# Patient Record
Sex: MALE | Race: WHITE | NOT HISPANIC OR LATINO | Employment: OTHER | RURAL
[De-identification: names, ages, dates, MRNs, and addresses within clinical notes are randomized per-mention and may not be internally consistent; named-entity substitution may affect disease eponyms.]

---

## 2012-09-04 LAB — CRC RECOMMENDATION EXT: NORMAL

## 2019-07-11 ENCOUNTER — HISTORICAL (OUTPATIENT)
Dept: ADMINISTRATIVE | Facility: HOSPITAL | Age: 69
End: 2019-07-11

## 2019-07-12 LAB
LAB AP CLINICAL INFORMATION: NORMAL
LAB AP DIAGNOSIS - HISTORICAL: NORMAL
LAB AP GROSS PATHOLOGY - HISTORICAL: NORMAL
LAB AP SPECIMEN SUBMITTED - HISTORICAL: NORMAL

## 2020-07-28 ENCOUNTER — HISTORICAL (OUTPATIENT)
Dept: ADMINISTRATIVE | Facility: HOSPITAL | Age: 70
End: 2020-07-28

## 2020-07-28 LAB
ANION GAP SERPL CALCULATED.3IONS-SCNC: 11 MMOL/L
APTT PPP: 40.3 SECONDS (ref 25.2–37.3)
BACTERIA #/AREA URNS HPF: ABNORMAL /HPF
BASOPHILS # BLD AUTO: 0.12 X10E3/UL (ref 0–0.2)
BASOPHILS NFR BLD AUTO: 0.5 % (ref 0–1)
BILIRUB UR QL STRIP: NEGATIVE MG/DL
BUN SERPL-MCNC: 20 MG/DL (ref 7–18)
CALCIUM SERPL-MCNC: 8.7 MG/DL (ref 8.5–10.1)
CHLORIDE SERPL-SCNC: 101 MMOL/L (ref 98–107)
CK MB SERPL-MCNC: <1 NG/ML (ref 1–3.6)
CK SERPL-CCNC: 79 U/L (ref 39–308)
CLARITY UR: ABNORMAL
CLARITY UR: ABNORMAL
CO2 SERPL-SCNC: 25 MMOL/L (ref 21–32)
COLOR UR: ABNORMAL
COLOR UR: ABNORMAL
CREAT SERPL-MCNC: 1.43 MG/DL (ref 0.7–1.3)
D DIMER PPP FEU-MCNC: 1.77 UG/ML (ref 0–0.47)
EOSINOPHIL # BLD AUTO: 0.01 X10E3/UL (ref 0–0.5)
EOSINOPHIL NFR BLD AUTO: 0 % (ref 1–4)
ERYTHROCYTE [DISTWIDTH] IN BLOOD BY AUTOMATED COUNT: 12.2 % (ref 11.5–14.5)
GLUCOSE SERPL-MCNC: 124 MG/DL (ref 74–106)
GLUCOSE UR STRIP-MCNC: NEGATIVE MG/DL
HCT VFR BLD AUTO: 34.1 % (ref 40–54)
HGB BLD-MCNC: 11 G/DL (ref 13.5–18)
IMM GRANULOCYTES # BLD AUTO: 0.17 X10E3/UL (ref 0–0.04)
IMM GRANULOCYTES NFR BLD: 0.8 % (ref 0–0.4)
INR BLD: 1.19 (ref 0–3.3)
KETONES UR STRIP-SCNC: NEGATIVE MG/DL
LACTATE SERPL-SCNC: 1.6 MMOL/L (ref 0.4–2)
LEUKOCYTE ESTERASE UR QL STRIP: ABNORMAL LEU/UL
LYMPHOCYTES # BLD AUTO: 0.92 X10E3/UL (ref 1–4.8)
LYMPHOCYTES NFR BLD AUTO: 4.2 % (ref 27–41)
MAGNESIUM SERPL-MCNC: 2 MG/DL (ref 1.7–2.3)
MCH RBC QN AUTO: 32.5 PG (ref 27–31)
MCHC RBC AUTO-ENTMCNC: 32.3 G/DL (ref 32–36)
MCV RBC AUTO: 100.9 FL (ref 80–96)
MONOCYTES # BLD AUTO: 1.11 X10E3/UL (ref 0–0.8)
MONOCYTES NFR BLD AUTO: 5.1 % (ref 2–6)
MPC BLD CALC-MCNC: 8.7 FL (ref 9.4–12.4)
MYOGLOBIN SERPL-MCNC: 79 NG/ML (ref 16–116)
NEUTROPHILS # BLD AUTO: 19.54 X10E3/UL (ref 1.8–7.7)
NEUTROPHILS NFR BLD AUTO: 89.4 % (ref 53–65)
NITRITE UR QL STRIP: POSITIVE
NRBC # BLD AUTO: 0 X10E3/UL (ref 0–0)
NRBC, AUTO (.00): 0 /100 (ref 0–0)
NT-PROBNP SERPL-MCNC: 583 PG/ML (ref 1–125)
PH UR STRIP: 6 PH UNITS (ref 5–8)
PLATELET # BLD AUTO: 351 X10E3/UL (ref 150–400)
POTASSIUM SERPL-SCNC: 4.3 MMOL/L (ref 3.5–5.1)
PROT UR QL STRIP: 100 MG/DL
PROTHROMBIN TIME: 14.5 SECONDS (ref 11.7–14.7)
RBC # BLD AUTO: 3.38 X10E6/UL (ref 4.6–6.2)
RBC # UR STRIP: ABNORMAL ERY/UL
RBC #/AREA URNS HPF: ABNORMAL /HPF (ref 0–3)
SARS-COV+SARS-COV-2 AG RESP QL IA.RAPID: NEGATIVE
SODIUM SERPL-SCNC: 133 MMOL/L (ref 136–145)
SP GR UR STRIP: 1.02 (ref 1–1.03)
SQUAMOUS #/AREA URNS LPF: ABNORMAL /LPF
TROPONIN I SERPL-MCNC: <0.017 NG/ML (ref 0–0.06)
UROBILINOGEN UR STRIP-ACNC: 0.2 EU/DL
WBC # BLD AUTO: 21.87 X10E3/UL (ref 4.5–11)
WBC #/AREA URNS HPF: ABNORMAL /HPF (ref 0–5)

## 2020-07-30 LAB
REPORT: 38
REPORT: NORMAL

## 2020-08-03 LAB
REPORT: NORMAL

## 2020-12-03 ENCOUNTER — HISTORICAL (OUTPATIENT)
Dept: ADMINISTRATIVE | Facility: HOSPITAL | Age: 70
End: 2020-12-03

## 2020-12-03 LAB — CREAT SERPL-MCNC: 1.4 MG/DL (ref 0.6–1.3)

## 2020-12-17 ENCOUNTER — HISTORICAL (OUTPATIENT)
Dept: ADMINISTRATIVE | Facility: HOSPITAL | Age: 70
End: 2020-12-17

## 2021-03-12 RX ORDER — METAXALONE 800 MG/1
800 TABLET ORAL DAILY PRN
COMMUNITY
End: 2021-08-31 | Stop reason: SDUPTHER

## 2021-03-12 RX ORDER — IPRATROPIUM BROMIDE AND ALBUTEROL 20; 100 UG/1; UG/1
1 SPRAY, METERED RESPIRATORY (INHALATION) 2 TIMES DAILY
COMMUNITY
End: 2022-04-04 | Stop reason: SDUPTHER

## 2021-03-12 RX ORDER — OMEPRAZOLE 20 MG/1
20 CAPSULE, DELAYED RELEASE ORAL DAILY
COMMUNITY
End: 2021-12-28 | Stop reason: SDUPTHER

## 2021-03-12 RX ORDER — MUPIROCIN 20 MG/G
OINTMENT TOPICAL 3 TIMES DAILY
COMMUNITY
End: 2021-07-19

## 2021-03-12 RX ORDER — CETIRIZINE HYDROCHLORIDE 10 MG/1
10 TABLET ORAL DAILY
COMMUNITY
End: 2022-04-04 | Stop reason: SDUPTHER

## 2021-03-12 RX ORDER — METHENAMINE HIPPURATE 1000 MG/1
1 TABLET ORAL 2 TIMES DAILY
COMMUNITY
End: 2021-09-21 | Stop reason: SDUPTHER

## 2021-03-12 RX ORDER — LISINOPRIL 40 MG/1
40 TABLET ORAL DAILY
COMMUNITY
End: 2021-10-18

## 2021-03-12 RX ORDER — ROSUVASTATIN CALCIUM 40 MG/1
10 TABLET, COATED ORAL NIGHTLY
COMMUNITY
End: 2021-07-19

## 2021-03-12 RX ORDER — GABAPENTIN 300 MG/1
300 CAPSULE ORAL 3 TIMES DAILY
COMMUNITY
End: 2021-08-18 | Stop reason: SDUPTHER

## 2021-03-12 RX ORDER — FLUOXETINE HYDROCHLORIDE 40 MG/1
40 CAPSULE ORAL DAILY
COMMUNITY
End: 2021-11-10 | Stop reason: SDUPTHER

## 2021-03-18 ENCOUNTER — HOSPITAL ENCOUNTER (OUTPATIENT)
Facility: HOSPITAL | Age: 71
Discharge: HOME | End: 2021-03-18
Attending: PAIN MEDICINE | Admitting: PAIN MEDICINE
Payer: COMMERCIAL

## 2021-03-18 ENCOUNTER — ANESTHESIA (OUTPATIENT)
Dept: PAIN MEDICINE | Facility: HOSPITAL | Age: 71
End: 2021-03-18
Payer: COMMERCIAL

## 2021-03-18 ENCOUNTER — ANESTHESIA EVENT (OUTPATIENT)
Dept: PAIN MEDICINE | Facility: HOSPITAL | Age: 71
End: 2021-03-18
Payer: COMMERCIAL

## 2021-03-18 VITALS
RESPIRATION RATE: 15 BRPM | DIASTOLIC BLOOD PRESSURE: 74 MMHG | SYSTOLIC BLOOD PRESSURE: 131 MMHG | OXYGEN SATURATION: 99 % | HEART RATE: 49 BPM | WEIGHT: 182 LBS | TEMPERATURE: 98 F | BODY MASS INDEX: 31.07 KG/M2 | HEIGHT: 64 IN

## 2021-03-18 DIAGNOSIS — M47.817 SPONDYLOSIS OF LUMBOSACRAL SPINE WITHOUT MYELOPATHY: ICD-10-CM

## 2021-03-18 PROCEDURE — 25000003 PHARM REV CODE 250: Performed by: PAIN MEDICINE

## 2021-03-18 PROCEDURE — 64635 DESTROY LUMB/SAC FACET JNT: CPT | Mod: LT,,, | Performed by: PAIN MEDICINE

## 2021-03-18 PROCEDURE — 64635 PR DESTROY LUMB/SAC FACET JNT: ICD-10-PCS | Mod: LT,,, | Performed by: PAIN MEDICINE

## 2021-03-18 PROCEDURE — 64635 DESTROY LUMB/SAC FACET JNT: CPT | Mod: LT | Performed by: PAIN MEDICINE

## 2021-03-18 PROCEDURE — 27201423 OPTIME MED/SURG SUP & DEVICES STERILE SUPPLY: Performed by: PAIN MEDICINE

## 2021-03-18 PROCEDURE — 64636 DESTROY L/S FACET JNT ADDL: CPT | Mod: LT,,, | Performed by: PAIN MEDICINE

## 2021-03-18 PROCEDURE — 37000008 HC ANESTHESIA 1ST 15 MINUTES: Performed by: PAIN MEDICINE

## 2021-03-18 PROCEDURE — 63600175 PHARM REV CODE 636 W HCPCS: Performed by: NURSE ANESTHETIST, CERTIFIED REGISTERED

## 2021-03-18 PROCEDURE — D9220A PRA ANESTHESIA: ICD-10-PCS | Mod: ,,, | Performed by: NURSE ANESTHETIST, CERTIFIED REGISTERED

## 2021-03-18 PROCEDURE — 64636 DESTROY L/S FACET JNT ADDL: CPT | Mod: 50 | Performed by: PAIN MEDICINE

## 2021-03-18 PROCEDURE — 27000284 HC CANNULA NASAL: Performed by: NURSE ANESTHETIST, CERTIFIED REGISTERED

## 2021-03-18 PROCEDURE — D9220A PRA ANESTHESIA: Mod: ,,, | Performed by: NURSE ANESTHETIST, CERTIFIED REGISTERED

## 2021-03-18 PROCEDURE — 37000009 HC ANESTHESIA EA ADD 15 MINS: Performed by: PAIN MEDICINE

## 2021-03-18 PROCEDURE — 64636 PR DESTROY L/S FACET JNT ADDL: ICD-10-PCS | Mod: LT,,, | Performed by: PAIN MEDICINE

## 2021-03-18 RX ORDER — LIDOCAINE HCL/PF 100 MG/5ML
SYRINGE (ML) INTRAVENOUS
Status: DISCONTINUED | OUTPATIENT
Start: 2021-03-18 | End: 2021-03-18

## 2021-03-18 RX ORDER — BUPIVACAINE HYDROCHLORIDE 2.5 MG/ML
INJECTION, SOLUTION INFILTRATION; PERINEURAL
Status: DISCONTINUED | OUTPATIENT
Start: 2021-03-18 | End: 2021-03-18 | Stop reason: HOSPADM

## 2021-03-18 RX ORDER — PROPOFOL 10 MG/ML
INJECTION, EMULSION INTRAVENOUS
Status: DISCONTINUED | OUTPATIENT
Start: 2021-03-18 | End: 2021-03-18

## 2021-03-18 RX ORDER — ORPHENADRINE CITRATE 30 MG/ML
INJECTION INTRAMUSCULAR; INTRAVENOUS
Status: DISCONTINUED | OUTPATIENT
Start: 2021-03-18 | End: 2021-03-18

## 2021-03-18 RX ADMIN — PROPOFOL 20 MG: 10 INJECTION, EMULSION INTRAVENOUS at 11:03

## 2021-03-18 RX ADMIN — Medication 100 MG: at 11:03

## 2021-03-18 RX ADMIN — PROPOFOL 20 MG: 10 INJECTION, EMULSION INTRAVENOUS at 12:03

## 2021-03-18 RX ADMIN — PROPOFOL 60 MG: 10 INJECTION, EMULSION INTRAVENOUS at 11:03

## 2021-03-18 RX ADMIN — PROPOFOL 30 MG: 10 INJECTION, EMULSION INTRAVENOUS at 11:03

## 2021-03-18 RX ADMIN — ORPHENADRINE CITRATE 60 MG: 30 INJECTION INTRAMUSCULAR; INTRAVENOUS at 11:03

## 2021-03-31 ENCOUNTER — TELEPHONE (OUTPATIENT)
Dept: PAIN MEDICINE | Facility: HOSPITAL | Age: 71
End: 2021-03-31

## 2021-04-01 ENCOUNTER — HOSPITAL ENCOUNTER (OUTPATIENT)
Facility: HOSPITAL | Age: 71
Discharge: HOME | End: 2021-04-01
Attending: PAIN MEDICINE | Admitting: PAIN MEDICINE
Payer: COMMERCIAL

## 2021-04-01 ENCOUNTER — ANESTHESIA EVENT (OUTPATIENT)
Dept: PAIN MEDICINE | Facility: HOSPITAL | Age: 71
End: 2021-04-01
Payer: COMMERCIAL

## 2021-04-01 ENCOUNTER — ANESTHESIA (OUTPATIENT)
Dept: PAIN MEDICINE | Facility: HOSPITAL | Age: 71
End: 2021-04-01
Payer: COMMERCIAL

## 2021-04-01 VITALS
BODY MASS INDEX: 25.25 KG/M2 | SYSTOLIC BLOOD PRESSURE: 142 MMHG | WEIGHT: 176.38 LBS | HEIGHT: 70 IN | OXYGEN SATURATION: 98 % | RESPIRATION RATE: 14 BRPM | DIASTOLIC BLOOD PRESSURE: 72 MMHG | HEART RATE: 62 BPM | TEMPERATURE: 97 F

## 2021-04-01 DIAGNOSIS — M47.817 SPONDYLOSIS OF LUMBOSACRAL SPINE WITHOUT MYELOPATHY: Primary | ICD-10-CM

## 2021-04-01 DIAGNOSIS — M47.817 SPONDYLOSIS OF LUMBOSACRAL REGION WITHOUT MYELOPATHY OR RADICULOPATHY: ICD-10-CM

## 2021-04-01 PROCEDURE — D9220A PRA ANESTHESIA: Mod: ,,, | Performed by: NURSE ANESTHETIST, CERTIFIED REGISTERED

## 2021-04-01 PROCEDURE — 25000003 PHARM REV CODE 250: Performed by: NURSE ANESTHETIST, CERTIFIED REGISTERED

## 2021-04-01 PROCEDURE — 64636 PR DESTROY L/S FACET JNT ADDL: ICD-10-PCS | Mod: RT,,, | Performed by: PAIN MEDICINE

## 2021-04-01 PROCEDURE — 25000003 PHARM REV CODE 250: Performed by: PAIN MEDICINE

## 2021-04-01 PROCEDURE — D9220A PRA ANESTHESIA: ICD-10-PCS | Mod: ,,, | Performed by: NURSE ANESTHETIST, CERTIFIED REGISTERED

## 2021-04-01 PROCEDURE — 64636 DESTROY L/S FACET JNT ADDL: CPT | Mod: RT,,, | Performed by: PAIN MEDICINE

## 2021-04-01 PROCEDURE — 63600175 PHARM REV CODE 636 W HCPCS: Performed by: NURSE ANESTHETIST, CERTIFIED REGISTERED

## 2021-04-01 PROCEDURE — 64636 DESTROY L/S FACET JNT ADDL: CPT | Mod: RT | Performed by: PAIN MEDICINE

## 2021-04-01 PROCEDURE — 64635 DESTROY LUMB/SAC FACET JNT: CPT | Mod: RT,,, | Performed by: PAIN MEDICINE

## 2021-04-01 PROCEDURE — 64635 PR DESTROY LUMB/SAC FACET JNT: ICD-10-PCS | Mod: RT,,, | Performed by: PAIN MEDICINE

## 2021-04-01 PROCEDURE — 64635 DESTROY LUMB/SAC FACET JNT: CPT | Mod: RT | Performed by: PAIN MEDICINE

## 2021-04-01 PROCEDURE — 37000009 HC ANESTHESIA EA ADD 15 MINS: Performed by: PAIN MEDICINE

## 2021-04-01 PROCEDURE — 27201423 OPTIME MED/SURG SUP & DEVICES STERILE SUPPLY: Performed by: PAIN MEDICINE

## 2021-04-01 PROCEDURE — 37000008 HC ANESTHESIA 1ST 15 MINUTES: Performed by: PAIN MEDICINE

## 2021-04-01 RX ORDER — ORPHENADRINE CITRATE 30 MG/ML
INJECTION INTRAMUSCULAR; INTRAVENOUS
Status: DISCONTINUED | OUTPATIENT
Start: 2021-04-01 | End: 2021-04-01

## 2021-04-01 RX ORDER — SODIUM CHLORIDE 9 MG/ML
INJECTION, SOLUTION INTRAVENOUS CONTINUOUS
Status: DISCONTINUED | OUTPATIENT
Start: 2021-04-01 | End: 2021-09-30

## 2021-04-01 RX ORDER — PROPOFOL 10 MG/ML
VIAL (ML) INTRAVENOUS
Status: DISCONTINUED | OUTPATIENT
Start: 2021-04-01 | End: 2021-04-01

## 2021-04-01 RX ORDER — LIDOCAINE HYDROCHLORIDE 10 MG/ML
INJECTION, SOLUTION INTRAVENOUS
Status: DISCONTINUED | OUTPATIENT
Start: 2021-04-01 | End: 2021-04-01

## 2021-04-01 RX ADMIN — PROPOFOL 50 MG: 10 INJECTION, EMULSION INTRAVENOUS at 10:04

## 2021-04-01 RX ADMIN — PROPOFOL 50 MG: 10 INJECTION, EMULSION INTRAVENOUS at 09:04

## 2021-04-01 RX ADMIN — SODIUM CHLORIDE: 9 INJECTION, SOLUTION INTRAVENOUS at 09:04

## 2021-04-01 RX ADMIN — LIDOCAINE HYDROCHLORIDE 100 MG: 10 INJECTION, SOLUTION INTRAVENOUS at 09:04

## 2021-04-01 RX ADMIN — ORPHENADRINE CITRATE 60 MG: 30 INJECTION INTRAMUSCULAR; INTRAVENOUS at 10:04

## 2021-04-20 ENCOUNTER — TELEPHONE (OUTPATIENT)
Dept: PAIN MEDICINE | Facility: CLINIC | Age: 71
End: 2021-04-20

## 2021-07-16 RX ORDER — MULTIVITAMIN
1 TABLET ORAL DAILY
COMMUNITY
End: 2022-08-22 | Stop reason: SDUPTHER

## 2021-07-19 ENCOUNTER — OFFICE VISIT (OUTPATIENT)
Dept: FAMILY MEDICINE | Facility: CLINIC | Age: 71
End: 2021-07-19
Payer: COMMERCIAL

## 2021-07-19 VITALS
WEIGHT: 181.19 LBS | HEART RATE: 72 BPM | DIASTOLIC BLOOD PRESSURE: 60 MMHG | SYSTOLIC BLOOD PRESSURE: 108 MMHG | OXYGEN SATURATION: 97 % | TEMPERATURE: 98 F | BODY MASS INDEX: 25.94 KG/M2 | HEIGHT: 70 IN | RESPIRATION RATE: 20 BRPM

## 2021-07-19 DIAGNOSIS — N18.9 CHRONIC RENAL IMPAIRMENT, UNSPECIFIED CKD STAGE: ICD-10-CM

## 2021-07-19 DIAGNOSIS — E78.00 HYPERCHOLESTEREMIA: ICD-10-CM

## 2021-07-19 DIAGNOSIS — G89.4 CHRONIC PAIN SYNDROME: ICD-10-CM

## 2021-07-19 DIAGNOSIS — Z11.59 NEED FOR HEPATITIS C SCREENING TEST: ICD-10-CM

## 2021-07-19 DIAGNOSIS — R73.03 PREDIABETES: Primary | ICD-10-CM

## 2021-07-19 DIAGNOSIS — I10 ESSENTIAL HYPERTENSION: ICD-10-CM

## 2021-07-19 DIAGNOSIS — Z79.899 ENCOUNTER FOR LONG-TERM (CURRENT) USE OF OTHER MEDICATIONS: ICD-10-CM

## 2021-07-19 PROCEDURE — 1159F MED LIST DOCD IN RCRD: CPT | Mod: CPTII,,, | Performed by: NURSE PRACTITIONER

## 2021-07-19 PROCEDURE — 1126F AMNT PAIN NOTED NONE PRSNT: CPT | Mod: CPTII,,, | Performed by: NURSE PRACTITIONER

## 2021-07-19 PROCEDURE — 3074F SYST BP LT 130 MM HG: CPT | Mod: CPTII,,, | Performed by: NURSE PRACTITIONER

## 2021-07-19 PROCEDURE — 3008F PR BODY MASS INDEX (BMI) DOCUMENTED: ICD-10-PCS | Mod: CPTII,,, | Performed by: NURSE PRACTITIONER

## 2021-07-19 PROCEDURE — 1157F PR ADVANCE CARE PLAN OR EQUIV PRESENT IN MEDICAL RECORD: ICD-10-PCS | Mod: CPTII,,, | Performed by: NURSE PRACTITIONER

## 2021-07-19 PROCEDURE — 3078F DIAST BP <80 MM HG: CPT | Mod: CPTII,,, | Performed by: NURSE PRACTITIONER

## 2021-07-19 PROCEDURE — 1126F PR PAIN SEVERITY QUANTIFIED, NO PAIN PRESENT: ICD-10-PCS | Mod: CPTII,,, | Performed by: NURSE PRACTITIONER

## 2021-07-19 PROCEDURE — 3008F BODY MASS INDEX DOCD: CPT | Mod: CPTII,,, | Performed by: NURSE PRACTITIONER

## 2021-07-19 PROCEDURE — 99204 OFFICE O/P NEW MOD 45 MIN: CPT | Mod: ,,, | Performed by: NURSE PRACTITIONER

## 2021-07-19 PROCEDURE — 3078F PR MOST RECENT DIASTOLIC BLOOD PRESSURE < 80 MM HG: ICD-10-PCS | Mod: CPTII,,, | Performed by: NURSE PRACTITIONER

## 2021-07-19 PROCEDURE — 3074F PR MOST RECENT SYSTOLIC BLOOD PRESSURE < 130 MM HG: ICD-10-PCS | Mod: CPTII,,, | Performed by: NURSE PRACTITIONER

## 2021-07-19 PROCEDURE — 99204 PR OFFICE/OUTPT VISIT, NEW, LEVL IV, 45-59 MIN: ICD-10-PCS | Mod: ,,, | Performed by: NURSE PRACTITIONER

## 2021-07-19 PROCEDURE — 1159F PR MEDICATION LIST DOCUMENTED IN MEDICAL RECORD: ICD-10-PCS | Mod: CPTII,,, | Performed by: NURSE PRACTITIONER

## 2021-07-19 PROCEDURE — 1157F ADVNC CARE PLAN IN RCRD: CPT | Mod: CPTII,,, | Performed by: NURSE PRACTITIONER

## 2021-07-19 RX ORDER — ROSUVASTATIN CALCIUM 20 MG/1
10 TABLET, FILM COATED ORAL DAILY
COMMUNITY
Start: 2021-07-12

## 2021-07-19 RX ORDER — ACETAMINOPHEN 500 MG
1 TABLET ORAL DAILY
COMMUNITY

## 2021-07-19 RX ORDER — AMOXICILLIN 500 MG
1 CAPSULE ORAL 2 TIMES DAILY
COMMUNITY

## 2021-07-19 RX ORDER — HYDROCORTISONE 25 MG/G
30 CREAM TOPICAL 2 TIMES DAILY PRN
COMMUNITY

## 2021-07-19 RX ORDER — KETOCONAZOLE 20 MG/G
CREAM TOPICAL DAILY PRN
COMMUNITY

## 2021-07-19 RX ORDER — ZINC GLUCONATE 50 MG
50 TABLET ORAL DAILY
COMMUNITY

## 2021-07-19 RX ORDER — ACETAMINOPHEN 500 MG
500 TABLET ORAL EVERY 6 HOURS PRN
COMMUNITY
End: 2022-05-12

## 2021-07-19 RX ORDER — TRIAMCINOLONE ACETONIDE 1 MG/G
15 CREAM TOPICAL 2 TIMES DAILY PRN
COMMUNITY

## 2021-07-19 RX ORDER — DIPHENHYDRAMINE HCL 25 MG
25 TABLET ORAL NIGHTLY PRN
COMMUNITY
End: 2022-03-15

## 2021-08-18 RX ORDER — GABAPENTIN 300 MG/1
300 CAPSULE ORAL 3 TIMES DAILY
Qty: 90 CAPSULE | Refills: 11 | Status: SHIPPED | OUTPATIENT
Start: 2021-08-18 | End: 2022-05-12 | Stop reason: SDUPTHER

## 2021-08-31 RX ORDER — METAXALONE 800 MG/1
800 TABLET ORAL DAILY PRN
Qty: 90 TABLET | Refills: 0 | Status: SHIPPED | OUTPATIENT
Start: 2021-08-31 | End: 2021-12-14 | Stop reason: SDUPTHER

## 2021-09-08 ENCOUNTER — TELEPHONE (OUTPATIENT)
Dept: FAMILY MEDICINE | Facility: CLINIC | Age: 71
End: 2021-09-08

## 2021-09-14 ENCOUNTER — TELEPHONE (OUTPATIENT)
Dept: FAMILY MEDICINE | Facility: CLINIC | Age: 71
End: 2021-09-14

## 2021-09-14 DIAGNOSIS — I10 ESSENTIAL HYPERTENSION: Primary | ICD-10-CM

## 2021-09-15 RX ORDER — AMLODIPINE BESYLATE 5 MG/1
5 TABLET ORAL DAILY
Qty: 90 TABLET | Refills: 1 | Status: SHIPPED | OUTPATIENT
Start: 2021-09-15 | End: 2021-11-10 | Stop reason: SDUPTHER

## 2021-09-21 RX ORDER — METHENAMINE HIPPURATE 1000 MG/1
1 TABLET ORAL 2 TIMES DAILY
Qty: 180 TABLET | Refills: 1 | Status: SHIPPED | OUTPATIENT
Start: 2021-09-21 | End: 2022-03-21 | Stop reason: SDUPTHER

## 2021-09-29 DIAGNOSIS — M25.562 LEFT KNEE PAIN, UNSPECIFIED CHRONICITY: Primary | ICD-10-CM

## 2021-09-30 ENCOUNTER — HOSPITAL ENCOUNTER (OUTPATIENT)
Dept: RADIOLOGY | Facility: HOSPITAL | Age: 71
Discharge: HOME | End: 2021-09-30
Attending: ORTHOPAEDIC SURGERY
Payer: COMMERCIAL

## 2021-09-30 ENCOUNTER — OFFICE VISIT (OUTPATIENT)
Dept: FAMILY MEDICINE | Facility: CLINIC | Age: 71
End: 2021-09-30
Payer: COMMERCIAL

## 2021-09-30 ENCOUNTER — OFFICE VISIT (OUTPATIENT)
Dept: ORTHOPEDICS | Facility: CLINIC | Age: 71
End: 2021-09-30
Payer: COMMERCIAL

## 2021-09-30 VITALS
WEIGHT: 184.81 LBS | HEART RATE: 72 BPM | RESPIRATION RATE: 20 BRPM | TEMPERATURE: 97 F | DIASTOLIC BLOOD PRESSURE: 68 MMHG | HEIGHT: 70 IN | OXYGEN SATURATION: 97 % | BODY MASS INDEX: 26.46 KG/M2 | SYSTOLIC BLOOD PRESSURE: 130 MMHG

## 2021-09-30 DIAGNOSIS — M17.12 PRIMARY LOCALIZED OSTEOARTHRITIS OF LEFT KNEE: Primary | ICD-10-CM

## 2021-09-30 DIAGNOSIS — Z79.899 ENCOUNTER FOR LONG-TERM (CURRENT) USE OF OTHER MEDICATIONS: ICD-10-CM

## 2021-09-30 DIAGNOSIS — I10 ESSENTIAL HYPERTENSION: Primary | ICD-10-CM

## 2021-09-30 DIAGNOSIS — G56.01 CARPAL TUNNEL SYNDROME OF RIGHT WRIST: ICD-10-CM

## 2021-09-30 DIAGNOSIS — M79.601 PAIN AND NUMBNESS OF RIGHT UPPER EXTREMITY: ICD-10-CM

## 2021-09-30 DIAGNOSIS — R20.0 PAIN AND NUMBNESS OF RIGHT UPPER EXTREMITY: ICD-10-CM

## 2021-09-30 DIAGNOSIS — M25.562 LEFT KNEE PAIN, UNSPECIFIED CHRONICITY: ICD-10-CM

## 2021-09-30 DIAGNOSIS — E78.00 HYPERCHOLESTEREMIA: ICD-10-CM

## 2021-09-30 LAB
ALBUMIN SERPL BCP-MCNC: 4 G/DL (ref 3.5–5)
ALBUMIN/GLOB SERPL: 1 {RATIO}
ALP SERPL-CCNC: 36 U/L (ref 45–115)
ALT SERPL W P-5'-P-CCNC: 19 U/L (ref 16–61)
ANION GAP SERPL CALCULATED.3IONS-SCNC: 6 MMOL/L (ref 7–16)
AST SERPL W P-5'-P-CCNC: 15 U/L (ref 15–37)
BASOPHILS # BLD AUTO: 0.09 K/UL (ref 0–0.2)
BASOPHILS NFR BLD AUTO: 1.2 % (ref 0–1)
BILIRUB SERPL-MCNC: 0.5 MG/DL (ref 0–1.2)
BUN SERPL-MCNC: 22 MG/DL (ref 7–18)
BUN/CREAT SERPL: 20 (ref 6–20)
CALCIUM SERPL-MCNC: 10 MG/DL (ref 8.5–10.1)
CHLORIDE SERPL-SCNC: 110 MMOL/L (ref 98–107)
CHOLEST SERPL-MCNC: 114 MG/DL (ref 0–200)
CHOLEST/HDLC SERPL: 2.2 {RATIO}
CO2 SERPL-SCNC: 29 MMOL/L (ref 21–32)
CREAT SERPL-MCNC: 1.09 MG/DL (ref 0.7–1.3)
DIFFERENTIAL METHOD BLD: ABNORMAL
EOSINOPHIL # BLD AUTO: 0.37 K/UL (ref 0–0.5)
EOSINOPHIL NFR BLD AUTO: 4.9 % (ref 1–4)
ERYTHROCYTE [DISTWIDTH] IN BLOOD BY AUTOMATED COUNT: 12.2 % (ref 11.5–14.5)
GLOBULIN SER-MCNC: 3.9 G/DL (ref 2–4)
GLUCOSE SERPL-MCNC: 104 MG/DL (ref 74–106)
HCT VFR BLD AUTO: 39.3 % (ref 40–54)
HDLC SERPL-MCNC: 51 MG/DL (ref 40–60)
HGB BLD-MCNC: 12.7 G/DL (ref 13.5–18)
IMM GRANULOCYTES # BLD AUTO: 0.02 K/UL (ref 0–0.04)
IMM GRANULOCYTES NFR BLD: 0.3 % (ref 0–0.4)
LDLC SERPL CALC-MCNC: 47 MG/DL
LDLC/HDLC SERPL: 0.9 {RATIO}
LYMPHOCYTES # BLD AUTO: 2.49 K/UL (ref 1–4.8)
LYMPHOCYTES NFR BLD AUTO: 33.2 % (ref 27–41)
MCH RBC QN AUTO: 31.4 PG (ref 27–31)
MCHC RBC AUTO-ENTMCNC: 32.3 G/DL (ref 32–36)
MCV RBC AUTO: 97 FL (ref 80–96)
MONOCYTES # BLD AUTO: 0.5 K/UL (ref 0–0.8)
MONOCYTES NFR BLD AUTO: 6.7 % (ref 2–6)
MPC BLD CALC-MCNC: 9.9 FL (ref 9.4–12.4)
NEUTROPHILS # BLD AUTO: 4.04 K/UL (ref 1.8–7.7)
NEUTROPHILS NFR BLD AUTO: 53.7 % (ref 53–65)
NONHDLC SERPL-MCNC: 63 MG/DL
NRBC # BLD AUTO: 0 X10E3/UL
NRBC, AUTO (.00): 0 %
PLATELET # BLD AUTO: 275 K/UL (ref 150–400)
POTASSIUM SERPL-SCNC: 4.4 MMOL/L (ref 3.5–5.1)
PROT SERPL-MCNC: 7.9 G/DL (ref 6.4–8.2)
RBC # BLD AUTO: 4.05 M/UL (ref 4.6–6.2)
SODIUM SERPL-SCNC: 141 MMOL/L (ref 136–145)
TRIGL SERPL-MCNC: 80 MG/DL (ref 35–150)
TSH SERPL DL<=0.005 MIU/L-ACNC: 1.54 UIU/ML (ref 0.36–3.74)
VLDLC SERPL-MCNC: 16 MG/DL
WBC # BLD AUTO: 7.51 K/UL (ref 4.5–11)

## 2021-09-30 PROCEDURE — 84443 ASSAY THYROID STIM HORMONE: CPT | Mod: ,,, | Performed by: CLINICAL MEDICAL LABORATORY

## 2021-09-30 PROCEDURE — 80061 LIPID PANEL: CPT | Mod: ,,, | Performed by: CLINICAL MEDICAL LABORATORY

## 2021-09-30 PROCEDURE — 73564 X-RAY EXAM KNEE 4 OR MORE: CPT | Mod: TC,LT

## 2021-09-30 PROCEDURE — 4010F ACE/ARB THERAPY RXD/TAKEN: CPT | Mod: CPTII,,, | Performed by: NURSE PRACTITIONER

## 2021-09-30 PROCEDURE — 99213 OFFICE O/P EST LOW 20 MIN: CPT | Mod: ,,, | Performed by: NURSE PRACTITIONER

## 2021-09-30 PROCEDURE — 1157F ADVNC CARE PLAN IN RCRD: CPT | Mod: CPTII,,, | Performed by: NURSE PRACTITIONER

## 2021-09-30 PROCEDURE — 80053 PR  METABOLIC PANEL,COMPREHENSIVE: ICD-10-PCS | Mod: ,,, | Performed by: CLINICAL MEDICAL LABORATORY

## 2021-09-30 PROCEDURE — 80061 LIPID PANEL: ICD-10-PCS | Mod: ,,, | Performed by: CLINICAL MEDICAL LABORATORY

## 2021-09-30 PROCEDURE — 84443 PR  ASSAY THYROID STIM HORMONE: ICD-10-PCS | Mod: ,,, | Performed by: CLINICAL MEDICAL LABORATORY

## 2021-09-30 PROCEDURE — 73564 X-RAY EXAM KNEE 4 OR MORE: CPT | Mod: 26,LT,, | Performed by: ORTHOPAEDIC SURGERY

## 2021-09-30 PROCEDURE — 1159F PR MEDICATION LIST DOCUMENTED IN MEDICAL RECORD: ICD-10-PCS | Mod: CPTII,,, | Performed by: NURSE PRACTITIONER

## 2021-09-30 PROCEDURE — 4010F PR ACE/ARB THEARPY RXD/TAKEN: ICD-10-PCS | Mod: CPTII,,, | Performed by: ORTHOPAEDIC SURGERY

## 2021-09-30 PROCEDURE — 1157F PR ADVANCE CARE PLAN OR EQUIV PRESENT IN MEDICAL RECORD: ICD-10-PCS | Mod: CPTII,,, | Performed by: ORTHOPAEDIC SURGERY

## 2021-09-30 PROCEDURE — 99213 PR OFFICE/OUTPT VISIT, EST, LEVL III, 20-29 MIN: ICD-10-PCS | Mod: ,,, | Performed by: ORTHOPAEDIC SURGERY

## 2021-09-30 PROCEDURE — 4010F ACE/ARB THERAPY RXD/TAKEN: CPT | Mod: CPTII,,, | Performed by: ORTHOPAEDIC SURGERY

## 2021-09-30 PROCEDURE — 3078F DIAST BP <80 MM HG: CPT | Mod: CPTII,,, | Performed by: NURSE PRACTITIONER

## 2021-09-30 PROCEDURE — 4010F PR ACE/ARB THEARPY RXD/TAKEN: ICD-10-PCS | Mod: CPTII,,, | Performed by: NURSE PRACTITIONER

## 2021-09-30 PROCEDURE — 73564 XR KNEE COMP 4 OR MORE VIEWS LEFT: ICD-10-PCS | Mod: 26,LT,, | Performed by: ORTHOPAEDIC SURGERY

## 2021-09-30 PROCEDURE — 1126F PR PAIN SEVERITY QUANTIFIED, NO PAIN PRESENT: ICD-10-PCS | Mod: CPTII,,, | Performed by: NURSE PRACTITIONER

## 2021-09-30 PROCEDURE — 99213 PR OFFICE/OUTPT VISIT, EST, LEVL III, 20-29 MIN: ICD-10-PCS | Mod: ,,, | Performed by: NURSE PRACTITIONER

## 2021-09-30 PROCEDURE — 3075F SYST BP GE 130 - 139MM HG: CPT | Mod: CPTII,,, | Performed by: NURSE PRACTITIONER

## 2021-09-30 PROCEDURE — 3044F HG A1C LEVEL LT 7.0%: CPT | Mod: CPTII,,, | Performed by: NURSE PRACTITIONER

## 2021-09-30 PROCEDURE — 99213 OFFICE O/P EST LOW 20 MIN: CPT | Mod: ,,, | Performed by: ORTHOPAEDIC SURGERY

## 2021-09-30 PROCEDURE — 3008F BODY MASS INDEX DOCD: CPT | Mod: CPTII,,, | Performed by: NURSE PRACTITIONER

## 2021-09-30 PROCEDURE — 3044F HG A1C LEVEL LT 7.0%: CPT | Mod: CPTII,,, | Performed by: ORTHOPAEDIC SURGERY

## 2021-09-30 PROCEDURE — 3078F PR MOST RECENT DIASTOLIC BLOOD PRESSURE < 80 MM HG: ICD-10-PCS | Mod: CPTII,,, | Performed by: NURSE PRACTITIONER

## 2021-09-30 PROCEDURE — 3008F PR BODY MASS INDEX (BMI) DOCUMENTED: ICD-10-PCS | Mod: CPTII,,, | Performed by: NURSE PRACTITIONER

## 2021-09-30 PROCEDURE — 1126F AMNT PAIN NOTED NONE PRSNT: CPT | Mod: CPTII,,, | Performed by: NURSE PRACTITIONER

## 2021-09-30 PROCEDURE — 85025 PR  COMPLETE CBC & AUTO DIFF WBC: ICD-10-PCS | Mod: ,,, | Performed by: CLINICAL MEDICAL LABORATORY

## 2021-09-30 PROCEDURE — 1157F PR ADVANCE CARE PLAN OR EQUIV PRESENT IN MEDICAL RECORD: ICD-10-PCS | Mod: CPTII,,, | Performed by: NURSE PRACTITIONER

## 2021-09-30 PROCEDURE — 3044F PR MOST RECENT HEMOGLOBIN A1C LEVEL <7.0%: ICD-10-PCS | Mod: CPTII,,, | Performed by: ORTHOPAEDIC SURGERY

## 2021-09-30 PROCEDURE — 3044F PR MOST RECENT HEMOGLOBIN A1C LEVEL <7.0%: ICD-10-PCS | Mod: CPTII,,, | Performed by: NURSE PRACTITIONER

## 2021-09-30 PROCEDURE — 85025 COMPLETE CBC W/AUTO DIFF WBC: CPT | Mod: ,,, | Performed by: CLINICAL MEDICAL LABORATORY

## 2021-09-30 PROCEDURE — 3075F PR MOST RECENT SYSTOLIC BLOOD PRESS GE 130-139MM HG: ICD-10-PCS | Mod: CPTII,,, | Performed by: NURSE PRACTITIONER

## 2021-09-30 PROCEDURE — 1157F ADVNC CARE PLAN IN RCRD: CPT | Mod: CPTII,,, | Performed by: ORTHOPAEDIC SURGERY

## 2021-09-30 PROCEDURE — 80053 COMPREHEN METABOLIC PANEL: CPT | Mod: ,,, | Performed by: CLINICAL MEDICAL LABORATORY

## 2021-09-30 PROCEDURE — 1159F MED LIST DOCD IN RCRD: CPT | Mod: CPTII,,, | Performed by: NURSE PRACTITIONER

## 2021-10-05 DIAGNOSIS — G56.01 CARPAL TUNNEL SYNDROME OF RIGHT WRIST: Primary | ICD-10-CM

## 2021-10-18 ENCOUNTER — HOSPITAL ENCOUNTER (OUTPATIENT)
Dept: RADIOLOGY | Facility: HOSPITAL | Age: 71
Discharge: HOME | End: 2021-10-18
Attending: NURSE PRACTITIONER
Payer: COMMERCIAL

## 2021-10-18 ENCOUNTER — OFFICE VISIT (OUTPATIENT)
Dept: ORTHOPEDICS | Facility: CLINIC | Age: 71
End: 2021-10-18
Payer: COMMERCIAL

## 2021-10-18 DIAGNOSIS — M25.551 RIGHT HIP PAIN: ICD-10-CM

## 2021-10-18 DIAGNOSIS — M17.0 PRIMARY LOCALIZED OSTEOARTHRITIS OF KNEES, BILATERAL: Primary | ICD-10-CM

## 2021-10-18 DIAGNOSIS — M25.551 RIGHT HIP PAIN: Primary | ICD-10-CM

## 2021-10-18 DIAGNOSIS — M17.0 BILATERAL PRIMARY OSTEOARTHRITIS OF KNEE: ICD-10-CM

## 2021-10-18 PROCEDURE — 20610 LARGE JOINT ASPIRATION/INJECTION: BILATERAL PATELLAR BURSA: ICD-10-PCS | Mod: 50,,, | Performed by: NURSE PRACTITIONER

## 2021-10-18 PROCEDURE — 1157F PR ADVANCE CARE PLAN OR EQUIV PRESENT IN MEDICAL RECORD: ICD-10-PCS | Mod: CPTII,,, | Performed by: NURSE PRACTITIONER

## 2021-10-18 PROCEDURE — 4010F ACE/ARB THERAPY RXD/TAKEN: CPT | Mod: CPTII,,, | Performed by: NURSE PRACTITIONER

## 2021-10-18 PROCEDURE — 1157F ADVNC CARE PLAN IN RCRD: CPT | Mod: CPTII,,, | Performed by: NURSE PRACTITIONER

## 2021-10-18 PROCEDURE — 73502 X-RAY EXAM HIP UNI 2-3 VIEWS: CPT | Mod: 26,RT,, | Performed by: STUDENT IN AN ORGANIZED HEALTH CARE EDUCATION/TRAINING PROGRAM

## 2021-10-18 PROCEDURE — 73502 X-RAY EXAM HIP UNI 2-3 VIEWS: CPT | Mod: TC,RT

## 2021-10-18 PROCEDURE — 3044F HG A1C LEVEL LT 7.0%: CPT | Mod: CPTII,,, | Performed by: NURSE PRACTITIONER

## 2021-10-18 PROCEDURE — 20610 DRAIN/INJ JOINT/BURSA W/O US: CPT | Mod: 50,,, | Performed by: NURSE PRACTITIONER

## 2021-10-18 PROCEDURE — 4010F PR ACE/ARB THEARPY RXD/TAKEN: ICD-10-PCS | Mod: CPTII,,, | Performed by: NURSE PRACTITIONER

## 2021-10-18 PROCEDURE — 3044F PR MOST RECENT HEMOGLOBIN A1C LEVEL <7.0%: ICD-10-PCS | Mod: CPTII,,, | Performed by: NURSE PRACTITIONER

## 2021-10-18 PROCEDURE — 73502 XR PELVIS 3 VIEW INC HIP 2 VIEW RIGHT: ICD-10-PCS | Mod: 26,RT,, | Performed by: STUDENT IN AN ORGANIZED HEALTH CARE EDUCATION/TRAINING PROGRAM

## 2021-10-18 RX ORDER — TRIAMCINOLONE ACETONIDE 40 MG/ML
40 INJECTION, SUSPENSION INTRA-ARTICULAR; INTRAMUSCULAR
Status: DISCONTINUED | OUTPATIENT
Start: 2021-10-18 | End: 2021-10-18 | Stop reason: HOSPADM

## 2021-10-18 RX ADMIN — TRIAMCINOLONE ACETONIDE 40 MG: 40 INJECTION, SUSPENSION INTRA-ARTICULAR; INTRAMUSCULAR at 02:10

## 2021-10-20 DIAGNOSIS — M79.603 PAIN AND NUMBNESS OF UPPER EXTREMITY: Primary | ICD-10-CM

## 2021-10-20 DIAGNOSIS — R20.0 PAIN AND NUMBNESS OF UPPER EXTREMITY: Primary | ICD-10-CM

## 2021-10-27 ENCOUNTER — TELEPHONE (OUTPATIENT)
Dept: FAMILY MEDICINE | Facility: CLINIC | Age: 71
End: 2021-10-27
Payer: MEDICARE

## 2021-11-10 ENCOUNTER — OFFICE VISIT (OUTPATIENT)
Dept: FAMILY MEDICINE | Facility: CLINIC | Age: 71
End: 2021-11-10
Payer: COMMERCIAL

## 2021-11-10 VITALS
SYSTOLIC BLOOD PRESSURE: 132 MMHG | WEIGHT: 185.19 LBS | HEIGHT: 70 IN | DIASTOLIC BLOOD PRESSURE: 88 MMHG | TEMPERATURE: 98 F | OXYGEN SATURATION: 98 % | RESPIRATION RATE: 16 BRPM | HEART RATE: 67 BPM | BODY MASS INDEX: 26.51 KG/M2

## 2021-11-10 DIAGNOSIS — I10 ESSENTIAL HYPERTENSION: Primary | ICD-10-CM

## 2021-11-10 DIAGNOSIS — Z82.49 FH: AORTIC ANEURYSM: ICD-10-CM

## 2021-11-10 DIAGNOSIS — Z13.6 ENCOUNTER FOR ABDOMINAL AORTIC ANEURYSM SCREENING: ICD-10-CM

## 2021-11-10 DIAGNOSIS — Z87.891 FORMER SMOKER: ICD-10-CM

## 2021-11-10 PROCEDURE — 1157F ADVNC CARE PLAN IN RCRD: CPT | Mod: CPTII,,, | Performed by: NURSE PRACTITIONER

## 2021-11-10 PROCEDURE — 4010F ACE/ARB THERAPY RXD/TAKEN: CPT | Mod: CPTII,,, | Performed by: NURSE PRACTITIONER

## 2021-11-10 PROCEDURE — 3008F PR BODY MASS INDEX (BMI) DOCUMENTED: ICD-10-PCS | Mod: CPTII,,, | Performed by: NURSE PRACTITIONER

## 2021-11-10 PROCEDURE — 1126F AMNT PAIN NOTED NONE PRSNT: CPT | Mod: CPTII,,, | Performed by: NURSE PRACTITIONER

## 2021-11-10 PROCEDURE — 1159F PR MEDICATION LIST DOCUMENTED IN MEDICAL RECORD: ICD-10-PCS | Mod: CPTII,,, | Performed by: NURSE PRACTITIONER

## 2021-11-10 PROCEDURE — 3075F SYST BP GE 130 - 139MM HG: CPT | Mod: CPTII,,, | Performed by: NURSE PRACTITIONER

## 2021-11-10 PROCEDURE — 99213 OFFICE O/P EST LOW 20 MIN: CPT | Mod: ,,, | Performed by: NURSE PRACTITIONER

## 2021-11-10 PROCEDURE — 1126F PR PAIN SEVERITY QUANTIFIED, NO PAIN PRESENT: ICD-10-PCS | Mod: CPTII,,, | Performed by: NURSE PRACTITIONER

## 2021-11-10 PROCEDURE — 3044F PR MOST RECENT HEMOGLOBIN A1C LEVEL <7.0%: ICD-10-PCS | Mod: CPTII,,, | Performed by: NURSE PRACTITIONER

## 2021-11-10 PROCEDURE — 3044F HG A1C LEVEL LT 7.0%: CPT | Mod: CPTII,,, | Performed by: NURSE PRACTITIONER

## 2021-11-10 PROCEDURE — 3075F PR MOST RECENT SYSTOLIC BLOOD PRESS GE 130-139MM HG: ICD-10-PCS | Mod: CPTII,,, | Performed by: NURSE PRACTITIONER

## 2021-11-10 PROCEDURE — 4010F PR ACE/ARB THEARPY RXD/TAKEN: ICD-10-PCS | Mod: CPTII,,, | Performed by: NURSE PRACTITIONER

## 2021-11-10 PROCEDURE — 1159F MED LIST DOCD IN RCRD: CPT | Mod: CPTII,,, | Performed by: NURSE PRACTITIONER

## 2021-11-10 PROCEDURE — 3008F BODY MASS INDEX DOCD: CPT | Mod: CPTII,,, | Performed by: NURSE PRACTITIONER

## 2021-11-10 PROCEDURE — 1157F PR ADVANCE CARE PLAN OR EQUIV PRESENT IN MEDICAL RECORD: ICD-10-PCS | Mod: CPTII,,, | Performed by: NURSE PRACTITIONER

## 2021-11-10 PROCEDURE — 99213 PR OFFICE/OUTPT VISIT, EST, LEVL III, 20-29 MIN: ICD-10-PCS | Mod: ,,, | Performed by: NURSE PRACTITIONER

## 2021-11-10 PROCEDURE — 3079F PR MOST RECENT DIASTOLIC BLOOD PRESSURE 80-89 MM HG: ICD-10-PCS | Mod: CPTII,,, | Performed by: NURSE PRACTITIONER

## 2021-11-10 PROCEDURE — 3079F DIAST BP 80-89 MM HG: CPT | Mod: CPTII,,, | Performed by: NURSE PRACTITIONER

## 2021-11-10 RX ORDER — FLUOXETINE HYDROCHLORIDE 40 MG/1
40 CAPSULE ORAL DAILY
Qty: 90 CAPSULE | Refills: 3 | Status: SHIPPED | OUTPATIENT
Start: 2021-11-10 | End: 2022-03-08 | Stop reason: DRUGHIGH

## 2021-11-10 RX ORDER — DICLOFENAC SODIUM 10 MG/G
GEL TOPICAL
COMMUNITY
Start: 2021-10-25 | End: 2022-05-16 | Stop reason: SDUPTHER

## 2021-11-10 RX ORDER — AMLODIPINE BESYLATE 5 MG/1
5 TABLET ORAL DAILY
Qty: 90 TABLET | Refills: 3 | Status: SHIPPED | OUTPATIENT
Start: 2021-11-10 | End: 2022-04-04 | Stop reason: SDUPTHER

## 2021-11-11 ENCOUNTER — OFFICE VISIT (OUTPATIENT)
Dept: ORTHOPEDICS | Facility: CLINIC | Age: 71
End: 2021-11-11
Payer: COMMERCIAL

## 2021-11-11 DIAGNOSIS — M54.50 LUMBAR PAIN: Primary | ICD-10-CM

## 2021-11-11 PROCEDURE — 4010F PR ACE/ARB THEARPY RXD/TAKEN: ICD-10-PCS | Mod: CPTII,,, | Performed by: ORTHOPAEDIC SURGERY

## 2021-11-11 PROCEDURE — 3044F PR MOST RECENT HEMOGLOBIN A1C LEVEL <7.0%: ICD-10-PCS | Mod: CPTII,,, | Performed by: ORTHOPAEDIC SURGERY

## 2021-11-11 PROCEDURE — 99213 OFFICE O/P EST LOW 20 MIN: CPT | Mod: ,,, | Performed by: ORTHOPAEDIC SURGERY

## 2021-11-11 PROCEDURE — 1157F PR ADVANCE CARE PLAN OR EQUIV PRESENT IN MEDICAL RECORD: ICD-10-PCS | Mod: CPTII,,, | Performed by: ORTHOPAEDIC SURGERY

## 2021-11-11 PROCEDURE — 4010F ACE/ARB THERAPY RXD/TAKEN: CPT | Mod: CPTII,,, | Performed by: ORTHOPAEDIC SURGERY

## 2021-11-11 PROCEDURE — 3044F HG A1C LEVEL LT 7.0%: CPT | Mod: CPTII,,, | Performed by: ORTHOPAEDIC SURGERY

## 2021-11-11 PROCEDURE — 1157F ADVNC CARE PLAN IN RCRD: CPT | Mod: CPTII,,, | Performed by: ORTHOPAEDIC SURGERY

## 2021-11-11 PROCEDURE — 99213 PR OFFICE/OUTPT VISIT, EST, LEVL III, 20-29 MIN: ICD-10-PCS | Mod: ,,, | Performed by: ORTHOPAEDIC SURGERY

## 2021-12-14 RX ORDER — METAXALONE 800 MG/1
800 TABLET ORAL DAILY PRN
Qty: 90 TABLET | Refills: 1 | Status: SHIPPED | OUTPATIENT
Start: 2021-12-14 | End: 2022-01-10 | Stop reason: SDUPTHER

## 2021-12-15 ENCOUNTER — HOSPITAL ENCOUNTER (OUTPATIENT)
Dept: RADIOLOGY | Facility: HOSPITAL | Age: 71
Discharge: HOME | End: 2021-12-15
Attending: NURSE PRACTITIONER
Payer: COMMERCIAL

## 2021-12-15 DIAGNOSIS — Z87.891 FORMER SMOKER: ICD-10-CM

## 2021-12-15 DIAGNOSIS — Z82.49 FH: AORTIC ANEURYSM: ICD-10-CM

## 2021-12-15 DIAGNOSIS — Z13.6 ENCOUNTER FOR ABDOMINAL AORTIC ANEURYSM SCREENING: ICD-10-CM

## 2021-12-15 PROCEDURE — 76706 US AAA SCREENING: ICD-10-PCS | Mod: 26,,,

## 2021-12-15 PROCEDURE — 76706 US ABDL AORTA SCREEN AAA: CPT | Mod: TC

## 2021-12-15 PROCEDURE — 76706 US ABDL AORTA SCREEN AAA: CPT | Mod: 26,,,

## 2021-12-28 RX ORDER — OMEPRAZOLE 20 MG/1
20 CAPSULE, DELAYED RELEASE ORAL DAILY
Qty: 90 CAPSULE | Refills: 3 | Status: SHIPPED | OUTPATIENT
Start: 2021-12-28 | End: 2022-04-04 | Stop reason: SDUPTHER

## 2022-01-03 ENCOUNTER — OFFICE VISIT (OUTPATIENT)
Dept: PAIN MEDICINE | Facility: CLINIC | Age: 72
End: 2022-01-03
Payer: COMMERCIAL

## 2022-01-03 VITALS
BODY MASS INDEX: 27.2 KG/M2 | HEART RATE: 66 BPM | WEIGHT: 190 LBS | SYSTOLIC BLOOD PRESSURE: 137 MMHG | HEIGHT: 70 IN | RESPIRATION RATE: 20 BRPM | DIASTOLIC BLOOD PRESSURE: 72 MMHG

## 2022-01-03 DIAGNOSIS — G89.29 CHRONIC BILATERAL LOW BACK PAIN WITH RIGHT-SIDED SCIATICA: Chronic | ICD-10-CM

## 2022-01-03 DIAGNOSIS — M54.17 LUMBOSACRAL RADICULOPATHY: Primary | Chronic | ICD-10-CM

## 2022-01-03 DIAGNOSIS — M54.41 CHRONIC BILATERAL LOW BACK PAIN WITH RIGHT-SIDED SCIATICA: Chronic | ICD-10-CM

## 2022-01-03 PROCEDURE — 1159F PR MEDICATION LIST DOCUMENTED IN MEDICAL RECORD: ICD-10-PCS | Mod: ,,, | Performed by: PAIN MEDICINE

## 2022-01-03 PROCEDURE — 99214 OFFICE O/P EST MOD 30 MIN: CPT | Mod: S$PBB,,, | Performed by: PAIN MEDICINE

## 2022-01-03 PROCEDURE — 3008F BODY MASS INDEX DOCD: CPT | Mod: ,,, | Performed by: PAIN MEDICINE

## 2022-01-03 PROCEDURE — 3075F PR MOST RECENT SYSTOLIC BLOOD PRESS GE 130-139MM HG: ICD-10-PCS | Mod: ,,, | Performed by: PAIN MEDICINE

## 2022-01-03 PROCEDURE — 3075F SYST BP GE 130 - 139MM HG: CPT | Mod: ,,, | Performed by: PAIN MEDICINE

## 2022-01-03 PROCEDURE — 1157F PR ADVANCE CARE PLAN OR EQUIV PRESENT IN MEDICAL RECORD: ICD-10-PCS | Mod: ,,, | Performed by: PAIN MEDICINE

## 2022-01-03 PROCEDURE — 3078F DIAST BP <80 MM HG: CPT | Mod: ,,, | Performed by: PAIN MEDICINE

## 2022-01-03 PROCEDURE — 3288F PR FALLS RISK ASSESSMENT DOCUMENTED: ICD-10-PCS | Mod: ,,, | Performed by: PAIN MEDICINE

## 2022-01-03 PROCEDURE — 1125F PR PAIN SEVERITY QUANTIFIED, PAIN PRESENT: ICD-10-PCS | Mod: ,,, | Performed by: PAIN MEDICINE

## 2022-01-03 PROCEDURE — 3288F FALL RISK ASSESSMENT DOCD: CPT | Mod: ,,, | Performed by: PAIN MEDICINE

## 2022-01-03 PROCEDURE — 1101F PT FALLS ASSESS-DOCD LE1/YR: CPT | Mod: ,,, | Performed by: PAIN MEDICINE

## 2022-01-03 PROCEDURE — 1157F ADVNC CARE PLAN IN RCRD: CPT | Mod: ,,, | Performed by: PAIN MEDICINE

## 2022-01-03 PROCEDURE — 99215 OFFICE O/P EST HI 40 MIN: CPT | Mod: PBBFAC | Performed by: PAIN MEDICINE

## 2022-01-03 PROCEDURE — 3008F PR BODY MASS INDEX (BMI) DOCUMENTED: ICD-10-PCS | Mod: ,,, | Performed by: PAIN MEDICINE

## 2022-01-03 PROCEDURE — 1159F MED LIST DOCD IN RCRD: CPT | Mod: ,,, | Performed by: PAIN MEDICINE

## 2022-01-03 PROCEDURE — 99214 PR OFFICE/OUTPT VISIT, EST, LEVL IV, 30-39 MIN: ICD-10-PCS | Mod: S$PBB,,, | Performed by: PAIN MEDICINE

## 2022-01-03 PROCEDURE — 1101F PR PT FALLS ASSESS DOC 0-1 FALLS W/OUT INJ PAST YR: ICD-10-PCS | Mod: ,,, | Performed by: PAIN MEDICINE

## 2022-01-03 PROCEDURE — 1125F AMNT PAIN NOTED PAIN PRSNT: CPT | Mod: ,,, | Performed by: PAIN MEDICINE

## 2022-01-03 PROCEDURE — 3078F PR MOST RECENT DIASTOLIC BLOOD PRESSURE < 80 MM HG: ICD-10-PCS | Mod: ,,, | Performed by: PAIN MEDICINE

## 2022-01-03 NOTE — H&P (VIEW-ONLY)
She Disclaimer: This note has been generated using voice-recognition software. There may be typographical errors that have been missed during proof-reading        Patient ID: Tray Jalloh is a 71 y.o. male.      Chief Complaint: Low-back Pain and Leg Pain      71 year old male returns for re-evaluation of lower back and right lumbar radicular pain.  The pain started several months ago,  without any precipitating events, and has remained intractable.  Pain starts at the right hip and radiates to the right knee, calf and foot.  The pain is exacerbated with sitting on hard surfaces.  He denies any paresthesia or weakness of the lower extremity.  He was seen by Dr. Dada Enriquez for right hip pain and Dr. Enriquez felt that the pain source originated from the lumbar spine.  He returns today to discuss a lumbar spine MRI and also possible nerve block injections.  He received lumbar medial branch blocks,  February 2021,  and experienced greater than 70%  Relief of the lower  back pain.  Three back radicular pain  remained unchanged and has  worsened over the past several months.          Pain Assessment  Pain Assessment: 0-10  Pain Score:   8  Pain Location: Back  Pain Orientation: Lower,Right (rt s i jt down to rt calf)  Pain Descriptors: Burning,Aching,Stabbing,Sharp  Pain Frequency: Intermittent  Pain Onset: Unable to tell  Aggravating Factors:  (sitting)  Pain Intervention(s): Medication (See eMAR),Rest      A's of Opioid Risk Assessment  Activity:Patient can  perform ADL.   Analgesia:Patients pain is  controlled by current medication.   Adverse Effects: Patient denies constipation or sedation.  Aberrant Behavior:  reviewed with no aberrant drug seeking/taking behavior.      Patient denies any suicidal or homicidal ideations    Physical Therapy/Home Exercise: yes          Review of Systems   Constitutional: Negative.    HENT: Negative.    Eyes: Negative.    Respiratory: Negative.    Cardiovascular: Negative.     Gastrointestinal: Negative.    Endocrine: Negative.    Genitourinary: Negative.    Musculoskeletal: Positive for arthralgias, back pain and leg pain.   Integumentary:  Negative.   Allergic/Immunologic: Negative.    Neurological: Negative.    Hematological: Negative.    Psychiatric/Behavioral: Negative.              Past Medical History:   Diagnosis Date    Cancer     Cervical radiculopathy     Chronic pain     Chronic renal impairment     COPD (chronic obstructive pulmonary disease)     Depression     Digestive disorder     GERD (gastroesophageal reflux disease)     Hypercholesteremia     Hypertension     Left ventricular hypertrophy     Lumbosacral spondylosis     Prediabetes     Pulmonary nodules     repeat CT due 12/03/21     Past Surgical History:   Procedure Laterality Date    ABDOMINAL SURGERY      inguinal hernia repair    BACK SURGERY      Bilateral L3-S1 MBB Bilateral 2-, 1-, 1-, 12-    Dr Jorge Parker    EYE SURGERY      HERNIA REPAIR      PROSTATECTOMY      RADIOFREQUENCY ABLATION OF LUMBAR MEDIAL BRANCH NERVE AT SINGLE LEVEL Right 4/1/2021    Procedure: Radiofrequency Ablation, Nerve, Spinal, Lumbar, Medial Branch, 1 Level;  Surgeon: Jordana Patel MD;  Location: [facility];  Service: Pain Management;  Laterality: Right;  Right L3-S1 RFTC    RADIOFREQUENCY THERMOCOAGULATION Bilateral 03/20/2012    Dr Parker    RADIOFREQUENCY THERMOCOAGULATION Left 3/18/2021    Procedure: RADIOFREQUENCY THERMAL COAGULATION;  Surgeon: Jordana Patel MD;  Location: [facility];  Service: Pain Management;  Laterality: Left;  BILATERAL L3-S1 RFTC LEFT SIDE FIRST     Social History     Socioeconomic History    Marital status:    Tobacco Use    Smoking status: Former Smoker     Types: Cigarettes    Smokeless tobacco: Never Used   Substance and Sexual Activity    Alcohol use: Not Currently    Drug use: Never    Sexual activity: Yes     Family  History   Problem Relation Age of Onset    Cancer Other     Diabetes Other     Heart disease Other     Hypertension Other     Stroke Other     Diabetes Mother     Hypertension Father     Heart disease Father     Stroke Father     Diabetes Paternal Grandmother      Review of patient's allergies indicates:   Allergen Reactions    Bee pollen     Grass pollen-june grass standard      has a current medication list which includes the following prescription(s): acetaminophen, amlodipine, cetirizine, cholecalciferol (vitamin d3), crestor, diclofenac sodium, fluoxetine, folic acid/multivit-min/lutein, gabapentin, hydrocortisone, combivent respimat, ketoconazole, lisinopril, metaxalone, methenamine, multivitamin, fish oil-omega-3 fatty acids, omeprazole, triamcinolone acetonide 0.1%, zinc gluconate, and diphenhydramine, and the following Facility-Administered Medications: hyaluronate sodium, stabilized and hyaluronate sodium, stabilized.      Objective:  Vitals:    01/03/22 0813   BP: 137/72   Pulse: 66   Resp: 20        Physical Exam  Vitals and nursing note reviewed.   Constitutional:       General: He is not in acute distress.     Appearance: Normal appearance. He is not ill-appearing, toxic-appearing or diaphoretic.   HENT:      Head: Normocephalic and atraumatic.      Nose: Nose normal.      Mouth/Throat:      Mouth: Mucous membranes are moist.   Eyes:      Extraocular Movements: Extraocular movements intact.      Pupils: Pupils are equal, round, and reactive to light.   Cardiovascular:      Rate and Rhythm: Normal rate and regular rhythm.      Heart sounds: Normal heart sounds.   Pulmonary:      Effort: Pulmonary effort is normal. No respiratory distress.      Breath sounds: Normal breath sounds. No stridor. No wheezing or rhonchi.   Abdominal:      General: Bowel sounds are normal.      Palpations: Abdomen is soft.   Musculoskeletal:         General: No swelling or deformity.      Cervical back: Normal and  normal range of motion. No spasms or tenderness. No pain with movement. Normal range of motion.      Thoracic back: Normal.      Lumbar back: Tenderness and bony tenderness present. No spasms. Decreased range of motion. Positive right straight leg raise test. Negative left straight leg raise test. Scoliosis present.      Right lower leg: No edema.      Left lower leg: No edema.   Skin:     General: Skin is warm.   Neurological:      General: No focal deficit present.      Mental Status: He is alert and oriented to person, place, and time. Mental status is at baseline.      Cranial Nerves: Cranial nerves are intact. No cranial nerve deficit.      Sensory: Sensation is intact. No sensory deficit.      Motor: No weakness.      Coordination: Coordination normal.      Gait: Gait abnormal.      Deep Tendon Reflexes: Reflexes are normal and symmetric.   Psychiatric:         Mood and Affect: Mood normal.         Behavior: Behavior normal.           Assessment:      1. Lumbosacral radiculopathy    2. Chronic bilateral low back pain with right-sided sciatica          Plan:  1. reviewed  2. Schedule right L4-5, 5 S1 transforaminal epidural steroid injection for lumbosacral radiculopathy  Orders Placed This Encounter   Procedures    Case Request Operating Room: Right L4-5,5-S1 TFESI     Order Specific Question:   Medical Necessity:     Answer:   Medically Non-Urgent [100]     Order Specific Question:   CPT Code:     Answer:   DE EPIDURAL INJ, ANES/STEROID, TRANSFORAMINAL, LUMB/SACR, SNGL LEVL [25347]     Order Specific Question:   Post-Procedure Disposition:     Answer:   PACU [1]     Order Specific Question:   Estimated Length of Stay:     Answer:   0 midnight     Order Specific Question:   Implant Required:     Answer:   No [1001]     Order Specific Question:   Is an on-site pathologist required for this procedure?     Answer:   N/A      3. Indications for this procedure for this specific patient include the following      - Pt has been in pain for at least 6 weeks and has failed conservative care (e.g. Exercise, physical methods, NSAID/ and or muscle relaxants)  - Pt has no major risk factors for spinal cancer or contraindicated condition   - Radicular pain is interfering with functional activity  - Pain is associated with symptoms of nerve root irritation   - Any numbness documented is accompanied with paresthesia   - No evidence of systemic or local infection, bleeding tendency or unstable medical condition   - Epidural provided as part of a comprehensive pain management program  - All repeat injections have at least 80% pain relief and increase functional gain and physical activity, and reduction in reliance on the use of medication and or physical therapy  - Pt has significant functional limitation resulting in diminished quality of life and impaired age appropriate ADL's.   - Diagnostic evaluation has ruled out other causes of pain  - Pt participating in an active rehabilitation program or home exercise program which has been discussed with the patient including heat ice and rest  - No more than 3 epidurals will be done in a 6 month period at the same level with at least 7 days between injections  - MAC is only offered in cases of needle phobia   - Injection done at right L4-5,5-S1  level which is consistent with patient's dermatomal pain complaint    6.Monitored Anesthesia Care medical necessity authorization request:    Monitor anesthesia request is medically indicated for the scheduled nerve block procedure due to:  - needle phobia and anxiety, placing  the patient at risk during the provided service.  -patient has severe problems with muscles and muscle spasticity that makes it hard to lie still  -patient suffers from chronic pain and is unable to function due to  diminished ADLs       report:  Reviewed and consistent with medication use as prescribed.      The total time spent for evaluation and management on  01/03/2022 including reviewing separately obtained history, performing a medically appropriate exam and evaluation, documenting clinical information in the health record, independently interpreting results and communicating them to the patient/family/caregiver, and ordering medications/tests/procedures was between 15-29 minutes.    The above plan and management options were discussed at length with patient. Patient is in agreement with the above and verbalized understanding. It will be communicated with the referring physician via electronic record, fax, or mail.

## 2022-01-03 NOTE — PATIENT INSTRUCTIONS
Right L4-S1 TFESI   1- AT 0800      COVID SCREENING TO BE DONE 48-72 HOURS PRIOR TO PROCEDURE IF PT HAS NOT RECEIVED BOTH COVID VACCINATIONS OR HAS BEEN VACCINATED WITHIN THE LAST 2 WEEKS. VACCINATION CARD MUST BE PROVIDED IF PT HAS BEEN VACCINATED OR PT MUST HAVE COVID SCREENING IN ORDER TO HAVE PROCEDURE. IF YOUR PRIMARY CARE DOCTOR ORDERS YOUR COVID TESTING YOU MUST BRING YOUR RESULTS WITH YOU TO YOUR PROCEDURE.    Procedure Instructions:    Nothing to eat or drink for 8 hours or after midnight including gum, candy, mints, or tobacco products.  If you are scheduled for 1:30 or later nothing to eat or drink after 5 a.m. the morning of the procedure, including gum, candy, mints, or tobacco products.  Must have a  at least 18 yrs of age to stay present at all times  No Diabetic medications the morning of procedure, check blood sugar the morning of procedure, if it is greater than 200 call the office at [phone]  If you are started on antibiotics or have been prescribed antibiotics, have a fever, or have any other type of infection call to reschedule procedure.  If you take blood pressure medications you can take it at your regular scheduled time.    If you are having a cervical/NECK procedure done: hold aspirin and aspirin products for 7 days and NSAIDS( ibuprofen, mobic, advil, aleve etc....)  for 3 days   prior to procedure.

## 2022-01-03 NOTE — PROGRESS NOTES
She Disclaimer: This note has been generated using voice-recognition software. There may be typographical errors that have been missed during proof-reading        Patient ID: Tray Jalloh is a 71 y.o. male.      Chief Complaint: Low-back Pain and Leg Pain      71 year old male returns for re-evaluation of lower back and right lumbar radicular pain.  The pain started several months ago,  without any precipitating events, and has remained intractable.  Pain starts at the right hip and radiates to the right knee, calf and foot.  The pain is exacerbated with sitting on hard surfaces.  He denies any paresthesia or weakness of the lower extremity.  He was seen by Dr. Dada Enriquez for right hip pain and Dr. Enriquez felt that the pain source originated from the lumbar spine.  He returns today to discuss a lumbar spine MRI and also possible nerve block injections.  He received lumbar medial branch blocks,  February 2021,  and experienced greater than 70%  Relief of the lower  back pain.  Three back radicular pain  remained unchanged and has  worsened over the past several months.          Pain Assessment  Pain Assessment: 0-10  Pain Score:   8  Pain Location: Back  Pain Orientation: Lower,Right (rt s i jt down to rt calf)  Pain Descriptors: Burning,Aching,Stabbing,Sharp  Pain Frequency: Intermittent  Pain Onset: Unable to tell  Aggravating Factors:  (sitting)  Pain Intervention(s): Medication (See eMAR),Rest      A's of Opioid Risk Assessment  Activity:Patient can  perform ADL.   Analgesia:Patients pain is  controlled by current medication.   Adverse Effects: Patient denies constipation or sedation.  Aberrant Behavior:  reviewed with no aberrant drug seeking/taking behavior.      Patient denies any suicidal or homicidal ideations    Physical Therapy/Home Exercise: yes          Review of Systems   Constitutional: Negative.    HENT: Negative.    Eyes: Negative.    Respiratory: Negative.    Cardiovascular: Negative.     Gastrointestinal: Negative.    Endocrine: Negative.    Genitourinary: Negative.    Musculoskeletal: Positive for arthralgias, back pain and leg pain.   Integumentary:  Negative.   Allergic/Immunologic: Negative.    Neurological: Negative.    Hematological: Negative.    Psychiatric/Behavioral: Negative.              Past Medical History:   Diagnosis Date    Cancer     Cervical radiculopathy     Chronic pain     Chronic renal impairment     COPD (chronic obstructive pulmonary disease)     Depression     Digestive disorder     GERD (gastroesophageal reflux disease)     Hypercholesteremia     Hypertension     Left ventricular hypertrophy     Lumbosacral spondylosis     Prediabetes     Pulmonary nodules     repeat CT due 12/03/21     Past Surgical History:   Procedure Laterality Date    ABDOMINAL SURGERY      inguinal hernia repair    BACK SURGERY      Bilateral L3-S1 MBB Bilateral 2-, 1-, 1-, 12-    Dr Jorge Parker    EYE SURGERY      HERNIA REPAIR      PROSTATECTOMY      RADIOFREQUENCY ABLATION OF LUMBAR MEDIAL BRANCH NERVE AT SINGLE LEVEL Right 4/1/2021    Procedure: Radiofrequency Ablation, Nerve, Spinal, Lumbar, Medial Branch, 1 Level;  Surgeon: Jordana Patel MD;  Location: [facility];  Service: Pain Management;  Laterality: Right;  Right L3-S1 RFTC    RADIOFREQUENCY THERMOCOAGULATION Bilateral 03/20/2012    Dr Parker    RADIOFREQUENCY THERMOCOAGULATION Left 3/18/2021    Procedure: RADIOFREQUENCY THERMAL COAGULATION;  Surgeon: Jordana Patel MD;  Location: [facility];  Service: Pain Management;  Laterality: Left;  BILATERAL L3-S1 RFTC LEFT SIDE FIRST     Social History     Socioeconomic History    Marital status:    Tobacco Use    Smoking status: Former Smoker     Types: Cigarettes    Smokeless tobacco: Never Used   Substance and Sexual Activity    Alcohol use: Not Currently    Drug use: Never    Sexual activity: Yes     Family  History   Problem Relation Age of Onset    Cancer Other     Diabetes Other     Heart disease Other     Hypertension Other     Stroke Other     Diabetes Mother     Hypertension Father     Heart disease Father     Stroke Father     Diabetes Paternal Grandmother      Review of patient's allergies indicates:   Allergen Reactions    Bee pollen     Grass pollen-june grass standard      has a current medication list which includes the following prescription(s): acetaminophen, amlodipine, cetirizine, cholecalciferol (vitamin d3), crestor, diclofenac sodium, fluoxetine, folic acid/multivit-min/lutein, gabapentin, hydrocortisone, combivent respimat, ketoconazole, lisinopril, metaxalone, methenamine, multivitamin, fish oil-omega-3 fatty acids, omeprazole, triamcinolone acetonide 0.1%, zinc gluconate, and diphenhydramine, and the following Facility-Administered Medications: hyaluronate sodium, stabilized and hyaluronate sodium, stabilized.      Objective:  Vitals:    01/03/22 0813   BP: 137/72   Pulse: 66   Resp: 20        Physical Exam  Vitals and nursing note reviewed.   Constitutional:       General: He is not in acute distress.     Appearance: Normal appearance. He is not ill-appearing, toxic-appearing or diaphoretic.   HENT:      Head: Normocephalic and atraumatic.      Nose: Nose normal.      Mouth/Throat:      Mouth: Mucous membranes are moist.   Eyes:      Extraocular Movements: Extraocular movements intact.      Pupils: Pupils are equal, round, and reactive to light.   Cardiovascular:      Rate and Rhythm: Normal rate and regular rhythm.      Heart sounds: Normal heart sounds.   Pulmonary:      Effort: Pulmonary effort is normal. No respiratory distress.      Breath sounds: Normal breath sounds. No stridor. No wheezing or rhonchi.   Abdominal:      General: Bowel sounds are normal.      Palpations: Abdomen is soft.   Musculoskeletal:         General: No swelling or deformity.      Cervical back: Normal and  normal range of motion. No spasms or tenderness. No pain with movement. Normal range of motion.      Thoracic back: Normal.      Lumbar back: Tenderness and bony tenderness present. No spasms. Decreased range of motion. Positive right straight leg raise test. Negative left straight leg raise test. Scoliosis present.      Right lower leg: No edema.      Left lower leg: No edema.   Skin:     General: Skin is warm.   Neurological:      General: No focal deficit present.      Mental Status: He is alert and oriented to person, place, and time. Mental status is at baseline.      Cranial Nerves: Cranial nerves are intact. No cranial nerve deficit.      Sensory: Sensation is intact. No sensory deficit.      Motor: No weakness.      Coordination: Coordination normal.      Gait: Gait abnormal.      Deep Tendon Reflexes: Reflexes are normal and symmetric.   Psychiatric:         Mood and Affect: Mood normal.         Behavior: Behavior normal.           Assessment:      1. Lumbosacral radiculopathy    2. Chronic bilateral low back pain with right-sided sciatica          Plan:  1. reviewed  2. Schedule right L4-5, 5 S1 transforaminal epidural steroid injection for lumbosacral radiculopathy  Orders Placed This Encounter   Procedures    Case Request Operating Room: Right L4-5,5-S1 TFESI     Order Specific Question:   Medical Necessity:     Answer:   Medically Non-Urgent [100]     Order Specific Question:   CPT Code:     Answer:   SC EPIDURAL INJ, ANES/STEROID, TRANSFORAMINAL, LUMB/SACR, SNGL LEVL [97276]     Order Specific Question:   Post-Procedure Disposition:     Answer:   PACU [1]     Order Specific Question:   Estimated Length of Stay:     Answer:   0 midnight     Order Specific Question:   Implant Required:     Answer:   No [1001]     Order Specific Question:   Is an on-site pathologist required for this procedure?     Answer:   N/A      3. Indications for this procedure for this specific patient include the following      - Pt has been in pain for at least 6 weeks and has failed conservative care (e.g. Exercise, physical methods, NSAID/ and or muscle relaxants)  - Pt has no major risk factors for spinal cancer or contraindicated condition   - Radicular pain is interfering with functional activity  - Pain is associated with symptoms of nerve root irritation   - Any numbness documented is accompanied with paresthesia   - No evidence of systemic or local infection, bleeding tendency or unstable medical condition   - Epidural provided as part of a comprehensive pain management program  - All repeat injections have at least 80% pain relief and increase functional gain and physical activity, and reduction in reliance on the use of medication and or physical therapy  - Pt has significant functional limitation resulting in diminished quality of life and impaired age appropriate ADL's.   - Diagnostic evaluation has ruled out other causes of pain  - Pt participating in an active rehabilitation program or home exercise program which has been discussed with the patient including heat ice and rest  - No more than 3 epidurals will be done in a 6 month period at the same level with at least 7 days between injections  - MAC is only offered in cases of needle phobia   - Injection done at right L4-5,5-S1  level which is consistent with patient's dermatomal pain complaint    6.Monitored Anesthesia Care medical necessity authorization request:    Monitor anesthesia request is medically indicated for the scheduled nerve block procedure due to:  - needle phobia and anxiety, placing  the patient at risk during the provided service.  -patient has severe problems with muscles and muscle spasticity that makes it hard to lie still  -patient suffers from chronic pain and is unable to function due to  diminished ADLs       report:  Reviewed and consistent with medication use as prescribed.      The total time spent for evaluation and management on  01/03/2022 including reviewing separately obtained history, performing a medically appropriate exam and evaluation, documenting clinical information in the health record, independently interpreting results and communicating them to the patient/family/caregiver, and ordering medications/tests/procedures was between 15-29 minutes.    The above plan and management options were discussed at length with patient. Patient is in agreement with the above and verbalized understanding. It will be communicated with the referring physician via electronic record, fax, or mail.

## 2022-01-10 DIAGNOSIS — Z11.59 SCREENING FOR VIRAL DISEASE: Primary | ICD-10-CM

## 2022-01-10 RX ORDER — METAXALONE 800 MG/1
800 TABLET ORAL DAILY PRN
Qty: 90 TABLET | Refills: 1 | Status: SHIPPED | OUTPATIENT
Start: 2022-01-10 | End: 2023-04-24 | Stop reason: SDUPTHER

## 2022-01-10 NOTE — TELEPHONE ENCOUNTER
----- Message from Sabiha Friedman sent at 1/10/2022 12:06 PM CST -----  Pt needs refill on metaxalone sent to [facility] on [phone]

## 2022-01-11 ENCOUNTER — PATIENT MESSAGE (OUTPATIENT)
Dept: PAIN MEDICINE | Facility: HOSPITAL | Age: 72
End: 2022-01-11
Payer: COMMERCIAL

## 2022-01-13 ENCOUNTER — ANESTHESIA EVENT (OUTPATIENT)
Dept: PAIN MEDICINE | Facility: HOSPITAL | Age: 72
End: 2022-01-13
Payer: COMMERCIAL

## 2022-01-13 ENCOUNTER — ANESTHESIA (OUTPATIENT)
Dept: PAIN MEDICINE | Facility: HOSPITAL | Age: 72
End: 2022-01-13
Payer: COMMERCIAL

## 2022-01-13 ENCOUNTER — HOSPITAL ENCOUNTER (OUTPATIENT)
Facility: HOSPITAL | Age: 72
Discharge: HOME | End: 2022-01-13
Attending: PAIN MEDICINE | Admitting: PAIN MEDICINE
Payer: COMMERCIAL

## 2022-01-13 VITALS
RESPIRATION RATE: 10 BRPM | HEIGHT: 70 IN | DIASTOLIC BLOOD PRESSURE: 73 MMHG | TEMPERATURE: 99 F | SYSTOLIC BLOOD PRESSURE: 135 MMHG | OXYGEN SATURATION: 98 % | WEIGHT: 186 LBS | BODY MASS INDEX: 26.63 KG/M2 | HEART RATE: 56 BPM

## 2022-01-13 DIAGNOSIS — M54.17 LUMBOSACRAL RADICULOPATHY: Primary | ICD-10-CM

## 2022-01-13 PROCEDURE — 64483 NJX AA&/STRD TFRM EPI L/S 1: CPT | Mod: RT,,, | Performed by: PAIN MEDICINE

## 2022-01-13 PROCEDURE — 64484 NJX AA&/STRD TFRM EPI L/S EA: CPT | Mod: RT | Performed by: PAIN MEDICINE

## 2022-01-13 PROCEDURE — 25000003 PHARM REV CODE 250: Performed by: NURSE ANESTHETIST, CERTIFIED REGISTERED

## 2022-01-13 PROCEDURE — 64484 PRA INJECT ANES/STEROID FORAMEN LUMBAR/SACRAL W IMG GUIDE ,EA ADD LEVEL: ICD-10-PCS | Mod: RT,,, | Performed by: PAIN MEDICINE

## 2022-01-13 PROCEDURE — D9220A PRA ANESTHESIA: ICD-10-PCS | Mod: ,,, | Performed by: NURSE ANESTHETIST, CERTIFIED REGISTERED

## 2022-01-13 PROCEDURE — 64483 PR EPIDURAL INJ, ANES/STEROID, TRANSFORAMINAL, LUMB/SACR, SNGL LEVL: ICD-10-PCS | Mod: RT,,, | Performed by: PAIN MEDICINE

## 2022-01-13 PROCEDURE — 25000003 PHARM REV CODE 250: Performed by: PAIN MEDICINE

## 2022-01-13 PROCEDURE — 27000284 HC CANNULA NASAL: Performed by: NURSE ANESTHETIST, CERTIFIED REGISTERED

## 2022-01-13 PROCEDURE — 27201423 OPTIME MED/SURG SUP & DEVICES STERILE SUPPLY: Performed by: PAIN MEDICINE

## 2022-01-13 PROCEDURE — 63600175 PHARM REV CODE 636 W HCPCS: Performed by: NURSE ANESTHETIST, CERTIFIED REGISTERED

## 2022-01-13 PROCEDURE — 64484 NJX AA&/STRD TFRM EPI L/S EA: CPT | Mod: RT,,, | Performed by: PAIN MEDICINE

## 2022-01-13 PROCEDURE — 37000008 HC ANESTHESIA 1ST 15 MINUTES: Performed by: PAIN MEDICINE

## 2022-01-13 PROCEDURE — 63600175 PHARM REV CODE 636 W HCPCS: Performed by: PAIN MEDICINE

## 2022-01-13 PROCEDURE — D9220A PRA ANESTHESIA: Mod: ,,, | Performed by: NURSE ANESTHETIST, CERTIFIED REGISTERED

## 2022-01-13 PROCEDURE — 01992 ANES DX/THER NRV BLK&INJ PRN: CPT | Performed by: PAIN MEDICINE

## 2022-01-13 PROCEDURE — 25500020 PHARM REV CODE 255: Performed by: PAIN MEDICINE

## 2022-01-13 PROCEDURE — 64483 NJX AA&/STRD TFRM EPI L/S 1: CPT | Mod: RT | Performed by: PAIN MEDICINE

## 2022-01-13 RX ORDER — IOPAMIDOL 612 MG/ML
INJECTION, SOLUTION INTRATHECAL
Status: DISCONTINUED | OUTPATIENT
Start: 2022-01-13 | End: 2022-01-13 | Stop reason: HOSPADM

## 2022-01-13 RX ORDER — PROPOFOL 10 MG/ML
VIAL (ML) INTRAVENOUS
Status: DISCONTINUED | OUTPATIENT
Start: 2022-01-13 | End: 2022-01-13

## 2022-01-13 RX ORDER — LIDOCAINE HYDROCHLORIDE 20 MG/ML
INJECTION, SOLUTION EPIDURAL; INFILTRATION; INTRACAUDAL; PERINEURAL
Status: DISCONTINUED | OUTPATIENT
Start: 2022-01-13 | End: 2022-01-13

## 2022-01-13 RX ORDER — SODIUM CHLORIDE 9 MG/ML
INJECTION, SOLUTION INTRAVENOUS CONTINUOUS
Status: DISCONTINUED | OUTPATIENT
Start: 2022-01-13 | End: 2022-01-13 | Stop reason: HOSPADM

## 2022-01-13 RX ORDER — TRIAMCINOLONE ACETONIDE 40 MG/ML
INJECTION, SUSPENSION INTRA-ARTICULAR; INTRAMUSCULAR
Status: DISCONTINUED | OUTPATIENT
Start: 2022-01-13 | End: 2022-01-13 | Stop reason: HOSPADM

## 2022-01-13 RX ADMIN — LIDOCAINE HYDROCHLORIDE 100 MG: 20 INJECTION, SOLUTION EPIDURAL; INFILTRATION; INTRACAUDAL; PERINEURAL at 08:01

## 2022-01-13 RX ADMIN — SODIUM CHLORIDE: 9 INJECTION, SOLUTION INTRAVENOUS at 08:01

## 2022-01-13 RX ADMIN — PROPOFOL 200 MG: 10 INJECTION, EMULSION INTRAVENOUS at 08:01

## 2022-01-13 NOTE — ANESTHESIA PREPROCEDURE EVALUATION
01/13/2022  Tray Jalolh is a 71 y.o., male.    Anesthesia Evaluation    I have reviewed the Patient Summary Reports.    I have reviewed the Nursing Notes. I have reviewed the NPO Status.   I have reviewed the Medications.     Review of Systems  Anesthesia Hx:  No problems with previous Anesthesia  Neg history of prior surgery.   Cardiovascular:   Hypertension    Pulmonary:   COPD, mild    Renal/:   Chronic Renal Disease    Hepatic/GI:   GERD    Musculoskeletal:   Arthritis     Neurological:   Neuromuscular Disease,    Psych:   Psychiatric History depression          Physical Exam  General:  Well nourished    Airway/Jaw/Neck:  Airway Findings: Mouth Opening: Normal Tongue: Normal  Mallampati: II  TM Distance: Normal, at least 6 cm         Dental:  DENTAL FINDINGS: Normal   Chest/Lungs:  Chest/Lungs Findings: Clear to auscultation     Heart/Vascular:  Heart Findings: Rate: Normal  Rhythm: Regular Rhythm     Abdomen:  Abdomen Findings:  Normal, Soft     Musculoskeletal:  Musculoskeletal Findings:     Mental Status:  Mental Status Findings:  Cooperative         Anesthesia Plan  Type of Anesthesia, risks & benefits discussed:  Anesthesia Type:  general    Patient's Preference:   Plan Factors:          Intra-op Monitoring Plan:   Intra-op Monitoring Plan Comments:   Post Op Pain Control Plan:   Post Op Pain Control Plan Comments:     Induction:   IV  Beta Blocker:         Informed Consent: Patient understands risks and agrees with Anesthesia plan.  Questions answered. Anesthesia consent signed with patient.  ASA Score: 3     Day of Surgery Review of History & Physical: I have interviewed and examined the patient. I have reviewed the patient's H&P dated:  There are no significant changes.          Ready For Surgery From Anesthesia Perspective.

## 2022-01-13 NOTE — OP NOTE
Procedure Note    Procedure Date: 1/13/2022    Procedure Performed: Right  Transforaminal Epidural @ L4-5,5-S1 with Fluoroscopic Guidance    Indications: Patient has failed conservative therapy.      Pre-op diagnosis: Lumbar Radiculopathy    Post-op diagnosis: same    Physician: Jordana Patel MD    Anesthesia: MAC    Medications injected:  kenalog 40mg, sterile preservative-free normal saline.    IVF: Per Anesthesia    Estimated Blood Loss: Less than 1cc    Complications: None    Technique:  The patient was interviewed in the holding area and Risks/Benefits were discussed, including, but not limited to the possibility of new or different pain, bleeding or infection.   All questions were answered.  The patient understood and accepted risks.  Consent was signed.  A time out was taken to identify the patient, procedure and side of the procedure. The patient was placed in a prone position, then prepped and draped in the usual sterile fashion using ChloraPrep and sterile towels.  The Right L4-5,5-S1 neural foramen were identified under fluoroscopic guidance in AP and oblique view.  Local anesthetic was given by raising a skin wheal with a 25-gauge 1.5 inch needle.  In the oblique view, a 3.5 inch 22-gauge  touhy needle was introduced into the foramen @ right L4-5 and L5-S1 and positioned in the posterior superior quarter of the foramen.  .5cc of Isovue M-300  contrast was injected live in an AP fluoroscopic view at each level demonstrating appropriate needle position with contrast spread outlining the respective  Right L4 and L5 nerve roots and also medially into the epidural space without intravascular or intrathecal spread.  After negative aspiration 1cc from a   1:1 mixture of  (40 mg Kenalog and  1 mL sterile  preservative-free normal saline)  was injected slowly and incrementally.  The needles were   removed.  The patient tolerated the procedure well.  The patient was monitored after the procedure.  Patient was  given post procedure and discharge instructions to follow at home. The patient was discharged in a stable condition and accompanied by an adult .

## 2022-01-13 NOTE — ANESTHESIA POSTPROCEDURE EVALUATION
Anesthesia Post Evaluation    Patient: Tray Jalloh    Procedure(s) Performed: Procedure(s) (LRB):  Right L4-5,5-S1 TFESI (Right)    Final Anesthesia Type: general      Patient location during evaluation: PACU  Patient participation: Yes- Able to Participate  Level of consciousness: awake and alert  Post-procedure vital signs: reviewed and stable  Pain management: adequate  Airway patency: patent    PONV status at discharge: No PONV  Anesthetic complications: no      Cardiovascular status: blood pressure returned to baseline  Respiratory status: unassisted and spontaneous ventilation  Hydration status: euvolemic  Follow-up not needed.          Vitals Value Taken Time   /65 01/13/22 0858   Temp 37 °C (98.6 °F) 01/13/22 0858   Pulse 65 01/13/22 0858   Resp 16 01/13/22 0858   SpO2 100 % 01/13/22 0858         No case tracking events are documented in the log.      Pain/Radha Score: No data recorded

## 2022-01-13 NOTE — TRANSFER OF CARE
"Anesthesia Transfer of Care Note    Patient: Tray Jalloh    Procedure(s) Performed: Procedure(s) (LRB):  Right L4-5,5-S1 TFESI (Right)    Patient location: PACU    Anesthesia Type: general    Transport from OR: Transported from OR on room air with adequate spontaneous ventilation    Post pain: adequate analgesia    Post assessment: no apparent anesthetic complications and tolerated procedure well    Post vital signs: stable    Level of consciousness: awake    Nausea/Vomiting: no nausea    Complications: none    Transfer of care protocol was followed      Last vitals:   Visit Vitals  /65   Pulse 65   Temp 37 °C (98.6 °F) (Oral)   Resp 16   Ht 5' 10" (1.778 m)   Wt 84.4 kg (186 lb)   SpO2 100%   BMI 26.69 kg/m²     "

## 2022-01-13 NOTE — BRIEF OP NOTE
Discharge Note  Short Stay    Admit Date: 1/13/2022    Discharge Date: 1/13/2022    Attending Physician: Jordana Patel     Discharge Provider: Jordana Patel    Diagnoses:Lumbosacral radiculopathy    Discharged Condition: Good    Final Diagnoses: Lumbosacral radiculopathy [M54.17]    Disposition: Home or Self Care    Hospital Course: No complications, uneventful    Outcome of Hospitalization, Treatment, Procedure, or Surgery:  Patient was admitted for outpatient interventional pain management procedure. The patient tolerated the procedure well with no complications.    Follow up/Patient Instructions:  Follow up as scheduled in Pain Management office in 3-4 weeks.  Patient has received instructions and follow up date and time.    Medications:  Continue previous medications

## 2022-01-13 NOTE — DISCHARGE SUMMARY
[facility] - Pain Management  Discharge Note  Short Stay    Procedure(s) (LRB):  Right L4-5,5-S1 TFESI (Right)    OUTCOME: Patient tolerated treatment/procedure well without complication and is now ready for discharge.    DISPOSITION: Home or Self Care    FINAL DIAGNOSIS:  Lumbosacral radiculopathy    FOLLOWUP: In clinic    DISCHARGE INSTRUCTIONS:  See nurse's notes     TIME SPENT ON DISCHARGE: 5 minutes

## 2022-01-13 NOTE — DISCHARGE INSTRUCTIONS
No driving, operating machinery, making legal decisions, or signing legal documents until tomorrow.   Continue diet as tolerated. Drink plenty of fluids and rest.   If unable to void in 8 hours proceed to nearest ER.   Notify MD of redness or drainage from incision site as well as any fever over the next 3-4 days.  No lifting over 5 lbs for the next 24 hrs.   Continue medications as prescribed. May take pain medication as prescribed.   May shower tomorrow. No tub baths for 48 hours following procedure.   May remove bandaids tomorrow, if they fall off before tomorrow they do not have to be replaced.

## 2022-01-13 NOTE — PLAN OF CARE
Plan:  D/c pt via wheelchair at 0945  Informed pt if does not void in 8 hours to go to ER. Notify if redness, drainage, from injection site or fever over next 3-4 days. Rest and drink plenty of fluids for the remainder of the day. No lifting over 5 lbs. For the remainder of the day. Continue regular medications as prescribed. May take pain medications as prescribed.     Pain improved 100%

## 2022-01-26 PROBLEM — M54.17 LUMBOSACRAL RADICULOPATHY: Status: ACTIVE | Noted: 2022-01-26

## 2022-01-26 PROBLEM — M25.50 POLYARTHRALGIA: Chronic | Status: ACTIVE | Noted: 2022-01-26

## 2022-01-26 NOTE — PROGRESS NOTES
Disclaimer:  This note has been generated using voice recognition software.  There may be type of graft focal areas that have been missed during a proof reading      Subjective:      Patient ID: Tray Jalloh is a 71 y.o. male.    Chief Complaint: Low-back Pain and Leg Pain      Pain  This is a chronic problem. The current episode started more than 1 year ago. The problem occurs daily. The problem has been waxing and waning. Associated symptoms include arthralgias. Pertinent negatives include no change in bowel habit, chest pain, chills, coughing, diaphoresis, fever, neck pain, rash, sore throat, vertigo or vomiting.     Review of Systems   Constitutional: Negative for activity change, appetite change, chills, diaphoresis, fever and unexpected weight change.   HENT: Negative for drooling, ear discharge, ear pain, facial swelling, nosebleeds, sore throat, trouble swallowing, voice change and goiter.    Eyes: Negative for photophobia, pain, discharge, redness and visual disturbance.   Respiratory: Negative for apnea, cough, choking, chest tightness, shortness of breath, wheezing and stridor.    Cardiovascular: Negative for chest pain, palpitations and leg swelling.   Gastrointestinal: Negative for abdominal distention, change in bowel habit, diarrhea, rectal pain, vomiting, fecal incontinence and change in bowel habit.   Endocrine: Negative for cold intolerance, heat intolerance, polydipsia, polyphagia and polyuria.   Genitourinary: Negative for bladder incontinence, dysuria, flank pain, frequency and hot flashes.   Musculoskeletal: Positive for arthralgias, back pain and leg pain. Negative for neck pain and neck stiffness.   Integumentary:  Negative for color change, pallor and rash.   Allergic/Immunologic: Negative for immunocompromised state.   Neurological: Negative for dizziness, vertigo, seizures, syncope, facial asymmetry, speech difficulty, light-headedness, disturbances in coordination, memory loss  "and coordination difficulties.   Hematological: Negative for adenopathy. Does not bruise/bleed easily.   Psychiatric/Behavioral: Negative for agitation, behavioral problems, confusion, decreased concentration, dysphoric mood, hallucinations, self-injury and suicidal ideas. The patient is not nervous/anxious and is not hyperactive.             Objective:  Vitals:    01/27/22 1423   BP: 114/74   Pulse: 66   Resp: 19   Weight: 85.3 kg (188 lb)   Height: 5' 10" (1.778 m)   PainSc:   3         Physical Exam  Vitals and nursing note reviewed. Exam conducted with a chaperone present.   Constitutional:       General: He is awake. He is not in acute distress.     Appearance: Normal appearance. He is not ill-appearing, toxic-appearing or diaphoretic.   HENT:      Head: Normocephalic and atraumatic.      Nose: Nose normal.      Mouth/Throat:      Mouth: Mucous membranes are moist.      Pharynx: Oropharynx is clear.   Eyes:      Conjunctiva/sclera: Conjunctivae normal.      Pupils: Pupils are equal, round, and reactive to light.   Cardiovascular:      Rate and Rhythm: Normal rate.   Pulmonary:      Effort: Pulmonary effort is normal. No respiratory distress.   Abdominal:      Palpations: Abdomen is soft.      Tenderness: There is no guarding.   Musculoskeletal:         General: No signs of injury. Normal range of motion.      Cervical back: Normal range of motion and neck supple. No rigidity.   Skin:     General: Skin is warm and dry.      Coloration: Skin is not jaundiced or pale.   Neurological:      General: No focal deficit present.      Mental Status: He is alert and oriented to person, place, and time. Mental status is at baseline.      Cranial Nerves: Cranial nerves are intact. No cranial nerve deficit (II-XII).   Psychiatric:         Mood and Affect: Mood normal.         Behavior: Behavior normal. Behavior is cooperative.         Thought Content: Thought content normal.           [facility] for [facility]  See OP " Notes for results.     IMPRESSION: See OP Notes for results.     This procedure was auto-finalized by: Virtual Radiologist       Lab Visit on 01/11/2022   Component Date Value Ref Range Status    SARS CoV-2 PCR 01/11/2022 Negative  Negative, Invalid Final   Office Visit on 09/30/2021   Component Date Value Ref Range Status    Sodium 09/30/2021 141  136 - 145 mmol/L Final    Potassium 09/30/2021 4.4  3.5 - 5.1 mmol/L Final    Chloride 09/30/2021 110* 98 - 107 mmol/L Final    CO2 09/30/2021 29  21 - 32 mmol/L Final    Anion Gap 09/30/2021 6* 7 - 16 mmol/L Final    Glucose 09/30/2021 104  74 - 106 mg/dL Final    BUN 09/30/2021 22* 7 - 18 mg/dL Final    Creatinine 09/30/2021 1.09  0.70 - 1.30 mg/dL Final    BUN/Creatinine Ratio 09/30/2021 20  6 - 20 Final    Calcium 09/30/2021 10.0  8.5 - 10.1 mg/dL Final    Total Protein 09/30/2021 7.9  6.4 - 8.2 g/dL Final    Albumin 09/30/2021 4.0  3.5 - 5.0 g/dL Final    Globulin 09/30/2021 3.9  2.0 - 4.0 g/dL Final    A/G Ratio 09/30/2021 1.0   Final    Bilirubin, Total 09/30/2021 0.5  >0.0 - 1.2 mg/dL Final    Alk Phos 09/30/2021 36* 45 - 115 U/L Final    ALT 09/30/2021 19  16 - 61 U/L Final    AST 09/30/2021 15  15 - 37 U/L Final    eGFR 09/30/2021 71  >=60 mL/min/1.73m² Final    Triglycerides 09/30/2021 80  35 - 150 mg/dL Final    Cholesterol 09/30/2021 114  0 - 200 mg/dL Final    HDL Cholesterol 09/30/2021 51  40 - 60 mg/dL Final    Cholesterol/HDL Ratio (Risk Factor) 09/30/2021 2.2   Final    Non-HDL 09/30/2021 63  mg/dL Final    LDL Calculated 09/30/2021 47  mg/dL Final    LDL/HDL 09/30/2021 0.9   Final    VLDL 09/30/2021 16  mg/dL Final    TSH 09/30/2021 1.540  0.358 - 3.740 uIU/mL Final    WBC 09/30/2021 7.51  4.50 - 11.00 K/uL Final    RBC 09/30/2021 4.05* 4.60 - 6.20 M/uL Final    Hemoglobin 09/30/2021 12.7* 13.5 - 18.0 g/dL Final    Hematocrit 09/30/2021 39.3* 40.0 - 54.0 % Final    MCV 09/30/2021 97.0* 80.0 - 96.0 fL Final    MCH  09/30/2021 31.4* 27.0 - 31.0 pg Final    MCHC 09/30/2021 32.3  32.0 - 36.0 g/dL Final    RDW 09/30/2021 12.2  11.5 - 14.5 % Final    Platelet Count 09/30/2021 275  150 - 400 K/uL Final    MPV 09/30/2021 9.9  9.4 - 12.4 fL Final    Neutrophils % 09/30/2021 53.7  53.0 - 65.0 % Final    Lymphocytes % 09/30/2021 33.2  27.0 - 41.0 % Final    Monocytes % 09/30/2021 6.7* 2.0 - 6.0 % Final    Eosinophils % 09/30/2021 4.9* 1.0 - 4.0 % Final    Basophils % 09/30/2021 1.2* 0.0 - 1.0 % Final    Immature Granulocytes % 09/30/2021 0.3  0.0 - 0.4 % Final    nRBC, Auto 09/30/2021 0.0  <=0.0 % Final    Neutrophils, Abs 09/30/2021 4.04  1.80 - 7.70 K/uL Final    Lymphocytes, Absolute 09/30/2021 2.49  1.00 - 4.80 K/uL Final    Monocytes, Absolute 09/30/2021 0.50  0.00 - 0.80 K/uL Final    Eosinophils, Absolute 09/30/2021 0.37  0.00 - 0.50 K/uL Final    Basophils, Absolute 09/30/2021 0.09  0.00 - 0.20 K/uL Final    Immature Granulocytes, Absolute 09/30/2021 0.02  0.00 - 0.04 K/uL Final    nRBC, Absolute 09/30/2021 0.00  <=0.00 x10e3/uL Final    Diff Type 09/30/2021 Auto   Final           Assessment:      1. Lumbosacral radiculopathy    2. Polyarthralgia          No orders of the defined types were placed in this encounter.          Requested Prescriptions      No prescriptions requested or ordered in this encounter         Plan:    Not using narcotics from our office    Follows orthopedics [facility]    Follow-up after L4/5, L5/S1 right-sided TFESI # 1 January 13, 2022  He states he had 97% relief after procedure note is ongoing    Procedure notes/fluoro images reviewed    X-ray pelvis [facility] October 2021, degenerative changes no fracture noted    X-ray right knee [facility] September 2021 degenerative changes multiple metallic fragments noted    MRI lumbar spine [facility] December 2020 multiple level degenerative changes malalignment noted    He states when his pain is flaring up with standing  walking of forces him to lean forward pain in his buttock and legs    At this point he would like to continue with conservative management    Follow-up as needed, patient request    Dr. Patel, January 2023

## 2022-01-27 ENCOUNTER — OFFICE VISIT (OUTPATIENT)
Dept: PAIN MEDICINE | Facility: CLINIC | Age: 72
End: 2022-01-27
Payer: COMMERCIAL

## 2022-01-27 VITALS
SYSTOLIC BLOOD PRESSURE: 114 MMHG | WEIGHT: 188 LBS | HEIGHT: 70 IN | HEART RATE: 66 BPM | DIASTOLIC BLOOD PRESSURE: 74 MMHG | BODY MASS INDEX: 26.92 KG/M2 | RESPIRATION RATE: 19 BRPM

## 2022-01-27 DIAGNOSIS — M54.17 LUMBOSACRAL RADICULOPATHY: Primary | Chronic | ICD-10-CM

## 2022-01-27 DIAGNOSIS — M25.50 POLYARTHRALGIA: Chronic | ICD-10-CM

## 2022-01-27 PROCEDURE — 1125F PR PAIN SEVERITY QUANTIFIED, PAIN PRESENT: ICD-10-PCS | Mod: ,,, | Performed by: PHYSICIAN ASSISTANT

## 2022-01-27 PROCEDURE — 3078F DIAST BP <80 MM HG: CPT | Mod: ,,, | Performed by: PHYSICIAN ASSISTANT

## 2022-01-27 PROCEDURE — 3074F PR MOST RECENT SYSTOLIC BLOOD PRESSURE < 130 MM HG: ICD-10-PCS | Mod: ,,, | Performed by: PHYSICIAN ASSISTANT

## 2022-01-27 PROCEDURE — 1159F MED LIST DOCD IN RCRD: CPT | Mod: ,,, | Performed by: PHYSICIAN ASSISTANT

## 2022-01-27 PROCEDURE — 1159F PR MEDICATION LIST DOCUMENTED IN MEDICAL RECORD: ICD-10-PCS | Mod: ,,, | Performed by: PHYSICIAN ASSISTANT

## 2022-01-27 PROCEDURE — 3078F PR MOST RECENT DIASTOLIC BLOOD PRESSURE < 80 MM HG: ICD-10-PCS | Mod: ,,, | Performed by: PHYSICIAN ASSISTANT

## 2022-01-27 PROCEDURE — 4010F ACE/ARB THERAPY RXD/TAKEN: CPT | Mod: ,,, | Performed by: PHYSICIAN ASSISTANT

## 2022-01-27 PROCEDURE — 3008F BODY MASS INDEX DOCD: CPT | Mod: ,,, | Performed by: PHYSICIAN ASSISTANT

## 2022-01-27 PROCEDURE — 3074F SYST BP LT 130 MM HG: CPT | Mod: ,,, | Performed by: PHYSICIAN ASSISTANT

## 2022-01-27 PROCEDURE — 1125F AMNT PAIN NOTED PAIN PRSNT: CPT | Mod: ,,, | Performed by: PHYSICIAN ASSISTANT

## 2022-01-27 PROCEDURE — 99214 OFFICE O/P EST MOD 30 MIN: CPT | Mod: S$PBB,,, | Performed by: PHYSICIAN ASSISTANT

## 2022-01-27 PROCEDURE — 1157F PR ADVANCE CARE PLAN OR EQUIV PRESENT IN MEDICAL RECORD: ICD-10-PCS | Mod: ,,, | Performed by: PHYSICIAN ASSISTANT

## 2022-01-27 PROCEDURE — 4010F PR ACE/ARB THEARPY RXD/TAKEN: ICD-10-PCS | Mod: ,,, | Performed by: PHYSICIAN ASSISTANT

## 2022-01-27 PROCEDURE — 3008F PR BODY MASS INDEX (BMI) DOCUMENTED: ICD-10-PCS | Mod: ,,, | Performed by: PHYSICIAN ASSISTANT

## 2022-01-27 PROCEDURE — 99214 PR OFFICE/OUTPT VISIT, EST, LEVL IV, 30-39 MIN: ICD-10-PCS | Mod: S$PBB,,, | Performed by: PHYSICIAN ASSISTANT

## 2022-01-27 PROCEDURE — 1157F ADVNC CARE PLAN IN RCRD: CPT | Mod: ,,, | Performed by: PHYSICIAN ASSISTANT

## 2022-01-27 PROCEDURE — 99215 OFFICE O/P EST HI 40 MIN: CPT | Mod: PBBFAC | Performed by: PHYSICIAN ASSISTANT

## 2022-03-07 NOTE — TELEPHONE ENCOUNTER
----- Message from Temitope Merritt sent at 3/7/2022  8:55 AM CST -----  Pt call back about meds      [phone]

## 2022-03-08 RX ORDER — FLUOXETINE HYDROCHLORIDE 20 MG/1
CAPSULE ORAL
Qty: 21 CAPSULE | Refills: 0 | Status: SHIPPED | OUTPATIENT
Start: 2022-03-08 | End: 2022-04-04

## 2022-03-08 NOTE — TELEPHONE ENCOUNTER
Pt returned call. He voiced understanding and states he still wants to stop the prozac. He voiced understanding on weaning off of it.     WM 39.

## 2022-03-08 NOTE — TELEPHONE ENCOUNTER
Sure, but it is more likely that the gabapentin is causing drowsiness than the prozac, and usually if it is the prozac, that can be resolved by taking it at night.   We will need to wean off the prozac since he is on 40 mg capsule daily which can't be split, I can send a 20 mg to take daily x 2 weeks then every other day x 2 weeks then stop. (I haven't sent it yet.)

## 2022-03-08 NOTE — TELEPHONE ENCOUNTER
Pt returned call. He states he takes prozac and gabapentin and states that he stays sleepy all the time. He is wondering if he could stop taking the prozac and see if that makes a difference.

## 2022-03-15 ENCOUNTER — OFFICE VISIT (OUTPATIENT)
Dept: FAMILY MEDICINE | Facility: CLINIC | Age: 72
End: 2022-03-15
Payer: COMMERCIAL

## 2022-03-15 VITALS
SYSTOLIC BLOOD PRESSURE: 118 MMHG | OXYGEN SATURATION: 95 % | BODY MASS INDEX: 26.01 KG/M2 | HEIGHT: 72 IN | DIASTOLIC BLOOD PRESSURE: 68 MMHG | RESPIRATION RATE: 20 BRPM | HEART RATE: 80 BPM | TEMPERATURE: 98 F | WEIGHT: 192 LBS

## 2022-03-15 DIAGNOSIS — R50.9 FEVER, UNSPECIFIED FEVER CAUSE: Primary | ICD-10-CM

## 2022-03-15 DIAGNOSIS — J06.9 VIRAL URI: ICD-10-CM

## 2022-03-15 DIAGNOSIS — R06.02 SOB (SHORTNESS OF BREATH): ICD-10-CM

## 2022-03-15 DIAGNOSIS — J02.9 SORE THROAT: ICD-10-CM

## 2022-03-15 LAB
CTP QC/QA: YES
CTP QC/QA: YES
FLUAV AG NPH QL: NEGATIVE
FLUBV AG NPH QL: NEGATIVE
S PYO RRNA THROAT QL PROBE: NEGATIVE
SARS-COV-2 AG RESP QL IA.RAPID: NEGATIVE

## 2022-03-15 PROCEDURE — 1157F PR ADVANCE CARE PLAN OR EQUIV PRESENT IN MEDICAL RECORD: ICD-10-PCS | Mod: ,,, | Performed by: NURSE PRACTITIONER

## 2022-03-15 PROCEDURE — 99213 OFFICE O/P EST LOW 20 MIN: CPT | Mod: ,,, | Performed by: NURSE PRACTITIONER

## 2022-03-15 PROCEDURE — 4010F ACE/ARB THERAPY RXD/TAKEN: CPT | Mod: ,,, | Performed by: NURSE PRACTITIONER

## 2022-03-15 PROCEDURE — 3008F BODY MASS INDEX DOCD: CPT | Mod: ,,, | Performed by: NURSE PRACTITIONER

## 2022-03-15 PROCEDURE — 1101F PR PT FALLS ASSESS DOC 0-1 FALLS W/OUT INJ PAST YR: ICD-10-PCS | Mod: ,,, | Performed by: NURSE PRACTITIONER

## 2022-03-15 PROCEDURE — 3078F DIAST BP <80 MM HG: CPT | Mod: ,,, | Performed by: NURSE PRACTITIONER

## 2022-03-15 PROCEDURE — 1159F PR MEDICATION LIST DOCUMENTED IN MEDICAL RECORD: ICD-10-PCS | Mod: ,,, | Performed by: NURSE PRACTITIONER

## 2022-03-15 PROCEDURE — 3288F FALL RISK ASSESSMENT DOCD: CPT | Mod: ,,, | Performed by: NURSE PRACTITIONER

## 2022-03-15 PROCEDURE — 3288F PR FALLS RISK ASSESSMENT DOCUMENTED: ICD-10-PCS | Mod: ,,, | Performed by: NURSE PRACTITIONER

## 2022-03-15 PROCEDURE — 3074F SYST BP LT 130 MM HG: CPT | Mod: ,,, | Performed by: NURSE PRACTITIONER

## 2022-03-15 PROCEDURE — 87880 POCT RAPID STREP A: ICD-10-PCS | Mod: QW,,, | Performed by: NURSE PRACTITIONER

## 2022-03-15 PROCEDURE — 99213 PR OFFICE/OUTPT VISIT, EST, LEVL III, 20-29 MIN: ICD-10-PCS | Mod: ,,, | Performed by: NURSE PRACTITIONER

## 2022-03-15 PROCEDURE — 1125F AMNT PAIN NOTED PAIN PRSNT: CPT | Mod: ,,, | Performed by: NURSE PRACTITIONER

## 2022-03-15 PROCEDURE — 87880 STREP A ASSAY W/OPTIC: CPT | Mod: QW,,, | Performed by: NURSE PRACTITIONER

## 2022-03-15 PROCEDURE — 3008F PR BODY MASS INDEX (BMI) DOCUMENTED: ICD-10-PCS | Mod: ,,, | Performed by: NURSE PRACTITIONER

## 2022-03-15 PROCEDURE — 1157F ADVNC CARE PLAN IN RCRD: CPT | Mod: ,,, | Performed by: NURSE PRACTITIONER

## 2022-03-15 PROCEDURE — 1125F PR PAIN SEVERITY QUANTIFIED, PAIN PRESENT: ICD-10-PCS | Mod: ,,, | Performed by: NURSE PRACTITIONER

## 2022-03-15 PROCEDURE — 87428 POCT SARS-COV2 (COVID) WITH FLU ANTIGEN: ICD-10-PCS | Mod: QW,,, | Performed by: NURSE PRACTITIONER

## 2022-03-15 PROCEDURE — 87428 SARSCOV & INF VIR A&B AG IA: CPT | Mod: QW,,, | Performed by: NURSE PRACTITIONER

## 2022-03-15 PROCEDURE — 3078F PR MOST RECENT DIASTOLIC BLOOD PRESSURE < 80 MM HG: ICD-10-PCS | Mod: ,,, | Performed by: NURSE PRACTITIONER

## 2022-03-15 PROCEDURE — 1159F MED LIST DOCD IN RCRD: CPT | Mod: ,,, | Performed by: NURSE PRACTITIONER

## 2022-03-15 PROCEDURE — 1101F PT FALLS ASSESS-DOCD LE1/YR: CPT | Mod: ,,, | Performed by: NURSE PRACTITIONER

## 2022-03-15 PROCEDURE — 3074F PR MOST RECENT SYSTOLIC BLOOD PRESSURE < 130 MM HG: ICD-10-PCS | Mod: ,,, | Performed by: NURSE PRACTITIONER

## 2022-03-15 PROCEDURE — 4010F PR ACE/ARB THEARPY RXD/TAKEN: ICD-10-PCS | Mod: ,,, | Performed by: NURSE PRACTITIONER

## 2022-03-15 NOTE — PATIENT INSTRUCTIONS
Patient Education       Preventing Falls   The Basics   Written by the doctors and editors at [facility]   Am I at risk of falling? -- Your risk of falling increases as you grow older. That's because getting older can make it harder to walk steadily and keep your balance. Also, the effects of falls are more serious in older people.  Overall, 3 to 4 out of every 10 people over the age of 65 fall each year. Up to 75 percent of people who fracture a hip never recover to the point they were before they had their fracture. If you have fallen in the past, you are at higher risk of falling again.  Several things can increase your risk of a fall, including:  Illness  A change in the medicines you take  An unsafe or unfamiliar setting (for example, a room with rugs or furniture that might trip you, or an area you don't know well)  How can my doctor help me to avoid falling? -- Your doctor can talk to you about the following things:  Past falls - It is important to tell your doctor about any times you have fallen or almost fallen. He or she can then suggest ways to prevent another fall.  Your health conditions - Some health problems can put you at risk of falling. These include conditions that affect eyesight, hearing, muscle strength, or balance.  The medicines you take - Certain medicines can increase the risk of falling. These include some medicines that are used for sleeping problems, anxiety, high blood pressure, or depression. Adding new medicines, or changing doses of some medicines, can also affect your risk of falling.  The more your doctor knows about your situation, the better he or she will be able to help you. For example, if you fell because you have a condition that causes pain, your doctor might suggest treatments to deal with the pain. Or if one of your medicines is making you dizzy and more likely to fall, your doctor might switch you to a different medicine.  Is there anything I can do on my own? -- Yes. To  help keep from falling, you can:  Make your home safer - To avoid falling at home, get rid of things that might make you trip or slip. This might include furniture, electrical cords, clutter, and loose rugs (figure 1). Keep your home well-lit so that you can easily see where you are going. Avoid storing things in high places so you don't have to reach or climb.  Wear sturdy shoes that fit well - Wearing shoes with high heels or slippery soles, or shoes that are too loose, can lead to falls. Walking around in bare feet, or only socks, can also increase your risk of falling.  Take vitamin D pills - Taking vitamin D might lower the risk of falls in older people. This is because vitamin D helps make bones and muscles stronger. Your doctor can talk to you about whether you should take extra vitamin D, and how much.  Stay active - Exercising on a regular basis can help lower your risk of falling. It might also help prevent you from getting hurt if you do fall. It is best to do a few different activities that help with both strength and balance. There are many kinds of exercise that can be safe for older people. These include walking, swimming, and Pérez Chi (a Chinese martial art that involves slow, gentle movements).  Use a cane, walker, and other safety devices - If your doctor recommends that you use a cane or walker, be sure that it's the right size and you know how to use it. There are other devices that might help you avoid falling, too. These include grab bars or a sturdy seat for the shower, non-slip bath mats, and hand rails or treads for the stairs (to prevent slipping).  If you worry that you could fall, there are also alarm buttons that let you call for help if you fall and can't get up.  What should I do if I fall? -- If you fall, see your doctor right away, even if you aren't hurt. Your doctor can try to figure out what caused you to fall, and how likely you are to fall again. He or she will do an exam and  talk to you about your health problems, medicines, and activities. Then he or she can suggest things you can do to avoid falling again.  Many older people have a hard time recovering after a fall. Doing things to prevent falling can help you to protect your health and independence.  All topics are updated as new evidence becomes available and our peer review process is complete.  This topic retrieved from [employer] on: Sep 21, 2021.  Topic 35304 Version 18.0  Release: 29.4.2 - C29.263  © 2021 UpToDate, Inc. and/or its affiliates. All rights reserved.  figure 1: How to avoid falling at home     This picture shows some of the things that can cause a fall in your home. Look around and remove any loose rugs, electrical cords, clutter, or furniture that could trip you.  Graphic 59773 Version 1.0    Consumer Information Use and Disclaimer   This information is not specific medical advice and does not replace information you receive from your health care provider. This is only a brief summary of general information. It does NOT include all information about conditions, illnesses, injuries, tests, procedures, treatments, therapies, discharge instructions or life-style choices that may apply to you. You must talk with your health care provider for complete information about your health and treatment options. This information should not be used to decide whether or not to accept your health care provider's advice, instructions or recommendations. Only your health care provider has the knowledge and training to provide advice that is right for you. The use of this information is governed by the [employer] End User License Agreement, available at https://www.[employer]/en/solutions/[employer]/about/pavan.The use of [employer] content is governed by the [employer] Terms of Use. ©2021 UpToDate, Inc. All rights reserved.  Copyright   © 2021 UpToDate, Inc. and/or its affiliates. All rights reserved.

## 2022-03-15 NOTE — PROGRESS NOTES
[facility] - FAMILY MEDICINE       PATIENT NAME: Tray Jalloh   : 1950    AGE: 71 y.o. DATE: 03/15/2022    MRN: [id number]        Reason for Visit / Chief Complaint: URI (Yesterday started feeling bad, temp was 100.3, highest 102. Hurting al over. Headache. Feel SOB) and Headache     Subjective:     Presents as a walk-in c/o fever up to 102, body aches, malaise, and SOB since yesterday. Denies cough. Some pleuritic pain left lower lung when he takes a deep breath.     PHQ9 11/10/2021   Total Score 1       Review of Systems:     Review of Systems   Constitutional: Positive for fatigue and fever. Negative for chills.   HENT: Positive for rhinorrhea and sore throat. Negative for congestion, ear pain, postnasal drip and sinus pain.    Respiratory: Positive for shortness of breath. Negative for cough and wheezing.    Cardiovascular: Negative for chest pain.   Gastrointestinal: Negative for abdominal pain, diarrhea, nausea and vomiting.   Musculoskeletal: Positive for myalgias.   Skin: Negative.    Neurological: Positive for headaches.       Allergies and Medications:     Review of patient's allergies indicates:   Allergen Reactions    Bee pollen     Grass pollen- grass standard         Current Outpatient Medications on File Prior to Visit   Medication Sig Dispense Refill    acetaminophen (TYLENOL) 500 MG tablet Take 500 mg by mouth every 6 (six) hours as needed for Pain.      amLODIPine (NORVASC) 5 MG tablet Take 1 tablet (5 mg total) by mouth once daily. 90 tablet 3    cetirizine (ZYRTEC) 10 MG tablet Take 10 mg by mouth once daily.      cholecalciferol, vitamin D3, (VITAMIN D3) 50 mcg (2,000 unit) Cap Take 1 capsule by mouth once daily.      CRESTOR 20 mg tablet Take 1 tablet by mouth once daily. Take 1/2 tablet by mouth every evening      diclofenac sodium (VOLTAREN) 1 % Gel       FLUoxetine 20 MG capsule Take 1 capsule daily for 2 weeks and  then 1 capsule every other day for 2 weeks. 21 capsule 0    folic acid/multivit-min/lutein (CENTRUM SILVER ORAL) Take 1 tablet by mouth once daily.      gabapentin (NEURONTIN) 300 MG capsule Take 1 capsule (300 mg total) by mouth 3 (three) times daily. 90 capsule 11    hydrocortisone 2.5 % cream Apply 30 g/kg topically 2 (two) times daily. Apply to face for dry skin      ipratropium-albuteroL (COMBIVENT RESPIMAT)  mcg/actuation inhaler Inhale 1 puff into the lungs 2 (two) times a day. Rescue      ketoconazole (NIZORAL) 2 % cream Apply topically once daily.      lisinopriL (PRINIVIL,ZESTRIL) 40 MG tablet Take 1 tablet (40 mg total) by mouth once daily. 90 tablet 3    metaxalone (SKELAXIN) 800 MG tablet Take 1 tablet (800 mg total) by mouth daily as needed for Pain. 90 tablet 1    methenamine (HIPREX) 1 gram Tab Take 1 tablet (1 g total) by mouth 2 (two) times daily. 180 tablet 1    multivitamin (THERAGRAN) per tablet Take 1 tablet by mouth once daily.      omega-3 fatty acids/fish oil (FISH OIL-OMEGA-3 FATTY ACIDS) 300-1,000 mg capsule Take 2 capsules by mouth once daily.      omeprazole (PRILOSEC) 20 MG capsule Take 1 capsule (20 mg total) by mouth once daily. 90 capsule 3    triamcinolone acetonide 0.1% (KENALOG) 0.1 % cream Apply 15 g topically 2 (two) times daily. Apply to back for dry skin and itching      zinc gluconate 50 mg tablet Take 50 mg by mouth once daily.      [DISCONTINUED] diphenhydrAMINE (SOMINEX) 25 mg tablet Take 25 mg by mouth nightly as needed for Insomnia.       Current Facility-Administered Medications on File Prior to Visit   Medication Dose Route Frequency Provider Last Rate Last Admin    hyaluronate sodium, stabilized (MONOVISC) Syrg   Intra-articular 1 time in Clinic/HOD Dada Enriquez MD        hyaluronate sodium, stabilized 60 mg/3 mL   Intra-articular 1 time in Clinic/HOD Dada Enriquez MD           Medical/Social/Family History:     Past Medical History:   Diagnosis  Date    Cancer     Cervical radiculopathy     Chronic pain     Chronic renal impairment     COPD (chronic obstructive pulmonary disease)     Depression     Digestive disorder     GERD (gastroesophageal reflux disease)     Hypercholesteremia     Hypertension     Left ventricular hypertrophy     Lumbosacral spondylosis     Prediabetes     Pulmonary nodules     repeat CT due 12/03/21      Social History     Tobacco Use   Smoking Status Former Smoker    Types: Cigarettes   Smokeless Tobacco Never Used      Social History     Substance and Sexual Activity   Alcohol Use Not Currently       Family History   Problem Relation Age of Onset    Cancer Other     Diabetes Other     Heart disease Other     Hypertension Other     Stroke Other     Diabetes Mother     Hypertension Father     Heart disease Father     Stroke Father     Diabetes Paternal Grandmother       Past Surgical History:   Procedure Laterality Date    ABDOMINAL SURGERY      inguinal hernia repair    BACK SURGERY      Bilateral L3-S1 MBB Bilateral 2-, 1-, 1-, 12-    Dr Jorge Parker    EYE SURGERY      HERNIA REPAIR      PROSTATECTOMY      RADIOFREQUENCY ABLATION OF LUMBAR MEDIAL BRANCH NERVE AT SINGLE LEVEL Right 4/1/2021    Procedure: Radiofrequency Ablation, Nerve, Spinal, Lumbar, Medial Branch, 1 Level;  Surgeon: Jordana Patel MD;  Location: [facility];  Service: Pain Management;  Laterality: Right;  Right L3-S1 RFTC    RADIOFREQUENCY THERMOCOAGULATION Bilateral 03/20/2012    Dr Parker    RADIOFREQUENCY THERMOCOAGULATION Left 3/18/2021    Procedure: RADIOFREQUENCY THERMAL COAGULATION;  Surgeon: Jordana Patel MD;  Location: [facility];  Service: Pain Management;  Laterality: Left;  BILATERAL L3-S1 RFTC LEFT SIDE FIRST    SELECTIVE INJECTION OF ANESTHETIC AGENT AROUND LUMBAR SPINAL NERVE ROOT BY TRANSFORAMINAL APPROACH Right 1/13/2022    Procedure: Right L4-5,5-S1 TFESI;   "Surgeon: Jordana Patel MD;  Location: [facility];  Service: Pain Management;  Laterality: Right;  PT AWARE TO BE TESTED VIA PC      Immunization History   Administered Date(s) Administered    COVID-19, MRNA, LN-S, PF (MODERNA FULL 0.5 ML DOSE) 03/02/2021, 03/30/2021    Influenza 10/14/2009, 11/01/2011, 10/01/2012, 10/01/2014    Influenza - High Dose - PF (65 years and older) 11/08/2005, 12/13/2006, 11/06/2007, 10/23/2008, 10/26/2021    Influenza - Quadrivalent - High Dose - PF (65 years and older) 10/27/2020    Pneumococcal 11/06/2007    Pneumococcal Conjugate - 13 Valent 08/17/2016    Pneumococcal Polysaccharide - 23 Valent 11/06/2007, 01/03/2018    Td (Adult), Unspecified Formulation 06/11/2004    Tdap 10/27/2020    Zoster Recombinant 10/27/2020, 01/27/2021          Objective:     Wt Readings from Last 3 Encounters:   03/15/22 0829 87.1 kg (192 lb)   01/27/22 1423 85.3 kg (188 lb)   01/13/22 0805 84.4 kg (186 lb)       Vitals:    03/15/22 0829   BP: 118/68   BP Location: Left arm   Patient Position: Sitting   BP Method: Large (Manual)   Pulse: 80   Resp: 20   Temp: 98.1 °F (36.7 °C)   TempSrc: Temporal   SpO2: 95%   Weight: 87.1 kg (192 lb)   Height: 5' 11.5" (1.816 m)     Body mass index is 26.41 kg/m².     Physical Exam:    Physical Exam  Vitals and nursing note reviewed.   Constitutional:       General: He is not in acute distress.     Appearance: Normal appearance. He is ill-appearing.   HENT:      Head: Normocephalic.      Right Ear: Tympanic membrane, ear canal and external ear normal.      Left Ear: Tympanic membrane, ear canal and external ear normal.      Nose: Nose normal. No congestion or rhinorrhea.      Right Sinus: No maxillary sinus tenderness or frontal sinus tenderness.      Left Sinus: No maxillary sinus tenderness or frontal sinus tenderness.      Mouth/Throat:      Mouth: Mucous membranes are moist.      Pharynx: Posterior oropharyngeal erythema present.   Eyes:      " Conjunctiva/sclera: Conjunctivae normal.   Cardiovascular:      Rate and Rhythm: Normal rate and regular rhythm.      Heart sounds: Normal heart sounds.   Pulmonary:      Effort: Pulmonary effort is normal. No respiratory distress.      Breath sounds: Normal breath sounds.   Musculoskeletal:      Cervical back: No rigidity.   Lymphadenopathy:      Cervical: No cervical adenopathy.   Skin:     General: Skin is warm and dry.      Coloration: Skin is not pale.   Neurological:      General: No focal deficit present.      Mental Status: He is alert and oriented to person, place, and time.         Assessment:          ICD-10-CM ICD-9-CM   1. Fever, unspecified fever cause  R50.9 780.60   2. SOB (shortness of breath)  R06.02 786.05   3. Sore throat  J02.9 462   4. Viral URI  J06.9 465.9        Plan:         Rapid COVID negative and flu negative      Rapid strep negative    CXR normal  Suspect viral etiology of s/s which should run their course with time and supportive care. Off work x 3 days to rest, increase fluids, and must be fever free x 24 hrs to return.    Fever, unspecified fever cause  -     POCT SARS-COV2 (COVID) with Flu Antigen  -     X-Ray Chest PA And Lateral; Future; Expected date: 03/15/2022  -     POCT rapid strep A    SOB (shortness of breath)  -     X-Ray Chest PA And Lateral; Future; Expected date: 03/15/2022    Sore throat  -     POCT rapid strep A    Viral URI        Current Outpatient Medications:     acetaminophen (TYLENOL) 500 MG tablet, Take 500 mg by mouth every 6 (six) hours as needed for Pain., Disp: , Rfl:     amLODIPine (NORVASC) 5 MG tablet, Take 1 tablet (5 mg total) by mouth once daily., Disp: 90 tablet, Rfl: 3    cetirizine (ZYRTEC) 10 MG tablet, Take 10 mg by mouth once daily., Disp: , Rfl:     cholecalciferol, vitamin D3, (VITAMIN D3) 50 mcg (2,000 unit) Cap, Take 1 capsule by mouth once daily., Disp: , Rfl:     CRESTOR 20 mg tablet, Take 1 tablet by mouth once daily. Take 1/2  tablet by mouth every evening, Disp: , Rfl:     diclofenac sodium (VOLTAREN) 1 % Gel, , Disp: , Rfl:     FLUoxetine 20 MG capsule, Take 1 capsule daily for 2 weeks and then 1 capsule every other day for 2 weeks., Disp: 21 capsule, Rfl: 0    folic acid/multivit-min/lutein (CENTRUM SILVER ORAL), Take 1 tablet by mouth once daily., Disp: , Rfl:     gabapentin (NEURONTIN) 300 MG capsule, Take 1 capsule (300 mg total) by mouth 3 (three) times daily., Disp: 90 capsule, Rfl: 11    hydrocortisone 2.5 % cream, Apply 30 g/kg topically 2 (two) times daily. Apply to face for dry skin, Disp: , Rfl:     ipratropium-albuteroL (COMBIVENT RESPIMAT)  mcg/actuation inhaler, Inhale 1 puff into the lungs 2 (two) times a day. Rescue, Disp: , Rfl:     ketoconazole (NIZORAL) 2 % cream, Apply topically once daily., Disp: , Rfl:     lisinopriL (PRINIVIL,ZESTRIL) 40 MG tablet, Take 1 tablet (40 mg total) by mouth once daily., Disp: 90 tablet, Rfl: 3    metaxalone (SKELAXIN) 800 MG tablet, Take 1 tablet (800 mg total) by mouth daily as needed for Pain., Disp: 90 tablet, Rfl: 1    methenamine (HIPREX) 1 gram Tab, Take 1 tablet (1 g total) by mouth 2 (two) times daily., Disp: 180 tablet, Rfl: 1    multivitamin (THERAGRAN) per tablet, Take 1 tablet by mouth once daily., Disp: , Rfl:     omega-3 fatty acids/fish oil (FISH OIL-OMEGA-3 FATTY ACIDS) 300-1,000 mg capsule, Take 2 capsules by mouth once daily., Disp: , Rfl:     omeprazole (PRILOSEC) 20 MG capsule, Take 1 capsule (20 mg total) by mouth once daily., Disp: 90 capsule, Rfl: 3    triamcinolone acetonide 0.1% (KENALOG) 0.1 % cream, Apply 15 g topically 2 (two) times daily. Apply to back for dry skin and itching, Disp: , Rfl:     zinc gluconate 50 mg tablet, Take 50 mg by mouth once daily., Disp: , Rfl:     Current Facility-Administered Medications:     hyaluronate sodium, stabilized (MONOVISC) Syrg, , Intra-articular, 1 time in Clinic/HOD, Dada Enriquez MD    hyaluronate  sodium, stabilized 60 mg/3 mL, , Intra-articular, 1 time in Clinic/HOD, Dada Enriquez MD    Requested Prescriptions      No prescriptions requested or ordered in this encounter       F/u as needed or if symptoms worsen or persist.  Future Appointments   Date Time Provider Department Center   5/12/2022  9:20 AM [facility] BRYANT Sweet       Signature: Elizabeth BARNES-BC

## 2022-03-15 NOTE — LETTER
March 15, 2022      [facility] [address]  [place] [address]  Phone: [phone]  Fax: [phone]       Patient: Tray Jalloh   YOB: 1950  Date of Visit: 03/15/2022    To Whom It May Concern:    Weston Jalloh  was at [facility] on 03/15/2022. The patient may return to work/school on 03/18/2022 with no restrictions. If you have any questions or concerns, or if I can be of further assistance, please do not hesitate to contact me.    Sincerely,    MOLLY Flannery

## 2022-03-21 RX ORDER — METHENAMINE HIPPURATE 1000 MG/1
1 TABLET ORAL 2 TIMES DAILY
Qty: 180 TABLET | Refills: 1 | Status: SHIPPED | OUTPATIENT
Start: 2022-03-21 | End: 2022-09-12 | Stop reason: SDUPTHER

## 2022-03-21 NOTE — TELEPHONE ENCOUNTER
----- Message from Sabiha Friedman sent at 3/21/2022 12:02 PM CDT -----  Patient needs a refill on methenamine  [facility]  at [phone].  Please call patient at [phone] if you have any questions. Thanks!

## 2022-04-04 DIAGNOSIS — I10 ESSENTIAL HYPERTENSION: ICD-10-CM

## 2022-04-04 RX ORDER — OMEPRAZOLE 20 MG/1
20 CAPSULE, DELAYED RELEASE ORAL DAILY
Qty: 90 CAPSULE | Refills: 3 | Status: SHIPPED | OUTPATIENT
Start: 2022-04-04 | End: 2023-02-21 | Stop reason: SDUPTHER

## 2022-04-04 RX ORDER — CETIRIZINE HYDROCHLORIDE 10 MG/1
10 TABLET ORAL DAILY
Qty: 90 TABLET | Refills: 3 | Status: SHIPPED | OUTPATIENT
Start: 2022-04-04 | End: 2023-04-24 | Stop reason: SDUPTHER

## 2022-04-04 RX ORDER — FLUOXETINE HYDROCHLORIDE 20 MG/1
CAPSULE ORAL
Qty: 21 CAPSULE | Refills: 0 | Status: CANCELLED | OUTPATIENT
Start: 2022-04-04

## 2022-04-04 RX ORDER — IPRATROPIUM BROMIDE AND ALBUTEROL 20; 100 UG/1; UG/1
1 SPRAY, METERED RESPIRATORY (INHALATION) EVERY 6 HOURS PRN
Qty: 12 G | Refills: 1 | Status: SHIPPED | OUTPATIENT
Start: 2022-04-04 | End: 2024-01-03

## 2022-04-04 RX ORDER — LISINOPRIL 40 MG/1
40 TABLET ORAL DAILY
Qty: 90 TABLET | Refills: 3 | Status: SHIPPED | OUTPATIENT
Start: 2022-04-04 | End: 2023-04-24 | Stop reason: SDUPTHER

## 2022-04-04 RX ORDER — AMLODIPINE BESYLATE 5 MG/1
5 TABLET ORAL DAILY
Qty: 90 TABLET | Refills: 3 | Status: SHIPPED | OUTPATIENT
Start: 2022-04-04 | End: 2022-07-18

## 2022-04-04 NOTE — TELEPHONE ENCOUNTER
Pt requesting printed Rx for [facility] pharmacy.    Last seen 11/10/21 and has appt 5/12/22.

## 2022-04-04 NOTE — TELEPHONE ENCOUNTER
----- Message from Temitope Merritt sent at 4/4/2022  7:26 AM CDT -----  Pt is wanting written prescriptions on everything but cholesterol med, [facility]. The written prescription is for the [employer] he said      [phone]

## 2022-05-12 ENCOUNTER — OFFICE VISIT (OUTPATIENT)
Dept: FAMILY MEDICINE | Facility: CLINIC | Age: 72
End: 2022-05-12
Payer: COMMERCIAL

## 2022-05-12 VITALS
BODY MASS INDEX: 27.77 KG/M2 | SYSTOLIC BLOOD PRESSURE: 132 MMHG | RESPIRATION RATE: 20 BRPM | DIASTOLIC BLOOD PRESSURE: 70 MMHG | HEIGHT: 70 IN | HEART RATE: 66 BPM | WEIGHT: 194 LBS | TEMPERATURE: 98 F | OXYGEN SATURATION: 96 %

## 2022-05-12 DIAGNOSIS — I10 ESSENTIAL HYPERTENSION: ICD-10-CM

## 2022-05-12 DIAGNOSIS — Z00.00 ROUTINE GENERAL MEDICAL EXAMINATION AT A HEALTH CARE FACILITY: Primary | ICD-10-CM

## 2022-05-12 DIAGNOSIS — Z13.1 DIABETES MELLITUS SCREENING: ICD-10-CM

## 2022-05-12 DIAGNOSIS — Z79.899 ENCOUNTER FOR LONG-TERM (CURRENT) USE OF OTHER MEDICATIONS: ICD-10-CM

## 2022-05-12 DIAGNOSIS — F32.89 OTHER DEPRESSION: ICD-10-CM

## 2022-05-12 DIAGNOSIS — R41.3 MEMORY IMPAIRMENT: ICD-10-CM

## 2022-05-12 DIAGNOSIS — R73.03 PREDIABETES: ICD-10-CM

## 2022-05-12 DIAGNOSIS — N18.9 CHRONIC RENAL IMPAIRMENT, UNSPECIFIED CKD STAGE: ICD-10-CM

## 2022-05-12 DIAGNOSIS — Z13.220 SCREENING FOR HYPERLIPIDEMIA: ICD-10-CM

## 2022-05-12 PROBLEM — J02.9 SORE THROAT: Status: RESOLVED | Noted: 2022-03-15 | Resolved: 2022-05-12

## 2022-05-12 PROBLEM — F32.A DEPRESSION: Status: ACTIVE | Noted: 2022-05-12

## 2022-05-12 PROBLEM — K92.9 DIGESTIVE DISORDER: Status: ACTIVE | Noted: 2022-05-12

## 2022-05-12 PROBLEM — R50.9 FEVER: Status: RESOLVED | Noted: 2022-03-15 | Resolved: 2022-05-12

## 2022-05-12 PROBLEM — J06.9 VIRAL URI: Status: RESOLVED | Noted: 2022-03-15 | Resolved: 2022-05-12

## 2022-05-12 LAB
ALBUMIN SERPL BCP-MCNC: 3.6 G/DL (ref 3.5–5)
ALBUMIN/GLOB SERPL: 0.9 {RATIO}
ALP SERPL-CCNC: 42 U/L (ref 45–115)
ALT SERPL W P-5'-P-CCNC: 21 U/L (ref 16–61)
ANION GAP SERPL CALCULATED.3IONS-SCNC: 12 MMOL/L (ref 7–16)
AST SERPL W P-5'-P-CCNC: 13 U/L (ref 15–37)
BASOPHILS # BLD AUTO: 0.1 K/UL (ref 0–0.2)
BASOPHILS NFR BLD AUTO: 1.7 % (ref 0–1)
BILIRUB SERPL-MCNC: 0.3 MG/DL (ref 0–1.2)
BUN SERPL-MCNC: 21 MG/DL (ref 7–18)
BUN/CREAT SERPL: 19 (ref 6–20)
CALCIUM SERPL-MCNC: 8.6 MG/DL (ref 8.5–10.1)
CHLORIDE SERPL-SCNC: 108 MMOL/L (ref 98–107)
CHOLEST SERPL-MCNC: 119 MG/DL (ref 0–200)
CHOLEST/HDLC SERPL: 3 {RATIO}
CO2 SERPL-SCNC: 24 MMOL/L (ref 21–32)
CREAT SERPL-MCNC: 1.1 MG/DL (ref 0.7–1.3)
CREAT UR-MCNC: 91 MG/DL (ref 39–259)
DIFFERENTIAL METHOD BLD: ABNORMAL
EOSINOPHIL # BLD AUTO: 0.4 K/UL (ref 0–0.5)
EOSINOPHIL NFR BLD AUTO: 6.6 % (ref 1–4)
ERYTHROCYTE [DISTWIDTH] IN BLOOD BY AUTOMATED COUNT: 12.2 % (ref 11.5–14.5)
EST. AVERAGE GLUCOSE BLD GHB EST-MCNC: 90 MG/DL
GLOBULIN SER-MCNC: 4.2 G/DL (ref 2–4)
GLUCOSE SERPL-MCNC: 102 MG/DL (ref 74–106)
HBA1C MFR BLD HPLC: 5.3 % (ref 4.5–6.6)
HCT VFR BLD AUTO: 39.2 % (ref 40–54)
HDLC SERPL-MCNC: 40 MG/DL (ref 40–60)
HGB BLD-MCNC: 12.3 G/DL (ref 13.5–18)
IMM GRANULOCYTES # BLD AUTO: 0.02 K/UL (ref 0–0.04)
IMM GRANULOCYTES NFR BLD: 0.3 % (ref 0–0.4)
LDLC SERPL CALC-MCNC: 63 MG/DL
LDLC/HDLC SERPL: 1.6 {RATIO}
LYMPHOCYTES # BLD AUTO: 2.23 K/UL (ref 1–4.8)
LYMPHOCYTES NFR BLD AUTO: 36.9 % (ref 27–41)
MCH RBC QN AUTO: 31 PG (ref 27–31)
MCHC RBC AUTO-ENTMCNC: 31.4 G/DL (ref 32–36)
MCV RBC AUTO: 98.7 FL (ref 80–96)
MICROALBUMIN UR-MCNC: 3.1 MG/DL (ref 0–2.8)
MICROALBUMIN/CREAT RATIO PNL UR: 34.1 MG/G (ref 0–30)
MONOCYTES # BLD AUTO: 0.44 K/UL (ref 0–0.8)
MONOCYTES NFR BLD AUTO: 7.3 % (ref 2–6)
MPC BLD CALC-MCNC: 9.6 FL (ref 9.4–12.4)
NEUTROPHILS # BLD AUTO: 2.85 K/UL (ref 1.8–7.7)
NEUTROPHILS NFR BLD AUTO: 47.2 % (ref 53–65)
NONHDLC SERPL-MCNC: 79 MG/DL
NRBC # BLD AUTO: 0 X10E3/UL
NRBC, AUTO (.00): 0 %
PLATELET # BLD AUTO: 305 K/UL (ref 150–400)
POTASSIUM SERPL-SCNC: 4.5 MMOL/L (ref 3.5–5.1)
PROT SERPL-MCNC: 7.8 G/DL (ref 6.4–8.2)
RBC # BLD AUTO: 3.97 M/UL (ref 4.6–6.2)
SODIUM SERPL-SCNC: 139 MMOL/L (ref 136–145)
TRIGL SERPL-MCNC: 78 MG/DL (ref 35–150)
TSH SERPL DL<=0.005 MIU/L-ACNC: 1.36 UIU/ML (ref 0.36–3.74)
VIT B12 SERPL-MCNC: 319 PG/ML (ref 193–986)
VLDLC SERPL-MCNC: 16 MG/DL
WBC # BLD AUTO: 6.04 K/UL (ref 4.5–11)

## 2022-05-12 PROCEDURE — 80050 GENERAL HEALTH PANEL: CPT | Mod: ,,, | Performed by: CLINICAL MEDICAL LABORATORY

## 2022-05-12 PROCEDURE — 3008F BODY MASS INDEX DOCD: CPT | Mod: ,,, | Performed by: NURSE PRACTITIONER

## 2022-05-12 PROCEDURE — 99397 PR PREVENTIVE VISIT,EST,65 & OVER: ICD-10-PCS | Mod: ,,, | Performed by: NURSE PRACTITIONER

## 2022-05-12 PROCEDURE — 1160F PR REVIEW ALL MEDS BY PRESCRIBER/CLIN PHARMACIST DOCUMENTED: ICD-10-PCS | Mod: ,,, | Performed by: NURSE PRACTITIONER

## 2022-05-12 PROCEDURE — 1160F RVW MEDS BY RX/DR IN RCRD: CPT | Mod: ,,, | Performed by: NURSE PRACTITIONER

## 2022-05-12 PROCEDURE — 3008F PR BODY MASS INDEX (BMI) DOCUMENTED: ICD-10-PCS | Mod: ,,, | Performed by: NURSE PRACTITIONER

## 2022-05-12 PROCEDURE — 80061 LIPID PANEL: ICD-10-PCS | Mod: ICN,,, | Performed by: CLINICAL MEDICAL LABORATORY

## 2022-05-12 PROCEDURE — 83036 HEMOGLOBIN GLYCOSYLATED A1C: CPT | Mod: ICN,,, | Performed by: CLINICAL MEDICAL LABORATORY

## 2022-05-12 PROCEDURE — 3078F PR MOST RECENT DIASTOLIC BLOOD PRESSURE < 80 MM HG: ICD-10-PCS | Mod: ,,, | Performed by: NURSE PRACTITIONER

## 2022-05-12 PROCEDURE — 4010F ACE/ARB THERAPY RXD/TAKEN: CPT | Mod: ,,, | Performed by: NURSE PRACTITIONER

## 2022-05-12 PROCEDURE — 82570 MICROALBUMIN / CREATININE RATIO URINE: ICD-10-PCS | Mod: ICN,,, | Performed by: CLINICAL MEDICAL LABORATORY

## 2022-05-12 PROCEDURE — 99397 PER PM REEVAL EST PAT 65+ YR: CPT | Mod: ,,, | Performed by: NURSE PRACTITIONER

## 2022-05-12 PROCEDURE — 1157F ADVNC CARE PLAN IN RCRD: CPT | Mod: ,,, | Performed by: NURSE PRACTITIONER

## 2022-05-12 PROCEDURE — 82607 VITAMIN B12: ICD-10-PCS | Mod: ICN,,, | Performed by: CLINICAL MEDICAL LABORATORY

## 2022-05-12 PROCEDURE — 3075F SYST BP GE 130 - 139MM HG: CPT | Mod: ,,, | Performed by: NURSE PRACTITIONER

## 2022-05-12 PROCEDURE — 82043 UR ALBUMIN QUANTITATIVE: CPT | Mod: ICN,,, | Performed by: CLINICAL MEDICAL LABORATORY

## 2022-05-12 PROCEDURE — 3075F PR MOST RECENT SYSTOLIC BLOOD PRESS GE 130-139MM HG: ICD-10-PCS | Mod: ,,, | Performed by: NURSE PRACTITIONER

## 2022-05-12 PROCEDURE — 1159F PR MEDICATION LIST DOCUMENTED IN MEDICAL RECORD: ICD-10-PCS | Mod: ,,, | Performed by: NURSE PRACTITIONER

## 2022-05-12 PROCEDURE — 80061 LIPID PANEL: CPT | Mod: ICN,,, | Performed by: CLINICAL MEDICAL LABORATORY

## 2022-05-12 PROCEDURE — 1159F MED LIST DOCD IN RCRD: CPT | Mod: ,,, | Performed by: NURSE PRACTITIONER

## 2022-05-12 PROCEDURE — 80050 PR  GENERAL HEALTH PANEL: ICD-10-PCS | Mod: ,,, | Performed by: CLINICAL MEDICAL LABORATORY

## 2022-05-12 PROCEDURE — 82570 ASSAY OF URINE CREATININE: CPT | Mod: ICN,,, | Performed by: CLINICAL MEDICAL LABORATORY

## 2022-05-12 PROCEDURE — 3078F DIAST BP <80 MM HG: CPT | Mod: ,,, | Performed by: NURSE PRACTITIONER

## 2022-05-12 PROCEDURE — 1157F PR ADVANCE CARE PLAN OR EQUIV PRESENT IN MEDICAL RECORD: ICD-10-PCS | Mod: ,,, | Performed by: NURSE PRACTITIONER

## 2022-05-12 PROCEDURE — 4010F PR ACE/ARB THEARPY RXD/TAKEN: ICD-10-PCS | Mod: ,,, | Performed by: NURSE PRACTITIONER

## 2022-05-12 PROCEDURE — 83036 HEMOGLOBIN A1C: ICD-10-PCS | Mod: ICN,,, | Performed by: CLINICAL MEDICAL LABORATORY

## 2022-05-12 PROCEDURE — 82043 MICROALBUMIN / CREATININE RATIO URINE: ICD-10-PCS | Mod: ICN,,, | Performed by: CLINICAL MEDICAL LABORATORY

## 2022-05-12 PROCEDURE — 82607 VITAMIN B-12: CPT | Mod: ICN,,, | Performed by: CLINICAL MEDICAL LABORATORY

## 2022-05-12 RX ORDER — GABAPENTIN 300 MG/1
300 CAPSULE ORAL 3 TIMES DAILY
Qty: 270 CAPSULE | Refills: 3 | Status: SHIPPED | OUTPATIENT
Start: 2022-05-12 | End: 2022-11-16

## 2022-05-12 RX ORDER — FLUOXETINE HYDROCHLORIDE 20 MG/1
20 CAPSULE ORAL DAILY
Qty: 90 CAPSULE | Refills: 1 | Status: SHIPPED | OUTPATIENT
Start: 2022-05-12 | End: 2022-07-18

## 2022-05-12 RX ORDER — FLUOXETINE HYDROCHLORIDE 20 MG/1
20 CAPSULE ORAL DAILY
Qty: 90 CAPSULE | Refills: 1 | Status: SHIPPED | OUTPATIENT
Start: 2022-05-12 | End: 2022-05-12

## 2022-05-12 RX ORDER — AMLODIPINE BESYLATE 5 MG/1
1 TABLET ORAL DAILY
COMMUNITY
Start: 2022-04-04 | End: 2023-04-24 | Stop reason: SDUPTHER

## 2022-05-12 NOTE — PROGRESS NOTES
[facility] - FAMILY MEDICINE       PATIENT NAME: Tray Jalloh   : 1950    AGE: 71 y.o. DATE: 2022    MRN: [id number]        Reason for Visit / Chief Complaint:  Follow-up (Room 4//  6 month follow up )     Subjective:     HPI:  Presents for 6 mth f/u HTN and is [employer] eligible.  Is worried about blood glucose and kidney filtration.  Worried about short term memory impairment, and c/o depression.  Wants to have comprehensive lab work-up.  Gut has gotten bigger and causes pressure on bladder when he bends over.    Wanted to stop fluoxetine bc he thought it was causing drowsiness, but now having bouts of depression and wants to resume it at a lower dose. No interest in doing things.    [facility]; hx prostatectomy.    PHQ9 2022   Total Score 2       Review of Systems:     Review of Systems   Constitutional: Negative for activity change and unexpected weight change.   HENT: Negative for hearing loss, rhinorrhea and trouble swallowing.    Eyes: Negative for discharge and visual disturbance.   Respiratory: Negative for chest tightness and wheezing.    Cardiovascular: Negative for chest pain and palpitations.   Gastrointestinal: Negative for abdominal pain, blood in stool, constipation, diarrhea and vomiting.   Endocrine: Negative for polydipsia and polyuria.   Genitourinary: Negative for difficulty urinating, hematuria and urgency.   Musculoskeletal: Positive for arthralgias, joint swelling and neck pain.        Seeing Dr. Akers - chronic left shoulder pain, gabapentin helps.  Bilat knee pain   Neurological: Positive for headaches. Negative for weakness.   Psychiatric/Behavioral: Positive for dysphoric mood. Negative for confusion, self-injury and suicidal ideas.        Memory impairment       Allergies and Medications:     Review of patient's allergies indicates:   Allergen Reactions    Bee pollen     Grass  pollen-june grass standard         Current Outpatient Medications on File Prior to Visit   Medication Sig Dispense Refill    amLODIPine (NORVASC) 5 MG tablet Take 1 tablet (5 mg total) by mouth once daily. 90 tablet 3    cetirizine (ZYRTEC) 10 MG tablet Take 1 tablet (10 mg total) by mouth once daily. 90 tablet 3    cholecalciferol, vitamin D3, (VITAMIN D3) 50 mcg (2,000 unit) Cap Take 1 capsule by mouth once daily.      CRESTOR 20 mg tablet Take 1 tablet by mouth once daily. Take 1/2 tablet by mouth every evening      diclofenac sodium (VOLTAREN) 1 % Gel       folic acid/multivit-min/lutein (CENTRUM SILVER ORAL) Take 1 tablet by mouth once daily.      hydrocortisone 2.5 % cream Apply 30 g/kg topically 2 (two) times daily. Apply to face for dry skin      ipratropium-albuteroL (COMBIVENT RESPIMAT)  mcg/actuation inhaler Inhale 1 puff into the lungs every 6 (six) hours as needed for Wheezing. Rescue 12 g 1    ketoconazole (NIZORAL) 2 % cream Apply topically once daily.      lisinopriL (PRINIVIL,ZESTRIL) 40 MG tablet Take 1 tablet (40 mg total) by mouth once daily. 90 tablet 3    metaxalone (SKELAXIN) 800 MG tablet Take 1 tablet (800 mg total) by mouth daily as needed for Pain. 90 tablet 1    methenamine (HIPREX) 1 gram Tab Take 1 tablet (1 g total) by mouth 2 (two) times daily. 180 tablet 1    multivitamin (THERAGRAN) per tablet Take 1 tablet by mouth once daily.      omega-3 fatty acids/fish oil (FISH OIL-OMEGA-3 FATTY ACIDS) 300-1,000 mg capsule Take 2 capsules by mouth once daily.      omeprazole (PRILOSEC) 20 MG capsule Take 1 capsule (20 mg total) by mouth once daily. 90 capsule 3    triamcinolone acetonide 0.1% (KENALOG) 0.1 % cream Apply 15 g topically 2 (two) times daily. Apply to back for dry skin and itching      zinc gluconate 50 mg tablet Take 50 mg by mouth once daily.      [DISCONTINUED] acetaminophen (TYLENOL) 500 MG tablet Take 500 mg by mouth every 6 (six) hours as needed for  Pain.      [DISCONTINUED] gabapentin (NEURONTIN) 300 MG capsule Take 1 capsule (300 mg total) by mouth 3 (three) times daily. 90 capsule 11    amLODIPine (NORVASC) 5 MG tablet Take 1 tablet by mouth once daily.       No current facility-administered medications on file prior to visit.       Labs:      Lab Results   Component Value Date    HGBA1C 5.6 07/19/2021      Lipids:   Lab Results   Component Value Date    CHOL 114 09/30/2021     Lab Results   Component Value Date    HDL 51 09/30/2021     Lab Results   Component Value Date    LDLCALC 47 09/30/2021     Lab Results   Component Value Date    TRIG 80 09/30/2021     Lab Results   Component Value Date    CHOLHDL 2.2 09/30/2021      CMP:  Sodium   Date Value Ref Range Status   09/30/2021 141 136 - 145 mmol/L Final     Potassium   Date Value Ref Range Status   09/30/2021 4.4 3.5 - 5.1 mmol/L Final     Chloride   Date Value Ref Range Status   09/30/2021 110 (H) 98 - 107 mmol/L Final     CO2   Date Value Ref Range Status   09/30/2021 29 21 - 32 mmol/L Final     Glucose   Date Value Ref Range Status   09/30/2021 104 74 - 106 mg/dL Final     BUN   Date Value Ref Range Status   09/30/2021 22 (H) 7 - 18 mg/dL Final     Creatinine   Date Value Ref Range Status   09/30/2021 1.09 0.70 - 1.30 mg/dL Final     Calcium   Date Value Ref Range Status   09/30/2021 10.0 8.5 - 10.1 mg/dL Final     Total Protein   Date Value Ref Range Status   09/30/2021 7.9 6.4 - 8.2 g/dL Final     Albumin   Date Value Ref Range Status   09/30/2021 4.0 3.5 - 5.0 g/dL Final     Bilirubin, Total   Date Value Ref Range Status   09/30/2021 0.5 >0.0 - 1.2 mg/dL Final     Alk Phos   Date Value Ref Range Status   09/30/2021 36 (L) 45 - 115 U/L Final     AST   Date Value Ref Range Status   09/30/2021 15 15 - 37 U/L Final     ALT   Date Value Ref Range Status   09/30/2021 19 16 - 61 U/L Final     Anion Gap   Date Value Ref Range Status   09/30/2021 6 (L) 7 - 16 mmol/L Final     eGFR African American   Date  Value Ref Range Status   12/03/2020 65       Comment:     The above 3 analytes were performed by [facility] [id number] [id number]     eGFR   Date Value Ref Range Status   09/30/2021 71 >=60 mL/min/1.73m² Final      CBC:  Lab Results   Component Value Date    WBC 7.51 09/30/2021    RBC 4.05 (L) 09/30/2021    HGB 12.7 (L) 09/30/2021    HCT 39.3 (L) 09/30/2021    MCV 97.0 (H) 09/30/2021    MCH 31.4 (H) 09/30/2021    MCHC 32.3 09/30/2021    RDW 12.2 09/30/2021     09/30/2021    MPV 9.9 09/30/2021    LYMPH 33.2 09/30/2021    LYMPH 2.49 09/30/2021    MONO 6.7 (H) 09/30/2021    EOS 0.37 09/30/2021    BASO 0.09 09/30/2021    EOSINOPHIL 4.9 (H) 09/30/2021    BASOPHIL 1.2 (H) 09/30/2021      Lab Results   Component Value Date    TSH 1.540 09/30/2021       Medical/Social/Family History:     Past Medical History:   Diagnosis Date    Cancer     Cervical radiculopathy     Chronic pain     Chronic renal impairment     COPD (chronic obstructive pulmonary disease)     Depression     Digestive disorder     GERD (gastroesophageal reflux disease)     Hypercholesteremia     Hypertension     Left ventricular hypertrophy     Lumbosacral spondylosis     Prediabetes     Pulmonary nodules     repeat CT due 12/03/21      Social History     Tobacco Use   Smoking Status Former Smoker    Packs/day: 2.00    Years: 30.00    Pack years: 60.00    Types: Cigarettes, Pipe, Cigars   Smokeless Tobacco Never Used      Social History     Substance and Sexual Activity   Alcohol Use Not Currently       Family History   Problem Relation Age of Onset    Diabetes Mother     Hypertension Father     Heart disease Father     Stroke Father     Alzheimer's disease Maternal Grandmother     Diabetes Paternal Grandmother     Cancer Other     Diabetes Other     Heart disease Other     Hypertension Other     Stroke Other       Past Surgical History:   Procedure Laterality Date    ABDOMINAL SURGERY      inguinal hernia  repair    BACK SURGERY      Bilateral L3-S1 MBB Bilateral 2-, 1-, 1-, 12-    Dr Jorge Parker    EYE SURGERY      HERNIA REPAIR      KNEE ARTHROSCOPY W/ MENISCAL REPAIR Right     PROSTATECTOMY      RADIOFREQUENCY ABLATION OF LUMBAR MEDIAL BRANCH NERVE AT SINGLE LEVEL Right 04/01/2021    Procedure: Radiofrequency Ablation, Nerve, Spinal, Lumbar, Medial Branch, 1 Level;  Surgeon: Jordana Patel MD;  Location: [facility];  Service: Pain Management;  Laterality: Right;  Right L3-S1 RFTC    RADIOFREQUENCY THERMOCOAGULATION Bilateral 03/20/2012    Dr Parker    RADIOFREQUENCY THERMOCOAGULATION Left 03/18/2021    Procedure: RADIOFREQUENCY THERMAL COAGULATION;  Surgeon: Jordana Patel MD;  Location: [facility];  Service: Pain Management;  Laterality: Left;  BILATERAL L3-S1 RFTC LEFT SIDE FIRST    SELECTIVE INJECTION OF ANESTHETIC AGENT AROUND LUMBAR SPINAL NERVE ROOT BY TRANSFORAMINAL APPROACH Right 01/13/2022    Procedure: Right L4-5,5-S1 TFESI;  Surgeon: Jordana Patel MD;  Location: [facility];  Service: Pain Management;  Laterality: Right;  PT AWARE TO BE TESTED VIA PC    SPINE SURGERY  1995    VASECTOMY  Mar 1996        Health Maintenance:     Immunization History   Administered Date(s) Administered    COVID-19, MRNA, LN-S, PF (MODERNA FULL 0.5 ML DOSE) 03/02/2021, 03/30/2021    Influenza 10/14/2009, 11/01/2011, 10/01/2012, 10/01/2014    Influenza - High Dose - PF (65 years and older) 11/08/2005, 12/13/2006, 11/06/2007, 10/23/2008, 10/26/2021    Influenza - Quadrivalent - High Dose - PF (65 years and older) 10/27/2020    Pneumococcal 11/06/2007    Pneumococcal Conjugate - 13 Valent 08/17/2016    Pneumococcal Polysaccharide - 23 Valent 11/06/2007, 01/03/2018    Td (Adult), Unspecified Formulation 06/11/2004    Tdap 10/27/2020    Zoster Recombinant 10/27/2020, 01/27/2021      Health Maintenance Due   Topic Date Due    COVID-19 Vaccine (3 -  "Booster for Moderna series) 08/30/2021     Health Maintenance Topics with due status: Not Due       Topic Last Completion Date    Colorectal Cancer Screening 09/04/2012    TETANUS VACCINE 10/27/2020    Lipid Panel 09/30/2021       Objective:      Wt Readings from Last 3 Encounters:   05/12/22 0909 88 kg (194 lb)   03/15/22 0829 87.1 kg (192 lb)   01/27/22 1423 85.3 kg (188 lb)     Vitals:    05/12/22 0909   BP: 132/70   Pulse: 66   Resp: 20   Temp: 97.6 °F (36.4 °C)   TempSrc: Tympanic   SpO2: 96%   Weight: 88 kg (194 lb)   Height: 5' 10" (1.778 m)     Body mass index is 27.84 kg/m².     Physical Exam:    Physical Exam  Vitals and nursing note reviewed.   Constitutional:       Appearance: Normal appearance.   HENT:      Head: Normocephalic.      Right Ear: Tympanic membrane, ear canal and external ear normal.      Left Ear: Tympanic membrane, ear canal and external ear normal.      Ears:      Comments: Wears hearing aids bilat.     Nose: Nose normal.      Mouth/Throat:      Mouth: Mucous membranes are moist.      Pharynx: Oropharynx is clear.   Eyes:      Conjunctiva/sclera: Conjunctivae normal.      Pupils: Pupils are equal, round, and reactive to light.   Neck:      Thyroid: No thyromegaly.      Vascular: Normal carotid pulses. No carotid bruit.   Cardiovascular:      Rate and Rhythm: Normal rate and regular rhythm.      Pulses: Normal pulses.      Heart sounds: Normal heart sounds.   Pulmonary:      Effort: Pulmonary effort is normal.      Breath sounds: Normal breath sounds.   Abdominal:      Palpations: Abdomen is soft.      Tenderness: There is no abdominal tenderness.   Musculoskeletal:      Cervical back: Neck supple.      Right lower leg: No edema.      Left lower leg: No edema.   Lymphadenopathy:      Cervical: No cervical adenopathy.   Skin:     General: Skin is warm and dry.   Neurological:      General: No focal deficit present.      Mental Status: He is alert and oriented to person, place, and time. "   Psychiatric:         Mood and Affect: Mood normal.         Behavior: Behavior normal.          Assessment:          ICD-10-CM ICD-9-CM   1. Routine general medical examination at a health care facility  Z00.00 V70.0   2. Screening for hyperlipidemia  Z13.220 V77.91   3. Diabetes mellitus screening  Z13.1 V77.1   4. Essential hypertension  I10 401.9   5. Other depression  F32.89 311   6. Prediabetes  R73.03 790.29   7. Chronic renal impairment, unspecified CKD stage  N18.9 585.9   8. Memory impairment  R41.3 780.93   9. Encounter for long-term (current) use of other medications  Z79.899 V58.69        Plan:       Routine general medical examination at a health care facility    Screening for hyperlipidemia  -     Lipid Panel; Future; Expected date: 05/12/2022    Diabetes mellitus screening  -     Cancel: Glucose, Fasting; Future; Expected date: 05/12/2022    Essential hypertension    Other depression  -     Discontinue: FLUoxetine 20 MG capsule; Take 1 capsule (20 mg total) by mouth once daily.  Dispense: 90 capsule; Refill: 1  -     FLUoxetine 20 MG capsule; Take 1 capsule (20 mg total) by mouth once daily.  Dispense: 90 capsule; Refill: 1    Prediabetes  -     Comprehensive Metabolic Panel; Future; Expected date: 05/12/2022  -     Hemoglobin A1C; Future; Expected date: 05/12/2022  -     Microalbumin/Creatinine Ratio, Urine; Future; Expected date: 05/12/2022    Chronic renal impairment, unspecified CKD stage  -     Comprehensive Metabolic Panel; Future; Expected date: 05/12/2022    Memory impairment  -     Vitamin B12; Future; Expected date: 05/12/2022  -     CBC Auto Differential; Future; Expected date: 05/12/2022  -     TSH; Future; Expected date: 05/12/2022    Encounter for long-term (current) use of other medications  -     Comprehensive Metabolic Panel; Future; Expected date: 05/12/2022  -     Vitamin B12; Future; Expected date: 05/12/2022  -     CBC Auto Differential; Future; Expected date: 05/12/2022  -      TSH; Future; Expected date: 05/12/2022    Other orders  -     gabapentin (NEURONTIN) 300 MG capsule; Take 1 capsule (300 mg total) by mouth 3 (three) times daily.  Dispense: 270 capsule; Refill: 3        Current Outpatient Medications:     amLODIPine (NORVASC) 5 MG tablet, Take 1 tablet (5 mg total) by mouth once daily., Disp: 90 tablet, Rfl: 3    cetirizine (ZYRTEC) 10 MG tablet, Take 1 tablet (10 mg total) by mouth once daily., Disp: 90 tablet, Rfl: 3    cholecalciferol, vitamin D3, (VITAMIN D3) 50 mcg (2,000 unit) Cap, Take 1 capsule by mouth once daily., Disp: , Rfl:     CRESTOR 20 mg tablet, Take 1 tablet by mouth once daily. Take 1/2 tablet by mouth every evening, Disp: , Rfl:     diclofenac sodium (VOLTAREN) 1 % Gel, , Disp: , Rfl:     folic acid/multivit-min/lutein (CENTRUM SILVER ORAL), Take 1 tablet by mouth once daily., Disp: , Rfl:     hydrocortisone 2.5 % cream, Apply 30 g/kg topically 2 (two) times daily. Apply to face for dry skin, Disp: , Rfl:     ipratropium-albuteroL (COMBIVENT RESPIMAT)  mcg/actuation inhaler, Inhale 1 puff into the lungs every 6 (six) hours as needed for Wheezing. Rescue, Disp: 12 g, Rfl: 1    ketoconazole (NIZORAL) 2 % cream, Apply topically once daily., Disp: , Rfl:     lisinopriL (PRINIVIL,ZESTRIL) 40 MG tablet, Take 1 tablet (40 mg total) by mouth once daily., Disp: 90 tablet, Rfl: 3    metaxalone (SKELAXIN) 800 MG tablet, Take 1 tablet (800 mg total) by mouth daily as needed for Pain., Disp: 90 tablet, Rfl: 1    methenamine (HIPREX) 1 gram Tab, Take 1 tablet (1 g total) by mouth 2 (two) times daily., Disp: 180 tablet, Rfl: 1    multivitamin (THERAGRAN) per tablet, Take 1 tablet by mouth once daily., Disp: , Rfl:     omega-3 fatty acids/fish oil (FISH OIL-OMEGA-3 FATTY ACIDS) 300-1,000 mg capsule, Take 2 capsules by mouth once daily., Disp: , Rfl:     omeprazole (PRILOSEC) 20 MG capsule, Take 1 capsule (20 mg total) by mouth once daily., Disp: 90 capsule,  Rfl: 3    triamcinolone acetonide 0.1% (KENALOG) 0.1 % cream, Apply 15 g topically 2 (two) times daily. Apply to back for dry skin and itching, Disp: , Rfl:     zinc gluconate 50 mg tablet, Take 50 mg by mouth once daily., Disp: , Rfl:     amLODIPine (NORVASC) 5 MG tablet, Take 1 tablet by mouth once daily., Disp: , Rfl:     FLUoxetine 20 MG capsule, Take 1 capsule (20 mg total) by mouth once daily., Disp: 90 capsule, Rfl: 1    gabapentin (NEURONTIN) 300 MG capsule, Take 1 capsule (300 mg total) by mouth 3 (three) times daily., Disp: 270 capsule, Rfl: 3      New & refilled meds:  Requested Prescriptions     Signed Prescriptions Disp Refills    FLUoxetine 20 MG capsule 90 capsule 1     Sig: Take 1 capsule (20 mg total) by mouth once daily.    gabapentin (NEURONTIN) 300 MG capsule 270 capsule 3     Sig: Take 1 capsule (300 mg total) by mouth 3 (three) times daily.     Health goals to improve overall health and well-being:  DASH diet, exercise at least 5 days per week  fasting glucose < 100  SBP < 130 & DBP < 80  LDL chol < 100  Stressed medication adherence.    Resume fluoxetine - rx to [facility] as requested and requests refills of gabapentin to [facility] (usually gets rx from Dr. Akers [place]    HY plus other needed labs as discussed; if no abn contributing to memory issues, will rx aricept    Return to clinic 6 mths for HTN, predm; HY 1 yr w/ fasting; and f/u as needed.    Future Appointments   Date Time Provider Department Center   5/16/2022 11:00 AM MOLLY Slater [facility]   11/16/2022  9:20 AM Elizabeth Lange [facility]   5/15/2023  8:40 AM Elizabeth Lange [facility]        Signature:  Elizabeth Lange [facility] - FAMILY MEDICINE    Date of encounter: 5/12/22

## 2022-05-13 DIAGNOSIS — M25.562 PAIN IN BOTH KNEES, UNSPECIFIED CHRONICITY: Primary | ICD-10-CM

## 2022-05-13 DIAGNOSIS — M25.561 PAIN IN BOTH KNEES, UNSPECIFIED CHRONICITY: Primary | ICD-10-CM

## 2022-05-16 ENCOUNTER — HOSPITAL ENCOUNTER (OUTPATIENT)
Dept: RADIOLOGY | Facility: HOSPITAL | Age: 72
Discharge: HOME | End: 2022-05-16
Attending: NURSE PRACTITIONER
Payer: COMMERCIAL

## 2022-05-16 ENCOUNTER — OFFICE VISIT (OUTPATIENT)
Dept: ORTHOPEDICS | Facility: CLINIC | Age: 72
End: 2022-05-16
Payer: COMMERCIAL

## 2022-05-16 DIAGNOSIS — M17.0 PRIMARY LOCALIZED OSTEOARTHRITIS OF KNEES, BILATERAL: Primary | ICD-10-CM

## 2022-05-16 DIAGNOSIS — M25.562 PAIN IN BOTH KNEES, UNSPECIFIED CHRONICITY: ICD-10-CM

## 2022-05-16 DIAGNOSIS — M25.561 PAIN IN BOTH KNEES, UNSPECIFIED CHRONICITY: ICD-10-CM

## 2022-05-16 PROCEDURE — 99212 OFFICE O/P EST SF 10 MIN: CPT | Mod: 25,,, | Performed by: NURSE PRACTITIONER

## 2022-05-16 PROCEDURE — 1157F ADVNC CARE PLAN IN RCRD: CPT | Mod: ,,, | Performed by: NURSE PRACTITIONER

## 2022-05-16 PROCEDURE — 73564 X-RAY EXAM KNEE 4 OR MORE: CPT | Mod: 26,RT,,

## 2022-05-16 PROCEDURE — 73564 X-RAY EXAM KNEE 4 OR MORE: CPT | Mod: 26,LT,,

## 2022-05-16 PROCEDURE — 3066F PR DOCUMENTATION OF TREATMENT FOR NEPHROPATHY: ICD-10-PCS | Mod: ,,, | Performed by: NURSE PRACTITIONER

## 2022-05-16 PROCEDURE — 4010F PR ACE/ARB THEARPY RXD/TAKEN: ICD-10-PCS | Mod: ,,, | Performed by: NURSE PRACTITIONER

## 2022-05-16 PROCEDURE — 20610 DRAIN/INJ JOINT/BURSA W/O US: CPT | Mod: 50,,, | Performed by: NURSE PRACTITIONER

## 2022-05-16 PROCEDURE — 20610 PR DRAIN/INJECT LARGE JOINT/BURSA: ICD-10-PCS | Mod: 50,,, | Performed by: NURSE PRACTITIONER

## 2022-05-16 PROCEDURE — 3044F PR MOST RECENT HEMOGLOBIN A1C LEVEL <7.0%: ICD-10-PCS | Mod: ,,, | Performed by: NURSE PRACTITIONER

## 2022-05-16 PROCEDURE — 3060F PR POS MICROALBUMINURIA RESULT DOCUMENTED/REVIEW: ICD-10-PCS | Mod: ,,, | Performed by: NURSE PRACTITIONER

## 2022-05-16 PROCEDURE — 73564 X-RAY EXAM KNEE 4 OR MORE: CPT | Mod: TC,50

## 2022-05-16 PROCEDURE — 1157F PR ADVANCE CARE PLAN OR EQUIV PRESENT IN MEDICAL RECORD: ICD-10-PCS | Mod: ,,, | Performed by: NURSE PRACTITIONER

## 2022-05-16 PROCEDURE — 3060F POS MICROALBUMINURIA REV: CPT | Mod: ,,, | Performed by: NURSE PRACTITIONER

## 2022-05-16 PROCEDURE — 4010F ACE/ARB THERAPY RXD/TAKEN: CPT | Mod: ,,, | Performed by: NURSE PRACTITIONER

## 2022-05-16 PROCEDURE — 3044F HG A1C LEVEL LT 7.0%: CPT | Mod: ,,, | Performed by: NURSE PRACTITIONER

## 2022-05-16 PROCEDURE — 20610 DRAIN/INJ JOINT/BURSA W/O US: CPT | Mod: ,,, | Performed by: NURSE PRACTITIONER

## 2022-05-16 PROCEDURE — 99212 PR OFFICE/OUTPT VISIT, EST, LEVL II, 10-19 MIN: ICD-10-PCS | Mod: 25,,, | Performed by: NURSE PRACTITIONER

## 2022-05-16 PROCEDURE — 3066F NEPHROPATHY DOC TX: CPT | Mod: ,,, | Performed by: NURSE PRACTITIONER

## 2022-05-16 PROCEDURE — 73564 PR  X-RAY KNEE 4+ VIEW: ICD-10-PCS | Mod: 26,RT,,

## 2022-05-16 RX ORDER — DICLOFENAC SODIUM 10 MG/G
GEL TOPICAL 4 TIMES DAILY
Qty: 900 G | Refills: 5 | Status: SHIPPED | OUTPATIENT
Start: 2022-05-16

## 2022-05-16 RX ORDER — TRIAMCINOLONE ACETONIDE 40 MG/ML
40 INJECTION, SUSPENSION INTRA-ARTICULAR; INTRAMUSCULAR
Status: DISCONTINUED | OUTPATIENT
Start: 2022-05-16 | End: 2022-05-16 | Stop reason: HOSPADM

## 2022-05-16 RX ADMIN — TRIAMCINOLONE ACETONIDE 40 MG: 40 INJECTION, SUSPENSION INTRA-ARTICULAR; INTRAMUSCULAR at 11:05

## 2022-05-16 NOTE — TELEPHONE ENCOUNTER
----- Message from Temitope Merritt sent at 5/16/2022  2:52 PM CDT -----  Patient needs a refill on diclofenac 1% gel for knees called into [facility] pharmacy.  Please call patient at [phone] if you have any questions. Thanks!

## 2022-05-16 NOTE — PROGRESS NOTES
CC:  Knee pain    71 y.o. Male returns to clinic for a follow up visit regarding knee pain.      He had bilateral knee injections in October. States they lasted 5 months until he had to do a lot of work on his knees. Reports his left knee has been the worst.        Past Medical History:   Diagnosis Date    Cancer     Cervical radiculopathy     Chronic pain     Chronic renal impairment     COPD (chronic obstructive pulmonary disease)     Depression     Digestive disorder     GERD (gastroesophageal reflux disease)     Hypercholesteremia     Hypertension     Left ventricular hypertrophy     Lumbosacral spondylosis     Prediabetes     Pulmonary nodules     repeat CT due 12/03/21     Past Surgical History:   Procedure Laterality Date    ABDOMINAL SURGERY      inguinal hernia repair    BACK SURGERY      Bilateral L3-S1 MBB Bilateral 2-, 1-, 1-, 12-    Dr Jorge Parker    EYE SURGERY      HERNIA REPAIR      KNEE ARTHROSCOPY W/ MENISCAL REPAIR Right     PROSTATECTOMY      RADIOFREQUENCY ABLATION OF LUMBAR MEDIAL BRANCH NERVE AT SINGLE LEVEL Right 04/01/2021    Procedure: Radiofrequency Ablation, Nerve, Spinal, Lumbar, Medial Branch, 1 Level;  Surgeon: Jordana Patel MD;  [facility]: [facility];  Service: Pain Management;  Laterality: Right;  Right L3-S1 RFTC    RADIOFREQUENCY THERMOCOAGULATION Bilateral 03/20/2012    Dr Parker    RADIOFREQUENCY THERMOCOAGULATION Left 03/18/2021    Procedure: RADIOFREQUENCY THERMAL COAGULATION;  Surgeon: Jordana Patel MD;  Location: [facility] access hospital [facility];  Service: Pain Management;  Laterality: Left;  BILATERAL L3-S1 RFTC LEFT SIDE FIRST    SELECTIVE INJECTION OF ANESTHETIC AGENT AROUND LUMBAR SPINAL NERVE ROOT BY TRANSFORAMINAL APPROACH Right 01/13/2022    Procedure: Right L4-5,5-S1 TFESI;  Surgeon: Jordana Patel MD;  Location: Critical access hospital [facility];  Service: Pain Management;  Laterality: Right;  PT AWARE TO BE TESTED VIA  PC    SPINE SURGERY  1995    VASECTOMY  Mar 1996         REVIEW OF SYSTEMS:   Constitution: Negative. Negative for chills, fever and night sweats.    Hematologic/Lymphatic: Negative for bleeding problem. Does not bruise/bleed easily.   Skin: Negative for dry skin, itching and rash.   Musculoskeletal: Negative for falls. Positive for knee pain and muscle weakness.   All other review of symptoms were reviewed and found to be noncontributory.     PHYSICAL EXAMINATION:  There were no vitals taken for this visit.  General    Constitutional: He is oriented to person, place, and time. He appears well-nourished.   HENT:   Head: Normocephalic and atraumatic.   Eyes: Pupils are equal, round, and reactive to light.   Neck: Neck supple.   Cardiovascular: Normal rate and regular rhythm.    Pulmonary/Chest: Effort normal. No respiratory distress.   Abdominal: There is no abdominal tenderness. There is no guarding.   Neurological: He is alert and oriented to person, place, and time. He has normal reflexes.   Psychiatric: He has a normal mood and affect. His behavior is normal. Judgment and thought content normal.           Right Knee Exam     Inspection   Swelling: present  Effusion: present    Tenderness   The patient is tender to palpation of the medial joint line and lateral joint line.    Crepitus   The patient has crepitus of the patella.    Range of Motion   Extension: normal   Flexion: abnormal     Tests   Meniscus   Ozzy:  Medial - positive   Ligament Examination Lachman: normal (-1 to 2mm) PCL-Posterior Drawer: normal (0 to 2mm)     Patella   Patellar Tracking: normal  Patellar Grind: positive    Other   Sensation: normal    Left Knee Exam     Inspection   Swelling: present  Effusion: present    Tenderness   The patient tender to palpation of the medial joint line and lateral joint line.    Crepitus   The patient has crepitus of the patella.    Range of Motion   Extension: normal   Flexion: abnormal     Tests    Meniscus   Ozzy:  Medial - positive   Stability Lachman: normal (-1 to 2mm) PCL-Posterior Drawer: normal (0 to 2mm)  Patella   Patellar Tracking: normal    Other   Sensation: normal    Muscle Strength   Right Lower Extremity   Quadriceps:  5/5   Hamstrin/5   Left Lower Extremity   Quadriceps:  5/5   Hamstrin/5     Reflexes     Left Side  Achilles:  2+  Quadriceps:  2+    Right Side   Achilles:  2+  Quadriceps:  2+    Vascular Exam     Right Pulses  Dorsalis Pedis:      2+  Posterior Tibial:      2+        Left Pulses  Dorsalis Pedis:      2+  Posterior Tibial:      2+              IMAGING:  No results found.   FINDINGS:  There is bilateral chondrocalcinosis.  There are minimal osteophytes bilaterally with mild to moderate medial compartment joint space loss bilaterally, more pronounced on the right     No acute fracture or aggressive periosteal reaction is seen     Bilateral left greater than right soft tissue pellets present     Impression:     1. Right greater than left osteoarthritis  2. Bilateral chondrocalcinosis  3. Left greater than right soft tissue foreign bodies        Electronically signed by: Kenya Hugo  Date:                                            2022  Time:                                           12:00                      ASSESSMENT:      ICD-10-CM ICD-9-CM   1. Primary localized osteoarthritis of knees, bilateral  M17.0 715.16       PLAN:     -Findings and treatment options were discussed with the patient  -All questions answered  Natural history and expected course discussed. Questions answered.  Educational materials distributed.  Reduction in offending activity.  Patellar compression sleeve.  OTC analgesics as needed.  Arthrocentesis. See procedure note.  RTC PRN  Rx given for incredi sleeve    There are no Patient Instructions on file for this visit.      No orders of the defined types were placed in this encounter.        Large Joint Aspiration/Injection: bilateral  knee    Date/Time: 5/16/2022 11:00 AM  Performed by: MOLLY Slater  Authorized by: MOLLY Slater     Consent Done?:  Yes (Verbal)  Indications:  Pain  Local anesthesia used?: No    Local anesthetic:  Bupivacaine 0.25% without epinephrine    Details:  Needle Size:  22 G  Approach:  Superior  Location:  Knee  Laterality:  Bilateral  Site:  Bilateral knee  Medications (Right):  40 mg triamcinolone acetonide 40 mg/mL  Medications (Left):  40 mg triamcinolone acetonide 40 mg/mL  Patient tolerance:  Patient tolerated the procedure well with no immediate complications

## 2022-07-18 ENCOUNTER — OFFICE VISIT (OUTPATIENT)
Dept: FAMILY MEDICINE | Facility: CLINIC | Age: 72
End: 2022-07-18
Payer: COMMERCIAL

## 2022-07-18 ENCOUNTER — HOSPITAL ENCOUNTER (OUTPATIENT)
Dept: RADIOLOGY | Facility: HOSPITAL | Age: 72
Discharge: HOME | End: 2022-07-18
Attending: NURSE PRACTITIONER
Payer: COMMERCIAL

## 2022-07-18 VITALS
OXYGEN SATURATION: 98 % | HEART RATE: 79 BPM | BODY MASS INDEX: 27.46 KG/M2 | DIASTOLIC BLOOD PRESSURE: 68 MMHG | SYSTOLIC BLOOD PRESSURE: 112 MMHG | RESPIRATION RATE: 20 BRPM | WEIGHT: 191.81 LBS | HEIGHT: 70 IN | TEMPERATURE: 98 F

## 2022-07-18 DIAGNOSIS — J44.9 COPD, MILD: ICD-10-CM

## 2022-07-18 DIAGNOSIS — K21.9 GASTROESOPHAGEAL REFLUX DISEASE WITHOUT ESOPHAGITIS: Chronic | ICD-10-CM

## 2022-07-18 DIAGNOSIS — R91.8 PULMONARY NODULES: ICD-10-CM

## 2022-07-18 DIAGNOSIS — R07.89 COSTOCHONDRAL CHEST PAIN: Primary | ICD-10-CM

## 2022-07-18 DIAGNOSIS — R07.9 LEFT-SIDED CHEST PAIN: ICD-10-CM

## 2022-07-18 DIAGNOSIS — I10 ESSENTIAL HYPERTENSION: Chronic | ICD-10-CM

## 2022-07-18 DIAGNOSIS — R06.02 SOB (SHORTNESS OF BREATH): ICD-10-CM

## 2022-07-18 DIAGNOSIS — J44.9 COPD, MILD: Chronic | ICD-10-CM

## 2022-07-18 PROCEDURE — 71046 X-RAY EXAM CHEST 2 VIEWS: CPT | Mod: TC

## 2022-07-18 PROCEDURE — 3044F PR MOST RECENT HEMOGLOBIN A1C LEVEL <7.0%: ICD-10-PCS | Mod: ,,, | Performed by: NURSE PRACTITIONER

## 2022-07-18 PROCEDURE — 93000 PR ELECTROCARDIOGRAM, COMPLETE: ICD-10-PCS | Mod: ,,, | Performed by: NURSE PRACTITIONER

## 2022-07-18 PROCEDURE — 71046 X-RAY EXAM CHEST 2 VIEWS: CPT | Mod: 26,,, | Performed by: STUDENT IN AN ORGANIZED HEALTH CARE EDUCATION/TRAINING PROGRAM

## 2022-07-18 PROCEDURE — 1159F MED LIST DOCD IN RCRD: CPT | Mod: ,,, | Performed by: NURSE PRACTITIONER

## 2022-07-18 PROCEDURE — 1126F AMNT PAIN NOTED NONE PRSNT: CPT | Mod: ,,, | Performed by: NURSE PRACTITIONER

## 2022-07-18 PROCEDURE — 1157F PR ADVANCE CARE PLAN OR EQUIV PRESENT IN MEDICAL RECORD: ICD-10-PCS | Mod: ,,, | Performed by: NURSE PRACTITIONER

## 2022-07-18 PROCEDURE — 3008F BODY MASS INDEX DOCD: CPT | Mod: ,,, | Performed by: NURSE PRACTITIONER

## 2022-07-18 PROCEDURE — 3008F PR BODY MASS INDEX (BMI) DOCUMENTED: ICD-10-PCS | Mod: ,,, | Performed by: NURSE PRACTITIONER

## 2022-07-18 PROCEDURE — 1157F ADVNC CARE PLAN IN RCRD: CPT | Mod: ,,, | Performed by: NURSE PRACTITIONER

## 2022-07-18 PROCEDURE — 99214 OFFICE O/P EST MOD 30 MIN: CPT | Mod: ,,, | Performed by: NURSE PRACTITIONER

## 2022-07-18 PROCEDURE — 1101F PT FALLS ASSESS-DOCD LE1/YR: CPT | Mod: ,,, | Performed by: NURSE PRACTITIONER

## 2022-07-18 PROCEDURE — 93000 ELECTROCARDIOGRAM COMPLETE: CPT | Mod: ,,, | Performed by: NURSE PRACTITIONER

## 2022-07-18 PROCEDURE — 3288F PR FALLS RISK ASSESSMENT DOCUMENTED: ICD-10-PCS | Mod: ,,, | Performed by: NURSE PRACTITIONER

## 2022-07-18 PROCEDURE — 3066F NEPHROPATHY DOC TX: CPT | Mod: ,,, | Performed by: NURSE PRACTITIONER

## 2022-07-18 PROCEDURE — 3074F SYST BP LT 130 MM HG: CPT | Mod: ,,, | Performed by: NURSE PRACTITIONER

## 2022-07-18 PROCEDURE — 3060F POS MICROALBUMINURIA REV: CPT | Mod: ,,, | Performed by: NURSE PRACTITIONER

## 2022-07-18 PROCEDURE — 3288F FALL RISK ASSESSMENT DOCD: CPT | Mod: ,,, | Performed by: NURSE PRACTITIONER

## 2022-07-18 PROCEDURE — 1159F PR MEDICATION LIST DOCUMENTED IN MEDICAL RECORD: ICD-10-PCS | Mod: ,,, | Performed by: NURSE PRACTITIONER

## 2022-07-18 PROCEDURE — 3078F DIAST BP <80 MM HG: CPT | Mod: ,,, | Performed by: NURSE PRACTITIONER

## 2022-07-18 PROCEDURE — 1126F PR PAIN SEVERITY QUANTIFIED, NO PAIN PRESENT: ICD-10-PCS | Mod: ,,, | Performed by: NURSE PRACTITIONER

## 2022-07-18 PROCEDURE — 1101F PR PT FALLS ASSESS DOC 0-1 FALLS W/OUT INJ PAST YR: ICD-10-PCS | Mod: ,,, | Performed by: NURSE PRACTITIONER

## 2022-07-18 PROCEDURE — 1160F RVW MEDS BY RX/DR IN RCRD: CPT | Mod: ,,, | Performed by: NURSE PRACTITIONER

## 2022-07-18 PROCEDURE — 3074F PR MOST RECENT SYSTOLIC BLOOD PRESSURE < 130 MM HG: ICD-10-PCS | Mod: ,,, | Performed by: NURSE PRACTITIONER

## 2022-07-18 PROCEDURE — 1160F PR REVIEW ALL MEDS BY PRESCRIBER/CLIN PHARMACIST DOCUMENTED: ICD-10-PCS | Mod: ,,, | Performed by: NURSE PRACTITIONER

## 2022-07-18 PROCEDURE — 3066F PR DOCUMENTATION OF TREATMENT FOR NEPHROPATHY: ICD-10-PCS | Mod: ,,, | Performed by: NURSE PRACTITIONER

## 2022-07-18 PROCEDURE — 3044F HG A1C LEVEL LT 7.0%: CPT | Mod: ,,, | Performed by: NURSE PRACTITIONER

## 2022-07-18 PROCEDURE — 3060F PR POS MICROALBUMINURIA RESULT DOCUMENTED/REVIEW: ICD-10-PCS | Mod: ,,, | Performed by: NURSE PRACTITIONER

## 2022-07-18 PROCEDURE — 3078F PR MOST RECENT DIASTOLIC BLOOD PRESSURE < 80 MM HG: ICD-10-PCS | Mod: ,,, | Performed by: NURSE PRACTITIONER

## 2022-07-18 PROCEDURE — 99214 PR OFFICE/OUTPT VISIT, EST, LEVL IV, 30-39 MIN: ICD-10-PCS | Mod: ,,, | Performed by: NURSE PRACTITIONER

## 2022-07-18 PROCEDURE — 4010F PR ACE/ARB THEARPY RXD/TAKEN: ICD-10-PCS | Mod: ,,, | Performed by: NURSE PRACTITIONER

## 2022-07-18 PROCEDURE — 71046 XR CHEST PA AND LATERAL: ICD-10-PCS | Mod: 26,,, | Performed by: STUDENT IN AN ORGANIZED HEALTH CARE EDUCATION/TRAINING PROGRAM

## 2022-07-18 PROCEDURE — 4010F ACE/ARB THERAPY RXD/TAKEN: CPT | Mod: ,,, | Performed by: NURSE PRACTITIONER

## 2022-07-18 RX ORDER — FLUOXETINE HYDROCHLORIDE 40 MG/1
40 CAPSULE ORAL DAILY
COMMUNITY
End: 2022-11-16

## 2022-07-18 NOTE — PROGRESS NOTES
[facility] - FAMILY MEDICINE       PATIENT NAME: Tray Jalloh   : 1950    AGE: 72 y.o. DATE: 2022    MRN: [id number]        Reason for Visit / Chief Complaint:  Chest Pain (Left sided chest pain x 2-3 weeks. He states it seems to happen when he is singing or exerting himself. He states he also has intermittent radiating pain in his left arm along with some SOB.) and Sore Throat (He also states he has been trouble swallowing for the past week.)     Subjective:     HPI:    Chest Pain (Left sided chest pain x 2-3 weeks. He states it seems to happen when he is singing or exerting himself. He states he also has intermittent radiating pain in his left arm along with some SOB.) and Sore Throat (He also states he has been trouble swallowing for the past week.)    Intermittent x 2-3 weeks, especially when singing in the choir and with exertion, hurts to take a deep breath. Pain occasionally radiates to LUE. SOB more than in past; uses CAROLE for mild COPD; hx of asthma which he reports resolved when he quit smoking.  Hx pulm nodules - f/u CT was due Dec 2021.    Saw Dr. Hernández/[facility] in past for cardiac work-up, all ok except LVH.     x1 week feels like he has a lump in his throat; occas gets choked  EGD w/ dilation in past    Review of Systems:     Review of Systems   Constitutional: Negative for activity change and unexpected weight change.   HENT: Positive for postnasal drip, rhinorrhea and trouble swallowing. Negative for hearing loss and sore throat.    Eyes: Positive for visual disturbance. Negative for discharge.   Respiratory: Positive for shortness of breath. Negative for cough, chest tightness and wheezing.    Cardiovascular: Positive for chest pain. Negative for palpitations.   Gastrointestinal: Negative for blood in stool, constipation, diarrhea and vomiting.   Endocrine: Negative for polydipsia and polyuria.   Genitourinary:  "Negative for difficulty urinating, hematuria and urgency.   Musculoskeletal: Positive for arthralgias and neck pain. Negative for joint swelling.   Neurological: Positive for headaches. Negative for weakness.   Psychiatric/Behavioral: Negative for confusion and dysphoric mood.       Allergies:     Review of patient's allergies indicates:   Allergen Reactions    Bee pollen     Grass pollen-june grass standard         Objective:      Wt Readings from Last 3 Encounters:   07/18/22 1510 87 kg (191 lb 12.8 oz)   05/12/22 0909 88 kg (194 lb)   03/15/22 0829 87.1 kg (192 lb)     Vitals:    07/18/22 1510   BP: 112/68   BP Location: Right arm   Patient Position: Sitting   BP Method: Large (Manual)   Pulse: 79   Resp: 20   Temp: 97.9 °F (36.6 °C)   TempSrc: Oral   SpO2: 98%   Weight: 87 kg (191 lb 12.8 oz)   Height: 5' 10" (1.778 m)     Body mass index is 27.52 kg/m².     Physical Exam:    Physical Exam  Vitals and nursing note reviewed.   Constitutional:       General: He is not in acute distress.     Appearance: Normal appearance.   HENT:      Head: Normocephalic.   Eyes:      Conjunctiva/sclera: Conjunctivae normal.   Neck:      Thyroid: No thyromegaly or thyroid tenderness.      Trachea: Trachea normal.   Cardiovascular:      Rate and Rhythm: Normal rate and regular rhythm.      Pulses: Normal pulses.      Heart sounds: Normal heart sounds.   Pulmonary:      Effort: Pulmonary effort is normal. No respiratory distress.      Breath sounds: Normal breath sounds.   Chest:      Chest wall: Tenderness present.   Musculoskeletal:      Cervical back: Neck supple.      Right lower leg: No edema.      Left lower leg: No edema.   Lymphadenopathy:      Cervical: No cervical adenopathy.   Skin:     General: Skin is warm and dry.   Neurological:      Mental Status: He is alert and oriented to person, place, and time.   Psychiatric:         Mood and Affect: Mood normal.         Behavior: Behavior normal.          Assessment:          " ICD-10-CM ICD-9-CM   1. Costochondral chest pain  R07.89 786.59   2. COPD, mild  J44.9 496   3. SOB (shortness of breath)  R06.02 786.05   4. Pulmonary nodules  R91.8 793.19   5. Gastroesophageal reflux disease without esophagitis  K21.9 530.81   6. Essential hypertension  I10 401.9        Plan:       Costochondral chest pain  -     POCT EKG 12-LEAD (NOT FOR [facility] USE)  -     X-Ray Chest PA And Lateral; Future; Expected date: 07/18/2022    COPD, mild  -     X-Ray Chest PA And Lateral; Future; Expected date: 07/18/2022  -     CT Chest High Resolution Without Contrast; Future; Expected date: 07/18/2022    SOB (shortness of breath)  -     X-Ray Chest PA And Lateral; Future; Expected date: 07/18/2022  -     CT Chest High Resolution Without Contrast; Future; Expected date: 07/18/2022    Pulmonary nodules  -     CT Chest High Resolution Without Contrast; Future; Expected date: 07/18/2022    Gastroesophageal reflux disease without esophagitis    Essential hypertension        Current Outpatient Medications:     amLODIPine (NORVASC) 5 MG tablet, Take 1 tablet by mouth once daily., Disp: , Rfl:     cetirizine (ZYRTEC) 10 MG tablet, Take 1 tablet (10 mg total) by mouth once daily., Disp: 90 tablet, Rfl: 3    cholecalciferol, vitamin D3, (VITAMIN D3) 50 mcg (2,000 unit) Cap, Take 1 capsule by mouth once daily., Disp: , Rfl:     CRESTOR 20 mg tablet, Take 1 tablet by mouth once daily. Take 1/2 tablet by mouth every evening, Disp: , Rfl:     diclofenac sodium (VOLTAREN) 1 % Gel, Apply topically 4 (four) times daily., Disp: 900 g, Rfl: 5    FLUoxetine 40 MG capsule, Take 40 mg by mouth once daily., Disp: , Rfl:     folic acid/multivit-min/lutein (CENTRUM SILVER ORAL), Take 1 tablet by mouth once daily., Disp: , Rfl:     gabapentin (NEURONTIN) 300 MG capsule, Take 1 capsule (300 mg total) by mouth 3 (three) times daily., Disp: 270 capsule, Rfl: 3    hydrocortisone 2.5 % cream, Apply 30 g/kg topically 2 (two) times daily.  Apply to face for dry skin, Disp: , Rfl:     ipratropium-albuteroL (COMBIVENT RESPIMAT)  mcg/actuation inhaler, Inhale 1 puff into the lungs every 6 (six) hours as needed for Wheezing. Rescue, Disp: 12 g, Rfl: 1    ketoconazole (NIZORAL) 2 % cream, Apply topically once daily., Disp: , Rfl:     lisinopriL (PRINIVIL,ZESTRIL) 40 MG tablet, Take 1 tablet (40 mg total) by mouth once daily., Disp: 90 tablet, Rfl: 3    metaxalone (SKELAXIN) 800 MG tablet, Take 1 tablet (800 mg total) by mouth daily as needed for Pain., Disp: 90 tablet, Rfl: 1    methenamine (HIPREX) 1 gram Tab, Take 1 tablet (1 g total) by mouth 2 (two) times daily., Disp: 180 tablet, Rfl: 1    multivitamin (THERAGRAN) per tablet, Take 1 tablet by mouth once daily., Disp: , Rfl:     omega-3 fatty acids/fish oil (FISH OIL-OMEGA-3 FATTY ACIDS) 300-1,000 mg capsule, Take 2 capsules by mouth once daily., Disp: , Rfl:     omeprazole (PRILOSEC) 20 MG capsule, Take 1 capsule (20 mg total) by mouth once daily., Disp: 90 capsule, Rfl: 3    triamcinolone acetonide 0.1% (KENALOG) 0.1 % cream, Apply 15 g topically 2 (two) times daily. Apply to back for dry skin and itching, Disp: , Rfl:     zinc gluconate 50 mg tablet, Take 50 mg by mouth once daily., Disp: , Rfl:   Active Problem List with Overview Notes    Diagnosis Date Noted    Left-sided chest pain 07/18/2022    Costochondral chest pain 07/18/2022    COPD, mild     Digestive disorder 05/12/2022    Depression 05/12/2022    Memory impairment 05/12/2022    SOB (shortness of breath) 03/15/2022    Lumbosacral radiculopathy 01/26/2022    Polyarthralgia 01/26/2022    Pain and numbness of right upper extremity 09/30/2021    Essential hypertension     Prediabetes     Chronic pain     GERD (gastroesophageal reflux disease)     Pulmonary nodules      repeat CT due 12/03/21      Hypercholesteremia     Chronic renal impairment     Spondylosis of lumbosacral region without myelopathy or  radiculopathy 04/01/2021    Spondylosis of lumbosacral spine without myelopathy 03/18/2021       New & refilled meds:  Requested Prescriptions      No prescriptions requested or ordered in this encounter     EKG normal  Suspect inflammation of chest wall. CXR at [facility] due to r/o infectious process.  May need EGD w/ dil if cont issues w/ swallowing.  F/u CT chest ordered but d/t nationwide contrast shortage ordered w/o.    Return to clinic as scheduled; and f/u as needed.    Future Appointments   Date Time Provider Department Center   11/16/2022  9:20 AM MOLLY Mur[facility] Sweet   5[facility]15[facility]2023  [facility]        Signature:  MOLLY Murry  [place] [facility] - FAMILY MEDICINE    Date of encounter: 7/18/22     no Educational problems/Occupational problems

## 2022-08-05 ENCOUNTER — HOSPITAL ENCOUNTER (OUTPATIENT)
Dept: RADIOLOGY | Facility: HOSPITAL | Age: 72
Discharge: HOME | End: 2022-08-05
Attending: NURSE PRACTITIONER
Payer: COMMERCIAL

## 2022-08-05 DIAGNOSIS — R91.8 PULMONARY NODULES: ICD-10-CM

## 2022-08-05 DIAGNOSIS — J44.9 COPD, MILD: ICD-10-CM

## 2022-08-05 DIAGNOSIS — R06.02 SOB (SHORTNESS OF BREATH): ICD-10-CM

## 2022-08-05 PROCEDURE — 71250 CT CHEST HIGH RESOLUTION WITHOUT CONTRAST: ICD-10-PCS | Mod: 26,,, | Performed by: RADIOLOGY

## 2022-08-05 PROCEDURE — 71250 CT THORAX DX C-: CPT | Mod: 26,,, | Performed by: RADIOLOGY

## 2022-08-05 PROCEDURE — 71250 CT THORAX DX C-: CPT | Mod: TC

## 2022-08-08 DIAGNOSIS — R91.8 PULMONARY NODULES: Primary | ICD-10-CM

## 2022-08-08 DIAGNOSIS — R91.8 OTHER NONSPECIFIC ABNORMAL FINDING OF LUNG FIELD: ICD-10-CM

## 2022-08-22 ENCOUNTER — OFFICE VISIT (OUTPATIENT)
Dept: FAMILY MEDICINE | Facility: CLINIC | Age: 72
End: 2022-08-22
Payer: COMMERCIAL

## 2022-08-22 VITALS
TEMPERATURE: 98 F | HEIGHT: 70 IN | OXYGEN SATURATION: 98 % | RESPIRATION RATE: 20 BRPM | BODY MASS INDEX: 27.15 KG/M2 | HEART RATE: 80 BPM | SYSTOLIC BLOOD PRESSURE: 132 MMHG | WEIGHT: 189.63 LBS | DIASTOLIC BLOOD PRESSURE: 84 MMHG

## 2022-08-22 DIAGNOSIS — I10 ESSENTIAL HYPERTENSION: Chronic | ICD-10-CM

## 2022-08-22 DIAGNOSIS — J06.9 VIRAL UPPER RESPIRATORY ILLNESS: Primary | ICD-10-CM

## 2022-08-22 PROBLEM — R07.89 COSTOCHONDRAL CHEST PAIN: Status: RESOLVED | Noted: 2022-07-18 | Resolved: 2022-08-22

## 2022-08-22 PROCEDURE — 1126F PR PAIN SEVERITY QUANTIFIED, NO PAIN PRESENT: ICD-10-PCS | Mod: ,,, | Performed by: NURSE PRACTITIONER

## 2022-08-22 PROCEDURE — 4010F ACE/ARB THERAPY RXD/TAKEN: CPT | Mod: ,,, | Performed by: NURSE PRACTITIONER

## 2022-08-22 PROCEDURE — 3008F PR BODY MASS INDEX (BMI) DOCUMENTED: ICD-10-PCS | Mod: ,,, | Performed by: NURSE PRACTITIONER

## 2022-08-22 PROCEDURE — 1160F RVW MEDS BY RX/DR IN RCRD: CPT | Mod: ,,, | Performed by: NURSE PRACTITIONER

## 2022-08-22 PROCEDURE — 3066F NEPHROPATHY DOC TX: CPT | Mod: ,,, | Performed by: NURSE PRACTITIONER

## 2022-08-22 PROCEDURE — 99213 PR OFFICE/OUTPT VISIT, EST, LEVL III, 20-29 MIN: ICD-10-PCS | Mod: ,,, | Performed by: NURSE PRACTITIONER

## 2022-08-22 PROCEDURE — 3075F SYST BP GE 130 - 139MM HG: CPT | Mod: ,,, | Performed by: NURSE PRACTITIONER

## 2022-08-22 PROCEDURE — 3060F PR POS MICROALBUMINURIA RESULT DOCUMENTED/REVIEW: ICD-10-PCS | Mod: ,,, | Performed by: NURSE PRACTITIONER

## 2022-08-22 PROCEDURE — 3075F PR MOST RECENT SYSTOLIC BLOOD PRESS GE 130-139MM HG: ICD-10-PCS | Mod: ,,, | Performed by: NURSE PRACTITIONER

## 2022-08-22 PROCEDURE — 99213 OFFICE O/P EST LOW 20 MIN: CPT | Mod: ,,, | Performed by: NURSE PRACTITIONER

## 2022-08-22 PROCEDURE — 3079F PR MOST RECENT DIASTOLIC BLOOD PRESSURE 80-89 MM HG: ICD-10-PCS | Mod: ,,, | Performed by: NURSE PRACTITIONER

## 2022-08-22 PROCEDURE — 3008F BODY MASS INDEX DOCD: CPT | Mod: ,,, | Performed by: NURSE PRACTITIONER

## 2022-08-22 PROCEDURE — 4010F PR ACE/ARB THEARPY RXD/TAKEN: ICD-10-PCS | Mod: ,,, | Performed by: NURSE PRACTITIONER

## 2022-08-22 PROCEDURE — 1126F AMNT PAIN NOTED NONE PRSNT: CPT | Mod: ,,, | Performed by: NURSE PRACTITIONER

## 2022-08-22 PROCEDURE — 1159F MED LIST DOCD IN RCRD: CPT | Mod: ,,, | Performed by: NURSE PRACTITIONER

## 2022-08-22 PROCEDURE — 3060F POS MICROALBUMINURIA REV: CPT | Mod: ,,, | Performed by: NURSE PRACTITIONER

## 2022-08-22 PROCEDURE — 3044F PR MOST RECENT HEMOGLOBIN A1C LEVEL <7.0%: ICD-10-PCS | Mod: ,,, | Performed by: NURSE PRACTITIONER

## 2022-08-22 PROCEDURE — 3066F PR DOCUMENTATION OF TREATMENT FOR NEPHROPATHY: ICD-10-PCS | Mod: ,,, | Performed by: NURSE PRACTITIONER

## 2022-08-22 PROCEDURE — 3079F DIAST BP 80-89 MM HG: CPT | Mod: ,,, | Performed by: NURSE PRACTITIONER

## 2022-08-22 PROCEDURE — 1159F PR MEDICATION LIST DOCUMENTED IN MEDICAL RECORD: ICD-10-PCS | Mod: ,,, | Performed by: NURSE PRACTITIONER

## 2022-08-22 PROCEDURE — 1157F ADVNC CARE PLAN IN RCRD: CPT | Mod: ,,, | Performed by: NURSE PRACTITIONER

## 2022-08-22 PROCEDURE — 1157F PR ADVANCE CARE PLAN OR EQUIV PRESENT IN MEDICAL RECORD: ICD-10-PCS | Mod: ,,, | Performed by: NURSE PRACTITIONER

## 2022-08-22 PROCEDURE — 3044F HG A1C LEVEL LT 7.0%: CPT | Mod: ,,, | Performed by: NURSE PRACTITIONER

## 2022-08-22 PROCEDURE — 1160F PR REVIEW ALL MEDS BY PRESCRIBER/CLIN PHARMACIST DOCUMENTED: ICD-10-PCS | Mod: ,,, | Performed by: NURSE PRACTITIONER

## 2022-08-22 RX ORDER — IBUPROFEN 100 MG/5ML
1000 SUSPENSION, ORAL (FINAL DOSE FORM) ORAL DAILY
COMMUNITY

## 2022-08-22 RX ORDER — PNV NO.95/FERROUS FUM/FOLIC AC 28MG-0.8MG
1000 TABLET ORAL DAILY
COMMUNITY

## 2022-08-22 RX ORDER — LANOLIN ALCOHOL/MO/W.PET/CERES
100 CREAM (GRAM) TOPICAL DAILY
COMMUNITY

## 2022-08-22 NOTE — LETTER
August 22, 2022      [facility] - [facility] [address] [place] [place] [address]  Phone: [phone]  Fax: [phone]       Patient: Tray Jalloh   YOB: 1950  Date of Visit: 08/22/2022    To Whom It May Concern:    Weston Jalloh  was at [facility] on 08/22/2022. The patient may return to work/school on 08/24/2022 with no restrictions. If you have any questions or concerns, or if I can be of further assistance, please do not hesitate to contact me.    Sincerely,    MOLLY Flannery

## 2022-08-22 NOTE — PROGRESS NOTES
"[facility]       PATIENT NAME: Tray Jalloh   : 1950    AGE: 72 y.o. DATE OF ENCOUNTER: 22   MRN: [id number]      Visit type: WALK-IN    Reason for Visit / Chief Complaint: URI (Pt presents with sore throat, drainage, nonproductive cough, diarrhea, and headaches x 3 days. No fever, nausea, vomiting, or congestion. )     Subjective:     Presents with sore throat, drainage, nonproductive cough, and headaches x 3 days. Sore throat has improved. Abd discomfort & diarrhea started last night & stopped around 0500 this am.     Went to a family reunion yesterday.    Review of Systems:     Review of Systems   Constitutional: Positive for fatigue. Negative for chills and fever.   HENT: Positive for postnasal drip and sore throat. Negative for congestion, ear pain, rhinorrhea and sinus pain.    Respiratory: Positive for cough. Negative for shortness of breath and wheezing.    Cardiovascular: Negative for chest pain.   Gastrointestinal: Negative for abdominal pain, diarrhea, nausea and vomiting.   Musculoskeletal: Negative for myalgias.   Skin: Negative.    Neurological: Positive for headaches.       Allergies:     Review of patient's allergies indicates:   Allergen Reactions    Bee pollen     Grass pollen- grass standard         Objective:     Vitals:    22 1131   BP: 132/84   BP Location: Left arm   Patient Position: Sitting   BP Method: Large (Manual)   Pulse: 80   Resp: 20   Temp: 97.7 °F (36.5 °C)   TempSrc: Oral   SpO2: 98%   Weight: 86 kg (189 lb 9.6 oz)   Height: 5' 10" (1.778 m)     Body mass index is 27.2 kg/m².     Physical Exam:    Physical Exam  Vitals and nursing note reviewed.   Constitutional:       General: He is not in acute distress.     Appearance: Normal appearance. He is not ill-appearing.   HENT:      Head: Normocephalic.      Right Ear: Tympanic membrane, ear canal and external ear normal.      Left Ear: Tympanic membrane, ear canal and " external ear normal.      Nose: Nose normal.      Mouth/Throat:      Mouth: Mucous membranes are moist.      Pharynx: Oropharynx is clear.   Eyes:      Conjunctiva/sclera: Conjunctivae normal.   Cardiovascular:      Rate and Rhythm: Normal rate and regular rhythm.      Heart sounds: Normal heart sounds.   Pulmonary:      Effort: Pulmonary effort is normal. No respiratory distress.      Breath sounds: Normal breath sounds.   Abdominal:      Palpations: Abdomen is soft.      Tenderness: There is no abdominal tenderness.   Musculoskeletal:      Cervical back: Neck supple.   Lymphadenopathy:      Cervical: No cervical adenopathy.   Skin:     General: Skin is warm and dry.   Neurological:      Mental Status: He is alert and oriented to person, place, and time.         Assessment:          ICD-10-CM ICD-9-CM   1. Viral upper respiratory illness  J06.9 465.9   2. Essential hypertension  I10 401.9        Plan:     Viral upper respiratory illness  -     POCT SARS-COV2 (COVID) with Flu Antigen    Essential hypertension  Comments:  fairly well controlled        Current Outpatient Medications:     amLODIPine (NORVASC) 5 MG tablet, Take 1 tablet by mouth once daily., Disp: , Rfl:     ascorbic acid, vitamin C, (VITAMIN C) 1000 MG tablet, Take 1,000 mg by mouth once daily., Disp: , Rfl:     cetirizine (ZYRTEC) 10 MG tablet, Take 1 tablet (10 mg total) by mouth once daily., Disp: 90 tablet, Rfl: 3    cholecalciferol, vitamin D3, (VITAMIN D3) 50 mcg (2,000 unit) Cap, Take 1 capsule by mouth once daily., Disp: , Rfl:     CRESTOR 20 mg tablet, Take 1 tablet by mouth once daily. Take 1/2 tablet by mouth every evening, Disp: , Rfl:     cyanocobalamin (VITAMIN B-12) 1000 MCG tablet, Take 100 mcg by mouth once daily., Disp: , Rfl:     diclofenac sodium (VOLTAREN) 1 % Gel, Apply topically 4 (four) times daily., Disp: 900 g, Rfl: 5    FLUoxetine 40 MG capsule, Take 40 mg by mouth once daily., Disp: , Rfl:     folic  acid/multivit-min/lutein (CENTRUM SILVER ORAL), Take 1 tablet by mouth once daily., Disp: , Rfl:     gabapentin (NEURONTIN) 300 MG capsule, Take 1 capsule (300 mg total) by mouth 3 (three) times daily., Disp: 270 capsule, Rfl: 3    hydrocortisone 2.5 % cream, Apply 30 g/kg topically 2 (two) times daily. Apply to face for dry skin, Disp: , Rfl:     ipratropium-albuteroL (COMBIVENT RESPIMAT)  mcg/actuation inhaler, Inhale 1 puff into the lungs every 6 (six) hours as needed for Wheezing. Rescue, Disp: 12 g, Rfl: 1    ketoconazole (NIZORAL) 2 % cream, Apply topically once daily., Disp: , Rfl:     lisinopriL (PRINIVIL,ZESTRIL) 40 MG tablet, Take 1 tablet (40 mg total) by mouth once daily., Disp: 90 tablet, Rfl: 3    metaxalone (SKELAXIN) 800 MG tablet, Take 1 tablet (800 mg total) by mouth daily as needed for Pain., Disp: 90 tablet, Rfl: 1    methenamine (HIPREX) 1 gram Tab, Take 1 tablet (1 g total) by mouth 2 (two) times daily., Disp: 180 tablet, Rfl: 1    omega-3 fatty acids/fish oil (FISH OIL-OMEGA-3 FATTY ACIDS) 300-1,000 mg capsule, Take 2 capsules by mouth once daily., Disp: , Rfl:     omeprazole (PRILOSEC) 20 MG capsule, Take 1 capsule (20 mg total) by mouth once daily., Disp: 90 capsule, Rfl: 3    triamcinolone acetonide 0.1% (KENALOG) 0.1 % cream, Apply 15 g topically 2 (two) times daily. Apply to back for dry skin and itching, Disp: , Rfl:     vitamin E 1000 UNIT capsule, Take 1,000 Units by mouth once daily., Disp: , Rfl:     zinc gluconate 50 mg tablet, Take 50 mg by mouth once daily., Disp: , Rfl:     Requested Prescriptions      No prescriptions requested or ordered in this encounter      Rapid COVID negative and flu negative    Suspect viral etiology of s/s that should run their course w/ time, rest, & supportive care.    No antibiotic indicated at this time.  [place] diet, increased fluids, advance as tolerated.  Continue current meds.    F/u as needed or if symptoms worsen or  persist.    Future Appointments   Date Time Provider Department Center   11/16/2022  9:20 AM MOLLY Murry [facility] Sweet   5/15/2023  8:40 AM MOLLY Murry [facility] Sweet       Signature: [facility]

## 2022-09-12 RX ORDER — METHENAMINE HIPPURATE 1000 MG/1
1 TABLET ORAL 2 TIMES DAILY
Qty: 180 TABLET | Refills: 1 | Status: SHIPPED | OUTPATIENT
Start: 2022-09-12 | End: 2023-04-24 | Stop reason: SDUPTHER

## 2022-09-12 NOTE — TELEPHONE ENCOUNTER
----- Message from Sabiha Friedman sent at 9/12/2022  7:33 AM CDT -----  Patient needs a refill on methenamine  [facility] .  Please call patient at [phone] if you have any questions. Thanks!

## 2022-09-27 ENCOUNTER — TELEPHONE (OUTPATIENT)
Dept: ORTHOPEDICS | Facility: CLINIC | Age: 72
End: 2022-09-27
Payer: COMMERCIAL

## 2022-09-27 NOTE — TELEPHONE ENCOUNTER
Patient called requesting an appointment for bilateral knee injections. His last were on 5-. He is requesting to come on OCT 3 because he is off that day and already has an appointment with Dr Akers at 2:15. We will see that isaura at 1:00

## 2022-09-30 DIAGNOSIS — M54.16 LUMBAR RADICULOPATHY: Primary | ICD-10-CM

## 2022-10-03 ENCOUNTER — OFFICE VISIT (OUTPATIENT)
Dept: SPINE | Facility: CLINIC | Age: 72
End: 2022-10-03
Payer: COMMERCIAL

## 2022-10-03 ENCOUNTER — HOSPITAL ENCOUNTER (OUTPATIENT)
Dept: RADIOLOGY | Facility: HOSPITAL | Age: 72
Discharge: HOME | End: 2022-10-03
Attending: ORTHOPAEDIC SURGERY
Payer: COMMERCIAL

## 2022-10-03 ENCOUNTER — OFFICE VISIT (OUTPATIENT)
Dept: ORTHOPEDICS | Facility: CLINIC | Age: 72
End: 2022-10-03
Payer: COMMERCIAL

## 2022-10-03 DIAGNOSIS — M54.16 LUMBAR RADICULOPATHY: Primary | ICD-10-CM

## 2022-10-03 DIAGNOSIS — M51.36 DDD (DEGENERATIVE DISC DISEASE), LUMBAR: ICD-10-CM

## 2022-10-03 DIAGNOSIS — M54.16 LUMBAR RADICULOPATHY: ICD-10-CM

## 2022-10-03 DIAGNOSIS — M17.0 PRIMARY LOCALIZED OSTEOARTHRITIS OF KNEES, BILATERAL: Primary | ICD-10-CM

## 2022-10-03 PROCEDURE — 3060F POS MICROALBUMINURIA REV: CPT | Mod: ,,, | Performed by: ORTHOPAEDIC SURGERY

## 2022-10-03 PROCEDURE — 3066F PR DOCUMENTATION OF TREATMENT FOR NEPHROPATHY: ICD-10-PCS | Mod: ,,, | Performed by: ORTHOPAEDIC SURGERY

## 2022-10-03 PROCEDURE — 3066F NEPHROPATHY DOC TX: CPT | Mod: ,,, | Performed by: ORTHOPAEDIC SURGERY

## 2022-10-03 PROCEDURE — 3060F PR POS MICROALBUMINURIA RESULT DOCUMENTED/REVIEW: ICD-10-PCS | Mod: ,,, | Performed by: ORTHOPAEDIC SURGERY

## 2022-10-03 PROCEDURE — 99214 PR OFFICE/OUTPT VISIT, EST, LEVL IV, 30-39 MIN: ICD-10-PCS | Mod: S$PBB,,, | Performed by: ORTHOPAEDIC SURGERY

## 2022-10-03 PROCEDURE — 20610 DRAIN/INJ JOINT/BURSA W/O US: CPT | Mod: 50,,, | Performed by: NURSE PRACTITIONER

## 2022-10-03 PROCEDURE — 72110 X-RAY EXAM L-2 SPINE 4/>VWS: CPT | Mod: 26,,, | Performed by: ORTHOPAEDIC SURGERY

## 2022-10-03 PROCEDURE — 1157F PR ADVANCE CARE PLAN OR EQUIV PRESENT IN MEDICAL RECORD: ICD-10-PCS | Mod: ,,, | Performed by: ORTHOPAEDIC SURGERY

## 2022-10-03 PROCEDURE — 99212 OFFICE O/P EST SF 10 MIN: CPT | Mod: PBBFAC | Performed by: ORTHOPAEDIC SURGERY

## 2022-10-03 PROCEDURE — 72110 X-RAY EXAM L-2 SPINE 4/>VWS: CPT | Mod: TC

## 2022-10-03 PROCEDURE — 72110 XR LUMBAR SPINE 4-5 VIEW WITH BENDING VIEWS: ICD-10-PCS | Mod: 26,,, | Performed by: ORTHOPAEDIC SURGERY

## 2022-10-03 PROCEDURE — 99214 OFFICE O/P EST MOD 30 MIN: CPT | Mod: S$PBB,,, | Performed by: ORTHOPAEDIC SURGERY

## 2022-10-03 PROCEDURE — 1157F ADVNC CARE PLAN IN RCRD: CPT | Mod: ,,, | Performed by: ORTHOPAEDIC SURGERY

## 2022-10-03 PROCEDURE — 99212 PR OFFICE/OUTPT VISIT, EST, LEVL II, 10-19 MIN: ICD-10-PCS | Mod: 25,,, | Performed by: NURSE PRACTITIONER

## 2022-10-03 PROCEDURE — 3044F HG A1C LEVEL LT 7.0%: CPT | Mod: ,,, | Performed by: ORTHOPAEDIC SURGERY

## 2022-10-03 PROCEDURE — 99212 OFFICE O/P EST SF 10 MIN: CPT | Mod: 25,,, | Performed by: NURSE PRACTITIONER

## 2022-10-03 PROCEDURE — 3044F PR MOST RECENT HEMOGLOBIN A1C LEVEL <7.0%: ICD-10-PCS | Mod: ,,, | Performed by: ORTHOPAEDIC SURGERY

## 2022-10-03 PROCEDURE — 4010F PR ACE/ARB THEARPY RXD/TAKEN: ICD-10-PCS | Mod: ,,, | Performed by: ORTHOPAEDIC SURGERY

## 2022-10-03 PROCEDURE — 20610 LARGE JOINT ASPIRATION/INJECTION: BILATERAL KNEE: ICD-10-PCS | Mod: 50,,, | Performed by: NURSE PRACTITIONER

## 2022-10-03 PROCEDURE — 4010F ACE/ARB THERAPY RXD/TAKEN: CPT | Mod: ,,, | Performed by: ORTHOPAEDIC SURGERY

## 2022-10-03 RX ORDER — TRIAMCINOLONE ACETONIDE 40 MG/ML
40 INJECTION, SUSPENSION INTRA-ARTICULAR; INTRAMUSCULAR
Status: DISCONTINUED | OUTPATIENT
Start: 2022-10-03 | End: 2022-10-03 | Stop reason: HOSPADM

## 2022-10-03 RX ORDER — PREGABALIN 75 MG/1
75 CAPSULE ORAL 2 TIMES DAILY
Qty: 60 CAPSULE | Refills: 6 | Status: SHIPPED | OUTPATIENT
Start: 2022-10-03 | End: 2022-10-04

## 2022-10-03 RX ADMIN — TRIAMCINOLONE ACETONIDE 40 MG: 40 INJECTION, SUSPENSION INTRA-ARTICULAR; INTRAMUSCULAR at 01:10

## 2022-10-03 NOTE — PROGRESS NOTES
MDM/time:  Greater than 30 minutes spent on this encounter including 10 minutes reviewing imaging and notes, 15 minutes with the patient, 5 minutes documentation    ASSESSMENT:  72 y.o. male with lumbar scoliosis and radiculopathy.  Cervical scoliosis    PLAN:  Will follow-up with Dr. Patel.  Consider medial branch blocks and rhizotomy.  Physical therapy.  Lyrica.  Follow-up in 3 months    HPI:  72 y.o. male here for evaluation of low back pain for the last few months.  Denies any leg pain.  Reports that he had a diskectomy in 1995 and did okay after that.  He has been taking Neurontin at night.  He has been using Voltaren gel.  He had an injection in January with good relief of his leg pain.  His neck is not bothering him that much.    IMAGING:  X-rays lumbar spine reviewed show:   On the AP there is thoracolumbar scoliosis with trunk shift to the right.  There are 5 non-rib-bearing lumbar vertebrae.  On the lateral there is decreased lumbar lordosis.  There is spondylotic disease with decreased disc height and osteophyte formation noted.  Grade 1 anterolisthesis at L3-4 and L4-5.    Past Medical History:   Diagnosis Date    Cancer     Cervical radiculopathy     Chronic pain     Chronic renal impairment     COPD (chronic obstructive pulmonary disease)     Depression     Digestive disorder     GERD (gastroesophageal reflux disease)     Hypercholesteremia     Hypertension     Left ventricular hypertrophy     Lumbosacral spondylosis     Prediabetes     Pulmonary nodules     repeat CT due 12/03/21     Past Surgical History:   Procedure Laterality Date    ABDOMINAL SURGERY      inguinal hernia repair    BACK SURGERY      Bilateral L3-S1 MBB Bilateral 2-, 1-, 1-, 12-    Dr Jorge Farmer Elena    EYE SURGERY      HERNIA REPAIR      KNEE ARTHROSCOPY W/ MENISCAL REPAIR Right     PROSTATECTOMY      RADIOFREQUENCY ABLATION OF LUMBAR MEDIAL BRANCH NERVE AT SINGLE LEVEL Right 04/01/2021     Procedure: Radiofrequency Ablation, Nerve, Spinal, Lumbar, Medial Branch, 1 Level;  Surgeon: Jordana Patel MD;  [facility];  Service: Pain Management;  Laterality: Right;  Right L3-S1 RFTC    RADIOFREQUENCY THERMOCOAGULATION Bilateral 03/20/2012    Dr Parker    RADIOFREQUENCY THERMOCOAGULATION Left 03/18/2021    Procedure: RADIOFREQUENCY THERMAL COAGULATION;  Surgeon: Jordana Patel MD;  [facility];  Service: Pain Management;  Laterality: Left;  BILATERAL L3-S1 RFTC LEFT SIDE FIRST    SELECTIVE INJECTION OF ANESTHETIC AGENT AROUND LUMBAR SPINAL NERVE ROOT BY TRANSFORAMINAL APPROACH Right 01/13/2022    Procedure: Right L4-5,5-S1 TFESI;  Surgeon: Jordana Patel MD;  [facility];  Service: Pain Management;  Laterality: Right;  PT AWARE TO BE TESTED VIA PC    SPINE SURGERY  1995    VASECTOMY  Mar 1996     Social History     Tobacco Use    Smoking status: Former     Packs/day: 2.00     Years: 30.00     Pack years: 60.00     Types: Cigarettes, Pipe, Cigars    Smokeless tobacco: Never   Substance Use Topics    Alcohol use: Not Currently    Drug use: Never      Current Outpatient Medications   Medication Instructions    amLODIPine (NORVASC) 5 MG tablet 1 tablet, Oral, Daily    ascorbic acid (vitamin C) (VITAMIN C) 1,000 mg, Oral, Daily    cetirizine (ZYRTEC) 10 mg, Oral, Daily    cholecalciferol, vitamin D3, (VITAMIN D3) 50 mcg (2,000 unit) Cap 1 capsule, Oral, Daily    CRESTOR 20 mg tablet 1 tablet, Oral, Daily, Take 1/2 tablet by mouth every evening    cyanocobalamin (VITAMIN B-12) 100 mcg, Oral, Daily    diclofenac sodium (VOLTAREN) 1 % Gel Topical (Top), 4 times daily    FLUoxetine 40 mg, Oral, Daily    folic acid/multivit-min/lutein (CENTRUM SILVER ORAL) 1 tablet, Oral, Daily    gabapentin (NEURONTIN) 300 mg, Oral, 3 times daily    hydrocortisone 2.5 % cream 30 g/kg, Topical (Top), 2 times daily, Apply to face for dry skin     ipratropium-albuteroL (COMBIVENT  RESPIMAT)  mcg/actuation inhaler 1 puff, Inhalation, Every 6 hours PRN, Rescue     ketoconazole (NIZORAL) 2 % cream Topical (Top), Daily    lisinopriL (PRINIVIL,ZESTRIL) 40 mg, Oral, Daily    metaxalone (SKELAXIN) 800 mg, Oral, Daily PRN    methenamine (HIPREX) 1 g, Oral, 2 times daily    omega-3 fatty acids/fish oil (FISH OIL-OMEGA-3 FATTY ACIDS) 300-1,000 mg capsule 2 capsules, Oral, Daily    omeprazole (PRILOSEC) 20 mg, Oral, Daily    triamcinolone acetonide 0.1% (KENALOG) 15 g, Topical (Top), 2 times daily, Apply to back for dry skin and itching     vitamin E 1,000 Units, Oral, Daily    zinc gluconate 50 mg, Oral, Daily        EXAM:  Constitutional  General Appearance:  There is no height or weight on file to calculate BMI., NAD  Psychiatric   Orientation: Oriented to time, oriented to place, oriented to person  Mood and Affect: Active and alert, normal mood, normal affect  Gait and Station   Appearance:  Normal gait, normal tandem gait, able to walk on toes, able to walk on heels  LUMBAR  Musculoskeletal System   Hips: Normal appearance, no leg length discrepancy, normal motion; left, normal motion; right    Lumbar Spine                   Inspection:  Normal alignment, normal sagittal balance                  Range of motion:  Painful decreased range of motion                  Bony Palpation of the Lumbar Spine:  No tenderness of the spinous process, no tenderness of the sacrum, no tenderness of the coccyx                  Bony Palpation of the Right Hip:  No tenderness of the iliac crest, no tenderness of the sciatic notch, no tenderness of the SI joint                  Bony Palpation of the Left Hip:  No tenderness of the iliac crest, no tenderness of the sciatic notch, no tenderness of the SI joint                  Soft Tissue Palpation on the Right:  No tenderness of the paraspinal region, no tenderness of the iliolumbar region                  Soft Tissue Palpation on the Left:  No tenderness of the  paraspinal region, no tenderness of the iliolumbar region    Motor Strength   L1 Right:  Hip flexion iliopsoas 5/5    L1 Left:  Hip flexion iliopsoas 5/5              L2-L4 Right:  Knee extension quadriceps 5/5, tibialis anterior 5/5              L2-L4 Left:  Knee extension quadriceps 5/5, tibialis anterior 5/5   L5 Right:  Extensor hallucis llongus 5/5,    L5 Left:  Extensor hallucis longus 5/5,    S1 Right:  Plantar flexion gastrocnemius 5/5   S1 Left:  Plantar flexion gastrocnemius 5/5    Neurological System   Ankle Reflex Right:  normal   Ankle Reflex Left: normal   Knee Reflex Right:  normal   Knee Reflex Left:  normal   Sensation on the Right:  L2 normal, L3 normal, L4 normal, L5 normal, S1 normal   Sensation on the Left:  L2 normal, L3 normal, L4 normal, L5 normal, S1 normal              Special Test on the Right:  Seated straight leg raising test negative, no clonus of the ankle              Special Test on the Left:  Seated straight leg raising test negative, no clonus of the ankle    Skin   Lumbosacral Spine:  Normal skin    Cardiovascular System   Arterial Pulses Right:  Posterior tibialis normal, dorsalis pedis normal   Arterial Pulses Left:  Posterior tibialis normal, dorsalis pedis normal   Edema Right: None   Edema Left:  None

## 2022-10-03 NOTE — PROGRESS NOTES
CC:  Knee pain    72 y.o. Male returns to clinic for a follow up visit regarding knee pain.       Patient has had injections in the past and that has provided him good relief for about 3 months.   He is here today to discuss injections again.        Past Medical History:   Diagnosis Date    Cancer     Cervical radiculopathy     Chronic pain     Chronic renal impairment     COPD (chronic obstructive pulmonary disease)     Depression     Digestive disorder     GERD (gastroesophageal reflux disease)     Hypercholesteremia     Hypertension     Left ventricular hypertrophy     Lumbosacral spondylosis     Prediabetes     Pulmonary nodules     repeat CT due 12/03/21     Past Surgical History:   Procedure Laterality Date    ABDOMINAL SURGERY      inguinal hernia repair    BACK SURGERY      Bilateral L3-S1 MBB Bilateral 2-, 1-, 1-, 12-    Dr Jorge Parker    EYE SURGERY      HERNIA REPAIR      KNEE ARTHROSCOPY W/ MENISCAL REPAIR Right     PROSTATECTOMY      RADIOFREQUENCY ABLATION OF LUMBAR MEDIAL BRANCH NERVE AT SINGLE LEVEL Right 04/01/2021    Procedure: Radiofrequency Ablation, Nerve, Spinal, Lumbar, Medial Branch, 1 Level;  Surgeon: Jordana Patel MD;  Location: [facility];  Service: Pain Management;  Laterality: Right;  Right L3-S1 RFTC    RADIOFREQUENCY THERMOCOAGULATION Bilateral 03/20/2012    Dr Parker    RADIOFREQUENCY THERMOCOAGULATION Left 03/18/2021    Procedure: RADIOFREQUENCY THERMAL COAGULATION;  Surgeon: Jordana Patel MD;  Location: [facility];  Service: Pain Management;  Laterality: Left;  BILATERAL L3-S1 RFTC LEFT SIDE FIRST    SELECTIVE INJECTION OF ANESTHETIC AGENT AROUND LUMBAR SPINAL NERVE ROOT BY TRANSFORAMINAL APPROACH Right 01/13/2022    Procedure: Right L4-5,5-S1 TFESI;  Surgeon: Jordana Patel MD;  Location: [facility];  Service: Pain Management;  Laterality: Right;  PT AWARE TO BE TESTED VIA PC    SPINE SURGERY  1995    VASECTOMY   Mar 1996         REVIEW OF SYSTEMS:   Constitution: Negative. Negative for chills, fever and night sweats.    Hematologic/Lymphatic: Negative for bleeding problem. Does not bruise/bleed easily.   Skin: Negative for dry skin, itching and rash.   Musculoskeletal: Negative for falls. Positive for knee pain and muscle weakness.   All other review of symptoms were reviewed and found to be noncontributory.     PHYSICAL EXAMINATION:  There were no vitals taken for this visit.  General    Constitutional: He is oriented to person, place, and time. He appears well-nourished.   HENT:   Head: Normocephalic and atraumatic.   Eyes: Pupils are equal, round, and reactive to light.   Neck: Neck supple.   Cardiovascular:  Normal rate and regular rhythm.            Pulmonary/Chest: Effort normal. No respiratory distress.   Abdominal: There is no abdominal tenderness. There is no guarding.   Neurological: He is alert and oriented to person, place, and time. He has normal reflexes.   Psychiatric: He has a normal mood and affect. His behavior is normal. Judgment and thought content normal.           Right Knee Exam     Inspection   Swelling: present  Effusion: present    Tenderness   The patient is tender to palpation of the medial joint line and lateral joint line.    Crepitus   The patient has crepitus of the patella.    Range of Motion   Extension:  normal   Flexion:  abnormal     Tests   Meniscus   Ozzy:  Medial - positive   Ligament Examination   Lachman: normal (-1 to 2mm)   PCL-Posterior Drawer: normal (0 to 2mm)     Patella   Patellar Tracking: normal  Patellar Grind: positive    Other   Sensation: normal    Left Knee Exam     Inspection   Swelling: present  Effusion: present    Tenderness   The patient tender to palpation of the medial joint line and lateral joint line.    Crepitus   The patient has crepitus of the patella.    Range of Motion   Extension:  normal   Flexion:  abnormal     Tests   Meniscus   Ozzy:  Medial  - positive   Stability   Lachman: normal (-1 to 2mm)   PCL-Posterior Drawer: normal (0 to 2mm)  Patella   Patellar Tracking: normal    Other   Sensation: normal    Muscle Strength   Right Lower Extremity   Quadriceps:  5/5   Hamstrin/5   Left Lower Extremity   Quadriceps:  5/5   Hamstrin/5     Reflexes     Left Side  Achilles:  2+  Quadriceps:  2+    Right Side   Achilles:  2+  Quadriceps:  2+    Vascular Exam     Right Pulses  Dorsalis Pedis:      2+  Posterior Tibial:      2+        Left Pulses  Dorsalis Pedis:      2+  Posterior Tibial:      2+      IMAGING:  No results found.     ASSESSMENT:      ICD-10-CM ICD-9-CM   1. Primary localized osteoarthritis of knees, bilateral  M17.0 715.16       PLAN:     -Findings and treatment options were discussed with the patient  -All questions answered  Natural history and expected course discussed. Questions answered.  Educational materials distributed.  Reduction in offending activity.  OTC analgesics as needed.  Arthrocentesis. See procedure note.    Bilateral knee injections today    There are no Patient Instructions on file for this visit.      No orders of the defined types were placed in this encounter.        Large Joint Aspiration/Injection: bilateral knee    Date/Time: 10/3/2022 1:00 PM  Performed by: MOLLY Slater  Authorized by: MOLLY Slater     Consent Done?:  Yes (Verbal)  Indications:  Pain  Local anesthesia used?: No    Local anesthetic:  Bupivacaine 0.25% without epinephrine    Details:  Needle Size:  22 G  Approach:  Superior  Location:  Knee  Laterality:  Bilateral  Site:  Bilateral knee  Medications (Right):  40 mg triamcinolone acetonide 40 mg/mL  Medications (Left):  40 mg triamcinolone acetonide 40 mg/mL  Patient tolerance:  Patient tolerated the procedure well with no immediate complications

## 2022-10-03 NOTE — PROGRESS NOTES
AP, lateral, flexion/extension views of the lumbar spine reviewed    On the AP there is thoracolumbar scoliosis with trunk shift to the right.  There are 5 non-rib-bearing lumbar vertebrae.  On the lateral there is decreased lumbar lordosis.  There is spondylotic disease with decreased disc height and osteophyte formation noted.  Grade 1 anterolisthesis at L3-4 and L4-5.    Impression:  Spondylotic changes of the lumbar spine as noted above

## 2022-10-04 RX ORDER — PREGABALIN 75 MG/1
75 CAPSULE ORAL 2 TIMES DAILY
Qty: 60 CAPSULE | Refills: 5 | Status: SHIPPED | OUTPATIENT
Start: 2022-10-04 | End: 2023-04-17 | Stop reason: SDUPTHER

## 2022-10-04 NOTE — TELEPHONE ENCOUNTER
Received call from patient that prescription could not be filled. I called pharmacy and they stated that there were too many refills on the scrip and they canceled it out. They asked that we send in new rx for lyrica.

## 2022-10-26 ENCOUNTER — CLINICAL SUPPORT (OUTPATIENT)
Dept: REHABILITATION | Facility: HOSPITAL | Age: 72
End: 2022-10-26
Payer: COMMERCIAL

## 2022-10-26 DIAGNOSIS — M54.16 LUMBAR RADICULOPATHY: ICD-10-CM

## 2022-10-26 PROCEDURE — 97140 MANUAL THERAPY 1/> REGIONS: CPT

## 2022-10-26 PROCEDURE — 97162 PT EVAL MOD COMPLEX 30 MIN: CPT

## 2022-10-26 NOTE — PLAN OF CARE
[facility]   Physical Therapy Initial Evaluation     Date: 10/26/2022   [id number]    Therapy Diagnosis:   Encounter Diagnosis   Name Primary?    Lumbar radiculopathy      Physician: Milan Akers MD    Physician Orders: PT Eval and Treat Lumbar  2-3 times per week for 6weeks  Medical Diagnosis from Referral: see above  Evaluation Date: 10/26/2022  Authorization Period Expiration: 10/03/2023  Plan of Care Expiration: 12/23/2022  Progress Note Due: 11/26/22  Visit # / Visits authorized: 1/ 20   FOTO: 50/59    Precautions: Standard     Time In: 0710  Time Out: 0740  Total Appointment Time (timed & untimed codes): 30 minutes      SUBJECTIVE     Date of onset: LBP for years, getting worse  History of current condition - Tray reports: LBP since he was a teenager.  Pt has a history of scoliosis, xray showing spondylolisthesis.  Grade [id number], L4-5.  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    Falls: no    Imaging, :   On the AP there is thoracolumbar scoliosis with trunk shift to the right.  There are 5 non-rib-bearing lumbar vertebrae.  On the lateral there is decreased lumbar lordosis.  There is spondylotic disease with decreased disc height and osteophyte formation noted.  Grade 1 anterolisthesis at L3-4 and L4-5.    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  Prior Level of Function: independent  Current Level of Function: independent with back pain    Pain:  Current 0/10, worst 9/10, best 0/10   Location: right back    Description: Aching and Burning  Aggravating Factors: Standing  Easing Factors:  heat    Patients goals: get relief of back pain     Medical History:   Past Medical History:   Diagnosis Date    Cancer     Cervical  radiculopathy     Chronic pain     Chronic renal impairment     COPD (chronic obstructive pulmonary disease)     Depression     Digestive disorder     GERD (gastroesophageal reflux disease)     Hypercholesteremia     Hypertension     Left ventricular hypertrophy     Lumbosacral spondylosis     Prediabetes     Pulmonary nodules     repeat CT due 21       Surgical History:   Tray Jalloh  has a past surgical history that includes Hernia repair; Back surgery; Radiofrequency thermocoagulation (Bilateral, 2012); Prostatectomy; Abdominal surgery; Radiofrequency thermocoagulation (Left, 2021); Eye surgery; Radiofrequency ablation of lumbar medial branch nerve at single level (Right, 2021); Bilateral L3-S1 MBB (Bilateral, 2-, 2021, 1-, 12-); Selective injection of anesthetic agent around lumbar spinal nerve root by transforaminal approach (Right, 2022); Spine surgery (); Vasectomy (Mar 1996); and Knee arthroscopy w/ meniscal repair (Right).    Medications:   Tray has a current medication list which includes the following prescription(s): amlodipine, ascorbic acid (vitamin c), cetirizine, cholecalciferol (vitamin d3), crestor, cyanocobalamin, diclofenac sodium, fluoxetine, folic acid/multivit-min/lutein, gabapentin, hydrocortisone, combivent respimat, ketoconazole, lisinopril, metaxalone, methenamine, fish oil-omega-3 fatty acids, omeprazole, pregabalin, triamcinolone acetonide 0.1%, vitamin e, and zinc gluconate.    Allergies:   Review of patient's allergies indicates:   Allergen Reactions    Bee pollen     Grass pollen- grass standard           OBJECTIVE     MMT:  BLE 5/5, /x left knee 4/5    Lumbar ACTIVE ROM:    Flexion:  full, with no pain  Extension:  50% decrease, with no pain  Side bendin % limitation on right and with P!, left full range of motion, no P!    Scoliosis-right convexity  Lateral shift-right  Leg length discrepancy-left  longer    Limitation/Restriction for FOTO lumbar Survey    Therapist reviewed FOTO scores for Tray Jalloh on 10/26/2022.   FOTO documents entered into [employer] - see Media section.  Functional Score: 50%         TREATMENT     Total Treatment time (time-based codes) separate from Evaluation: 10 minutes      Tray received the treatments listed below:      manual therapy techniques: sciatic nerve glide  BLE      ASSESSMENT     Tray is a 72 y.o. male referred to outpatient Physical Therapy with a medical diagnosis of lumbar radiculopathy. Patient presents with decreased range of motion, strength    Patient prognosis is Good.   Patient will benefit from skilled outpatient Physical Therapy to address the deficits stated above and in the chart below, provide patient /family education, and to maximize patientt's level of independence.     Plan of care discussed with patient: Yes  Patient's spiritual, cultural and educational needs considered and patient is agreeable to the plan of care and goals as stated below:     Anticipated Barriers for therapy: scoliosis, pain      Goals:  Short Term Goals: 4 weeks   Independent with HOME EXERCISE PROGRAM  Improve left knee strength to 4+/5  Tolerate 45 minutes of exercise with <7/10 pain.    Long Term Goals: 8 weeks   Improve left knee strength to 5/5  Tolerate 60 minutes of exercise with <5/10 pain.  Work 8 hour/day with <5/10 pain.    PLAN   Plan of care Certification: 10/26/2022 to 12/23/2022.    Outpatient Physical Therapy 2 times weekly for 8 weeks to include the following interventions: Cervical/Lumbar Traction, Electrical Stimulation 0-300 hz, Iontophoresis (with 1.3-2.0 dexamethasone), Manual Therapy, Neuromuscular Re-ed, Orthotic Management and Training, Patient Education, Self Care, Therapeutic Activities, Therapeutic Exercise, and Ultrasound.     MEHRDAD HARRISON, PT      I CERTIFY THE NEED FOR THESE SERVICES FURNISHED UNDER THIS PLAN OF TREATMENT AND WHILE  UNDER MY CARE   Physician's comments:     Physician's Signature: ___________________________________________________

## 2022-10-31 DIAGNOSIS — M17.0 PRIMARY LOCALIZED OSTEOARTHRITIS OF KNEES, BILATERAL: Primary | ICD-10-CM

## 2022-11-01 ENCOUNTER — CLINICAL SUPPORT (OUTPATIENT)
Dept: REHABILITATION | Facility: HOSPITAL | Age: 72
End: 2022-11-01
Payer: COMMERCIAL

## 2022-11-01 DIAGNOSIS — M47.817 SPONDYLOSIS OF LUMBOSACRAL REGION WITHOUT MYELOPATHY OR RADICULOPATHY: Primary | ICD-10-CM

## 2022-11-01 PROCEDURE — 97112 NEUROMUSCULAR REEDUCATION: CPT | Mod: CQ

## 2022-11-01 PROCEDURE — 97110 THERAPEUTIC EXERCISES: CPT | Mod: CQ

## 2022-11-01 NOTE — PROGRESS NOTES
[facility]   Physical Therapy Treatment Note     Name: Tray Jalloh  Clinic Number: [id number]    Therapy Diagnosis:   Encounter Diagnosis   Name Primary?    Spondylosis of lumbosacral region without myelopathy or radiculopathy Yes     Physician: Milan Akers MD    Visit Date: 11/1/2022    Physician Orders: PT Eval and Treat Lumbar  2-3 times per week for 6weeks  Medical Diagnosis from Referral: see above  Evaluation Date: 10/26/2022  Authorization Period Expiration: 10/03/2023  Plan of Care Expiration: 12/23/2022  Progress Note Due: 11/26/22  Visit # / Visits authorized: 2/ 20   FOTO: 50/59    PTA Visit #: 1/5     Time In: 7:00 am  Time Out: 7:42 am  Total Billable Time: 42 minutes    SUBJECTIVE     Pt reports: his left knee is what is hurting this morning.  He was not compliant with home exercise program. (He was not given home exercise program until today)  Response to previous treatment: no complaint   Functional change: no change    Pain: 5/10  Location: bilateral low back and left knee      OBJECTIVE     Objective Measures updated at progress report unless specified.   Case conference with Gustavo, PT, for initial PTA treatment.     Treatment     Tray received the treatments listed below:      therapeutic exercises to develop strength, ROM, and flexibility for 15 minutes including:  Bike x 5 minutes   Slant board stretch 3 x 30 second hold   Hamstring stretch at stairs 3 x 30 second hold bilateral   LTR in hooklying x 5 each way with 5 second hold     manual therapy techniques: Myofacial release and Soft tissue Mobilization were applied to the: low back and bilateral lower extremities  for 4 minutes, including:  Hamstring, piriformis, external rotators bilateral stretches    neuromuscular re-education activities to improve: Coordination and Posture for 23 minutes. The following activities were included:  Pelvic tilts (neutral) x 20 with 3 second hold   Tilts  with hip abduction with green theraband 2 x 10 with 3 second hold   Tilts with hip adduction with adduction squeeze x 3 second hold 2 x 10  Seated sciatic nerve glides x 5 each leg     therapeutic activities to improve functional performance for 0  minutes, including:  (not performed today)       Patient Education and Home Exercises     Home Exercises Provided and Patient Education Provided     Education provided:   - home exercise program     Written Home Exercises Provided: yes. Exercises were reviewed and Tray was able to demonstrate them prior to the end of the session.  Tray demonstrated good  understanding of the education provided. See EMR under Patient Instructions for exercises provided during therapy sessions    ASSESSMENT     Initially:  Tray is a 72 y.o. male referred to outpatient Physical Therapy with a medical diagnosis of lumbar radiculopathy. Patient presents with decreased range of motion, strength     Currently:  Tray was given an home exercise program and instructed on performance of these exercises. He arrived with forward flexed posture and very guarded gait. He was able to stand a little straighter after treatment. He received some brief manual stretching. His left knee was swollen and painful per subjective report. He did well with exercises and voiced understanding. Will progress as able.    Tray Is progressing towards his goals.   Pt prognosis is Good.     Pt will continue to benefit from skilled outpatient physical therapy to address the deficits listed in the problem list box on initial evaluation, provide pt/family education and to maximize pt's level of independence in the home and community environment.     Pt's spiritual, cultural and educational needs considered and pt agreeable to plan of care and goals.     Anticipated barriers to physical therapy: scoliosis and pain    Goals:   Short Term Goals: 4 weeks   Independent with HOME EXERCISE PROGRAM  Improve left knee  strength to 4+/5  Tolerate 45 minutes of exercise with <7/10 pain.     Long Term Goals: 8 weeks   Improve left knee strength to 5/5  Tolerate 60 minutes of exercise with <5/10 pain.  Work 8 hour/day with <5/10 pain.       PLAN     Plan of care Certification: 10/26/2022 to 12/23/2022.     Continue Plan of Care for Outpatient Physical Therapy 2 times weekly for 8 weeks to include the following interventions: Cervical/Lumbar Traction, Electrical Stimulation 0-300 hz, Iontophoresis (with 1.3-2.0 dexamethasone), Manual Therapy, Neuromuscular Re-ed, Orthotic Management and Training, Patient Education, Self Care, Therapeutic Activities, Therapeutic Exercise, and Ultrasound.     Merly Mcarthur, PTA   11/01/2022

## 2022-11-15 ENCOUNTER — TELEPHONE (OUTPATIENT)
Dept: ORTHOPEDICS | Facility: CLINIC | Age: 72
End: 2022-11-15
Payer: COMMERCIAL

## 2022-11-15 ENCOUNTER — CLINICAL SUPPORT (OUTPATIENT)
Dept: REHABILITATION | Facility: HOSPITAL | Age: 72
End: 2022-11-15
Payer: COMMERCIAL

## 2022-11-15 DIAGNOSIS — M41.35 THORACOGENIC SCOLIOSIS OF THORACOLUMBAR REGION: Primary | ICD-10-CM

## 2022-11-15 PROCEDURE — 97110 THERAPEUTIC EXERCISES: CPT | Mod: CQ

## 2022-11-15 NOTE — PROGRESS NOTES
[facility]   Physical Therapy Treatment Note     Name: Tray Jalloh  Clinic Number: [id number]    Therapy Diagnosis:   No diagnosis found.    Physician: Milan Akers MD    Visit Date: 11/15/2022    Physician Orders: PT Eval and Treat Lumbar  2-3 times per week for 6weeks  Medical Diagnosis from Referral: see above  Evaluation Date: 10/26/2022  Authorization Period Expiration: 10/03/2023  Plan of Care Expiration: 12/23/2022  Progress Note Due: 11/26/22  Visit # / Visits authorized: 3/ 20   FOTO: 50/59    PTA Visit #: 2/5     Time In: 7:00 am  Time Out: 0745  Total Billable Time: 45 minutes    SUBJECTIVE     Pt reports: hoping to get left knee injected tomorrow  He was not compliant with home exercise program. (He was not given home exercise program until today)  Response to previous treatment: no complaint   Functional change: no change    Pain: 5/10  Location: bilateral low back and left knee      OBJECTIVE     Objective Measures updated at progress report unless specified.   Case conference with Gustavo, PT, for initial PTA treatment.     Treatment     Tray received the treatments listed below:      therapeutic exercises to develop strength, ROM, and flexibility for 40 minutes including:  Bike x 5 minutes   Slant board stretch 3 x 30 second hold   Hamstring stretch at stairs 3 x 30 second hold bilateral   Hip flexor stretch on stairs 2 x 30 sec   Standing rows on cable #4 x 20  Forward pulldowns standing at cable #4 x 20  Cybex lumbar extension #4 x 20  Prone abdominal brace with GS x 10 with 3 sec hold  Prone hip extension x 10 Lionel  LTR in hooklying x 5 each way with 5 second hold     manual therapy techniques: Myofacial release and Soft tissue Mobilization were applied to the: low back and bilateral lower extremities  for 0 minutes, including:  Hamstring, piriformis, external rotators bilateral stretches    neuromuscular re-education activities to improve:  Coordination and Posture for 5 minutes. The following activities were included:  Pelvic tilts (neutral) x 20 with 3 second hold  NOT THIS VISIT   Tilts with hip abduction with green theraband 2 x 10 with 3 second hold NOT THIS VISIT   Tilts with hip adduction with adduction squeeze x 3 second hold 2 x 10 NOT THIS VISIT   Seated sciatic nerve glides x 5 each leg     therapeutic activities to improve functional performance for 0  minutes, including:  (not performed today)       Patient Education and Home Exercises     Home Exercises Provided and Patient Education Provided     Education provided:   - home exercise program     Written Home Exercises Provided: yes. Exercises were reviewed and Tray was able to demonstrate them prior to the end of the session.  Tray demonstrated good  understanding of the education provided. See EMR under Patient Instructions for exercises provided during therapy sessions    ASSESSMENT   Case conference with Jenni Suárez PT for initial PTA visit. Pt walks with flexed posture and with no heel strike at quick pace. States relief from back and leg pain at end of session.  Initially:  Tray is a 72 y.o. male referred to outpatient Physical Therapy with a medical diagnosis of lumbar radiculopathy. Patient presents with decreased range of motion, strength     Currently:  Tray was given an home exercise program and instructed on performance of these exercises. He arrived with forward flexed posture and very guarded gait. He was able to stand a little straighter after treatment. He received some brief manual stretching. His left knee was swollen and painful per subjective report. He did well with exercises and voiced understanding. Will progress as able.    Tray Is progressing towards his goals.   Pt prognosis is Good.     Pt will continue to benefit from skilled outpatient physical therapy to address the deficits listed in the problem list box on initial evaluation, provide  pt/family education and to maximize pt's level of independence in the home and community environment.     Pt's spiritual, cultural and educational needs considered and pt agreeable to plan of care and goals.     Anticipated barriers to physical therapy: scoliosis and pain    Goals:   Short Term Goals: 4 weeks   Independent with HOME EXERCISE PROGRAM  Improve left knee strength to 4+/5  Tolerate 45 minutes of exercise with <7/10 pain.     Long Term Goals: 8 weeks   Improve left knee strength to 5/5  Tolerate 60 minutes of exercise with <5/10 pain.  Work 8 hour/day with <5/10 pain.       PLAN     Plan of care Certification: 10/26/2022 to 12/23/2022.     Continue Plan of Care for Outpatient Physical Therapy 2 times weekly for 8 weeks to include the following interventions: Cervical/Lumbar Traction, Electrical Stimulation 0-300 hz, Iontophoresis (with 1.3-2.0 dexamethasone), Manual Therapy, Neuromuscular Re-ed, Orthotic Management and Training, Patient Education, Self Care, Therapeutic Activities, Therapeutic Exercise, and Ultrasound.     Liliana Ng, PTA   11/15/2022

## 2022-11-15 NOTE — TELEPHONE ENCOUNTER
----- Message from Kanchan Akers sent at 11/15/2022 10:04 AM CST -----  Pt calling to confirm that insurance has approved inj that pt is scheduled to get on 11/16 - pt call back # [phone]

## 2022-11-16 ENCOUNTER — OFFICE VISIT (OUTPATIENT)
Dept: FAMILY MEDICINE | Facility: CLINIC | Age: 72
End: 2022-11-16
Payer: COMMERCIAL

## 2022-11-16 VITALS
RESPIRATION RATE: 19 BRPM | SYSTOLIC BLOOD PRESSURE: 134 MMHG | HEART RATE: 72 BPM | DIASTOLIC BLOOD PRESSURE: 72 MMHG | BODY MASS INDEX: 28.89 KG/M2 | WEIGHT: 201.81 LBS | TEMPERATURE: 98 F | OXYGEN SATURATION: 99 % | HEIGHT: 70 IN

## 2022-11-16 DIAGNOSIS — Z12.11 SCREENING FOR MALIGNANT NEOPLASM OF COLON: ICD-10-CM

## 2022-11-16 DIAGNOSIS — M17.0 PRIMARY LOCALIZED OSTEOARTHRITIS OF KNEES, BILATERAL: Primary | ICD-10-CM

## 2022-11-16 DIAGNOSIS — Z79.899 ENCOUNTER FOR LONG-TERM (CURRENT) USE OF OTHER MEDICATIONS: ICD-10-CM

## 2022-11-16 DIAGNOSIS — N18.9 CHRONIC RENAL IMPAIRMENT, UNSPECIFIED CKD STAGE: ICD-10-CM

## 2022-11-16 DIAGNOSIS — Z87.898 HISTORY OF PREDIABETES: ICD-10-CM

## 2022-11-16 DIAGNOSIS — I10 ESSENTIAL HYPERTENSION: Primary | Chronic | ICD-10-CM

## 2022-11-16 DIAGNOSIS — J44.9 COPD, MILD: Chronic | ICD-10-CM

## 2022-11-16 DIAGNOSIS — R06.02 SOB (SHORTNESS OF BREATH): ICD-10-CM

## 2022-11-16 DIAGNOSIS — D63.8 ANEMIA OF CHRONIC DISEASE: ICD-10-CM

## 2022-11-16 PROBLEM — M19.90 CHRONIC OSTEOARTHRITIS: Status: ACTIVE | Noted: 2022-11-16

## 2022-11-16 PROBLEM — Z85.46 HISTORY OF PROSTATE CANCER: Status: ACTIVE | Noted: 2022-11-16

## 2022-11-16 PROBLEM — J06.9 VIRAL UPPER RESPIRATORY ILLNESS: Status: RESOLVED | Noted: 2022-03-15 | Resolved: 2022-11-16

## 2022-11-16 LAB
ANION GAP SERPL CALCULATED.3IONS-SCNC: 11 MMOL/L (ref 7–16)
BASOPHILS # BLD AUTO: 0.08 K/UL (ref 0–0.2)
BASOPHILS NFR BLD AUTO: 1.1 % (ref 0–1)
BUN SERPL-MCNC: 21 MG/DL (ref 7–18)
BUN/CREAT SERPL: 21 (ref 6–20)
CALCIUM SERPL-MCNC: 9 MG/DL (ref 8.5–10.1)
CHLORIDE SERPL-SCNC: 109 MMOL/L (ref 98–107)
CO2 SERPL-SCNC: 23 MMOL/L (ref 21–32)
CREAT SERPL-MCNC: 1 MG/DL (ref 0.7–1.3)
DIFFERENTIAL METHOD BLD: ABNORMAL
EGFR (NO RACE VARIABLE) (RUSH/TITUS): 80 ML/MIN/1.73M²
EOSINOPHIL # BLD AUTO: 0.51 K/UL (ref 0–0.5)
EOSINOPHIL NFR BLD AUTO: 7 % (ref 1–4)
ERYTHROCYTE [DISTWIDTH] IN BLOOD BY AUTOMATED COUNT: 11.8 % (ref 11.5–14.5)
EST. AVERAGE GLUCOSE BLD GHB EST-MCNC: 100 MG/DL
FERRITIN SERPL-MCNC: 21 NG/ML (ref 26–388)
FOLATE SERPL-MCNC: >20 NG/ML (ref 3.1–17.5)
GLUCOSE SERPL-MCNC: 102 MG/DL (ref 74–106)
HBA1C MFR BLD HPLC: 5.6 % (ref 4.5–6.6)
HCT VFR BLD AUTO: 38 % (ref 40–54)
HGB BLD-MCNC: 12.6 G/DL (ref 13.5–18)
IMM GRANULOCYTES # BLD AUTO: 0.01 K/UL (ref 0–0.04)
IMM GRANULOCYTES NFR BLD: 0.1 % (ref 0–0.4)
IRON SATN MFR SERPL: 37 % (ref 14–50)
IRON SERPL-MCNC: 100 ΜG/DL (ref 65–175)
LYMPHOCYTES # BLD AUTO: 2.39 K/UL (ref 1–4.8)
LYMPHOCYTES NFR BLD AUTO: 32.7 % (ref 27–41)
MCH RBC QN AUTO: 31.7 PG (ref 27–31)
MCHC RBC AUTO-ENTMCNC: 33.2 G/DL (ref 32–36)
MCV RBC AUTO: 95.7 FL (ref 80–96)
MONOCYTES # BLD AUTO: 0.69 K/UL (ref 0–0.8)
MONOCYTES NFR BLD AUTO: 9.4 % (ref 2–6)
MPC BLD CALC-MCNC: 10.1 FL (ref 9.4–12.4)
NEUTROPHILS # BLD AUTO: 3.63 K/UL (ref 1.8–7.7)
NEUTROPHILS NFR BLD AUTO: 49.7 % (ref 53–65)
NRBC # BLD AUTO: 0 X10E3/UL
NRBC, AUTO (.00): 0 %
PLATELET # BLD AUTO: 308 K/UL (ref 150–400)
POTASSIUM SERPL-SCNC: 4.4 MMOL/L (ref 3.5–5.1)
RBC # BLD AUTO: 3.97 M/UL (ref 4.6–6.2)
SODIUM SERPL-SCNC: 139 MMOL/L (ref 136–145)
TIBC SERPL-MCNC: 272 ΜG/DL (ref 250–450)
VIT B12 SERPL-MCNC: 668 PG/ML (ref 193–986)
WBC # BLD AUTO: 7.31 K/UL (ref 4.5–11)

## 2022-11-16 PROCEDURE — 1157F ADVNC CARE PLAN IN RCRD: CPT | Mod: ,,, | Performed by: NURSE PRACTITIONER

## 2022-11-16 PROCEDURE — 85025 COMPLETE CBC W/AUTO DIFF WBC: CPT | Mod: ,,, | Performed by: CLINICAL MEDICAL LABORATORY

## 2022-11-16 PROCEDURE — 3008F BODY MASS INDEX DOCD: CPT | Mod: ,,, | Performed by: NURSE PRACTITIONER

## 2022-11-16 PROCEDURE — 1159F PR MEDICATION LIST DOCUMENTED IN MEDICAL RECORD: ICD-10-PCS | Mod: ,,, | Performed by: NURSE PRACTITIONER

## 2022-11-16 PROCEDURE — 99214 PR OFFICE/OUTPT VISIT, EST, LEVL IV, 30-39 MIN: ICD-10-PCS | Mod: ,,, | Performed by: NURSE PRACTITIONER

## 2022-11-16 PROCEDURE — 82607 VITAMIN B-12: CPT | Mod: ,,, | Performed by: CLINICAL MEDICAL LABORATORY

## 2022-11-16 PROCEDURE — 3078F DIAST BP <80 MM HG: CPT | Mod: ,,, | Performed by: NURSE PRACTITIONER

## 2022-11-16 PROCEDURE — 82728 ASSAY OF FERRITIN: CPT | Mod: ,,, | Performed by: CLINICAL MEDICAL LABORATORY

## 2022-11-16 PROCEDURE — 83036 HEMOGLOBIN GLYCOSYLATED A1C: CPT | Mod: ,,, | Performed by: CLINICAL MEDICAL LABORATORY

## 2022-11-16 PROCEDURE — 1160F RVW MEDS BY RX/DR IN RCRD: CPT | Mod: ,,, | Performed by: NURSE PRACTITIONER

## 2022-11-16 PROCEDURE — 83036 HEMOGLOBIN A1C: ICD-10-PCS | Mod: ,,, | Performed by: CLINICAL MEDICAL LABORATORY

## 2022-11-16 PROCEDURE — 80048 BASIC METABOLIC PNL TOTAL CA: CPT | Mod: ,,, | Performed by: CLINICAL MEDICAL LABORATORY

## 2022-11-16 PROCEDURE — 83540 ASSAY OF IRON: CPT | Mod: ,,, | Performed by: CLINICAL MEDICAL LABORATORY

## 2022-11-16 PROCEDURE — 99214 OFFICE O/P EST MOD 30 MIN: CPT | Mod: ,,, | Performed by: NURSE PRACTITIONER

## 2022-11-16 PROCEDURE — 82746 VITAMIN B12/FOLATE, SERUM PANEL: ICD-10-PCS | Mod: ,,, | Performed by: CLINICAL MEDICAL LABORATORY

## 2022-11-16 PROCEDURE — 3044F HG A1C LEVEL LT 7.0%: CPT | Mod: ,,, | Performed by: NURSE PRACTITIONER

## 2022-11-16 PROCEDURE — 3060F PR POS MICROALBUMINURIA RESULT DOCUMENTED/REVIEW: ICD-10-PCS | Mod: ,,, | Performed by: NURSE PRACTITIONER

## 2022-11-16 PROCEDURE — 3044F PR MOST RECENT HEMOGLOBIN A1C LEVEL <7.0%: ICD-10-PCS | Mod: ,,, | Performed by: NURSE PRACTITIONER

## 2022-11-16 PROCEDURE — 82728 FERRITIN: ICD-10-PCS | Mod: ,,, | Performed by: CLINICAL MEDICAL LABORATORY

## 2022-11-16 PROCEDURE — 3066F NEPHROPATHY DOC TX: CPT | Mod: ,,, | Performed by: NURSE PRACTITIONER

## 2022-11-16 PROCEDURE — 4010F ACE/ARB THERAPY RXD/TAKEN: CPT | Mod: ,,, | Performed by: NURSE PRACTITIONER

## 2022-11-16 PROCEDURE — 1160F PR REVIEW ALL MEDS BY PRESCRIBER/CLIN PHARMACIST DOCUMENTED: ICD-10-PCS | Mod: ,,, | Performed by: NURSE PRACTITIONER

## 2022-11-16 PROCEDURE — 83550 IRON BINDING TEST: CPT | Mod: ,,, | Performed by: CLINICAL MEDICAL LABORATORY

## 2022-11-16 PROCEDURE — 82746 ASSAY OF FOLIC ACID SERUM: CPT | Mod: ,,, | Performed by: CLINICAL MEDICAL LABORATORY

## 2022-11-16 PROCEDURE — 1159F MED LIST DOCD IN RCRD: CPT | Mod: ,,, | Performed by: NURSE PRACTITIONER

## 2022-11-16 PROCEDURE — 80048 BASIC METABOLIC PANEL: ICD-10-PCS | Mod: ,,, | Performed by: CLINICAL MEDICAL LABORATORY

## 2022-11-16 PROCEDURE — 1157F PR ADVANCE CARE PLAN OR EQUIV PRESENT IN MEDICAL RECORD: ICD-10-PCS | Mod: ,,, | Performed by: NURSE PRACTITIONER

## 2022-11-16 PROCEDURE — 3075F SYST BP GE 130 - 139MM HG: CPT | Mod: ,,, | Performed by: NURSE PRACTITIONER

## 2022-11-16 PROCEDURE — 3078F PR MOST RECENT DIASTOLIC BLOOD PRESSURE < 80 MM HG: ICD-10-PCS | Mod: ,,, | Performed by: NURSE PRACTITIONER

## 2022-11-16 PROCEDURE — 85025 CBC WITH DIFFERENTIAL: ICD-10-PCS | Mod: ,,, | Performed by: CLINICAL MEDICAL LABORATORY

## 2022-11-16 PROCEDURE — 4010F PR ACE/ARB THEARPY RXD/TAKEN: ICD-10-PCS | Mod: ,,, | Performed by: NURSE PRACTITIONER

## 2022-11-16 PROCEDURE — 83540 IRON AND TIBC: ICD-10-PCS | Mod: ,,, | Performed by: CLINICAL MEDICAL LABORATORY

## 2022-11-16 PROCEDURE — 3066F PR DOCUMENTATION OF TREATMENT FOR NEPHROPATHY: ICD-10-PCS | Mod: ,,, | Performed by: NURSE PRACTITIONER

## 2022-11-16 PROCEDURE — 3060F POS MICROALBUMINURIA REV: CPT | Mod: ,,, | Performed by: NURSE PRACTITIONER

## 2022-11-16 PROCEDURE — 3075F PR MOST RECENT SYSTOLIC BLOOD PRESS GE 130-139MM HG: ICD-10-PCS | Mod: ,,, | Performed by: NURSE PRACTITIONER

## 2022-11-16 PROCEDURE — 83550 IRON AND TIBC: ICD-10-PCS | Mod: ,,, | Performed by: CLINICAL MEDICAL LABORATORY

## 2022-11-16 PROCEDURE — 82607 VITAMIN B12/FOLATE, SERUM PANEL: ICD-10-PCS | Mod: ,,, | Performed by: CLINICAL MEDICAL LABORATORY

## 2022-11-16 PROCEDURE — 3008F PR BODY MASS INDEX (BMI) DOCUMENTED: ICD-10-PCS | Mod: ,,, | Performed by: NURSE PRACTITIONER

## 2022-11-16 RX ORDER — BUDESONIDE, GLYCOPYRROLATE, AND FORMOTEROL FUMARATE 160; 9; 4.8 UG/1; UG/1; UG/1
2 AEROSOL, METERED RESPIRATORY (INHALATION) 2 TIMES DAILY
Qty: 32.1 G | Refills: 3 | Status: SHIPPED | OUTPATIENT
Start: 2022-11-16 | End: 2024-01-03 | Stop reason: ALTCHOICE

## 2022-11-16 RX ORDER — TOLTERODINE 2 MG/1
2 CAPSULE, EXTENDED RELEASE ORAL DAILY
COMMUNITY
Start: 2022-10-25

## 2022-11-16 NOTE — PROGRESS NOTES
[facility] - FAMILY [facility]IC[facility]       PATIENT NAME: Tray Jalloh   : 1950    AGE: 72 y.o. DATE OF ENCOUNTER: 22    MRN: [id number]      PCP: MOLLY Murry    Reason for Visit / Chief Complaint:  prediabetes and Follow-up (6mo fu for prediabetes. Patient is not fasting. )     Subjective:     HPI:    Presents for 6 mth f/u HTN & hx predm.  Last A1c normal.   [facility] but [facility]'t [facility].  Dr. Avalos started detrol LA for OA/stress incontinence which is helping.    Dr. Akers changed him from gabapentin to lyrica which is working well & not making him as groggy.    Off fluoxetine & doing well.    Former smoker, hx mild COPD, years ago asthma.  Was using CAROLE as needed but now having to use it 2x/day for SOB w/ exertion.  Scheduled  for f/u CT chest.  Job more sedentary than in past, snacking often - PB crackers, poptarts.  Has gained 12 lbs in past 3 mths.    [facility]; hx prostatectomy.    Review of Systems:     Review of Systems   Constitutional:  Negative for chills and fever. Unexpected weight change: wt gain.  Respiratory:  Positive for cough and shortness of breath.    Cardiovascular: Negative.    Gastrointestinal: Negative.    Genitourinary: Negative.    Skin: Negative.    Neurological: Negative.      Allergies:     Review of patient's allergies indicates:   Allergen Reactions    Bee pollen     Grass pollen-figueroa grass standard         Labs:      Lab Results   Component Value Date    HGBA1C 5.3 2022      Lipids:   Lab Results   Component Value Date    CHOL 119 2022     Lab Results   Component Value Date    HDL 40 2022     Lab Results   Component Value Date    LDLCALC 63 2022     Lab Results   Component Value Date    TRIG 78 2022     CMP:  Sodium   Date Value Ref Range Status   2022 139 136 - 145 mmol/L Final     Potassium   Date Value Ref Range Status   2022 4.5 3.5 - 5.1 mmol/L Final      Chloride   Date Value Ref Range Status   05/12/2022 108 (H) 98 - 107 mmol/L Final     Glucose   Date Value Ref Range Status   05/12/2022 102 74 - 106 mg/dL Final     BUN   Date Value Ref Range Status   05/12/2022 21 (H) 7 - 18 mg/dL Final     Creatinine   Date Value Ref Range Status   05/12/2022 1.10 0.70 - 1.30 mg/dL Final     AST   Date Value Ref Range Status   05/12/2022 13 (L) 15 - 37 U/L Final     ALT   Date Value Ref Range Status   05/12/2022 21 16 - 61 U/L Final     eGFR    Date Value Ref Range Status   12/03/2020 65       Comment:     The above 3 analytes were performed by [id number] [address] [place] [address]     eGFR   Date Value Ref Range Status   05/12/2022 70 >=60 mL/min/1.73m² Final      CBC:  Lab Results   Component Value Date    WBC 6.04 05/12/2022    RBC 3.97 (L) 05/12/2022    HGB 12.3 (L) 05/12/2022    HCT 39.2 (L) 05/12/2022     05/12/2022      TSH:  Lab Results   Component Value Date    TSH 1.360 05/12/2022       Medical History:     Past Medical History:   Diagnosis Date    Cervical radiculopathy     Chronic pain     Chronic renal impairment     COPD (chronic obstructive pulmonary disease)     Depression     Digestive disorder     GERD (gastroesophageal reflux disease)     Hypercholesteremia     Hypertension     Left ventricular hypertrophy     Lumbosacral spondylosis     Prediabetes     Pulmonary nodules     repeat CT due 12/03/21      Social History     Tobacco Use   Smoking Status Former    Packs/day: 2.00    Years: 30.00    Pack years: 60.00    Types: Cigarettes, Pipe, Cigars   Smokeless Tobacco Never      Objective:      Wt Readings from Last 3 Encounters:   11/16/22 0909 91.5 kg (201 lb 12.8 oz)   08/22/22 1131 86 kg (189 lb 9.6 oz)   07/18/22 1510 87 kg (191 lb 12.8 oz)     Vitals:    11/16/22 0909   BP: 134/72   BP Location: Left arm   Patient Position: Sitting   BP Method: Large (Manual)   Pulse: 72   Resp: 19   Temp: 97.6 °F (36.4 °C)   TempSrc: Oral  "  SpO2: 99%   Weight: 91.5 kg (201 lb 12.8 oz)   Height: 5' 10" (1.778 m)     Body mass index is 28.96 kg/m².     Physical Exam:    Physical Exam  Vitals and nursing note reviewed.   Constitutional:       General: He is not in acute distress.     Appearance: Normal appearance. He is not ill-appearing.   HENT:      Head: Normocephalic.      Mouth/Throat:      Mouth: Mucous membranes are moist.      Pharynx: Oropharynx is clear.   Eyes:      Conjunctiva/sclera: Conjunctivae normal.   Cardiovascular:      Rate and Rhythm: Normal rate and regular rhythm.      Pulses: Normal pulses.      Heart sounds: Normal heart sounds.   Pulmonary:      Effort: Pulmonary effort is normal. No respiratory distress.      Breath sounds: Normal breath sounds.   Musculoskeletal:      Cervical back: Neck supple.      Right lower leg: No edema.      Left lower leg: No edema.   Lymphadenopathy:      Cervical: No cervical adenopathy.   Skin:     General: Skin is warm and dry.   Neurological:      Mental Status: He is alert and oriented to person, place, and time.        Assessment:          ICD-10-CM ICD-9-CM   1. Essential hypertension  I10 401.9   2. Encounter for long-term (current) use of other medications  Z79.899 V58.69   3. Screening for malignant neoplasm of colon  Z12.11 V76.51   4. COPD, mild  J44.9 496   5. SOB (shortness of breath)  R06.02 786.05   6. History of prediabetes  Z87.898 V12.29   7. Anemia of chronic disease  D63.8 285.29   8. Chronic renal impairment, unspecified CKD stage  N18.9 585.9        Plan:       Essential hypertension    Encounter for long-term (current) use of other medications  -     Basic Metabolic Panel; Future; Expected date: 11/16/2022  -     Hemoglobin A1C; Future; Expected date: 11/16/2022  -     Vitamin B12 & Folate; Future; Expected date: 11/16/2022    Screening for malignant neoplasm of colon  -     Cologuard Screening (Multitarget Stool DNA); Future; Expected date: 11/16/2022    COPD, mild  -     " budesonide-glycopyr-formoterol (BREZTRI AEROSPHERE) 160-9-4.8 mcg/actuation HFAA; Inhale 2 puffs into the lungs 2 (two) times a day. Rinse mouth after use.  Dispense: 32.1 g; Refill: 3    SOB (shortness of breath)    History of prediabetes  -     Hemoglobin A1C; Future; Expected date: 11/16/2022    Anemia of chronic disease  -     Iron and TIBC; Future; Expected date: 11/16/2022  -     Ferritin; Future; Expected date: 11/16/2022  -     Vitamin B12 & Folate; Future; Expected date: 11/16/2022  -     CBC Auto Differential; Future; Expected date: 11/16/2022    Chronic renal impairment, unspecified CKD stage  -     Basic Metabolic Panel; Future; Expected date: 11/16/2022      Current Outpatient Medications:     amLODIPine (NORVASC) 5 MG tablet, Take 1 tablet by mouth once daily., Disp: , Rfl:     ascorbic acid, vitamin C, (VITAMIN C) 1000 MG tablet, Take 1,000 mg by mouth once daily., Disp: , Rfl:     cetirizine (ZYRTEC) 10 MG tablet, Take 1 tablet (10 mg total) by mouth once daily., Disp: 90 tablet, Rfl: 3    cholecalciferol, vitamin D3, (VITAMIN D3) 50 mcg (2,000 unit) Cap, Take 1 capsule by mouth once daily., Disp: , Rfl:     CRESTOR 20 mg tablet, Take 1 tablet by mouth once daily. Take 1/2 tablet by mouth every evening, Disp: , Rfl:     cyanocobalamin (VITAMIN B-12) 1000 MCG tablet, Take 100 mcg by mouth once daily., Disp: , Rfl:     diclofenac sodium (VOLTAREN) 1 % Gel, Apply topically 4 (four) times daily., Disp: 900 g, Rfl: 5    folic acid/multivit-min/lutein (CENTRUM SILVER ORAL), Take 1 tablet by mouth once daily., Disp: , Rfl:     hydrocortisone 2.5 % cream, Apply 30 g/kg topically 2 (two) times daily. Apply to face for dry skin, Disp: , Rfl:     ipratropium-albuteroL (COMBIVENT RESPIMAT)  mcg/actuation inhaler, Inhale 1 puff into the lungs every 6 (six) hours as needed for Wheezing. Rescue, Disp: 12 g, Rfl: 1    ketoconazole (NIZORAL) 2 % cream, Apply topically once daily., Disp: , Rfl:     lisinopriL  (PRINIVIL,ZESTRIL) 40 MG tablet, Take 1 tablet (40 mg total) by mouth once daily., Disp: 90 tablet, Rfl: 3    metaxalone (SKELAXIN) 800 MG tablet, Take 1 tablet (800 mg total) by mouth daily as needed for Pain., Disp: 90 tablet, Rfl: 1    methenamine (HIPREX) 1 gram Tab, Take 1 tablet (1 g total) by mouth 2 (two) times daily., Disp: 180 tablet, Rfl: 1    omega-3 fatty acids/fish oil (FISH OIL-OMEGA-3 FATTY ACIDS) 300-1,000 mg capsule, Take 2 capsules by mouth once daily., Disp: , Rfl:     omeprazole (PRILOSEC) 20 MG capsule, Take 1 capsule (20 mg total) by mouth once daily., Disp: 90 capsule, Rfl: 3    pregabalin (LYRICA) 75 MG capsule, Take 1 capsule (75 mg total) by mouth 2 (two) times daily., Disp: 60 capsule, Rfl: 5    tolterodine (DETROL LA) 2 MG Cp24, Take 2 mg by mouth once daily., Disp: , Rfl:     triamcinolone acetonide 0.1% (KENALOG) 0.1 % cream, Apply 15 g topically 2 (two) times daily. Apply to back for dry skin and itching, Disp: , Rfl:     vitamin E 1000 UNIT capsule, Take 1,000 Units by mouth once daily., Disp: , Rfl:     zinc gluconate 50 mg tablet, Take 50 mg by mouth once daily., Disp: , Rfl:     budesonide-glycopyr-formoterol (BREZTRI AEROSPHERE) 160-9-4.8 mcg/actuation HFAA, Inhale 2 puffs into the lungs 2 (two) times a day. Rinse mouth after use., Disp: 32.1 g, Rfl: 3    New & refilled meds:  Requested Prescriptions     Signed Prescriptions Disp Refills    budesonide-glycopyr-formoterol (BREZTRI AEROSPHERE) 160-9-4.8 mcg/actuation HFAA 32.1 g 3     Sig: Inhale 2 puffs into the lungs 2 (two) times a day. Rinse mouth after use.     Cologuard to update colon screening  Had flu shot about 1 mth ago at [facility] [address]; get COVID booster date from [facility], declines further booster doses.     Current treatment plan is effective, no change in therapy.  Lab results and schedule of future lab studies reviewed with patient.  Reviewed diet, exercise and weight  control.    Start Breztri for uncontrolled COPD, SOB w/ exertion, w/ increased CAROLE use.  Coupon given.  Advised to rinse mouth after using inhaler.    Return to clinic 3 mths for COPD/SOB w/ exertion, f/u wt gain; May for HY w/ fasting labs as scheduled/routine f/u; and f/u as needed.    Signature:  MOLLY Murry

## 2022-11-17 ENCOUNTER — CLINICAL SUPPORT (OUTPATIENT)
Dept: REHABILITATION | Facility: HOSPITAL | Age: 72
End: 2022-11-17
Payer: COMMERCIAL

## 2022-11-17 DIAGNOSIS — M54.17 LUMBOSACRAL RADICULOPATHY: Primary | ICD-10-CM

## 2022-11-17 PROCEDURE — 97110 THERAPEUTIC EXERCISES: CPT | Mod: CQ

## 2022-11-17 NOTE — PROGRESS NOTES
[facility]   Physical Therapy Treatment Note     Name: Tray Jalloh  Clinic Number: [id number]    Therapy Diagnosis:   No diagnosis found.    Physician: Milan Akers MD    Visit Date: 11/17/2022    Physician Orders: PT Eval and Treat Lumbar  2-3 times per week for 6weeks  Medical Diagnosis from Referral: see above  Evaluation Date: 10/26/2022  Authorization Period Expiration: 10/03/2023  Plan of Care Expiration: 12/23/2022  Progress Note Due: 11/26/22  Visit # / Visits authorized: 3/ 20   FOTO: 50/59    PTA Visit #: 3/5     Time In: 7:00 am  Time Out: 0745  Total Billable Time: 45 minutes    SUBJECTIVE     Pt reports: knee injection was put off til 11/30/22  He was not compliant with home exercise program. (He was not given home exercise program until today)  Response to previous treatment: no problems  Functional change: no change    Pain: 5/10  Location: bilateral low back and left knee      OBJECTIVE     Objective Measures updated at progress report unless specified.       Treatment     Tray received the treatments listed below:      therapeutic exercises to develop strength, ROM, and flexibility for 40 minutes including:  Bike x 5 minutes   Slant board stretch 3 x 30 second hold   Hamstring stretch at stairs 3 x 30 second hold bilateral   Hip flexor stretch on stairs 2 x 30 sec   Standing rows on cable #4 x 20  Forward pulldowns standing at cable #4 x 20  Cybex lumbar extension #4 x 30  Prone abdominal brace with GS x 10 with 3 sec hold  Prone hip extension x 10 Lionel  LTR in hooklying x 0 each way with 5 second hold   Quad rocking x 5 with 10 sec hold    manual therapy techniques: Myofacial release and Soft tissue Mobilization were applied to the: low back and bilateral lower extremities  for 0 minutes, including:  Hamstring, piriformis, external rotators bilateral stretches    neuromuscular re-education activities to improve: Coordination and Posture for 5  minutes. The following activities were included:  Pelvic tilts (neutral) x 20 with 3 second hold  NOT THIS VISIT   Tilts with hip abduction with green theraband 2 x 10 with 3 second hold NOT THIS VISIT   Tilts with hip adduction with adduction squeeze x 3 second hold 2 x 10 NOT THIS VISIT   Seated sciatic nerve glides x 5 each leg     therapeutic activities to improve functional performance for 0  minutes, including:  (not performed today)       Patient Education and Home Exercises     Home Exercises Provided and Patient Education Provided     Education provided:   - home exercise program     Written Home Exercises Provided: yes. Exercises were reviewed and Tray was able to demonstrate them prior to the end of the session.  Tray demonstrated good  understanding of the education provided. See EMR under Patient Instructions for exercises provided during therapy sessions    ASSESSMENT    Pt states prone hip extension exercises are easier this visit versus last. Needs correction for posture with exercises for less flexed spine. Good ROM with quad rocking without c/o knee pain.  Initially:  Tray is a 72 y.o. male referred to outpatient Physical Therapy with a medical diagnosis of lumbar radiculopathy. Patient presents with decreased range of motion, strength     Currently:  Tray was given an home exercise program and instructed on performance of these exercises. He arrived with forward flexed posture and very guarded gait. He was able to stand a little straighter after treatment. He received some brief manual stretching. His left knee was swollen and painful per subjective report. He did well with exercises and voiced understanding. Will progress as able.    Tray Is progressing towards his goals.   Pt prognosis is Good.     Pt will continue to benefit from skilled outpatient physical therapy to address the deficits listed in the problem list box on initial evaluation, provide pt/family education and to  maximize pt's level of independence in the home and community environment.     Pt's spiritual, cultural and educational needs considered and pt agreeable to plan of care and goals.     Anticipated barriers to physical therapy: scoliosis and pain    Goals:   Short Term Goals: 4 weeks   Independent with HOME EXERCISE PROGRAM  Improve left knee strength to 4+/5  Tolerate 45 minutes of exercise with <7/10 pain.     Long Term Goals: 8 weeks   Improve left knee strength to 5/5  Tolerate 60 minutes of exercise with <5/10 pain.  Work 8 hour/day with <5/10 pain.       PLAN     Plan of care Certification: 10/26/2022 to 12/23/2022.     Continue Plan of Care for Outpatient Physical Therapy 2 times weekly for 8 weeks to include the following interventions: Cervical/Lumbar Traction, Electrical Stimulation 0-300 hz, Iontophoresis (with 1.3-2.0 dexamethasone), Manual Therapy, Neuromuscular Re-ed, Orthotic Management and Training, Patient Education, Self Care, Therapeutic Activities, Therapeutic Exercise, and Ultrasound.     Liliana Ng, PTA   11/17/2022

## 2022-11-22 ENCOUNTER — CLINICAL SUPPORT (OUTPATIENT)
Dept: REHABILITATION | Facility: HOSPITAL | Age: 72
End: 2022-11-22
Payer: COMMERCIAL

## 2022-11-22 DIAGNOSIS — M47.817 SPONDYLOSIS OF LUMBOSACRAL REGION WITHOUT MYELOPATHY OR RADICULOPATHY: Primary | ICD-10-CM

## 2022-11-22 PROCEDURE — 97140 MANUAL THERAPY 1/> REGIONS: CPT | Mod: CQ

## 2022-11-22 PROCEDURE — 97110 THERAPEUTIC EXERCISES: CPT | Mod: CQ

## 2022-11-22 NOTE — PROGRESS NOTES
[facility]   Physical Therapy Treatment Note     Name: Tray Jalloh  Clinic Number: [id number]    Therapy Diagnosis:   No diagnosis found.    Physician: Milan Akers MD    Visit Date: 11/22/2022    Physician Orders: PT Eval and Treat Lumbar  2-3 times per week for 6weeks  Medical Diagnosis from Referral: see above  Evaluation Date: 10/26/2022  Authorization Period Expiration: 10/03/2023  Plan of Care Expiration: 12/23/2022  Progress Note Due: 11/26/22  Visit # / Visits authorized: 5/ 20   FOTO: 50/59    PTA Visit #: 4/5     Time In: 7:00 am  Time Out: 0745  Total Billable Time: 45 minutes    SUBJECTIVE     Pt reports:  feeling pretty good  He was compliant with home exercise program.  Response to previous treatment: no problems  Functional change: no change    Pain: 2/10  Location: bilateral low back and left knee      OBJECTIVE     Objective Measures updated at progress report unless specified.       Treatment     Tray received the treatments listed below:      therapeutic exercises to develop strength, ROM, and flexibility for 40 minutes including:  Bike x 5 minutes   Slant board stretch 3 x 30 second hold   Hamstring stretch at stairs 3 x 30 second hold bilateral   Hip flexor stretch on stairs 2 x 30 sec   Standing rows on cable #4 x 20  Forward pulldowns standing at cable #4 x 20  Cybex lumbar extension #4 x 30  Prone abdominal brace with GS x 10 with 3 sec hold  Prone hip extension with opp UE lift alternating x 10 Lionel  LTR in hooklying x 0 each way with 5 second hold   Quad rocking x 5 with 10 sec hold    manual therapy techniques: Myofacial release and Soft tissue Mobilization were applied to the: low back and bilateral lower extremities  for 10 minutes, including:  Hamstring, piriformis, external rotators bilateral stretches, MFR to paraspinals    neuromuscular re-education activities to improve: Coordination and Posture for 0 minutes. The following activities  were included:  Pelvic tilts (neutral) x 20 with 3 second hold  NOT THIS VISIT   Tilts with hip abduction with green theraband 2 x 10 with 3 second hold NOT THIS VISIT   Tilts with hip adduction with adduction squeeze x 3 second hold 2 x 10 NOT THIS VISIT   Seated sciatic nerve glides x 5 each leg     therapeutic activities to improve functional performance for 0  minutes, including:  (not performed today)       Patient Education and Home Exercises     Home Exercises Provided and Patient Education Provided     Education provided:   - home exercise program     Written Home Exercises Provided: yes. Exercises were reviewed and Tray was able to demonstrate them prior to the end of the session.  Tray demonstrated good  understanding of the education provided. See EMR under Patient Instructions for exercises provided during therapy sessions    ASSESSMENT    Pt states he is able to stand more erect after treatment.  Initially:  Tray is a 72 y.o. male referred to outpatient Physical Therapy with a medical diagnosis of lumbar radiculopathy. Patient presents with decreased range of motion, strength     Currently:  Tray was given an home exercise program and instructed on performance of these exercises. He arrived with forward flexed posture and very guarded gait. He was able to stand a little straighter after treatment. He received some brief manual stretching. His left knee was swollen and painful per subjective report. He did well with exercises and voiced understanding. Will progress as able.    Tray Is progressing towards his goals.   Pt prognosis is Good.     Pt will continue to benefit from skilled outpatient physical therapy to address the deficits listed in the problem list box on initial evaluation, provide pt/family education and to maximize pt's level of independence in the home and community environment.     Pt's spiritual, cultural and educational needs considered and pt agreeable to plan of care  and goals.     Anticipated barriers to physical therapy: scoliosis and pain    Goals:   Short Term Goals: 4 weeks   Independent with HOME EXERCISE PROGRAM  Improve left knee strength to 4+/5  Tolerate 45 minutes of exercise with <7/10 pain.     Long Term Goals: 8 weeks   Improve left knee strength to 5/5  Tolerate 60 minutes of exercise with <5/10 pain.  Work 8 hour/day with <5/10 pain.       PLAN     Plan of care Certification: 10/26/2022 to 12/23/2022.     Continue Plan of Care for Outpatient Physical Therapy 2 times weekly for 8 weeks to include the following interventions: Cervical/Lumbar Traction, Electrical Stimulation 0-300 hz, Iontophoresis (with 1.3-2.0 dexamethasone), Manual Therapy, Neuromuscular Re-ed, Orthotic Management and Training, Patient Education, Self Care, Therapeutic Activities, Therapeutic Exercise, and Ultrasound.     Liliana Ng, PTA   11/22/2022

## 2022-11-29 ENCOUNTER — CLINICAL SUPPORT (OUTPATIENT)
Dept: REHABILITATION | Facility: HOSPITAL | Age: 72
End: 2022-11-29
Payer: COMMERCIAL

## 2022-11-29 DIAGNOSIS — M41.9 SCOLIOSIS OF LUMBAR SPINE, UNSPECIFIED SCOLIOSIS TYPE: Primary | ICD-10-CM

## 2022-11-29 PROCEDURE — 97110 THERAPEUTIC EXERCISES: CPT | Mod: CQ

## 2022-11-29 PROCEDURE — 97140 MANUAL THERAPY 1/> REGIONS: CPT | Mod: CQ

## 2022-11-29 NOTE — PROGRESS NOTES
[facility]   Physical Therapy Treatment Note     Name: Tray Jalloh  Clinic Number: [id number]    Therapy Diagnosis:   No diagnosis found.    Physician: Milan Akers MD    Visit Date: 11/29/2022    Physician Orders: PT Eval and Treat Lumbar  2-3 times per week for 6weeks  Medical Diagnosis from Referral: see above  Evaluation Date: 10/26/2022  Authorization Period Expiration: 10/03/2023  Plan of Care Expiration: 12/23/2022  Progress Note Due: 11/26/22  Visit # / Visits authorized: 5/ 20   FOTO: 50/59    PTA Visit #: 5/5     Time In: 7:00 am  Time Out: 0745  Total Billable Time: 45 minutes    SUBJECTIVE     Pt reports:  feeling pretty good, getting knee injected tomorrow. Legs seems better, back a little better too. Able to sleep good last night  He was compliant with home exercise program.  Response to previous treatment: no problems  Functional change: no change    Pain: 1/10  Location: bilateral low back     OBJECTIVE     Objective Measures updated at progress report unless specified.       Treatment     Tray received the treatments listed below:      therapeutic exercises to develop strength, ROM, and flexibility for 40 minutes including:  Bike x 5 minutes   Slant board stretch 3 x 30 second hold   Hamstring stretch at stairs 3 x 30 second hold bilateral   Hip flexor stretch on stairs 2 x 30 sec   Standing rows on cable #5 x 20  Forward pulldowns standing at cable #5 x 20  Leg press #6 x 20  Hip extension #3 x 15 Lionel  Cybex lumbar extension #4 x 30  Prone abdominal brace with GS x 10 with 3 sec hold  Prone hip extension with opp UE lift alternating x 10 Lionel  Lionel UE lift x 10 with 2 sec hold  LTR in hooklying x 0 each way with 5 second hold   Quad rocking x 5 with 10 sec hold    manual therapy techniques: Myofacial release and Soft tissue Mobilization were applied to the: low back and bilateral lower extremities  for 10 minutes, including:  Hamstring, piriformis,  external rotators bilateral stretches, MFR to paraspinals    neuromuscular re-education activities to improve: Coordination and Posture for 0 minutes. The following activities were included:  Pelvic tilts (neutral) x 20 with 3 second hold  NOT THIS VISIT   Tilts with hip abduction with green theraband 2 x 10 with 3 second hold NOT THIS VISIT   Tilts with hip adduction with adduction squeeze x 3 second hold 2 x 10 NOT THIS VISIT   Seated sciatic nerve glides x 5 each leg     therapeutic activities to improve functional performance for 0  minutes, including:  (not performed today)       Patient Education and Home Exercises     Home Exercises Provided and Patient Education Provided     Education provided:   - home exercise program     Written Home Exercises Provided: yes. Exercises were reviewed and Tray was able to demonstrate them prior to the end of the session.  Tray demonstrated good  understanding of the education provided. See EMR under Patient Instructions for exercises provided during therapy sessions    ASSESSMENT   Challenged by prone multifidus strengthening. Added in general strengthening of Les as well today.  Initially:  Tray is a 72 y.o. male referred to outpatient Physical Therapy with a medical diagnosis of lumbar radiculopathy. Patient presents with decreased range of motion, strength     Currently:  Tray was given an home exercise program and instructed on performance of these exercises. He arrived with forward flexed posture and very guarded gait. He was able to stand a little straighter after treatment. He received some brief manual stretching. His left knee was swollen and painful per subjective report. He did well with exercises and voiced understanding. Will progress as able.    Tray Is progressing towards his goals.   Pt prognosis is Good.     Pt will continue to benefit from skilled outpatient physical therapy to address the deficits listed in the problem list box on initial  evaluation, provide pt/family education and to maximize pt's level of independence in the home and community environment.     Pt's spiritual, cultural and educational needs considered and pt agreeable to plan of care and goals.     Anticipated barriers to physical therapy: scoliosis and pain    Goals:   Short Term Goals: 4 weeks   Independent with HOME EXERCISE PROGRAM  Improve left knee strength to 4+/5  Tolerate 45 minutes of exercise with <7/10 pain.     Long Term Goals: 8 weeks   Improve left knee strength to 5/5  Tolerate 60 minutes of exercise with <5/10 pain.  Work 8 hour/day with <5/10 pain.       PLAN     Plan of care Certification: 10/26/2022 to 12/23/2022.     Continue Plan of Care for Outpatient Physical Therapy 2 times weekly for 8 weeks to include the following interventions: Cervical/Lumbar Traction, Electrical Stimulation 0-300 hz, Iontophoresis (with 1.3-2.0 dexamethasone), Manual Therapy, Neuromuscular Re-ed, Orthotic Management and Training, Patient Education, Self Care, Therapeutic Activities, Therapeutic Exercise, and Ultrasound.     Liliana Ng, PTA   11/29/2022

## 2022-12-01 ENCOUNTER — CLINICAL SUPPORT (OUTPATIENT)
Dept: REHABILITATION | Facility: HOSPITAL | Age: 72
End: 2022-12-01
Payer: COMMERCIAL

## 2022-12-01 DIAGNOSIS — M41.9 SCOLIOSIS OF LUMBAR SPINE, UNSPECIFIED SCOLIOSIS TYPE: Primary | ICD-10-CM

## 2022-12-01 PROCEDURE — 97110 THERAPEUTIC EXERCISES: CPT

## 2022-12-01 NOTE — PROGRESS NOTES
[facility]   Physical Therapy Updated Plan of Care     Name: Tray Jalloh  Clinic Number: [id number]    Therapy Diagnosis:   No diagnosis found.    Physician: Milan Akers MD    Visit Date: 12/1/2022    Physician Orders: PT Eval and Treat Lumbar  2-3 times per week for 6weeks  Medical Diagnosis from Referral: see above  Evaluation Date: 10/26/2022  Authorization Period Expiration: 10/03/2023  Plan of Care Expiration: 12/23/2022  Progress Note Due: 1/3/22  Visit # / Visits authorized: 7/ 20   FOTO: 50/59    PTA Visit #: 5/5     Time In: 7:08 am  Time Out: 745am  Total Billable Time: 38  minutes    SUBJECTIVE     Pt reports:  Was not able to get the injection because insurance did not cover it.     He was compliant with home exercise program.  Response to previous treatment: no problems  Functional change: no change    Pain: 1/10  Location: bilateral low back     OBJECTIVE     Objective Measures updated at progress report unless specified.       Treatment     Tray received the treatments listed below:      therapeutic exercises to develop strength, ROM, and flexibility for 38 minutes including:  Bike x 5 minutes   Slant board stretch 3 x 30 second hold   Hamstring stretch at stairs 3 x 30 second hold bilateral   Standing rows on cable #5 x 20  Forward pulldowns standing at cable #5 x 20  Leg press #6 x 20  Cybex lumbar extension #4 x 30  Cybex hip abduction 2pl x 20      (Not today)  Hip flexor stretch on stairs 2 x 30 sec   Hip extension #3 x 15 Lionel  Prone abdominal brace with GS x 10 with 3 sec hold  Prone hip extension with opp UE lift alternating x 10 Lionel  Lionel UE lift x 10 with 2 sec hold  LTR in hooklying x 0 each way with 5 second hold   Quad rocking x 5 with 10 sec hold  manual therapy techniques: Myofacial release and Soft tissue Mobilization were applied to the: low back and bilateral lower extremities  for 10 minutes, including:  Hamstring, piriformis,  external rotators bilateral stretches, MFR to paraspinals  neuromuscular re-education activities to improve: Coordination and Posture for 0 minutes. The following activities were included:  Pelvic tilts (neutral) x 20 with 3 second hold  NOT THIS VISIT   Tilts with hip abduction with green theraband 2 x 10 with 3 second hold NOT THIS VISIT   Tilts with hip adduction with adduction squeeze x 3 second hold 2 x 10 NOT THIS VISIT   Seated sciatic nerve glides x 5 each leg   therapeutic activities to improve functional performance for 0  minutes, including:  (not performed today)       Patient Education and Home Exercises     Home Exercises Provided and Patient Education Provided     Education provided:   - home exercise program     Written Home Exercises Provided: yes. Exercises were reviewed and Tray was able to demonstrate them prior to the end of the session.  Tray demonstrated good  understanding of the education provided. See EMR under Patient Instructions for exercises provided during therapy sessions    ASSESSMENT     Tray was able to complete some stretching and strengthening exercises today but unable to finish the whole program secondary to needing to get to work. Posture is still forward bend at the trunk and slow and guarded movements but did well with body mechanics throughout exercises. Progress as able.      Currently:  Tray was given an home exercise program and instructed on performance of these exercises. He arrived with forward flexed posture and very guarded gait. He was able to stand a little straighter after treatment. He received some brief manual stretching. His left knee was swollen and painful per subjective report. He did well with exercises and voiced understanding. Will progress as able.    Tray Is progressing towards his goals.   Pt prognosis is Good.     Pt will continue to benefit from skilled outpatient physical therapy to address the deficits listed in the problem list  box on initial evaluation, provide pt/family education and to maximize pt's level of independence in the home and community environment.     Pt's spiritual, cultural and educational needs considered and pt agreeable to plan of care and goals.     Anticipated barriers to physical therapy: scoliosis and pain    Goals:   Short Term Goals: 4 weeks   Independent with HOME EXERCISE PROGRAM (Goal Met)  Improve left knee strength to 4+/5 (Goal Partially Met)  Tolerate 45 minutes of exercise with <7/10 pain. (Goal Met)     Long Term Goals: 8 weeks   Improve left knee strength to 5/5 (Goal Partially Met)  Tolerate 60 minutes of exercise with <5/10 pain. (Goal Met)  Work 8 hour/day with <5/10 pain. (Goal Partially Met)     Reasons for Recertification of Therapy: to continue to progress toward goals    Plan     Updated Certification Period: 12/1/2022 to 1/3/22  Recommended Treatment Plan: 2 times per week for 8 weeks: Aquatic Therapy, Cervical/Lumbar Traction, Electrical Stimulation IFC/Premod, Gait Training, Iontophoresis (with Dex), Manual Therapy, Moist Heat/ Ice, Neuromuscular Re-ed, Patient Education, Therapeutic Activities, Therapeutic Exercise, Ultrasound, and Dry Needling  Other Recommendations: none    JOSEF CONLEY, PT  12/1/2022      I CERTIFY THE NEED FOR THESE SERVICES FURNISHED UNDER THIS PLAN OF TREATMENT AND WHILE UNDER MY CARE.    Physician's comments:      Physician's Signature: ___________________________________________________

## 2022-12-06 ENCOUNTER — CLINICAL SUPPORT (OUTPATIENT)
Dept: REHABILITATION | Facility: HOSPITAL | Age: 72
End: 2022-12-06
Payer: COMMERCIAL

## 2022-12-06 DIAGNOSIS — M41.9 SCOLIOSIS OF LUMBOSACRAL SPINE, UNSPECIFIED SCOLIOSIS TYPE: Primary | ICD-10-CM

## 2022-12-06 PROCEDURE — 97110 THERAPEUTIC EXERCISES: CPT | Mod: CQ

## 2022-12-06 PROCEDURE — 97140 MANUAL THERAPY 1/> REGIONS: CPT | Mod: CQ

## 2022-12-06 NOTE — PROGRESS NOTES
[facility]   Physical Therapy Updated Plan of Care     Name: Tray Jalloh  Clinic Number: [id number]    Therapy Diagnosis:   No diagnosis found.    Physician: Milan Akers MD    Visit Date: 12/6/2022    Physician Orders: PT Eval and Treat Lumbar  2-3 times per week for 6weeks  Medical Diagnosis from Referral: see above  Evaluation Date: 10/26/2022  Authorization Period Expiration: 10/03/2023  Plan of Care Expiration: 12/23/2022  Progress Note Due: 1/3/22  Visit # / Visits authorized: 8/ 20   FOTO: 50/59    PTA Visit #: 1     Time In: 0707  Time Out: 745am  Total Billable Time: 38 minutes    SUBJECTIVE     Pt reports:  Vivekna see Dada Enriquez on 12/20 about the knee    He was compliant with home exercise program.  Response to previous treatment: no problems  Functional change: no change    Pain: 1/10  Location: bilateral low back     OBJECTIVE     Objective Measures updated at progress report unless specified.       Treatment     Tray received the treatments listed below:      therapeutic exercises to develop strength, ROM, and flexibility for 38 minutes including:  Bike x 3 minutes   Slant board stretch with abdominal bracing x 10 with 3 sec hold  Hamstring stretch on bench 3 x 30 second hold bilateral   Standing rows on cable #5 x 20  Forward pulldowns standing at cable #5 x 20  Leg press #6 x 20  Cybex lumbar extension #4 x 30  Seated ball roll outs 3 x 10 sec hold 3 ways  Cybex hip abduction 2pl x 20  Hip flexor stretch on stairs 2 x 30 sec   Hip extension #3 x 15 Lionel NOT THIS VISIT   Prone abdominal brace with GS x 10 with 3 sec hold  Prone hip extension with opp UE lift alternating x 10 Lionel  Lionel UE lift x 10 with 2 sec hold NOT THIS VISIT   LTR in hooklying x 0 each way with 5 second hold   Quad rocking x 5 with 10 sec hold NOT THIS VISIT     manual therapy techniques: Myofacial release and Soft tissue Mobilization were applied to the: low back and bilateral lower  extremities  for 10 minutes, including: MFR to paraspinals    neuromuscular re-education activities to improve: Coordination and Posture for 0 minutes. The following activities were included:  Pelvic tilts (neutral) x 20 with 3 second hold  NOT THIS VISIT   Tilts with hip abduction with green theraband 2 x 10 with 3 second hold NOT THIS VISIT   Tilts with hip adduction with adduction squeeze x 3 second hold 2 x 10 NOT THIS VISIT   Seated sciatic nerve glides x 5 each leg   therapeutic activities to improve functional performance for 0  minutes, including:  (not performed today)       Patient Education and Home Exercises     Home Exercises Provided and Patient Education Provided     Education provided:   - home exercise program     Written Home Exercises Provided: yes. Exercises were reviewed and Tray was able to demonstrate them prior to the end of the session.  Tray demonstrated good  understanding of the education provided. See EMR under Patient Instructions for exercises provided during therapy sessions    ASSESSMENT     Pt with more centered alignment today, still very flexed especially in walking gait.;  Currently:  Tray was given an home exercise program and instructed on performance of these exercises. He arrived with forward flexed posture and very guarded gait. He was able to stand a little straighter after treatment. He received some brief manual stretching. His left knee was swollen and painful per subjective report. He did well with exercises and voiced understanding. Will progress as able.    Tray Is progressing towards his goals.   Pt prognosis is Good.     Pt will continue to benefit from skilled outpatient physical therapy to address the deficits listed in the problem list box on initial evaluation, provide pt/family education and to maximize pt's level of independence in the home and community environment.     Pt's spiritual, cultural and educational needs considered and pt agreeable to  plan of care and goals.     Anticipated barriers to physical therapy: scoliosis and pain    Goals:   Short Term Goals: 4 weeks   Independent with HOME EXERCISE PROGRAM (Goal Met)  Improve left knee strength to 4+/5 (Goal Partially Met)  Tolerate 45 minutes of exercise with <7/10 pain. (Goal Met)     Long Term Goals: 8 weeks   Improve left knee strength to 5/5 (Goal Partially Met)  Tolerate 60 minutes of exercise with <5/10 pain. (Goal Met)  Work 8 hour/day with <5/10 pain. (Goal Partially Met)     Reasons for Recertification of Therapy: to continue to progress toward goals    Plan     Updated Certification Period: 12/6/2022 to 1/3/22  Recommended Treatment Plan: 2 times per week for 8 weeks: Aquatic Therapy, Cervical/Lumbar Traction, Electrical Stimulation IFC/Premod, Gait Training, Iontophoresis (with Dex), Manual Therapy, Moist Heat/ Ice, Neuromuscular Re-ed, Patient Education, Therapeutic Activities, Therapeutic Exercise, Ultrasound, and Dry Needling  Other Recommendations: none    Liliana Ng, PTA  12/6/2022

## 2022-12-08 ENCOUNTER — PATIENT OUTREACH (OUTPATIENT)
Dept: ADMINISTRATIVE | Facility: HOSPITAL | Age: 72
End: 2022-12-08
Payer: MEDICARE

## 2022-12-13 ENCOUNTER — HOSPITAL ENCOUNTER (OUTPATIENT)
Dept: RADIOLOGY | Facility: HOSPITAL | Age: 72
Discharge: HOME | End: 2022-12-13
Attending: NURSE PRACTITIONER
Payer: COMMERCIAL

## 2022-12-13 ENCOUNTER — CLINICAL SUPPORT (OUTPATIENT)
Dept: REHABILITATION | Facility: HOSPITAL | Age: 72
End: 2022-12-13
Payer: COMMERCIAL

## 2022-12-13 DIAGNOSIS — M54.17 LUMBOSACRAL RADICULOPATHY: ICD-10-CM

## 2022-12-13 DIAGNOSIS — R91.8 PULMONARY NODULES: ICD-10-CM

## 2022-12-13 DIAGNOSIS — M41.9 SCOLIOSIS OF LUMBOSACRAL SPINE, UNSPECIFIED SCOLIOSIS TYPE: Primary | ICD-10-CM

## 2022-12-13 DIAGNOSIS — R91.8 OTHER NONSPECIFIC ABNORMAL FINDING OF LUNG FIELD: ICD-10-CM

## 2022-12-13 PROCEDURE — 71250 CT CHEST WITHOUT CONTRAST: ICD-10-PCS | Mod: 26,,, | Performed by: STUDENT IN AN ORGANIZED HEALTH CARE EDUCATION/TRAINING PROGRAM

## 2022-12-13 PROCEDURE — 71250 CT THORAX DX C-: CPT | Mod: 26,,, | Performed by: STUDENT IN AN ORGANIZED HEALTH CARE EDUCATION/TRAINING PROGRAM

## 2022-12-13 PROCEDURE — 97110 THERAPEUTIC EXERCISES: CPT

## 2022-12-13 PROCEDURE — 97140 MANUAL THERAPY 1/> REGIONS: CPT

## 2022-12-13 PROCEDURE — 71250 CT THORAX DX C-: CPT | Mod: TC

## 2022-12-13 PROCEDURE — 97112 NEUROMUSCULAR REEDUCATION: CPT

## 2022-12-13 NOTE — PLAN OF CARE
[facility]  Physical Therapy Plan of Care Note    Name: Tary Jalloh  Clinic Number: [id number]    Therapy Diagnosis:   Encounter Diagnoses   Name Primary?    Scoliosis of lumbosacral spine, unspecified scoliosis type [M41.9 (ICD-10-CM)] Yes    Lumbosacral radiculopathy [M54.17 (ICD-10-CM)]      Physician: Milan Akers MD    Visit Date: 12/13/2022    Physician Orders: PT Eval and Treat Lumbar  2-3 times per week for 6weeks  Medical Diagnosis from Referral: see above  Evaluation Date: 10/26/2022  Authorization Period Expiration: 10/03/2023  Plan of Care Expiration: 1/23/2023  Progress Note Due: 1/26/23  Visit # / Visits authorized: 9/20   FOTO: 50/59    Precautions: standard, grade I spondylolistheses  Functional Level Prior to Evaluation: independent with all bending, lifting, standing, walking /s pain.    SUBJECTIVE     Update: patient reports pain present 2/10 in thoracic and lumbar paraspinalss     OBJECTIVE     Update: knee flexion left 4+, right 4+) improved from 4-/5.  Knee / left 4+, right 4+    Therex:  15 minutes  LOWER TRUNK ROTATION x 10  Seated ball flexion forward<>left<>right  Hooklying hip adduction x 30  Hooklying hip abduction green x 30    Neuromuscular re-ed:  5 minutes  Scap retract / x 30 green      Manual:  35 minutes  Passive stretching of lumbosacral spine, hamstrings, piriformis, single KTC, flexion and rotation to right, Nolan stretch (hip flexor stretch)  Graston #5, #4 to thoracic and lumbar paraspinals.    ASSESSMENT     Update: patient has improved flexibility, standing and gait tolerance    Short Term Goals: 4 weeks   Independent with HOME EXERCISE PROGRAM.  Met  Improve left knee strength to 4+/5.  Met  Tolerate 45 minutes of exercise with <7/10 pain. Met     Long Term Goals: 8 weeks   Improve left knee strength to 5/5. Not Met  Tolerate 60 minutes of exercise with <5/10 pain. Not Met  Work 8 hour/day with <5/10 pain.  Not Met     Reasons for  Recertification of Therapy:  patient is making progress towards established goals.    PLAN     Updated Certification Period: 12/13/2022 to 1/23/2023   Recommended Treatment Plan: 2 times per week for 6 weeks:  Cervical/Lumbar Traction, Electrical Stimulation 0-300 hz, Gait Training, Iontophoresis (with 2.0 ml dexamethasone), Manual Therapy, Moist Heat/ Ice, Neuromuscular Re-ed, Orthotic Management and Training, Patient Education, and Self Care  Other Recommendations: none    MEHRDAD HARRISON, PT    I CERTIFY THE NEED FOR THESE SERVICES FURNISHED UNDER THIS PLAN OF TREATMENT AND WHILE UNDER MY CARE  Physician's comments:      Physician's Signature: ___________________________________________________

## 2022-12-14 LAB — NONINV COLON CA DNA+OCC BLD SCRN STL QL: POSITIVE

## 2022-12-15 ENCOUNTER — CLINICAL SUPPORT (OUTPATIENT)
Dept: REHABILITATION | Facility: HOSPITAL | Age: 72
End: 2022-12-15
Payer: MEDICARE

## 2022-12-15 DIAGNOSIS — M41.9 SCOLIOSIS OF LUMBOSACRAL SPINE, UNSPECIFIED SCOLIOSIS TYPE: Primary | ICD-10-CM

## 2022-12-15 DIAGNOSIS — M54.16 LUMBAR RADICULOPATHY: ICD-10-CM

## 2022-12-15 DIAGNOSIS — R19.5 POSITIVE COLORECTAL CANCER SCREENING USING COLOGUARD TEST: Primary | ICD-10-CM

## 2022-12-15 PROCEDURE — 97140 MANUAL THERAPY 1/> REGIONS: CPT

## 2022-12-15 PROCEDURE — 97110 THERAPEUTIC EXERCISES: CPT

## 2022-12-15 NOTE — PROGRESS NOTES
[facility]   Physical Therapy Updated Plan of Care     Name: Tray Jalloh  Clinic Number: [id number]    Therapy Diagnosis:   Encounter Diagnoses   Name Primary?    Scoliosis of lumbosacral spine, unspecified scoliosis type [M41.9 (ICD-10-CM)] Yes    Lumbar radiculopathy        Physician: Milan Akers MD    Visit Date: 12/15/2022    Physician Orders: PT Eval and Treat Lumbar  2-3 times per week for 6weeks  Medical Diagnosis from Referral: see above  Evaluation Date: 10/26/2022  Authorization Period Expiration: 10/03/2023  Plan of Care Expiration: 12/23/2022  Progress Note Due: 1/3/22  Visit # / Visits authorized: 10/20   FOTO: 50/59    PTA Visit #: 0    Time In: 0705  Time Out: 0750  Total Billable Time: 30 minutes    SUBJECTIVE     Pt reports:  Vivekna see Dada Enriquez on 12/20 about the knee    He was compliant with home exercise program.  Response to previous treatment: no problems  Functional change: no change    Pain: 1/10  Location: bilateral low back     OBJECTIVE     Objective Measures updated at progress report unless specified.       Treatment     Tray received the treatments listed below:      therapeutic exercises to develop strength, ROM, and flexibility for 10 minutes including:  Cubii x 5 minutes   Slant board stretch with abdominal bracing x 10 with 3 sec hold    manual therapy techniques 20 minutes;   Myofacial release and Soft tissue Mobilization were applied to the low back and bilateral lower extremities:  Piriformis stretch 4 x 30 sh  Hamstring stretch supine 90/90 4 x 30 sh  Nolan stretch 4 x 30 sh    neuromuscular re-education activities to improve: Coordination and Posture for 0 minutes. The following activities were included:  Pelvic tilts (neutral) x 20 with 3 second hold  NOT THIS VISIT   Tilts with hip abduction with green theraband 2 x 10 with 3 second hold NOT THIS VISIT   Tilts with hip adduction with adduction squeeze x 3 second hold 2 x  10 NOT THIS VISIT   Seated sciatic nerve glides x 5 each leg   therapeutic activities to improve functional performance for 0  minutes, including:  (not performed today)       Patient Education and Home Exercises     Home Exercises Provided and Patient Education Provided     Education provided:   - home exercise program     Written Home Exercises Provided: yes. Exercises were reviewed and Tray was able to demonstrate them prior to the end of the session.  Tray demonstrated good  understanding of the education provided. See EMR under Patient Instructions for exercises provided during therapy sessions    ASSESSMENT     Patient tolerated treatment /s increased complaints of pain in mid to low back.  Patient is   Tray Is progressing towards his goals.   Pt prognosis is Good.     Pt will continue to benefit from skilled outpatient physical therapy to address the deficits listed in the problem list box on initial evaluation, provide pt/family education and to maximize pt's level of independence in the home and community environment.     Pt's spiritual, cultural and educational needs considered and pt agreeable to plan of care and goals.     Anticipated barriers to physical therapy: scoliosis and pain    Goals:   Short Term Goals: 4 weeks   Independent with HOME EXERCISE PROGRAM (Goal Met)  Improve left knee strength to 4+/5 (Goal Partially Met)  Tolerate 45 minutes of exercise with <7/10 pain. (Goal Met)     Long Term Goals: 8 weeks   Improve left knee strength to 5/5 (Goal Partially Met)  Tolerate 60 minutes of exercise with <5/10 pain. (Goal Met)  Work 8 hour/day with <5/10 pain. (Goal Partially Met)     Reasons for Recertification of Therapy: to continue to progress toward goals    Plan     Updated Certification Period: 12/15/2022 to 1/3/22  Recommended Treatment Plan: 2 times per week for 8 weeks: Aquatic Therapy, Cervical/Lumbar Traction, Electrical Stimulation IFC/Premod, Gait Training, Iontophoresis (with  Dex), Manual Therapy, Moist Heat/ Ice, Neuromuscular Re-ed, Patient Education, Therapeutic Activities, Therapeutic Exercise, Ultrasound, and Dry Needling  Other Recommendations: none    MEHRDAD HARRISON, PT  12/15/2022

## 2022-12-19 DIAGNOSIS — Z12.11 COLON CANCER SCREENING: Primary | ICD-10-CM

## 2022-12-20 ENCOUNTER — OFFICE VISIT (OUTPATIENT)
Dept: ORTHOPEDICS | Facility: CLINIC | Age: 72
End: 2022-12-20
Payer: COMMERCIAL

## 2022-12-20 ENCOUNTER — CLINICAL SUPPORT (OUTPATIENT)
Dept: REHABILITATION | Facility: HOSPITAL | Age: 72
End: 2022-12-20
Payer: COMMERCIAL

## 2022-12-20 DIAGNOSIS — M17.12 PRIMARY OSTEOARTHRITIS OF LEFT KNEE: Primary | ICD-10-CM

## 2022-12-20 DIAGNOSIS — M47.817 SPONDYLOSIS OF LUMBOSACRAL SPINE WITHOUT MYELOPATHY: Primary | ICD-10-CM

## 2022-12-20 PROCEDURE — 99213 PR OFFICE/OUTPT VISIT, EST, LEVL III, 20-29 MIN: ICD-10-PCS | Mod: ,,, | Performed by: ORTHOPAEDIC SURGERY

## 2022-12-20 PROCEDURE — 1157F PR ADVANCE CARE PLAN OR EQUIV PRESENT IN MEDICAL RECORD: ICD-10-PCS | Mod: ,,, | Performed by: ORTHOPAEDIC SURGERY

## 2022-12-20 PROCEDURE — 1157F ADVNC CARE PLAN IN RCRD: CPT | Mod: ,,, | Performed by: ORTHOPAEDIC SURGERY

## 2022-12-20 PROCEDURE — 3060F POS MICROALBUMINURIA REV: CPT | Mod: ,,, | Performed by: ORTHOPAEDIC SURGERY

## 2022-12-20 PROCEDURE — 3066F NEPHROPATHY DOC TX: CPT | Mod: ,,, | Performed by: ORTHOPAEDIC SURGERY

## 2022-12-20 PROCEDURE — 3060F PR POS MICROALBUMINURIA RESULT DOCUMENTED/REVIEW: ICD-10-PCS | Mod: ,,, | Performed by: ORTHOPAEDIC SURGERY

## 2022-12-20 PROCEDURE — 4010F ACE/ARB THERAPY RXD/TAKEN: CPT | Mod: ,,, | Performed by: ORTHOPAEDIC SURGERY

## 2022-12-20 PROCEDURE — 99213 OFFICE O/P EST LOW 20 MIN: CPT | Mod: ,,, | Performed by: ORTHOPAEDIC SURGERY

## 2022-12-20 PROCEDURE — 3066F PR DOCUMENTATION OF TREATMENT FOR NEPHROPATHY: ICD-10-PCS | Mod: ,,, | Performed by: ORTHOPAEDIC SURGERY

## 2022-12-20 PROCEDURE — 3044F PR MOST RECENT HEMOGLOBIN A1C LEVEL <7.0%: ICD-10-PCS | Mod: ,,, | Performed by: ORTHOPAEDIC SURGERY

## 2022-12-20 PROCEDURE — 97110 THERAPEUTIC EXERCISES: CPT | Mod: CQ

## 2022-12-20 PROCEDURE — 97140 MANUAL THERAPY 1/> REGIONS: CPT | Mod: CQ

## 2022-12-20 PROCEDURE — 3044F HG A1C LEVEL LT 7.0%: CPT | Mod: ,,, | Performed by: ORTHOPAEDIC SURGERY

## 2022-12-20 PROCEDURE — 4010F PR ACE/ARB THEARPY RXD/TAKEN: ICD-10-PCS | Mod: ,,, | Performed by: ORTHOPAEDIC SURGERY
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
## 2022-12-20 NOTE — PROGRESS NOTES
[facility]   Physical Therapy Updated Plan of Care     Name: Tray Jalloh  Clinic Number: [id number]    Therapy Diagnosis:   No diagnosis found.      Physician: Milan Akers MD    Visit Date: 12/20/2022    Physician Orders: PT Eval and Treat Lumbar  2-3 times per week for 6weeks  Medical Diagnosis from Referral: see above  Evaluation Date: 10/26/2022  Authorization Period Expiration: 10/03/2023  Plan of Care Expiration: 12/23/2022  Progress Note Due: 1/3/22  Visit # / Visits authorized: 11/20   FOTO: 50/59    PTA Visit #: 1    Time In: 0700  Time Out: 0748  Total Billable Time: 48 minutes    SUBJECTIVE     Pt reports:  Gonna see Dada Enriquez on 12/20 about the knee. Probably will need knee replacement    He was compliant with home exercise program.  Response to previous treatment: no problems  Functional change: no change    Pain: 1/10  Location: bilateral low back     OBJECTIVE     Objective Measures updated at progress report unless specified.       Treatment     Tray received the treatments listed below:      therapeutic exercises to develop strength, ROM, and flexibility for 10 minutes including:  Nustep x 5 minutes   Slant board stretch with abdominal bracing x 10 with 3 sec hold  HAMSTRING and hip flexor stretch on step 3 x 30 sec hold each Lionel  Standing rows on cable tower #4 x 20  Standing forward pull down #4 x 20  Standing overhead shoulder flexion #2 x 10  Leg press #6 x 20  Cybex HAMSTRING curls #4 2 x10 with DF stretch x 10 after each set  Trunk extension #4 x 20    manual therapy techniques 8 minutes;   Myofacial release and Soft tissue Mobilization were applied to the low back and bilateral lower extremities:  Piriformis stretch 4 x 30 second hold NOT THIS VISIT   Hamstring stretch supine 90/90 4 x 30 second hold NOT THIS VISIT   Nolan stretch 4 x 30 second hold NOT THIS VISIT   MFR to thoracic and lumbar area while flexion stretch over  ball    neuromuscular re-education activities to improve: Coordination and Posture for 0 minutes. The following activities were included:  Pelvic tilts (neutral) x 20 with 3 second hold  NOT THIS VISIT   Tilts with hip abduction with green theraband 2 x 10 with 3 second hold NOT THIS VISIT   Tilts with hip adduction with adduction squeeze x 3 second hold 2 x 10 NOT THIS VISIT   Seated sciatic nerve glides x 5 each leg   therapeutic activities to improve functional performance for 0  minutes, including:  (not performed today)       Patient Education and Home Exercises     Home Exercises Provided and Patient Education Provided     Education provided:   - home exercise program     Written Home Exercises Provided: yes. Exercises were reviewed and Tray was able to demonstrate them prior to the end of the session.  Tray demonstrated good  understanding of the education provided. See EMR under Patient Instructions for exercises provided during therapy sessions    ASSESSMENT     Patient needing cueing for aligning trunk with exercise. Still tends to walk with flexed posture.  Patient is   Tray Is progressing towards his goals.   Pt prognosis is Good.     Pt will continue to benefit from skilled outpatient physical therapy to address the deficits listed in the problem list box on initial evaluation, provide pt/family education and to maximize pt's level of independence in the home and community environment.     Pt's spiritual, cultural and educational needs considered and pt agreeable to plan of care and goals.     Anticipated barriers to physical therapy: scoliosis and pain    Goals:   Short Term Goals: 4 weeks   Independent with HOME EXERCISE PROGRAM (Goal Met)  Improve left knee strength to 4+/5 (Goal Partially Met)  Tolerate 45 minutes of exercise with <7/10 pain. (Goal Met)     Long Term Goals: 8 weeks   Improve left knee strength to 5/5 (Goal Partially Met)  Tolerate 60 minutes of exercise with <5/10 pain.  (Goal Met)  Work 8 hour/day with <5/10 pain. (Goal Partially Met)     Reasons for Recertification of Therapy: to continue to progress toward goals    Plan     Updated Certification Period: 12/20/2022 to 1/3/22  Recommended Treatment Plan: 2 times per week for 8 weeks: Aquatic Therapy, Cervical/Lumbar Traction, Electrical Stimulation IFC/Premod, Gait Training, Iontophoresis (with Dex), Manual Therapy, Moist Heat/ Ice, Neuromuscular Re-ed, Patient Education, Therapeutic Activities, Therapeutic Exercise, Ultrasound, and Dry Needling  Other Recommendations: none    Liliana Ng, PTA  12/20/2022

## 2022-12-22 ENCOUNTER — CLINICAL SUPPORT (OUTPATIENT)
Dept: REHABILITATION | Facility: HOSPITAL | Age: 72
End: 2022-12-22
Payer: COMMERCIAL

## 2022-12-22 DIAGNOSIS — M47.817 SPONDYLOSIS OF LUMBOSACRAL REGION WITHOUT MYELOPATHY OR RADICULOPATHY: Primary | ICD-10-CM

## 2022-12-22 DIAGNOSIS — M25.551 RIGHT HIP PAIN: Primary | ICD-10-CM

## 2022-12-22 DIAGNOSIS — Z01.818 PREPROCEDURAL EXAMINATION: ICD-10-CM

## 2022-12-22 DIAGNOSIS — M17.12 PRIMARY OSTEOARTHRITIS OF LEFT KNEE: Primary | ICD-10-CM

## 2022-12-22 PROCEDURE — 97110 THERAPEUTIC EXERCISES: CPT | Mod: CQ

## 2022-12-22 PROCEDURE — 97140 MANUAL THERAPY 1/> REGIONS: CPT | Mod: CQ

## 2022-12-22 RX ORDER — SODIUM CHLORIDE 9 MG/ML
INJECTION, SOLUTION INTRAVENOUS CONTINUOUS
Status: CANCELLED | OUTPATIENT
Start: 2022-12-22

## 2022-12-22 RX ORDER — MUPIROCIN 20 MG/G
OINTMENT TOPICAL
Status: CANCELLED | OUTPATIENT
Start: 2022-12-22

## 2022-12-22 NOTE — PROGRESS NOTES
[facility]   Physical Therapy Updated Plan of Care     Name: Tray Jalloh  Clinic Number: [id number]    Therapy Diagnosis:   No diagnosis found.      Physician: Milan Aekrs MD    Visit Date: 12/22/2022    Physician Orders: PT Eval and Treat Lumbar  2-3 times per week for 6weeks  Medical Diagnosis from Referral: see above  Evaluation Date: 10/26/2022  Authorization Period Expiration: 10/03/2023  Plan of Care Expiration: 12/23/2022  Progress Note Due: 1/3/22  Visit # / Visits authorized: 12/20   FOTO: 50/59    PTA Visit #: 2    Time In: 0700  Time Out: 0745  Total Billable Time: 45 minutes    SUBJECTIVE     Pt reports: will have knee scope surgery on Jan 11, 2023 on left knee;     He was compliant with home exercise program.  Response to previous treatment: no problems  Functional change: no change    Pain: 1/10  Location: bilateral low back     OBJECTIVE     Objective Measures updated at progress report unless specified.       Treatment     Tray received the treatments listed below:      therapeutic exercises to develop strength, ROM, and flexibility for 10 minutes including:  Nustep x 5 minutes   Slant board stretch with abdominal bracing and glut sets x 10 with 3 sec hold  HAMSTRING and hip flexor stretch on step 3 x 30 sec hold each Lionel  Standing rows on cable tower #5 x 20  Standing forward pull down #5 x 20  Standing overhead shoulder flexion #2 x 10  Leg press #6 x 20  Cybex HAMSTRING curls #4 x10, #5 x 10 with DF stretch x 10 after each set  Trunk extension #5 x 20    manual therapy techniques 8 minutes;   Myofacial release and Soft tissue Mobilization were applied to the low back and bilateral lower extremities:  Piriformis stretch 4 x 30 second hold NOT THIS VISIT   Hamstring stretch supine 90/90 4 x 30 second hold NOT THIS VISIT   Nolan stretch 4 x 30 second hold NOT THIS VISIT   MFR to thoracic and lumbar area while flexion stretch over  ball    neuromuscular re-education activities to improve: Coordination and Posture for 0 minutes. The following activities were included:  Pelvic tilts (neutral) x 20 with 3 second hold  NOT THIS VISIT   Tilts with hip abduction with green theraband 2 x 10 with 3 second hold NOT THIS VISIT   Tilts with hip adduction with adduction squeeze x 3 second hold 2 x 10 NOT THIS VISIT   Seated sciatic nerve glides x 5 each leg   therapeutic activities to improve functional performance for 0  minutes, including:  (not performed today)       Patient Education and Home Exercises     Home Exercises Provided and Patient Education Provided     Education provided:   - home exercise program     Written Home Exercises Provided: yes. Exercises were reviewed and Tray was able to demonstrate them prior to the end of the session.  Tray demonstrated good  understanding of the education provided. See EMR under Patient Instructions for exercises provided during therapy sessions    ASSESSMENT     Patient needing cueing for aligning trunk with exercise. Still tends to walk with flexed posture.  Patient is challenged by UE exercises but was able to increase resistance on others and perform well.  Tray Is progressing towards his goals.   Pt prognosis is Good.     Pt will continue to benefit from skilled outpatient physical therapy to address the deficits listed in the problem list box on initial evaluation, provide pt/family education and to maximize pt's level of independence in the home and community environment.     Pt's spiritual, cultural and educational needs considered and pt agreeable to plan of care and goals.     Anticipated barriers to physical therapy: scoliosis and pain    Goals:   Short Term Goals: 4 weeks   Independent with HOME EXERCISE PROGRAM (Goal Met)  Improve left knee strength to 4+/5 (Goal Partially Met)  Tolerate 45 minutes of exercise with <7/10 pain. (Goal Met)     Long Term Goals: 8 weeks   Improve left knee  strength to 5/5 (Goal Partially Met)  Tolerate 60 minutes of exercise with <5/10 pain. (Goal Met)  Work 8 hour/day with <5/10 pain. (Goal Partially Met)     Reasons for Recertification of Therapy: to continue to progress toward goals    Plan     Updated Certification Period: 12/22/2022 to 1/3/22  Recommended Treatment Plan: 2 times per week for 8 weeks: Aquatic Therapy, Cervical/Lumbar Traction, Electrical Stimulation IFC/Premod, Gait Training, Iontophoresis (with Dex), Manual Therapy, Moist Heat/ Ice, Neuromuscular Re-ed, Patient Education, Therapeutic Activities, Therapeutic Exercise, Ultrasound, and Dry Needling  Other Recommendations: none    Liliana Ng, PTA  12/22/2022

## 2022-12-27 ENCOUNTER — CLINICAL SUPPORT (OUTPATIENT)
Dept: REHABILITATION | Facility: HOSPITAL | Age: 72
End: 2022-12-27
Payer: COMMERCIAL

## 2022-12-27 ENCOUNTER — OFFICE VISIT (OUTPATIENT)
Dept: FAMILY MEDICINE | Facility: CLINIC | Age: 72
End: 2022-12-27
Payer: COMMERCIAL

## 2022-12-27 VITALS
TEMPERATURE: 98 F | SYSTOLIC BLOOD PRESSURE: 118 MMHG | HEART RATE: 71 BPM | WEIGHT: 197.38 LBS | RESPIRATION RATE: 20 BRPM | DIASTOLIC BLOOD PRESSURE: 68 MMHG | HEIGHT: 70 IN | BODY MASS INDEX: 28.26 KG/M2 | OXYGEN SATURATION: 96 %

## 2022-12-27 DIAGNOSIS — I10 ESSENTIAL HYPERTENSION: Chronic | ICD-10-CM

## 2022-12-27 DIAGNOSIS — U07.1 COVID-19: Primary | ICD-10-CM

## 2022-12-27 DIAGNOSIS — M41.27 OTHER IDIOPATHIC SCOLIOSIS, LUMBOSACRAL REGION: Primary | ICD-10-CM

## 2022-12-27 LAB
CTP QC/QA: YES
FLUAV AG NPH QL: NEGATIVE
FLUBV AG NPH QL: NEGATIVE
SARS-COV-2 AG RESP QL IA.RAPID: POSITIVE

## 2022-12-27 PROCEDURE — 87428 SARSCOV & INF VIR A&B AG IA: CPT | Mod: QW,,, | Performed by: NURSE PRACTITIONER

## 2022-12-27 PROCEDURE — 3288F PR FALLS RISK ASSESSMENT DOCUMENTED: ICD-10-PCS | Mod: ,,, | Performed by: NURSE PRACTITIONER

## 2022-12-27 PROCEDURE — 1160F PR REVIEW ALL MEDS BY PRESCRIBER/CLIN PHARMACIST DOCUMENTED: ICD-10-PCS | Mod: ,,, | Performed by: NURSE PRACTITIONER

## 2022-12-27 PROCEDURE — 3066F PR DOCUMENTATION OF TREATMENT FOR NEPHROPATHY: ICD-10-PCS | Mod: ,,, | Performed by: NURSE PRACTITIONER

## 2022-12-27 PROCEDURE — 1157F ADVNC CARE PLAN IN RCRD: CPT | Mod: ,,, | Performed by: NURSE PRACTITIONER

## 2022-12-27 PROCEDURE — 3060F POS MICROALBUMINURIA REV: CPT | Mod: ,,, | Performed by: NURSE PRACTITIONER

## 2022-12-27 PROCEDURE — 97110 THERAPEUTIC EXERCISES: CPT | Mod: CQ

## 2022-12-27 PROCEDURE — 3078F DIAST BP <80 MM HG: CPT | Mod: ,,, | Performed by: NURSE PRACTITIONER

## 2022-12-27 PROCEDURE — 1101F PR PT FALLS ASSESS DOC 0-1 FALLS W/OUT INJ PAST YR: ICD-10-PCS | Mod: ,,, | Performed by: NURSE PRACTITIONER

## 2022-12-27 PROCEDURE — 3288F FALL RISK ASSESSMENT DOCD: CPT | Mod: ,,, | Performed by: NURSE PRACTITIONER

## 2022-12-27 PROCEDURE — 4010F PR ACE/ARB THEARPY RXD/TAKEN: ICD-10-PCS | Mod: ,,, | Performed by: NURSE PRACTITIONER

## 2022-12-27 PROCEDURE — 1101F PT FALLS ASSESS-DOCD LE1/YR: CPT | Mod: ,,, | Performed by: NURSE PRACTITIONER

## 2022-12-27 PROCEDURE — 3008F PR BODY MASS INDEX (BMI) DOCUMENTED: ICD-10-PCS | Mod: ,,, | Performed by: NURSE PRACTITIONER

## 2022-12-27 PROCEDURE — 3074F PR MOST RECENT SYSTOLIC BLOOD PRESSURE < 130 MM HG: ICD-10-PCS | Mod: ,,, | Performed by: NURSE PRACTITIONER

## 2022-12-27 PROCEDURE — 1157F PR ADVANCE CARE PLAN OR EQUIV PRESENT IN MEDICAL RECORD: ICD-10-PCS | Mod: ,,, | Performed by: NURSE PRACTITIONER

## 2022-12-27 PROCEDURE — 3066F NEPHROPATHY DOC TX: CPT | Mod: ,,, | Performed by: NURSE PRACTITIONER

## 2022-12-27 PROCEDURE — 1159F PR MEDICATION LIST DOCUMENTED IN MEDICAL RECORD: ICD-10-PCS | Mod: ,,, | Performed by: NURSE PRACTITIONER

## 2022-12-27 PROCEDURE — 99213 OFFICE O/P EST LOW 20 MIN: CPT | Mod: ,,, | Performed by: NURSE PRACTITIONER

## 2022-12-27 PROCEDURE — 1159F MED LIST DOCD IN RCRD: CPT | Mod: ,,, | Performed by: NURSE PRACTITIONER

## 2022-12-27 PROCEDURE — 87428 POCT SARS-COV2 (COVID) WITH FLU ANTIGEN: ICD-10-PCS | Mod: QW,,, | Performed by: NURSE PRACTITIONER

## 2022-12-27 PROCEDURE — 3044F PR MOST RECENT HEMOGLOBIN A1C LEVEL <7.0%: ICD-10-PCS | Mod: ,,, | Performed by: NURSE PRACTITIONER

## 2022-12-27 PROCEDURE — 3044F HG A1C LEVEL LT 7.0%: CPT | Mod: ,,, | Performed by: NURSE PRACTITIONER

## 2022-12-27 PROCEDURE — 97140 MANUAL THERAPY 1/> REGIONS: CPT | Mod: CQ

## 2022-12-27 PROCEDURE — 4010F ACE/ARB THERAPY RXD/TAKEN: CPT | Mod: ,,, | Performed by: NURSE PRACTITIONER

## 2022-12-27 PROCEDURE — 3078F PR MOST RECENT DIASTOLIC BLOOD PRESSURE < 80 MM HG: ICD-10-PCS | Mod: ,,, | Performed by: NURSE PRACTITIONER

## 2022-12-27 PROCEDURE — 3008F BODY MASS INDEX DOCD: CPT | Mod: ,,, | Performed by: NURSE PRACTITIONER

## 2022-12-27 PROCEDURE — 1160F RVW MEDS BY RX/DR IN RCRD: CPT | Mod: ,,, | Performed by: NURSE PRACTITIONER

## 2022-12-27 PROCEDURE — 99213 PR OFFICE/OUTPT VISIT, EST, LEVL III, 20-29 MIN: ICD-10-PCS | Mod: ,,, | Performed by: NURSE PRACTITIONER

## 2022-12-27 PROCEDURE — 3060F PR POS MICROALBUMINURIA RESULT DOCUMENTED/REVIEW: ICD-10-PCS | Mod: ,,, | Performed by: NURSE PRACTITIONER

## 2022-12-27 PROCEDURE — 3074F SYST BP LT 130 MM HG: CPT | Mod: ,,, | Performed by: NURSE PRACTITIONER

## 2022-12-27 NOTE — LETTER
December 27, 2022      [facility] [address] [place] [address]  Phone: [phone]  Fax: [phone]       Patient: Tray Jalloh   YOB: 1950  Date of Visit: 12/27/2022    To Whom It May Concern:    Weston Jalloh  was at [facility] on 12/27/2022. The patient may return to work/school on 01/02/2023 with no restrictions. If you have any questions or concerns, or if I can be of further assistance, please do not hesitate to contact me.    Sincerely,    Bridgett Yates RN

## 2022-12-27 NOTE — PROGRESS NOTES
"[facility] MEDICINE       PATIENT NAME: Tray Jalloh   : 1950    AGE: 72 y.o. DATE OF ENCOUNTER: 22   MRN: [id number]      Visit type: WALK-IN  PCP:  MOLLY Murry    Reason for Visit / Chief Complaint: Sore Throat, Cough, Fever (Patient reports taking tylenol for fever last night. Symptoms present since .), Sinusitis, and Headache     Subjective:     Presents for upper resp s/s w/ fever x 2 days.  Works at [employer].    Reports is having knee surgery in January and needs lab results from Nov sent to Dr. Enriquez.  Advised if needing surgery clearance will need appt w/ labs & CXR after COVID resolved/wk prior to surgery.    Review of Systems:     Review of Systems   Constitutional:  Positive for fatigue and fever. Negative for chills.   HENT:  Positive for congestion and sore throat. Negative for ear pain, postnasal drip, rhinorrhea and sinus pain.    Respiratory:  Positive for cough. Negative for shortness of breath and wheezing.    Cardiovascular:  Negative for chest pain.   Musculoskeletal:  Positive for myalgias.   Skin: Negative.    Neurological:  Positive for headaches.     Allergies:     Review of patient's allergies indicates:   Allergen Reactions    Bee pollen     Grass pollen- grass standard       Objective:     Wt Readings from Last 3 Encounters:   22 1436 89.5 kg (197 lb 6.4 oz)   22 0909 91.5 kg (201 lb 12.8 oz)   22 1131 86 kg (189 lb 9.6 oz)       Vitals:    22 1436   BP: 118/68   BP Location: Right arm   Patient Position: Sitting   BP Method: Large (Manual)   Pulse: 71   Resp: 20   Temp: 97.9 °F (36.6 °C)   SpO2: 96%   Weight: 89.5 kg (197 lb 6.4 oz)   Height: 5' 10" (1.778 m)     Body mass index is 28.32 kg/m².     Physical Exam:    Physical Exam  Vitals and nursing note reviewed.   Constitutional:       General: He is not in acute distress.     Appearance: Normal appearance. He is ill-appearing.   HENT:      Head: " Normocephalic.      Right Ear: Tympanic membrane, ear canal and external ear normal.      Left Ear: Tympanic membrane, ear canal and external ear normal.      Nose: Mucosal edema and rhinorrhea present. Rhinorrhea is clear.      Mouth/Throat:      Mouth: Mucous membranes are moist.      Pharynx: Oropharynx is clear.   Eyes:      Conjunctiva/sclera: Conjunctivae normal.   Cardiovascular:      Rate and Rhythm: Normal rate and regular rhythm.      Heart sounds: Normal heart sounds.   Pulmonary:      Effort: Pulmonary effort is normal. No respiratory distress.      Breath sounds: Normal breath sounds.   Musculoskeletal:      Cervical back: Neck supple.   Skin:     General: Skin is warm and dry.   Neurological:      Mental Status: He is alert and oriented to person, place, and time.       Assessment:          ICD-10-CM ICD-9-CM   1. COVID-19  U07.1 079.89   2. Essential hypertension  I10 401.9        Plan:     COVID-19  -     POCT SARS-COV2 (COVID) with Flu Antigen    Essential hypertension  Comments:  well controlled with current tx        Current Outpatient Medications:     amLODIPine (NORVASC) 5 MG tablet, Take 1 tablet by mouth once daily., Disp: , Rfl:     ascorbic acid, vitamin C, (VITAMIN C) 1000 MG tablet, Take 1,000 mg by mouth once daily., Disp: , Rfl:     budesonide-glycopyr-formoterol (BREZTRI AEROSPHERE) 160-9-4.8 mcg/actuation HFAA, Inhale 2 puffs into the lungs 2 (two) times a day. Rinse mouth after use., Disp: 32.1 g, Rfl: 3    cetirizine (ZYRTEC) 10 MG tablet, Take 1 tablet (10 mg total) by mouth once daily., Disp: 90 tablet, Rfl: 3    cholecalciferol, vitamin D3, (VITAMIN D3) 50 mcg (2,000 unit) Cap, Take 1 capsule by mouth once daily., Disp: , Rfl:     CRESTOR 20 mg tablet, Take 1 tablet by mouth once daily. Take 1/2 tablet by mouth every evening, Disp: , Rfl:     cyanocobalamin (VITAMIN B-12) 1000 MCG tablet, Take 100 mcg by mouth once daily., Disp: , Rfl:     diclofenac sodium (VOLTAREN) 1 % Gel,  Apply topically 4 (four) times daily., Disp: 900 g, Rfl: 5    folic acid/multivit-min/lutein (CENTRUM SILVER ORAL), Take 1 tablet by mouth once daily., Disp: , Rfl:     hydrocortisone 2.5 % cream, Apply 30 g/kg topically 2 (two) times daily. Apply to face for dry skin, Disp: , Rfl:     ipratropium-albuteroL (COMBIVENT RESPIMAT)  mcg/actuation inhaler, Inhale 1 puff into the lungs every 6 (six) hours as needed for Wheezing. Rescue, Disp: 12 g, Rfl: 1    ketoconazole (NIZORAL) 2 % cream, Apply topically once daily., Disp: , Rfl:     lisinopriL (PRINIVIL,ZESTRIL) 40 MG tablet, Take 1 tablet (40 mg total) by mouth once daily., Disp: 90 tablet, Rfl: 3    metaxalone (SKELAXIN) 800 MG tablet, Take 1 tablet (800 mg total) by mouth daily as needed for Pain., Disp: 90 tablet, Rfl: 1    methenamine (HIPREX) 1 gram Tab, Take 1 tablet (1 g total) by mouth 2 (two) times daily., Disp: 180 tablet, Rfl: 1    omega-3 fatty acids/fish oil (FISH OIL-OMEGA-3 FATTY ACIDS) 300-1,000 mg capsule, Take 2 capsules by mouth once daily., Disp: , Rfl:     omeprazole (PRILOSEC) 20 MG capsule, Take 1 capsule (20 mg total) by mouth once daily., Disp: 90 capsule, Rfl: 3    pregabalin (LYRICA) 75 MG capsule, Take 1 capsule (75 mg total) by mouth 2 (two) times daily., Disp: 60 capsule, Rfl: 5    tolterodine (DETROL LA) 2 MG Cp24, Take 2 mg by mouth once daily., Disp: , Rfl:     triamcinolone acetonide 0.1% (KENALOG) 0.1 % cream, Apply 15 g topically 2 (two) times daily. Apply to back for dry skin and itching, Disp: , Rfl:     vitamin E 1000 UNIT capsule, Take 1,000 Units by mouth once daily., Disp: , Rfl:     zinc gluconate 50 mg tablet, Take 50 mg by mouth once daily., Disp: , Rfl:     Requested Prescriptions      No prescriptions requested or ordered in this encounter        Rapid COVID POS and flu negative      Advised to quarantine per updated CDC guidelines.  Advised COVID-19 is a virus and viruses do not respond to antibiotics nor do  antibiotics prevent a bacterial infection from developing.  Advised of multiple med interactions with anti-viral Paxlovid so will not prescribe.   Treatment for COVID-19 is primarily supportive including rest, increase fluids, and the use of OTC meds to help alleviate symptoms.    For immune support take vitamin C, vitamin D, and zinc 50 mg daily.  Acetaminophen or ibuprofen as needed for body aches, headache, or fever.  Home O2 sat monitoring can be used if experiencing shortness of breath.   Advised to report to ED for evaluation of any new or worsening chest pain, shortness of breath, or other severe symptoms.    Call the office if you have other questions or concerns that arise.    Signature: Elizabeth Lange FNP-BC

## 2022-12-27 NOTE — PROGRESS NOTES
[facility]   Physical Therapy Updated Plan of Care     Name: Tray Jalloh  Clinic Number: [id number]    Therapy Diagnosis:   No diagnosis found.      Physician: Milan Akers MD    Visit Date: 12/27/2022    Physician Orders: PT Eval and Treat Lumbar  2-3 times per week for 6weeks  Medical Diagnosis from Referral: see above  Evaluation Date: 10/26/2022  Authorization Period Expiration: 10/03/2023  Plan of Care Expiration: 12/23/2022  Progress Note Due: 1/3/22  Visit # / Visits authorized: 13/20   FOTO: 50/59    PTA Visit #: 3    Time In: 0700  Time Out: 0743  Total Billable Time: 43 minutes    SUBJECTIVE     Pt reports: will have knee scope surgery on Jan 11, 2023 on left knee; may have to quit working because it causes so much pain    He was compliant with home exercise program.  Response to previous treatment: no problems  Functional change: no change    Pain: 0/10  Location: bilateral low back     OBJECTIVE     Objective Measures updated at progress report unless specified.       Treatment     Tray received the treatments listed below:      therapeutic exercises to develop strength, ROM, and flexibility for 10 minutes including:  Nustep x 5 minutes   Slant board stretch with abdominal bracing and glut sets x 10 with 3 sec hold  HAMSTRING and hip flexor stretch on step 3 x 30 sec hold each Lionel  Standing rows on cable tower #5 x 20  Standing forward pull down #5 x 20  Standing overhead shoulder flexion #2 x 10  Leg press #6 x 20  Cybex HAMSTRING curls  #5 2 x 10 with DF stretch x 10 after each set  Trunk extension #5 x 20    manual therapy techniques 8 minutes;   Myofacial release and Soft tissue Mobilization were applied to the low back and bilateral lower extremities:  Piriformis stretch 4 x 30 second hold   Hamstring stretch supine 90/90 4 x 30 second hold  Nolan stretch 4 x 30 second hold   MFR to thoracic and lumbar area while flexion stretch over ball NOT THIS  VISIT     neuromuscular re-education activities to improve: Coordination and Posture for 0 minutes. The following activities were included:  Pelvic tilts (neutral) x 20 with 3 second hold  NOT THIS VISIT   Tilts with hip abduction with green theraband 2 x 10 with 3 second hold NOT THIS VISIT   Tilts with hip adduction with adduction squeeze x 3 second hold 2 x 10 NOT THIS VISIT   Seated sciatic nerve glides x 5 each leg   therapeutic activities to improve functional performance for 0  minutes, including:  (not performed today)       Patient Education and Home Exercises     Home Exercises Provided and Patient Education Provided     Education provided:   - home exercise program     Written Home Exercises Provided: yes. Exercises were reviewed and Tray was able to demonstrate them prior to the end of the session.  Tray demonstrated good  understanding of the education provided. See EMR under Patient Instructions for exercises provided during therapy sessions    ASSESSMENT     Patient needing cueing for aligning trunk with exercise. Still tends to walk with flexed posture and on toes/ no heelstrike.  Patient is challenged by UE exercises but was able to increase resistance on others and perform well.  Tray Is progressing towards his goals.   Pt prognosis is Good.     Pt will continue to benefit from skilled outpatient physical therapy to address the deficits listed in the problem list box on initial evaluation, provide pt/family education and to maximize pt's level of independence in the home and community environment.     Pt's spiritual, cultural and educational needs considered and pt agreeable to plan of care and goals.     Anticipated barriers to physical therapy: scoliosis and pain    Goals:   Short Term Goals: 4 weeks   Independent with HOME EXERCISE PROGRAM (Goal Met)  Improve left knee strength to 4+/5 (Goal Partially Met)  Tolerate 45 minutes of exercise with <7/10 pain. (Goal Met)     Long Term Goals:  8 weeks   Improve left knee strength to 5/5 (Goal Partially Met)  Tolerate 60 minutes of exercise with <5/10 pain. (Goal Met)  Work 8 hour/day with <5/10 pain. (Goal Partially Met)     Reasons for Recertification of Therapy: to continue to progress toward goals    Plan   Consider D/C next visit  Updated Certification Period: 12/27/2022 to 1/3/22  Recommended Treatment Plan: 2 times per week for 8 weeks: Aquatic Therapy, Cervical/Lumbar Traction, Electrical Stimulation IFC/Premod, Gait Training, Iontophoresis (with Dex), Manual Therapy, Moist Heat/ Ice, Neuromuscular Re-ed, Patient Education, Therapeutic Activities, Therapeutic Exercise, Ultrasound, and Dry Needling  Other Recommendations: none    Liliana Ng, PTA  12/27/2022

## 2023-01-04 ENCOUNTER — HOSPITAL ENCOUNTER (OUTPATIENT)
Dept: RADIOLOGY | Facility: HOSPITAL | Age: 73
Discharge: HOME | End: 2023-01-04
Attending: ORTHOPAEDIC SURGERY
Payer: COMMERCIAL

## 2023-01-04 ENCOUNTER — OFFICE VISIT (OUTPATIENT)
Dept: FAMILY MEDICINE | Facility: CLINIC | Age: 73
End: 2023-01-04
Payer: COMMERCIAL

## 2023-01-04 ENCOUNTER — CLINICAL SUPPORT (OUTPATIENT)
Dept: CARDIOLOGY | Facility: CLINIC | Age: 73
End: 2023-01-04
Payer: COMMERCIAL

## 2023-01-04 VITALS
HEIGHT: 70 IN | HEART RATE: 67 BPM | WEIGHT: 194.38 LBS | BODY MASS INDEX: 27.83 KG/M2 | SYSTOLIC BLOOD PRESSURE: 120 MMHG | DIASTOLIC BLOOD PRESSURE: 68 MMHG | RESPIRATION RATE: 18 BRPM | OXYGEN SATURATION: 97 % | TEMPERATURE: 98 F

## 2023-01-04 DIAGNOSIS — G89.29 CHRONIC PAIN OF LEFT KNEE: ICD-10-CM

## 2023-01-04 DIAGNOSIS — Z01.818 PREPROCEDURAL EXAMINATION: ICD-10-CM

## 2023-01-04 DIAGNOSIS — M25.562 CHRONIC PAIN OF LEFT KNEE: ICD-10-CM

## 2023-01-04 DIAGNOSIS — Z01.818 PREPROCEDURAL EXAMINATION: Primary | ICD-10-CM

## 2023-01-04 DIAGNOSIS — M17.12 PRIMARY OSTEOARTHRITIS OF LEFT KNEE: Chronic | ICD-10-CM

## 2023-01-04 DIAGNOSIS — I10 ESSENTIAL HYPERTENSION: Chronic | ICD-10-CM

## 2023-01-04 LAB
ANION GAP SERPL CALCULATED.3IONS-SCNC: 16 MMOL/L (ref 7–16)
BASOPHILS # BLD AUTO: 0.04 K/UL (ref 0–0.2)
BASOPHILS NFR BLD AUTO: 0.7 % (ref 0–1)
BUN SERPL-MCNC: 30 MG/DL (ref 7–18)
BUN/CREAT SERPL: 26 (ref 6–20)
CALCIUM SERPL-MCNC: 9.2 MG/DL (ref 8.5–10.1)
CHLORIDE SERPL-SCNC: 110 MMOL/L (ref 98–107)
CO2 SERPL-SCNC: 22 MMOL/L (ref 21–32)
CREAT SERPL-MCNC: 1.14 MG/DL (ref 0.7–1.3)
DIFFERENTIAL METHOD BLD: ABNORMAL
EGFR (NO RACE VARIABLE) (RUSH/TITUS): 69 ML/MIN/1.73M²
EOSINOPHIL # BLD AUTO: 0.38 K/UL (ref 0–0.5)
EOSINOPHIL NFR BLD AUTO: 6.6 % (ref 1–4)
ERYTHROCYTE [DISTWIDTH] IN BLOOD BY AUTOMATED COUNT: 11.9 % (ref 11.5–14.5)
GLUCOSE SERPL-MCNC: 110 MG/DL (ref 74–106)
HCT VFR BLD AUTO: 36.6 % (ref 40–54)
HGB BLD-MCNC: 11.9 G/DL (ref 13.5–18)
IMM GRANULOCYTES # BLD AUTO: 0.02 K/UL (ref 0–0.04)
IMM GRANULOCYTES NFR BLD: 0.3 % (ref 0–0.4)
LYMPHOCYTES # BLD AUTO: 2.15 K/UL (ref 1–4.8)
LYMPHOCYTES NFR BLD AUTO: 37.3 % (ref 27–41)
MCH RBC QN AUTO: 30.7 PG (ref 27–31)
MCHC RBC AUTO-ENTMCNC: 32.5 G/DL (ref 32–36)
MCV RBC AUTO: 94.3 FL (ref 80–96)
MONOCYTES # BLD AUTO: 0.4 K/UL (ref 0–0.8)
MONOCYTES NFR BLD AUTO: 6.9 % (ref 2–6)
MPC BLD CALC-MCNC: 10.2 FL (ref 9.4–12.4)
NEUTROPHILS # BLD AUTO: 2.77 K/UL (ref 1.8–7.7)
NEUTROPHILS NFR BLD AUTO: 48.2 % (ref 53–65)
NRBC # BLD AUTO: 0 X10E3/UL
NRBC, AUTO (.00): 0 %
PLATELET # BLD AUTO: 280 K/UL (ref 150–400)
POTASSIUM SERPL-SCNC: 4.6 MMOL/L (ref 3.5–5.1)
RBC # BLD AUTO: 3.88 M/UL (ref 4.6–6.2)
SODIUM SERPL-SCNC: 143 MMOL/L (ref 136–145)
WBC # BLD AUTO: 5.76 K/UL (ref 4.5–11)

## 2023-01-04 PROCEDURE — 1160F RVW MEDS BY RX/DR IN RCRD: CPT | Mod: ,,, | Performed by: NURSE PRACTITIONER

## 2023-01-04 PROCEDURE — 80048 BASIC METABOLIC PNL TOTAL CA: CPT | Mod: ,,, | Performed by: CLINICAL MEDICAL LABORATORY

## 2023-01-04 PROCEDURE — 1159F PR MEDICATION LIST DOCUMENTED IN MEDICAL RECORD: ICD-10-PCS | Mod: ,,, | Performed by: NURSE PRACTITIONER

## 2023-01-04 PROCEDURE — 93005 ELECTROCARDIOGRAM TRACING: CPT | Mod: PBBFAC | Performed by: STUDENT IN AN ORGANIZED HEALTH CARE EDUCATION/TRAINING PROGRAM

## 2023-01-04 PROCEDURE — 80048 BASIC METABOLIC PANEL: ICD-10-PCS | Mod: ,,, | Performed by: CLINICAL MEDICAL LABORATORY

## 2023-01-04 PROCEDURE — 71046 X-RAY EXAM CHEST 2 VIEWS: CPT | Mod: TC

## 2023-01-04 PROCEDURE — 3078F DIAST BP <80 MM HG: CPT | Mod: ,,, | Performed by: NURSE PRACTITIONER

## 2023-01-04 PROCEDURE — 1160F PR REVIEW ALL MEDS BY PRESCRIBER/CLIN PHARMACIST DOCUMENTED: ICD-10-PCS | Mod: ,,, | Performed by: NURSE PRACTITIONER

## 2023-01-04 PROCEDURE — 3008F PR BODY MASS INDEX (BMI) DOCUMENTED: ICD-10-PCS | Mod: ,,, | Performed by: NURSE PRACTITIONER

## 2023-01-04 PROCEDURE — 3008F BODY MASS INDEX DOCD: CPT | Mod: ,,, | Performed by: NURSE PRACTITIONER

## 2023-01-04 PROCEDURE — 3078F PR MOST RECENT DIASTOLIC BLOOD PRESSURE < 80 MM HG: ICD-10-PCS | Mod: ,,, | Performed by: NURSE PRACTITIONER

## 2023-01-04 PROCEDURE — 1157F ADVNC CARE PLAN IN RCRD: CPT | Mod: ,,, | Performed by: NURSE PRACTITIONER

## 2023-01-04 PROCEDURE — 99213 OFFICE O/P EST LOW 20 MIN: CPT | Mod: ,,, | Performed by: NURSE PRACTITIONER

## 2023-01-04 PROCEDURE — 1157F PR ADVANCE CARE PLAN OR EQUIV PRESENT IN MEDICAL RECORD: ICD-10-PCS | Mod: ,,, | Performed by: NURSE PRACTITIONER

## 2023-01-04 PROCEDURE — 85025 COMPLETE CBC W/AUTO DIFF WBC: CPT | Mod: ,,, | Performed by: CLINICAL MEDICAL LABORATORY

## 2023-01-04 PROCEDURE — 93010 EKG 12-LEAD: ICD-10-PCS | Mod: S$PBB,,, | Performed by: STUDENT IN AN ORGANIZED HEALTH CARE EDUCATION/TRAINING PROGRAM

## 2023-01-04 PROCEDURE — 85025 CBC WITH DIFFERENTIAL: ICD-10-PCS | Mod: ,,, | Performed by: CLINICAL MEDICAL LABORATORY

## 2023-01-04 PROCEDURE — 99213 PR OFFICE/OUTPT VISIT, EST, LEVL III, 20-29 MIN: ICD-10-PCS | Mod: ,,, | Performed by: NURSE PRACTITIONER

## 2023-01-04 PROCEDURE — 71046 X-RAY EXAM CHEST 2 VIEWS: CPT | Mod: 26,,, | Performed by: RADIOLOGY

## 2023-01-04 PROCEDURE — 3074F PR MOST RECENT SYSTOLIC BLOOD PRESSURE < 130 MM HG: ICD-10-PCS | Mod: ,,, | Performed by: NURSE PRACTITIONER

## 2023-01-04 PROCEDURE — 99212 OFFICE O/P EST SF 10 MIN: CPT | Mod: PBBFAC,25

## 2023-01-04 PROCEDURE — 3074F SYST BP LT 130 MM HG: CPT | Mod: ,,, | Performed by: NURSE PRACTITIONER

## 2023-01-04 PROCEDURE — 71046 XR CHEST PA AND LATERAL: ICD-10-PCS | Mod: 26,,, | Performed by: RADIOLOGY

## 2023-01-04 PROCEDURE — 93010 ELECTROCARDIOGRAM REPORT: CPT | Mod: S$PBB,,, | Performed by: STUDENT IN AN ORGANIZED HEALTH CARE EDUCATION/TRAINING PROGRAM

## 2023-01-04 PROCEDURE — 1159F MED LIST DOCD IN RCRD: CPT | Mod: ,,, | Performed by: NURSE PRACTITIONER

## 2023-01-04 RX ORDER — FLUOXETINE HYDROCHLORIDE 20 MG/1
1 CAPSULE ORAL DAILY
COMMUNITY
Start: 2022-05-12 | End: 2023-01-04

## 2023-01-04 NOTE — PROGRESS NOTES
[facility] - FAMILY MEDICINE       PATIENT NAME: Tray Jalloh   : 1950    AGE: 72 y.o. DATE OF ENCOUNTER: 23    MRN: [id number]      PCP: MOLLY Murry    Reason for Visit / Chief Complaint:  surgery clearance (Patient presents to clinic for a surgery clearance)     Subjective:     HPI:    Presents for pre-op clearance for diagnostic left knee arthroscopy per Dr. Dada Enriquez scheduled for 23.  Was pos for COVID 8 days ago; feels much better.  Had flu shot in Oct at [facility] (or [facility]).  Labs, EKG, & CXR were ordered per Dr. Enriquez - advised to go for procedures today & will draw labs while here.    Review of Systems:     Review of Systems   Constitutional: Negative.    HENT:  Positive for congestion (chronic rhinitis).    Respiratory:  Negative for cough and wheezing. Shortness of breath: chronic, intermittent d/t hx COPD.   Cardiovascular: Negative.    Gastrointestinal: Negative.    Genitourinary: Negative.    Musculoskeletal:  Positive for arthralgias.   Skin: Negative.    Neurological: Negative.      Allergies:     Review of patient's allergies indicates:   Allergen Reactions    Bee pollen     Grass pollen-figueroa grass standard         Labs:      Lab Results   Component Value Date    HGBA1C 5.6 2022      Lipids:   Lab Results   Component Value Date    CHOL 119 2022     Lab Results   Component Value Date    HDL 40 2022     Lab Results   Component Value Date    LDLCALC 63 2022     Lab Results   Component Value Date    TRIG 78 2022     CMP:  Sodium   Date Value Ref Range Status   2022 139 136 - 145 mmol/L Final     Potassium   Date Value Ref Range Status   2022 4.4 3.5 - 5.1 mmol/L Final     Chloride   Date Value Ref Range Status   2022 109 (H) 98 - 107 mmol/L Final     Glucose   Date Value Ref Range Status   2022 102 74 - 106 mg/dL Final     BUN   Date Value Ref Range Status   2022 21 (H) 7 - 18 mg/dL Final  "    Creatinine   Date Value Ref Range Status   11/16/2022 1.00 0.70 - 1.30 mg/dL Final     AST   Date Value Ref Range Status   05/12/2022 13 (L) 15 - 37 U/L Final     ALT   Date Value Ref Range Status   05/12/2022 21 16 - 61 U/L Final     eGFR    Date Value Ref Range Status   12/03/2020 65       Comment:     The above 3 analytes were performed by [facility] [id number] [place] [address],[place] [id number]     eGFR   Date Value Ref Range Status   05/12/2022 70 >=60 mL/min/1.73m² Final      CBC:  Lab Results   Component Value Date    WBC 7.31 11/16/2022    RBC 3.97 (L) 11/16/2022    HGB 12.6 (L) 11/16/2022    HCT 38.0 (L) 11/16/2022     11/16/2022      TSH:  Lab Results   Component Value Date    TSH 1.360 05/12/2022     Objective:      Wt Readings from Last 3 Encounters:   01/04/23 1121 88.2 kg (194 lb 6.4 oz)   12/27/22 1436 89.5 kg (197 lb 6.4 oz)   11/16/22 0909 91.5 kg (201 lb 12.8 oz)     Vitals:    01/04/23 1121   BP: 120/68   BP Location: Left arm   Patient Position: Sitting   BP Method: Large (Manual)   Pulse: 67   Resp: 18   Temp: 98 °F (36.7 °C)   TempSrc: Oral   SpO2: 97%   Weight: 88.2 kg (194 lb 6.4 oz)   Height: 5' 10" (1.778 m)     Body mass index is 27.89 kg/m².     Physical Exam:    Physical Exam  Vitals and nursing note reviewed.   Constitutional:       General: He is not in acute distress.     Appearance: Normal appearance.   HENT:      Head: Normocephalic.      Right Ear: Tympanic membrane, ear canal and external ear normal.      Left Ear: Tympanic membrane, ear canal and external ear normal.      Nose: Nose normal.      Mouth/Throat:      Mouth: Mucous membranes are moist.      Pharynx: Oropharynx is clear.   Eyes:      Conjunctiva/sclera: Conjunctivae normal.      Pupils: Pupils are equal, round, and reactive to light.   Neck:      Thyroid: No thyromegaly.      Vascular: Normal carotid pulses. No carotid bruit.   Cardiovascular:      Rate and Rhythm: Normal rate and regular rhythm.      " Pulses: Normal pulses.      Heart sounds: Normal heart sounds.   Pulmonary:      Effort: Pulmonary effort is normal. No respiratory distress.      Breath sounds: Normal breath sounds.   Abdominal:      Palpations: Abdomen is soft.      Tenderness: There is no abdominal tenderness.   Musculoskeletal:      Cervical back: Neck supple.      Right lower leg: No edema.      Left lower leg: No edema.   Lymphadenopathy:      Cervical: No cervical adenopathy.   Skin:     General: Skin is warm and dry.   Neurological:      General: No focal deficit present.      Mental Status: He is alert and oriented to person, place, and time.   Psychiatric:         Mood and Affect: Mood normal.         Behavior: Behavior normal.        Assessment:          ICD-10-CM ICD-9-CM   1. Preprocedural examination  Z01.818 V72.84   2. Chronic pain of left knee  M25.562 719.46    G89.29 338.29   3. Primary osteoarthritis of left knee  M17.12 715.16   4. Essential hypertension  I10 401.9        Plan:       Preprocedural examination  -     CBC Auto Differential  -     Basic Metabolic Panel    Chronic pain of left knee    Primary osteoarthritis of left knee  Comments:  diagnostic knee scope left 1/11/23 per Dr. Dada Enriquez    Essential hypertension  Comments:  controlled        Current Outpatient Medications:     amLODIPine (NORVASC) 5 MG tablet, Take 1 tablet by mouth once daily., Disp: , Rfl:     ascorbic acid, vitamin C, (VITAMIN C) 1000 MG tablet, Take 1,000 mg by mouth once daily., Disp: , Rfl:     budesonide-glycopyr-formoterol (BREZTRI AEROSPHERE) 160-9-4.8 mcg/actuation HFAA, Inhale 2 puffs into the lungs 2 (two) times a day. Rinse mouth after use., Disp: 32.1 g, Rfl: 3    cetirizine (ZYRTEC) 10 MG tablet, Take 1 tablet (10 mg total) by mouth once daily., Disp: 90 tablet, Rfl: 3    cholecalciferol, vitamin D3, (VITAMIN D3) 50 mcg (2,000 unit) Cap, Take 1 capsule by mouth once daily., Disp: , Rfl:     CRESTOR 20 mg tablet, Take 1 tablet by  mouth once daily. Take 1/2 tablet by mouth every evening, Disp: , Rfl:     cyanocobalamin (VITAMIN B-12) 1000 MCG tablet, Take 100 mcg by mouth once daily., Disp: , Rfl:     diclofenac sodium (VOLTAREN) 1 % Gel, Apply topically 4 (four) times daily., Disp: 900 g, Rfl: 5    folic acid/multivit-min/lutein (CENTRUM SILVER ORAL), Take 1 tablet by mouth once daily., Disp: , Rfl:     hydrocortisone 2.5 % cream, Apply 30 g/kg topically 2 (two) times daily. Apply to face for dry skin, Disp: , Rfl:     ipratropium-albuteroL (COMBIVENT RESPIMAT)  mcg/actuation inhaler, Inhale 1 puff into the lungs every 6 (six) hours as needed for Wheezing. Rescue, Disp: 12 g, Rfl: 1    ketoconazole (NIZORAL) 2 % cream, Apply topically once daily., Disp: , Rfl:     lisinopriL (PRINIVIL,ZESTRIL) 40 MG tablet, Take 1 tablet (40 mg total) by mouth once daily., Disp: 90 tablet, Rfl: 3    metaxalone (SKELAXIN) 800 MG tablet, Take 1 tablet (800 mg total) by mouth daily as needed for Pain., Disp: 90 tablet, Rfl: 1    methenamine (HIPREX) 1 gram Tab, Take 1 tablet (1 g total) by mouth 2 (two) times daily., Disp: 180 tablet, Rfl: 1    omega-3 fatty acids/fish oil (FISH OIL-OMEGA-3 FATTY ACIDS) 300-1,000 mg capsule, Take 2 capsules by mouth once daily., Disp: , Rfl:     omeprazole (PRILOSEC) 20 MG capsule, Take 1 capsule (20 mg total) by mouth once daily., Disp: 90 capsule, Rfl: 3    pregabalin (LYRICA) 75 MG capsule, Take 1 capsule (75 mg total) by mouth 2 (two) times daily., Disp: 60 capsule, Rfl: 5    tolterodine (DETROL LA) 2 MG Cp24, Take 2 mg by mouth once daily., Disp: , Rfl:     triamcinolone acetonide 0.1% (KENALOG) 0.1 % cream, Apply 15 g topically 2 (two) times daily. Apply to back for dry skin and itching, Disp: , Rfl:     vitamin E 1000 UNIT capsule, Take 1,000 Units by mouth once daily., Disp: , Rfl:     zinc gluconate 50 mg tablet, Take 50 mg by mouth once daily., Disp: , Rfl:     New & refilled meds:  Requested  Prescriptions      No prescriptions requested or ordered in this encounter     Continue current meds.  Flu shot - NIGHAT for date  Pre-op CXR is normal.    EKG report pending  Lab results pending  Is clear for surgery from a primary care standpoint pending review of labs & EKG.    Return to clinic as scheduled; and f/u as needed.    Future Appointments   Date Time Provider Department Center   1/6/2023  9:45 AM Milan Akers MD [facility]   2/15/2023  7:00 AM [facility] GI ROOM 02 [facility]   2/21/2023 10:40 AM MOLLY Murry [facility]   5/15/2023  8:40 AM MOLLY Murry [facility]        Signature:  MOLLY Murry

## 2023-01-06 ENCOUNTER — OFFICE VISIT (OUTPATIENT)
Dept: SPINE | Facility: CLINIC | Age: 73
End: 2023-01-06
Payer: COMMERCIAL

## 2023-01-06 DIAGNOSIS — M54.16 LUMBAR RADICULOPATHY: ICD-10-CM

## 2023-01-06 DIAGNOSIS — M51.36 DDD (DEGENERATIVE DISC DISEASE), LUMBAR: Primary | ICD-10-CM

## 2023-01-06 PROCEDURE — 1159F PR MEDICATION LIST DOCUMENTED IN MEDICAL RECORD: ICD-10-PCS | Mod: ,,, | Performed by: ORTHOPAEDIC SURGERY

## 2023-01-06 PROCEDURE — 1157F ADVNC CARE PLAN IN RCRD: CPT | Mod: ,,, | Performed by: ORTHOPAEDIC SURGERY

## 2023-01-06 PROCEDURE — 99213 OFFICE O/P EST LOW 20 MIN: CPT | Mod: PBBFAC | Performed by: ORTHOPAEDIC SURGERY

## 2023-01-06 PROCEDURE — 99214 PR OFFICE/OUTPT VISIT, EST, LEVL IV, 30-39 MIN: ICD-10-PCS | Mod: S$PBB,,, | Performed by: ORTHOPAEDIC SURGERY

## 2023-01-06 PROCEDURE — 99214 OFFICE O/P EST MOD 30 MIN: CPT | Mod: S$PBB,,, | Performed by: ORTHOPAEDIC SURGERY

## 2023-01-06 PROCEDURE — 1157F PR ADVANCE CARE PLAN OR EQUIV PRESENT IN MEDICAL RECORD: ICD-10-PCS | Mod: ,,, | Performed by: ORTHOPAEDIC SURGERY

## 2023-01-06 PROCEDURE — 1159F MED LIST DOCD IN RCRD: CPT | Mod: ,,, | Performed by: ORTHOPAEDIC SURGERY

## 2023-01-11 ENCOUNTER — HOSPITAL ENCOUNTER (OUTPATIENT)
Facility: HOSPITAL | Age: 73
Discharge: HOME | End: 2023-01-11
Attending: ORTHOPAEDIC SURGERY | Admitting: ORTHOPAEDIC SURGERY
Payer: COMMERCIAL

## 2023-01-11 ENCOUNTER — ANESTHESIA (OUTPATIENT)
Dept: SURGERY | Facility: HOSPITAL | Age: 73
End: 2023-01-11
Payer: COMMERCIAL

## 2023-01-11 ENCOUNTER — ANESTHESIA EVENT (OUTPATIENT)
Dept: SURGERY | Facility: HOSPITAL | Age: 73
End: 2023-01-11
Payer: COMMERCIAL

## 2023-01-11 VITALS
TEMPERATURE: 99 F | HEART RATE: 95 BPM | SYSTOLIC BLOOD PRESSURE: 143 MMHG | BODY MASS INDEX: 27.63 KG/M2 | RESPIRATION RATE: 16 BRPM | WEIGHT: 193 LBS | HEIGHT: 70 IN | OXYGEN SATURATION: 94 % | DIASTOLIC BLOOD PRESSURE: 73 MMHG

## 2023-01-11 DIAGNOSIS — M17.12 PRIMARY OSTEOARTHRITIS OF LEFT KNEE: Primary | ICD-10-CM

## 2023-01-11 PROCEDURE — 63600175 PHARM REV CODE 636 W HCPCS: Performed by: ANESTHESIOLOGY

## 2023-01-11 PROCEDURE — 25000003 PHARM REV CODE 250: Performed by: ORTHOPAEDIC SURGERY

## 2023-01-11 PROCEDURE — D9220A PRA ANESTHESIA: ICD-10-PCS | Mod: CRNA,,,

## 2023-01-11 PROCEDURE — 71000016 HC POSTOP RECOV ADDL HR: Performed by: ORTHOPAEDIC SURGERY

## 2023-01-11 PROCEDURE — 29880 ARTHRS KNE SRG MNISECTMY M&L: CPT | Mod: LT,,, | Performed by: ORTHOPAEDIC SURGERY

## 2023-01-11 PROCEDURE — 27000177 HC AIRWAY, LARYNGEAL MASK: Performed by: ANESTHESIOLOGY

## 2023-01-11 PROCEDURE — D9220A PRA ANESTHESIA: Mod: ANES,,, | Performed by: ANESTHESIOLOGY

## 2023-01-11 PROCEDURE — 71000015 HC POSTOP RECOV 1ST HR: Performed by: ORTHOPAEDIC SURGERY

## 2023-01-11 PROCEDURE — 27000655: Performed by: ANESTHESIOLOGY

## 2023-01-11 PROCEDURE — 29873 ARTHRS KNEE SURG W/LAT RLS: CPT | Mod: 51,LT,, | Performed by: ORTHOPAEDIC SURGERY

## 2023-01-11 PROCEDURE — 36000711: Performed by: ORTHOPAEDIC SURGERY

## 2023-01-11 PROCEDURE — 27000716 HC OXISENSOR PROBE, ANY SIZE: Performed by: ANESTHESIOLOGY

## 2023-01-11 PROCEDURE — 29873 PR KNEE SCOPE, W/LATERAL RELEASE: ICD-10-PCS | Mod: 51,LT,, | Performed by: ORTHOPAEDIC SURGERY

## 2023-01-11 PROCEDURE — 63600175 PHARM REV CODE 636 W HCPCS

## 2023-01-11 PROCEDURE — 27201423 OPTIME MED/SURG SUP & DEVICES STERILE SUPPLY: Performed by: ORTHOPAEDIC SURGERY

## 2023-01-11 PROCEDURE — 36000710: Performed by: ORTHOPAEDIC SURGERY

## 2023-01-11 PROCEDURE — 37000008 HC ANESTHESIA 1ST 15 MINUTES: Performed by: ORTHOPAEDIC SURGERY

## 2023-01-11 PROCEDURE — D9220A PRA ANESTHESIA: Mod: CRNA,,,

## 2023-01-11 PROCEDURE — 29880 PR KNEE SCOPE MED/LAT MENISCECTOMY: ICD-10-PCS | Mod: LT,,, | Performed by: ORTHOPAEDIC SURGERY

## 2023-01-11 PROCEDURE — 71000033 HC RECOVERY, INTIAL HOUR: Performed by: ORTHOPAEDIC SURGERY

## 2023-01-11 PROCEDURE — 63600175 PHARM REV CODE 636 W HCPCS: Performed by: ORTHOPAEDIC SURGERY

## 2023-01-11 PROCEDURE — 25000003 PHARM REV CODE 250

## 2023-01-11 PROCEDURE — D9220A PRA ANESTHESIA: ICD-10-PCS | Mod: ANES,,, | Performed by: ANESTHESIOLOGY

## 2023-01-11 PROCEDURE — 37000009 HC ANESTHESIA EA ADD 15 MINS: Performed by: ORTHOPAEDIC SURGERY

## 2023-01-11 PROCEDURE — 97161 PT EVAL LOW COMPLEX 20 MIN: CPT

## 2023-01-11 RX ORDER — MEPERIDINE HYDROCHLORIDE 25 MG/ML
25 INJECTION INTRAMUSCULAR; INTRAVENOUS; SUBCUTANEOUS EVERY 10 MIN PRN
Status: DISCONTINUED | OUTPATIENT
Start: 2023-01-11 | End: 2023-01-11 | Stop reason: HOSPADM

## 2023-01-11 RX ORDER — ACETAMINOPHEN 325 MG/1
325 TABLET ORAL EVERY 6 HOURS PRN
COMMUNITY

## 2023-01-11 RX ORDER — CEFAZOLIN SODIUM 1 G/3ML
INJECTION, POWDER, FOR SOLUTION INTRAMUSCULAR; INTRAVENOUS
Status: DISCONTINUED | OUTPATIENT
Start: 2023-01-11 | End: 2023-01-11

## 2023-01-11 RX ORDER — EPHEDRINE SULFATE 50 MG/ML
INJECTION, SOLUTION INTRAVENOUS
Status: DISCONTINUED | OUTPATIENT
Start: 2023-01-11 | End: 2023-01-11

## 2023-01-11 RX ORDER — OXYCODONE HYDROCHLORIDE 5 MG/1
5 TABLET ORAL
Status: DISCONTINUED | OUTPATIENT
Start: 2023-01-11 | End: 2023-01-11 | Stop reason: HOSPADM

## 2023-01-11 RX ORDER — MUPIROCIN 20 MG/G
OINTMENT TOPICAL
Status: DISCONTINUED | OUTPATIENT
Start: 2023-01-11 | End: 2023-01-11 | Stop reason: HOSPADM

## 2023-01-11 RX ORDER — CELECOXIB 200 MG/1
200 CAPSULE ORAL 2 TIMES DAILY
Qty: 60 CAPSULE | Refills: 2 | Status: SHIPPED | OUTPATIENT
Start: 2023-01-11 | End: 2023-05-22

## 2023-01-11 RX ORDER — DEXAMETHASONE SODIUM PHOSPHATE 4 MG/ML
INJECTION, SOLUTION INTRA-ARTICULAR; INTRALESIONAL; INTRAMUSCULAR; INTRAVENOUS; SOFT TISSUE
Status: DISCONTINUED | OUTPATIENT
Start: 2023-01-11 | End: 2023-01-11

## 2023-01-11 RX ORDER — DIPHENHYDRAMINE HCL 25 MG
25 CAPSULE ORAL EVERY 6 HOURS PRN
COMMUNITY

## 2023-01-11 RX ORDER — DIPHENHYDRAMINE HYDROCHLORIDE 50 MG/ML
25 INJECTION INTRAMUSCULAR; INTRAVENOUS EVERY 6 HOURS PRN
Status: DISCONTINUED | OUTPATIENT
Start: 2023-01-11 | End: 2023-01-11 | Stop reason: HOSPADM

## 2023-01-11 RX ORDER — ONDANSETRON 2 MG/ML
4 INJECTION INTRAMUSCULAR; INTRAVENOUS DAILY PRN
Status: DISCONTINUED | OUTPATIENT
Start: 2023-01-11 | End: 2023-01-11 | Stop reason: HOSPADM

## 2023-01-11 RX ORDER — CEFAZOLIN SODIUM 2 G/50ML
2 SOLUTION INTRAVENOUS
Status: DISCONTINUED | OUTPATIENT
Start: 2023-01-11 | End: 2023-01-11 | Stop reason: HOSPADM

## 2023-01-11 RX ORDER — PROPOFOL 10 MG/ML
INJECTION, EMULSION INTRAVENOUS
Status: DISCONTINUED | OUTPATIENT
Start: 2023-01-11 | End: 2023-01-11

## 2023-01-11 RX ORDER — OXYCODONE AND ACETAMINOPHEN 5; 325 MG/1; MG/1
1 TABLET ORAL EVERY 4 HOURS PRN
Status: DISCONTINUED | OUTPATIENT
Start: 2023-01-11 | End: 2023-01-11 | Stop reason: HOSPADM

## 2023-01-11 RX ORDER — OXYCODONE AND ACETAMINOPHEN 10; 325 MG/1; MG/1
1 TABLET ORAL EVERY 6 HOURS PRN
Qty: 30 TABLET | Refills: 0 | Status: SHIPPED | OUTPATIENT
Start: 2023-01-11 | End: 2023-02-21

## 2023-01-11 RX ORDER — LIDOCAINE HYDROCHLORIDE 20 MG/ML
INJECTION, SOLUTION EPIDURAL; INFILTRATION; INTRACAUDAL; PERINEURAL
Status: DISCONTINUED | OUTPATIENT
Start: 2023-01-11 | End: 2023-01-11

## 2023-01-11 RX ORDER — ONDANSETRON 2 MG/ML
INJECTION INTRAMUSCULAR; INTRAVENOUS
Status: DISCONTINUED | OUTPATIENT
Start: 2023-01-11 | End: 2023-01-11

## 2023-01-11 RX ORDER — FENTANYL CITRATE 50 UG/ML
INJECTION, SOLUTION INTRAMUSCULAR; INTRAVENOUS
Status: DISCONTINUED | OUTPATIENT
Start: 2023-01-11 | End: 2023-01-11

## 2023-01-11 RX ORDER — MORPHINE SULFATE 10 MG/ML
4 INJECTION INTRAMUSCULAR; INTRAVENOUS; SUBCUTANEOUS EVERY 5 MIN PRN
Status: DISCONTINUED | OUTPATIENT
Start: 2023-01-11 | End: 2023-01-11 | Stop reason: HOSPADM

## 2023-01-11 RX ORDER — OXYCODONE HYDROCHLORIDE 5 MG/1
10 TABLET ORAL EVERY 4 HOURS PRN
Status: DISCONTINUED | OUTPATIENT
Start: 2023-01-11 | End: 2023-01-11 | Stop reason: HOSPADM

## 2023-01-11 RX ORDER — SODIUM CHLORIDE 9 MG/ML
INJECTION, SOLUTION INTRAVENOUS CONTINUOUS
Status: DISCONTINUED | OUTPATIENT
Start: 2023-01-11 | End: 2023-01-11 | Stop reason: HOSPADM

## 2023-01-11 RX ORDER — EPINEPHRINE CONVENIENCE KIT 1 MG/ML(1)
KIT INTRAMUSCULAR; SUBCUTANEOUS
Status: DISCONTINUED | OUTPATIENT
Start: 2023-01-11 | End: 2023-01-11 | Stop reason: HOSPADM

## 2023-01-11 RX ORDER — HYDROMORPHONE HYDROCHLORIDE 2 MG/ML
0.5 INJECTION, SOLUTION INTRAMUSCULAR; INTRAVENOUS; SUBCUTANEOUS EVERY 5 MIN PRN
Status: DISCONTINUED | OUTPATIENT
Start: 2023-01-11 | End: 2023-01-11 | Stop reason: HOSPADM

## 2023-01-11 RX ORDER — ONDANSETRON 4 MG/1
8 TABLET, ORALLY DISINTEGRATING ORAL EVERY 8 HOURS PRN
Status: DISCONTINUED | OUTPATIENT
Start: 2023-01-11 | End: 2023-01-11 | Stop reason: HOSPADM

## 2023-01-11 RX ORDER — ONDANSETRON 4 MG/1
4 TABLET, ORALLY DISINTEGRATING ORAL EVERY 6 HOURS PRN
Qty: 30 TABLET | Refills: 0 | Status: SHIPPED | OUTPATIENT
Start: 2023-01-11 | End: 2023-02-21

## 2023-01-11 RX ORDER — HYDROMORPHONE HYDROCHLORIDE 2 MG/ML
INJECTION, SOLUTION INTRAMUSCULAR; INTRAVENOUS; SUBCUTANEOUS
Status: DISCONTINUED | OUTPATIENT
Start: 2023-01-11 | End: 2023-01-11

## 2023-01-11 RX ADMIN — EPHEDRINE SULFATE 10 MG: 50 INJECTION INTRAVENOUS at 12:01

## 2023-01-11 RX ADMIN — EPHEDRINE SULFATE 15 MG: 50 INJECTION INTRAVENOUS at 11:01

## 2023-01-11 RX ADMIN — ONDANSETRON 4 MG: 2 INJECTION INTRAMUSCULAR; INTRAVENOUS at 11:01

## 2023-01-11 RX ADMIN — OXYCODONE HYDROCHLORIDE 10 MG: 5 TABLET ORAL at 01:01

## 2023-01-11 RX ADMIN — SODIUM CHLORIDE, POTASSIUM CHLORIDE, SODIUM LACTATE AND CALCIUM CHLORIDE: 600; 310; 30; 20 INJECTION, SOLUTION INTRAVENOUS at 12:01

## 2023-01-11 RX ADMIN — PROPOFOL 120 MG: 10 INJECTION, EMULSION INTRAVENOUS at 11:01

## 2023-01-11 RX ADMIN — LIDOCAINE HYDROCHLORIDE 80 MG: 20 INJECTION, SOLUTION EPIDURAL; INFILTRATION; INTRACAUDAL; PERINEURAL at 11:01

## 2023-01-11 RX ADMIN — SODIUM CHLORIDE: 9 INJECTION, SOLUTION INTRAVENOUS at 10:01

## 2023-01-11 RX ADMIN — DEXAMETHASONE SODIUM PHOSPHATE 8 MG: 4 INJECTION, SOLUTION INTRA-ARTICULAR; INTRALESIONAL; INTRAMUSCULAR; INTRAVENOUS; SOFT TISSUE at 11:01

## 2023-01-11 RX ADMIN — EPHEDRINE SULFATE 25 MG: 50 INJECTION INTRAVENOUS at 11:01

## 2023-01-11 RX ADMIN — FENTANYL CITRATE 100 MCG: 50 INJECTION INTRAMUSCULAR; INTRAVENOUS at 11:01

## 2023-01-11 RX ADMIN — CEFAZOLIN 2 G: 1 INJECTION, POWDER, FOR SOLUTION INTRAMUSCULAR; INTRAVENOUS; PARENTERAL at 11:01

## 2023-01-11 RX ADMIN — MORPHINE SULFATE 4 MG: 10 INJECTION INTRAVENOUS at 01:01

## 2023-01-11 RX ADMIN — FENTANYL CITRATE 100 MCG: 50 INJECTION INTRAMUSCULAR; INTRAVENOUS at 12:01

## 2023-01-11 RX ADMIN — HYDROMORPHONE HYDROCHLORIDE 0.5 MG: 2 INJECTION, SOLUTION INTRAMUSCULAR; INTRAVENOUS; SUBCUTANEOUS at 12:01

## 2023-01-11 NOTE — PT/OT/SLP EVAL
Physical Therapy Evaluation    Patient Name:  Tray Jalloh   MRN:  [id number]    Recommendations:     Discharge Recommendations: home, outpatient PT   Discharge Equipment Recommendations: crutches   Barriers to discharge: None    Assessment:     Tray Jalloh is a 72 y.o. male admitted with a medical diagnosis of <principal problem not specified>.  He presents with the following impairments/functional limitations: decreased ROM, gait instability Patient with good understanding of post op education.    Rehab Prognosis: Good; patient would benefit from acute skilled PT services to address these deficits and reach maximum level of function.    Recent Surgery: Procedure(s) (LRB):  ARTHROSCOPY, KNEE, WITH MENISCECTOMY (Left)  ARTHROSCOPIC RELEASE, KNEE, LATERAL RETINACULA (Left) Day of Surgery    Plan:     During this hospitalization, patient to be seen 1 x/week to address the identified rehab impairments via gait training, therapeutic activities and progress toward the following goals:    Plan of Care Expires:  01/11/23    Subjective     Chief Complaint: post op pain  Patient/Family Comments/goals: Plans on dc home today  Pain/Comfort:  Pain Rating 1: 4/10  Location - Side 1: Left  Location 1: knee  Pain Addressed 1: Cessation of Activity    Patients cultural, spiritual, Sabianist conflicts given the current situation:      Living Environment:  Lives with spouse  Prior to admission, patients level of function was independent.  Equipment used at home: none.  DME owned (not currently used): none.  Upon discharge, patient will have assistance from spouse.    Objective:     Communicated with nurse prior to session.  Patient found supine with    upon PT entry to room.    General Precautions: Standard, fall  Orthopedic Precautions:LLE weight bearing as tolerated   Braces:    Respiratory Status: Room air    Exams:  na    Functional Mobility:  Gait: ambulated 20 feet with crutches wbat with crutches      AM-PAC  6 CLICK MOBILITY  Total Score:18       Treatment & Education:  HEP: ascope protocol, wbat with crutches    Patient left supine with call button in reach.    GOALS:   Multidisciplinary Problems       Physical Therapy Goals       Not on file              Multidisciplinary Problems (Resolved)          Problem: Physical Therapy    Goal Priority Disciplines Outcome Goal Variances Interventions   Physical Therapy Goal   (Resolved)     PT, PT/OT Met     Description: Short Term Goals  Independent with HEP  Independent with crutchesx 10 feet FWB/WBAT: left lower extremity    Long term goals  Needed equipment for home.                            History:     Past Medical History:   Diagnosis Date    Cervical radiculopathy     Chronic pain     Chronic renal impairment     COPD (chronic obstructive pulmonary disease)     Depression     Digestive disorder     GERD (gastroesophageal reflux disease)     Hypercholesteremia     Hypertension     Left ventricular hypertrophy     Lumbosacral spondylosis     Prediabetes     Pulmonary nodules     repeat CT due 12/03/21       Past Surgical History:   Procedure Laterality Date    ABDOMINAL SURGERY      inguinal hernia repair    BACK SURGERY      Bilateral L3-S1 MBB Bilateral 2-, 1-, 1-, 12-    Dr Jorge Parker    EYE SURGERY      HERNIA REPAIR      KNEE ARTHROSCOPY W/ MENISCAL REPAIR Right     PROSTATECTOMY      RADIOFREQUENCY ABLATION OF LUMBAR MEDIAL BRANCH NERVE AT SINGLE LEVEL Right 04/01/2021    Procedure: Radiofrequency Ablation, Nerve, Spinal, Lumbar, Medial Branch, 1 Level;  Surgeon: Jordana Patel MD;  Location: [facility];  Service: Pain Management;  Laterality: Right;  Right L3-S1 RFTC    RADIOFREQUENCY THERMOCOAGULATION Bilateral 03/20/2012    Dr Parker    RADIOFREQUENCY THERMOCOAGULATION Left 03/18/2021    Procedure: RADIOFREQUENCY THERMAL COAGULATION;  Surgeon: Jordana Patel MD;  Location: [facility];  Service: Pain  Management;  Laterality: Left;  BILATERAL L3-S1 RFTC LEFT SIDE FIRST    SELECTIVE INJECTION OF ANESTHETIC AGENT AROUND LUMBAR SPINAL NERVE ROOT BY TRANSFORAMINAL APPROACH Right 01/13/2022    Procedure: Right L4-5,5-S1 TFESI;  Surgeon: Jordana Patel MD;  Location: [facility];  Service: Pain Management;  Laterality: Right;  PT AWARE TO BE TESTED VIA PC    SHOULDER ARTHROSCOPY Right     SPINE SURGERY  1995    VASECTOMY  Mar 1996       Time Tracking:     PT Received On: 01/11/23  PT Start Time: 1440     PT Stop Time: 1500  PT Total Time (min): 20 min     Billable Minutes: Evaluation 20 01/11/2023

## 2023-01-11 NOTE — PLAN OF CARE
Problem: Physical Therapy  Goal: Physical Therapy Goal  Description: Short Term Goals  Independent with HEP  Independent with crutchesx 10 feet FWB/WBAT: left lower extremity    Long term goals  Needed equipment for home.       Outcome: Met

## 2023-01-11 NOTE — ANESTHESIA PREPROCEDURE EVALUATION
01/11/2023  Tray Jalloh is a 72 y.o., male.      Pre-op Assessment    I have reviewed the Patient Summary Reports.    I have reviewed the NPO Status.   I have reviewed the Medications.     Review of Systems  Anesthesia Hx:  No problems with previous Anesthesia    Social:  Non-Smoker, No Alcohol Use    Hematology/Oncology:  Hematology Normal   Oncology Normal     EENT/Dental:EENT/Dental Normal   Cardiovascular:   Exercise tolerance: good Hypertension Denies MI.      Pulmonary:   COPD, mild    Renal/:   Chronic Renal Disease, CKD    Hepatic/GI:   GERD    Musculoskeletal:  Musculoskeletal Normal    Neurological:   Denies CVA.  Pain Etiology/Diagnosis, Cervical Radiculopathy, Lumbar Radiculopathy    Endocrine:  Endocrine Normal    Dermatological:  Skin Normal    Psych:   depression          Physical Exam  General: Well nourished, Cooperative, Alert and Oriented    Airway:  Mallampati: II / II  Mouth Opening: Normal  TM Distance: Normal  Neck ROM: Normal ROM    Dental:  Dentures    Chest/Lungs:  Clear to auscultation    Heart:  Rate: Normal  Rhythm: Regular Rhythm  Sounds: Normal        Chemistry        Component Value Date/Time     01/04/2023 1146    K 4.6 01/04/2023 1146     (H) 01/04/2023 1146    CO2 22 01/04/2023 1146    BUN 30 (H) 01/04/2023 1146    CREATININE 1.14 01/04/2023 1146     (H) 01/04/2023 1146        Component Value Date/Time    CALCIUM 9.2 01/04/2023 1146    ALKPHOS 42 (L) 05/12/2022 1004    AST 13 (L) 05/12/2022 1004    ALT 21 05/12/2022 1004    BILITOT 0.3 05/12/2022 1004    ESTGFRAFRICA 65 12/03/2020 1107    EGFRNONAA 70 05/12/2022 1004        Lab Results   Component Value Date    WBC 5.76 01/04/2023    HGB 11.9 (L) 01/04/2023    HCT 36.6 (L) 01/04/2023     01/04/2023     Results for orders placed or performed in visit on 01/04/23   EKG 12-lead     Collection Time: 01/04/23 12:34 PM    Narrative    Test Reason : PREOP    Vent. Rate : 060 BPM     Atrial Rate : 060 BPM     P-R Int : 250 ms          QRS Dur : 088 ms      QT Int : 378 ms       P-R-T Axes : 062 058 052 degrees     QTc Int : 378 ms    Sinus rhythm with 1st degree A-V block  Otherwise normal ECG  No previous ECGs available  Confirmed by Hesham MCCLELLAND, Yung RODARTE ([id number]) on 1/9/2023 5:18:01 PM    Referred By: RUFUS YOON           Confirmed By:Yung Dahl MD         Anesthesia Plan  Type of Anesthesia, risks & benefits discussed:    Anesthesia Type: Gen Supraglottic Airway  Intra-op Monitoring Plan: Standard ASA Monitors  Post Op Pain Control Plan: multimodal analgesia  Induction:  IV  Airway Plan: Direct  Informed Consent: Informed consent signed with the Patient and all parties understand the risks and agree with anesthesia plan.  All questions answered.   ASA Score: 3  Day of Surgery Review of History & Physical: H&P Update referred to the surgeon/provider.    Ready For Surgery From Anesthesia Perspective.     .

## 2023-01-11 NOTE — OP NOTE
[facility] - Orthopedic Periop Services  Operative Note    Surgery Date: 1/11/2023      Surgeon(s) and Role:     * Dada Enriquez MD - Primary    Assistant: Reggie Burgos    Pre-op Diagnosis:   Primary osteoarthritis of left knee [M17.12]     Post-op Diagnosis:  Post-Op Diagnosis Codes:     * Primary osteoarthritis of left knee [M17.12]     Procedure:  Procedure(s) (LRB):  ARTHROSCOPY, KNEE, WITH MENISCECTOMY (Left)  ARTHROSCOPIC RELEASE, KNEE, LATERAL RETINACULA (Left)   (Medial and lateral menisectomy)   Chondroplasty of the medial tibial plateau, medial femoral condyle    Anesthesia:  General    EBL:  1 mL     Implants: * No implants in log *    Tourniquet time: 0 mins    Complication:   none        INDICATIONS:  The patient is a 72 y.o. year-old who had a history and physical examination consistent with the aforementioned diagnoses.  The risks and benefits were discussed with the patient.  The patient acknowledged an understanding and wished to proceed with operative intervention.  Informed consent was obtained prior to the procedure.  Details of the surgical procedure were explained including incisions, probable rehabilitation course and description of the hardware and graft to be used was given.  The patient understands the likely length of convalescence after surgery and we reasonably explained the risks, benefits and alternatives to surgery.  The reasonable expectations and potential complications were discussed and acknowledged including, but not limited to, infection, bleeding, blood clots, nerve injury, retear, instability and continued pain and stiffness.  It was also explained that there was a chance of failure, which may require further management.  The patient agreed, understood and wished to proceed.      Procedure: The patient was taken to the operating room and placed supine.  Anesthesia was administered and pre-operative antibiotics were given.  A timeout was performed.  Patient was positioned  appropriately and prepped and draped in the usual sterile fashion.  Standard anteromedial and anterolateral portals were established and a diagnostic arthroscopy was performed; systemic examination of the joint revealed the following:     PF  negative compartmentalization or adhesions in the suprapatellar pouch.   Trochlear chondromalacia:grade I  Patella chondromalacia: grade I    Medial  Medial femoral chondyle chondromalacia : grade II  Medial tibial plateau chondromalacia grade III  Positive for meniscus tear.    Lateral  Lateral femoral condyle chondromalacia: none  Lateral tibial plateau chondromalacia: none  Positive for meniscus tear.      Examination of the intercondylar notch revealed: Normal ACL and PCL      Within the medial compartment we noted significant tightness and a percutaneous pie crusting release of the MCL was performed in order to better expose the medial meniscus.  There was a horizontal cleavage tear extending from the root attachment all the way to the junction of the anterior and posterior horns.  At the junction between the anterior and posterior horns there was a full radial tear.  Along the anterior aspect of this the tear was quite degenerative and the body the meniscus appeared to be quite diminutive.  It is seen that meniscus repair would be achievable in this case.  A meniscectomy was performed at this point and the edges of the radial tear were debrided with a motorized shaver and the unstable edges of the horizontal cleavage tear posteriorly were trimmed with a biter and completed with a shaver.  Grade 3 chondral wear was focused on the posterior medial aspect of the tibia and this was lightly debrided and a debridement of the grade 2 chondromalacia of the medial femoral condyle was likewise performed.  Attention was then turned towards the lateral compartment where a lateral meniscectomy was performed on the periphery as well as 2 small tear between the junction of the anterior  and posterior horns lateral meniscus.  The cartilage in this compartment appeared to be otherwise well preserved.  Excessive tightness of the patella was noted within the patellofemoral compartment grade 1 chondromalacia was seen on both the trochlea and patella.  This was debrided with a motorized shaver and a attention was then turned towards a lateral release.A release of the lateral retinaculum was performed.  An arthroscopic wand was used to release the lateral retinacular structures from the superior lateral edge to the inferior lateral edge of the patella.  This corrected the lateral patella tilt was present and served to off load the patellofemoral compartment.     This completed the procedure all instruments were removed. Portals were closed with 3-0 nylon.  0.25% bupivacaine was injected along the portal sites and sterile dressings were applied.           Disposition:awakened from anesthesia, extubated and taken to the recovery room in a stable condition, having suffered no apparent untoward event.

## 2023-01-11 NOTE — ANESTHESIA PROCEDURE NOTES
LMA    Date/Time: 1/11/2023 11:38 AM  Performed by: Travis Saez II, CRNA  Authorized by: Hernesto Torres MD     Intubation:     Induction:  Intravenous    Intubated:  Postinduction    Mask Ventilation:  Easy with oral airway    Attempts:  1    Attempted By:  CRNA    Difficult Airway Encountered?: No      Complications:  None    Airway Device:  Supraglottic airway/LMA    Airway Device Size:  4.0    Style/Cuff Inflation:  Cuffed (inflated to minimal occlusive pressure)    Secured at:  The lips    Placement Verified By:  Capnometry    Complicating Factors:  None    Findings Post-Intubation:  BS equal bilateral and atraumatic/condition of teeth unchanged

## 2023-01-11 NOTE — TRANSFER OF CARE
"Anesthesia Transfer of Care Note    Patient: Tray Jalloh    Procedure(s) Performed: Procedure(s) (LRB):  ARTHROSCOPY, KNEE, WITH MENISCECTOMY (Left)  ARTHROSCOPIC RELEASE, KNEE, LATERAL RETINACULA (Left)    Patient location: PACU    Anesthesia Type: general    Transport from OR: Transported from OR on 6-10 L/min O2 by face mask with adequate spontaneous ventilation    Post pain: adequate analgesia    Post assessment: no apparent anesthetic complications    Post vital signs: stable    Level of consciousness: responds to stimulation and sedated    Nausea/Vomiting: no nausea/vomiting    Complications: none    Transfer of care protocol was followed      Last vitals:   Visit Vitals  BP (!) 111/59 (BP Location: Right arm, Patient Position: Lying)   Pulse 85   Temp 37 °C (98.6 °F) (Oral)   Resp 10   Ht 5' 10" (1.778 m)   Wt 87.5 kg (193 lb)   SpO2 97%   BMI 27.69 kg/m²     "

## 2023-01-11 NOTE — OR NURSING
1300 REC'D TO PACU IN STABLE COND. PT SLEEP, WAKES TO VOICE. CONNECTED TO BP CUFF, EKG LEADS & SPO2 MONITORS. VS STABLE. PT HAD A LT KNEE SCOPE TODAY. NO DISTRESS NOTED @ THIS TIME, WILL  CONT TO MONITOR.    1340 TRANSFERRED TO OUT PT ROOM 20 IN STABLE COND. VS STABLE BEDSIDE REPORT GIVEN TO MARIAN METZ RN. NO DISTRESS NOTED@ THIS TIME.  VS  95%  96 115/64

## 2023-01-13 DIAGNOSIS — M17.12 PRIMARY OSTEOARTHRITIS OF LEFT KNEE: Primary | ICD-10-CM

## 2023-01-17 NOTE — DISCHARGE SUMMARY
[facility] - Orthopedic Periop Services  Discharge Note  Short Stay    Procedure(s) (LRB):  ARTHROSCOPY, KNEE, WITH MENISCECTOMY (Left)  ARTHROSCOPIC RELEASE, KNEE, LATERAL RETINACULA (Left)      OUTCOME: Patient tolerated treatment/procedure well without complication and is now ready for discharge.    DISPOSITION: Home or Self Care    FINAL DIAGNOSIS:  <principal problem not specified> left knee osteoarthitis    FOLLOWUP: In clinic    DISCHARGE INSTRUCTIONS:    Discharge Procedure Orders   Diet general     Remove dressing in 72 hours   Order Comments: Remove dressing in 3 days.  Place band-aids over portal sites.     Call MD for:  temperature >100.4     Call MD for:  persistent nausea and vomiting     Call MD for:  severe uncontrolled pain     Call MD for:  difficulty breathing, headache or visual disturbances     Call MD for:  redness, tenderness, or signs of infection (pain, swelling, redness, odor or green/yellow discharge around incision site)     Call MD for:  hives     Call MD for:  persistent dizziness or light-headedness     Call MD for:  extreme fatigue     Weight bearing restrictions (specify)   Order Comments: Please refer to op note for therapy plan        TIME SPENT ON DISCHARGE: 9 minutes

## 2023-01-19 ENCOUNTER — OFFICE VISIT (OUTPATIENT)
Dept: ORTHOPEDICS | Facility: CLINIC | Age: 73
End: 2023-01-19
Payer: COMMERCIAL

## 2023-01-19 DIAGNOSIS — Z98.890 S/P LEFT KNEE ARTHROSCOPY: Primary | ICD-10-CM

## 2023-01-19 PROCEDURE — 99214 OFFICE O/P EST MOD 30 MIN: CPT | Mod: PBBFAC | Performed by: NURSE PRACTITIONER

## 2023-01-19 PROCEDURE — 1159F PR MEDICATION LIST DOCUMENTED IN MEDICAL RECORD: ICD-10-PCS | Mod: ,,, | Performed by: NURSE PRACTITIONER

## 2023-01-19 PROCEDURE — 1159F MED LIST DOCD IN RCRD: CPT | Mod: ,,, | Performed by: NURSE PRACTITIONER

## 2023-01-19 PROCEDURE — 1157F PR ADVANCE CARE PLAN OR EQUIV PRESENT IN MEDICAL RECORD: ICD-10-PCS | Mod: ,,, | Performed by: NURSE PRACTITIONER

## 2023-01-19 PROCEDURE — 1157F ADVNC CARE PLAN IN RCRD: CPT | Mod: ,,, | Performed by: NURSE PRACTITIONER

## 2023-01-19 PROCEDURE — 99024 POSTOP FOLLOW-UP VISIT: CPT | Mod: ,,, | Performed by: NURSE PRACTITIONER

## 2023-01-19 PROCEDURE — 99024 PR POST-OP FOLLOW-UP VISIT: ICD-10-PCS | Mod: ,,, | Performed by: NURSE PRACTITIONER

## 2023-01-19 NOTE — PROGRESS NOTES
HISTORY OF PRESENT ILLNESS:       No surgery found No surgery found      Pt is here today for First post-operative followup of his No surgery found.  he is doing well.  We have reviewed his findings and discussed plan of care and future treatment options, including the physical therapy plan.      Patient is here 1 week post op left knee arthroscopy. He is doing well. Still using crutches when he leaves home. Not having much pain. He starts therapy tomorrow. His incisions look good. Sutures removed and steri strips applied.                                                                                PHYSICAL EXAMINATION:     Incision sites healed well  No evidence of any erythema, infection or induration  Minimal effusion  2+ DP pulse  Knee ROM: 0-120                                                                               ASSESSMENT:                                                                                                                                               1. Status post above, doing well.                                                                                                                               PLAN:       Wounds were treated today  DVT prophylaxis discussed  Therapy plan discussed in great detail today; all questions answered.                     Discussed plan of care  Ok to work off crutches  RTC 4 weeks with Dr Garland                                                                                                                            There are no Patient Instructions on file for this visit.

## 2023-01-20 ENCOUNTER — CLINICAL SUPPORT (OUTPATIENT)
Dept: REHABILITATION | Facility: HOSPITAL | Age: 73
End: 2023-01-20
Payer: COMMERCIAL

## 2023-01-20 DIAGNOSIS — Z98.890 STATUS POST LATERAL MENISCECTOMY OF LEFT KNEE: Primary | ICD-10-CM

## 2023-01-20 DIAGNOSIS — R29.898 WEAKNESS OF LEFT LEG: ICD-10-CM

## 2023-01-20 DIAGNOSIS — M17.12 PRIMARY OSTEOARTHRITIS OF LEFT KNEE: ICD-10-CM

## 2023-01-20 DIAGNOSIS — M25.662 DECREASED RANGE OF MOTION (ROM) OF LEFT KNEE: ICD-10-CM

## 2023-01-20 PROCEDURE — 97110 THERAPEUTIC EXERCISES: CPT

## 2023-01-20 PROCEDURE — 97161 PT EVAL LOW COMPLEX 20 MIN: CPT

## 2023-01-20 NOTE — PLAN OF CARE
[facility]   Physical Therapy Initial Evaluation    Date: 1/20/2023   Name: Tray Jalloh  Clinic Number: [id number]    Therapy Diagnosis: Left Knee Meniscectomy   Physician: Dada Enriquez MD    Physician Orders: PT Eval and Treat   Medical Diagnosis from Referral: Left Knee Meniscectomy   Evaluation Date: 1/20/2023  Updated Plan of Care Due :   Authorization Period Expiration: 1/13/2023  Plan of Care Expiration: 3/3/2023  Visit # / Visits authorized: 1/ 20    Time In: 7:55 am   Time Out: 8:55 am   Total Appointment Time (timed & untimed codes): 60 minutes    Precautions: Standard; He is WBAT     Subjective   Date of onset: 1/11/2023  History of current condition - Tray reports: His Left had been hurting him pretty bad for the past 6 months. Dr Dada Enriquez performed a Medial and Lateral Meniscectomy of the Left Knee on 1/11/2023. He presents today to Outpatient Physical Therapy to begin his Rehab. He does have a complicated past medical history that includes Right Knee Surgery, Scoliosis causing significant curve to low back and uneven pelvis, and Left Hip pain.  X Ray noted there is thoracolumbar scoliosis with trunk shift to the right.  On the lateral there is decreased lumbar lordosis.  There is spondylotic disease with decreased disc height and osteophyte formation noted.  Grade 1 anterolisthesis at L3-4 and L4-5.       Patient underwent surgery on 1/11/2023. Op Note States in Part :  Procedure:  Procedure(s) (LRB):  ARTHROSCOPY, KNEE, WITH MENISCECTOMY (Left)  ARTHROSCOPIC RELEASE, KNEE, LATERAL RETINACULA (Left)   (Medial and lateral menisectomy)   Chondroplasty of the medial tibial plateau, medial femoral condylePF  negative compartmentalization or adhesions in the suprapatellar pouch.   Trochlear chondromalacia:grade I  Patella chondromalacia: grade I     Medial  Medial femoral chondyle chondromalacia : grade II  Medial tibial plateau chondromalacia grade III  Positive for  meniscus tear.     Lateral  Lateral femoral condyle chondromalacia: none  Lateral tibial plateau chondromalacia: none  Positive for meniscus tear.       For the medial meniscus, there was a horizontal cleavage tear extending from the root attachment all the way to the junction of the anterior and posterior horns.  At the junction between the anterior and posterior horns there was a full radial tear.  Along the anterior aspect of this the tear was quite degenerative and the body the meniscus appeared to be quite diminutive.  It is seen that meniscus repair would be achievable in this case.  A meniscectomy was performed at this point and the edges of the radial tear were debrided with a motorized shaver and the unstable edges of the horizontal cleavage tear posteriorly were trimmed with a biter and completed with a shaver.  Grade 3 chondral wear was focused on the posterior medial aspect of the tibia and this was lightly debrided and a debridement of the grade 2 chondromalacia of the medial femoral condyle was likewise performed.  Attention was then turned towards the lateral compartment where a lateral meniscectomy was performed on the periphery as well as 2 small tear between the junction of the anterior and posterior horns lateral meniscus.  The cartilage in this compartment appeared to be otherwise well preserved.  Excessive tightness of the patella was noted within the patellofemoral compartment grade 1 chondromalacia was seen on both the trochlea and patella.  This was debrided with a motorized shaver and a attention was then turned towards a lateral release.A release of the lateral retinaculum was performed.  An arthroscopic wand was used to release the lateral retinacular structures from the superior lateral edge to the inferior lateral edge of the patella.  This corrected the lateral patella tilt was present and served to off load the patellofemoral compartment.         Medical History:   Past Medical  History:   Diagnosis Date    Cervical radiculopathy     Chronic pain     Chronic renal impairment     COPD (chronic obstructive pulmonary disease)     Depression     Digestive disorder     GERD (gastroesophageal reflux disease)     Hypercholesteremia     Hypertension     Left ventricular hypertrophy     Lumbosacral spondylosis     Prediabetes     Pulmonary nodules     repeat CT due 12/03/21       Surgical History:   Tray Jalloh  has a past surgical history that includes Hernia repair; Back surgery; Radiofrequency thermocoagulation (Bilateral, 03/20/2012); Prostatectomy; Abdominal surgery; Radiofrequency thermocoagulation (Left, 03/18/2021); Eye surgery; Radiofrequency ablation of lumbar medial branch nerve at single level (Right, 04/01/2021); Bilateral L3-S1 MBB (Bilateral, 2-, 1-, 1-, 12-); Selective injection of anesthetic agent around lumbar spinal nerve root by transforaminal approach (Right, 01/13/2022); Spine surgery (1995); Vasectomy (Mar 1996); Knee arthroscopy w/ meniscal repair (Right); Shoulder arthroscopy (Right); Knee arthroscopy w/ meniscectomy (Left, 1/11/2023); and Lateral retinacula release of knee (Left, 1/11/2023).    Medications:   Tray has a current medication list which includes the following prescription(s): acetaminophen, amlodipine, ascorbic acid (vitamin c), breztri aerosphere, celecoxib, cetirizine, cholecalciferol (vitamin d3), crestor, cyanocobalamin, diclofenac sodium, diphenhydramine, folic acid/multivit-min/lutein, hydrocortisone, combivent respimat, ketoconazole, lisinopril, metaxalone, methenamine, fish oil-omega-3 fatty acids, omeprazole, ondansetron, oxycodone-acetaminophen, pregabalin, tolterodine, triamcinolone acetonide 0.1%, vitamin e, and zinc gluconate.    Allergies:   Review of patient's allergies indicates:   Allergen Reactions    Bee pollen     Grass pollen-june grass standard         Imaging,   Severe OA in the Left Knee  And On  "the AP there is thoracolumbar scoliosis with trunk shift to the right.  There are 5 non-rib-bearing lumbar vertebrae.  On the lateral there is decreased lumbar lordosis.  There is spondylotic disease with decreased disc height and osteophyte formation noted.  Grade 1 anterolisthesis at L3-4 and L4-5.     Prior Therapy: None for this   Social History:  lives with their family  Occupation: Works at [employer] - has to be on his feet a lot and gets down on his knees sometimes  Prior Level of Function: Able to work full time   Current Level of Function: Unable to work at this time; Has crutches for long distance ambulation    Pain:  Current 0/10, worst 4/10, best 0/10   Location: left knee    Description: Aching, Dull, and Tight  Aggravating Factors: Standing, Walking, Extension, Flexing, and Getting out of bed/chair  Easing Factors: relaxation, pain medication, ice, and rest    Patients goals: "I need to get this knee better so I can go back to work."    Objective       Observation : Scoliosis causes Right Convexity and Lateral shift right. This affects him in sitting and standing. The scoliosis does make standing and ambulation more difficult.    Incision :    Healing well      Girth Measurements :      Right Lower Extremity :  Mid Patella 40  cm         Left Lower Extremity :  Mid Patella 45  cm          Range of Motion/Strength :                  Left Extremity                                                                        Right Extremity   AROM PROM Strength  Location  AROM    PROM   Strength        Hip      Flexion (140)                       Extension (10)                       Internal Rotation (40)                       External Rotation (50)                       Abduction (45)                       Adduction (30)       115  120  4/5    Knee    Flexion (140)  135   5/5     -2   0  4/5                 Extension (0)  0   5/5         Ankle   Dorsiflexion (20)                        Plantar Flexion (50)    "                     Inversion (35)                        Eversion (25)                   Functional Impairments :  Patient is noted to have decreased stance time and antalgic gait on the Left. Patient has decreased heel strike and hits foot flat due to lack of Full Knee Extension with initial contact on the Left. Patient has decreased step/stride length resulting in slow milena.  He does have crutches that he uses for long distance ambulation at this time. He is able to walk short distances without an assistive device.       Limitation/Restriction for FOTO Intake Knee Survey    Therapist reviewed FOTO scores for Tray Jalloh on 1/20/2023.   FOTO documents entered into EPIC - see Media section.    Limitation Score: 41%         TREATMENT   Treatment Time In: 8:30 am   Treatment Time Out: 8:45 am   Total Treatment time (time-based codes) separate from Evaluation: 15 minutes      Tray received the treatments listed below:  THERAPEUTIC EXERCISES to develop strength, endurance, ROM, and flexibility for 15 minutes including :  Initiated the Home Exercise Program       Home Exercises and Patient Education Provided    Education provided:   - Discussed the findings from the Evaluation, Reviewed the Plan of Care, and Instructed patient on their Home Exercise Program      Written Home Exercises Provided: yes.  Exercises were reviewed and Tray was able to demonstrate them prior to the end of the session.  Tray demonstrated good  understanding of the education provided.     See EMR under Patient Instructions for exercises provided 1/20/2023.    Assessment   Tray is a 72 y.o. male referred to outpatient Physical Therapy with a medical diagnosis of Left Knee Meniscectomy. Tray reports: His Left had been hurting him pretty bad for the past 6 months. Dr Dada Enriquez performed a Medial and Lateral Meniscectomy of the Left Knee on 1/11/2023. He presents today to Outpatient Physical Therapy to begin his Rehab. He  does have a complicated past medical history that includes Right Knee Surgery, Scoliosis causing significant curve to low back and uneven pelvis, and Left Hip pain.  X Ray noted there is thoracolumbar scoliosis with trunk shift to the right.  On the lateral there is decreased lumbar lordosis.  There is spondylotic disease with decreased disc height and osteophyte formation noted.  Grade 1 anterolisthesis at L3-4 and L4-5. Scoliosis causes Right Convexity and Lateral shift right. This affects him in sitting and standing. The scoliosis does make standing and ambulation more difficult. Patient presents with decreased Left Knee Range of Motion and Strength as espected following surgery. He also continues to have edema in the Left knee. Discussed icing this at home. Patient is noted to have decreased stance time and antalgic gait on the Left. Patient has decreased heel strike and hits foot flat due to lack of Full Knee Extension with initial contact on the Left. Patient has decreased step/stride length resulting in slow milena.  He does have crutches that he uses for long distance ambulation at this time. He is able to walk short distances without an assistive device. Therapist initiated the basic Home Exercise Program for Knee Rehab. Therapist reviewed all the exercises and had patient return demonstration. Patient will benefit from Physical Therapy intervention to address all deficits and return patient to his prior level of function.         Patient prognosis is Good.   Patientt will benefit from skilled outpatient Physical Therapy to address the deficits stated above and in the chart below, provide patient /family education, and to maximize patientt's level of independence.     Plan of care discussed with patient: Yes  Patient's spiritual, cultural and educational needs considered and patient is agreeable to the plan of care and goals as stated below:     Anticipated Barriers for therapy: None    Goals:  Short Term  Goals: 3 weeks   1. Independent with Home Exercise Program   2. Increase Left Knee Range of Motion to 0 Degrees to 130 Degrees  3. Increase Left Knee Strength to grossly 4+/5  4. Patient will ambulate 500 feet with Normal Gait pattern with complaints of pain Less than or Equal to 3/10.      Long Term Goals: 6 weeks   1. Patient will increase Left Knee Strength to grossly 5/5  2. Patient will ambulate 1000+ feet with normal step/stride length with complaints of pain Less than or Equal to 1/10.        Plan   Plan of care Certification: 1/20/2023 to 3/3/2023.    Outpatient Physical Therapy 2 times weekly for 6 weeks to include the following interventions: Electrical Stimulation to increase strength and decrease pain, inflammation, and edema as needed, Gait Training, Manual Therapy, Moist Heat/ Ice, Neuromuscular Re-ed, Patient Education, and Therapeutic Exercise.     MAGALI OSBORNE, PT, DPT       I CERTIFY THE NEED FOR THESE SERVICES FURNISHED UNDER THIS PLAN OF TREATMENT AND WHILE UNDER MY CARE.    Physician's comments:      Physician's Signature: ___________________________________________________

## 2023-01-20 NOTE — PROGRESS NOTES
See Plan of Care     Sup Visit performed today with DANIEL Portillo and DANIEL Marlow.  All goals and treatment plan reviewed. Will work toward completion of all goals set.     Patient does have previous back and Left Hip problems that will have to be considered during his knee rehabilitation.   We will start conservative and advance to Cybex machines as able.    First Treatment May Include :     Bike   SB  Hamstring Stretch on Stairs   Knee Flexion Stretch on Stairs     Supine :   Quad Sets  Heel Slides   Short Arc Quads   Straight Leg Raises   Hip Abduction   Bridging     Sitting :  Marching   TKEs  Hamstring Curls   Hip Abd/Add  Ankle Pumps     Standing :  Standing Marches  Squat to chair/Shallow standing knee bends  Hip Abduction   Heel Raises     Single Leg Stance - Firm   Single Leg Stance - Foam

## 2023-01-23 ENCOUNTER — CLINICAL SUPPORT (OUTPATIENT)
Dept: REHABILITATION | Facility: HOSPITAL | Age: 73
End: 2023-01-23
Payer: COMMERCIAL

## 2023-01-23 DIAGNOSIS — Z98.890 STATUS POST LATERAL MENISCECTOMY OF LEFT KNEE: Primary | ICD-10-CM

## 2023-01-23 DIAGNOSIS — M25.662 DECREASED RANGE OF MOTION (ROM) OF LEFT KNEE: ICD-10-CM

## 2023-01-23 DIAGNOSIS — R29.898 WEAKNESS OF LEFT LEG: ICD-10-CM

## 2023-01-23 PROCEDURE — 97530 THERAPEUTIC ACTIVITIES: CPT | Mod: CQ

## 2023-01-23 PROCEDURE — 97110 THERAPEUTIC EXERCISES: CPT | Mod: CQ

## 2023-01-23 PROCEDURE — 97016 VASOPNEUMATIC DEVICE THERAPY: CPT | Mod: CQ

## 2023-01-23 NOTE — PROGRESS NOTES
[facility]   Physical Therapy Treatment Note     Name: Tray Jalloh  Clinic Number: [id number]  Physician: Dada Enriquez MD  Visit Date: 1/23/2023  Therapy Diagnosis: Left Knee Meniscectomy   Physician Orders: PT Eval and Treat   Medical Diagnosis from Referral: Left Knee Meniscectomy: DOS:1/11/2023    Evaluation Date: 1/20/2023  Updated Plan of Care Due :   Authorization Period Expiration: 1/13/2023  Plan of Care Expiration: 3/3/2023  Visit # / Visits authorized: 2/20  PTA Visit #: 1/5     Time In: 11:28 am  Time Out: 12:15 pm  Total Billable Time: 47 minutes    SUBJECTIVE     Pt reports: he was sore from the sit to stands and leg raises that were given on his home exercise program. His knee is swollen today.  He was compliant with home exercise program.  Response to previous treatment: first visit since evaluation   Functional change: n/a    Pain: 1/10  Location: left knee    Precautions: STANDARD, Patient is WBAT    Patient does have previous back and Left Hip problems that will have to be considered during his knee rehabilitation.   We will start conservative and advance to Cybex machines as able.    OBJECTIVE     Objective Measures updated at progress report unless specified.     EVALUATION: 115-120    -2-0    Treatment     Tray received the treatments listed below:      therapeutic exercises to develop strength, endurance, ROM, and flexibility for 23 minutes including:    Nustep: 5 minutes   SB stretch 3 x 20 second hold   Hamstring Stretch on Stairs 3 x 15 second hold LLE  Knee Flexion Stretch on Stairs 3 x 15 second hold LLE    Supine :   Quad Sets   Heel Slides   Short Arc Quads   Straight Leg Raises   Hip Abduction   Bridging     Sitting :  Marching   TKEs - 3 plates 3 x 10 LLE  Hamstring Curls   Hip Abd/Add  Ankle Pumps     Standing :  Heel Raises 2 x 10  Cybex bilateral hamstring curls 5 plates 3 x 10    neuromuscular re-education activities to improve:  Balance, Coordination, Sense, and Proprioception for 0 minutes. The following activities were included:  Single Leg Stance - Firm   Single Leg Stance - Foam     therapeutic activities to improve functional performance for 14 minutes, including:  Standing Marches x 20 bilateral at rail   Squat to chair/Shallow standing knee bends - at sink with chair x 15x   Hip Abduction bilateral 2 x 10 at rail     supervised modalities after being cleared for contradictions: Vasopneumatic compression with GameReady to left knee at medium pressure, coldest setting x 10 minutes to decrease edema and soreness.         Patient Education and Home Exercises     Home Exercises Provided and Patient Education Provided     Education provided:   - home exercise program review    Written Home Exercises Provided: Patient instructed to cont prior HEP. Exercises were reviewed and Tray was able to demonstrate them prior to the end of the session.  Tray demonstrated good  understanding of the education provided. See EMR under Patient Instructions for exercises provided during therapy sessions    ASSESSMENT     Supervisory visit with MAGALI OSBORNE PT, DPT  prior to initial PTA visit.     Patient arrived for initial visit after evaluation today with reports of soreness in anterior thigh and hip musculature. He presents with decreased tolerance to exercises due to extensive history of scoliosis and lumbar issues. Reports the knee is swollen today upon arrival, but he has been icing it at home. Patient tolerated today's treatment with functional mobility activities introduced. Ended with GameReady for vasopneumatic compression to left knee at conclusion of tx today. Will slowly progress as tolerated when Patient returns.       PMH from evaluation:   Tray is a 72 y.o. male referred to outpatient Physical Therapy with a medical diagnosis of Left Knee Meniscectomy. Tray reports: His Left had been hurting him pretty bad for the past 6  months. Dr Dada Enriquez performed a Medial and Lateral Meniscectomy of the Left Knee on 1/11/2023. He presents today to Outpatient Physical Therapy to begin his Rehab. He does have a complicated past medical history that includes Right Knee Surgery, Scoliosis causing significant curve to low back and uneven pelvis, and Left Hip pain.  X Ray noted there is thoracolumbar scoliosis with trunk shift to the right.  On the lateral there is decreased lumbar lordosis.  There is spondylotic disease with decreased disc height and osteophyte formation noted.  Grade 1 anterolisthesis at L3-4 and L4-5. Scoliosis causes Right Convexity and Lateral shift right. This affects him in sitting and standing. The scoliosis does make standing and ambulation more difficult. Patient presents with decreased Left Knee Range of Motion and Strength as espected following surgery. He also continues to have edema in the Left knee. Discussed icing this at home. Patient is noted to have decreased stance time and antalgic gait on the Left. Patient has decreased heel strike and hits foot flat due to lack of Full Knee Extension with initial contact on the Left. Patient has decreased step/stride length resulting in slow milena.  He does have crutches that he uses for long distance ambulation at this time. He is able to walk short distances without an assistive device. Therapist initiated the basic Home Exercise Program for Knee Rehab. Therapist reviewed all the exercises and had patient return demonstration. Patient will benefit from Physical Therapy intervention to address all deficits and return patient to his prior level of function.      Tray Is progressing towards his goals.   Pt prognosis is Good.     Pt will continue to benefit from skilled outpatient physical therapy to address the deficits listed in the problem list box on initial evaluation, provide pt/family education and to maximize pt's level of independence in the home and community  environment.     Pt's spiritual, cultural and educational needs considered and pt agreeable to plan of care and goals.     Anticipated Barriers for therapy: None     Goals:  Short Term Goals: 3 weeks   1. Independent with Home Exercise Program   2. Increase Left Knee Range of Motion to 0 Degrees to 130 Degrees  3. Increase Left Knee Strength to grossly 4+/5  4. Patient will ambulate 500 feet with Normal Gait pattern with complaints of pain Less than or Equal to 3/10.       Long Term Goals: 6 weeks   1. Patient will increase Left Knee Strength to grossly 5/5  2. Patient will ambulate 1000+ feet with normal step/stride length with complaints of pain Less than or Equal to 1/10.     PLAN     Plan of care Certification: 1/20/2023 to 3/3/2023.     Outpatient Physical Therapy 2 times weekly for 6 weeks to include the following interventions: Electrical Stimulation to increase strength and decrease pain, inflammation, and edema as needed, Gait Training, Manual Therapy, Moist Heat/ Ice, Neuromuscular Re-ed, Patient Education, and Therapeutic Exercise.     Omari Swift, PTA   01/23/2023

## 2023-01-24 ENCOUNTER — OFFICE VISIT (OUTPATIENT)
Dept: PAIN MEDICINE | Facility: CLINIC | Age: 73
End: 2023-01-24
Payer: COMMERCIAL

## 2023-01-24 VITALS
DIASTOLIC BLOOD PRESSURE: 77 MMHG | HEIGHT: 70 IN | HEART RATE: 77 BPM | RESPIRATION RATE: 12 BRPM | WEIGHT: 201 LBS | SYSTOLIC BLOOD PRESSURE: 132 MMHG | BODY MASS INDEX: 28.77 KG/M2

## 2023-01-24 DIAGNOSIS — G89.29 CHRONIC PAIN OF LEFT KNEE: Chronic | ICD-10-CM

## 2023-01-24 DIAGNOSIS — M25.50 POLYARTHRALGIA: Chronic | ICD-10-CM

## 2023-01-24 DIAGNOSIS — M47.817 SPONDYLOSIS OF LUMBOSACRAL REGION WITHOUT MYELOPATHY OR RADICULOPATHY: Primary | Chronic | ICD-10-CM

## 2023-01-24 DIAGNOSIS — M25.562 CHRONIC PAIN OF LEFT KNEE: Chronic | ICD-10-CM

## 2023-01-24 PROCEDURE — 1159F MED LIST DOCD IN RCRD: CPT | Mod: ,,, | Performed by: PHYSICIAN ASSISTANT

## 2023-01-24 PROCEDURE — 1125F PR PAIN SEVERITY QUANTIFIED, PAIN PRESENT: ICD-10-PCS | Mod: ,,, | Performed by: PHYSICIAN ASSISTANT

## 2023-01-24 PROCEDURE — 1157F ADVNC CARE PLAN IN RCRD: CPT | Mod: ,,, | Performed by: PHYSICIAN ASSISTANT

## 2023-01-24 PROCEDURE — 1157F PR ADVANCE CARE PLAN OR EQUIV PRESENT IN MEDICAL RECORD: ICD-10-PCS | Mod: ,,, | Performed by: PHYSICIAN ASSISTANT

## 2023-01-24 PROCEDURE — 3075F SYST BP GE 130 - 139MM HG: CPT | Mod: ,,, | Performed by: PHYSICIAN ASSISTANT

## 2023-01-24 PROCEDURE — 99214 PR OFFICE/OUTPT VISIT, EST, LEVL IV, 30-39 MIN: ICD-10-PCS | Mod: S$PBB,,, | Performed by: PHYSICIAN ASSISTANT

## 2023-01-24 PROCEDURE — 3008F PR BODY MASS INDEX (BMI) DOCUMENTED: ICD-10-PCS | Mod: ,,, | Performed by: PHYSICIAN ASSISTANT

## 2023-01-24 PROCEDURE — 99215 OFFICE O/P EST HI 40 MIN: CPT | Mod: PBBFAC | Performed by: PHYSICIAN ASSISTANT

## 2023-01-24 PROCEDURE — 3075F PR MOST RECENT SYSTOLIC BLOOD PRESS GE 130-139MM HG: ICD-10-PCS | Mod: ,,, | Performed by: PHYSICIAN ASSISTANT

## 2023-01-24 PROCEDURE — 3078F PR MOST RECENT DIASTOLIC BLOOD PRESSURE < 80 MM HG: ICD-10-PCS | Mod: ,,, | Performed by: PHYSICIAN ASSISTANT

## 2023-01-24 PROCEDURE — 99214 OFFICE O/P EST MOD 30 MIN: CPT | Mod: S$PBB,,, | Performed by: PHYSICIAN ASSISTANT

## 2023-01-24 PROCEDURE — 1125F AMNT PAIN NOTED PAIN PRSNT: CPT | Mod: ,,, | Performed by: PHYSICIAN ASSISTANT

## 2023-01-24 PROCEDURE — 3288F FALL RISK ASSESSMENT DOCD: CPT | Mod: ,,, | Performed by: PHYSICIAN ASSISTANT

## 2023-01-24 PROCEDURE — 1101F PT FALLS ASSESS-DOCD LE1/YR: CPT | Mod: ,,, | Performed by: PHYSICIAN ASSISTANT

## 2023-01-24 PROCEDURE — 3008F BODY MASS INDEX DOCD: CPT | Mod: ,,, | Performed by: PHYSICIAN ASSISTANT

## 2023-01-24 PROCEDURE — 1101F PR PT FALLS ASSESS DOC 0-1 FALLS W/OUT INJ PAST YR: ICD-10-PCS | Mod: ,,, | Performed by: PHYSICIAN ASSISTANT

## 2023-01-24 PROCEDURE — 3078F DIAST BP <80 MM HG: CPT | Mod: ,,, | Performed by: PHYSICIAN ASSISTANT

## 2023-01-24 PROCEDURE — 3288F PR FALLS RISK ASSESSMENT DOCUMENTED: ICD-10-PCS | Mod: ,,, | Performed by: PHYSICIAN ASSISTANT

## 2023-01-24 PROCEDURE — 1159F PR MEDICATION LIST DOCUMENTED IN MEDICAL RECORD: ICD-10-PCS | Mod: ,,, | Performed by: PHYSICIAN ASSISTANT

## 2023-01-24 NOTE — PROGRESS NOTES
Disclaimer:  This note has been generated using voice recognition software.  There may be type of graft focal areas that have been missed during a proof reading      Subjective:      Patient ID: Tray Jalloh is a 72 y.o. male.    Chief Complaint: Low-back Pain and Knee Pain (Knee surgery on 1/11/23)      Pain  This is a chronic problem. The current episode started more than 1 year ago. The problem occurs daily. The problem has been unchanged. Associated symptoms include arthralgias. Pertinent negatives include no anorexia, chest pain, chills, coughing, diaphoresis, fatigue, fever, neck pain, sore throat, vertigo or vomiting.   Review of Systems   Constitutional:  Negative for activity change, appetite change, chills, diaphoresis, fatigue, fever and unexpected weight change.   HENT:  Negative for drooling, ear discharge, ear pain, facial swelling, nosebleeds, sore throat, trouble swallowing, voice change and goiter.    Eyes:  Negative for photophobia, pain, discharge, redness and visual disturbance.   Respiratory:  Negative for apnea, cough, choking, chest tightness, shortness of breath, wheezing and stridor.    Cardiovascular:  Negative for chest pain, palpitations and leg swelling.   Gastrointestinal:  Negative for abdominal distention, anorexia, diarrhea, rectal pain, vomiting and fecal incontinence.   Endocrine: Negative for cold intolerance, heat intolerance, polydipsia, polyphagia and polyuria.   Genitourinary:  Negative for flank pain and frequency.   Musculoskeletal:  Positive for arthralgias and back pain. Negative for neck pain and neck stiffness.   Integumentary:  Negative for color change and pallor.   Neurological:  Negative for dizziness, vertigo, seizures, syncope, facial asymmetry, speech difficulty, light-headedness, coordination difficulties, memory loss and coordination difficulties.   Hematological:  Negative for adenopathy. Does not bruise/bleed easily.   Psychiatric/Behavioral:   "Negative for agitation, behavioral problems, confusion, decreased concentration, dysphoric mood, hallucinations, self-injury and suicidal ideas. The patient is not nervous/anxious and is not hyperactive.           Objective:  Vitals:    01/24/23 1054   BP: 132/77   Pulse: 77   Resp: 12   Weight: 91.2 kg (201 lb)   Height: 5' 10" (1.778 m)   PainSc:   1   PainLoc: Back         Physical Exam  Vitals and nursing note reviewed. Exam conducted with a chaperone present.   Constitutional:       General: He is awake. He is not in acute distress.     Appearance: Normal appearance. He is not ill-appearing, toxic-appearing or diaphoretic.   HENT:      Head: Normocephalic and atraumatic.      Nose: Nose normal.      Mouth/Throat:      Mouth: Mucous membranes are moist.      Pharynx: Oropharynx is clear.   Eyes:      Conjunctiva/sclera: Conjunctivae normal.      Pupils: Pupils are equal, round, and reactive to light.   Cardiovascular:      Rate and Rhythm: Normal rate.   Pulmonary:      Effort: Pulmonary effort is normal. No respiratory distress.   Abdominal:      Palpations: Abdomen is soft.      Tenderness: There is no guarding.   Musculoskeletal:         General: No signs of injury. Normal range of motion.      Cervical back: Normal range of motion and neck supple. No rigidity.   Skin:     General: Skin is warm and dry.      Coloration: Skin is not jaundiced or pale.   Neurological:      General: No focal deficit present.      Mental Status: He is alert and oriented to person, place, and time. Mental status is at baseline.      Cranial Nerves: No cranial nerve deficit (II-XII).   Psychiatric:         Mood and Affect: Mood normal.         Behavior: Behavior normal. Behavior is cooperative.         Thought Content: Thought content normal.         X-Ray Chest PA And Lateral  Narrative: EXAMINATION:  XR CHEST PA AND LATERAL    CLINICAL HISTORY:  Encounter for other preprocedural examination    COMPARISON:  18 July " 2022    FINDINGS:  The heart and mediastinum are normal in size and configuration.  The pulmonary vascularity is normal in caliber.  No lung infiltrates, effusions, pneumothorax or other abnormality is demonstrated.  Impression: No evidence of cardiopulmonary disease.    Electronically signed by: Fantasma Lutz  Date:    01/04/2023  Time:    12:14       Office Visit on 01/04/2023   Component Date Value Ref Range Status    Sodium 01/04/2023 143  136 - 145 mmol/L Final    Potassium 01/04/2023 4.6  3.5 - 5.1 mmol/L Final    Chloride 01/04/2023 110 (H)  98 - 107 mmol/L Final    CO2 01/04/2023 22  21 - 32 mmol/L Final    Anion Gap 01/04/2023 16  7 - 16 mmol/L Final    Glucose 01/04/2023 110 (H)  74 - 106 mg/dL Final    BUN 01/04/2023 30 (H)  7 - 18 mg/dL Final    Creatinine 01/04/2023 1.14  0.70 - 1.30 mg/dL Final    BUN/Creatinine Ratio 01/04/2023 26 (H)  6 - 20 Final    Calcium 01/04/2023 9.2  8.5 - 10.1 mg/dL Final    eGFR 01/04/2023 69  >=60 mL/min/1.73m² Final    WBC 01/04/2023 5.76  4.50 - 11.00 K/uL Final    RBC 01/04/2023 3.88 (L)  4.60 - 6.20 M/uL Final    Hemoglobin 01/04/2023 11.9 (L)  13.5 - 18.0 g/dL Final    Hematocrit 01/04/2023 36.6 (L)  40.0 - 54.0 % Final    MCV 01/04/2023 94.3  80.0 - 96.0 fL Final    MCH 01/04/2023 30.7  27.0 - 31.0 pg Final    MCHC 01/04/2023 32.5  32.0 - 36.0 g/dL Final    RDW 01/04/2023 11.9  11.5 - 14.5 % Final    Platelet Count 01/04/2023 280  150 - 400 K/uL Final    MPV 01/04/2023 10.2  9.4 - 12.4 fL Final    Neutrophils % 01/04/2023 48.2 (L)  53.0 - 65.0 % Final    Lymphocytes % 01/04/2023 37.3  27.0 - 41.0 % Final    Monocytes % 01/04/2023 6.9 (H)  2.0 - 6.0 % Final    Eosinophils % 01/04/2023 6.6 (H)  1.0 - 4.0 % Final    Basophils % 01/04/2023 0.7  0.0 - 1.0 % Final    Immature Granulocytes % 01/04/2023 0.3  0.0 - 0.4 % Final    nRBC, Auto 01/04/2023 0.0  <=0.0 % Final    Neutrophils, Abs 01/04/2023 2.77  1.80 - 7.70 K/uL Final    Lymphocytes, Absolute 01/04/2023 2.15  1.00  - 4.80 K/uL Final    Monocytes, Absolute 01/04/2023 0.40  0.00 - 0.80 K/uL Final    Eosinophils, Absolute 01/04/2023 0.38  0.00 - 0.50 K/uL Final    Basophils, Absolute 01/04/2023 0.04  0.00 - 0.20 K/uL Final    Immature Granulocytes, Absolute 01/04/2023 0.02  0.00 - 0.04 K/uL Final    nRBC, Absolute 01/04/2023 0.00  <=0.00 x10e3/uL Final    Diff Type 01/04/2023 Auto   Final   Office Visit on 12/27/2022   Component Date Value Ref Range Status    SARS Coronavirus 2 Antigen 12/27/2022 Positive (A)  Negative Final    Rapid Influenza A Ag 12/27/2022 Negative  Negative Final    Rapid Influenza B Ag 12/27/2022 Negative  Negative Final     Acceptable 12/27/2022 Yes   Final   Patient Outreach on 12/08/2022   Component Date Value Ref Range Status    CRC Recommendation External 09/04/2012 Repeat colonoscopy in 10 years   Final-Edited   Office Visit on 11/16/2022   Component Date Value Ref Range Status    Cologuard Result 12/08/2022 Positive (A)  Negative Final    Sodium 11/16/2022 139  136 - 145 mmol/L Final    Potassium 11/16/2022 4.4  3.5 - 5.1 mmol/L Final    Chloride 11/16/2022 109 (H)  98 - 107 mmol/L Final    CO2 11/16/2022 23  21 - 32 mmol/L Final    Anion Gap 11/16/2022 11  7 - 16 mmol/L Final    Glucose 11/16/2022 102  74 - 106 mg/dL Final    BUN 11/16/2022 21 (H)  7 - 18 mg/dL Final    Creatinine 11/16/2022 1.00  0.70 - 1.30 mg/dL Final    BUN/Creatinine Ratio 11/16/2022 21 (H)  6 - 20 Final    Calcium 11/16/2022 9.0  8.5 - 10.1 mg/dL Final    eGFR 11/16/2022 80  >=60 mL/min/1.73m² Final    Hemoglobin A1C 11/16/2022 5.6  4.5 - 6.6 % Final    Estimated Average Glucose 11/16/2022 100  mg/dL Final    Iron 11/16/2022 100  65 - 175 µg/dL Final    Iron Saturation 11/16/2022 37  14 - 50 % Final    TIBC 11/16/2022 272  250 - 450 µg/dL Final    Ferritin 11/16/2022 21 (L)  26 - 388 ng/mL Final    Vitamin B12 11/16/2022 668  193 - 986 pg/mL Final    Folate 11/16/2022 >20.0 (H)  3.1 - 17.5 ng/mL Final    WBC  11/16/2022 7.31  4.50 - 11.00 K/uL Final    RBC 11/16/2022 3.97 (L)  4.60 - 6.20 M/uL Final    Hemoglobin 11/16/2022 12.6 (L)  13.5 - 18.0 g/dL Final    Hematocrit 11/16/2022 38.0 (L)  40.0 - 54.0 % Final    MCV 11/16/2022 95.7  80.0 - 96.0 fL Final    MCH 11/16/2022 31.7 (H)  27.0 - 31.0 pg Final    MCHC 11/16/2022 33.2  32.0 - 36.0 g/dL Final    RDW 11/16/2022 11.8  11.5 - 14.5 % Final    Platelet Count 11/16/2022 308  150 - 400 K/uL Final    MPV 11/16/2022 10.1  9.4 - 12.4 fL Final    Neutrophils % 11/16/2022 49.7 (L)  53.0 - 65.0 % Final    Lymphocytes % 11/16/2022 32.7  27.0 - 41.0 % Final    Monocytes % 11/16/2022 9.4 (H)  2.0 - 6.0 % Final    Eosinophils % 11/16/2022 7.0 (H)  1.0 - 4.0 % Final    Basophils % 11/16/2022 1.1 (H)  0.0 - 1.0 % Final    Immature Granulocytes % 11/16/2022 0.1  0.0 - 0.4 % Final    nRBC, Auto 11/16/2022 0.0  <=0.0 % Final    Neutrophils, Abs 11/16/2022 3.63  1.80 - 7.70 K/uL Final    Lymphocytes, Absolute 11/16/2022 2.39  1.00 - 4.80 K/uL Final    Monocytes, Absolute 11/16/2022 0.69  0.00 - 0.80 K/uL Final    Eosinophils, Absolute 11/16/2022 0.51 (H)  0.00 - 0.50 K/uL Final    Basophils, Absolute 11/16/2022 0.08  0.00 - 0.20 K/uL Final    Immature Granulocytes, Absolute 11/16/2022 0.01  0.00 - 0.04 K/uL Final    nRBC, Absolute 11/16/2022 0.00  <=0.00 x10e3/uL Final    Diff Type 11/16/2022 Auto   Final           Assessment:      No diagnosis found.        No orders of the defined types were placed in this encounter.          Requested Prescriptions      No prescriptions requested or ordered in this encounter         Plan:    Not using narcotics from our office    Follows orthopedics [facility]    He had L4/5, L5/S1 right-sided TFESI # 1 January 13, 2022  He states he had 97% relief after procedure note is ongoing    Today's complain mostly back pain lower back area worse with flexion extension rotation lumbar spine ongoing for 3 months, his pain is facet joint in nature    He is  requesting procedure for back pain    X-ray pelvis [facility] October 2021, degenerative changes no fracture noted    X-ray right knee [facility] September 2021 degenerative changes multiple metallic fragments noted    MRI lumbar spine [facility] December 2020 multiple level degenerative changes malalignment noted    Continue home exercise program as directed    Indications for this procedure for this specific patient include the following   - Pt has had symptoms for three months with moderate to severe pain with functional impairment rated of 7/10 pain.   - Pain non-responsive to conservative care.    - Pain predominately axial and not associated with radiculopathy or claudication.    - No non-facet pathology as source of pain.    - Clinical assessment implicates facet joint as putative pain source.    - facet loading maneuver positive  - Pain is exacerbated by extension or prolonged sitting/standing and relieved by rest.    - No unexplained neurologic deficit.    - No history of coagulopathy, infection or unstable medical conditions.    - Pain is causing significant functional limitation resulting in diminished quality of life and impaired age appropriate ADL's.   - Clinical assessment implicates facet joint as putative source of pain  - Repeat injections not done prior to 7 days   - no more than 2 levels will be done    The planned medically necessary  surgical procedure is performed in a hospital outpatient department and not in an ambulatory surgical center due to:     -there is no geographically assessable ambulatory surgery center that has the  necessary equipment and fluoroscopy needed for the procedure     -there is no geographically assessable ambulatory surgical center available at which the physician has privileges     -an ASC's  specific  guideline regarding the individuals weight or health conditions that prevent the use of an ASC    Monitor anesthesia request is medically indicated for the  scheduled nerve block procedure due to:  1- needle phobia and anxiety, placing  the patient at risk during the provided service.  2-patient has an ASA class greater than 3 and requires constant presence of an anesthesiologist during the procedure,   3-patient has severe problems hard to lie still  4-patient suffers from chronic pain and is unable to function due to  diminished ADLs    Schedule right lumbar RF TC L4 through S1, lumbosacral spondylosis    Proceed with left lumbar RF TC L4 through S1 after completing right side    Dr. Patel,

## 2023-01-24 NOTE — PATIENT INSTRUCTIONS
Procedure Instructions:    Nothing to eat or drink for 8 hours or after midnight including gum, candy, mints, or tobacco products.  If you are scheduled for 1:30 or later nothing to eat or drink after 5 a.m. the morning of the procedure, including gum, candy, mints, or tobacco products.  Must have a  at least 18 yrs of age to stay present at all times  No Diabetic medications the morning of procedure, check blood sugar the morning of procedure, if it is greater than 200 call the office at [phone]  If you are started on antibiotics or have been prescribed antibiotics, have a fever, or have any other type of infection call to reschedule procedure.  If you take blood pressure medications you can take it at your regular scheduled time with a small sip of WATER!    HOLD ASPIRIN AND ASPIRIN PRODUCTS  (ASPIRIN, BC POWDER ETC. ) FOR 7 DAYS  PRIOR TO PROCEDURE  HOLD NSAIDS( ibuprofen, mobic, meloxicam, advil, diclofenac, naproxen, relafen, celebrex,  methotrexate, aleve etc....)  FOR 3 DAYS   PRIOR TO PROCEDURE    Hold celebrex starting on 2/6

## 2023-01-24 NOTE — H&P (VIEW-ONLY)
Disclaimer:  This note has been generated using voice recognition software.  There may be type of graft focal areas that have been missed during a proof reading      Subjective:      Patient ID: Tray Jalloh is a 72 y.o. male.    Chief Complaint: Low-back Pain and Knee Pain (Knee surgery on 1/11/23)      Pain  This is a chronic problem. The current episode started more than 1 year ago. The problem occurs daily. The problem has been unchanged. Associated symptoms include arthralgias. Pertinent negatives include no anorexia, chest pain, chills, coughing, diaphoresis, fatigue, fever, neck pain, sore throat, vertigo or vomiting.   Review of Systems   Constitutional:  Negative for activity change, appetite change, chills, diaphoresis, fatigue, fever and unexpected weight change.   HENT:  Negative for drooling, ear discharge, ear pain, facial swelling, nosebleeds, sore throat, trouble swallowing, voice change and goiter.    Eyes:  Negative for photophobia, pain, discharge, redness and visual disturbance.   Respiratory:  Negative for apnea, cough, choking, chest tightness, shortness of breath, wheezing and stridor.    Cardiovascular:  Negative for chest pain, palpitations and leg swelling.   Gastrointestinal:  Negative for abdominal distention, anorexia, diarrhea, rectal pain, vomiting and fecal incontinence.   Endocrine: Negative for cold intolerance, heat intolerance, polydipsia, polyphagia and polyuria.   Genitourinary:  Negative for flank pain and frequency.   Musculoskeletal:  Positive for arthralgias and back pain. Negative for neck pain and neck stiffness.   Integumentary:  Negative for color change and pallor.   Neurological:  Negative for dizziness, vertigo, seizures, syncope, facial asymmetry, speech difficulty, light-headedness, coordination difficulties, memory loss and coordination difficulties.   Hematological:  Negative for adenopathy. Does not bruise/bleed easily.   Psychiatric/Behavioral:   "Negative for agitation, behavioral problems, confusion, decreased concentration, dysphoric mood, hallucinations, self-injury and suicidal ideas. The patient is not nervous/anxious and is not hyperactive.           Objective:  Vitals:    01/24/23 1054   BP: 132/77   Pulse: 77   Resp: 12   Weight: 91.2 kg (201 lb)   Height: 5' 10" (1.778 m)   PainSc:   1   PainLoc: Back         Physical Exam  Vitals and nursing note reviewed. Exam conducted with a chaperone present.   Constitutional:       General: He is awake. He is not in acute distress.     Appearance: Normal appearance. He is not ill-appearing, toxic-appearing or diaphoretic.   HENT:      Head: Normocephalic and atraumatic.      Nose: Nose normal.      Mouth/Throat:      Mouth: Mucous membranes are moist.      Pharynx: Oropharynx is clear.   Eyes:      Conjunctiva/sclera: Conjunctivae normal.      Pupils: Pupils are equal, round, and reactive to light.   Cardiovascular:      Rate and Rhythm: Normal rate.   Pulmonary:      Effort: Pulmonary effort is normal. No respiratory distress.   Abdominal:      Palpations: Abdomen is soft.      Tenderness: There is no guarding.   Musculoskeletal:         General: No signs of injury. Normal range of motion.      Cervical back: Normal range of motion and neck supple. No rigidity.   Skin:     General: Skin is warm and dry.      Coloration: Skin is not jaundiced or pale.   Neurological:      General: No focal deficit present.      Mental Status: He is alert and oriented to person, place, and time. Mental status is at baseline.      Cranial Nerves: No cranial nerve deficit (II-XII).   Psychiatric:         Mood and Affect: Mood normal.         Behavior: Behavior normal. Behavior is cooperative.         Thought Content: Thought content normal.         X-Ray Chest PA And Lateral  Narrative: EXAMINATION:  XR CHEST PA AND LATERAL    CLINICAL HISTORY:  Encounter for other preprocedural examination    COMPARISON:  18 July " 2022    FINDINGS:  The heart and mediastinum are normal in size and configuration.  The pulmonary vascularity is normal in caliber.  No lung infiltrates, effusions, pneumothorax or other abnormality is demonstrated.  Impression: No evidence of cardiopulmonary disease.    Electronically signed by: Fantasma Lutz  Date:    01/04/2023  Time:    12:14       Office Visit on 01/04/2023   Component Date Value Ref Range Status    Sodium 01/04/2023 143  136 - 145 mmol/L Final    Potassium 01/04/2023 4.6  3.5 - 5.1 mmol/L Final    Chloride 01/04/2023 110 (H)  98 - 107 mmol/L Final    CO2 01/04/2023 22  21 - 32 mmol/L Final    Anion Gap 01/04/2023 16  7 - 16 mmol/L Final    Glucose 01/04/2023 110 (H)  74 - 106 mg/dL Final    BUN 01/04/2023 30 (H)  7 - 18 mg/dL Final    Creatinine 01/04/2023 1.14  0.70 - 1.30 mg/dL Final    BUN/Creatinine Ratio 01/04/2023 26 (H)  6 - 20 Final    Calcium 01/04/2023 9.2  8.5 - 10.1 mg/dL Final    eGFR 01/04/2023 69  >=60 mL/min/1.73m² Final    WBC 01/04/2023 5.76  4.50 - 11.00 K/uL Final    RBC 01/04/2023 3.88 (L)  4.60 - 6.20 M/uL Final    Hemoglobin 01/04/2023 11.9 (L)  13.5 - 18.0 g/dL Final    Hematocrit 01/04/2023 36.6 (L)  40.0 - 54.0 % Final    MCV 01/04/2023 94.3  80.0 - 96.0 fL Final    MCH 01/04/2023 30.7  27.0 - 31.0 pg Final    MCHC 01/04/2023 32.5  32.0 - 36.0 g/dL Final    RDW 01/04/2023 11.9  11.5 - 14.5 % Final    Platelet Count 01/04/2023 280  150 - 400 K/uL Final    MPV 01/04/2023 10.2  9.4 - 12.4 fL Final    Neutrophils % 01/04/2023 48.2 (L)  53.0 - 65.0 % Final    Lymphocytes % 01/04/2023 37.3  27.0 - 41.0 % Final    Monocytes % 01/04/2023 6.9 (H)  2.0 - 6.0 % Final    Eosinophils % 01/04/2023 6.6 (H)  1.0 - 4.0 % Final    Basophils % 01/04/2023 0.7  0.0 - 1.0 % Final    Immature Granulocytes % 01/04/2023 0.3  0.0 - 0.4 % Final    nRBC, Auto 01/04/2023 0.0  <=0.0 % Final    Neutrophils, Abs 01/04/2023 2.77  1.80 - 7.70 K/uL Final    Lymphocytes, Absolute 01/04/2023 2.15  1.00  - 4.80 K/uL Final    Monocytes, Absolute 01/04/2023 0.40  0.00 - 0.80 K/uL Final    Eosinophils, Absolute 01/04/2023 0.38  0.00 - 0.50 K/uL Final    Basophils, Absolute 01/04/2023 0.04  0.00 - 0.20 K/uL Final    Immature Granulocytes, Absolute 01/04/2023 0.02  0.00 - 0.04 K/uL Final    nRBC, Absolute 01/04/2023 0.00  <=0.00 x10e3/uL Final    Diff Type 01/04/2023 Auto   Final   Office Visit on 12/27/2022   Component Date Value Ref Range Status    SARS Coronavirus 2 Antigen 12/27/2022 Positive (A)  Negative Final    Rapid Influenza A Ag 12/27/2022 Negative  Negative Final    Rapid Influenza B Ag 12/27/2022 Negative  Negative Final     Acceptable 12/27/2022 Yes   Final   Patient Outreach on 12/08/2022   Component Date Value Ref Range Status    CRC Recommendation External 09/04/2012 Repeat colonoscopy in 10 years   Final-Edited   Office Visit on 11/16/2022   Component Date Value Ref Range Status    Cologuard Result 12/08/2022 Positive (A)  Negative Final    Sodium 11/16/2022 139  136 - 145 mmol/L Final    Potassium 11/16/2022 4.4  3.5 - 5.1 mmol/L Final    Chloride 11/16/2022 109 (H)  98 - 107 mmol/L Final    CO2 11/16/2022 23  21 - 32 mmol/L Final    Anion Gap 11/16/2022 11  7 - 16 mmol/L Final    Glucose 11/16/2022 102  74 - 106 mg/dL Final    BUN 11/16/2022 21 (H)  7 - 18 mg/dL Final    Creatinine 11/16/2022 1.00  0.70 - 1.30 mg/dL Final    BUN/Creatinine Ratio 11/16/2022 21 (H)  6 - 20 Final    Calcium 11/16/2022 9.0  8.5 - 10.1 mg/dL Final    eGFR 11/16/2022 80  >=60 mL/min/1.73m² Final    Hemoglobin A1C 11/16/2022 5.6  4.5 - 6.6 % Final    Estimated Average Glucose 11/16/2022 100  mg/dL Final    Iron 11/16/2022 100  65 - 175 µg/dL Final    Iron Saturation 11/16/2022 37  14 - 50 % Final    TIBC 11/16/2022 272  250 - 450 µg/dL Final    Ferritin 11/16/2022 21 (L)  26 - 388 ng/mL Final    Vitamin B12 11/16/2022 668  193 - 986 pg/mL Final    Folate 11/16/2022 >20.0 (H)  3.1 - 17.5 ng/mL Final    WBC  11/16/2022 7.31  4.50 - 11.00 K/uL Final    RBC 11/16/2022 3.97 (L)  4.60 - 6.20 M/uL Final    Hemoglobin 11/16/2022 12.6 (L)  13.5 - 18.0 g/dL Final    Hematocrit 11/16/2022 38.0 (L)  40.0 - 54.0 % Final    MCV 11/16/2022 95.7  80.0 - 96.0 fL Final    MCH 11/16/2022 31.7 (H)  27.0 - 31.0 pg Final    MCHC 11/16/2022 33.2  32.0 - 36.0 g/dL Final    RDW 11/16/2022 11.8  11.5 - 14.5 % Final    Platelet Count 11/16/2022 308  150 - 400 K/uL Final    MPV 11/16/2022 10.1  9.4 - 12.4 fL Final    Neutrophils % 11/16/2022 49.7 (L)  53.0 - 65.0 % Final    Lymphocytes % 11/16/2022 32.7  27.0 - 41.0 % Final    Monocytes % 11/16/2022 9.4 (H)  2.0 - 6.0 % Final    Eosinophils % 11/16/2022 7.0 (H)  1.0 - 4.0 % Final    Basophils % 11/16/2022 1.1 (H)  0.0 - 1.0 % Final    Immature Granulocytes % 11/16/2022 0.1  0.0 - 0.4 % Final    nRBC, Auto 11/16/2022 0.0  <=0.0 % Final    Neutrophils, Abs 11/16/2022 3.63  1.80 - 7.70 K/uL Final    Lymphocytes, Absolute 11/16/2022 2.39  1.00 - 4.80 K/uL Final    Monocytes, Absolute 11/16/2022 0.69  0.00 - 0.80 K/uL Final    Eosinophils, Absolute 11/16/2022 0.51 (H)  0.00 - 0.50 K/uL Final    Basophils, Absolute 11/16/2022 0.08  0.00 - 0.20 K/uL Final    Immature Granulocytes, Absolute 11/16/2022 0.01  0.00 - 0.04 K/uL Final    nRBC, Absolute 11/16/2022 0.00  <=0.00 x10e3/uL Final    Diff Type 11/16/2022 Auto   Final           Assessment:      No diagnosis found.        No orders of the defined types were placed in this encounter.          Requested Prescriptions      No prescriptions requested or ordered in this encounter         Plan:    Not using narcotics from our office    Follows orthopedics [facility]    He had L4/5, L5/S1 right-sided TFESI # 1 January 13, 2022  He states he had 97% relief after procedure note is ongoing    Today's complain mostly back pain lower back area worse with flexion extension rotation lumbar spine ongoing for 3 months, his pain is facet joint in nature    He is  requesting procedure for back pain    X-ray pelvis [facility] October 2021, degenerative changes no fracture noted    X-ray right knee [facility] September 2021 degenerative changes multiple metallic fragments noted    MRI lumbar spine [facility] December 2020 multiple level degenerative changes malalignment noted    Continue home exercise program as directed    Indications for this procedure for this specific patient include the following   - Pt has had symptoms for three months with moderate to severe pain with functional impairment rated of 7/10 pain.   - Pain non-responsive to conservative care.    - Pain predominately axial and not associated with radiculopathy or claudication.    - No non-facet pathology as source of pain.    - Clinical assessment implicates facet joint as putative pain source.    - facet loading maneuver positive  - Pain is exacerbated by extension or prolonged sitting/standing and relieved by rest.    - No unexplained neurologic deficit.    - No history of coagulopathy, infection or unstable medical conditions.    - Pain is causing significant functional limitation resulting in diminished quality of life and impaired age appropriate ADL's.   - Clinical assessment implicates facet joint as putative source of pain  - Repeat injections not done prior to 7 days   - no more than 2 levels will be done    The planned medically necessary  surgical procedure is performed in a hospital outpatient department and not in an ambulatory surgical center due to:     -there is no geographically assessable ambulatory surgery center that has the  necessary equipment and fluoroscopy needed for the procedure     -there is no geographically assessable ambulatory surgical center available at which the physician has privileges     -an ASC's  specific  guideline regarding the individuals weight or health conditions that prevent the use of an ASC    Monitor anesthesia request is medically indicated for the  scheduled nerve block procedure due to:  1- needle phobia and anxiety, placing  the patient at risk during the provided service.  2-patient has an ASA class greater than 3 and requires constant presence of an anesthesiologist during the procedure,   3-patient has severe problems hard to lie still  4-patient suffers from chronic pain and is unable to function due to  diminished ADLs    Schedule right lumbar RF TC L4 through S1, lumbosacral spondylosis    Proceed with left lumbar RF TC L4 through S1 after completing right side    Dr. Patel,

## 2023-01-25 ENCOUNTER — CLINICAL SUPPORT (OUTPATIENT)
Dept: REHABILITATION | Facility: HOSPITAL | Age: 73
End: 2023-01-25
Payer: COMMERCIAL

## 2023-01-25 DIAGNOSIS — M25.662 DECREASED RANGE OF MOTION (ROM) OF LEFT KNEE: ICD-10-CM

## 2023-01-25 DIAGNOSIS — Z98.890 STATUS POST LATERAL MENISCECTOMY OF LEFT KNEE: Primary | ICD-10-CM

## 2023-01-25 DIAGNOSIS — R29.898 WEAKNESS OF LEFT LEG: ICD-10-CM

## 2023-01-25 PROCEDURE — 97110 THERAPEUTIC EXERCISES: CPT | Mod: CQ

## 2023-01-25 PROCEDURE — 97530 THERAPEUTIC ACTIVITIES: CPT | Mod: CQ

## 2023-01-25 PROCEDURE — 97112 NEUROMUSCULAR REEDUCATION: CPT | Mod: CQ

## 2023-01-25 NOTE — PROGRESS NOTES
[facility]   Physical Therapy Treatment Note     Name: Tray Jalloh  Clinic Number: [id number]  Physician: Dada Enriquez MD  Visit Date: 1/25/2023  Therapy Diagnosis: Left Knee Meniscectomy   Physician Orders: PT Eval and Treat   Medical Diagnosis from Referral: Left Knee Meniscectomy: DOS:1/11/2023    Evaluation Date: 1/20/2023  Updated Plan of Care Due :   Authorization Period Expiration: 1/13/2023  Plan of Care Expiration: 3/3/2023  Visit # / Visits authorized: 3/20  PTA Visit #: 2/5     Time In: 11:18 am  Time Out: 12:01 pm  Total Billable Time: 43 minutes    SUBJECTIVE     Pt reports: he reports he was sore from the last visit. The right knee is a little sore today from the exercises.   He was compliant with home exercise program.   Response to previous treatment: soreness noted  Functional change: n/a    Pain: 1/10  Location: left knee     Precautions: STANDARD, Patient is WBAT    Patient does have previous back and Left Hip problems that will have to be considered during his knee rehabilitation.   We will start conservative and advance to Cybex machines as able.    OBJECTIVE     Objective Measures updated at progress report unless specified.     EVALUATION: 115-120    -2-0    Treatment     Tray received the treatments listed below:      therapeutic exercises to develop strength, endurance, ROM, and flexibility for 23 minutes including:    Bike: 5 minutes   SB stretch 3 x 20 second hold   Hamstring Stretch on Stairs 3 x 15 second hold LLE  Knee Flexion Stretch on Stairs 3 x 15 second hold LLE  Heel Raises at step 2 x 10  Cybex bilateral leg press 6 plates 3 x 10  Cybex bilateral hamstring curls 5 plates 3 x 10  Cybex bilateral hip abduction 1 plate 2 x 10 ea    neuromuscular re-education activities to improve: Balance, Coordination, Sense, and Proprioception for 10 minutes. The following activities were included:  Single Leg Stance - Firm 3 x 15 second hold   LLE  Single Leg Stance - Foam 3 x 15 second hold LLE  TKEs - 4 plates 3 x 10 LLE    therapeutic activities to improve functional performance for 10 minutes, including:  Fwd step ups with Reebok step 3 x 10 LLE  Lateral step downs with Reebok step 2 x 10 LLE  Shallow squat sit <> stand at rail 3 x 10    supervised modalities after being cleared for contradictions: Vasopneumatic compression with GameReady to left knee at medium pressure, coldest setting x 0 minutes to decrease edema and soreness.     Patient Education and Home Exercises     Home Exercises Provided and Patient Education Provided     Education provided:   - home exercise program    Written Home Exercises Provided: Patient instructed to cont prior HEP. Exercises were reviewed and Tray was able to demonstrate them prior to the end of the session.  Tray demonstrated good  understanding of the education provided. See EMR under Patient Instructions for exercises provided during therapy sessions    ASSESSMENT     Supervisory visit with MAGALI OSBORNE, PT, DPT  prior to initial PTA visit.     Patient arrived with reports of soreness in left knee today. He reports compliance with home exercise program. He presents forward flexed, kyphotic posture and foot flat landing with ambulation due to extensive history of scoliosis and lumbar issues. Reports the knee is swollen today upon arrival, but he has been icing it at home. Patient tolerated today's treatment with progression of functional mobility activities to fwd and lateral step ups. Also added cybex bilateral hip abduction and cybex bilateral hamstring curls. Unilateral SLS Neuro Re-Ed balance activities introduced with both firm and unsupported foam surface. Patient demonstrated fatigue at end of session today. Educated him to be diligent with home exercise program.  Will slowly progress as tolerated when Patient returns.      PMH from evaluation:   Tray is a 72 y.o. male referred to outpatient  Physical Therapy with a medical diagnosis of Left Knee Meniscectomy. Tray reports: His Left had been hurting him pretty bad for the past 6 months. Dr Dada Enriquez performed a Medial and Lateral Meniscectomy of the Left Knee on 1/11/2023. He presents today to Outpatient Physical Therapy to begin his Rehab. He does have a complicated past medical history that includes Right Knee Surgery, Scoliosis causing significant curve to low back and uneven pelvis, and Left Hip pain.  X Ray noted there is thoracolumbar scoliosis with trunk shift to the right.  On the lateral there is decreased lumbar lordosis.  There is spondylotic disease with decreased disc height and osteophyte formation noted.  Grade 1 anterolisthesis at L3-4 and L4-5. Scoliosis causes Right Convexity and Lateral shift right. This affects him in sitting and standing. The scoliosis does make standing and ambulation more difficult. Patient presents with decreased Left Knee Range of Motion and Strength as espected following surgery. He also continues to have edema in the Left knee. Discussed icing this at home. Patient is noted to have decreased stance time and antalgic gait on the Left. Patient has decreased heel strike and hits foot flat due to lack of Full Knee Extension with initial contact on the Left. Patient has decreased step/stride length resulting in slow milena.  He does have crutches that he uses for long distance ambulation at this time. He is able to walk short distances without an assistive device. Therapist initiated the basic Home Exercise Program for Knee Rehab. Therapist reviewed all the exercises and had patient return demonstration. Patient will benefit from Physical Therapy intervention to address all deficits and return patient to his prior level of function.      Tray Is progressing towards his goals.   Pt prognosis is Good.     Pt will continue to benefit from skilled outpatient physical therapy to address the deficits listed in the  problem list box on initial evaluation, provide pt/family education and to maximize pt's level of independence in the home and community environment.     Pt's spiritual, cultural and educational needs considered and pt agreeable to plan of care and goals.     Anticipated Barriers for therapy: None     Goals:  Short Term Goals: 3 weeks   1. Independent with Home Exercise Program   2. Increase Left Knee Range of Motion to 0 Degrees to 130 Degrees  3. Increase Left Knee Strength to grossly 4+/5  4. Patient will ambulate 500 feet with Normal Gait pattern with complaints of pain Less than or Equal to 3/10.       Long Term Goals: 6 weeks   1. Patient will increase Left Knee Strength to grossly 5/5  2. Patient will ambulate 1000+ feet with normal step/stride length with complaints of pain Less than or Equal to 1/10.     PLAN     Plan of care Certification: 1/20/2023 to 3/3/2023.     Outpatient Physical Therapy 2 times weekly for 6 weeks to include the following interventions: Electrical Stimulation to increase strength and decrease pain, inflammation, and edema as needed, Gait Training, Manual Therapy, Moist Heat/ Ice, Neuromuscular Re-ed, Patient Education, and Therapeutic Exercise.     Omari Swift, PTA   01/25/2023

## 2023-01-30 ENCOUNTER — CLINICAL SUPPORT (OUTPATIENT)
Dept: REHABILITATION | Facility: HOSPITAL | Age: 73
End: 2023-01-30
Payer: COMMERCIAL

## 2023-01-30 DIAGNOSIS — Z98.890 STATUS POST LATERAL MENISCECTOMY OF LEFT KNEE: Primary | ICD-10-CM

## 2023-01-30 DIAGNOSIS — M25.662 DECREASED RANGE OF MOTION (ROM) OF LEFT KNEE: ICD-10-CM

## 2023-01-30 DIAGNOSIS — R29.898 WEAKNESS OF LEFT LEG: ICD-10-CM

## 2023-01-30 PROCEDURE — 97110 THERAPEUTIC EXERCISES: CPT | Mod: CQ

## 2023-01-30 PROCEDURE — 97112 NEUROMUSCULAR REEDUCATION: CPT | Mod: CQ

## 2023-01-30 PROCEDURE — 97530 THERAPEUTIC ACTIVITIES: CPT | Mod: CQ

## 2023-01-30 NOTE — PROGRESS NOTES
[facility]   Physical Therapy Treatment Note     Name: Tray Jalloh  Clinic Number: [id number]  Physician: Dada Enriquez MD  Visit Date: 1/30/2023  Therapy Diagnosis: Left Knee Meniscectomy   Physician Orders: PT Eval and Treat   Medical Diagnosis from Referral: Left Knee Meniscectomy: DOS:1/11/2023    Evaluation Date: 1/20/2023  Updated Plan of Care Due :   Authorization Period Expiration: 1/13/2023  Plan of Care Expiration: 3/3/2023  Visit # / Visits authorized: 3/20  PTA Visit #: 2/5     Time In: 10:50 am  Time Out: 11:24 am  Total Billable Time: 34 minutes    SUBJECTIVE     Pt reports: he is out of breath but doing well  He was compliant with home exercise program.   Response to previous treatment: soreness noted  Functional change: n/a    Pain: 1/10  Location: left knee     Precautions: STANDARD, Patient is WBAT    Patient does have previous back and Left Hip problems that will have to be considered during his knee rehabilitation.   We will start conservative and advance to Cybex machines as able.    OBJECTIVE     Objective Measures updated at progress report unless specified.     EVALUATION: 115-120    -2-0    Treatment     Tray received the treatments listed below:      therapeutic exercises to develop strength, endurance, ROM, and flexibility for 14 minutes including:    Bike: 5 minutes   SB stretch 3 x 20 second hold   Hamstring Stretch on Stairs 3 x 15 second hold LLE  Knee Flexion Stretch on Stairs 3 x 15 second hold LLE  Heel Raises at step 3 x 10  Cybex bilateral leg press 8 plates 3 x 10  Single Leg Press 4 plates 30x  Cybex bilateral hamstring curls 6 plates 25x  Cybex bilateral hip abduction 1 plate 2 x 10 ea    neuromuscular re-education activities to improve: Balance, Coordination, Sense, and Proprioception for 10 minutes. The following activities were included:  Single Leg Stance - Firm 3 x 15 second hold  LLE  Single Leg Stance - Foam 3 x 15 second hold  LLE  TKEs - 4 plates 3 x 10 LLE    therapeutic activities to improve functional performance for 10 minutes, including:  Fwd step ups with Reebok step 3 x 10 LLE  Lateral step downs with Reebok step 2 x 10 LLE  Shallow squat sit <> stand at rail 3 x 10    supervised modalities after being cleared for contradictions: Vasopneumatic compression with GameReady to left knee at medium pressure, coldest setting x 0 minutes to decrease edema and soreness.     Patient Education and Home Exercises     Home Exercises Provided and Patient Education Provided     Education provided:   - home exercise program    Written Home Exercises Provided: Patient instructed to cont prior HEP. Exercises were reviewed and Tray was able to demonstrate them prior to the end of the session.  Tray demonstrated good  understanding of the education provided. See EMR under Patient Instructions for exercises provided during therapy sessions    ASSESSMENT       Pt able to progress through strengthening exercises with only complaints of fatigue and minimal joint line knee pain. Progressed exercises as pt tolerates. Will continue to progress towards LTGs.       PMH from evaluation:   Tray is a 72 y.o. male referred to outpatient Physical Therapy with a medical diagnosis of Left Knee Meniscectomy. Tray reports: His Left had been hurting him pretty bad for the past 6 months. Dr Dada Enriquez performed a Medial and Lateral Meniscectomy of the Left Knee on 1/11/2023. He presents today to Outpatient Physical Therapy to begin his Rehab. He does have a complicated past medical history that includes Right Knee Surgery, Scoliosis causing significant curve to low back and uneven pelvis, and Left Hip pain.  X Ray noted there is thoracolumbar scoliosis with trunk shift to the right.  On the lateral there is decreased lumbar lordosis.  There is spondylotic disease with decreased disc height and osteophyte formation noted.  Grade 1 anterolisthesis at L3-4 and  L4-5. Scoliosis causes Right Convexity and Lateral shift right. This affects him in sitting and standing. The scoliosis does make standing and ambulation more difficult. Patient presents with decreased Left Knee Range of Motion and Strength as espected following surgery. He also continues to have edema in the Left knee. Discussed icing this at home. Patient is noted to have decreased stance time and antalgic gait on the Left. Patient has decreased heel strike and hits foot flat due to lack of Full Knee Extension with initial contact on the Left. Patient has decreased step/stride length resulting in slow milena.  He does have crutches that he uses for long distance ambulation at this time. He is able to walk short distances without an assistive device. Therapist initiated the basic Home Exercise Program for Knee Rehab. Therapist reviewed all the exercises and had patient return demonstration. Patient will benefit from Physical Therapy intervention to address all deficits and return patient to his prior level of function.      Tray Is progressing towards his goals.   Pt prognosis is Good.     Pt will continue to benefit from skilled outpatient physical therapy to address the deficits listed in the problem list box on initial evaluation, provide pt/family education and to maximize pt's level of independence in the home and community environment.     Pt's spiritual, cultural and educational needs considered and pt agreeable to plan of care and goals.     Anticipated Barriers for therapy: None     Goals:  Short Term Goals: 3 weeks   1. Independent with Home Exercise Program   2. Increase Left Knee Range of Motion to 0 Degrees to 130 Degrees  3. Increase Left Knee Strength to grossly 4+/5  4. Patient will ambulate 500 feet with Normal Gait pattern with complaints of pain Less than or Equal to 3/10.       Long Term Goals: 6 weeks   1. Patient will increase Left Knee Strength to grossly 5/5  2. Patient will ambulate  1000+ feet with normal step/stride length with complaints of pain Less than or Equal to 1/10.     PLAN     Plan of care Certification: 1/20/2023 to 3/3/2023.     Outpatient Physical Therapy 2 times weekly for 6 weeks to include the following interventions: Electrical Stimulation to increase strength and decrease pain, inflammation, and edema as needed, Gait Training, Manual Therapy, Moist Heat/ Ice, Neuromuscular Re-ed, Patient Education, and Therapeutic Exercise.     Virgil Boothe, PTA   01/30/2023

## 2023-02-02 ENCOUNTER — CLINICAL SUPPORT (OUTPATIENT)
Dept: REHABILITATION | Facility: HOSPITAL | Age: 73
End: 2023-02-02
Payer: COMMERCIAL

## 2023-02-02 DIAGNOSIS — Z98.890 STATUS POST LATERAL MENISCECTOMY OF LEFT KNEE: Primary | ICD-10-CM

## 2023-02-02 DIAGNOSIS — R29.898 WEAKNESS OF LEFT LEG: ICD-10-CM

## 2023-02-02 DIAGNOSIS — M25.662 DECREASED RANGE OF MOTION (ROM) OF LEFT KNEE: ICD-10-CM

## 2023-02-02 PROCEDURE — 97110 THERAPEUTIC EXERCISES: CPT | Mod: CQ

## 2023-02-02 PROCEDURE — 97112 NEUROMUSCULAR REEDUCATION: CPT | Mod: CQ

## 2023-02-02 PROCEDURE — 97530 THERAPEUTIC ACTIVITIES: CPT | Mod: CQ

## 2023-02-02 NOTE — PROGRESS NOTES
[facility]   Physical Therapy Treatment Note     Name: Tray Jalloh  Clinic Number: [id number]  Physician: Milan Akers MD  Visit Date: 2/2/2023  Therapy Diagnosis: Left Knee Meniscectomy   Physician Orders: PT Eval and Treat   Medical Diagnosis from Referral: Left Knee Meniscectomy: DOS:1/11/2023    Evaluation Date: 1/20/2023  Updated Plan of Care Due :   Authorization Period Expiration: 1/13/2023  Plan of Care Expiration: 3/3/2023  Visit # / Visits authorized: 5/20  PTA Visit #: 4/5     Time In: 08:31 am  Time Out: 09:12 am  Total Billable Time: 41 minutes    SUBJECTIVE     Pt reports: he is a little stiff this morning, he reports the weather has contributed to this. Patient reports he sees Dr. Patel 2/9 for a procedure where he will have the nerve in his back burned.   He was compliant with home exercise program.   Response to previous treatment: soreness noted  Functional change: n/a    Pain: 1/10  Location: left knee     Precautions: STANDARD, Patient is weightbearing with no assistive device     Patient does have previous back and Left Hip problems that will have to be considered during his knee rehabilitation.   We will start conservative and advance to Cybex machines as able.    OBJECTIVE     Objective Measures updated at progress report unless specified.     EVALUATION: 115-120    -2-0    Treatment     Tray received the treatments listed below:      therapeutic exercises to develop strength, endurance, ROM, and flexibility for 20 minutes including:    Bike: 5 minutes   SB stretch 3 x 20 second hold   Hamstring Stretch on Stairs 3 x 15 second hold LLE  Knee Flexion Stretch on Stairs 3 x 15 second hold LLE  Heel Raises at step 3 x 10  Cybex bilateral leg press 8 plates 3 x 10  Single Leg Press 4 plates 30x  Cybex bilateral hamstring curls 7 plates 30x  Cybex bilateral hip abduction 2 plate 2 x 10 ea    neuromuscular re-education activities to improve: Balance,  Coordination, Sense, and Proprioception for 10 minutes. The following activities were included:  Single Leg Stance - Firm 3 x 15 second hold  LLE  Single Leg Stance - Foam 3 x 15 second hold LLE  TKEs - 4 plates 3 x 10 LLE    therapeutic activities to improve functional performance for 10 minutes, including:  Fwd step ups with Reebok step 3 x 10 LLE  Lateral step downs with Reebok step 3 x 10 LLE  Shallow squat sit <> stand at rail 3 x 10    supervised modalities after being cleared for contradictions: Vasopneumatic compression with GameReady to left knee at medium pressure, coldest setting x 0 minutes to decrease edema and soreness.     Patient Education and Home Exercises     Home Exercises Provided and Patient Education Provided     Education provided:   - home exercise program    Written Home Exercises Provided: Patient instructed to cont prior HEP. Exercises were reviewed and Tray was able to demonstrate them prior to the end of the session.  Tray demonstrated good  understanding of the education provided. See EMR under Patient Instructions for exercises provided during therapy sessions    ASSESSMENT     Pt able to progress through strengthening exercises with only complaints of fatigue and stiffness. Increased weight today on cybex bilateral hip abduction and cybex hamstring curls per Patient tolerance. Patient sees pain management MD 2/9 for lumbar nerve block. Will continue to progress lower extremity therex and neuro-re-ed towards LTGs.       PMH from evaluation:   Tray is a 72 y.o. male referred to outpatient Physical Therapy with a medical diagnosis of Left Knee Meniscectomy. Tray reports: His Left had been hurting him pretty bad for the past 6 months. Dr Dada Enriquez performed a Medial and Lateral Meniscectomy of the Left Knee on 1/11/2023. He presents today to Outpatient Physical Therapy to begin his Rehab. He does have a complicated past medical history that includes Right Knee Surgery,  Scoliosis causing significant curve to low back and uneven pelvis, and Left Hip pain.  X Ray noted there is thoracolumbar scoliosis with trunk shift to the right.  On the lateral there is decreased lumbar lordosis.  There is spondylotic disease with decreased disc height and osteophyte formation noted.  Grade 1 anterolisthesis at L3-4 and L4-5. Scoliosis causes Right Convexity and Lateral shift right. This affects him in sitting and standing. The scoliosis does make standing and ambulation more difficult. Patient presents with decreased Left Knee Range of Motion and Strength as espected following surgery. He also continues to have edema in the Left knee. Discussed icing this at home. Patient is noted to have decreased stance time and antalgic gait on the Left. Patient has decreased heel strike and hits foot flat due to lack of Full Knee Extension with initial contact on the Left. Patient has decreased step/stride length resulting in slow milena.  He does have crutches that he uses for long distance ambulation at this time. He is able to walk short distances without an assistive device. Therapist initiated the basic Home Exercise Program for Knee Rehab. Therapist reviewed all the exercises and had patient return demonstration. Patient will benefit from Physical Therapy intervention to address all deficits and return patient to his prior level of function.      Tray Is progressing towards his goals.   Pt prognosis is Good.     Pt will continue to benefit from skilled outpatient physical therapy to address the deficits listed in the problem list box on initial evaluation, provide pt/family education and to maximize pt's level of independence in the home and community environment.     Pt's spiritual, cultural and educational needs considered and pt agreeable to plan of care and goals.     Anticipated Barriers for therapy: None     Goals:  Short Term Goals: 3 weeks   1. Independent with Home Exercise Program   2.  Increase Left Knee Range of Motion to 0 Degrees to 130 Degrees  3. Increase Left Knee Strength to grossly 4+/5  4. Patient will ambulate 500 feet with Normal Gait pattern with complaints of pain Less than or Equal to 3/10.       Long Term Goals: 6 weeks   1. Patient will increase Left Knee Strength to grossly 5/5  2. Patient will ambulate 1000+ feet with normal step/stride length with complaints of pain Less than or Equal to 1/10.     PLAN     Plan of care Certification: 1/20/2023 to 3/3/2023.     Outpatient Physical Therapy 2 times weekly for 6 weeks to include the following interventions: Electrical Stimulation to increase strength and decrease pain, inflammation, and edema as needed, Gait Training, Manual Therapy, Moist Heat/ Ice, Neuromuscular Re-ed, Patient Education, and Therapeutic Exercise.     Omari Swift, PTA   02/02/2023

## 2023-02-06 ENCOUNTER — CLINICAL SUPPORT (OUTPATIENT)
Dept: REHABILITATION | Facility: HOSPITAL | Age: 73
End: 2023-02-06
Payer: COMMERCIAL

## 2023-02-06 DIAGNOSIS — M25.662 DECREASED RANGE OF MOTION (ROM) OF LEFT KNEE: ICD-10-CM

## 2023-02-06 DIAGNOSIS — R29.898 WEAKNESS OF LEFT LEG: ICD-10-CM

## 2023-02-06 DIAGNOSIS — Z98.890 STATUS POST LATERAL MENISCECTOMY OF LEFT KNEE: Primary | ICD-10-CM

## 2023-02-06 PROCEDURE — 97112 NEUROMUSCULAR REEDUCATION: CPT

## 2023-02-06 PROCEDURE — 97110 THERAPEUTIC EXERCISES: CPT

## 2023-02-06 PROCEDURE — 97530 THERAPEUTIC ACTIVITIES: CPT

## 2023-02-06 NOTE — PROGRESS NOTES
[facility]   Physical Therapy Treatment Note     Name: Tray Jalloh  Clinic Number: [id number]  Physician: Milan Akers MD  Visit Date: 2/6/2023  Therapy Diagnosis: Left Knee Meniscectomy   Physician Orders: PT Eval and Treat   Medical Diagnosis from Referral: Left Knee Meniscectomy: DOS:1/11/2023    Evaluation Date: 1/20/2023  Authorization Period Expiration: 1/13/2023  Plan of Care Expiration: 3/3/2023  Visit # / Visits authorized: 6/20  PTA Visit #: 4/5     Time In: 10:00 am  Time Out:10:54 am  Total Billable Time: 53 minutes    SUBJECTIVE     Pt reports: Feeling well with only 1/10 pain  He was compliant with home exercise program.   Response to previous treatment: soreness noted  Functional change: Improved mobility    Pain: 1/10  Location: left knee     Precautions: STANDARD, Patient is weightbearing with no assistive device     Patient does have previous back and Left Hip problems that will have to be considered during his knee rehabilitation.   We will start conservative and advance to Cybex machines as able.    OBJECTIVE     Objective Measures updated at progress report unless specified.     2/6/2023          120 degrees      0    Treatment     Tray received the treatments listed below:      therapeutic exercises to develop strength, endurance, ROM, and flexibility for 35 minutes including:    Bike: 5 minutes   SB stretch 4 x 15 second hold   Hamstring Stretch on Stairs 4 x 15 second hold LLE  Knee Flexion Stretch on Stairs 4 x 15 second hold LLE  Heel Raises at step 3 x 10  Cybex bilateral leg press 8 plates 3 x 10  Single Leg Press 4 plates 3 x 10  Cybex bilateral hamstring curls 7 plates 3 x 10  Cybex bilateral hip abduction 2 plate 2 x 10 ea  Cybex knee extension 3 x 10 3 plates    neuromuscular re-education activities to improve: Balance, Coordination, Sense, and Proprioception for 8 minutes. The following activities were included:  Single Leg Stance -  "Firm    Single Leg Stance - Foam  4 minutes with multiple trails  TKEs - 4 plates 3 x 10 LEFT LOWER EXTREMITY  Balance board    therapeutic activities to improve functional performance for 10 minutes, including:  Fwd step ups with 6" step 3 x 10 LLE  Lateral step downs with Reebok step 4" 3 x 10 LLE  Shallow squat sit <> stand at rail 3 x 10  Half kneeling position on foam with practice getting up and down x 5 with 30 second holds in position    supervised modalities after being cleared for contradictions: Vasopneumatic compression with GameReady to left knee at medium pressure, coldest setting x 0 minutes to decrease edema and soreness.     Patient Education and Home Exercises     Home Exercises Provided and Patient Education Provided     Education provided:   - home exercise program    Written Home Exercises Provided: Patient instructed to cont prior HEP. Exercises were reviewed and Tray was able to demonstrate them prior to the end of the session.  Tray demonstrated good  understanding of the education provided. See EMR under Patient Instructions for exercises provided during therapy sessions    ASSESSMENT     Patient demonstrates improved RANGE OF MOTION. Patient has met his short term goals and his long term pain goal. Patient exhibits potential to meet his long term strength goal. Patient complains of not being able to get down on one knee in half kneel position due to anterior knee pain. Worked on half kneeling today with patient tolerating and performing well. Will continue to advance as tolerated.    PMH from evaluation:   Tray is a 72 y.o. male referred to outpatient Physical Therapy with a medical diagnosis of Left Knee Meniscectomy. Tray reports: His Left had been hurting him pretty bad for the past 6 months. Dr Dada Enriquez performed a Medial and Lateral Meniscectomy of the Left Knee on 1/11/2023. He presents today to Outpatient Physical Therapy to begin his Rehab. He does have a complicated " past medical history that includes Right Knee Surgery, Scoliosis causing significant curve to low back and uneven pelvis, and Left Hip pain.  X Ray noted there is thoracolumbar scoliosis with trunk shift to the right.  On the lateral there is decreased lumbar lordosis.  There is spondylotic disease with decreased disc height and osteophyte formation noted.  Grade 1 anterolisthesis at L3-4 and L4-5. Scoliosis causes Right Convexity and Lateral shift right. This affects him in sitting and standing. The scoliosis does make standing and ambulation more difficult. Patient presents with decreased Left Knee Range of Motion and Strength as espected following surgery. He also continues to have edema in the Left knee. Discussed icing this at home. Patient is noted to have decreased stance time and antalgic gait on the Left. Patient has decreased heel strike and hits foot flat due to lack of Full Knee Extension with initial contact on the Left. Patient has decreased step/stride length resulting in slow milena.  He does have crutches that he uses for long distance ambulation at this time. He is able to walk short distances without an assistive device. Therapist initiated the basic Home Exercise Program for Knee Rehab. Therapist reviewed all the exercises and had patient return demonstration. Patient will benefit from Physical Therapy intervention to address all deficits and return patient to his prior level of function.      Tray Is progressing towards his goals.   Pt prognosis is Good.     Pt will continue to benefit from skilled outpatient physical therapy to address the deficits listed in the problem list box on initial evaluation, provide pt/family education and to maximize pt's level of independence in the home and community environment.     Pt's spiritual, cultural and educational needs considered and pt agreeable to plan of care and goals.     Anticipated Barriers for therapy: None     Goals:  Short Term Goals: 3  weeks   1. Independent with Home Exercise Program : Met  2. Increase Left Knee Range of Motion to 0 Degrees to 130 Degrees : Not met, current 0-120 degrees  3. Increase Left Knee Strength to grossly 4+/5 : Met  4. Patient will ambulate 500 feet with Normal Gait pattern with complaints of pain Less than or Equal to 3/10. : Met     Long Term Goals: 6 weeks   1. Patient will increase Left Knee Strength to grossly 5/5 : Ongoing  2. Patient will ambulate 1000+ feet with normal step/stride length with complaints of pain Less than or Equal to 1/10. : Met    PLAN     Plan of care Certification: 1/20/2023 to 3/3/2023.     Outpatient Physical Therapy 2 times weekly for 6 weeks to include the following interventions: Electrical Stimulation to increase strength and decrease pain, inflammation, and edema as needed, Gait Training, Manual Therapy, Moist Heat/ Ice, Neuromuscular Re-ed, Patient Education, and Therapeutic Exercise.     JANIE OSBORNE, PT, MLT     02/06/2023

## 2023-02-08 ENCOUNTER — CLINICAL SUPPORT (OUTPATIENT)
Dept: REHABILITATION | Facility: HOSPITAL | Age: 73
End: 2023-02-08
Payer: COMMERCIAL

## 2023-02-08 DIAGNOSIS — Z98.890 STATUS POST LATERAL MENISCECTOMY OF LEFT KNEE: Primary | ICD-10-CM

## 2023-02-08 DIAGNOSIS — M25.662 DECREASED RANGE OF MOTION (ROM) OF LEFT KNEE: ICD-10-CM

## 2023-02-08 DIAGNOSIS — R29.898 WEAKNESS OF LEFT LEG: ICD-10-CM

## 2023-02-08 PROCEDURE — 97110 THERAPEUTIC EXERCISES: CPT | Mod: CQ

## 2023-02-08 PROCEDURE — 97530 THERAPEUTIC ACTIVITIES: CPT | Mod: CQ

## 2023-02-08 NOTE — PROGRESS NOTES
[facility]   Physical Therapy Treatment Note     Name: Tray Jalloh  Clinic Number: [id number]  Physician: Milan Akers MD  Visit Date: 2/8/2023  Therapy Diagnosis: Left Knee Meniscectomy   Physician Orders: PT Eval and Treat   Medical Diagnosis from Referral: Left Knee Meniscectomy: DOS:1/11/2023    Evaluation Date: 1/20/2023  Authorization Period Expiration: 1/13/2023  Plan of Care Expiration: 3/3/2023  Visit # / Visits authorized: 7/20  PTA Visit #: 1/5     Time In: 10:00 am  Time Out:10:40 am  Total Billable Time: 40 minutes    SUBJECTIVE     Pt reports: Feeling well with only minor pain noted  He was compliant with home exercise program.   Response to previous treatment: soreness noted  Functional change: Improved mobility    Pain: 1/10  Location: left knee     Precautions: STANDARD, Patient is weightbearing with no assistive device     Patient does have previous back and Left Hip problems that will have to be considered during his knee rehabilitation.   We will start conservative and advance to Cybex machines as able.    OBJECTIVE     Objective Measures updated at progress report unless specified.     2/6/2023          120 degrees      0    Treatment     Tray received the treatments listed below:      therapeutic exercises to develop strength, endurance, ROM, and flexibility for 30 minutes including:    Bike: 5 minutes   SB stretch 4 x 15 second hold   Hamstring Stretch on Stairs 4 x 15 second hold LLE  Knee Flexion Stretch on Stairs 4 x 15 second hold LLE  Heel Raises at step 3 x 10  Cybex bilateral leg press 8 plates 3 x 10  Single Leg Press 4 plates 3 x 10  Cybex bilateral hamstring curls 7 plates 3 x 10  Cybex bilateral hip abduction 2 plate 2 x 10 ea  Cybex Hip extension bilateral 2 plates 20x  Cybex knee extension 3 x 10 3 plates    neuromuscular re-education activities to improve: Balance, Coordination, Sense, and Proprioception for 0 minutes. The  "following activities were included:  Single Leg Stance - Firm    Single Leg Stance - Foam  4 minutes with multiple trails  TKEs - 4 plates 3 x 10 LEFT LOWER EXTREMITY  Balance board    therapeutic activities to improve functional performance for 10 minutes, including:  Sit to Stands 30x  Fwd step ups with 6" step 3 x 10 LLE  Lateral step downs with Reebok step 4" 3 x 10 LLE  Half kneeling position on foam with practice getting up and down x 5 with 30 second holds in position    supervised modalities after being cleared for contradictions: Vasopneumatic compression with GameReady to left knee at medium pressure, coldest setting x 0 minutes to decrease edema and soreness.     Patient Education and Home Exercises     Home Exercises Provided and Patient Education Provided     Education provided:   - home exercise program    Written Home Exercises Provided: Patient instructed to cont prior HEP. Exercises were reviewed and Tray was able to demonstrate them prior to the end of the session.  Tray demonstrated good  understanding of the education provided. See EMR under Patient Instructions for exercises provided during therapy sessions    ASSESSMENT     Pt tolerated all therapeutic exercises/activities today with fatigue noted. RANGE OF MOTION is doing well but pt ambulates and avoids terminal knee extension. Progressed LOWER EXTREMITY strengthening with fatigue noted. Will continue to progress as noted. Pt is doing well and reaching his LTGs.     PMH from evaluation:   Tray is a 72 y.o. male referred to outpatient Physical Therapy with a medical diagnosis of Left Knee Meniscectomy. Tray reports: His Left had been hurting him pretty bad for the past 6 months. Dr Dada Enriquez performed a Medial and Lateral Meniscectomy of the Left Knee on 1/11/2023. He presents today to Outpatient Physical Therapy to begin his Rehab. He does have a complicated past medical history that includes Right Knee Surgery, Scoliosis causing " significant curve to low back and uneven pelvis, and Left Hip pain.  X Ray noted there is thoracolumbar scoliosis with trunk shift to the right.  On the lateral there is decreased lumbar lordosis.  There is spondylotic disease with decreased disc height and osteophyte formation noted.  Grade 1 anterolisthesis at L3-4 and L4-5. Scoliosis causes Right Convexity and Lateral shift right. This affects him in sitting and standing. The scoliosis does make standing and ambulation more difficult. Patient presents with decreased Left Knee Range of Motion and Strength as espected following surgery. He also continues to have edema in the Left knee. Discussed icing this at home. Patient is noted to have decreased stance time and antalgic gait on the Left. Patient has decreased heel strike and hits foot flat due to lack of Full Knee Extension with initial contact on the Left. Patient has decreased step/stride length resulting in slow milena.  He does have crutches that he uses for long distance ambulation at this time. He is able to walk short distances without an assistive device. Therapist initiated the basic Home Exercise Program for Knee Rehab. Therapist reviewed all the exercises and had patient return demonstration. Patient will benefit from Physical Therapy intervention to address all deficits and return patient to his prior level of function.      Tray Is progressing towards his goals.   Pt prognosis is Good.     Pt will continue to benefit from skilled outpatient physical therapy to address the deficits listed in the problem list box on initial evaluation, provide pt/family education and to maximize pt's level of independence in the home and community environment.     Pt's spiritual, cultural and educational needs considered and pt agreeable to plan of care and goals.     Anticipated Barriers for therapy: None     Goals:  Short Term Goals: 3 weeks   1. Independent with Home Exercise Program : Met  2. Increase Left  Knee Range of Motion to 0 Degrees to 130 Degrees : Not met, current 0-120 degrees  3. Increase Left Knee Strength to grossly 4+/5 : Met  4. Patient will ambulate 500 feet with Normal Gait pattern with complaints of pain Less than or Equal to 3/10. : Met     Long Term Goals: 6 weeks   1. Patient will increase Left Knee Strength to grossly 5/5 : Ongoing  2. Patient will ambulate 1000+ feet with normal step/stride length with complaints of pain Less than or Equal to 1/10. : Met    PLAN     Plan of care Certification: 1/20/2023 to 3/3/2023.     Outpatient Physical Therapy 2 times weekly for 6 weeks to include the following interventions: Electrical Stimulation to increase strength and decrease pain, inflammation, and edema as needed, Gait Training, Manual Therapy, Moist Heat/ Ice, Neuromuscular Re-ed, Patient Education, and Therapeutic Exercise.     Virgil Boothe PTA,      02/08/2023

## 2023-02-09 ENCOUNTER — HOSPITAL ENCOUNTER (OUTPATIENT)
Facility: HOSPITAL | Age: 73
Discharge: HOME | End: 2023-02-09
Attending: PAIN MEDICINE | Admitting: PAIN MEDICINE
Payer: COMMERCIAL

## 2023-02-09 ENCOUNTER — ANESTHESIA EVENT (OUTPATIENT)
Dept: PAIN MEDICINE | Facility: HOSPITAL | Age: 73
End: 2023-02-09
Payer: COMMERCIAL

## 2023-02-09 ENCOUNTER — ANESTHESIA (OUTPATIENT)
Dept: PAIN MEDICINE | Facility: HOSPITAL | Age: 73
End: 2023-02-09
Payer: COMMERCIAL

## 2023-02-09 VITALS
RESPIRATION RATE: 11 BRPM | SYSTOLIC BLOOD PRESSURE: 122 MMHG | OXYGEN SATURATION: 97 % | BODY MASS INDEX: 27.63 KG/M2 | HEART RATE: 65 BPM | WEIGHT: 193 LBS | HEIGHT: 70 IN | DIASTOLIC BLOOD PRESSURE: 73 MMHG | TEMPERATURE: 97 F

## 2023-02-09 DIAGNOSIS — M47.817 LUMBOSACRAL SPONDYLOSIS WITHOUT MYELOPATHY: ICD-10-CM

## 2023-02-09 PROCEDURE — 37000008 HC ANESTHESIA 1ST 15 MINUTES: Performed by: PAIN MEDICINE

## 2023-02-09 PROCEDURE — 01940 ANES NULYT AGT LMBR/SAC: CPT | Performed by: PAIN MEDICINE

## 2023-02-09 PROCEDURE — 37000009 HC ANESTHESIA EA ADD 15 MINS: Performed by: PAIN MEDICINE

## 2023-02-09 PROCEDURE — 63600175 PHARM REV CODE 636 W HCPCS: Performed by: PAIN MEDICINE

## 2023-02-09 PROCEDURE — 64635 DESTROY LUMB/SAC FACET JNT: CPT | Mod: RT,,, | Performed by: PAIN MEDICINE

## 2023-02-09 PROCEDURE — D9220A PRA ANESTHESIA: ICD-10-PCS | Mod: ,,,

## 2023-02-09 PROCEDURE — 25000003 PHARM REV CODE 250: Performed by: PAIN MEDICINE

## 2023-02-09 PROCEDURE — 27000716 HC OXISENSOR PROBE, ANY SIZE

## 2023-02-09 PROCEDURE — 64635 DESTROY LUMB/SAC FACET JNT: CPT | Mod: RT | Performed by: PAIN MEDICINE

## 2023-02-09 PROCEDURE — 27000284 HC CANNULA NASAL

## 2023-02-09 PROCEDURE — 64636 PR DESTROY L/S FACET JNT ADDL: ICD-10-PCS | Mod: RT,,, | Performed by: PAIN MEDICINE

## 2023-02-09 PROCEDURE — 63600175 PHARM REV CODE 636 W HCPCS

## 2023-02-09 PROCEDURE — D9220A PRA ANESTHESIA: Mod: ,,,

## 2023-02-09 PROCEDURE — 64636 DESTROY L/S FACET JNT ADDL: CPT | Mod: RT,,, | Performed by: PAIN MEDICINE

## 2023-02-09 PROCEDURE — 27201423 OPTIME MED/SURG SUP & DEVICES STERILE SUPPLY: Performed by: PAIN MEDICINE

## 2023-02-09 PROCEDURE — 25000003 PHARM REV CODE 250

## 2023-02-09 PROCEDURE — 64635 PR DESTROY LUMB/SAC FACET JNT: ICD-10-PCS | Mod: RT,,, | Performed by: PAIN MEDICINE

## 2023-02-09 PROCEDURE — 64636 DESTROY L/S FACET JNT ADDL: CPT | Mod: RT | Performed by: PAIN MEDICINE

## 2023-02-09 RX ORDER — ORPHENADRINE CITRATE 30 MG/ML
INJECTION INTRAMUSCULAR; INTRAVENOUS
Status: DISCONTINUED | OUTPATIENT
Start: 2023-02-09 | End: 2023-02-09

## 2023-02-09 RX ORDER — LIDOCAINE HYDROCHLORIDE 20 MG/ML
INJECTION INTRAVENOUS
Status: DISCONTINUED | OUTPATIENT
Start: 2023-02-09 | End: 2023-02-09

## 2023-02-09 RX ORDER — BUPIVACAINE HYDROCHLORIDE 2.5 MG/ML
INJECTION, SOLUTION INFILTRATION; PERINEURAL CODE/TRAUMA/SEDATION MEDICATION
Status: DISCONTINUED | OUTPATIENT
Start: 2023-02-09 | End: 2023-02-09 | Stop reason: HOSPADM

## 2023-02-09 RX ORDER — TRIAMCINOLONE ACETONIDE 40 MG/ML
INJECTION, SUSPENSION INTRA-ARTICULAR; INTRAMUSCULAR CODE/TRAUMA/SEDATION MEDICATION
Status: DISCONTINUED | OUTPATIENT
Start: 2023-02-09 | End: 2023-02-09 | Stop reason: HOSPADM

## 2023-02-09 RX ORDER — PROPOFOL 10 MG/ML
VIAL (ML) INTRAVENOUS
Status: DISCONTINUED | OUTPATIENT
Start: 2023-02-09 | End: 2023-02-09

## 2023-02-09 RX ORDER — SODIUM CHLORIDE 9 MG/ML
INJECTION, SOLUTION INTRAVENOUS CONTINUOUS
Status: DISCONTINUED | OUTPATIENT
Start: 2023-02-09 | End: 2023-02-09 | Stop reason: HOSPADM

## 2023-02-09 RX ADMIN — SODIUM CHLORIDE: 9 INJECTION, SOLUTION INTRAVENOUS at 12:02

## 2023-02-09 RX ADMIN — PROPOFOL 40 MG: 10 INJECTION, EMULSION INTRAVENOUS at 12:02

## 2023-02-09 RX ADMIN — LIDOCAINE HYDROCHLORIDE 100 MG: 20 INJECTION, SOLUTION INTRAVENOUS at 12:02

## 2023-02-09 RX ADMIN — PROPOFOL 50 MG: 10 INJECTION, EMULSION INTRAVENOUS at 12:02

## 2023-02-09 RX ADMIN — PROPOFOL 30 MG: 10 INJECTION, EMULSION INTRAVENOUS at 12:02

## 2023-02-09 RX ADMIN — ORPHENADRINE CITRATE 60 MG: 30 INJECTION INTRAMUSCULAR; INTRAVENOUS at 12:02

## 2023-02-09 NOTE — ANESTHESIA PREPROCEDURE EVALUATION
02/09/2023  Tray Jalloh is a 72 y.o., male.  No current facility-administered medications on file prior to encounter.     Current Outpatient Medications on File Prior to Encounter   Medication Sig Dispense Refill    amLODIPine (NORVASC) 5 MG tablet Take 1 tablet by mouth once daily.      ascorbic acid, vitamin C, (VITAMIN C) 1000 MG tablet Take 1,000 mg by mouth once daily.      cetirizine (ZYRTEC) 10 MG tablet Take 1 tablet (10 mg total) by mouth once daily. 90 tablet 3    CRESTOR 20 mg tablet Take 1 tablet by mouth once daily. Take 1/2 tablet by mouth every evening      lisinopriL (PRINIVIL,ZESTRIL) 40 MG tablet Take 1 tablet (40 mg total) by mouth once daily. 90 tablet 3    metaxalone (SKELAXIN) 800 MG tablet Take 1 tablet (800 mg total) by mouth daily as needed for Pain. 90 tablet 1    omeprazole (PRILOSEC) 20 MG capsule Take 1 capsule (20 mg total) by mouth once daily. 90 capsule 3    oxyCODONE-acetaminophen (PERCOCET)  mg per tablet Take 1 tablet by mouth every 6 (six) hours as needed for Pain. 30 tablet 0    pregabalin (LYRICA) 75 MG capsule Take 1 capsule (75 mg total) by mouth 2 (two) times daily. 60 capsule 5    acetaminophen (TYLENOL) 325 MG tablet Take 325 mg by mouth every 6 (six) hours as needed for Pain.      budesonide-glycopyr-formoterol (BREZTRI AEROSPHERE) 160-9-4.8 mcg/actuation HFAA Inhale 2 puffs into the lungs 2 (two) times a day. Rinse mouth after use. 32.1 g 3    celecoxib (CELEBREX) 200 MG capsule Take 1 capsule (200 mg total) by mouth 2 (two) times daily. 60 capsule 2    cholecalciferol, vitamin D3, (VITAMIN D3) 50 mcg (2,000 unit) Cap Take 1 capsule by mouth once daily.      cyanocobalamin (VITAMIN B-12) 1000 MCG tablet Take 100 mcg by mouth once daily.      diclofenac sodium (VOLTAREN) 1 % Gel Apply topically 4 (four) times daily. 900 g 5     diphenhydrAMINE (BENADRYL) 25 mg capsule Take 25 mg by mouth every 6 (six) hours as needed for Itching.      folic acid/multivit-min/lutein (CENTRUM SILVER ORAL) Take 1 tablet by mouth once daily.      hydrocortisone 2.5 % cream Apply 30 g/kg topically 2 (two) times daily. Apply to face for dry skin      ipratropium-albuteroL (COMBIVENT RESPIMAT)  mcg/actuation inhaler Inhale 1 puff into the lungs every 6 (six) hours as needed for Wheezing. Rescue 12 g 1    ketoconazole (NIZORAL) 2 % cream Apply topically once daily.      methenamine (HIPREX) 1 gram Tab Take 1 tablet (1 g total) by mouth 2 (two) times daily. 180 tablet 1    omega-3 fatty acids/fish oil (FISH OIL-OMEGA-3 FATTY ACIDS) 300-1,000 mg capsule Take 2 capsules by mouth once daily.      ondansetron (ZOFRAN-ODT) 4 MG TbDL Take 1 tablet (4 mg total) by mouth every 6 (six) hours as needed (nausea). 30 tablet 0    tolterodine (DETROL LA) 2 MG Cp24 Take 2 mg by mouth once daily.      triamcinolone acetonide 0.1% (KENALOG) 0.1 % cream Apply 15 g topically 2 (two) times daily. Apply to back for dry skin and itching      vitamin E 1000 UNIT capsule Take 1,000 Units by mouth once daily.      zinc gluconate 50 mg tablet Take 50 mg by mouth once daily.       Active Ambulatory Problems     Diagnosis Date Noted    Spondylosis of lumbosacral spine without myelopathy 03/18/2021    Spondylosis of lumbosacral region without myelopathy or radiculopathy 04/01/2021    Essential hypertension     Chronic pain     Gastroesophageal reflux disease without esophagitis     Pulmonary nodules     Hypercholesteremia     Chronic renal impairment     Pain and numbness of right upper extremity 09/30/2021    Lumbosacral radiculopathy 01/26/2022    Polyarthralgia 01/26/2022    SOB (shortness of breath) 03/15/2022    Digestive disorder 05/12/2022    Depressive disorder 05/12/2022    Memory impairment 05/12/2022    COPD, mild     Left-sided chest pain  07/18/2022    History of prostate cancer 11/16/2022    Chronic osteoarthritis 11/16/2022    Anemia of chronic disease 11/16/2022    History of prediabetes 11/16/2022    Positive colorectal cancer screening using Cologuard test 12/15/2022    COVID-19 12/27/2022    Primary osteoarthritis of left knee 01/04/2023    Status post lateral meniscectomy of left knee 01/20/2023    Decreased range of motion (ROM) of left knee 01/20/2023    Weakness of left leg 01/20/2023    Chronic pain of left knee 01/24/2023     Resolved Ambulatory Problems     Diagnosis Date Noted    Prediabetes     Fever 03/15/2022    Sore throat 03/15/2022    Viral upper respiratory illness 03/15/2022    Costochondral chest pain 07/18/2022     Past Medical History:   Diagnosis Date    Cervical radiculopathy     COPD (chronic obstructive pulmonary disease)     Depression     GERD (gastroesophageal reflux disease)     Hypertension     Left ventricular hypertrophy     Lumbosacral spondylosis      Past Surgical History:   Procedure Laterality Date    ABDOMINAL SURGERY      inguinal hernia repair    BACK SURGERY      Bilateral L3-S1 MBB Bilateral 2-, 1-, 1-, 12-    Dr Jorge Parker    EYE SURGERY      HERNIA REPAIR      KNEE ARTHROSCOPY W/ MENISCAL REPAIR Right     KNEE ARTHROSCOPY W/ MENISCECTOMY Left 1/11/2023    Procedure: ARTHROSCOPY, KNEE, WITH MENISCECTOMY;  Surgeon: Dada Enriquez MD;  Location: [facility];  Service: Orthopedics;  Laterality: Left;    LATERAL RETINACULA RELEASE OF KNEE Left 1/11/2023    Procedure: ARTHROSCOPIC RELEASE, KNEE, LATERAL RETINACULA;  Surgeon: Dada Enriquez MD;  Location: [facility];  Service: Orthopedics;  Laterality: Left;    PROSTATECTOMY      RADIOFREQUENCY ABLATION OF LUMBAR MEDIAL BRANCH NERVE AT SINGLE LEVEL Right 04/01/2021    Procedure: Radiofrequency Ablation, Nerve, Spinal, Lumbar, Medial Branch, 1 Level;  Surgeon: Jordana Patel MD;   Location: [facility];  Service: Pain Management;  Laterality: Right;  Right L3-S1 RFTC    RADIOFREQUENCY THERMOCOAGULATION Bilateral 03/20/2012    Dr Parker    RADIOFREQUENCY THERMOCOAGULATION Left 03/18/2021    Procedure: RADIOFREQUENCY THERMAL COAGULATION;  Surgeon: Jordana Patel MD;  Location: [facility];  Service: Pain Management;  Laterality: Left;  BILATERAL L3-S1 RFTC LEFT SIDE FIRST    SELECTIVE INJECTION OF ANESTHETIC AGENT AROUND LUMBAR SPINAL NERVE ROOT BY TRANSFORAMINAL APPROACH Right 01/13/2022    Procedure: Right L4-5,5-S1 TFESI;  Surgeon: Jordana Patel MD;  Location: [facility];  Service: Pain Management;  Laterality: Right;  PT AWARE TO BE TESTED VIA PC    SHOULDER ARTHROSCOPY Right     SPINE SURGERY  1995    VASECTOMY  Mar 1996         Pre-op Assessment    I have reviewed the Patient Summary Reports.     I have reviewed the Nursing Notes. I have reviewed the NPO Status.   I have reviewed the Medications.     Review of Systems  Anesthesia Hx:  No problems with previous Anesthesia  Denies Family Hx of Anesthesia complications.   Denies Personal Hx of Anesthesia complications.   Social:  Non-Smoker, No Alcohol Use    Hematology/Oncology:  Hematology Normal   Oncology Normal     EENT/Dental:EENT/Dental Normal   Cardiovascular:   Exercise tolerance: good Hypertension Denies MI.   hyperlipidemia ECG has been reviewed.    Pulmonary:   COPD, mild Shortness of breath    Renal/:   Chronic Renal Disease, CKD    Hepatic/GI:   GERD    Musculoskeletal:   Arthritis     Neurological:   Denies CVA. Neuromuscular Disease,   Pain Etiology/Diagnosis, Cervical Radiculopathy, Lumbar Radiculopathy    Endocrine:  Endocrine Normal    Dermatological:  Skin Normal    Psych:   Psychiatric History depression          Chemistry        Component Value Date/Time     01/04/2023 1146    K 4.6 01/04/2023 1146     (H) 01/04/2023 1146    CO2 22 01/04/2023 1146    BUN 30 (H) 01/04/2023  1146    CREATININE 1.14 01/04/2023 1146     (H) 01/04/2023 1146        Component Value Date/Time    CALCIUM 9.2 01/04/2023 1146    ALKPHOS 42 (L) 05/12/2022 1004    AST 13 (L) 05/12/2022 1004    ALT 21 05/12/2022 1004    BILITOT 0.3 05/12/2022 1004    ESTGFRAFRICA 65 12/03/2020 1107    EGFRNONAA 70 05/12/2022 1004        Lab Results   Component Value Date    WBC 5.76 01/04/2023    HGB 11.9 (L) 01/04/2023    HCT 36.6 (L) 01/04/2023    MCV 94.3 01/04/2023     01/04/2023           Physical Exam  General: Well nourished, Cooperative, Alert and Oriented    Airway:  Mallampati: II   Mouth Opening: Normal  TM Distance: Normal  Tongue: Normal  Neck ROM: Normal ROM    Dental:  Partial Dentures    Chest/Lungs:  Normal Respiratory Rate      Results for orders placed or performed in visit on 01/04/23   EKG 12-lead    Collection Time: 01/04/23 12:34 PM    Narrative    Test Reason : PREOP    Vent. Rate : 060 BPM     Atrial Rate : 060 BPM     P-R Int : 250 ms          QRS Dur : 088 ms      QT Int : 378 ms       P-R-T Axes : 062 058 052 degrees     QTc Int : 378 ms    Sinus rhythm with 1st degree A-V block  Otherwise normal ECG  No previous ECGs available  Confirmed by Hesham MCCLELLAND, Yung RODARTE ([id number]) on 1/9/2023 5:18:01 PM    Referred By: RUFUS YOON           Confirmed By:Yung Dahl MD       Anesthesia Plan  Type of Anesthesia, risks & benefits discussed:    Anesthesia Type: Gen Natural Airway  Intra-op Monitoring Plan: Standard ASA Monitors  Post Op Pain Control Plan: multimodal analgesia  Induction:  IV  Airway Plan: Direct  Informed Consent: Informed consent signed with the Patient and all parties understand the risks and agree with anesthesia plan.  All questions answered. Patient consented to blood products? Yes  ASA Score: 3  Day of Surgery Review of History & Physical: H&P Update referred to the surgeon/provider.I have interviewed and examined the patient. I have reviewed the patient's H&P dated: There  are no significant changes.     Ready For Surgery From Anesthesia Perspective.     .

## 2023-02-09 NOTE — ANESTHESIA POSTPROCEDURE EVALUATION
Anesthesia Post Evaluation    Patient: Tray Jalloh    Procedure(s) Performed: Procedure(s) (LRB):  Radiofrequency Ablation, Nerve, Spinal, Lumbar, Medial Branch, Level L4-S1 (Right)    Final Anesthesia Type: general      [facility]  Patient participation: Yes- Able to Participate  Level of consciousness: awake and alert  Post-procedure vital signs: reviewed and stable  Pain management: adequate  Airway patency: patent    PONV status at discharge: No PONV  Anesthetic complications: no      Cardiovascular status: blood pressure returned to baseline, hemodynamically stable and stable  Respiratory status: unassisted  Hydration status: euvolemic  Follow-up not needed.  Comments: Pt voices appreciation for care          Vitals Value Taken Time   /73 02/09/23 1305   Temp 36.1 °C (97 °F) 02/09/23 1236   Pulse 65 02/09/23 1305   Resp 11 02/09/23 1305   SpO2 97 % 02/09/23 1305         Event Time   Out of Recovery 13:05:59         Pain/Radha Score: Radha Score: 10 (2/9/2023  1:05 PM)

## 2023-02-09 NOTE — BRIEF OP NOTE
Discharge Note  Short Stay    Admit Date: 2/9/2023    Discharge Date: 2/9/2023    Attending Physician: Jordana Patel     Discharge Provider: Jordana Patel    Diagnoses:Lumbosacral spondylosis    Discharged Condition: Good    Final Diagnoses: Spondylosis of lumbosacral region without myelopathy or radiculopathy [M47.817]    Disposition: Home or Self Care    Hospital Course: No complications, uneventful    Outcome of Hospitalization, Treatment, Procedure, or Surgery:  Patient was admitted for outpatient interventional pain management procedure. The patient tolerated the procedure well with no complications.    Follow up/Patient Instructions:  Follow up as scheduled in Pain Management office in 3-4 weeks.  Patient has received instructions and follow up date and time.    Medications:  Continue previous medications    No discharge procedures on file.

## 2023-02-09 NOTE — PLAN OF CARE
Plan:  D/c pt via wheelchair at 1310  Informed pt if does not void in 8 hours to go to ER. Notify if redness, drainage, from injection site or fever over next 3-4 days. Rest and drink plenty of fluids for the remainder of the day. No lifting over 5 lbs. For the remainder of the day. Continue regular medications as prescribed. May take pain medications as prescribed.     Pain improved 100%

## 2023-02-09 NOTE — TRANSFER OF CARE
"Anesthesia Transfer of Care Note    Patient: Tray Jalloh    Procedure(s) Performed: Procedure(s) (LRB):  Radiofrequency Ablation, Nerve, Spinal, Lumbar, Medial Branch, Level L4-S1 (Right)    Patient location: Other: [facility]    Anesthesia Type: general    Transport from OR: Transported from OR on room air with adequate spontaneous ventilation    Post pain: adequate analgesia    Post assessment: no apparent anesthetic complications    Post vital signs: stable    Level of consciousness: sedated and responds to stimulation    Nausea/Vomiting: no nausea/vomiting    Complications: none    Transfer of care protocol was followedComments: Good SV continue, NAD noted, VSS, RTRN      Last vitals:   Visit Vitals  BP (!) 91/52 (BP Location: Left arm, Patient Position: Lying)   Pulse 68   Temp 36.1 °C (97 °F) (Skin)   Resp 13   Ht 5' 10" (1.778 m)   Wt 87.5 kg (193 lb)   SpO2 95%   BMI 27.69 kg/m²     "

## 2023-02-09 NOTE — DISCHARGE INSTRUCTIONS
No driving, operating machinery, making legal decisions, or signing legal documents until tomorrow.   Continue diet as tolerated. Drink plenty of fluids and rest.   If unable to void in 8 hours proceed to nearest ER.   Notify MD of redness or drainage from incision site as well as any fever over the next 3-4 days.  No lifting over 5 lbs for the next 24 hrs.   Continue medications as prescribed. May take pain medication as prescribed.   May shower tomorrow. No tub baths for 48 hours following procedure.   May remove bandaids tomorrow, if they fall off before tomorrow they do not have to be replaced.          Procedure Instructions:    Nothing to eat or drink for 8 hours or after midnight including gum, candy, mints, or tobacco products.  If you are scheduled for 1:30 or later nothing to eat or drink after 5 a.m. the morning of the procedure, including gum, candy, mints, or tobacco products.  Must have a  at least 18 yrs of age to stay present at all times  No Diabetic medications the morning of procedure, check blood sugar the morning of procedure, if it is greater than 200 call the office at [phone]  If you are started on antibiotics or have been prescribed antibiotics, have a fever, or have any other type of infection call to reschedule procedure.  If you take blood pressure medications you can take it at your regular scheduled time with a small sip of WATER!    HOLD ASPIRIN AND ASPIRIN PRODUCTS  (ASPIRIN, BC POWDER ETC. ) FOR 7 DAYS  PRIOR TO PROCEDURE  HOLD NSAIDS( ibuprofen, mobic, meloxicam, advil, diclofenac, naproxen, relafen, celebrex,  methotrexate, aleve etc....)  FOR 3 DAYS   PRIOR TO PROCEDURE    Left L4-S1 RFTC scheduled for 2/28/2023 @ 1040am  Begin holding Celebrex on 2/25/2023

## 2023-02-09 NOTE — OP NOTE
Procedure Note    Procedure Date: 2/9/2023    Procedure Performed:  Radiofrequency ablation of the right  L3-4,4-5,5-S1 medial branch nerves utilizing fluoroscopy    Indications: Patient has failed conservative therapy.      Pre-op diagnosis: Lumbosacral Spondylosis    Post-op diagnosis: same    Physician: GUILHERME Patel MD    Anesthesia:MAC    Medications injected:  Pre-lesion, 2ml of 1% lidocaine at each level.  From a 3cc mixture of  (0.25% bupivacaine and 40mg of kenalog)  1ml of this solution was injected at each level post-lesion.    Local anesthetic used: 1% Lidocaine, 10 ml     Estimated Blood Loss:Less than 1cc    IVF:Per Anesthesia    Complications: None    Technique:  The patient was interviewed in the holding area and Risks/Benefits were discussed, including, but not limited to  the possibility of new or different pain, bleeding or infection.   All questions were answered.  The patient understood and accepted risks.  The area of treatment was marked. Consent was reviewed and signed.  A time out was taken to identify the patient, procedure and side of the procedure. The patient was placed in a prone position, then prepped and draped in the usual sterile fashion using ChloraPrep and sterile towels.  The levels were determined under fluoroscopic guidance.   AP and oblique fluoroscopy were used to identify and wai the junctions between the superior articular processes and transverse processes at  Right  L3-4,4-5,5-S1.  The right  sacral ala was also identified and marked.  The skin and subcutaneous tissues in these identified areas were anesthetized with 1% lidocaine. A 20-gauge 100 mm Cumulux RF needle was advanced towards each of these points under fluoroscopic guidance such that the tip of the needle was positioned posterior to the Neuro-foramen on lateral fluoroscopic view. Each needle was positioned such that, on stimulation, the patient had an appropriate pain response between 0.3-0.5 V, with no motor  response, other than Lumbar Paraspinal Muscles up to 2.0V.  One mL of 2% lidocaine was then injected at each level prior to lesioning, which was performed for 90 seconds at 80°C.  Once the lesion was complete, 1 mL of a solution consisting of  a 3cc mixture  (0.25% bupivacaine 2cc and and 40mg kenalog)  was injected through each probe. The probes were removed and a sterile Band-Aid dressing was applied to the puncture site.  The patient tolerated the procedure well and was monitored after the procedure.  Patient was given post procedure and discharge instructions to follow at home.  The patient was discharged in a stable condition and accompanied by an adult.

## 2023-02-13 ENCOUNTER — CLINICAL SUPPORT (OUTPATIENT)
Dept: REHABILITATION | Facility: HOSPITAL | Age: 73
End: 2023-02-13
Payer: COMMERCIAL

## 2023-02-13 DIAGNOSIS — R29.898 WEAKNESS OF LEFT LEG: ICD-10-CM

## 2023-02-13 DIAGNOSIS — M25.662 DECREASED RANGE OF MOTION (ROM) OF LEFT KNEE: ICD-10-CM

## 2023-02-13 DIAGNOSIS — Z98.890 STATUS POST LATERAL MENISCECTOMY OF LEFT KNEE: Primary | ICD-10-CM

## 2023-02-13 PROCEDURE — 97530 THERAPEUTIC ACTIVITIES: CPT

## 2023-02-13 PROCEDURE — 97112 NEUROMUSCULAR REEDUCATION: CPT

## 2023-02-13 PROCEDURE — 97110 THERAPEUTIC EXERCISES: CPT

## 2023-02-13 NOTE — PROGRESS NOTES
[facility]   Physical Therapy Treatment Note     Name: Tray Jalloh  Clinic Number: [id number]  Physician: Milan Akers MD  Visit Date: 2/13/2023  Therapy Diagnosis: Left Knee Meniscectomy   Physician Orders: PT Eval and Treat   Medical Diagnosis from Referral: Left Knee Meniscectomy: DOS:1/11/2023    Evaluation Date: 1/20/2023  Authorization Period Expiration: 1/13/2023  Plan of Care Expiration: 3/3/2023  Visit # / Visits authorized: 8/20  PTA Visit #: 1/5     Time In: 9:00 am (front office was late checking patient in due to computers being down)  Time Out: 9:55 am  Total Billable Time: 55 minutes    SUBJECTIVE     Pt reports: stiffness in left knee due to cold weather  He was compliant with home exercise program.   Response to previous treatment: soreness noted  Functional change: Improved mobility    Pain: 2/10  Location: left knee     Precautions: STANDARD, Patient is weightbearing with no assistive device     Patient does have previous back and Left Hip problems that will have to be considered during his knee rehabilitation.   We will start conservative and advance to Cybex machines as able.    OBJECTIVE     Objective Measures updated at progress report unless specified.     2/13/2023          130 degrees      0    Treatment     Tray received the treatments listed below:      therapeutic exercises to develop strength, endurance, ROM, and flexibility for 35 minutes including:    Bike: 5 minutes   SB stretch 4 x 15 second hold   Hamstring Stretch on Stairs 4 x 15 second hold LLE  Knee Flexion Stretch on Stairs 4 x 15 second hold LLE  Heel Raises at step 3 x 10  Cybex bilateral leg press 8 plates 3 x 10  Single Leg Press 4 plates 3 x 10  Cybex bilateral hamstring curls 7 plates 3 x 10  Cybex bilateral hip abduction 2 plate 2 x 10 ea  Cybex Hip extension bilateral 2 plates 20x  Cybex knee extension 3 x 10 3 plates    neuromuscular re-education activities to improve:  "Balance, Coordination, Sense, and Proprioception for 10 minutes. The following activities were included:  Single Leg Stance - Firm    Single Leg Stance - Foam  4 minutes with multiple trails  TKEs - 6 plates 3 x 10 LEFT LOWER EXTREMITY  Balance board    therapeutic activities to improve functional performance for 10 minutes, including:  Sit to Stands 3 x 10  Fwd step ups with 6" step 3 x 10 LLE  Lateral step downs with Reebok step 4" 3 x 10 LLE  Half kneeling position on foam with practice getting up and down x 5 with 30 second holds in position    supervised modalities after being cleared for contradictions: Vasopneumatic compression with GameReady to left knee at medium pressure, coldest setting x 0 minutes to decrease edema and soreness.     Patient Education and Home Exercises     Home Exercises Provided and Patient Education Provided     Education provided:   - home exercise program    Written Home Exercises Provided: Patient instructed to cont prior HEP. Exercises were reviewed and Tray was able to demonstrate them prior to the end of the session.  Tray demonstrated good  understanding of the education provided. See EMR under Patient Instructions for exercises provided during therapy sessions    ASSESSMENT     Patient is making steady functional progress. Patient met his RANGE OF MOTION goal today so all STG's have been met. Will continue to work toward achievement of LTG's.     PMH from evaluation:   Tray is a 72 y.o. male referred to outpatient Physical Therapy with a medical diagnosis of Left Knee Meniscectomy. Tray reports: His Left had been hurting him pretty bad for the past 6 months. Dr Dada Enriquez performed a Medial and Lateral Meniscectomy of the Left Knee on 1/11/2023. He presents today to Outpatient Physical Therapy to begin his Rehab. He does have a complicated past medical history that includes Right Knee Surgery, Scoliosis causing significant curve to low back and uneven pelvis, and " Left Hip pain.  X Ray noted there is thoracolumbar scoliosis with trunk shift to the right.  On the lateral there is decreased lumbar lordosis.  There is spondylotic disease with decreased disc height and osteophyte formation noted.  Grade 1 anterolisthesis at L3-4 and L4-5. Scoliosis causes Right Convexity and Lateral shift right. This affects him in sitting and standing. The scoliosis does make standing and ambulation more difficult. Patient presents with decreased Left Knee Range of Motion and Strength as espected following surgery. He also continues to have edema in the Left knee. Discussed icing this at home. Patient is noted to have decreased stance time and antalgic gait on the Left. Patient has decreased heel strike and hits foot flat due to lack of Full Knee Extension with initial contact on the Left. Patient has decreased step/stride length resulting in slow milena.  He does have crutches that he uses for long distance ambulation at this time. He is able to walk short distances without an assistive device. Therapist initiated the basic Home Exercise Program for Knee Rehab. Therapist reviewed all the exercises and had patient return demonstration. Patient will benefit from Physical Therapy intervention to address all deficits and return patient to his prior level of function.      Tray Is progressing towards his goals.   Pt prognosis is Good.     Pt will continue to benefit from skilled outpatient physical therapy to address the deficits listed in the problem list box on initial evaluation, provide pt/family education and to maximize pt's level of independence in the home and community environment.     Pt's spiritual, cultural and educational needs considered and pt agreeable to plan of care and goals.     Anticipated Barriers for therapy: None     Goals:  Short Term Goals: 3 weeks   1. Independent with Home Exercise Program : Met  2. Increase Left Knee Range of Motion to 0 Degrees to 130 Degrees : met,  current 0-130 degrees  3. Increase Left Knee Strength to grossly 4+/5 : Met  4. Patient will ambulate 500 feet with Normal Gait pattern with complaints of pain Less than or Equal to 3/10. : Met     Long Term Goals: 6 weeks   1. Patient will increase Left Knee Strength to grossly 5/5 : Ongoing  2. Patient will ambulate 1000+ feet with normal step/stride length with complaints of pain Less than or Equal to 1/10. : Met    PLAN     Plan of care Certification: 1/20/2023 to 3/3/2023.     Outpatient Physical Therapy 2 times weekly for 6 weeks to include the following interventions: Electrical Stimulation to increase strength and decrease pain, inflammation, and edema as needed, Gait Training, Manual Therapy, Moist Heat/ Ice, Neuromuscular Re-ed, Patient Education, and Therapeutic Exercise.     JANIE OSBORNE, PT,      02/13/2023

## 2023-02-15 ENCOUNTER — ANESTHESIA EVENT (OUTPATIENT)
Dept: GASTROENTEROLOGY | Facility: HOSPITAL | Age: 73
End: 2023-02-15
Payer: COMMERCIAL

## 2023-02-15 ENCOUNTER — ANESTHESIA (OUTPATIENT)
Dept: GASTROENTEROLOGY | Facility: HOSPITAL | Age: 73
End: 2023-02-15
Payer: COMMERCIAL

## 2023-02-15 ENCOUNTER — HOSPITAL ENCOUNTER (OUTPATIENT)
Dept: GASTROENTEROLOGY | Facility: HOSPITAL | Age: 73
Discharge: HOME | End: 2023-02-15
Attending: INTERNAL MEDICINE
Payer: COMMERCIAL

## 2023-02-15 VITALS — DIASTOLIC BLOOD PRESSURE: 51 MMHG | OXYGEN SATURATION: 98 % | SYSTOLIC BLOOD PRESSURE: 111 MMHG | HEART RATE: 74 BPM

## 2023-02-15 VITALS
OXYGEN SATURATION: 97 % | RESPIRATION RATE: 14 BRPM | DIASTOLIC BLOOD PRESSURE: 57 MMHG | HEART RATE: 68 BPM | BODY MASS INDEX: 27.63 KG/M2 | SYSTOLIC BLOOD PRESSURE: 110 MMHG | TEMPERATURE: 97 F | WEIGHT: 193 LBS | HEIGHT: 70 IN

## 2023-02-15 DIAGNOSIS — K62.1 POLYP OF RECTUM: ICD-10-CM

## 2023-02-15 DIAGNOSIS — Z12.11 COLON CANCER SCREENING: ICD-10-CM

## 2023-02-15 DIAGNOSIS — K57.30 DIVERTICULOSIS OF COLON: ICD-10-CM

## 2023-02-15 DIAGNOSIS — R19.5 POSITIVE COLORECTAL CANCER SCREENING USING COLOGUARD TEST: Primary | ICD-10-CM

## 2023-02-15 PROCEDURE — D9220A PRA ANESTHESIA: ICD-10-PCS | Mod: ,,, | Performed by: NURSE ANESTHETIST, CERTIFIED REGISTERED

## 2023-02-15 PROCEDURE — 45385 COLONOSCOPY W/LESION REMOVAL: CPT | Mod: 33,,, | Performed by: INTERNAL MEDICINE

## 2023-02-15 PROCEDURE — 37000009 HC ANESTHESIA EA ADD 15 MINS

## 2023-02-15 PROCEDURE — 27000716 HC OXISENSOR PROBE, ANY SIZE: Performed by: NURSE ANESTHETIST, CERTIFIED REGISTERED

## 2023-02-15 PROCEDURE — 63600175 PHARM REV CODE 636 W HCPCS: Performed by: NURSE ANESTHETIST, CERTIFIED REGISTERED

## 2023-02-15 PROCEDURE — 45385 PR COLONOSCOPY,REMV LESN,SNARE: ICD-10-PCS | Mod: 33,,, | Performed by: INTERNAL MEDICINE

## 2023-02-15 PROCEDURE — 37000008 HC ANESTHESIA 1ST 15 MINUTES

## 2023-02-15 PROCEDURE — 27000284 HC CANNULA NASAL: Performed by: NURSE ANESTHETIST, CERTIFIED REGISTERED

## 2023-02-15 PROCEDURE — 88305 SURGICAL PATHOLOGY: ICD-10-PCS | Mod: 26,,, | Performed by: PATHOLOGY

## 2023-02-15 PROCEDURE — 88305 TISSUE EXAM BY PATHOLOGIST: CPT | Mod: TC,SUR | Performed by: INTERNAL MEDICINE

## 2023-02-15 PROCEDURE — 88305 TISSUE EXAM BY PATHOLOGIST: CPT | Mod: 26,,, | Performed by: PATHOLOGY

## 2023-02-15 PROCEDURE — 45385 COLONOSCOPY W/LESION REMOVAL: CPT | Mod: PT | Performed by: INTERNAL MEDICINE

## 2023-02-15 PROCEDURE — 25000003 PHARM REV CODE 250: Performed by: NURSE ANESTHETIST, CERTIFIED REGISTERED

## 2023-02-15 PROCEDURE — D9220A PRA ANESTHESIA: Mod: ,,, | Performed by: NURSE ANESTHETIST, CERTIFIED REGISTERED

## 2023-02-15 RX ORDER — PROPOFOL 10 MG/ML
VIAL (ML) INTRAVENOUS CONTINUOUS PRN
Status: DISCONTINUED | OUTPATIENT
Start: 2023-02-15 | End: 2023-02-15

## 2023-02-15 RX ORDER — PHENYLEPHRINE HYDROCHLORIDE 10 MG/ML
INJECTION INTRAVENOUS
Status: DISCONTINUED | OUTPATIENT
Start: 2023-02-15 | End: 2023-02-15

## 2023-02-15 RX ORDER — LIDOCAINE HYDROCHLORIDE 20 MG/ML
INJECTION, SOLUTION EPIDURAL; INFILTRATION; INTRACAUDAL; PERINEURAL
Status: DISCONTINUED | OUTPATIENT
Start: 2023-02-15 | End: 2023-02-15

## 2023-02-15 RX ORDER — SODIUM CHLORIDE 0.9 % (FLUSH) 0.9 %
10 SYRINGE (ML) INJECTION
Status: DISCONTINUED | OUTPATIENT
Start: 2023-02-15 | End: 2023-02-16 | Stop reason: HOSPADM

## 2023-02-15 RX ADMIN — PHENYLEPHRINE HYDROCHLORIDE 100 MCG: 10 INJECTION INTRAVENOUS at 08:02

## 2023-02-15 RX ADMIN — SODIUM CHLORIDE: 9 INJECTION, SOLUTION INTRAVENOUS at 08:02

## 2023-02-15 RX ADMIN — LIDOCAINE HYDROCHLORIDE 50 MG: 20 INJECTION, SOLUTION INTRAVENOUS at 08:02

## 2023-02-15 RX ADMIN — PROPOFOL 150 MCG/KG/MIN: 10 INJECTION, EMULSION INTRAVENOUS at 08:02

## 2023-02-15 NOTE — DISCHARGE INSTRUCTIONS
Procedure Date  2/15/23     Impression  Overall Impression: Diverticula were noted in the transverse, descending and sigmoid colon. One small polyp was removed from the rectum with hot snare.     Recommendation    Await pathology results     Repeat colonoscopy in 5 years         High fiber diet  Disp: DC to home in stable condition. Resume diet, no driving x 24 hours. F/U with PCP as scheduled. Dx: + cologuard test, colon diverticulosis, one small polyp was removed on this exam    NO DRIVING, OPERATING EQUIPMENT, OR SIGNING LEGAL DOCUMENTS FOR 24 HOURS.  THE NURSE WILL CALL YOU WITH YOUR BIOPSY RESULTS IN A FEW DAYS.

## 2023-02-15 NOTE — H&P
[facility]  Gastroenterology  H&P    Patient Name: Tray Jalloh  MRN: [id number]  Admission Date: 2/15/2023  Code Status: Full Code    Attending Provider: Mingo Hare MD   Primary Care Physician: MOLLY Murry  Principal Problem:<principal problem not specified>    Subjective:     History of Present Illness: Pt had a positive cologuard test. His last colonoscopy was 2012.    Past Medical History:   Diagnosis Date    Cervical radiculopathy     Chronic pain     Chronic renal impairment     COPD (chronic obstructive pulmonary disease)     Depression     Digestive disorder     GERD (gastroesophageal reflux disease)     Hypercholesteremia     Hypertension     Left ventricular hypertrophy     Lumbosacral spondylosis     Prediabetes     Pulmonary nodules     repeat CT due 12/03/21       Past Surgical History:   Procedure Laterality Date    ABDOMINAL SURGERY      inguinal hernia repair    BACK SURGERY      Bilateral L3-S1 MBB Bilateral 2-, 1-, 1-, 12-    Dr oJrge Parker    EYE SURGERY      HERNIA REPAIR      KNEE ARTHROSCOPY W/ MENISCAL REPAIR Right     KNEE ARTHROSCOPY W/ MENISCECTOMY Left 1/11/2023    Procedure: ARTHROSCOPY, KNEE, WITH MENISCECTOMY;  Surgeon: Dada Enriquez MD;  Location: [facility] OR;  Service: Orthopedics;  Laterality: Left;    LATERAL RETINACULA RELEASE OF KNEE Left 1/11/2023    Procedure: ARTHROSCOPIC RELEASE, KNEE, LATERAL RETINACULA;  Surgeon: Dada Enriquez MD;  Location: [facility] OR;  Service: Orthopedics;  Laterality: Left;    PROSTATECTOMY      RADIOFREQUENCY ABLATION OF LUMBAR MEDIAL BRANCH NERVE AT SINGLE LEVEL Right 04/01/2021    Procedure: Radiofrequency Ablation, Nerve, Spinal, Lumbar, Medial Branch, 1 Level;  Surgeon: Jordana Patel MD;  Location: [facility];  Service: Pain Management;  Laterality: Right;  Right L3-S1 RFTC    RADIOFREQUENCY ABLATION OF LUMBAR MEDIAL BRANCH NERVE AT SINGLE LEVEL Right  2/9/2023    Procedure: Radiofrequency Ablation, Nerve, Spinal, Lumbar, Medial Branch, Level L4-S1;  Surgeon: Jordana Patel MD;  Location: [facility];  Service: Pain Management;  Laterality: Right;  bonita left after right is done    RADIOFREQUENCY THERMOCOAGULATION Bilateral 03/20/2012    Dr Parker    RADIOFREQUENCY THERMOCOAGULATION Left 03/18/2021    Procedure: RADIOFREQUENCY THERMAL COAGULATION;  Surgeon: Jordana Patel MD;  Location: [facility];  Service: Pain Management;  Laterality: Left;  BILATERAL L3-S1 RFTC LEFT SIDE FIRST    SELECTIVE INJECTION OF ANESTHETIC AGENT AROUND LUMBAR SPINAL NERVE ROOT BY TRANSFORAMINAL APPROACH Right 01/13/2022    Procedure: Right L4-5,5-S1 TFESI;  Surgeon: Jordana Patel MD;  Location: [facility];  Service: Pain Management;  Laterality: Right;  PT AWARE TO BE TESTED VIA PC    SHOULDER ARTHROSCOPY Right     SPINE SURGERY  1995    VASECTOMY  Mar 1996       Review of patient's allergies indicates:   Allergen Reactions    Bee pollen     Grass pollen-figueroa grass standard      Family History       Problem Relation (Age of Onset)    Alzheimer's disease Maternal Grandmother    Cancer Other    Diabetes Mother, Paternal Grandmother, Other    Heart disease Father, Other    Hypertension Father, Other    Stroke Father, Other          Tobacco Use    Smoking status: Former     Packs/day: 2.00     Years: 30.00     Pack years: 60.00     Types: Cigarettes, Pipe, Cigars    Smokeless tobacco: Never   Substance and Sexual Activity    Alcohol use: Not Currently    Drug use: Never    Sexual activity: Not Currently     Partners: Female     Birth control/protection: Abstinence     Comment: Sterile     Review of Systems   Respiratory: Negative.     Cardiovascular: Negative.    Gastrointestinal: Negative.    Objective:     Vital Signs (Most Recent):  Pulse: 71 (02/15/23 0738)  Resp: 15 (02/15/23 0738)  BP: 120/75 (02/15/23 0738)  SpO2: 97 % (02/15/23 0738) Vital Signs (24h  Range):  Pulse:  [71] 71  Resp:  [15] 15  SpO2:  [97 %] 97 %  BP: (120)/(75) 120/75     Weight: 87.5 kg (193 lb) (02/15/23 0738)  Body mass index is 27.69 kg/m².    No intake or output data in the 24 hours ending 02/15/23 0822    Lines/Drains/Airways       Peripheral Intravenous Line  Duration                  Peripheral IV - Single Lumen 02/15/23 0755 22 G Left;Posterior Hand <1 day                    Physical Exam  Vitals reviewed.   Constitutional:       General: He is not in acute distress.     Appearance: Normal appearance. He is well-developed. He is not ill-appearing.   HENT:      Head: Normocephalic and atraumatic.      Nose: Nose normal.   Eyes:      Pupils: Pupils are equal, round, and reactive to light.      Comments: + glasses   Cardiovascular:      Rate and Rhythm: Normal rate and regular rhythm.   Pulmonary:      Effort: Pulmonary effort is normal.      Breath sounds: Normal breath sounds. No wheezing.   Abdominal:      General: Abdomen is flat. Bowel sounds are normal. There is no distension.      Palpations: Abdomen is soft.      Tenderness: There is no abdominal tenderness. There is no guarding.   Skin:     General: Skin is warm and dry.      Coloration: Skin is not jaundiced.   Neurological:      Mental Status: He is alert.   Psychiatric:         Attention and Perception: Attention normal.         Mood and Affect: Affect normal.         Speech: Speech normal.         Behavior: Behavior is cooperative.      Comments: Pt was calm while speaking.       Significant Labs:  CBC: No results for input(s): WBC, HGB, HCT, PLT in the last 48 hours.  CMP: No results for input(s): GLU, CALCIUM, ALBUMIN, PROT, NA, K, CO2, CL, BUN, CREATININE, ALKPHOS, ALT, AST, BILITOT in the last 48 hours.    Significant Imaging:  Imaging results within the past 24 hours have been reviewed.    Assessment/Plan:     There are no hospital problems to display for this patient.        Imp: Positive cologuard test  Plan:  colonoscopy    Mingo Hare MD  Gastroenterology  [facility] - Endoscopy

## 2023-02-15 NOTE — TRANSFER OF CARE
"Anesthesia Transfer of Care Note    Patient: Tray Jalloh    Procedure(s) Performed: * No procedures listed *    Patient location: GI    Anesthesia Type: general    Transport from OR: Transported from OR on room air with adequate spontaneous ventilation. Continuous ECG monitoring in transport. Continuous SpO2 monitoring in transport    Post pain: adequate analgesia    Post assessment: no apparent anesthetic complications    Post vital signs: stable    Level of consciousness: sedated and responds to stimulation    Nausea/Vomiting: no nausea/vomiting    Complications: none    Transfer of care protocol was followedComments: Good SV continue, NAD, VSS, RTRN      Last vitals:   Visit Vitals  BP (!) 111/51 (BP Location: Right arm, Patient Position: Lying)   Pulse 70   Temp 36.1 °C (97 °F) (Skin)   Resp 13   Ht 5' 10" (1.778 m)   Wt 87.5 kg (193 lb)   SpO2 98%   BMI 27.69 kg/m²     "

## 2023-02-15 NOTE — ANESTHESIA POSTPROCEDURE EVALUATION
Anesthesia Post Evaluation    Patient: Tray Jalloh    Procedure(s) Performed: * No procedures listed *    Final Anesthesia Type: general      Patient location during evaluation: GI PACU  Patient participation: Yes- Able to Participate  Level of consciousness: awake and alert  Post-procedure vital signs: reviewed and stable  Pain management: adequate  Airway patency: patent    PONV status at discharge: No PONV  Anesthetic complications: no      Cardiovascular status: blood pressure returned to baseline and hemodynamically stable  Respiratory status: spontaneous ventilation  Hydration status: euvolemic  Follow-up not needed.  Comments: Pt voices appreciation for care          Vitals Value Taken Time   /79 02/15/23 1143   Temp 36.1 °C (97 °F) 02/15/23 0849   Pulse 88 02/15/23 1304   Resp 11 02/15/23 1302   SpO2 96 % 02/15/23 1304   Vitals shown include unvalidated device data.      Event Time   Out of Recovery 09:27:21         Pain/Radha Score: Radha Score: 10 (2/15/2023  9:01 AM)

## 2023-02-15 NOTE — ANESTHESIA PREPROCEDURE EVALUATION
02/15/2023  Tray Jalloh is a 72 y.o., male.    Past Medical History:   Diagnosis Date    Cervical radiculopathy     Chronic pain     Chronic renal impairment     COPD (chronic obstructive pulmonary disease)     Depression     Digestive disorder     GERD (gastroesophageal reflux disease)     Hypercholesteremia     Hypertension     Left ventricular hypertrophy     Lumbosacral spondylosis     Prediabetes     Pulmonary nodules     repeat CT due 12/03/21       Past Surgical History:   Procedure Laterality Date    ABDOMINAL SURGERY      inguinal hernia repair    BACK SURGERY      Bilateral L3-S1 MBB Bilateral 2-, 1-, 1-, 12-    Dr Jorge Parker    EYE SURGERY      HERNIA REPAIR      KNEE ARTHROSCOPY W/ MENISCAL REPAIR Right     KNEE ARTHROSCOPY W/ MENISCECTOMY Left 1/11/2023    Procedure: ARTHROSCOPY, KNEE, WITH MENISCECTOMY;  Surgeon: Dada Enriquez MD;  Location: [facility];  Service: Orthopedics;  Laterality: Left;    LATERAL RETINACULA RELEASE OF KNEE Left 1/11/2023    Procedure: ARTHROSCOPIC RELEASE, KNEE, LATERAL RETINACULA;  Surgeon: Dada Enriquez MD;  Location: [facility];  Service: Orthopedics;  Laterality: Left;    PROSTATECTOMY      RADIOFREQUENCY ABLATION OF LUMBAR MEDIAL BRANCH NERVE AT SINGLE LEVEL Right 04/01/2021    Procedure: Radiofrequency Ablation, Nerve, Spinal, Lumbar, Medial Branch, 1 Level;  Surgeon: Jordana Patel MD;  Location: [facility];  Service: Pain Management;  Laterality: Right;  Right L3-S1 RFTC    RADIOFREQUENCY ABLATION OF LUMBAR MEDIAL BRANCH NERVE AT SINGLE LEVEL Right 2/9/2023    Procedure: Radiofrequency Ablation, Nerve, Spinal, Lumbar, Medial Branch, Level L4-S1;  Surgeon: Jordana Patel MD;  Location: [facility];  Service: Pain Management;  Laterality: Right;  bonita left after  right is done    RADIOFREQUENCY THERMOCOAGULATION Bilateral 03/20/2012    Dr Parker    RADIOFREQUENCY THERMOCOAGULATION Left 03/18/2021    Procedure: RADIOFREQUENCY THERMAL COAGULATION;  Surgeon: Jordana Patel MD;  Location: [facility];  Service: Pain Management;  Laterality: Left;  BILATERAL L3-S1 RFTC LEFT SIDE FIRST    SELECTIVE INJECTION OF ANESTHETIC AGENT AROUND LUMBAR SPINAL NERVE ROOT BY TRANSFORAMINAL APPROACH Right 01/13/2022    Procedure: Right L4-5,5-S1 TFESI;  Surgeon: Jordana Patel MD;  Location: [facility];  Service: Pain Management;  Laterality: Right;  PT AWARE TO BE TESTED VIA PC    SHOULDER ARTHROSCOPY Right     SPINE SURGERY  1995    VASECTOMY  Mar 1996       Family History   Problem Relation Age of Onset    Diabetes Mother     Hypertension Father     Heart disease Father     Stroke Father     Alzheimer's disease Maternal Grandmother     Diabetes Paternal Grandmother     Cancer Other     Diabetes Other     Heart disease Other     Hypertension Other     Stroke Other        Social History     Socioeconomic History    Marital status:    Tobacco Use    Smoking status: Former     Packs/day: 2.00     Years: 30.00     Pack years: 60.00     Types: Cigarettes, Pipe, Cigars    Smokeless tobacco: Never   Substance and Sexual Activity    Alcohol use: Not Currently    Drug use: Never    Sexual activity: Not Currently     Partners: Female     Birth control/protection: Abstinence     Comment: Sterile       Current Outpatient Medications   Medication Sig Dispense Refill    acetaminophen (TYLENOL) 325 MG tablet Take 325 mg by mouth every 6 (six) hours as needed for Pain.      amLODIPine (NORVASC) 5 MG tablet Take 1 tablet by mouth once daily.      ascorbic acid, vitamin C, (VITAMIN C) 1000 MG tablet Take 1,000 mg by mouth once daily.      budesonide-glycopyr-formoterol (BREZTRI AEROSPHERE) 160-9-4.8 mcg/actuation HFAA Inhale 2 puffs into the lungs 2 (two) times  a day. Rinse mouth after use. 32.1 g 3    celecoxib (CELEBREX) 200 MG capsule Take 1 capsule (200 mg total) by mouth 2 (two) times daily. 60 capsule 2    cetirizine (ZYRTEC) 10 MG tablet Take 1 tablet (10 mg total) by mouth once daily. 90 tablet 3    cholecalciferol, vitamin D3, (VITAMIN D3) 50 mcg (2,000 unit) Cap Take 1 capsule by mouth once daily.      CRESTOR 20 mg tablet Take 1 tablet by mouth once daily. Take 1/2 tablet by mouth every evening      cyanocobalamin (VITAMIN B-12) 1000 MCG tablet Take 100 mcg by mouth once daily.      diclofenac sodium (VOLTAREN) 1 % Gel Apply topically 4 (four) times daily. 900 g 5    diphenhydrAMINE (BENADRYL) 25 mg capsule Take 25 mg by mouth every 6 (six) hours as needed for Itching.      folic acid/multivit-min/lutein (CENTRUM SILVER ORAL) Take 1 tablet by mouth once daily.      hydrocortisone 2.5 % cream Apply 30 g/kg topically 2 (two) times daily. Apply to face for dry skin      ipratropium-albuteroL (COMBIVENT RESPIMAT)  mcg/actuation inhaler Inhale 1 puff into the lungs every 6 (six) hours as needed for Wheezing. Rescue 12 g 1    ketoconazole (NIZORAL) 2 % cream Apply topically once daily.      lisinopriL (PRINIVIL,ZESTRIL) 40 MG tablet Take 1 tablet (40 mg total) by mouth once daily. 90 tablet 3    metaxalone (SKELAXIN) 800 MG tablet Take 1 tablet (800 mg total) by mouth daily as needed for Pain. 90 tablet 1    methenamine (HIPREX) 1 gram Tab Take 1 tablet (1 g total) by mouth 2 (two) times daily. 180 tablet 1    omega-3 fatty acids/fish oil (FISH OIL-OMEGA-3 FATTY ACIDS) 300-1,000 mg capsule Take 2 capsules by mouth once daily.      omeprazole (PRILOSEC) 20 MG capsule Take 1 capsule (20 mg total) by mouth once daily. 90 capsule 3    ondansetron (ZOFRAN-ODT) 4 MG TbDL Take 1 tablet (4 mg total) by mouth every 6 (six) hours as needed (nausea). 30 tablet 0    oxyCODONE-acetaminophen (PERCOCET)  mg per tablet Take 1 tablet by mouth every 6  (six) hours as needed for Pain. 30 tablet 0    pregabalin (LYRICA) 75 MG capsule Take 1 capsule (75 mg total) by mouth 2 (two) times daily. 60 capsule 5    tolterodine (DETROL LA) 2 MG Cp24 Take 2 mg by mouth once daily.      triamcinolone acetonide 0.1% (KENALOG) 0.1 % cream Apply 15 g topically 2 (two) times daily. Apply to back for dry skin and itching      vitamin E 1000 UNIT capsule Take 1,000 Units by mouth once daily.      zinc gluconate 50 mg tablet Take 50 mg by mouth once daily.       No current facility-administered medications for this encounter.       Review of patient's allergies indicates:   Allergen Reactions    Bee pollen     Grass pollen-figueroa grass standard        Pre-op Assessment    I have reviewed the Patient Summary Reports.     I have reviewed the Nursing Notes. I have reviewed the NPO Status.   I have reviewed the Medications.     Review of Systems  Anesthesia Hx:  No problems with previous Anesthesia  Denies Family Hx of Anesthesia complications.   Denies Personal Hx of Anesthesia complications.   Hematology/Oncology:     Oncology Normal     EENT/Dental:EENT/Dental Normal   Cardiovascular:   Hypertension hyperlipidemia    Pulmonary:   COPD Shortness of breath    Renal/:   Chronic Renal Disease    Hepatic/GI:   GERD    Musculoskeletal:   Arthritis     Neurological:   Neuromuscular Disease,    Endocrine:   Diabetes, type 2    Dermatological:  Skin Normal    Psych:   Psychiatric History          Physical Exam  General: Well nourished, Alert, Oriented and Cooperative    Airway:  Mouth Opening: Normal  Neck ROM: Normal ROM    Chest/Lungs:  Normal Respiratory Rate    Heart:  Rate: Normal        Anesthesia Plan  Type of Anesthesia, risks & benefits discussed:    Anesthesia Type: Gen Natural Airway, MAC  Intra-op Monitoring Plan: Standard ASA Monitors  Post Op Pain Control Plan: multimodal analgesia and IV/PO Opioids PRN  Induction:  IV  Informed Consent: Informed consent signed with the  Patient and all parties understand the risks and agree with anesthesia plan.  All questions answered. Patient consented to blood products? Yes  ASA Score: 3  Day of Surgery Review of History & Physical: I have interviewed and examined the patient. I have reviewed the patient's H&P dated: There are no significant changes.     Ready For Surgery From Anesthesia Perspective.     .

## 2023-02-16 ENCOUNTER — OFFICE VISIT (OUTPATIENT)
Dept: ORTHOPEDICS | Facility: CLINIC | Age: 73
End: 2023-02-16
Payer: COMMERCIAL

## 2023-02-16 DIAGNOSIS — M17.11 OSTEOARTHRITIS OF RIGHT KNEE, UNSPECIFIED OSTEOARTHRITIS TYPE: Primary | ICD-10-CM

## 2023-02-16 LAB
ESTROGEN SERPL-MCNC: NORMAL PG/ML
INSULIN SERPL-ACNC: NORMAL U[IU]/ML
LAB AP GROSS DESCRIPTION: NORMAL
LAB AP LABORATORY NOTES: NORMAL
T3RU NFR SERPL: NORMAL %

## 2023-02-16 PROCEDURE — 4010F PR ACE/ARB THEARPY RXD/TAKEN: ICD-10-PCS | Mod: ,,, | Performed by: NURSE PRACTITIONER

## 2023-02-16 PROCEDURE — 99024 PR POST-OP FOLLOW-UP VISIT: ICD-10-PCS | Mod: S$PBB,,, | Performed by: NURSE PRACTITIONER

## 2023-02-16 PROCEDURE — 20610 DRAIN/INJ JOINT/BURSA W/O US: CPT | Mod: PBBFAC,79,RT | Performed by: NURSE PRACTITIONER

## 2023-02-16 PROCEDURE — 1157F PR ADVANCE CARE PLAN OR EQUIV PRESENT IN MEDICAL RECORD: ICD-10-PCS | Mod: ,,, | Performed by: NURSE PRACTITIONER

## 2023-02-16 PROCEDURE — 4010F ACE/ARB THERAPY RXD/TAKEN: CPT | Mod: ,,, | Performed by: NURSE PRACTITIONER

## 2023-02-16 PROCEDURE — 99024 POSTOP FOLLOW-UP VISIT: CPT | Mod: S$PBB,,, | Performed by: NURSE PRACTITIONER

## 2023-02-16 PROCEDURE — 99211 OFF/OP EST MAY X REQ PHY/QHP: CPT | Mod: PBBFAC,25 | Performed by: NURSE PRACTITIONER

## 2023-02-16 PROCEDURE — 1157F ADVNC CARE PLAN IN RCRD: CPT | Mod: ,,, | Performed by: NURSE PRACTITIONER

## 2023-02-16 PROCEDURE — 20610 LARGE JOINT ASPIRATION/INJECTION: R SUPRA PATELLAR BURSA: ICD-10-PCS | Mod: S$PBB,79,RT, | Performed by: NURSE PRACTITIONER

## 2023-02-16 RX ORDER — BUPIVACAINE HYDROCHLORIDE 2.5 MG/ML
1 INJECTION, SOLUTION INFILTRATION; PERINEURAL
Status: COMPLETED | OUTPATIENT
Start: 2023-02-16 | End: 2023-02-16

## 2023-02-16 RX ORDER — TRIAMCINOLONE ACETONIDE 40 MG/ML
40 INJECTION, SUSPENSION INTRA-ARTICULAR; INTRAMUSCULAR
Status: DISCONTINUED | OUTPATIENT
Start: 2023-02-16 | End: 2023-02-16 | Stop reason: HOSPADM

## 2023-02-16 RX ADMIN — TRIAMCINOLONE ACETONIDE 40 MG: 40 INJECTION, SUSPENSION INTRA-ARTICULAR; INTRAMUSCULAR at 10:02

## 2023-02-16 RX ADMIN — BUPIVACAINE HYDROCHLORIDE 2.5 MG: 2.5 INJECTION, SOLUTION INFILTRATION; PERINEURAL at 10:02

## 2023-02-16 NOTE — PROCEDURES
Large Joint Aspiration/Injection: R supra patellar bursa    Date/Time: 2/16/2023 10:00 AM  Performed by: MOLLY Slater  Authorized by: MOLLY Slater     Consent Done?:  Yes (Verbal)  Indications:  Pain  Site marked: the procedure site was marked    Local anesthetic:  Bupivacaine 0.25% without epinephrine  Anesthetic total (ml):  3      Details:  Needle Size:  22 G  Location:  Knee  Site:  R supra patellar bursa  Medications:  40 mg triamcinolone acetonide 40 mg/mL  Patient tolerance:  Patient tolerated the procedure well with no immediate complications

## 2023-02-16 NOTE — LETTER
February 16, 2023      [facility]  [address] [place] [id number]  Phone: [phone]  Fax: [phone]       Patient: Tray Jalloh   YOB: 1950  Date of Visit: 02/16/2023    To Whom It May Concern:    Weston Jalloh  was at [facility] on 02/16/2023. The patient may return to work/school on 3- with no restrictions. If you have any questions or concerns, or if I can be of further assistance, please do not hesitate to contact me.    Sincerely,    MOLLY Moreno

## 2023-02-16 NOTE — PROGRESS NOTES
HISTORY OF PRESENT ILLNESS:       No surgery found No surgery found      Pt is here today for Second post-operative followup of his No surgery found.  he is doing well.  We have reviewed his findings and discussed plan of care and future treatment options, including the physical therapy plan.      Patient is 5 weeks post op left knee arthroscopy. He is still in therapy and doing well. Pain from time to time but overall improving. Reports he is having pain in the right knee today. HE has had injections in the past and would like an injection in the right knee today.                                                                                PHYSICAL EXAMINATION:     Incision sites healed well  No evidence of any erythema, infection or induration  Minimal effusion  2+ DP pulse    Well healed incisions to left knee. Knee ROM 0-120                                                                                 ASSESSMENT:                                                                                                                                               1. Status post above, doing well.                                                                                                                               PLAN:       Wounds were treated today  DVT prophylaxis discussed  Therapy plan discussed in great detail today; all questions answered.                                                                         RTC in 4 weeks  Continue PT  Right knee injection today                                                                      There are no Patient Instructions on file for this visit.

## 2023-02-17 ENCOUNTER — CLINICAL SUPPORT (OUTPATIENT)
Dept: REHABILITATION | Facility: HOSPITAL | Age: 73
End: 2023-02-17
Payer: COMMERCIAL

## 2023-02-17 DIAGNOSIS — Z98.890 STATUS POST LATERAL MENISCECTOMY OF LEFT KNEE: Primary | ICD-10-CM

## 2023-02-17 DIAGNOSIS — M25.662 DECREASED RANGE OF MOTION (ROM) OF LEFT KNEE: ICD-10-CM

## 2023-02-17 DIAGNOSIS — R29.898 WEAKNESS OF LEFT LEG: ICD-10-CM

## 2023-02-17 PROCEDURE — 97110 THERAPEUTIC EXERCISES: CPT

## 2023-02-17 PROCEDURE — 97530 THERAPEUTIC ACTIVITIES: CPT

## 2023-02-17 PROCEDURE — 97112 NEUROMUSCULAR REEDUCATION: CPT

## 2023-02-17 NOTE — PROGRESS NOTES
[facility]   Physical Therapy Treatment Note     Name: Tray Jalloh  Clinic Number: [id number]  Physician: Milan Akers MD  Visit Date: 2/17/2023  Therapy Diagnosis: Left Knee Meniscectomy   Physician Orders: PT Eval and Treat   Medical Diagnosis from Referral: Left Knee Meniscectomy: DOS:1/11/2023    Evaluation Date: 1/20/2023  Authorization Period Expiration: 1/13/2023  Plan of Care Expiration: 3/3/2023  Visit # / Visits authorized: 9/20  PTA Visit #: 1/5     Time In: 8:00 am   Time Out: 8:58 am  Total Billable Time: 55 minutes    SUBJECTIVE     Pt reports: he fell this morning trying to catch the dog. He fell on his knees. He says he is ok just a little sore.  He was compliant with home exercise program.   Response to previous treatment: soreness noted  Functional change: Improved mobility    Pain: 3/10  Location: left knee     Precautions: STANDARD, Patient is weightbearing with no assistive device     Patient does have previous back and Left Hip problems that will have to be considered during his knee rehabilitation.   We will start conservative and advance to Cybex machines as able.    OBJECTIVE     Objective Measures updated at progress report unless specified.     2/13/2023          130 degrees      0    Treatment     Tray received the treatments listed below:      therapeutic exercises to develop strength, endurance, ROM, and flexibility for 35 minutes including:    Bike: 5 minutes   SB stretch 4 x 15 second hold   Hamstring Stretch on Stairs 4 x 15 second hold LLE  Knee Flexion Stretch on Stairs 4 x 15 second hold LLE  Heel Raises at step 3 x 10  Cybex bilateral leg press 8 plates 3 x 10  Single Leg Press 4 plates 3 x 10  Cybex bilateral hamstring curls 7 plates 3 x 10  Cybex bilateral hip abduction 2 plate 2 x 10 ea  Cybex Hip extension bilateral 2 plates 20x  Cybex knee extension 3 x 10 3 plates    neuromuscular re-education activities to improve: Balance,  "Coordination, Sense, and Proprioception for 10 minutes. The following activities were included:  Single Leg Stance - Firm    Single Leg Stance - Foam  4 minutes with multiple trails  TKEs - 6 plates 3 x 10 LEFT LOWER EXTREMITY  Balance board    therapeutic activities to improve functional performance for 10 minutes, including:  Sit to Stands 3 x 10  Fwd step ups with 6" step 3 x 10 LLE  Lateral step downs with Reebok step 4" 3 x 10 LLE  Half kneeling position on foam with practice getting up and down x 5   Bilateral kneeling position on foam with practice getting up and down x 5     supervised modalities after being cleared for contradictions: Vasopneumatic compression with GameReady to left knee at medium pressure, coldest setting x 0 minutes to decrease edema and soreness.     Patient Education and Home Exercises     Home Exercises Provided and Patient Education Provided     Education provided:   - home exercise program    Written Home Exercises Provided: Patient instructed to cont prior HEP. Exercises were reviewed and Tray was able to demonstrate them prior to the end of the session.  Tray demonstrated good  understanding of the education provided. See EMR under Patient Instructions for exercises provided during therapy sessions    ASSESSMENT     Patient reported 2 falls this morning. He was chasing his dog and stumbled and fell. His knees are skinned and he busted his lip. He is sore but otherwise states he is okay to proceed with Therapy today. Left Knee pain is 3/10. He was able to perform all exercises today with no increased complaints of pain. Therapist is working to increase strength in Quads and Hamstrings. We are also working to improve overall balance with static and dynamic single leg stance. 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 Had him practice kneeling on a foam pad and getting up and down from a bilateral kneeling and half kneeling position. He performed well. Will continue " with the current Plan of Care.           PM from evaluation:   Tray is a 72 y.o. male referred to outpatient Physical Therapy with a medical diagnosis of Left Knee Meniscectomy. Tray reports: His Left had been hurting him pretty bad for the past 6 months. Dr Dada Enriquez performed a Medial and Lateral Meniscectomy of the Left Knee on 1/11/2023. He presents today to Outpatient Physical Therapy to begin his Rehab. He does have a complicated past medical history that includes Right Knee Surgery, Scoliosis causing significant curve to low back and uneven pelvis, and Left Hip pain.  X Ray noted there is thoracolumbar scoliosis with trunk shift to the right.  On the lateral there is decreased lumbar lordosis.  There is spondylotic disease with decreased disc height and osteophyte formation noted.  Grade 1 anterolisthesis at L3-4 and L4-5. Scoliosis causes Right Convexity and Lateral shift right. This affects him in sitting and standing. The scoliosis does make standing and ambulation more difficult. Patient presents with decreased Left Knee Range of Motion and Strength as espected following surgery. He also continues to have edema in the Left knee. Discussed icing this at home. Patient is noted to have decreased stance time and antalgic gait on the Left. Patient has decreased heel strike and hits foot flat due to lack of Full Knee Extension with initial contact on the Left. Patient has decreased step/stride length resulting in slow milena.  He does have crutches that he uses for long distance ambulation at this time. He is able to walk short distances without an assistive device. Therapist initiated the basic Home Exercise Program for Knee Rehab. Therapist reviewed all the exercises and had patient return demonstration. Patient will benefit from Physical Therapy intervention to address all deficits and return patient to his prior level of function.      Tray Is progressing towards his goals.   Pt prognosis is  Good.     Pt will continue to benefit from skilled outpatient physical therapy to address the deficits listed in the problem list box on initial evaluation, provide pt/family education and to maximize pt's level of independence in the home and community environment.     Pt's spiritual, cultural and educational needs considered and pt agreeable to plan of care and goals.     Anticipated Barriers for therapy: None     Goals:  Short Term Goals: 3 weeks   1. Independent with Home Exercise Program : Met  2. Increase Left Knee Range of Motion to 0 Degrees to 130 Degrees : met, current 0-130 degrees  3. Increase Left Knee Strength to grossly 4+/5 : Met  4. Patient will ambulate 500 feet with Normal Gait pattern with complaints of pain Less than or Equal to 3/10. : Met     Long Term Goals: 6 weeks   1. Patient will increase Left Knee Strength to grossly 5/5 : Ongoing  2. Patient will ambulate 1000+ feet with normal step/stride length with complaints of pain Less than or Equal to 1/10. : Met    PLAN     Plan of care Certification: 1/20/2023 to 3/3/2023.     Outpatient Physical Therapy 2 times weekly for 6 weeks to include the following interventions: Electrical Stimulation to increase strength and decrease pain, inflammation, and edema as needed, Gait Training, Manual Therapy, Moist Heat/ Ice, Neuromuscular Re-ed, Patient Education, and Therapeutic Exercise.     MAGALI OSBORNE, PT, DPT     02/17/2023

## 2023-02-20 ENCOUNTER — CLINICAL SUPPORT (OUTPATIENT)
Dept: REHABILITATION | Facility: HOSPITAL | Age: 73
End: 2023-02-20
Payer: COMMERCIAL

## 2023-02-20 DIAGNOSIS — M25.662 DECREASED RANGE OF MOTION (ROM) OF LEFT KNEE: ICD-10-CM

## 2023-02-20 DIAGNOSIS — R29.898 WEAKNESS OF LEFT LEG: ICD-10-CM

## 2023-02-20 DIAGNOSIS — Z98.890 STATUS POST LATERAL MENISCECTOMY OF LEFT KNEE: Primary | ICD-10-CM

## 2023-02-20 PROCEDURE — 97112 NEUROMUSCULAR REEDUCATION: CPT

## 2023-02-20 PROCEDURE — 97110 THERAPEUTIC EXERCISES: CPT

## 2023-02-20 PROCEDURE — 97530 THERAPEUTIC ACTIVITIES: CPT

## 2023-02-20 NOTE — PROGRESS NOTES
[facility]   Physical Therapy Treatment Note     Name: Tray Jalloh  Clinic Number: [id number]  Physician: Milan Akers MD  Visit Date: 2/20/2023  Therapy Diagnosis: Left Knee Meniscectomy   Physician Orders: PT Eval and Treat   Medical Diagnosis from Referral: Left Knee Meniscectomy: DOS:1/11/2023    Evaluation Date: 1/20/2023  Authorization Period Expiration: 1/13/2023  Plan of Care Expiration: 3/3/2023  Visit # / Visits authorized: 10/20  PTA Visit #: 1/5     Time In: 8:56 am   Time Out: 9:53 am  Total Billable Time: 55 minutes    SUBJECTIVE     Pt reports: his left knee is feeling good today  He was compliant with home exercise program.   Response to previous treatment: soreness noted  Functional change: Improved mobility    Pain: 0/10  Location: left knee     Precautions: STANDARD, Patient is weightbearing with no assistive device     Patient does have previous back and Left Hip problems that will have to be considered during his knee rehabilitation.   We will start conservative and advance to Cybex machines as able.    OBJECTIVE     Objective Measures updated at progress report unless specified.     2/13/2023          130 degrees      0    Treatment     Tray received the treatments listed below:      therapeutic exercises to develop strength, endurance, ROM, and flexibility for 35 minutes including:    Bike: 5 minutes   SB stretch 4 x 15 second hold   Hamstring Stretch on Stairs 4 x 15 second hold LLE  Knee Flexion Stretch on Stairs 4 x 15 second hold LLE  Heel Raises at step 3 x 10  Cybex bilateral leg press 8 plates 3 x 10  Single Leg Press 4 plates 3 x 10  Cybex bilateral hamstring curls 7 plates 3 x 10  Cybex bilateral hip abduction 2 plate 2 x 10 ea  Cybex Hip extension bilateral 2 plates 20x  Cybex knee extension 3 x 10 3 plates    neuromuscular re-education activities to improve: Balance, Coordination, Sense, and Proprioception for 10 minutes. The  "following activities were included:  Single Leg Stance - Firm    Single Leg Stance - Foam  4 minutes with multiple trails  TKEs - 6 plates 3 x 10 LEFT LOWER EXTREMITY  Balance board    therapeutic activities to improve functional performance for 10 minutes, including:  Sit to Stands 3 x 10  Fwd step ups with 6" step 3 x 10 LLE  Lateral step downs with Reebok step 4" 3 x 10 LLE  Half kneeling position on foam with practice getting up and down x 5   Bilateral kneeling position (Tall Kneeling) on foam with practice getting up and down x 5     supervised modalities after being cleared for contradictions: Vasopneumatic compression with GameReady to left knee at medium pressure, coldest setting x 0 minutes to decrease edema and soreness.     Patient Education and Home Exercises     Home Exercises Provided and Patient Education Provided     Education provided:   - home exercise program    Written Home Exercises Provided: Patient instructed to cont prior HEP. Exercises were reviewed and Tray was able to demonstrate them prior to the end of the session.  Tray demonstrated good  understanding of the education provided. See EMR under Patient Instructions for exercises provided during therapy sessions    ASSESSMENT     Patient has had no falls since last week. His Left knee is feeling good and he reports no pain at this time. He is able to perform all exercises today with no increase in knee joint pain. We will continue to work on strengthening, Range of Motion, and overall functional mobility. Still working on half kneeling and tall kneeling and getting on/off the floor to prepare him for return to work. He performed well. Will continue with the current Plan of Care.           PMH from evaluation:   Tray is a 72 y.o. male referred to outpatient Physical Therapy with a medical diagnosis of Left Knee Meniscectomy. Tray reports: His Left had been hurting him pretty bad for the past 6 months. Dr Dada Enriquez performed a " Medial and Lateral Meniscectomy of the Left Knee on 1/11/2023. He presents today to Outpatient Physical Therapy to begin his Rehab. He does have a complicated past medical history that includes Right Knee Surgery, Scoliosis causing significant curve to low back and uneven pelvis, and Left Hip pain.  X Ray noted there is thoracolumbar scoliosis with trunk shift to the right.  On the lateral there is decreased lumbar lordosis.  There is spondylotic disease with decreased disc height and osteophyte formation noted.  Grade 1 anterolisthesis at L3-4 and L4-5. Scoliosis causes Right Convexity and Lateral shift right. This affects him in sitting and standing. The scoliosis does make standing and ambulation more difficult. Patient presents with decreased Left Knee Range of Motion and Strength as espected following surgery. He also continues to have edema in the Left knee. Discussed icing this at home. Patient is noted to have decreased stance time and antalgic gait on the Left. Patient has decreased heel strike and hits foot flat due to lack of Full Knee Extension with initial contact on the Left. Patient has decreased step/stride length resulting in slow milena.  He does have crutches that he uses for long distance ambulation at this time. He is able to walk short distances without an assistive device. Therapist initiated the basic Home Exercise Program for Knee Rehab. Therapist reviewed all the exercises and had patient return demonstration. Patient will benefit from Physical Therapy intervention to address all deficits and return patient to his prior level of function.      Tray Is progressing towards his goals.   Pt prognosis is Good.     Pt will continue to benefit from skilled outpatient physical therapy to address the deficits listed in the problem list box on initial evaluation, provide pt/family education and to maximize pt's level of independence in the home and community environment.     Pt's spiritual,  cultural and educational needs considered and pt agreeable to plan of care and goals.     Anticipated Barriers for therapy: None     Goals:  Short Term Goals: 3 weeks   1. Independent with Home Exercise Program : Met  2. Increase Left Knee Range of Motion to 0 Degrees to 130 Degrees : met, current 0-130 degrees  3. Increase Left Knee Strength to grossly 4+/5 : Met  4. Patient will ambulate 500 feet with Normal Gait pattern with complaints of pain Less than or Equal to 3/10. : Met     Long Term Goals: 6 weeks   1. Patient will increase Left Knee Strength to grossly 5/5 : Ongoing  2. Patient will ambulate 1000+ feet with normal step/stride length with complaints of pain Less than or Equal to 1/10. : Met    PLAN     Plan of care Certification: 1/20/2023 to 3/3/2023.     Outpatient Physical Therapy 2 times weekly for 6 weeks to include the following interventions: Electrical Stimulation to increase strength and decrease pain, inflammation, and edema as needed, Gait Training, Manual Therapy, Moist Heat/ Ice, Neuromuscular Re-ed, Patient Education, and Therapeutic Exercise.     MAGALI OSBORNE, PT, DPT     02/20/2023

## 2023-02-21 ENCOUNTER — OFFICE VISIT (OUTPATIENT)
Dept: FAMILY MEDICINE | Facility: CLINIC | Age: 73
End: 2023-02-21
Payer: COMMERCIAL

## 2023-02-21 VITALS
HEART RATE: 78 BPM | OXYGEN SATURATION: 98 % | HEIGHT: 70 IN | WEIGHT: 190.38 LBS | RESPIRATION RATE: 18 BRPM | SYSTOLIC BLOOD PRESSURE: 108 MMHG | DIASTOLIC BLOOD PRESSURE: 62 MMHG | TEMPERATURE: 98 F | BODY MASS INDEX: 27.25 KG/M2

## 2023-02-21 DIAGNOSIS — J44.9 COPD, MILD: Primary | Chronic | ICD-10-CM

## 2023-02-21 DIAGNOSIS — S00.81XA ABRASION, FACE W/O INFECTION: ICD-10-CM

## 2023-02-21 DIAGNOSIS — R06.02 SOB (SHORTNESS OF BREATH): Chronic | ICD-10-CM

## 2023-02-21 DIAGNOSIS — K21.9 GASTROESOPHAGEAL REFLUX DISEASE WITHOUT ESOPHAGITIS: Chronic | ICD-10-CM

## 2023-02-21 PROBLEM — R07.9 LEFT-SIDED CHEST PAIN: Status: RESOLVED | Noted: 2022-07-18 | Resolved: 2023-02-21

## 2023-02-21 PROBLEM — R19.5 POSITIVE COLORECTAL CANCER SCREENING USING COLOGUARD TEST: Status: RESOLVED | Noted: 2022-12-15 | Resolved: 2023-02-21

## 2023-02-21 PROBLEM — D63.8 ANEMIA OF CHRONIC DISEASE: Chronic | Status: ACTIVE | Noted: 2022-11-16

## 2023-02-21 PROBLEM — U07.1 COVID-19: Status: RESOLVED | Noted: 2022-12-27 | Resolved: 2023-02-21

## 2023-02-21 PROBLEM — M47.817 SPONDYLOSIS OF LUMBOSACRAL SPINE WITHOUT MYELOPATHY: Status: RESOLVED | Noted: 2021-03-18 | Resolved: 2023-02-21

## 2023-02-21 PROCEDURE — 3078F DIAST BP <80 MM HG: CPT | Mod: ,,, | Performed by: NURSE PRACTITIONER

## 2023-02-21 PROCEDURE — 1159F PR MEDICATION LIST DOCUMENTED IN MEDICAL RECORD: ICD-10-PCS | Mod: ,,, | Performed by: NURSE PRACTITIONER

## 2023-02-21 PROCEDURE — 3074F PR MOST RECENT SYSTOLIC BLOOD PRESSURE < 130 MM HG: ICD-10-PCS | Mod: ,,, | Performed by: NURSE PRACTITIONER

## 2023-02-21 PROCEDURE — 1126F AMNT PAIN NOTED NONE PRSNT: CPT | Mod: ,,, | Performed by: NURSE PRACTITIONER

## 2023-02-21 PROCEDURE — 99214 PR OFFICE/OUTPT VISIT, EST, LEVL IV, 30-39 MIN: ICD-10-PCS | Mod: ,,, | Performed by: NURSE PRACTITIONER

## 2023-02-21 PROCEDURE — 3078F PR MOST RECENT DIASTOLIC BLOOD PRESSURE < 80 MM HG: ICD-10-PCS | Mod: ,,, | Performed by: NURSE PRACTITIONER

## 2023-02-21 PROCEDURE — 1126F PR PAIN SEVERITY QUANTIFIED, NO PAIN PRESENT: ICD-10-PCS | Mod: ,,, | Performed by: NURSE PRACTITIONER

## 2023-02-21 PROCEDURE — 4010F ACE/ARB THERAPY RXD/TAKEN: CPT | Mod: ,,, | Performed by: NURSE PRACTITIONER

## 2023-02-21 PROCEDURE — 1157F ADVNC CARE PLAN IN RCRD: CPT | Mod: ,,, | Performed by: NURSE PRACTITIONER

## 2023-02-21 PROCEDURE — 3008F PR BODY MASS INDEX (BMI) DOCUMENTED: ICD-10-PCS | Mod: ,,, | Performed by: NURSE PRACTITIONER

## 2023-02-21 PROCEDURE — 1159F MED LIST DOCD IN RCRD: CPT | Mod: ,,, | Performed by: NURSE PRACTITIONER

## 2023-02-21 PROCEDURE — 1157F PR ADVANCE CARE PLAN OR EQUIV PRESENT IN MEDICAL RECORD: ICD-10-PCS | Mod: ,,, | Performed by: NURSE PRACTITIONER

## 2023-02-21 PROCEDURE — 1160F RVW MEDS BY RX/DR IN RCRD: CPT | Mod: ,,, | Performed by: NURSE PRACTITIONER

## 2023-02-21 PROCEDURE — 99214 OFFICE O/P EST MOD 30 MIN: CPT | Mod: ,,, | Performed by: NURSE PRACTITIONER

## 2023-02-21 PROCEDURE — 1100F PR PT FALLS ASSESS DOC 2+ FALLS/FALL W/INJURY/YR: ICD-10-PCS | Mod: ,,, | Performed by: NURSE PRACTITIONER

## 2023-02-21 PROCEDURE — 1100F PTFALLS ASSESS-DOCD GE2>/YR: CPT | Mod: ,,, | Performed by: NURSE PRACTITIONER

## 2023-02-21 PROCEDURE — 3074F SYST BP LT 130 MM HG: CPT | Mod: ,,, | Performed by: NURSE PRACTITIONER

## 2023-02-21 PROCEDURE — 1160F PR REVIEW ALL MEDS BY PRESCRIBER/CLIN PHARMACIST DOCUMENTED: ICD-10-PCS | Mod: ,,, | Performed by: NURSE PRACTITIONER

## 2023-02-21 PROCEDURE — 3288F FALL RISK ASSESSMENT DOCD: CPT | Mod: ,,, | Performed by: NURSE PRACTITIONER

## 2023-02-21 PROCEDURE — 3288F PR FALLS RISK ASSESSMENT DOCUMENTED: ICD-10-PCS | Mod: ,,, | Performed by: NURSE PRACTITIONER

## 2023-02-21 PROCEDURE — 4010F PR ACE/ARB THEARPY RXD/TAKEN: ICD-10-PCS | Mod: ,,, | Performed by: NURSE PRACTITIONER

## 2023-02-21 PROCEDURE — 3008F BODY MASS INDEX DOCD: CPT | Mod: ,,, | Performed by: NURSE PRACTITIONER

## 2023-02-21 RX ORDER — OMEPRAZOLE 20 MG/1
20 CAPSULE, DELAYED RELEASE ORAL DAILY
Qty: 90 CAPSULE | Refills: 3 | Status: SHIPPED | OUTPATIENT
Start: 2023-02-21 | End: 2023-05-22 | Stop reason: SDUPTHER

## 2023-02-21 NOTE — PROGRESS NOTES
"   [facility]       PATIENT NAME: Tray Jalloh   : 1950    AGE: 72 y.o. DATE OF ENCOUNTER: 23    MRN: [id number]      PCP: MOLLY Murry    Reason for Visit / Chief Complaint:  COPD (Patient presents to clinic for 3 month follow up of COPD)         274}    Subjective:     HPI:    Presents for 3 mth f/u uncontrolled COPD & SOB with exertion.  Was using CAROLE 2x/day so was started on Breztri in November.    Breztri has helped but reports still was using CAROLE 2x/day "out of habit"; still has chronic SOB w/ exertion.    1 wk ago got off balance when going too fast to get to his dog and fell forward hitting his face.  Still has discomfort in his nose and cheeks.    Review of Systems:   Review of Systems   Constitutional: Negative.    HENT:  Positive for congestion (chronic rhinitis).    Respiratory:  Positive for shortness of breath (chronic, intermittent d/t hx COPD). Negative for cough and wheezing.    Cardiovascular: Negative.    Gastrointestinal: Negative.    Genitourinary: Negative.    Musculoskeletal:  Positive for arthralgias and back pain.   Skin: Negative.    Neurological: Negative.      Allergies and Meds: 274}     Review of patient's allergies indicates:   Allergen Reactions    Bee pollen     Grass pollen-figueroa grass standard         Current Outpatient Medications:     acetaminophen (TYLENOL) 325 MG tablet, Take 325 mg by mouth every 6 (six) hours as needed for Pain., Disp: , Rfl:     amLODIPine (NORVASC) 5 MG tablet, Take 1 tablet by mouth once daily., Disp: , Rfl:     ascorbic acid, vitamin C, (VITAMIN C) 1000 MG tablet, Take 1,000 mg by mouth once daily., Disp: , Rfl:     budesonide-glycopyr-formoterol (BREZTRI AEROSPHERE) 160-9-4.8 mcg/actuation HFAA, Inhale 2 puffs into the lungs 2 (two) times a day. Rinse mouth after use., Disp: 32.1 g, Rfl: 3    celecoxib (CELEBREX) 200 MG capsule, Take 1 capsule (200 mg total) by mouth 2 (two) times daily., " Disp: 60 capsule, Rfl: 2    cetirizine (ZYRTEC) 10 MG tablet, Take 1 tablet (10 mg total) by mouth once daily., Disp: 90 tablet, Rfl: 3    cholecalciferol, vitamin D3, (VITAMIN D3) 50 mcg (2,000 unit) Cap, Take 1 capsule by mouth once daily., Disp: , Rfl:     CRESTOR 20 mg tablet, Take 1 tablet by mouth once daily. Take 1/2 tablet by mouth every evening, Disp: , Rfl:     cyanocobalamin (VITAMIN B-12) 1000 MCG tablet, Take 100 mcg by mouth once daily., Disp: , Rfl:     diclofenac sodium (VOLTAREN) 1 % Gel, Apply topically 4 (four) times daily., Disp: 900 g, Rfl: 5    diphenhydrAMINE (BENADRYL) 25 mg capsule, Take 25 mg by mouth every 6 (six) hours as needed for Itching., Disp: , Rfl:     folic acid/multivit-min/lutein (CENTRUM SILVER ORAL), Take 1 tablet by mouth once daily., Disp: , Rfl:     hydrocortisone 2.5 % cream, Apply 30 g/kg topically 2 (two) times daily. Apply to face for dry skin, Disp: , Rfl:     ipratropium-albuteroL (COMBIVENT RESPIMAT)  mcg/actuation inhaler, Inhale 1 puff into the lungs every 6 (six) hours as needed for Wheezing. Rescue, Disp: 12 g, Rfl: 1    ketoconazole (NIZORAL) 2 % cream, Apply topically once daily., Disp: , Rfl:     lisinopriL (PRINIVIL,ZESTRIL) 40 MG tablet, Take 1 tablet (40 mg total) by mouth once daily., Disp: 90 tablet, Rfl: 3    metaxalone (SKELAXIN) 800 MG tablet, Take 1 tablet (800 mg total) by mouth daily as needed for Pain., Disp: 90 tablet, Rfl: 1    methenamine (HIPREX) 1 gram Tab, Take 1 tablet (1 g total) by mouth 2 (two) times daily., Disp: 180 tablet, Rfl: 1    omega-3 fatty acids/fish oil (FISH OIL-OMEGA-3 FATTY ACIDS) 300-1,000 mg capsule, Take 2 capsules by mouth once daily., Disp: , Rfl:     pregabalin (LYRICA) 75 MG capsule, Take 1 capsule (75 mg total) by mouth 2 (two) times daily., Disp: 60 capsule, Rfl: 5    tolterodine (DETROL LA) 2 MG Cp24, Take 2 mg by mouth once daily., Disp: , Rfl:     triamcinolone acetonide 0.1% (KENALOG) 0.1 % cream, Apply 15  "g topically 2 (two) times daily. Apply to back for dry skin and itching, Disp: , Rfl:     vitamin E 1000 UNIT capsule, Take 1,000 Units by mouth once daily., Disp: , Rfl:     zinc gluconate 50 mg tablet, Take 50 mg by mouth once daily., Disp: , Rfl:     omeprazole (PRILOSEC) 20 MG capsule, Take 1 capsule (20 mg total) by mouth once daily., Disp: 90 capsule, Rfl: 3    Medical History: 274}     Past Medical History:   Diagnosis Date    Cervical radiculopathy     Chronic pain     Chronic renal impairment     COPD (chronic obstructive pulmonary disease)     COVID-19 12/27/2022    Depression     Digestive disorder     GERD (gastroesophageal reflux disease)     Hypercholesteremia     Hypertension     Left ventricular hypertrophy     Lumbosacral spondylosis     Positive colorectal cancer screening using Cologuard test 12/15/2022    Prediabetes     Pulmonary nodules     repeat CT due 12/03/21      Social History     Tobacco Use   Smoking Status Former    Packs/day: 2.00    Years: 30.00    Pack years: 60.00    Types: Cigarettes, Pipe, Cigars   Smokeless Tobacco Never      Objective:  274}   /62 (BP Location: Left arm, Patient Position: Sitting, BP Method: Medium (Manual))   Pulse 78   Temp 97.7 °F (36.5 °C) (Oral)   Resp 18   Ht 5' 10" (1.778 m)   Wt 86.4 kg (190 lb 6.4 oz)   SpO2 98%   BMI 27.32 kg/m²     Wt Readings from Last 3 Encounters:   02/21/23 86.4 kg (190 lb 6.4 oz)   02/15/23 87.5 kg (193 lb)   02/09/23 87.5 kg (193 lb)     BP Readings from Last 3 Encounters:   02/21/23 108/62   02/15/23 (!) 110/57   02/15/23 (!) 111/51     Body mass index is 27.32 kg/m².     Physical Exam  Vitals and nursing note reviewed.   Constitutional:       General: He is not in acute distress.     Appearance: Normal appearance. He is not ill-appearing.   HENT:      Head: Normocephalic.      Nose:      Right Sinus: Maxillary sinus tenderness present.      Left Sinus: Maxillary sinus tenderness present.   Eyes:      " Conjunctiva/sclera: Conjunctivae normal.   Cardiovascular:      Rate and Rhythm: Normal rate and regular rhythm.      Heart sounds: Normal heart sounds.   Pulmonary:      Effort: Pulmonary effort is normal. No respiratory distress.      Breath sounds: Normal breath sounds.   Musculoskeletal:      Cervical back: Neck supple.      Right lower leg: No edema.      Left lower leg: No edema.   Skin:     General: Skin is warm and dry.      Findings: Abrasion (small superficial scabbed abrasion to upper lip, no redness or drainage) present.   Neurological:      Mental Status: He is alert and oriented to person, place, and time.        Assessment and Plan: 274}     1. COPD, mild  Comments:  controlled, continue Breztri    2. SOB (shortness of breath)  Comments:  with exertion not controlled, progressing over time    3. Gastroesophageal reflux disease without esophagitis  -     omeprazole (PRILOSEC) 20 MG capsule; Take 1 capsule (20 mg total) by mouth once daily.  Dispense: 90 capsule; Refill: 3    4. Abrasion, face w/o infection  Comments:  above lip s/p fall 1 wk ago       Tylenol & ice pack 15 min 2-3 times per day for facial discomfort.     Return to clinic in May for [facility] as scheduled; and sooner as needed.    Chart reviewed by Milan Lilly MD on 04/18/2023  Future Appointments   Date Time Provider Department Center   2[facility]22/2023  9[facility]   2/27/2023  9:00 AM [facility]   3/1/2023 10:00 AM [facility]   3/9/2023  1:15 PM Dada Enriquez MD [facility]THO [facility]   5/15/2023  8:40 AM MOLLY Murry [facility]   7/3/2023  9:15 AM Milan Akers MD [facility] SPINE [facility]        Signature:  MOLLY Murry

## 2023-02-22 ENCOUNTER — CLINICAL SUPPORT (OUTPATIENT)
Dept: REHABILITATION | Facility: HOSPITAL | Age: 73
End: 2023-02-22
Payer: COMMERCIAL

## 2023-02-22 ENCOUNTER — TELEPHONE (OUTPATIENT)
Dept: GASTROENTEROLOGY | Facility: CLINIC | Age: 73
End: 2023-02-22
Payer: MEDICARE

## 2023-02-22 DIAGNOSIS — R29.898 WEAKNESS OF LEFT LEG: ICD-10-CM

## 2023-02-22 DIAGNOSIS — M25.662 DECREASED RANGE OF MOTION (ROM) OF LEFT KNEE: ICD-10-CM

## 2023-02-22 DIAGNOSIS — Z98.890 STATUS POST LATERAL MENISCECTOMY OF LEFT KNEE: Primary | ICD-10-CM

## 2023-02-22 PROCEDURE — 97530 THERAPEUTIC ACTIVITIES: CPT

## 2023-02-22 PROCEDURE — 97110 THERAPEUTIC EXERCISES: CPT

## 2023-02-22 PROCEDURE — 97112 NEUROMUSCULAR REEDUCATION: CPT

## 2023-02-22 NOTE — PROGRESS NOTES
[facility]   Physical Therapy Treatment Note     Name: Tray Jalloh  Clinic Number: [id number]  Physician: Milan Akers MD  Visit Date: 2/22/2023  Therapy Diagnosis: Left Knee Meniscectomy   Physician Orders: PT Eval and Treat   Medical Diagnosis from Referral: Left Knee Meniscectomy: DOS:1/11/2023    Evaluation Date: 1/20/2023  Authorization Period Expiration: 1/13/2023  Plan of Care Expiration: 3/3/2023  Visit # / Visits authorized: 10/20  PTA Visit #: 1/5     Time In: 8:54 am   Time Out: 9:53 am  Total Billable Time: 55 minutes    SUBJECTIVE     Pt reports: no pain in the Left knee   He was compliant with home exercise program.   Response to previous treatment: soreness noted  Functional change: Improved mobility    Pain: 0/10  Location: left knee     Precautions: STANDARD, Patient is weightbearing with no assistive device     Patient does have previous back and Left Hip problems that will have to be considered during his knee rehabilitation.   We will start conservative and advance to Cybex machines as able.    OBJECTIVE     Objective Measures updated at progress report unless specified.     2/13/2023          130 degrees      0    Treatment     Tray received the treatments listed below:      therapeutic exercises to develop strength, endurance, ROM, and flexibility for 35 minutes including:    Bike: 5 minutes   SB stretch 4 x 15 second hold   Hamstring Stretch on Stairs 4 x 15 second hold LLE  Knee Flexion Stretch on Stairs 4 x 15 second hold LLE  Heel Raises at step 3 x 10  Cybex bilateral leg press 8 plates 3 x 10  Single Leg Press 4 plates 3 x 10  Cybex bilateral hamstring curls 7 plates 3 x 10  Cybex bilateral hip abduction 2 plate 2 x 10 ea  Cybex Hip extension bilateral 2 plates 20x  Cybex knee extension 3 x 10 3 plates    neuromuscular re-education activities to improve: Balance, Coordination, Sense, and Proprioception for 10 minutes. The following  "activities were included:  Single Leg Stance - Firm    Single Leg Stance - Foam  4 minutes with multiple trails  TKEs - 6 plates 3 x 10 LEFT LOWER EXTREMITY  Balance board    therapeutic activities to improve functional performance for 10 minutes, including:  Sit to Stands 3 x 10  Fwd step ups with 6" step 3 x 10 LLE  Lateral step downs with Reebok step 4" 3 x 10 LLE  Half kneeling position on foam with practice getting up and down x 5   Bilateral kneeling position (Tall Kneeling) on foam with practice getting up and down x 5     supervised modalities after being cleared for contradictions: Vasopneumatic compression with GameReady to left knee at medium pressure, coldest setting x 0 minutes to decrease edema and soreness.     Patient Education and Home Exercises     Home Exercises Provided and Patient Education Provided     Education provided:   - home exercise program    Written Home Exercises Provided: Patient instructed to cont prior HEP. Exercises were reviewed and Trya was able to demonstrate them prior to the end of the session.  Tray demonstrated good  understanding of the education provided. See EMR under Patient Instructions for exercises provided during therapy sessions    ASSESSMENT     Left Knee appears to be healing very well. His Range of Motion in Within Normal Limits and his strength is improving. He should be able to discharge from Therapy by the end of next week if he continues to progress at this rate.             PMH from evaluation:   Tray is a 72 y.o. male referred to outpatient Physical Therapy with a medical diagnosis of Left Knee Meniscectomy. Tray reports: His Left had been hurting him pretty bad for the past 6 months. Dr Dada Enriquez performed a Medial and Lateral Meniscectomy of the Left Knee on 1/11/2023. He presents today to Outpatient Physical Therapy to begin his Rehab. He does have a complicated past medical history that includes Right Knee Surgery, Scoliosis causing " significant curve to low back and uneven pelvis, and Left Hip pain.  X Ray noted there is thoracolumbar scoliosis with trunk shift to the right.  On the lateral there is decreased lumbar lordosis.  There is spondylotic disease with decreased disc height and osteophyte formation noted.  Grade 1 anterolisthesis at L3-4 and L4-5. Scoliosis causes Right Convexity and Lateral shift right. This affects him in sitting and standing. The scoliosis does make standing and ambulation more difficult. Patient presents with decreased Left Knee Range of Motion and Strength as espected following surgery. He also continues to have edema in the Left knee. Discussed icing this at home. Patient is noted to have decreased stance time and antalgic gait on the Left. Patient has decreased heel strike and hits foot flat due to lack of Full Knee Extension with initial contact on the Left. Patient has decreased step/stride length resulting in slow milena.  He does have crutches that he uses for long distance ambulation at this time. He is able to walk short distances without an assistive device. Therapist initiated the basic Home Exercise Program for Knee Rehab. Therapist reviewed all the exercises and had patient return demonstration. Patient will benefit from Physical Therapy intervention to address all deficits and return patient to his prior level of function.      Tray Is progressing towards his goals.   Pt prognosis is Good.     Pt will continue to benefit from skilled outpatient physical therapy to address the deficits listed in the problem list box on initial evaluation, provide pt/family education and to maximize pt's level of independence in the home and community environment.     Pt's spiritual, cultural and educational needs considered and pt agreeable to plan of care and goals.     Anticipated Barriers for therapy: None     Goals:  Short Term Goals: 3 weeks   1. Independent with Home Exercise Program : Met  2. Increase Left  Knee Range of Motion to 0 Degrees to 130 Degrees : met, current 0-130 degrees  3. Increase Left Knee Strength to grossly 4+/5 : Met  4. Patient will ambulate 500 feet with Normal Gait pattern with complaints of pain Less than or Equal to 3/10. : Met     Long Term Goals: 6 weeks   1. Patient will increase Left Knee Strength to grossly 5/5 : Ongoing  2. Patient will ambulate 1000+ feet with normal step/stride length with complaints of pain Less than or Equal to 1/10. : Met    PLAN     Plan of care Certification: 1/20/2023 to 3/3/2023.     Outpatient Physical Therapy 2 times weekly for 6 weeks to include the following interventions: Electrical Stimulation to increase strength and decrease pain, inflammation, and edema as needed, Gait Training, Manual Therapy, Moist Heat/ Ice, Neuromuscular Re-ed, Patient Education, and Therapeutic Exercise.     MAGALI OSBORNE, PT, DPT     02/22/2023

## 2023-02-27 ENCOUNTER — CLINICAL SUPPORT (OUTPATIENT)
Dept: REHABILITATION | Facility: HOSPITAL | Age: 73
End: 2023-02-27
Payer: COMMERCIAL

## 2023-02-27 DIAGNOSIS — M25.662 DECREASED RANGE OF MOTION (ROM) OF LEFT KNEE: ICD-10-CM

## 2023-02-27 DIAGNOSIS — Z98.890 STATUS POST LATERAL MENISCECTOMY OF LEFT KNEE: Primary | ICD-10-CM

## 2023-02-27 DIAGNOSIS — R29.898 WEAKNESS OF LEFT LEG: ICD-10-CM

## 2023-02-27 PROCEDURE — 97530 THERAPEUTIC ACTIVITIES: CPT

## 2023-02-27 PROCEDURE — 97110 THERAPEUTIC EXERCISES: CPT

## 2023-02-27 PROCEDURE — 97112 NEUROMUSCULAR REEDUCATION: CPT

## 2023-02-27 NOTE — PROGRESS NOTES
[facility]   Physical Therapy Treatment Note     Name: Tray Jalloh  Clinic Number: [id number]  Physician: Milan Akers MD  Visit Date: 2/27/2023  Therapy Diagnosis: Left Knee Meniscectomy   Physician Orders: PT Eval and Treat   Medical Diagnosis from Referral: Left Knee Meniscectomy: DOS:1/11/2023    Evaluation Date: 1/20/2023  Authorization Period Expiration: 1/13/2023  Plan of Care Expiration: 3/3/2023  Visit # / Visits authorized: 12/20  PTA Visit #: 1/5     Time In: 8:52 am   Time Out: 9:48 am  Total Billable Time: 55 minutes    SUBJECTIVE     Pt reports: The Left knee is feeling great but his back is hurting  He was compliant with home exercise program.   Response to previous treatment: soreness noted  Functional change: Improved mobility    Pain: 0/10  Location: left knee     Precautions: STANDARD, Patient is weightbearing with no assistive device     Patient does have previous back and Left Hip problems that will have to be considered during his knee rehabilitation.   We will start conservative and advance to Cybex machines as able.    OBJECTIVE     Objective Measures updated at progress report unless specified.     2/13/2023          130 degrees      0    Treatment     Tray received the treatments listed below:      therapeutic exercises to develop strength, endurance, ROM, and flexibility for 35 minutes including:    Bike: 5 minutes   SB stretch 4 x 15 second hold   Hamstring Stretch on Stairs 4 x 15 second hold LLE  Knee Flexion Stretch on Stairs 4 x 15 second hold LLE  Heel Raises at step 3 x 10  Cybex bilateral leg press 8 plates 3 x 10  Single Leg Press 4 plates 3 x 10  Cybex bilateral hamstring curls 7 plates 3 x 10  Cybex bilateral hip abduction 2 plate 2 x 10 ea  Cybex Hip extension bilateral 2 plates 20x  Cybex knee extension 3 x 10 3 plates    neuromuscular re-education activities to improve: Balance, Coordination, Sense, and Proprioception for 10  "minutes. The following activities were included:  Single Leg Stance - Firm    Single Leg Stance - Foam  4 minutes with multiple trails  TKEs - 6 plates 3 x 10 LEFT LOWER EXTREMITY  Balance board    therapeutic activities to improve functional performance for 10 minutes, including:  Sit to Stands 3 x 10  Fwd step ups with 6" step 3 x 10 LLE  Lateral step downs with Reebok step 4" 3 x 10 LLE  Half kneeling position on foam with practice getting up and down x 5   Bilateral kneeling position (Tall Kneeling) on foam with practice getting up and down x 5     supervised modalities after being cleared for contradictions: Vasopneumatic compression with GameReady to left knee at medium pressure, coldest setting x 0 minutes to decrease edema and soreness.     Patient Education and Home Exercises     Home Exercises Provided and Patient Education Provided     Education provided:   - home exercise program    Written Home Exercises Provided: Patient instructed to cont prior HEP. Exercises were reviewed and Tray was able to demonstrate them prior to the end of the session.  Tray demonstrated good  understanding of the education provided. See EMR under Patient Instructions for exercises provided during therapy sessions    ASSESSMENT       Patient reports his back was acting up this weekend making his balance off. He had a fall in the shower on Saturday because of this. He states his knee is fine and not hurting at all. He is going to the pain doctor tomorrow and he is going to follow up with her on the back problems. This is a long standing problem that is being addressed by Dr Patel.   Therapist is working with patient on increasing Left Knee strength and Range of Motion. He should be ready for discharge from Therapy by end of next visit if he continues to progress at this rate. He has a follow up with Dr Garland on 3/9 and plans to return to work on 3/13.               PMH from evaluation:   Tray is a 72 y.o. male referred " to outpatient Physical Therapy with a medical diagnosis of Left Knee Meniscectomy. Tray reports: His Left had been hurting him pretty bad for the past 6 months. Dr Dada Enriquez performed a Medial and Lateral Meniscectomy of the Left Knee on 1/11/2023. He presents today to Outpatient Physical Therapy to begin his Rehab. He does have a complicated past medical history that includes Right Knee Surgery, Scoliosis causing significant curve to low back and uneven pelvis, and Left Hip pain.  X Ray noted there is thoracolumbar scoliosis with trunk shift to the right.  On the lateral there is decreased lumbar lordosis.  There is spondylotic disease with decreased disc height and osteophyte formation noted.  Grade 1 anterolisthesis at L3-4 and L4-5. Scoliosis causes Right Convexity and Lateral shift right. This affects him in sitting and standing. The scoliosis does make standing and ambulation more difficult. Patient presents with decreased Left Knee Range of Motion and Strength as espected following surgery. He also continues to have edema in the Left knee. Discussed icing this at home. Patient is noted to have decreased stance time and antalgic gait on the Left. Patient has decreased heel strike and hits foot flat due to lack of Full Knee Extension with initial contact on the Left. Patient has decreased step/stride length resulting in slow milena.  He does have crutches that he uses for long distance ambulation at this time. He is able to walk short distances without an assistive device. Therapist initiated the basic Home Exercise Program for Knee Rehab. Therapist reviewed all the exercises and had patient return demonstration. Patient will benefit from Physical Therapy intervention to address all deficits and return patient to his prior level of function.      Tray Is progressing towards his goals.   Pt prognosis is Good.     Pt will continue to benefit from skilled outpatient physical therapy to address the deficits  listed in the problem list box on initial evaluation, provide pt/family education and to maximize pt's level of independence in the home and community environment.     Pt's spiritual, cultural and educational needs considered and pt agreeable to plan of care and goals.     Anticipated Barriers for therapy: None     Goals:  Short Term Goals: 3 weeks   1. Independent with Home Exercise Program : Met  2. Increase Left Knee Range of Motion to 0 Degrees to 130 Degrees : met, current 0-130 degrees  3. Increase Left Knee Strength to grossly 4+/5 : Met  4. Patient will ambulate 500 feet with Normal Gait pattern with complaints of pain Less than or Equal to 3/10. : Met     Long Term Goals: 6 weeks   1. Patient will increase Left Knee Strength to grossly 5/5 : Ongoing  2. Patient will ambulate 1000+ feet with normal step/stride length with complaints of pain Less than or Equal to 1/10. : Met    PLAN     Plan of care Certification: 1/20/2023 to 3/3/2023.     Outpatient Physical Therapy 2 times weekly for 6 weeks to include the following interventions: Electrical Stimulation to increase strength and decrease pain, inflammation, and edema as needed, Gait Training, Manual Therapy, Moist Heat/ Ice, Neuromuscular Re-ed, Patient Education, and Therapeutic Exercise.     MAGALI OSBORNE, PT, DPT     02/27/2023

## 2023-02-28 ENCOUNTER — ANESTHESIA EVENT (OUTPATIENT)
Dept: PAIN MEDICINE | Facility: HOSPITAL | Age: 73
End: 2023-02-28
Payer: COMMERCIAL

## 2023-02-28 ENCOUNTER — ANESTHESIA (OUTPATIENT)
Dept: PAIN MEDICINE | Facility: HOSPITAL | Age: 73
End: 2023-02-28
Payer: COMMERCIAL

## 2023-02-28 ENCOUNTER — HOSPITAL ENCOUNTER (OUTPATIENT)
Facility: HOSPITAL | Age: 73
Discharge: HOME | End: 2023-02-28
Attending: PAIN MEDICINE | Admitting: PAIN MEDICINE
Payer: COMMERCIAL

## 2023-02-28 VITALS
HEART RATE: 67 BPM | HEIGHT: 70 IN | RESPIRATION RATE: 17 BRPM | TEMPERATURE: 99 F | SYSTOLIC BLOOD PRESSURE: 156 MMHG | WEIGHT: 189 LBS | DIASTOLIC BLOOD PRESSURE: 72 MMHG | OXYGEN SATURATION: 96 % | BODY MASS INDEX: 27.06 KG/M2

## 2023-02-28 DIAGNOSIS — M47.817 LUMBOSACRAL SPONDYLOSIS WITHOUT MYELOPATHY: ICD-10-CM

## 2023-02-28 DIAGNOSIS — M47.817 SPONDYLOSIS OF LUMBOSACRAL REGION WITHOUT MYELOPATHY OR RADICULOPATHY: Primary | Chronic | ICD-10-CM

## 2023-02-28 PROCEDURE — 63600175 PHARM REV CODE 636 W HCPCS

## 2023-02-28 PROCEDURE — 25000003 PHARM REV CODE 250: Performed by: PAIN MEDICINE

## 2023-02-28 PROCEDURE — 64635 DESTROY LUMB/SAC FACET JNT: CPT | Mod: LT,,, | Performed by: PAIN MEDICINE

## 2023-02-28 PROCEDURE — 37000009 HC ANESTHESIA EA ADD 15 MINS: Performed by: PAIN MEDICINE

## 2023-02-28 PROCEDURE — 64635 PR DESTROY LUMB/SAC FACET JNT: ICD-10-PCS | Mod: LT,,, | Performed by: PAIN MEDICINE

## 2023-02-28 PROCEDURE — 27000716 HC OXISENSOR PROBE, ANY SIZE

## 2023-02-28 PROCEDURE — 64636 DESTROY L/S FACET JNT ADDL: CPT | Mod: LT,,, | Performed by: PAIN MEDICINE

## 2023-02-28 PROCEDURE — 64636 DESTROY L/S FACET JNT ADDL: CPT | Mod: LT | Performed by: PAIN MEDICINE

## 2023-02-28 PROCEDURE — 63600175 PHARM REV CODE 636 W HCPCS: Performed by: PAIN MEDICINE

## 2023-02-28 PROCEDURE — 37000008 HC ANESTHESIA 1ST 15 MINUTES: Performed by: PAIN MEDICINE

## 2023-02-28 PROCEDURE — 64635 DESTROY LUMB/SAC FACET JNT: CPT | Mod: LT | Performed by: PAIN MEDICINE

## 2023-02-28 PROCEDURE — D9220A PRA ANESTHESIA: Mod: ,,,

## 2023-02-28 PROCEDURE — 27000284 HC CANNULA NASAL

## 2023-02-28 PROCEDURE — 25000003 PHARM REV CODE 250

## 2023-02-28 PROCEDURE — 27201423 OPTIME MED/SURG SUP & DEVICES STERILE SUPPLY: Performed by: PAIN MEDICINE

## 2023-02-28 PROCEDURE — D9220A PRA ANESTHESIA: ICD-10-PCS | Mod: ,,,

## 2023-02-28 PROCEDURE — 64636 PR DESTROY L/S FACET JNT ADDL: ICD-10-PCS | Mod: LT,,, | Performed by: PAIN MEDICINE

## 2023-02-28 RX ORDER — PROPOFOL 10 MG/ML
VIAL (ML) INTRAVENOUS
Status: DISCONTINUED | OUTPATIENT
Start: 2023-02-28 | End: 2023-02-28

## 2023-02-28 RX ORDER — PHENYLEPHRINE HYDROCHLORIDE 10 MG/ML
INJECTION INTRAVENOUS
Status: DISCONTINUED | OUTPATIENT
Start: 2023-02-28 | End: 2023-02-28

## 2023-02-28 RX ORDER — LIDOCAINE HYDROCHLORIDE 20 MG/ML
INJECTION INTRAVENOUS
Status: DISCONTINUED | OUTPATIENT
Start: 2023-02-28 | End: 2023-02-28

## 2023-02-28 RX ORDER — TRIAMCINOLONE ACETONIDE 40 MG/ML
INJECTION, SUSPENSION INTRA-ARTICULAR; INTRAMUSCULAR CODE/TRAUMA/SEDATION MEDICATION
Status: DISCONTINUED | OUTPATIENT
Start: 2023-02-28 | End: 2023-02-28 | Stop reason: HOSPADM

## 2023-02-28 RX ORDER — ORPHENADRINE CITRATE 30 MG/ML
INJECTION INTRAMUSCULAR; INTRAVENOUS
Status: DISCONTINUED | OUTPATIENT
Start: 2023-02-28 | End: 2023-02-28

## 2023-02-28 RX ORDER — SODIUM CHLORIDE 9 MG/ML
INJECTION, SOLUTION INTRAVENOUS CONTINUOUS
Status: DISCONTINUED | OUTPATIENT
Start: 2023-02-28 | End: 2023-02-28 | Stop reason: HOSPADM

## 2023-02-28 RX ORDER — BUPIVACAINE HYDROCHLORIDE 2.5 MG/ML
INJECTION, SOLUTION INFILTRATION; PERINEURAL CODE/TRAUMA/SEDATION MEDICATION
Status: DISCONTINUED | OUTPATIENT
Start: 2023-02-28 | End: 2023-02-28 | Stop reason: HOSPADM

## 2023-02-28 RX ADMIN — PHENYLEPHRINE HYDROCHLORIDE 100 MCG: 10 INJECTION INTRAVENOUS at 12:02

## 2023-02-28 RX ADMIN — PROPOFOL 20 MG: 10 INJECTION, EMULSION INTRAVENOUS at 12:02

## 2023-02-28 RX ADMIN — LIDOCAINE HYDROCHLORIDE 100 MG: 20 INJECTION, SOLUTION INTRAVENOUS at 11:02

## 2023-02-28 RX ADMIN — PROPOFOL 50 MG: 10 INJECTION, EMULSION INTRAVENOUS at 11:02

## 2023-02-28 RX ADMIN — SODIUM CHLORIDE: 9 INJECTION, SOLUTION INTRAVENOUS at 11:02

## 2023-02-28 RX ADMIN — PROPOFOL 30 MG: 10 INJECTION, EMULSION INTRAVENOUS at 12:02

## 2023-02-28 RX ADMIN — PROPOFOL 20 MG: 10 INJECTION, EMULSION INTRAVENOUS at 11:02

## 2023-02-28 RX ADMIN — ORPHENADRINE CITRATE 60 MG: 30 INJECTION INTRAMUSCULAR; INTRAVENOUS at 11:02

## 2023-02-28 RX ADMIN — PROPOFOL 30 MG: 10 INJECTION, EMULSION INTRAVENOUS at 11:02

## 2023-02-28 NOTE — OP NOTE
Procedure Note    Procedure Date: 2/28/2023    Procedure Performed:  Radiofrequency ablation of the left L3-4,4-5,5-S1 medial branch nerves utilizing fluoroscopy    Indications: Patient has failed conservative therapy.      Pre-op diagnosis: Lumbosacral Spondylosis    Post-op diagnosis: same    Physician: GUILHERME Patel MD    Anesthesia:MAC    Medications injected:  Pre-lesion, 2ml of 1% lidocaine at each level.  From a 1:1 mixture of  (0.25% bupivacaine,  1% Lidocaine 40mg of kenalog)  1ml of this solution was injected at each level post-lesion.    Local anesthetic used: 1% Lidocaine, 10 ml     Estimated Blood Loss:Less than 1cc    IVF:Per Anesthesia    Complications: None    Technique:  The patient was interviewed in the holding area and Risks/Benefits were discussed, including, but not limited to  the possibility of new or different pain, bleeding or infection.   All questions were answered.  The patient understood and accepted risks.  The area of treatment was marked. Consent was reviewed and signed.  A time out was taken to identify the patient, procedure and side of the procedure. The patient was placed in a prone position, then prepped and draped in the usual sterile fashion using ChloraPrep and sterile towels.  The levels were determined under fluoroscopic guidance.   AP and oblique fluoroscopy were used to identify and wai the junctions between the superior articular processes and transverse processes at  left  L3-4,4-5,5-S1.  The left sacral ala was also identified and marked.  The skin and subcutaneous tissues in these identified areas were anesthetized with 1% lidocaine. A 20-gauge 100 mm Ohlalapps RF needle was advanced towards each of these points under fluoroscopic guidance such that the tip of the needle was positioned posterior to the Neuro-foramen on lateral fluoroscopic view. Each needle was positioned such that, on stimulation, the patient had an appropriate pain response between 0.3-0.5 V, with no  motor response, other than Lumbar Paraspinal Muscles up to 2.0V.  One mL of 2% lidocaine was then injected at each level prior to lesioning, which was performed for 90 seconds at 80°C.  Once the lesion was complete, 1 mL of a solution consisting of  a 1:1 mixture  (0.25% bupivacaine 2cc and 40mg kenalog)  was injected through each probe. The probes were removed and a sterile Band-Aid dressing was applied to the puncture site.  The patient tolerated the procedure well and was monitored after the procedure.  Patient was given post procedure and discharge instructions to follow at home.  The patient was discharged in a stable condition and accompanied by an adult.

## 2023-02-28 NOTE — BRIEF OP NOTE
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 The patient tolerated the procedure well with no complications.    Follow up/Patient Instructions:  Follow up as scheduled in Pain Management office in 3-4 weeks.  Patient has received instructions and follow up date and time.    Medications:  Continue previous medications

## 2023-02-28 NOTE — ANESTHESIA POSTPROCEDURE EVALUATION
Anesthesia Post Evaluation    Patient: Tray Jalloh    Procedure(s) Performed: Procedure(s) (LRB):  RADIOFREQUENCY ABLATION, NERVE, SPINAL, LUMBAR, MEDIAL BRANCH, 1 LEVEL (Left)    Final Anesthesia Type: general      Patient location during evaluation: GI PACU  Patient participation: Yes- Able to Participate  Level of consciousness: awake and alert  Post-procedure vital signs: reviewed and stable  Pain management: adequate  Airway patency: patent    PONV status at discharge: No PONV  Anesthetic complications: no      Cardiovascular status: blood pressure returned to baseline and hemodynamically stable  Respiratory status: spontaneous ventilation and unassisted  Hydration status: euvolemic  Follow-up not needed.          Vitals Value Taken Time   /67 02/28/23 1212   Temp 37 °C (98.6 °F) 02/28/23 1212   Pulse 76 02/28/23 1212   Resp 18 02/28/23 1212   SpO2 95 % 02/28/23 1212         No case tracking events are documented in the log.      Pain/Radha Score: No data recorded

## 2023-02-28 NOTE — ANESTHESIA RELEASE NOTE
"Anesthesia Release from PACU Note    Patient: Tray Jalloh    Procedure(s) Performed: Procedure(s) (LRB):  RADIOFREQUENCY ABLATION, NERVE, SPINAL, LUMBAR, MEDIAL BRANCH, 1 LEVEL (Left)    Anesthesia type: general    Post pain: Adequate analgesia    Post assessment: no apparent anesthetic complications    Last Vitals:   Visit Vitals  BP (!) 145/67 (BP Location: Right arm, Patient Position: Lying)   Pulse 76   Temp 37 °C (98.6 °F) (Oral)   Resp 18   Ht 5' 10" (1.778 m)   Wt 85.7 kg (189 lb)   SpO2 95%   BMI 27.12 kg/m²       Post vital signs: stable    Level of consciousness: awake    Nausea/Vomiting: no nausea/no vomiting    Complications: none    Airway Patency: patent    Respiratory: unassisted    Cardiovascular: stable and blood pressure at baseline    Hydration: euvolemic  "

## 2023-02-28 NOTE — H&P
Disclaimer:  This note has been generated using voice recognition software.  There may be type of graft focal areas that have been missed during a proof reading      Subjective:      Patient ID: Tray Jalloh is a 72 y.o. male.    Chief Complaint: Low-back Pain and Knee Pain (Knee surgery on 1/11/23)    Pain  This is a chronic problem. The current episode started more than 1 year ago. The problem occurs daily. The problem has been unchanged. Associated symptoms include arthralgias. Pertinent negatives include no anorexia, chest pain, chills, coughing, diaphoresis, fatigue, fever, neck pain, sore throat, vertigo or vomiting.   Review of Systems   Constitutional:  Negative for activity change, appetite change, chills, diaphoresis, fatigue, fever and unexpected weight change.   HENT:  Negative for drooling, ear discharge, ear pain, facial swelling, nosebleeds, sore throat, trouble swallowing, voice change and goiter.    Eyes:  Negative for photophobia, pain, discharge, redness and visual disturbance.   Respiratory:  Negative for apnea, cough, choking, chest tightness, shortness of breath, wheezing and stridor.    Cardiovascular:  Negative for chest pain, palpitations and leg swelling.   Gastrointestinal:  Negative for abdominal distention, anorexia, diarrhea, rectal pain, vomiting and fecal incontinence.   Endocrine: Negative for cold intolerance, heat intolerance, polydipsia, polyphagia and polyuria.   Genitourinary:  Negative for flank pain and frequency.   Musculoskeletal:  Positive for arthralgias and back pain. Negative for neck pain and neck stiffness.   Integumentary:  Negative for color change and pallor.   Neurological:  Negative for dizziness, vertigo, seizures, syncope, facial asymmetry, speech difficulty, light-headedness, coordination difficulties, memory loss and coordination difficulties.   Hematological:  Negative for adenopathy. Does not bruise/bleed easily.   Psychiatric/Behavioral:  Negative  "for agitation, behavioral problems, confusion, decreased concentration, dysphoric mood, hallucinations, self-injury and suicidal ideas. The patient is not nervous/anxious and is not hyperactive.           Objective:  Vitals:    01/24/23 1054   BP: 132/77   Pulse: 77   Resp: 12   Weight: 91.2 kg (201 lb)   Height: 5' 10" (1.778 m)   PainSc:   1   PainLoc: Back         Physical Exam  Vitals and nursing note reviewed. Exam conducted with a chaperone present.   Constitutional:       General: He is awake. He is not in acute distress.     Appearance: Normal appearance. He is not ill-appearing, toxic-appearing or diaphoretic.   HENT:      Head: Normocephalic and atraumatic.      Nose: Nose normal.      Mouth/Throat:      Mouth: Mucous membranes are moist.      Pharynx: Oropharynx is clear.   Eyes:      Conjunctiva/sclera: Conjunctivae normal.      Pupils: Pupils are equal, round, and reactive to light.   Cardiovascular:      Rate and Rhythm: Normal rate.   Pulmonary:      Effort: Pulmonary effort is normal. No respiratory distress.   Abdominal:      Palpations: Abdomen is soft.      Tenderness: There is no guarding.   Musculoskeletal:         General: No signs of injury. Normal range of motion.      Cervical back: Normal range of motion and neck supple. No rigidity.   Skin:     General: Skin is warm and dry.      Coloration: Skin is not jaundiced or pale.   Neurological:      General: No focal deficit present.      Mental Status: He is alert and oriented to person, place, and time. Mental status is at baseline.      Cranial Nerves: No cranial nerve deficit (II-XII).   Psychiatric:         Mood and Affect: Mood normal.         Behavior: Behavior normal. Behavior is cooperative.         Thought Content: Thought content normal.         X-Ray Chest PA And Lateral  Narrative: EXAMINATION:  XR CHEST PA AND LATERAL    CLINICAL HISTORY:  Encounter for other preprocedural examination    COMPARISON:  18 July 2022    FINDINGS:  The " heart and mediastinum are normal in size and configuration.  The pulmonary vascularity is normal in caliber.  No lung infiltrates, effusions, pneumothorax or other abnormality is demonstrated.  Impression: No evidence of cardiopulmonary disease.    Electronically signed by: Fantasma Lutz  Date:    01/04/2023  Time:    12:14       Office Visit on 01/04/2023   Component Date Value Ref Range Status    Sodium 01/04/2023 143  136 - 145 mmol/L Final    Potassium 01/04/2023 4.6  3.5 - 5.1 mmol/L Final    Chloride 01/04/2023 110 (H)  98 - 107 mmol/L Final    CO2 01/04/2023 22  21 - 32 mmol/L Final    Anion Gap 01/04/2023 16  7 - 16 mmol/L Final    Glucose 01/04/2023 110 (H)  74 - 106 mg/dL Final    BUN 01/04/2023 30 (H)  7 - 18 mg/dL Final    Creatinine 01/04/2023 1.14  0.70 - 1.30 mg/dL Final    BUN/Creatinine Ratio 01/04/2023 26 (H)  6 - 20 Final    Calcium 01/04/2023 9.2  8.5 - 10.1 mg/dL Final    eGFR 01/04/2023 69  >=60 mL/min/1.73m² Final    WBC 01/04/2023 5.76  4.50 - 11.00 K/uL Final    RBC 01/04/2023 3.88 (L)  4.60 - 6.20 M/uL Final    Hemoglobin 01/04/2023 11.9 (L)  13.5 - 18.0 g/dL Final    Hematocrit 01/04/2023 36.6 (L)  40.0 - 54.0 % Final    MCV 01/04/2023 94.3  80.0 - 96.0 fL Final    MCH 01/04/2023 30.7  27.0 - 31.0 pg Final    MCHC 01/04/2023 32.5  32.0 - 36.0 g/dL Final    RDW 01/04/2023 11.9  11.5 - 14.5 % Final    Platelet Count 01/04/2023 280  150 - 400 K/uL Final    MPV 01/04/2023 10.2  9.4 - 12.4 fL Final    Neutrophils % 01/04/2023 48.2 (L)  53.0 - 65.0 % Final    Lymphocytes % 01/04/2023 37.3  27.0 - 41.0 % Final    Monocytes % 01/04/2023 6.9 (H)  2.0 - 6.0 % Final    Eosinophils % 01/04/2023 6.6 (H)  1.0 - 4.0 % Final    Basophils % 01/04/2023 0.7  0.0 - 1.0 % Final    Immature Granulocytes % 01/04/2023 0.3  0.0 - 0.4 % Final    nRBC, Auto 01/04/2023 0.0  <=0.0 % Final    Neutrophils, Abs 01/04/2023 2.77  1.80 - 7.70 K/uL Final    Lymphocytes, Absolute 01/04/2023 2.15  1.00 - 4.80 K/uL Final     Monocytes, Absolute 01/04/2023 0.40  0.00 - 0.80 K/uL Final    Eosinophils, Absolute 01/04/2023 0.38  0.00 - 0.50 K/uL Final    Basophils, Absolute 01/04/2023 0.04  0.00 - 0.20 K/uL Final    Immature Granulocytes, Absolute 01/04/2023 0.02  0.00 - 0.04 K/uL Final    nRBC, Absolute 01/04/2023 0.00  <=0.00 x10e3/uL Final    Diff Type 01/04/2023 Auto   Final   Office Visit on 12/27/2022   Component Date Value Ref Range Status    SARS Coronavirus 2 Antigen 12/27/2022 Positive (A)  Negative Final    Rapid Influenza A Ag 12/27/2022 Negative  Negative Final    Rapid Influenza B Ag 12/27/2022 Negative  Negative Final     Acceptable 12/27/2022 Yes   Final   Patient Outreach on 12/08/2022   Component Date Value Ref Range Status    CRC Recommendation External 09/04/2012 Repeat colonoscopy in 10 years   Final-Edited   Office Visit on 11/16/2022   Component Date Value Ref Range Status    Cologuard Result 12/08/2022 Positive (A)  Negative Final    Sodium 11/16/2022 139  136 - 145 mmol/L Final    Potassium 11/16/2022 4.4  3.5 - 5.1 mmol/L Final    Chloride 11/16/2022 109 (H)  98 - 107 mmol/L Final    CO2 11/16/2022 23  21 - 32 mmol/L Final    Anion Gap 11/16/2022 11  7 - 16 mmol/L Final    Glucose 11/16/2022 102  74 - 106 mg/dL Final    BUN 11/16/2022 21 (H)  7 - 18 mg/dL Final    Creatinine 11/16/2022 1.00  0.70 - 1.30 mg/dL Final    BUN/Creatinine Ratio 11/16/2022 21 (H)  6 - 20 Final    Calcium 11/16/2022 9.0  8.5 - 10.1 mg/dL Final    eGFR 11/16/2022 80  >=60 mL/min/1.73m² Final    Hemoglobin A1C 11/16/2022 5.6  4.5 - 6.6 % Final    Estimated Average Glucose 11/16/2022 100  mg/dL Final    Iron 11/16/2022 100  65 - 175 µg/dL Final    Iron Saturation 11/16/2022 37  14 - 50 % Final    TIBC 11/16/2022 272  250 - 450 µg/dL Final    Ferritin 11/16/2022 21 (L)  26 - 388 ng/mL Final    Vitamin B12 11/16/2022 668  193 - 986 pg/mL Final    Folate 11/16/2022 >20.0 (H)  3.1 - 17.5 ng/mL Final    WBC 11/16/2022 7.31  4.50 -  11.00 K/uL Final    RBC 11/16/2022 3.97 (L)  4.60 - 6.20 M/uL Final    Hemoglobin 11/16/2022 12.6 (L)  13.5 - 18.0 g/dL Final    Hematocrit 11/16/2022 38.0 (L)  40.0 - 54.0 % Final    MCV 11/16/2022 95.7  80.0 - 96.0 fL Final    MCH 11/16/2022 31.7 (H)  27.0 - 31.0 pg Final    MCHC 11/16/2022 33.2  32.0 - 36.0 g/dL Final    RDW 11/16/2022 11.8  11.5 - 14.5 % Final    Platelet Count 11/16/2022 308  150 - 400 K/uL Final    MPV 11/16/2022 10.1  9.4 - 12.4 fL Final    Neutrophils % 11/16/2022 49.7 (L)  53.0 - 65.0 % Final    Lymphocytes % 11/16/2022 32.7  27.0 - 41.0 % Final    Monocytes % 11/16/2022 9.4 (H)  2.0 - 6.0 % Final    Eosinophils % 11/16/2022 7.0 (H)  1.0 - 4.0 % Final    Basophils % 11/16/2022 1.1 (H)  0.0 - 1.0 % Final    Immature Granulocytes % 11/16/2022 0.1  0.0 - 0.4 % Final    nRBC, Auto 11/16/2022 0.0  <=0.0 % Final    Neutrophils, Abs 11/16/2022 3.63  1.80 - 7.70 K/uL Final    Lymphocytes, Absolute 11/16/2022 2.39  1.00 - 4.80 K/uL Final    Monocytes, Absolute 11/16/2022 0.69  0.00 - 0.80 K/uL Final    Eosinophils, Absolute 11/16/2022 0.51 (H)  0.00 - 0.50 K/uL Final    Basophils, Absolute 11/16/2022 0.08  0.00 - 0.20 K/uL Final    Immature Granulocytes, Absolute 11/16/2022 0.01  0.00 - 0.04 K/uL Final    nRBC, Absolute 11/16/2022 0.00  <=0.00 x10e3/uL Final    Diff Type 11/16/2022 Auto   Final           Assessment:      Lumbosacral spondylosis      No orders of the defined types were placed in this encounter.          Requested Prescriptions      No prescriptions requested or ordered in this encounter         Plan:    Not using narcotics from our office    Follows orthopedics [facility]    He had L4/5, L5/S1 right-sided TFESI # 1 January 13, 2022  He states he had 97% relief after procedure note is ongoing    Today's complain mostly back pain lower back area worse with flexion extension rotation lumbar spine ongoing for 3 months, his pain is facet joint in nature    He is requesting procedure  for back pain    X-ray pelvis [facility] October 2021, degenerative changes no fracture noted    X-ray right knee [facility] September 2021 degenerative changes multiple metallic fragments noted    MRI lumbar spine [facility] December 2020 multiple level degenerative changes malalignment noted    Continue home exercise program as directed    Indications for this procedure for this specific patient include the following   - Pt has had symptoms for three months with moderate to severe pain with functional impairment rated of 7/10 pain.   - Pain non-responsive to conservative care.    - Pain predominately axial and not associated with radiculopathy or claudication.    - No non-facet pathology as source of pain.    - Clinical assessment implicates facet joint as putative pain source.    - facet loading maneuver positive  - Pain is exacerbated by extension or prolonged sitting/standing and relieved by rest.    - No unexplained neurologic deficit.    - No history of coagulopathy, infection or unstable medical conditions.    - Pain is causing significant functional limitation resulting in diminished quality of life and impaired age appropriate ADL's.   - Clinical assessment implicates facet joint as putative source of pain  - Repeat injections not done prior to 7 days   - no more than 2 levels will be done    The planned medically necessary  surgical procedure is performed in a hospital outpatient department and not in an ambulatory surgical center due to:     -there is no geographically assessable ambulatory surgery center that has the  necessary equipment and fluoroscopy needed for the procedure     -there is no geographically assessable ambulatory surgical center available at which the physician has privileges     -an ASC's  specific  guideline regarding the individuals weight or health conditions that prevent the use of an ASC    Monitor anesthesia request is medically indicated for the scheduled nerve block  procedure due to:  1- needle phobia and anxiety, placing  the patient at risk during the provided service.  2-patient has an ASA class greater than 3 and requires constant presence of an anesthesiologist during the procedure,   3-patient has severe problems hard to lie still  4-patient suffers from chronic pain and is unable to function due to  diminished ADLs      Proceed with left lumbar RF TC L4 through S1 Dr. Patel,   Review of Systems   Constitutional:  Negative for activity change, appetite change, chills, diaphoresis, fatigue, fever and unexpected weight change.   HENT:  Negative for drooling, ear discharge, ear pain, facial swelling, nosebleeds, sore throat, trouble swallowing and voice change.    Eyes:  Negative for photophobia, pain, discharge, redness and visual disturbance.   Respiratory:  Negative for apnea, cough, choking, chest tightness, shortness of breath, wheezing and stridor.    Cardiovascular:  Negative for chest pain, palpitations and leg swelling.   Gastrointestinal:  Negative for abdominal distention, anorexia, diarrhea, rectal pain and vomiting.   Genitourinary:  Negative for flank pain and frequency.   Musculoskeletal:  Positive for arthralgias and back pain. Negative for neck pain and neck stiffness.   Skin:  Negative for color change and pallor.   Neurological:  Negative for dizziness, vertigo, seizures, syncope, facial asymmetry, speech difficulty, light-headedness and disturbances in coordination.   Endo/Heme/Allergies:  Negative for cold intolerance, heat intolerance, polydipsia, polyphagia and adenopathy. Does not bruise/bleed easily.   Psychiatric/Behavioral:  Negative for agitation, behavioral problems, confusion, decreased concentration, dysphoric mood, hallucinations, self-injury and suicidal ideas. The patient is not nervous/anxious and is not hyperactive.

## 2023-02-28 NOTE — TRANSFER OF CARE
"Anesthesia Transfer of Care Note    Patient: Tray Jalloh    Procedure(s) Performed: Procedure(s) (LRB):  RADIOFREQUENCY ABLATION, NERVE, SPINAL, LUMBAR, MEDIAL BRANCH, 1 LEVEL (Left)    Patient location: GI    Anesthesia Type: general    Transport from OR: Transported from OR on room air with adequate spontaneous ventilation. Continuous ECG monitoring in transport. Continuous SpO2 monitoring in transport    Post pain: adequate analgesia    Post assessment: no apparent anesthetic complications    Post vital signs: stable    Level of consciousness: sedated and responds to stimulation    Nausea/Vomiting: no nausea/vomiting    Complications: none    Transfer of care protocol was followedComments: Good SV continue, NAD, VSS, RTRN      Last vitals:   Visit Vitals  BP (!) 145/67 (BP Location: Right arm, Patient Position: Lying)   Pulse 76   Temp 37 °C (98.6 °F) (Oral)   Resp 18   Ht 5' 10" (1.778 m)   Wt 85.7 kg (189 lb)   SpO2 95%   BMI 27.12 kg/m²     "

## 2023-02-28 NOTE — DISCHARGE INSTRUCTIONS
Plan:    Informed pt if does not void in 8 hours to go to ER. Notify if redness, drainage, from injection site or fever over next 3-4 days. Rest and drink plenty of fluids for the remainder of the day. No lifting over 5 lbs. For the remainder of the day. Continue regular medications as prescribed. May take pain medications as prescribed.

## 2023-02-28 NOTE — ANESTHESIA PREPROCEDURE EVALUATION
02/28/2023  Tray Jalloh is a 72 y.o., male.  Current Facility-Administered Medications on File Prior to Visit   Medication Dose Route Frequency Provider Last Rate Last Admin    0.9%  NaCl infusion   Intravenous Continuous Jordana Patel MD         Current Outpatient Medications on File Prior to Visit   Medication Sig Dispense Refill    acetaminophen (TYLENOL) 325 MG tablet Take 325 mg by mouth every 6 (six) hours as needed for Pain.      amLODIPine (NORVASC) 5 MG tablet Take 1 tablet by mouth once daily.      ascorbic acid, vitamin C, (VITAMIN C) 1000 MG tablet Take 1,000 mg by mouth once daily.      budesonide-glycopyr-formoterol (BREZTRI AEROSPHERE) 160-9-4.8 mcg/actuation HFAA Inhale 2 puffs into the lungs 2 (two) times a day. Rinse mouth after use. 32.1 g 3    celecoxib (CELEBREX) 200 MG capsule Take 1 capsule (200 mg total) by mouth 2 (two) times daily. 60 capsule 2    cetirizine (ZYRTEC) 10 MG tablet Take 1 tablet (10 mg total) by mouth once daily. 90 tablet 3    cholecalciferol, vitamin D3, (VITAMIN D3) 50 mcg (2,000 unit) Cap Take 1 capsule by mouth once daily.      CRESTOR 20 mg tablet Take 1 tablet by mouth once daily. Take 1/2 tablet by mouth every evening      cyanocobalamin (VITAMIN B-12) 1000 MCG tablet Take 100 mcg by mouth once daily.      diclofenac sodium (VOLTAREN) 1 % Gel Apply topically 4 (four) times daily. 900 g 5    diphenhydrAMINE (BENADRYL) 25 mg capsule Take 25 mg by mouth every 6 (six) hours as needed for Itching.      folic acid/multivit-min/lutein (CENTRUM SILVER ORAL) Take 1 tablet by mouth once daily.      hydrocortisone 2.5 % cream Apply 30 g/kg topically 2 (two) times daily. Apply to face for dry skin      ipratropium-albuteroL (COMBIVENT RESPIMAT)  mcg/actuation inhaler Inhale 1 puff into the lungs every 6 (six) hours as needed for Wheezing.  Rescue 12 g 1    ketoconazole (NIZORAL) 2 % cream Apply topically once daily.      lisinopriL (PRINIVIL,ZESTRIL) 40 MG tablet Take 1 tablet (40 mg total) by mouth once daily. 90 tablet 3    metaxalone (SKELAXIN) 800 MG tablet Take 1 tablet (800 mg total) by mouth daily as needed for Pain. 90 tablet 1    methenamine (HIPREX) 1 gram Tab Take 1 tablet (1 g total) by mouth 2 (two) times daily. 180 tablet 1    omega-3 fatty acids/fish oil (FISH OIL-OMEGA-3 FATTY ACIDS) 300-1,000 mg capsule Take 2 capsules by mouth once daily.      omeprazole (PRILOSEC) 20 MG capsule Take 1 capsule (20 mg total) by mouth once daily. 90 capsule 3    pregabalin (LYRICA) 75 MG capsule Take 1 capsule (75 mg total) by mouth 2 (two) times daily. 60 capsule 5    tolterodine (DETROL LA) 2 MG Cp24 Take 2 mg by mouth once daily.      triamcinolone acetonide 0.1% (KENALOG) 0.1 % cream Apply 15 g topically 2 (two) times daily. Apply to back for dry skin and itching      vitamin E 1000 UNIT capsule Take 1,000 Units by mouth once daily.      zinc gluconate 50 mg tablet Take 50 mg by mouth once daily.       Active Ambulatory Problems     Diagnosis Date Noted    Spondylosis of lumbosacral region without myelopathy or radiculopathy 04/01/2021    Essential hypertension     Chronic pain     Gastroesophageal reflux disease without esophagitis     Pulmonary nodules     Hypercholesteremia     Chronic renal impairment     Pain and numbness of right upper extremity 09/30/2021    Lumbosacral radiculopathy 01/26/2022    Polyarthralgia 01/26/2022    SOB (shortness of breath) 03/15/2022    Digestive disorder 05/12/2022    Depressive disorder 05/12/2022    Memory impairment 05/12/2022    COPD, mild     History of prostate cancer 11/16/2022    Chronic osteoarthritis 11/16/2022    Anemia of chronic disease 11/16/2022    History of prediabetes 11/16/2022    Primary osteoarthritis of left knee 01/04/2023    Status post lateral meniscectomy  of left knee 01/20/2023    Decreased range of motion (ROM) of left knee 01/20/2023    Weakness of left leg 01/20/2023    Chronic pain of left knee 01/24/2023    Diverticulosis of colon 02/15/2023    Polyp of rectum 02/15/2023    Abrasion, face w/o infection 02/21/2023     Resolved Ambulatory Problems     Diagnosis Date Noted    Spondylosis of lumbosacral spine without myelopathy 03/18/2021    Prediabetes     Fever 03/15/2022    Sore throat 03/15/2022    Viral upper respiratory illness 03/15/2022    Left-sided chest pain 07/18/2022    Costochondral chest pain 07/18/2022    Positive colorectal cancer screening using Cologuard test 12/15/2022    COVID-19 12/27/2022     Past Medical History:   Diagnosis Date    Cervical radiculopathy     COPD (chronic obstructive pulmonary disease)     Depression     GERD (gastroesophageal reflux disease)     Hypertension     Left ventricular hypertrophy     Lumbosacral spondylosis      Past Surgical History:   Procedure Laterality Date    ABDOMINAL SURGERY      inguinal hernia repair    BACK SURGERY      Bilateral L3-S1 MBB Bilateral 2-, 1-, 1-, 12-    Dr Jorge Parker    EYE SURGERY      HERNIA REPAIR      KNEE ARTHROSCOPY W/ MENISCAL REPAIR Right     KNEE ARTHROSCOPY W/ MENISCECTOMY Left 01/11/2023    Procedure: ARTHROSCOPY, KNEE, WITH MENISCECTOMY;  Surgeon: Dada Enriquez MD;  Location: [facility];  Service: Orthopedics;  Laterality: Left;    KNEE SURGERY Left     LATERAL RETINACULA RELEASE OF KNEE Left 01/11/2023    Procedure: ARTHROSCOPIC RELEASE, KNEE, LATERAL RETINACULA;  Surgeon: Dada Enriquez MD;  Location: [facility];  Service: Orthopedics;  Laterality: Left;    PROSTATECTOMY      RADIOFREQUENCY ABLATION OF LUMBAR MEDIAL BRANCH NERVE AT SINGLE LEVEL Right 04/01/2021    Procedure: Radiofrequency Ablation, Nerve, Spinal, Lumbar, Medial Branch, 1 Level;  Surgeon: Jordana Patel MD;  Location: [facility];  Service: Pain Management;  Laterality: Right;  Right L3-S1 RFTC    RADIOFREQUENCY ABLATION OF LUMBAR MEDIAL BRANCH NERVE AT SINGLE LEVEL Right 02/09/2023    Procedure: Radiofrequency Ablation, Nerve, Spinal, Lumbar, Medial Branch, Level L4-S1;  Surgeon: Jordana Patel MD;  Location: [facility];  Service: Pain Management;  Laterality: Right;  bonita left after right is done    RADIOFREQUENCY THERMOCOAGULATION Bilateral 03/20/2012    Dr Parker    RADIOFREQUENCY THERMOCOAGULATION Left 03/18/2021    Procedure: RADIOFREQUENCY THERMAL COAGULATION;  Surgeon: Jordana Patel MD;  Location: [facility];  Service: Pain Management;  Laterality: Left;  BILATERAL L3-S1 RFTC LEFT SIDE FIRST    SELECTIVE INJECTION OF ANESTHETIC AGENT AROUND LUMBAR SPINAL NERVE ROOT BY TRANSFORAMINAL APPROACH Right 01/13/2022    Procedure: Right L4-5,5-S1 TFESI;  Surgeon: Jordana Patel MD;  Location: [facility];  Service: Pain Management;  Laterality: Right;  PT AWARE TO BE TESTED VIA PC    SHOULDER ARTHROSCOPY Right     SPINE SURGERY  1995    VASECTOMY  Mar 1996         Pre-op Assessment    I have reviewed the Patient Summary Reports.     I have reviewed the Nursing Notes. I have reviewed the NPO Status.   I have reviewed the Medications.     Review of Systems  Anesthesia Hx:  No problems with previous Anesthesia  Denies Family Hx of Anesthesia complications.   Denies Personal Hx of Anesthesia complications.   Social:  Non-Smoker, No Alcohol Use    Hematology/Oncology:     Oncology Normal    -- Anemia:   EENT/Dental:EENT/Dental Normal   Cardiovascular:   Exercise tolerance: good Hypertension Denies MI.   hyperlipidemia ECG has been reviewed.    Pulmonary:   COPD, mild Shortness of breath    Renal/:   Chronic Renal Disease, CKD    Hepatic/GI:   GERD    Musculoskeletal:   Arthritis     Neurological:   Denies CVA. Neuromuscular Disease,   Pain Etiology/Diagnosis, Cervical Radiculopathy, Lumbar  Radiculopathy    Endocrine:  Endocrine Normal    Dermatological:  Skin Normal    Psych:   Psychiatric History depression          Chemistry        Component Value Date/Time     01/04/2023 1146    K 4.6 01/04/2023 1146     (H) 01/04/2023 1146    CO2 22 01/04/2023 1146    BUN 30 (H) 01/04/2023 1146    CREATININE 1.14 01/04/2023 1146     (H) 01/04/2023 1146        Component Value Date/Time    CALCIUM 9.2 01/04/2023 1146    ALKPHOS 42 (L) 05/12/2022 1004    AST 13 (L) 05/12/2022 1004    ALT 21 05/12/2022 1004    BILITOT 0.3 05/12/2022 1004    ESTGFRAFRICA 65 12/03/2020 1107    EGFRNONAA 70 05/12/2022 1004        Lab Results   Component Value Date    WBC 5.76 01/04/2023    HGB 11.9 (L) 01/04/2023    HCT 36.6 (L) 01/04/2023    MCV 94.3 01/04/2023     01/04/2023             Physical Exam  General: Well nourished, Cooperative, Alert and Oriented    Airway:  Mallampati: II   Mouth Opening: Normal  TM Distance: Normal  Tongue: Normal  Neck ROM: Normal ROM    Dental:  Partial Dentures    Chest/Lungs:  Normal Respiratory Rate      Results for orders placed or performed in visit on 01/04/23   EKG 12-lead    Collection Time: 01/04/23 12:34 PM    Narrative    Test Reason : PREOP    Vent. Rate : 060 BPM     Atrial Rate : 060 BPM     P-R Int : 250 ms          QRS Dur : 088 ms      QT Int : 378 ms       P-R-T Axes : 062 058 052 degrees     QTc Int : 378 ms    Sinus rhythm with 1st degree A-V block  Otherwise normal ECG  No previous ECGs available  Confirmed by Hesham MCCLELLAND, Yung RODARTE ([id number]) on 1/9/2023 5:18:01 PM    Referred By: RUFUS YOON           Confirmed By:Yung Dahl MD         Anesthesia Plan  Type of Anesthesia, risks & benefits discussed:    Anesthesia Type: Gen Natural Airway  Intra-op Monitoring Plan: Standard ASA Monitors  Post Op Pain Control Plan: multimodal analgesia  Induction:  IV  Airway Plan: Direct  Informed Consent: Informed consent signed with the Patient and all parties  understand the risks and agree with anesthesia plan.  All questions answered. Patient consented to blood products? Yes  ASA Score: 3  Day of Surgery Review of History & Physical: H&P Update referred to the surgeon/provider.I have interviewed and examined the patient. I have reviewed the patient's H&P dated: There are no significant changes.     Ready For Surgery From Anesthesia Perspective.     .

## 2023-02-28 NOTE — PLAN OF CARE
Plan:  D/c pt via wheelchair at 1300  Informed pt if does not void in 8 hours to go to ER. Notify if redness, drainage, from injection site or fever over next 3-4 days. Rest and drink plenty of fluids for the remainder of the day. No lifting over 5 lbs. For the remainder of the day. Continue regular medications as prescribed. May take pain medications as prescribed.     Pain improved 100%

## 2023-03-01 ENCOUNTER — CLINICAL SUPPORT (OUTPATIENT)
Dept: REHABILITATION | Facility: HOSPITAL | Age: 73
End: 2023-03-01
Payer: COMMERCIAL

## 2023-03-01 DIAGNOSIS — R29.898 WEAKNESS OF LEFT LEG: ICD-10-CM

## 2023-03-01 DIAGNOSIS — M25.662 DECREASED RANGE OF MOTION (ROM) OF LEFT KNEE: ICD-10-CM

## 2023-03-01 DIAGNOSIS — Z98.890 STATUS POST LATERAL MENISCECTOMY OF LEFT KNEE: Primary | ICD-10-CM

## 2023-03-01 PROCEDURE — 97110 THERAPEUTIC EXERCISES: CPT

## 2023-03-01 PROCEDURE — 97530 THERAPEUTIC ACTIVITIES: CPT

## 2023-03-01 PROCEDURE — 97112 NEUROMUSCULAR REEDUCATION: CPT

## 2023-03-01 NOTE — PROGRESS NOTES
[facility]   Physical Therapy Treatment Note     Name: Tray Jalloh  Clinic Number: [id number]  Physician: Milan Akers MD  Visit Date: 3/1/2023  Therapy Diagnosis: Left Knee Meniscectomy   Physician Orders: PT Eval and Treat   Medical Diagnosis from Referral: Left Knee Meniscectomy: DOS:1/11/2023    Evaluation Date: 1/20/2023  Authorization Period Expiration: 1/13/2023  Plan of Care Expiration: 3/3/2023  Visit # / Visits authorized: 13/20  PTA Visit #: 1/5     Time In: 9:50 am   Time Out: 10:43 am  Total Billable Time: 53 minutes    SUBJECTIVE     Pt reports: saw Dr Patel yesterday for his back and it is feeling much better today. He reports no knee pain.   He was compliant with home exercise program.   Response to previous treatment: soreness noted  Functional change: Improved mobility    Pain: 0/10  Location: left knee     Precautions: STANDARD, Patient is weightbearing with no assistive device     Patient does have previous back and Left Hip problems that will have to be considered during his knee rehabilitation.   We will start conservative and advance to Cybex machines as able.    OBJECTIVE     Objective Measures updated at progress report unless specified.     3/1/2023          130 degrees      0    Treatment     Tray received the treatments listed below:      therapeutic exercises to develop strength, endurance, ROM, and flexibility for 35 minutes including:  Reviewed his Home Exercise Program and performed the following exercises     Bike: 5 minutes   SB stretch 4 x 15 second hold   Hamstring Stretch on Stairs 4 x 15 second hold LLE  Knee Flexion Stretch on Stairs 4 x 15 second hold LLE  Heel Raises at step 3 x 10  Cybex bilateral leg press 8 plates 3 x 10  Single Leg Press 4 plates 3 x 10  Cybex bilateral hamstring curls 7 plates 3 x 10  Cybex bilateral hip abduction 2 plate 2 x 10 ea  Cybex Hip extension bilateral 2 plates 20x  Cybex knee extension 3 x 10  "3 plates    neuromuscular re-education activities to improve: Balance, Coordination, Sense, and Proprioception for 10 minutes. The following activities were included:  Single Leg Stance - Firm    Single Leg Stance - Foam  4 minutes with multiple trails  TKEs - 6 plates 3 x 10 LEFT LOWER EXTREMITY  Balance board    therapeutic activities to improve functional performance for 8 minutes, including:  Sit to Stands 3 x 10  Fwd step ups with 6" step 3 x 10 LLE  Lateral step downs with Reebok step 4" 3 x 10 LLE  Half kneeling position on foam with practice getting up and down x 5   Bilateral kneeling position (Tall Kneeling) on foam with practice getting up and down x 5     supervised modalities after being cleared for contradictions: Vasopneumatic compression with GameReady to left knee at medium pressure, coldest setting x 0 minutes to decrease edema and soreness.     Patient Education and Home Exercises     Home Exercises Provided and Patient Education Provided     Education provided:   - home exercise program    Written Home Exercises Provided: Patient instructed to cont prior HEP. Exercises were reviewed and Tray was able to demonstrate them prior to the end of the session.  Tray demonstrated good  understanding of the education provided. See EMR under Patient Instructions for exercises provided during therapy sessions    ASSESSMENT     Patient underwent a Medial and Lateral Meniscectomy of the Left Knee on 1/11/2023. He has received 7 weeks and 13 visits with Physical Therapy. He has made excellent progress and has met all goals set. Patient has no pain and Range of Motion is 0 to 130 degrees. He is ambulating without a limp. Therapist has performed strengthening of the quads and hamstrings. We have also performed functional tasks such as getting up and down from the floor as his job requires him to do this on a regular basis. Discussed the need for Knee pads at work and he reports that he does have them and " intends to use them when he returns to work. He has a follow up with Dr Garland on 3/9 and plans to return to work on 3/13. Patient is therefore discharged from Physical Therapy services following today's treatment with all goals met.                Anticipated Barriers for therapy: None     Goals:  Short Term Goals: 3 weeks   1. Independent with Home Exercise Program : Met  2. Increase Left Knee Range of Motion to 0 Degrees to 130 Degrees : met, current 0-130 degrees  3. Increase Left Knee Strength to grossly 4+/5 : Met  4. Patient will ambulate 500 feet with Normal Gait pattern with complaints of pain Less than or Equal to 3/10. : Met     Long Term Goals: 6 weeks   1. Patient will increase Left Knee Strength to grossly 5/5 : Met   2. Patient will ambulate 1000+ feet with normal step/stride length with complaints of pain Less than or Equal to 1/10. : Met    PLAN     Patient to be Discharged from Physical Therapy services following today's treatment. See Discharge Summary if needed.      MAGALI OSBORNE, PT, DPT     03/01/2023

## 2023-03-01 NOTE — PLAN OF CARE
[facility]   Physical Therapy Discharge Summary      Name: Tray Jalloh  Clinic Number: [id number]  Physician: Dr Dada Enriquez / Milan Akers MD  Visit Date: 3/1/2023  Therapy Diagnosis: Left Knee Meniscectomy   Physician Orders: PT Eval and Treat   Medical Diagnosis from Referral: Left Knee Meniscectomy: DOS:1/11/2023    Evaluation Date: 1/20/2023  Authorization Period Expiration: 1/13/2023  Plan of Care Expiration: 3/3/2023  Visit # / Visits authorized: 13/20  PTA Visit #: 1/5     Time In: 9:50 am   Time Out: 10:43 am  Total Billable Time: 53 minutes    SUBJECTIVE     Pt reports: saw Dr Patel yesterday for his back and it is feeling much better today. He reports no knee pain.   He was compliant with home exercise program.   Response to previous treatment: soreness noted  Functional change: Improved mobility    Pain: 0/10  Location: left knee     Precautions: STANDARD, Patient is weightbearing with no assistive device     Patient does have previous back and Left Hip problems that will have to be considered during his knee rehabilitation.   We will start conservative and advance to Cybex machines as able.    OBJECTIVE     Objective Measures updated at progress report unless specified.     3/1/2023          130 degrees      0    Treatment     Tray received the treatments listed below:      therapeutic exercises to develop strength, endurance, ROM, and flexibility for 35 minutes including:  Reviewed his Home Exercise Program and performed the following exercises     Bike: 5 minutes   SB stretch 4 x 15 second hold   Hamstring Stretch on Stairs 4 x 15 second hold LLE  Knee Flexion Stretch on Stairs 4 x 15 second hold LLE  Heel Raises at step 3 x 10  Cybex bilateral leg press 8 plates 3 x 10  Single Leg Press 4 plates 3 x 10  Cybex bilateral hamstring curls 7 plates 3 x 10  Cybex bilateral hip abduction 2 plate 2 x 10 ea  Cybex Hip extension bilateral 2 plates 20x  Cybex  "knee extension 3 x 10 3 plates    neuromuscular re-education activities to improve: Balance, Coordination, Sense, and Proprioception for 10 minutes. The following activities were included:  Single Leg Stance - Firm    Single Leg Stance - Foam  4 minutes with multiple trails  TKEs - 6 plates 3 x 10 LEFT LOWER EXTREMITY  Balance board    therapeutic activities to improve functional performance for 8 minutes, including:  Sit to Stands 3 x 10  Fwd step ups with 6" step 3 x 10 LLE  Lateral step downs with Reebok step 4" 3 x 10 LLE  Half kneeling position on foam with practice getting up and down x 5   Bilateral kneeling position (Tall Kneeling) on foam with practice getting up and down x 5     supervised modalities after being cleared for contradictions: Vasopneumatic compression with GameReady to left knee at medium pressure, coldest setting x 0 minutes to decrease edema and soreness.     Patient Education and Home Exercises     Home Exercises Provided and Patient Education Provided     Education provided:   - home exercise program    Written Home Exercises Provided: Patient instructed to cont prior HEP. Exercises were reviewed and Tray was able to demonstrate them prior to the end of the session.  Tray demonstrated good  understanding of the education provided. See EMR under Patient Instructions for exercises provided during therapy sessions    ASSESSMENT     Patient underwent a Medial and Lateral Meniscectomy of the Left Knee on 1/11/2023. He has received 7 weeks and 13 visits with Physical Therapy. He has made excellent progress and has met all goals set. Patient has no pain and Range of Motion is 0 to 130 degrees. He is ambulating without a limp. Therapist has performed strengthening of the quads and hamstrings. We have also performed functional tasks such as getting up and down from the floor as his job requires him to do this on a regular basis. Discussed the need for Knee pads at work and he reports that he " does have them and intends to use them when he returns to work. He has a follow up with Dr Garland on 3/9 and plans to return to work on 3/13. Patient is therefore discharged from Physical Therapy services following today's treatment with all goals met.                Anticipated Barriers for therapy: None     Goals:  Short Term Goals: 3 weeks   1. Independent with Home Exercise Program : Met  2. Increase Left Knee Range of Motion to 0 Degrees to 130 Degrees : met, current 0-130 degrees  3. Increase Left Knee Strength to grossly 4+/5 : Met  4. Patient will ambulate 500 feet with Normal Gait pattern with complaints of pain Less than or Equal to 3/10. : Met     Long Term Goals: 6 weeks   1. Patient will increase Left Knee Strength to grossly 5/5 : Met   2. Patient will ambulate 1000+ feet with normal step/stride length with complaints of pain Less than or Equal to 1/10. : Met    Discharge reason: Patient has completed the physician's prescription    Plan   This patient is discharged from Physical Therapy.    Date of Last visit: 3/1/2023   Total Visits Received: 13  Cancelled Visits: 0  No Show Visits: 0    MAGALI OSBORNE, PT, DPT

## 2023-03-02 ENCOUNTER — TELEPHONE (OUTPATIENT)
Dept: GASTROENTEROLOGY | Facility: CLINIC | Age: 73
End: 2023-03-02
Payer: MEDICARE

## 2023-03-16 NOTE — PROGRESS NOTES
Subjective:      Patient ID: Tray Jalloh is a 72 y.o. male.    Chief Complaint: Back Pain and Hip Pain (Rt flank area)      Pain  This is a chronic problem. The current episode started more than 1 year ago. The problem occurs daily. The problem has been gradually improving. Associated symptoms include arthralgias. Pertinent negatives include no anorexia, chest pain, chills, coughing, diaphoresis, fatigue, fever, neck pain, sore throat, vertigo or vomiting.   Review of Systems   Constitutional:  Negative for activity change, appetite change, chills, diaphoresis, fatigue, fever and unexpected weight change.   HENT:  Negative for drooling, ear discharge, ear pain, facial swelling, nosebleeds, sore throat, trouble swallowing, voice change and goiter.    Eyes:  Negative for photophobia, pain, discharge, redness and visual disturbance.   Respiratory:  Negative for apnea, cough, choking, chest tightness, shortness of breath, wheezing and stridor.    Cardiovascular:  Negative for chest pain, palpitations and leg swelling.   Gastrointestinal:  Negative for abdominal distention, anorexia, diarrhea, rectal pain, vomiting and fecal incontinence.   Endocrine: Negative for cold intolerance, heat intolerance, polydipsia, polyphagia and polyuria.   Genitourinary:  Negative for flank pain and frequency.   Musculoskeletal:  Positive for arthralgias and back pain. Negative for neck pain and neck stiffness.   Integumentary:  Negative for color change and pallor.   Neurological:  Negative for dizziness, vertigo, seizures, syncope, facial asymmetry, speech difficulty, light-headedness, coordination difficulties, memory loss and coordination difficulties.   Hematological:  Negative for adenopathy. Does not bruise/bleed easily.   Psychiatric/Behavioral:  Negative for agitation, behavioral problems, confusion, decreased concentration, dysphoric mood, hallucinations, self-injury and suicidal ideas. The patient is not  "nervous/anxious and is not hyperactive.           Objective:  Vitals:    03/20/23 1113 03/20/23 1117   BP: (!) 180/83    Pulse: 71    Resp: 20    Weight: 88.5 kg (195 lb)    Height: 5' 10" (1.778 m)    PainSc:   3   3           Physical Exam  Vitals and nursing note reviewed. Exam conducted with a chaperone present.   Constitutional:       General: He is awake.      Appearance: Normal appearance. He is not ill-appearing, toxic-appearing or diaphoretic.   HENT:      Head: Normocephalic and atraumatic.      Nose: Nose normal.      Mouth/Throat:      Mouth: Mucous membranes are moist.      Pharynx: Oropharynx is clear.   Eyes:      Conjunctiva/sclera: Conjunctivae normal.      Pupils: Pupils are equal, round, and reactive to light.   Cardiovascular:      Rate and Rhythm: Normal rate.   Pulmonary:      Effort: Pulmonary effort is normal. No respiratory distress.   Abdominal:      Palpations: Abdomen is soft.      Tenderness: There is no guarding.   Musculoskeletal:         General: No signs of injury. Normal range of motion.      Cervical back: Normal range of motion and neck supple. No rigidity.   Skin:     General: Skin is warm and dry.      Coloration: Skin is not jaundiced or pale.   Neurological:      General: No focal deficit present.      Mental Status: He is alert and oriented to person, place, and time. Mental status is at baseline.      Cranial Nerves: No cranial nerve deficit (II-XII).   Psychiatric:         Mood and Affect: Mood normal.         Behavior: Behavior normal. Behavior is cooperative.         Thought Content: Thought content normal.         [facility]  See OP Notes for results.     IMPRESSION: See OP Notes for results.     This procedure was auto-finalized by: Virtual Radiologist       Hospital Outpatient Visit on 02/15/2023   Component Date Value Ref Range Status    Case Report 02/15/2023    Final                    Value:Surgical Pathology                                Case: " [id number]                                   Authorizing Provider:  Mingo Hare MD      Collected:           02/15/2023 08:45 AM          Ordering Location:     [facility] - Endoscopy       Received:            02/15/2023 10:12 AM          Pathologist:           Venkat Garber MD                                                       Specimen:    Rectum, A. Rectal polyp                                                                    Final Diagnosis 02/15/2023    Final                    Value:This result contains rich text formatting which cannot be displayed here.    Gross Description 02/15/2023    Final                    Value:This result contains rich text formatting which cannot be displayed here.    Microscopic Description 02/15/2023    Final                    Value:This result contains rich text formatting which cannot be displayed here.    Laboratory Notes 02/15/2023    Final                    Value:This result contains rich text formatting which cannot be displayed here.   Office Visit on 01/04/2023   Component Date Value Ref Range Status    Sodium 01/04/2023 143  136 - 145 mmol/L Final    Potassium 01/04/2023 4.6  3.5 - 5.1 mmol/L Final    Chloride 01/04/2023 110 (H)  98 - 107 mmol/L Final    CO2 01/04/2023 22  21 - 32 mmol/L Final    Anion Gap 01/04/2023 16  7 - 16 mmol/L Final    Glucose 01/04/2023 110 (H)  74 - 106 mg/dL Final    BUN 01/04/2023 30 (H)  7 - 18 mg/dL Final    Creatinine 01/04/2023 1.14  0.70 - 1.30 mg/dL Final    BUN/Creatinine Ratio 01/04/2023 26 (H)  6 - 20 Final    Calcium 01/04/2023 9.2  8.5 - 10.1 mg/dL Final    eGFR 01/04/2023 69  >=60 mL/min/1.73m² Final    WBC 01/04/2023 5.76  4.50 - 11.00 K/uL Final    RBC 01/04/2023 3.88 (L)  4.60 - 6.20 M/uL Final    Hemoglobin 01/04/2023 11.9 (L)  13.5 - 18.0 g/dL Final    Hematocrit 01/04/2023 36.6 (L)  40.0 - 54.0 % Final    MCV 01/04/2023 94.3  80.0 - 96.0 fL Final    MCH 01/04/2023 30.7  27.0 - 31.0 pg Final    MCHC 01/04/2023  32.5  32.0 - 36.0 g/dL Final    RDW 01/04/2023 11.9  11.5 - 14.5 % Final    Platelet Count 01/04/2023 280  150 - 400 K/uL Final    MPV 01/04/2023 10.2  9.4 - 12.4 fL Final    Neutrophils % 01/04/2023 48.2 (L)  53.0 - 65.0 % Final    Lymphocytes % 01/04/2023 37.3  27.0 - 41.0 % Final    Monocytes % 01/04/2023 6.9 (H)  2.0 - 6.0 % Final    Eosinophils % 01/04/2023 6.6 (H)  1.0 - 4.0 % Final    Basophils % 01/04/2023 0.7  0.0 - 1.0 % Final    Immature Granulocytes % 01/04/2023 0.3  0.0 - 0.4 % Final    nRBC, Auto 01/04/2023 0.0  <=0.0 % Final    Neutrophils, Abs 01/04/2023 2.77  1.80 - 7.70 K/uL Final    Lymphocytes, Absolute 01/04/2023 2.15  1.00 - 4.80 K/uL Final    Monocytes, Absolute 01/04/2023 0.40  0.00 - 0.80 K/uL Final    Eosinophils, Absolute 01/04/2023 0.38  0.00 - 0.50 K/uL Final    Basophils, Absolute 01/04/2023 0.04  0.00 - 0.20 K/uL Final    Immature Granulocytes, Absolute 01/04/2023 0.02  0.00 - 0.04 K/uL Final    nRBC, Absolute 01/04/2023 0.00  <=0.00 x10e3/uL Final    Diff Type 01/04/2023 Auto   Final   Office Visit on 12/27/2022   Component Date Value Ref Range Status    SARS Coronavirus 2 Antigen 12/27/2022 Positive (A)  Negative Final    Rapid Influenza A Ag 12/27/2022 Negative  Negative Final    Rapid Influenza B Ag 12/27/2022 Negative  Negative Final     Acceptable 12/27/2022 Yes   Final   Patient Outreach on 12/08/2022   Component Date Value Ref Range Status    CRC Recommendation External 09/04/2012 Repeat colonoscopy in 10 years   Final-Edited   Office Visit on 11/16/2022   Component Date Value Ref Range Status    Cologuard Result 12/08/2022 Positive (A)  Negative Final    Sodium 11/16/2022 139  136 - 145 mmol/L Final    Potassium 11/16/2022 4.4  3.5 - 5.1 mmol/L Final    Chloride 11/16/2022 109 (H)  98 - 107 mmol/L Final    CO2 11/16/2022 23  21 - 32 mmol/L Final    Anion Gap 11/16/2022 11  7 - 16 mmol/L Final    Glucose 11/16/2022 102  74 - 106 mg/dL Final    BUN 11/16/2022 21  (H)  7 - 18 mg/dL Final    Creatinine 11/16/2022 1.00  0.70 - 1.30 mg/dL Final    BUN/Creatinine Ratio 11/16/2022 21 (H)  6 - 20 Final    Calcium 11/16/2022 9.0  8.5 - 10.1 mg/dL Final    eGFR 11/16/2022 80  >=60 mL/min/1.73m² Final    Hemoglobin A1C 11/16/2022 5.6  4.5 - 6.6 % Final    Estimated Average Glucose 11/16/2022 100  mg/dL Final    Iron 11/16/2022 100  65 - 175 µg/dL Final    Iron Saturation 11/16/2022 37  14 - 50 % Final    TIBC 11/16/2022 272  250 - 450 µg/dL Final    Ferritin 11/16/2022 21 (L)  26 - 388 ng/mL Final    Vitamin B12 11/16/2022 668  193 - 986 pg/mL Final    Folate 11/16/2022 >20.0 (H)  3.1 - 17.5 ng/mL Final    WBC 11/16/2022 7.31  4.50 - 11.00 K/uL Final    RBC 11/16/2022 3.97 (L)  4.60 - 6.20 M/uL Final    Hemoglobin 11/16/2022 12.6 (L)  13.5 - 18.0 g/dL Final    Hematocrit 11/16/2022 38.0 (L)  40.0 - 54.0 % Final    MCV 11/16/2022 95.7  80.0 - 96.0 fL Final    MCH 11/16/2022 31.7 (H)  27.0 - 31.0 pg Final    MCHC 11/16/2022 33.2  32.0 - 36.0 g/dL Final    RDW 11/16/2022 11.8  11.5 - 14.5 % Final    Platelet Count 11/16/2022 308  150 - 400 K/uL Final    MPV 11/16/2022 10.1  9.4 - 12.4 fL Final    Neutrophils % 11/16/2022 49.7 (L)  53.0 - 65.0 % Final    Lymphocytes % 11/16/2022 32.7  27.0 - 41.0 % Final    Monocytes % 11/16/2022 9.4 (H)  2.0 - 6.0 % Final    Eosinophils % 11/16/2022 7.0 (H)  1.0 - 4.0 % Final    Basophils % 11/16/2022 1.1 (H)  0.0 - 1.0 % Final    Immature Granulocytes % 11/16/2022 0.1  0.0 - 0.4 % Final    nRBC, Auto 11/16/2022 0.0  <=0.0 % Final    Neutrophils, Abs 11/16/2022 3.63  1.80 - 7.70 K/uL Final    Lymphocytes, Absolute 11/16/2022 2.39  1.00 - 4.80 K/uL Final    Monocytes, Absolute 11/16/2022 0.69  0.00 - 0.80 K/uL Final    Eosinophils, Absolute 11/16/2022 0.51 (H)  0.00 - 0.50 K/uL Final    Basophils, Absolute 11/16/2022 0.08  0.00 - 0.20 K/uL Final    Immature Granulocytes, Absolute 11/16/2022 0.01  0.00 - 0.04 K/uL Final    nRBC, Absolute 11/16/2022 0.00   <=0.00 x10e3/uL Final    Diff Type 11/16/2022 Auto   Final           Assessment:      1. Spondylosis of lumbosacral region without myelopathy or radiculopathy    2. Polyarthralgia    3. Chronic pain of left knee    4. Thoracogenic scoliosis of thoracolumbar region          No orders of the defined types were placed in this encounter.          Requested Prescriptions      No prescriptions requested or ordered in this encounter         Plan:    Not using narcotics from our office    Follows orthopedics [facility]    Following spine surgery [facility], severe thoracolumbar scoliosis neuroforaminal central canal stenosis    He had L4/5, L5/S1 right-sided TFESI # 1 January 13, 2022  He states he had 97% relief after procedure note is ongoing    Follow-up after bilateral lumbar L4 through S1 RF TC right side February 9, 2023, left side February 28, 2023 he states he had 70% relief after his procedure, the procedure did help improve his level function having some right lower back pain pointing to his right sacroiliac area    He is requesting Toradol injection today     Toradol 60 mg IM, tolerated well    X-ray pelvis [facility] October 2021, degenerative changes no fracture noted    X-ray right knee [facility] September 2021 degenerative changes multiple metallic fragments noted    MRI lumbar spine [facility] December 2020 multiple level degenerative changes malalignment noted scoliosis noted    Continue home exercise program as directed    Patient requesting follow-up as needed    Dr. Patel, February 2024

## 2023-03-20 ENCOUNTER — OFFICE VISIT (OUTPATIENT)
Dept: PAIN MEDICINE | Facility: CLINIC | Age: 73
End: 2023-03-20
Payer: COMMERCIAL

## 2023-03-20 VITALS
RESPIRATION RATE: 20 BRPM | WEIGHT: 195 LBS | SYSTOLIC BLOOD PRESSURE: 180 MMHG | DIASTOLIC BLOOD PRESSURE: 83 MMHG | HEART RATE: 71 BPM | HEIGHT: 70 IN | BODY MASS INDEX: 27.92 KG/M2

## 2023-03-20 DIAGNOSIS — M25.562 CHRONIC PAIN OF LEFT KNEE: Chronic | ICD-10-CM

## 2023-03-20 DIAGNOSIS — M25.50 POLYARTHRALGIA: Chronic | ICD-10-CM

## 2023-03-20 DIAGNOSIS — G89.29 CHRONIC PAIN OF LEFT KNEE: Chronic | ICD-10-CM

## 2023-03-20 DIAGNOSIS — M41.35 THORACOGENIC SCOLIOSIS OF THORACOLUMBAR REGION: Chronic | ICD-10-CM

## 2023-03-20 DIAGNOSIS — M47.817 SPONDYLOSIS OF LUMBOSACRAL REGION WITHOUT MYELOPATHY OR RADICULOPATHY: Primary | Chronic | ICD-10-CM

## 2023-03-20 PROCEDURE — 96372 THER/PROPH/DIAG INJ SC/IM: CPT | Mod: PBBFAC | Performed by: PHYSICIAN ASSISTANT

## 2023-03-20 PROCEDURE — 3008F PR BODY MASS INDEX (BMI) DOCUMENTED: ICD-10-PCS | Mod: ,,, | Performed by: PHYSICIAN ASSISTANT

## 2023-03-20 PROCEDURE — 3008F BODY MASS INDEX DOCD: CPT | Mod: ,,, | Performed by: PHYSICIAN ASSISTANT

## 2023-03-20 PROCEDURE — 1159F MED LIST DOCD IN RCRD: CPT | Mod: ,,, | Performed by: PHYSICIAN ASSISTANT

## 2023-03-20 PROCEDURE — 3079F PR MOST RECENT DIASTOLIC BLOOD PRESSURE 80-89 MM HG: ICD-10-PCS | Mod: ,,, | Performed by: PHYSICIAN ASSISTANT

## 2023-03-20 PROCEDURE — 4010F PR ACE/ARB THEARPY RXD/TAKEN: ICD-10-PCS | Mod: ,,, | Performed by: PHYSICIAN ASSISTANT

## 2023-03-20 PROCEDURE — 3077F PR MOST RECENT SYSTOLIC BLOOD PRESSURE >= 140 MM HG: ICD-10-PCS | Mod: ,,, | Performed by: PHYSICIAN ASSISTANT

## 2023-03-20 PROCEDURE — 1157F PR ADVANCE CARE PLAN OR EQUIV PRESENT IN MEDICAL RECORD: ICD-10-PCS | Mod: ,,, | Performed by: PHYSICIAN ASSISTANT

## 2023-03-20 PROCEDURE — 1100F PR PT FALLS ASSESS DOC 2+ FALLS/FALL W/INJURY/YR: ICD-10-PCS | Mod: ,,, | Performed by: PHYSICIAN ASSISTANT

## 2023-03-20 PROCEDURE — 3288F PR FALLS RISK ASSESSMENT DOCUMENTED: ICD-10-PCS | Mod: ,,, | Performed by: PHYSICIAN ASSISTANT

## 2023-03-20 PROCEDURE — 3288F FALL RISK ASSESSMENT DOCD: CPT | Mod: ,,, | Performed by: PHYSICIAN ASSISTANT

## 2023-03-20 PROCEDURE — 99215 OFFICE O/P EST HI 40 MIN: CPT | Mod: PBBFAC | Performed by: PHYSICIAN ASSISTANT

## 2023-03-20 PROCEDURE — 99214 PR OFFICE/OUTPT VISIT, EST, LEVL IV, 30-39 MIN: ICD-10-PCS | Mod: S$PBB,25,, | Performed by: PHYSICIAN ASSISTANT

## 2023-03-20 PROCEDURE — 3079F DIAST BP 80-89 MM HG: CPT | Mod: ,,, | Performed by: PHYSICIAN ASSISTANT

## 2023-03-20 PROCEDURE — 1100F PTFALLS ASSESS-DOCD GE2>/YR: CPT | Mod: ,,, | Performed by: PHYSICIAN ASSISTANT

## 2023-03-20 PROCEDURE — 1159F PR MEDICATION LIST DOCUMENTED IN MEDICAL RECORD: ICD-10-PCS | Mod: ,,, | Performed by: PHYSICIAN ASSISTANT

## 2023-03-20 PROCEDURE — 3077F SYST BP >= 140 MM HG: CPT | Mod: ,,, | Performed by: PHYSICIAN ASSISTANT

## 2023-03-20 PROCEDURE — 1125F PR PAIN SEVERITY QUANTIFIED, PAIN PRESENT: ICD-10-PCS | Mod: ,,, | Performed by: PHYSICIAN ASSISTANT

## 2023-03-20 PROCEDURE — 99214 OFFICE O/P EST MOD 30 MIN: CPT | Mod: S$PBB,25,, | Performed by: PHYSICIAN ASSISTANT

## 2023-03-20 PROCEDURE — 1125F AMNT PAIN NOTED PAIN PRSNT: CPT | Mod: ,,, | Performed by: PHYSICIAN ASSISTANT

## 2023-03-20 PROCEDURE — 4010F ACE/ARB THERAPY RXD/TAKEN: CPT | Mod: ,,, | Performed by: PHYSICIAN ASSISTANT

## 2023-03-20 PROCEDURE — 1157F ADVNC CARE PLAN IN RCRD: CPT | Mod: ,,, | Performed by: PHYSICIAN ASSISTANT

## 2023-03-20 RX ORDER — KETOROLAC TROMETHAMINE 30 MG/ML
60 INJECTION, SOLUTION INTRAMUSCULAR; INTRAVENOUS
Status: COMPLETED | OUTPATIENT
Start: 2023-03-20 | End: 2023-03-20

## 2023-03-20 RX ADMIN — KETOROLAC TROMETHAMINE 60 MG: 30 INJECTION, SOLUTION INTRAMUSCULAR at 12:03

## 2023-04-04 ENCOUNTER — OFFICE VISIT (OUTPATIENT)
Dept: ORTHOPEDICS | Facility: CLINIC | Age: 73
End: 2023-04-04
Payer: COMMERCIAL

## 2023-04-04 DIAGNOSIS — M17.12 PRIMARY OSTEOARTHRITIS OF LEFT KNEE: ICD-10-CM

## 2023-04-04 DIAGNOSIS — Z98.890 S/P LEFT KNEE ARTHROSCOPY: Primary | ICD-10-CM

## 2023-04-04 PROCEDURE — 4010F ACE/ARB THERAPY RXD/TAKEN: CPT | Mod: ,,, | Performed by: ORTHOPAEDIC SURGERY

## 2023-04-04 PROCEDURE — 99212 OFFICE O/P EST SF 10 MIN: CPT | Mod: PBBFAC | Performed by: ORTHOPAEDIC SURGERY

## 2023-04-04 PROCEDURE — 99024 PR POST-OP FOLLOW-UP VISIT: ICD-10-PCS | Mod: ,,, | Performed by: ORTHOPAEDIC SURGERY

## 2023-04-04 PROCEDURE — 99024 POSTOP FOLLOW-UP VISIT: CPT | Mod: ,,, | Performed by: ORTHOPAEDIC SURGERY

## 2023-04-04 PROCEDURE — 4010F PR ACE/ARB THEARPY RXD/TAKEN: ICD-10-PCS | Mod: ,,, | Performed by: ORTHOPAEDIC SURGERY

## 2023-04-04 PROCEDURE — 1157F PR ADVANCE CARE PLAN OR EQUIV PRESENT IN MEDICAL RECORD: ICD-10-PCS | Mod: ,,, | Performed by: ORTHOPAEDIC SURGERY

## 2023-04-04 PROCEDURE — 1157F ADVNC CARE PLAN IN RCRD: CPT | Mod: ,,, | Performed by: ORTHOPAEDIC SURGERY

## 2023-04-04 NOTE — PROGRESS NOTES
HISTORY OF PRESENT ILLNESS:       Arthroscopy, Knee, With Meniscectomy - Left and Arthroscopic Release, Knee, Lateral Retinacula - Left 1/11/2023      Pt is here today for Third post-operative followup of his knee arthroscopy.      he is doing well.  He states he is only working 1 day a week now and will be fully retired on the 24th of April.   He states his knee are feeling much better now.     We have reviewed his findings and discussed plan of care and future treatment options, including the physical therapy plan.                                                                                     PHYSICAL EXAMINATION:     Incision sites healed well  No evidence of any erythema, infection or induration  Range of motion 0-120 degrees  Minimal effusion  2+ DP pulse  No swelling, no calf tenderness  - Chuy's sign  Negative medial joint line tendernes  Moderate quad atrophy                                                                                 ASSESSMENT:                                                                                                                                               1. Status post above, doing well.                                                                                                                               PLAN:                                                                                                                                                     1. Continue with PT  2. Emphasized quad function.  3. I have discussed return to activity in detail.  4. he will see us back in 4 weeks.                                      5. He does have osteoarthritis I am going to recommend a Visco injection about 4 weeks time.  He voices understanding see back in 4 weeks    There are no Patient Instructions on file for this visit.

## 2023-04-17 ENCOUNTER — TELEPHONE (OUTPATIENT)
Dept: SPINE | Facility: CLINIC | Age: 73
End: 2023-04-17
Payer: MEDICARE

## 2023-04-17 RX ORDER — PREGABALIN 75 MG/1
75 CAPSULE ORAL 2 TIMES DAILY
Qty: 60 CAPSULE | Refills: 5 | Status: SHIPPED | OUTPATIENT
Start: 2023-04-17 | End: 2023-04-24

## 2023-04-17 NOTE — TELEPHONE ENCOUNTER
Spoke with patient will send in medication ----- Message from Opal Kapadia sent at 4/17/2023  9:51 AM CDT -----  Regarding: rx  He wanted to  see if Dr. Akers wants him to still take Pregabalin if so he needs rx sent to [facility]

## 2023-04-20 DIAGNOSIS — M17.12 PRIMARY OSTEOARTHRITIS OF LEFT KNEE: Primary | ICD-10-CM

## 2023-04-24 DIAGNOSIS — J31.0 CHRONIC RHINITIS: ICD-10-CM

## 2023-04-24 DIAGNOSIS — Z87.440 HISTORY OF CHRONIC URINARY TRACT INFECTION: ICD-10-CM

## 2023-04-24 DIAGNOSIS — I10 ESSENTIAL HYPERTENSION: Primary | Chronic | ICD-10-CM

## 2023-04-24 RX ORDER — METAXALONE 800 MG/1
800 TABLET ORAL DAILY PRN
Qty: 90 TABLET | Refills: 1 | Status: SHIPPED | OUTPATIENT
Start: 2023-04-24 | End: 2023-05-01 | Stop reason: SDUPTHER

## 2023-04-24 RX ORDER — METHENAMINE HIPPURATE 1000 MG/1
1 TABLET ORAL 2 TIMES DAILY
Qty: 180 TABLET | Refills: 3 | Status: SHIPPED | OUTPATIENT
Start: 2023-04-24

## 2023-04-24 RX ORDER — LISINOPRIL 40 MG/1
40 TABLET ORAL DAILY
Qty: 90 TABLET | Refills: 3 | Status: SHIPPED | OUTPATIENT
Start: 2023-04-24 | End: 2023-04-25

## 2023-04-24 RX ORDER — CETIRIZINE HYDROCHLORIDE 10 MG/1
10 TABLET ORAL DAILY
Qty: 90 TABLET | Refills: 3 | Status: SHIPPED | OUTPATIENT
Start: 2023-04-24 | End: 2023-04-25

## 2023-04-24 RX ORDER — AMLODIPINE BESYLATE 5 MG/1
5 TABLET ORAL DAILY
Qty: 90 TABLET | Refills: 3 | Status: SHIPPED | OUTPATIENT
Start: 2023-04-24 | End: 2023-06-20 | Stop reason: SDUPTHER

## 2023-04-24 NOTE — TELEPHONE ENCOUNTER
----- Message from Sabiha Friedman sent at 4/24/2023  2:03 PM CDT -----  Pt need refill on lisinopriL,cetirizine ,methenamine,metaxalone,amLODIPine  [facility] [phone]

## 2023-04-25 RX ORDER — LISINOPRIL 40 MG/1
40 TABLET ORAL DAILY
Qty: 90 TABLET | Refills: 3 | Status: SHIPPED | OUTPATIENT
Start: 2023-04-25

## 2023-04-25 RX ORDER — CETIRIZINE HYDROCHLORIDE 10 MG/1
10 TABLET ORAL DAILY
Qty: 90 TABLET | Refills: 3 | Status: SHIPPED | OUTPATIENT
Start: 2023-04-25

## 2023-05-01 RX ORDER — METAXALONE 800 MG/1
800 TABLET ORAL DAILY PRN
Qty: 90 TABLET | Refills: 1 | Status: SHIPPED | OUTPATIENT
Start: 2023-05-01 | End: 2023-12-12 | Stop reason: SDUPTHER

## 2023-05-01 NOTE — TELEPHONE ENCOUNTER
----- Message from Sabiha Friedman sent at 5/1/2023 11:05 AM CDT -----  PT need refill on metaxalone [facility] it [facility] wanting it sent to [facility] [address] # [phone]

## 2023-05-04 ENCOUNTER — OFFICE VISIT (OUTPATIENT)
Dept: ORTHOPEDICS | Facility: CLINIC | Age: 73
End: 2023-05-04
Payer: MEDICARE

## 2023-05-04 DIAGNOSIS — M17.11 PRIMARY OSTEOARTHRITIS OF RIGHT KNEE: Primary | ICD-10-CM

## 2023-05-04 DIAGNOSIS — M17.11 PRIMARY OSTEOARTHRITIS OF RIGHT KNEE: ICD-10-CM

## 2023-05-04 DIAGNOSIS — M17.12 PRIMARY OSTEOARTHRITIS OF LEFT KNEE: Primary | ICD-10-CM

## 2023-05-04 PROCEDURE — 20610 DRAIN/INJ JOINT/BURSA W/O US: CPT | Mod: PBBFAC,LT | Performed by: NURSE PRACTITIONER

## 2023-05-04 PROCEDURE — 99214 OFFICE O/P EST MOD 30 MIN: CPT | Mod: PBBFAC,25 | Performed by: NURSE PRACTITIONER

## 2023-05-04 PROCEDURE — 99212 OFFICE O/P EST SF 10 MIN: CPT | Mod: S$PBB,25,, | Performed by: NURSE PRACTITIONER

## 2023-05-04 PROCEDURE — 20610 LARGE JOINT ASPIRATION/INJECTION: L SUPRA PATELLAR BURSA: ICD-10-PCS | Mod: S$PBB,LT,, | Performed by: NURSE PRACTITIONER

## 2023-05-04 PROCEDURE — 99212 PR OFFICE/OUTPT VISIT, EST, LEVL II, 10-19 MIN: ICD-10-PCS | Mod: S$PBB,25,, | Performed by: NURSE PRACTITIONER

## 2023-05-04 RX ORDER — TRIAMCINOLONE ACETONIDE 40 MG/ML
40 INJECTION, SUSPENSION INTRA-ARTICULAR; INTRAMUSCULAR
Status: DISCONTINUED | OUTPATIENT
Start: 2023-05-04 | End: 2023-05-04 | Stop reason: HOSPADM

## 2023-05-04 RX ADMIN — Medication 48 MG: at 11:05

## 2023-05-04 RX ADMIN — TRIAMCINOLONE ACETONIDE 40 MG: 40 INJECTION, SUSPENSION INTRA-ARTICULAR; INTRAMUSCULAR at 11:05

## 2023-05-04 NOTE — PROGRESS NOTES
72 y.o. Male returns to clinic for a follow up visit regarding     ICD-10-CM ICD-9-CM   1. Primary osteoarthritis of left knee  M17.12 715.16   2. Primary osteoarthritis of right knee  M17.11 715.16        Patient is here today for left knee synvisc injection. He thought he was having bilateral but is approved for the left. He is having pain and swelling in both knees. Both ache everyday.        Past Medical History:   Diagnosis Date    Cervical radiculopathy     Chronic pain     Chronic renal impairment     COPD (chronic obstructive pulmonary disease)     COVID-19 12/27/2022    Depression     Digestive disorder     GERD (gastroesophageal reflux disease)     Hypercholesteremia     Hypertension     Left ventricular hypertrophy     Lumbosacral spondylosis     Positive colorectal cancer screening using Cologuard test 12/15/2022    Prediabetes     Pulmonary nodules     repeat CT due 12/03/21     Past Surgical History:   Procedure Laterality Date    ABDOMINAL SURGERY      inguinal hernia repair    BACK SURGERY      Bilateral L3-S1 MBB Bilateral 2-, 1-, 1-, 12-    Dr Jorge Parker    EYE SURGERY      HERNIA REPAIR      KNEE ARTHROSCOPY W/ MENISCAL REPAIR Right     KNEE ARTHROSCOPY W/ MENISCECTOMY Left 01/11/2023    Procedure: ARTHROSCOPY, KNEE, WITH MENISCECTOMY;  Surgeon: Dada Enriquez MD;  Location: [facility] OR;  Service: Orthopedics;  Laterality: Left;    KNEE SURGERY Left     LATERAL RETINACULA RELEASE OF KNEE Left 01/11/2023    Procedure: ARTHROSCOPIC RELEASE, KNEE, LATERAL RETINACULA;  Surgeon: Dada Enriquez MD;  Location: [facility] ORTHO OR;  Service: Orthopedics;  Laterality: Left;    PROSTATECTOMY      RADIOFREQUENCY ABLATION OF LUMBAR MEDIAL BRANCH NERVE AT SINGLE LEVEL Right 04/01/2021    Procedure: Radiofrequency Ablation, Nerve, Spinal, Lumbar, Medial Branch, 1 Level;  Surgeon: Jordana Patel MD;  Location: [facility] PAIN [facility];  Service: Pain Management;  Laterality:  Right;  Right L3-S1 RFTC    RADIOFREQUENCY ABLATION OF LUMBAR MEDIAL BRANCH NERVE AT SINGLE LEVEL Right 02/09/2023    Procedure: Radiofrequency Ablation, Nerve, Spinal, Lumbar, Medial Branch, Level L4-S1;  Surgeon: Jordana Patel MD;  Location: [facility] PAIN [facility];  Service: Pain Management;  Laterality: Right;  bonita left after right is done    RADIOFREQUENCY ABLATION OF LUMBAR MEDIAL BRANCH NERVE AT SINGLE LEVEL Left 2/28/2023    Procedure: RADIOFREQUENCY ABLATION, NERVE, SPINAL, LUMBAR, MEDIAL BRANCH, 1 LEVEL;  Surgeon: Jordana Patel MD;  Location: [facility]IN [facility];  Service: Pain Management;  Laterality: Left;    RADIOFREQUENCY THERMOCOAGULATION Bilateral 03/20/2012    Dr Jettu    RADIOFREQUENCY THERMOCOAGULATION Left 03/18/2021    Procedure: RADIOFREQUENCY THERMAL COAGULATION;  Surgeon: Jordana Patel MD;  Location: [facility]IN [facility];  Service: Pain Management;  Laterality: Left;  BILATERAL L3-S1 RFTC LEFT SIDE FIRST    SELECTIVE INJECTION OF ANESTHETIC AGENT AROUND LUMBAR SPINAL NERVE ROOT BY TRANSFORAMINAL APPROACH Right 01/13/2022    Procedure: Right L4-5,5-S1 TFESI;  Surgeon: Jordana Patel MD;  Location: [facility] PAIN [facility];  Service: Pain Management;  Laterality: Right;  PT AWARE TO BE TESTED VIA PC    SHOULDER ARTHROSCOPY Right     SPINE SURGERY  1995    VASECTOMY  Mar 1996         PHYSICAL EXAMINATION:    General    Constitutional: He is oriented to person, place, and time. He appears well-nourished.   HENT:   Head: Normocephalic and atraumatic.   Eyes: Pupils are equal, round, and reactive to light.   Neck: Neck supple.   Cardiovascular:  Normal rate and regular rhythm.            Pulmonary/Chest: Effort normal. No respiratory distress.   Abdominal: There is no abdominal tenderness. There is no guarding.   Neurological: He is alert and oriented to person, place, and time. He has normal reflexes.   Psychiatric: He has a normal mood and affect. His behavior is normal. Judgment and thought content  normal.           Right Knee Exam     Inspection   Swelling: present  Effusion: present    Tenderness   The patient is tender to palpation of the medial joint line and lateral joint line.    Crepitus   The patient has crepitus of the patella.    Range of Motion   Extension:  normal   Flexion:  abnormal     Tests   Meniscus   Ozzy:  Medial - positive   Ligament Examination   Lachman: normal (-1 to 2mm)   PCL-Posterior Drawer: normal (0 to 2mm)     Patella   Patellar Tracking: normal  Patellar Grind: positive    Other   Sensation: normal    Left Knee Exam     Inspection   Swelling: present  Effusion: present    Tenderness   The patient tender to palpation of the medial joint line and lateral joint line.    Crepitus   The patient has crepitus of the patella.    Range of Motion   Extension:  normal   Flexion:  abnormal     Tests   Meniscus   Ozzy:  Medial - positive   Stability   Lachman: normal (-1 to 2mm)   PCL-Posterior Drawer: normal (0 to 2mm)  Patella   Patellar Tracking: normal    Other   Sensation: normal    Muscle Strength   Right Lower Extremity   Quadriceps:  5/5   Hamstrin/5   Left Lower Extremity   Quadriceps:  5/5   Hamstrin/5     Reflexes     Left Side  Achilles:  2+  Quadriceps:  2+    Right Side   Achilles:  2+  Quadriceps:  2+    Vascular Exam     Right Pulses  Dorsalis Pedis:      2+  Posterior Tibial:      2+        Left Pulses  Dorsalis Pedis:      2+  Posterior Tibial:      2+        IMAGING:  No results found.   No new images  ASSESSMENT:      ICD-10-CM ICD-9-CM   1. Primary osteoarthritis of left knee  M17.12 715.16   2. Primary osteoarthritis of right knee  M17.11 715.16       PLAN:     -Findings and treatment options were discussed with the patient  -All questions answered    Left knee synvisc injection today  RTC in 2 weeks. We will order right knee synvisc injection and bring back when approved.     There are no Patient Instructions on file for this visit.      Orders Placed  This Encounter   Procedures    Large Joint Aspiration/Injection: L supra patellar bursa         Large Joint Aspiration/Injection: L supra patellar bursa    Date/Time: 5/4/2023 11:00 AM  Performed by: MOLLY Slater  Authorized by: MOLLY Slater     Consent Done?:  Yes (Verbal)  Indications:  Pain  Site marked: the procedure site was marked    Local anesthetic:  Bupivacaine 0.25% without epinephrine    Details:  Needle Size:  22 G  Location:  Knee  Site:  L supra patellar bursa  Medications:  40 mg triamcinolone acetonide 40 mg/mL; 48 mg hylan g-f 20 48 mg/6 mL  Patient tolerance:  Patient tolerated the procedure well with no immediate complications

## 2023-05-09 DIAGNOSIS — M17.11 PRIMARY OSTEOARTHRITIS OF RIGHT KNEE: Primary | ICD-10-CM

## 2023-05-15 ENCOUNTER — OFFICE VISIT (OUTPATIENT)
Dept: FAMILY MEDICINE | Facility: CLINIC | Age: 73
End: 2023-05-15
Payer: MEDICARE

## 2023-05-15 VITALS
HEART RATE: 71 BPM | HEIGHT: 70 IN | SYSTOLIC BLOOD PRESSURE: 118 MMHG | DIASTOLIC BLOOD PRESSURE: 76 MMHG | WEIGHT: 199 LBS | OXYGEN SATURATION: 98 % | RESPIRATION RATE: 20 BRPM | BODY MASS INDEX: 28.49 KG/M2 | TEMPERATURE: 98 F

## 2023-05-15 DIAGNOSIS — E78.00 HYPERCHOLESTEREMIA: Chronic | ICD-10-CM

## 2023-05-15 DIAGNOSIS — Z00.00 ROUTINE GENERAL MEDICAL EXAMINATION AT A HEALTH CARE FACILITY: Primary | ICD-10-CM

## 2023-05-15 DIAGNOSIS — Z79.899 ENCOUNTER FOR LONG-TERM (CURRENT) USE OF OTHER MEDICATIONS: ICD-10-CM

## 2023-05-15 DIAGNOSIS — D63.8 ANEMIA OF CHRONIC DISEASE: Chronic | ICD-10-CM

## 2023-05-15 DIAGNOSIS — Z13.1 SCREENING FOR DIABETES MELLITUS: ICD-10-CM

## 2023-05-15 DIAGNOSIS — Z12.5 PROSTATE CANCER SCREENING: ICD-10-CM

## 2023-05-15 DIAGNOSIS — I10 ESSENTIAL HYPERTENSION: Chronic | ICD-10-CM

## 2023-05-15 DIAGNOSIS — Z13.220 SCREENING FOR LIPOID DISORDERS: ICD-10-CM

## 2023-05-15 PROBLEM — F32.A DEPRESSIVE DISORDER: Chronic | Status: ACTIVE | Noted: 2022-05-12

## 2023-05-15 PROBLEM — Z87.898 HISTORY OF PREDIABETES: Status: RESOLVED | Noted: 2022-11-16 | Resolved: 2023-05-15

## 2023-05-15 PROBLEM — F32.A DEPRESSIVE DISORDER: Chronic | Status: RESOLVED | Noted: 2022-05-12 | Resolved: 2023-05-15

## 2023-05-15 PROBLEM — S00.81XA ABRASION, FACE W/O INFECTION: Status: RESOLVED | Noted: 2023-02-21 | Resolved: 2023-05-15

## 2023-05-15 LAB
ALBUMIN SERPL BCP-MCNC: 3.7 G/DL (ref 3.5–5)
ALBUMIN/GLOB SERPL: 1 {RATIO}
ALP SERPL-CCNC: 47 U/L (ref 45–115)
ALT SERPL W P-5'-P-CCNC: 18 U/L (ref 16–61)
ANION GAP SERPL CALCULATED.3IONS-SCNC: 7 MMOL/L (ref 7–16)
AST SERPL W P-5'-P-CCNC: 13 U/L (ref 15–37)
BASOPHILS # BLD AUTO: 0.11 K/UL (ref 0–0.2)
BASOPHILS NFR BLD AUTO: 1.4 % (ref 0–1)
BILIRUB SERPL-MCNC: 0.3 MG/DL (ref ?–1.2)
BUN SERPL-MCNC: 28 MG/DL (ref 7–18)
BUN/CREAT SERPL: 20 (ref 6–20)
CALCIUM SERPL-MCNC: 8.8 MG/DL (ref 8.5–10.1)
CHLORIDE SERPL-SCNC: 111 MMOL/L (ref 98–107)
CHOLEST SERPL-MCNC: 140 MG/DL (ref 0–200)
CHOLEST/HDLC SERPL: 3.5 {RATIO}
CO2 SERPL-SCNC: 27 MMOL/L (ref 21–32)
CREAT SERPL-MCNC: 1.41 MG/DL (ref 0.7–1.3)
DIFFERENTIAL METHOD BLD: ABNORMAL
EGFR (NO RACE VARIABLE) (RUSH/TITUS): 53 ML/MIN/1.73M2
EOSINOPHIL # BLD AUTO: 0.37 K/UL (ref 0–0.5)
EOSINOPHIL NFR BLD AUTO: 4.6 % (ref 1–4)
ERYTHROCYTE [DISTWIDTH] IN BLOOD BY AUTOMATED COUNT: 12.4 % (ref 11.5–14.5)
GLOBULIN SER-MCNC: 3.6 G/DL (ref 2–4)
GLUCOSE SERPL-MCNC: 102 MG/DL (ref 74–106)
HCT VFR BLD AUTO: 38.4 % (ref 40–54)
HDLC SERPL-MCNC: 40 MG/DL (ref 40–60)
HGB BLD-MCNC: 12.2 G/DL (ref 13.5–18)
IMM GRANULOCYTES # BLD AUTO: 0.04 K/UL (ref 0–0.04)
IMM GRANULOCYTES NFR BLD: 0.5 % (ref 0–0.4)
LDLC SERPL CALC-MCNC: 79 MG/DL
LDLC/HDLC SERPL: 2 {RATIO}
LYMPHOCYTES # BLD AUTO: 2.1 K/UL (ref 1–4.8)
LYMPHOCYTES NFR BLD AUTO: 25.9 % (ref 27–41)
MCH RBC QN AUTO: 30.9 PG (ref 27–31)
MCHC RBC AUTO-ENTMCNC: 31.8 G/DL (ref 32–36)
MCV RBC AUTO: 97.2 FL (ref 80–96)
MONOCYTES # BLD AUTO: 0.62 K/UL (ref 0–0.8)
MONOCYTES NFR BLD AUTO: 7.7 % (ref 2–6)
MPC BLD CALC-MCNC: 9.9 FL (ref 9.4–12.4)
NEUTROPHILS # BLD AUTO: 4.86 K/UL (ref 1.8–7.7)
NEUTROPHILS NFR BLD AUTO: 59.9 % (ref 53–65)
NONHDLC SERPL-MCNC: 100 MG/DL
NRBC # BLD AUTO: 0 X10E3/UL
NRBC, AUTO (.00): 0 %
PLATELET # BLD AUTO: 274 K/UL (ref 150–400)
POTASSIUM SERPL-SCNC: 4.5 MMOL/L (ref 3.5–5.1)
PROT SERPL-MCNC: 7.3 G/DL (ref 6.4–8.2)
PSA SERPL-MCNC: <0.01 NG/ML
RBC # BLD AUTO: 3.95 M/UL (ref 4.6–6.2)
SODIUM SERPL-SCNC: 140 MMOL/L (ref 136–145)
TRIGL SERPL-MCNC: 105 MG/DL (ref 35–150)
VLDLC SERPL-MCNC: 21 MG/DL
WBC # BLD AUTO: 8.1 K/UL (ref 4.5–11)

## 2023-05-15 PROCEDURE — 3008F BODY MASS INDEX DOCD: CPT | Mod: ,,, | Performed by: NURSE PRACTITIONER

## 2023-05-15 PROCEDURE — 1101F PR PT FALLS ASSESS DOC 0-1 FALLS W/OUT INJ PAST YR: ICD-10-PCS | Mod: ,,, | Performed by: NURSE PRACTITIONER

## 2023-05-15 PROCEDURE — 1101F PT FALLS ASSESS-DOCD LE1/YR: CPT | Mod: ,,, | Performed by: NURSE PRACTITIONER

## 2023-05-15 PROCEDURE — 1160F RVW MEDS BY RX/DR IN RCRD: CPT | Mod: ,,, | Performed by: NURSE PRACTITIONER

## 2023-05-15 PROCEDURE — 80053 COMPREHENSIVE METABOLIC PANEL: ICD-10-PCS | Mod: ,,, | Performed by: CLINICAL MEDICAL LABORATORY

## 2023-05-15 PROCEDURE — 99397 PER PM REEVAL EST PAT 65+ YR: CPT | Mod: ,,, | Performed by: NURSE PRACTITIONER

## 2023-05-15 PROCEDURE — 4010F ACE/ARB THERAPY RXD/TAKEN: CPT | Mod: ,,, | Performed by: NURSE PRACTITIONER

## 2023-05-15 PROCEDURE — 85025 CBC WITH DIFFERENTIAL: ICD-10-PCS | Mod: ,,, | Performed by: CLINICAL MEDICAL LABORATORY

## 2023-05-15 PROCEDURE — 3078F DIAST BP <80 MM HG: CPT | Mod: ,,, | Performed by: NURSE PRACTITIONER

## 2023-05-15 PROCEDURE — 3078F PR MOST RECENT DIASTOLIC BLOOD PRESSURE < 80 MM HG: ICD-10-PCS | Mod: ,,, | Performed by: NURSE PRACTITIONER

## 2023-05-15 PROCEDURE — 3074F SYST BP LT 130 MM HG: CPT | Mod: ,,, | Performed by: NURSE PRACTITIONER

## 2023-05-15 PROCEDURE — 80061 LIPID PANEL: ICD-10-PCS | Mod: ,,, | Performed by: CLINICAL MEDICAL LABORATORY

## 2023-05-15 PROCEDURE — 80061 LIPID PANEL: CPT | Mod: ,,, | Performed by: CLINICAL MEDICAL LABORATORY

## 2023-05-15 PROCEDURE — 1159F MED LIST DOCD IN RCRD: CPT | Mod: ,,, | Performed by: NURSE PRACTITIONER

## 2023-05-15 PROCEDURE — 3008F PR BODY MASS INDEX (BMI) DOCUMENTED: ICD-10-PCS | Mod: ,,, | Performed by: NURSE PRACTITIONER

## 2023-05-15 PROCEDURE — 3288F PR FALLS RISK ASSESSMENT DOCUMENTED: ICD-10-PCS | Mod: ,,, | Performed by: NURSE PRACTITIONER

## 2023-05-15 PROCEDURE — 80053 COMPREHEN METABOLIC PANEL: CPT | Mod: ,,, | Performed by: CLINICAL MEDICAL LABORATORY

## 2023-05-15 PROCEDURE — 3288F FALL RISK ASSESSMENT DOCD: CPT | Mod: ,,, | Performed by: NURSE PRACTITIONER

## 2023-05-15 PROCEDURE — 1157F ADVNC CARE PLAN IN RCRD: CPT | Mod: ,,, | Performed by: NURSE PRACTITIONER

## 2023-05-15 PROCEDURE — 1157F PR ADVANCE CARE PLAN OR EQUIV PRESENT IN MEDICAL RECORD: ICD-10-PCS | Mod: ,,, | Performed by: NURSE PRACTITIONER

## 2023-05-15 PROCEDURE — 1159F PR MEDICATION LIST DOCUMENTED IN MEDICAL RECORD: ICD-10-PCS | Mod: ,,, | Performed by: NURSE PRACTITIONER

## 2023-05-15 PROCEDURE — 4010F PR ACE/ARB THEARPY RXD/TAKEN: ICD-10-PCS | Mod: ,,, | Performed by: NURSE PRACTITIONER

## 2023-05-15 PROCEDURE — G0103 PSA, SCREENING: ICD-10-PCS | Mod: ,,, | Performed by: CLINICAL MEDICAL LABORATORY

## 2023-05-15 PROCEDURE — G0103 PSA SCREENING: HCPCS | Mod: ,,, | Performed by: CLINICAL MEDICAL LABORATORY

## 2023-05-15 PROCEDURE — 1160F PR REVIEW ALL MEDS BY PRESCRIBER/CLIN PHARMACIST DOCUMENTED: ICD-10-PCS | Mod: ,,, | Performed by: NURSE PRACTITIONER

## 2023-05-15 PROCEDURE — 85025 COMPLETE CBC W/AUTO DIFF WBC: CPT | Mod: ,,, | Performed by: CLINICAL MEDICAL LABORATORY

## 2023-05-15 PROCEDURE — 99397 PR PREVENTIVE VISIT,EST,65 & OVER: ICD-10-PCS | Mod: ,,, | Performed by: NURSE PRACTITIONER

## 2023-05-15 PROCEDURE — 3074F PR MOST RECENT SYSTOLIC BLOOD PRESSURE < 130 MM HG: ICD-10-PCS | Mod: ,,, | Performed by: NURSE PRACTITIONER

## 2023-05-15 NOTE — PROGRESS NOTES
[facility] FAMILY MEDICINE       PATIENT NAME: Tray Jalloh   : 1950    AGE: 72 y.o. DATE OF ENCOUNTER: 5/15/23    MRN: [id number]    Subjective     Patient ID: Tray Jalloh is a 72 y.o. male.    Chief Complaint: Hypertension and Healthy You (Patient present to the clinic a healthy you )    HPI  Healthy You    [facility] and thinks he may have a heart problem so she is sending him to see a cardiologist, Dr. Man 23.    Has neuro appt 23 due to tremoring of hands.  Had 2 uncles w/ Parkinson's.    Review of Systems   Constitutional: Negative.    HENT: Negative.     Respiratory:  Positive for shortness of breath (chronic d/t SOB).    Cardiovascular: Negative.    Gastrointestinal: Negative.    Musculoskeletal:  Positive for arthralgias (chronic) and back pain (chronic).   Neurological:  Positive for tremors. Negative for dizziness and headaches.   Psychiatric/Behavioral: Negative.       Tobacco Use: Medium Risk    Smoking Tobacco Use: Former    Smokeless Tobacco Use: Never    Passive Exposure: Not on file     Review of patient's allergies indicates:   Allergen Reactions    Bee pollen     Grass pollen-figueroa grass standard      Current Outpatient Medications   Medication Instructions    acetaminophen (TYLENOL) 325 mg, Oral, Every 6 hours PRN    amLODIPine (NORVASC) 5 mg, Oral, Daily    ascorbic acid (vitamin C) (VITAMIN C) 1,000 mg, Oral, Daily    budesonide-glycopyr-formoterol (BREZTRI AEROSPHERE) 160-9-4.8 mcg/actuation HFAA 2 puffs, Inhalation, 2 times daily, Rinse mouth after use.    celecoxib (CELEBREX) 200 mg, Oral, 2 times daily    cetirizine (ZYRTEC) 10 mg, Oral, Daily    cholecalciferol, vitamin D3, (VITAMIN D3) 50 mcg (2,000 unit) Cap 1 capsule, Oral, Daily    CRESTOR 20 mg tablet 1 tablet, Oral, Daily, Take 1/2 tablet by mouth every evening    cyanocobalamin (VITAMIN B-12) 100 mcg, Oral, Daily    diclofenac sodium  (VOLTAREN) 1 % Gel Topical (Top), 4 times daily    diphenhydrAMINE (BENADRYL) 25 mg, Oral, Every 6 hours PRN    folic acid/multivit-min/lutein (CENTRUM SILVER ORAL) 1 tablet, Oral, Daily    hydrocortisone 2.5 % cream 30 g/kg, Topical (Top), 2 times daily, Apply to face for dry skin     ipratropium-albuteroL (COMBIVENT RESPIMAT)  mcg/actuation inhaler 1 puff, Inhalation, Every 6 hours PRN, Rescue     ketoconazole (NIZORAL) 2 % cream Topical (Top), Daily    lisinopriL (PRINIVIL,ZESTRIL) 40 mg, Oral, Daily    metaxalone (SKELAXIN) 800 mg, Oral, Daily PRN    methenamine (HIPREX) 1 g, Oral, 2 times daily    omega-3 fatty acids/fish oil (FISH OIL-OMEGA-3 FATTY ACIDS) 300-1,000 mg capsule 2 capsules, Oral, Daily    omeprazole (PRILOSEC) 20 mg, Oral, Daily    pregabalin (LYRICA) 75 MG capsule Take 1 capsule by mouth twice daily    tolterodine (DETROL LA) 2 mg, Oral, Daily    triamcinolone acetonide 0.1% (KENALOG) 15 g, Topical (Top), 2 times daily, Apply to back for dry skin and itching     vitamin E 1,000 Units, Oral, Daily    zinc gluconate 50 mg, Oral, Daily     There are no discontinued medications.    Past Medical History:   Diagnosis Date    Cervical radiculopathy     Chronic pain     Chronic renal impairment     COPD (chronic obstructive pulmonary disease)     COVID-19 12/27/2022    Depression     Depressive disorder 5/12/2022    Digestive disorder     GERD (gastroesophageal reflux disease)     History of prediabetes 11/16/2022    Hypercholesteremia     Hypertension     Left ventricular hypertrophy     Lumbosacral spondylosis     Positive colorectal cancer screening using Cologuard test 12/15/2022    Prediabetes     Pulmonary nodules     repeat CT due 12/03/21     Health Maintenance Topics with due status: Not Due       Topic Last Completion Date    TETANUS VACCINE 10/27/2020    Lipid Panel 05/12/2022    Hemoglobin A1c (Prediabetes) 11/16/2022    Colorectal Cancer Screening 02/15/2023     Immunization History  "  Administered Date(s) Administered    COVID-19, MRNA, LN-S, PF (MODERNA FULL 0.5 ML DOSE) 03/02/2021, 03/30/2021    Influenza 10/14/2009, 11/01/2011, 10/01/2012, 10/01/2014, 10/01/2022    Influenza - High Dose - PF (65 years and older) 11/08/2005, 12/13/2006, 11/06/2007, 10/23/2008, 10/26/2021    Influenza - Quadrivalent - High Dose - PF (65 years and older) 10/27/2020    Pneumococcal 11/06/2007    Pneumococcal Conjugate - 13 Valent 08/17/2016    Pneumococcal Polysaccharide - 23 Valent 11/06/2007, 01/03/2018    Td (Adult), Unspecified Formulation 06/11/2004    Tdap 10/27/2020    Zoster Recombinant 10/27/2020, 01/27/2021       Objective     Body mass index is 28.55 kg/m².  Wt Readings from Last 3 Encounters:   05/15/23 90.3 kg (199 lb)   03/20/23 88.5 kg (195 lb)   02/28/23 85.7 kg (189 lb)     Ht Readings from Last 3 Encounters:   05/15/23 5' 10" (1.778 m)   03/20/23 5' 10" (1.778 m)   02/28/23 5' 10" (1.778 m)     BP Readings from Last 3 Encounters:   05/15/23 118/76   03/20/23 (!) 180/83   02/28/23 (!) 156/72     Temp Readings from Last 3 Encounters:   05/15/23 97.6 °F (36.4 °C) (Oral)   02/28/23 98.6 °F (37 °C) (Oral)   02/21/23 97.7 °F (36.5 °C) (Oral)     Pulse Readings from Last 3 Encounters:   05/15/23 71   03/20/23 71   02/28/23 67     Resp Readings from Last 3 Encounters:   05/15/23 20   03/20/23 20   02/28/23 17     PF Readings from Last 3 Encounters:   No data found for PF       Physical Exam  Vitals and nursing note reviewed.   Constitutional:       General: He is not in acute distress.     Appearance: Normal appearance.   HENT:      Head: Normocephalic.      Right Ear: Tympanic membrane, ear canal and external ear normal.      Left Ear: Tympanic membrane, ear canal and external ear normal.      Nose: Nose normal.      Mouth/Throat:      Mouth: Mucous membranes are moist.      Pharynx: Oropharynx is clear.   Eyes:      Conjunctiva/sclera: Conjunctivae normal.   Neck:      Thyroid: No thyromegaly.      " Vascular: Normal carotid pulses. No carotid bruit.   Cardiovascular:      Rate and Rhythm: Normal rate and regular rhythm.      Pulses: Normal pulses.      Heart sounds: Normal heart sounds.   Pulmonary:      Effort: Pulmonary effort is normal. No respiratory distress.      Breath sounds: Normal breath sounds.   Abdominal:      Palpations: Abdomen is soft.      Tenderness: There is no abdominal tenderness.   Musculoskeletal:      Cervical back: Neck supple.      Right lower leg: No edema.      Left lower leg: No edema.   Lymphadenopathy:      Cervical: No cervical adenopathy.   Skin:     General: Skin is warm and dry.   Neurological:      General: No focal deficit present.      Mental Status: He is alert and oriented to person, place, and time.   Psychiatric:         Mood and Affect: Mood normal.         Behavior: Behavior normal.       Assessment and Plan   1. Routine general medical examination at a health care facility    2. Screening for diabetes mellitus    3. Screening for lipoid disorders  -     Comprehensive Metabolic Panel; Future; Expected date: 05/15/2023  -     Lipid Panel; Future; Expected date: 05/15/2023    4. Essential hypertension  Comments:  Controlled, continue amlodipine and lisinopril.  Orders:  -     Comprehensive Metabolic Panel; Future; Expected date: 05/15/2023    5. Hypercholesteremia  Comments:  Controlled, continue Crestor.    6. Anemia of chronic disease  Comments:  Stable, re-evaluate today.    7. Encounter for long-term (current) use of other medications  -     CBC Auto Differential; Future; Expected date: 05/15/2023  -     Comprehensive Metabolic Panel; Future; Expected date: 05/15/2023    8. Prostate cancer screening  -     PSA, Screening; Future; Expected date: 05/15/2023       Plan:   Screening lipids plus other needed labs for monitoring.        I have reviewed the medications, allergies, and problem list.     Goal Actions:    What type of visit is the patient here for today?:  Healthy You  Does the patient consent to enroll in Color Me Healthy?:  (n/a)  Is this a Wellness Follow Up?: No  What is your overall wellness goal? (select at least one): Lifestyle modifications, Improve overall health  Choose 3: Biometric, Lifestyle, Nutrition, Exercise  Biometric Actions: LDL Chol<100  Lifestyle Actions : Take medications as prescribed  Nurtrition Actions: Eat heart healthy diet  Exercise Actions: Recommend physical activity 30 minutes per day 3-5 times/week    F/u 6 months for HTN; and HY 1 yr with fasting labs; f/u as needed.    Elizabeth WEBBERP

## 2023-05-16 ENCOUNTER — PATIENT OUTREACH (OUTPATIENT)
Dept: ADMINISTRATIVE | Facility: HOSPITAL | Age: 73
End: 2023-05-16

## 2023-05-16 NOTE — PROGRESS NOTES
Post visit Population Health review of encounter with date of service  5/15 with Nazario.  All required HY components in encounter.    Added myself to careteam      .There are no preventive care reminders to display for this patient.

## 2023-05-19 ENCOUNTER — OFFICE VISIT (OUTPATIENT)
Dept: ORTHOPEDICS | Facility: CLINIC | Age: 73
End: 2023-05-19
Payer: MEDICARE

## 2023-05-19 DIAGNOSIS — M17.11 PRIMARY OSTEOARTHRITIS OF RIGHT KNEE: Primary | ICD-10-CM

## 2023-05-19 PROCEDURE — 99499 NO LOS: ICD-10-PCS | Mod: S$PBB,,, | Performed by: NURSE PRACTITIONER

## 2023-05-19 PROCEDURE — 99214 OFFICE O/P EST MOD 30 MIN: CPT | Mod: PBBFAC,25 | Performed by: NURSE PRACTITIONER

## 2023-05-19 PROCEDURE — 20610 DRAIN/INJ JOINT/BURSA W/O US: CPT | Mod: PBBFAC,RT | Performed by: NURSE PRACTITIONER

## 2023-05-19 PROCEDURE — 20610 LARGE JOINT ASPIRATION/INJECTION: R SUPRA PATELLAR BURSA: ICD-10-PCS | Mod: S$PBB,RT,, | Performed by: NURSE PRACTITIONER

## 2023-05-19 PROCEDURE — 99499 UNLISTED E&M SERVICE: CPT | Mod: S$PBB,,, | Performed by: NURSE PRACTITIONER

## 2023-05-19 RX ADMIN — Medication 60 MG: at 09:05

## 2023-05-19 NOTE — PROGRESS NOTES
CC:  Knee pain    72 y.o. Male returns to clinic for a follow up visit regarding knee pain.       Patient is here today for a Durolane injection in his right knee.        Past Medical History:   Diagnosis Date    Cervical radiculopathy     Chronic pain     Chronic renal impairment     COPD (chronic obstructive pulmonary disease)     COVID-19 12/27/2022    Depression     Depressive disorder 5/12/2022    Digestive disorder     GERD (gastroesophageal reflux disease)     History of prediabetes 11/16/2022    Hypercholesteremia     Hypertension     Left ventricular hypertrophy     Lumbosacral spondylosis     Positive colorectal cancer screening using Cologuard test 12/15/2022    Prediabetes     Pulmonary nodules     repeat CT due 12/03/21     Past Surgical History:   Procedure Laterality Date    ABDOMINAL SURGERY      inguinal hernia repair    BACK SURGERY      Bilateral L3-S1 MBB Bilateral 2-, 1-, 1-, 12-    Dr Jorge Parker    EYE SURGERY      HERNIA REPAIR      KNEE ARTHROSCOPY W/ MENISCAL REPAIR Right     KNEE ARTHROSCOPY W/ MENISCECTOMY Left 01/11/2023    Procedure: ARTHROSCOPY, KNEE, WITH MENISCECTOMY;  Surgeon: Dada Enriquez MD;  Location: [facility];  Service: Orthopedics;  Laterality: Left;    KNEE SURGERY Left     LATERAL RETINACULA RELEASE OF KNEE Left 01/11/2023    Procedure: ARTHROSCOPIC RELEASE, KNEE, LATERAL RETINACULA;  Surgeon: Dada Enriquez MD;  Location: [facility];  Service: Orthopedics;  Laterality: Left;    PROSTATECTOMY  03/1996    prostate cancer    RADIOFREQUENCY ABLATION OF LUMBAR MEDIAL BRANCH NERVE AT SINGLE LEVEL Right 04/01/2021    Procedure: Radiofrequency Ablation, Nerve, Spinal, Lumbar, Medial Branch, 1 Level;  Surgeon: Jordana Patel MD;  Location: [facility];  Service: Pain Management;  Laterality: Right;  Right L3-S1 RFTC    RADIOFREQUENCY ABLATION OF LUMBAR MEDIAL BRANCH NERVE AT SINGLE LEVEL Right 02/09/2023    Procedure:  Radiofrequency Ablation, Nerve, Spinal, Lumbar, Medial Branch, Level L4-S1;  Surgeon: Jordana Patel MD;  Location: [facility];  Service: Pain Management;  Laterality: Right;  bonita left after right is done    RADIOFREQUENCY ABLATION OF LUMBAR MEDIAL BRANCH NERVE AT SINGLE LEVEL Left 02/28/2023    Procedure: RADIOFREQUENCY ABLATION, NERVE, SPINAL, LUMBAR, MEDIAL BRANCH, 1 LEVEL;  Surgeon: Jordana Patel MD;  Location: [facility];  Service: Pain Management;  Laterality: Left;    RADIOFREQUENCY THERMOCOAGULATION Bilateral 03/20/2012    Dr Parker    RADIOFREQUENCY THERMOCOAGULATION Left 03/18/2021    Procedure: RADIOFREQUENCY THERMAL COAGULATION;  Surgeon: Jordana Patel MD;  Location: [facility];  Service: Pain Management;  Laterality: Left;  BILATERAL L3-S1 RFTC LEFT SIDE FIRST    SELECTIVE INJECTION OF ANESTHETIC AGENT AROUND LUMBAR SPINAL NERVE ROOT BY TRANSFORAMINAL APPROACH Right 01/13/2022    Procedure: Right L4-5,5-S1 TFESI;  Surgeon: Jordana Patel MD;  Location: [facility];  Service: Pain Management;  Laterality: Right;  PT AWARE TO BE TESTED VIA PC    SHOULDER ARTHROSCOPY Right     SPINE SURGERY  1995    VASECTOMY  Mar 1996         PHYSICAL EXAMINATION:  There were no vitals taken for this visit.  General    Constitutional: He is oriented to person, place, and time. He appears well-nourished.   HENT:   Head: Normocephalic and atraumatic.   Eyes: Pupils are equal, round, and reactive to light.   Neck: Neck supple.   Cardiovascular:  Normal rate and regular rhythm.            Pulmonary/Chest: Effort normal. No respiratory distress.   Abdominal: There is no abdominal tenderness. There is no guarding.   Neurological: He is alert and oriented to person, place, and time. He has normal reflexes.   Psychiatric: He has a normal mood and affect. His behavior is normal. Judgment and thought content normal.           Right Knee Exam     Inspection   Swelling: present  Effusion:  present    Tenderness   The patient is tender to palpation of the medial joint line and lateral joint line.    Crepitus   The patient has crepitus of the patella.    Range of Motion   Extension:  normal   Flexion:  abnormal     Tests   Meniscus   Ozzy:  Medial - positive   Ligament Examination   Lachman: normal (-1 to 2mm)   PCL-Posterior Drawer: normal (0 to 2mm)     Patella   Patellar Tracking: normal  Patellar Grind: positive    Other   Sensation: normal    Muscle Strength   Right Lower Extremity   Quadriceps:  5/5   Hamstrin/5     Reflexes     Right Side   Achilles:  2+  Quadriceps:  2+    Vascular Exam     Right Pulses  Dorsalis Pedis:      2+  Posterior Tibial:      2+        IMAGING:  No results found.     ASSESSMENT:      ICD-10-CM ICD-9-CM   1. Primary osteoarthritis of right knee  M17.11 715.16       PLAN:     -Findings and treatment options were discussed with the patient  -All questions answered  Natural history and expected course discussed. Questions answered.  Educational materials distributed.  Reduction in offending activity.  OTC analgesics as needed.  Arthrocentesis. See procedure note.  Right knee durolane injection today.  Return to clinic in 6 months or as needed.    There are no Patient Instructions on file for this visit.      No orders of the defined types were placed in this encounter.        Large Joint Aspiration/Injection: R supra patellar bursa    Date/Time: 2023 9:00 AM  Performed by: MOLLY Slater  Authorized by: MOLLY Slater     Consent Done?:  Yes (Verbal)  Indications:  Pain  Site marked: the procedure site was marked    Local anesthetic:  Bupivacaine 0.25% without epinephrine    Details:  Needle Size:  22 G  Location:  Knee  Site:  R supra patellar bursa  Medications:  60 mg hyaluronate sodium, stabilized (DUROLANE) 60 mg/3 mL  Patient tolerance:  Patient tolerated the procedure well with no immediate complications

## 2023-05-21 DIAGNOSIS — N28.9 ACUTE RENAL IMPAIRMENT: Primary | ICD-10-CM

## 2023-05-21 DIAGNOSIS — Z79.899 ENCOUNTER FOR LONG-TERM (CURRENT) USE OF OTHER MEDICATIONS: ICD-10-CM

## 2023-05-21 NOTE — PROGRESS NOTES
Undetectable PSA.  Stable anemia.  Lipids are good.  Acute renal impairment, but is not well hydrated.  Increase water intake & recheck BMP 1 wk well hydrated (order is in).

## 2023-05-22 DIAGNOSIS — K21.9 GASTROESOPHAGEAL REFLUX DISEASE WITHOUT ESOPHAGITIS: Chronic | ICD-10-CM

## 2023-05-22 RX ORDER — OMEPRAZOLE 20 MG/1
20 CAPSULE, DELAYED RELEASE ORAL DAILY
Qty: 90 CAPSULE | Refills: 3 | Status: SHIPPED | OUTPATIENT
Start: 2023-05-22

## 2023-05-22 RX ORDER — OMEPRAZOLE 20 MG/1
20 CAPSULE, DELAYED RELEASE ORAL DAILY
Qty: 90 CAPSULE | Refills: 3 | Status: SHIPPED | OUTPATIENT
Start: 2023-05-22 | End: 2023-05-22 | Stop reason: SDUPTHER

## 2023-05-22 RX ORDER — OMEPRAZOLE 20 MG/1
20 CAPSULE, DELAYED RELEASE ORAL DAILY
Qty: 90 CAPSULE | Refills: 3 | Status: SHIPPED | OUTPATIENT
Start: 2023-05-22 | End: 2023-05-22

## 2023-05-22 NOTE — TELEPHONE ENCOUNTER
----- Message from Sabiha Friedman sent at 5/22/2023  8:42 AM CDT -----  PT need refill on omeprazole sent in to the [facility] # [phone]

## 2023-05-22 NOTE — TELEPHONE ENCOUNTER
----- Message from Sabiha Friedman sent at 5/22/2023  8:42 AM CDT -----  PT need refill on omeprazole sent in to the [facility] # [phone]

## 2023-05-30 ENCOUNTER — OFFICE VISIT (OUTPATIENT)
Dept: CARDIOLOGY | Facility: CLINIC | Age: 73
End: 2023-05-30
Payer: MEDICARE

## 2023-05-30 VITALS
HEIGHT: 70 IN | OXYGEN SATURATION: 97 % | WEIGHT: 203 LBS | DIASTOLIC BLOOD PRESSURE: 70 MMHG | SYSTOLIC BLOOD PRESSURE: 142 MMHG | HEART RATE: 73 BPM | BODY MASS INDEX: 29.06 KG/M2

## 2023-05-30 DIAGNOSIS — R07.9 CHEST PAIN, UNSPECIFIED TYPE: Primary | ICD-10-CM

## 2023-05-30 PROCEDURE — 93005 ELECTROCARDIOGRAM TRACING: CPT | Mod: PBBFAC | Performed by: STUDENT IN AN ORGANIZED HEALTH CARE EDUCATION/TRAINING PROGRAM

## 2023-05-30 PROCEDURE — 99204 OFFICE O/P NEW MOD 45 MIN: CPT | Mod: 25,GW,S$PBB, | Performed by: STUDENT IN AN ORGANIZED HEALTH CARE EDUCATION/TRAINING PROGRAM

## 2023-05-30 PROCEDURE — 93010 EKG 12-LEAD: ICD-10-PCS | Mod: GW,S$PBB,, | Performed by: STUDENT IN AN ORGANIZED HEALTH CARE EDUCATION/TRAINING PROGRAM

## 2023-05-30 PROCEDURE — 99215 OFFICE O/P EST HI 40 MIN: CPT | Mod: PBBFAC | Performed by: STUDENT IN AN ORGANIZED HEALTH CARE EDUCATION/TRAINING PROGRAM

## 2023-05-30 PROCEDURE — 93010 ELECTROCARDIOGRAM REPORT: CPT | Mod: GW,S$PBB,, | Performed by: STUDENT IN AN ORGANIZED HEALTH CARE EDUCATION/TRAINING PROGRAM

## 2023-05-30 PROCEDURE — 99204 PR OFFICE/OUTPT VISIT, NEW, LEVL IV, 45-59 MIN: ICD-10-PCS | Mod: 25,GW,S$PBB, | Performed by: STUDENT IN AN ORGANIZED HEALTH CARE EDUCATION/TRAINING PROGRAM

## 2023-05-30 NOTE — PROGRESS NOTES
PCP: MOLLY Murry    Referring Provider[facility]val[facility])    Subjective:   Tray Jalloh is a 72 y.o. male with hx of hypertension, hyperlipidemia who presents for a new patient visit.     Has previously seen Dr. Hernández however, it has been over 3 years (last seen in 2019). He complained of chest pain radiating down to left arm and was re-referred to cardiology by Dr. Avalos and was scheduled for NPV with me.     Intermittent central chest pain w/ radiation to left arm since last 4-5 months. Notes this pain is different from previous episodes 3 years ago. Associated w/ singing and talking. He works out at the gym. He does weights but only does about 5 minutes of cardio at a time as he is limited by knee pain. This level of activity does not precipitate chest pain. Complains of long-standing hx shortness of breath with exertion, attributes it to COPD. Improves with inhalers.    Fhx: Father ( CAD. CABG in his early 60s, multiple MI, h/o cardiac arrest per report), paternal uncle had an MI as well    Shx: Former smoker 2 PPD for 30 pack years. Quit in 2001.     EKG 5/30/23: Sinus rhythm with 1st degree AV block    ECHO:  Normal biventricular function in 2019    CATH: .  Angiographically normal coronaries in 2019     Lab Results   Component Value Date     05/15/2023    K 4.5 05/15/2023     (H) 05/15/2023    CO2 27 05/15/2023    BUN 28 (H) 05/15/2023    CREATININE 1.41 (H) 05/15/2023    CALCIUM 8.8 05/15/2023    ANIONGAP 7 05/15/2023    ESTGFRAFRICA 65 12/03/2020    EGFRNONAA 70 05/12/2022       Lab Results   Component Value Date    CHOL 140 05/15/2023    CHOL 119 05/12/2022    CHOL 114 09/30/2021     Lab Results   Component Value Date    HDL 40 05/15/2023    HDL 40 05/12/2022    HDL 51 09/30/2021     Lab Results   Component Value Date    LDLCALC 79 05/15/2023    LDLCALC 63 05/12/2022    LDLCALC 47 09/30/2021     Lab Results   Component Value Date    TRIG 105 05/15/2023     TRIG 78 05/12/2022    TRIG 80 09/30/2021     Lab Results   Component Value Date    CHOLHDL 3.5 05/15/2023    CHOLHDL 3.0 05/12/2022    CHOLHDL 2.2 09/30/2021       Lab Results   Component Value Date    WBC 8.10 05/15/2023    HGB 12.2 (L) 05/15/2023    HCT 38.4 (L) 05/15/2023    MCV 97.2 (H) 05/15/2023     05/15/2023           Current Outpatient Medications:     acetaminophen (TYLENOL) 325 MG tablet, Take 325 mg by mouth every 6 (six) hours as needed for Pain., Disp: , Rfl:     amLODIPine (NORVASC) 5 MG tablet, Take 1 tablet (5 mg total) by mouth once daily., Disp: 90 tablet, Rfl: 3    ascorbic acid, vitamin C, (VITAMIN C) 1000 MG tablet, Take 1,000 mg by mouth once daily., Disp: , Rfl:     budesonide-glycopyr-formoterol (BREZTRI AEROSPHERE) 160-9-4.8 mcg/actuation HFAA, Inhale 2 puffs into the lungs 2 (two) times a day. Rinse mouth after use., Disp: 32.1 g, Rfl: 3    cetirizine (ZYRTEC) 10 MG tablet, Take 1 tablet (10 mg total) by mouth once daily., Disp: 90 tablet, Rfl: 3    cholecalciferol, vitamin D3, (VITAMIN D3) 50 mcg (2,000 unit) Cap, Take 1 capsule by mouth once daily., Disp: , Rfl:     CRESTOR 20 mg tablet, Take 1 tablet by mouth once daily. Take 1/2 tablet by mouth every evening, Disp: , Rfl:     cyanocobalamin (VITAMIN B-12) 1000 MCG tablet, Take 100 mcg by mouth once daily., Disp: , Rfl:     diclofenac sodium (VOLTAREN) 1 % Gel, Apply topically 4 (four) times daily., Disp: 900 g, Rfl: 5    diphenhydrAMINE (BENADRYL) 25 mg capsule, Take 25 mg by mouth every 6 (six) hours as needed for Itching., Disp: , Rfl:     folic acid/multivit-min/lutein (CENTRUM SILVER ORAL), Take 1 tablet by mouth once daily., Disp: , Rfl:     hydrocortisone 2.5 % cream, Apply 30 g/kg topically 2 (two) times daily. Apply to face for dry skin, Disp: , Rfl:     ipratropium-albuteroL (COMBIVENT RESPIMAT)  mcg/actuation inhaler, Inhale 1 puff into the lungs every 6 (six) hours as needed for Wheezing. Rescue, Disp: 12 g,  "Rfl: 1    ketoconazole (NIZORAL) 2 % cream, Apply topically once daily., Disp: , Rfl:     lisinopriL (PRINIVIL,ZESTRIL) 40 MG tablet, Take 1 tablet (40 mg total) by mouth once daily., Disp: 90 tablet, Rfl: 3    metaxalone (SKELAXIN) 800 MG tablet, Take 1 tablet (800 mg total) by mouth daily as needed for Pain., Disp: 90 tablet, Rfl: 1    methenamine (HIPREX) 1 gram Tab, Take 1 tablet (1 g total) by mouth 2 (two) times daily., Disp: 180 tablet, Rfl: 3    omega-3 fatty acids/fish oil (FISH OIL-OMEGA-3 FATTY ACIDS) 300-1,000 mg capsule, Take 2 capsules by mouth once daily., Disp: , Rfl:     omeprazole (PRILOSEC) 20 MG capsule, Take 1 capsule (20 mg total) by mouth once daily., Disp: 90 capsule, Rfl: 3    pregabalin (LYRICA) 75 MG capsule, Take 1 capsule by mouth twice daily, Disp: 60 capsule, Rfl: 5    tolterodine (DETROL LA) 2 MG Cp24, Take 2 mg by mouth once daily., Disp: , Rfl:     triamcinolone acetonide 0.1% (KENALOG) 0.1 % cream, Apply 15 g topically 2 (two) times daily. Apply to back for dry skin and itching, Disp: , Rfl:     vitamin E 1000 UNIT capsule, Take 1,000 Units by mouth once daily., Disp: , Rfl:     zinc gluconate 50 mg tablet, Take 50 mg by mouth once daily., Disp: , Rfl:     Review of Systems   Constitutional:  Negative for chills, diaphoresis, fever and malaise/fatigue.   Respiratory:  Negative for cough and shortness of breath.    Cardiovascular:  Negative for chest pain, palpitations, orthopnea, claudication, leg swelling and PND.   Gastrointestinal:  Negative for abdominal pain, heartburn, nausea and vomiting.   Neurological:  Negative for dizziness.     Objective:   BP (!) 142/70 (BP Location: Left arm, Patient Position: Sitting)   Pulse 73   Ht 5' 10" (1.778 m)   Wt 92.1 kg (203 lb)   SpO2 97%   BMI 29.13 kg/m²     Physical Exam  Constitutional:       General: He is not in acute distress.     Appearance: Normal appearance.   Cardiovascular:      Rate and Rhythm: Normal rate and regular " rhythm.      Pulses: Normal pulses.      Heart sounds: Normal heart sounds. No murmur heard.    No friction rub. No gallop.   Pulmonary:      Effort: Pulmonary effort is normal.      Breath sounds: Normal breath sounds. No wheezing or rales.   Musculoskeletal:      Right lower leg: No edema.      Left lower leg: No edema.   Skin:     General: Skin is warm and dry.   Neurological:      Mental Status: He is alert.         Assessment:     1. Chest pain, unspecified type  EKG 12-lead    EKG 12-lead    NM Myocardial Perfusion Spect Multi Pharmacologic    Nuclear Stress Test            Plan:   No problem-specific Assessment & Plan notes found for this encounter.    Chest pain  Not associated with exertion, radiates to left arm. Since last 4-5 months  Limited by knee pain and can only do 5 minutes of cardiovascular exercise at a time  Risk factors: HTN, HLD, former smoking, FH  Intermediate pre-test probability of CAD  Recommend pharmacologic stress test with nuclear imaging    F/U in 3 months.

## 2023-06-07 ENCOUNTER — OFFICE VISIT (OUTPATIENT)
Dept: NEUROLOGY | Facility: CLINIC | Age: 73
End: 2023-06-07
Payer: MEDICARE

## 2023-06-07 VITALS
WEIGHT: 203 LBS | HEART RATE: 74 BPM | DIASTOLIC BLOOD PRESSURE: 70 MMHG | OXYGEN SATURATION: 95 % | HEIGHT: 70 IN | BODY MASS INDEX: 29.06 KG/M2 | SYSTOLIC BLOOD PRESSURE: 124 MMHG

## 2023-06-07 DIAGNOSIS — G25.0 ESSENTIAL TREMOR: Primary | ICD-10-CM

## 2023-06-07 DIAGNOSIS — E03.9 HYPOTHYROIDISM, UNSPECIFIED TYPE: ICD-10-CM

## 2023-06-07 DIAGNOSIS — E55.9 VITAMIN D DEFICIENCY: ICD-10-CM

## 2023-06-07 DIAGNOSIS — E53.8 VITAMIN B12 DEFICIENCY: ICD-10-CM

## 2023-06-07 PROCEDURE — 99213 OFFICE O/P EST LOW 20 MIN: CPT | Mod: S$PBB,,, | Performed by: NURSE PRACTITIONER

## 2023-06-07 PROCEDURE — 99213 PR OFFICE/OUTPT VISIT, EST, LEVL III, 20-29 MIN: ICD-10-PCS | Mod: S$PBB,,, | Performed by: NURSE PRACTITIONER

## 2023-06-07 PROCEDURE — 99215 OFFICE O/P EST HI 40 MIN: CPT | Mod: PBBFAC | Performed by: NURSE PRACTITIONER

## 2023-06-07 NOTE — PATIENT INSTRUCTIONS
CT head  Labs as ordered  Recommend avoid excessive caffeine intake  Make sure getting plenty of sleep  Hold on medication for now, will continue to monitor
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
## 2023-06-08 ENCOUNTER — TELEPHONE (OUTPATIENT)
Dept: NEUROLOGY | Facility: CLINIC | Age: 73
End: 2023-06-08
Payer: MEDICARE

## 2023-06-08 NOTE — TELEPHONE ENCOUNTER
Called pt and gave him the information below from AMANUEL Jauregui NP. He v/u.        ----- Message from MOLLY Mensah sent at 6/8/2023 12:53 PM CDT -----  No significant abnormalities noted on the labs

## 2023-06-09 DIAGNOSIS — Z71.89 COMPLEX CARE COORDINATION: ICD-10-CM

## 2023-06-20 ENCOUNTER — HOSPITAL ENCOUNTER (OUTPATIENT)
Dept: RADIOLOGY | Facility: HOSPITAL | Age: 73
Discharge: HOME | End: 2023-06-20
Attending: NURSE PRACTITIONER
Payer: MEDICARE

## 2023-06-20 ENCOUNTER — TELEPHONE (OUTPATIENT)
Dept: FAMILY MEDICINE | Facility: CLINIC | Age: 73
End: 2023-06-20
Payer: MEDICARE

## 2023-06-20 DIAGNOSIS — G25.0 ESSENTIAL TREMOR: ICD-10-CM

## 2023-06-20 DIAGNOSIS — I10 ESSENTIAL HYPERTENSION: Chronic | ICD-10-CM

## 2023-06-20 PROCEDURE — 70450 CT HEAD/BRAIN W/O DYE: CPT | Mod: TC

## 2023-06-20 PROCEDURE — 70450 CT HEAD/BRAIN W/O DYE: CPT | Mod: 26,,, | Performed by: STUDENT IN AN ORGANIZED HEALTH CARE EDUCATION/TRAINING PROGRAM

## 2023-06-20 PROCEDURE — 70450 CT HEAD WITHOUT CONTRAST: ICD-10-PCS | Mod: 26,,, | Performed by: STUDENT IN AN ORGANIZED HEALTH CARE EDUCATION/TRAINING PROGRAM

## 2023-06-20 RX ORDER — AMLODIPINE BESYLATE 5 MG/1
5 TABLET ORAL DAILY
Qty: 90 TABLET | Refills: 3 | Status: SHIPPED | OUTPATIENT
Start: 2023-06-20 | End: 2024-01-03

## 2023-06-20 NOTE — TELEPHONE ENCOUNTER
I just sent 1 year of refills on amlodipine to the [facility] pharmacy so he should have plenty of refills on file.  I will go ahead and send it again with enough refills for a year.    amLODIPine (NORVASC) 5 MG tablet 90 tablet 3 4/24/2023 4/23/2024 No   Sig - Route: Take 1 tablet (5 mg total) by mouth once daily. - Oral   Sent to pharmacy as: amLODIPine (NORVASC) 5 MG tablet   Class: Normal   Notes to Pharmacy: .   Order: [id number]   Date/Time Signed: 4/24/2023 17:46       E-Prescribing Status: Receipt confirmed by pharmacy (4/24/2023  5:46 PM CDT)     Pharmacy    [facility] - [place], [place] - [address] [place] [address]    Associated Diagnoses    Essential hypertension  - Primary            Msg to  to notify pt prescription/refill has been sent.

## 2023-06-21 ENCOUNTER — TELEPHONE (OUTPATIENT)
Dept: NEUROLOGY | Facility: CLINIC | Age: 73
End: 2023-06-21
Payer: MEDICARE

## 2023-06-21 NOTE — TELEPHONE ENCOUNTER
Called pt and gave him the information below from AMANUEL Jauregui NP. He v/u.        ----- Message from MOLLY Mensah sent at 6/20/2023  4:49 PM CDT -----  CT head no stroke, showed chronic atrophy only

## 2023-06-29 ENCOUNTER — HOSPITAL ENCOUNTER (OUTPATIENT)
Dept: RADIOLOGY | Facility: HOSPITAL | Age: 73
Discharge: HOME | End: 2023-06-29
Attending: STUDENT IN AN ORGANIZED HEALTH CARE EDUCATION/TRAINING PROGRAM
Payer: MEDICARE

## 2023-06-29 ENCOUNTER — HOSPITAL ENCOUNTER (OUTPATIENT)
Dept: CARDIOLOGY | Facility: HOSPITAL | Age: 73
Discharge: HOME | End: 2023-06-29
Attending: STUDENT IN AN ORGANIZED HEALTH CARE EDUCATION/TRAINING PROGRAM
Payer: MEDICARE

## 2023-06-29 VITALS
WEIGHT: 203 LBS | BODY MASS INDEX: 29.06 KG/M2 | HEIGHT: 70 IN | SYSTOLIC BLOOD PRESSURE: 145 MMHG | HEART RATE: 69 BPM | DIASTOLIC BLOOD PRESSURE: 83 MMHG

## 2023-06-29 DIAGNOSIS — R07.9 CHEST PAIN, UNSPECIFIED TYPE: ICD-10-CM

## 2023-06-29 PROCEDURE — 63600175 PHARM REV CODE 636 W HCPCS: Performed by: STUDENT IN AN ORGANIZED HEALTH CARE EDUCATION/TRAINING PROGRAM

## 2023-06-29 PROCEDURE — 93016 CV STRESS TEST SUPVJ ONLY: CPT | Mod: ,,, | Performed by: NURSE PRACTITIONER

## 2023-06-29 PROCEDURE — 93018 CV STRESS TEST I&R ONLY: CPT | Mod: ,,, | Performed by: STUDENT IN AN ORGANIZED HEALTH CARE EDUCATION/TRAINING PROGRAM

## 2023-06-29 PROCEDURE — 93016 NUCLEAR STRESS TEST (CUPID ONLY): ICD-10-PCS | Mod: ,,, | Performed by: NURSE PRACTITIONER

## 2023-06-29 PROCEDURE — 78452 HT MUSCLE IMAGE SPECT MULT: CPT | Mod: 26,,, | Performed by: STUDENT IN AN ORGANIZED HEALTH CARE EDUCATION/TRAINING PROGRAM

## 2023-06-29 PROCEDURE — 93017 CV STRESS TEST TRACING ONLY: CPT

## 2023-06-29 PROCEDURE — 78452 HT MUSCLE IMAGE SPECT MULT: CPT | Mod: TC

## 2023-06-29 PROCEDURE — 78452 NM MYOCARDIAL PERFUSION SPECT MULTI PHARM: ICD-10-PCS | Mod: 26,,, | Performed by: STUDENT IN AN ORGANIZED HEALTH CARE EDUCATION/TRAINING PROGRAM

## 2023-06-29 PROCEDURE — 93018 NUCLEAR STRESS TEST (CUPID ONLY): ICD-10-PCS | Mod: ,,, | Performed by: STUDENT IN AN ORGANIZED HEALTH CARE EDUCATION/TRAINING PROGRAM

## 2023-06-29 PROCEDURE — A9500 TC99M SESTAMIBI: HCPCS

## 2023-06-29 RX ORDER — REGADENOSON 0.08 MG/ML
0.4 INJECTION, SOLUTION INTRAVENOUS ONCE
Status: COMPLETED | OUTPATIENT
Start: 2023-06-29 | End: 2023-06-29

## 2023-06-29 RX ADMIN — REGADENOSON 0.4 MG: 0.08 INJECTION, SOLUTION INTRAVENOUS at 09:06

## 2023-06-30 LAB
CV STRESS BASE HR: 69 BPM
DIASTOLIC BLOOD PRESSURE: 83 MMHG
OHS CV CPX 1 MINUTE RECOVERY HEART RATE: 90 BPM
OHS CV CPX 85 PERCENT MAX PREDICTED HEART RATE MALE: 127
OHS CV CPX MAX PREDICTED HEART RATE: 149
OHS CV CPX PATIENT IS FEMALE: 0
OHS CV CPX PATIENT IS MALE: 1
OHS CV CPX PEAK DIASTOLIC BLOOD PRESSURE: 83 MMHG
OHS CV CPX PEAK HEAR RATE: 92 BPM
OHS CV CPX PEAK RATE PRESSURE PRODUCT: NORMAL
OHS CV CPX PEAK SYSTOLIC BLOOD PRESSURE: 145 MMHG
OHS CV CPX PERCENT MAX PREDICTED HEART RATE ACHIEVED: 62
OHS CV CPX RATE PRESSURE PRODUCT PRESENTING: NORMAL
SYSTOLIC BLOOD PRESSURE: 145 MMHG

## 2023-07-05 ENCOUNTER — TELEPHONE (OUTPATIENT)
Dept: FAMILY MEDICINE | Facility: CLINIC | Age: 73
End: 2023-07-05
Payer: MEDICARE

## 2023-07-05 NOTE — TELEPHONE ENCOUNTER
----- Message from Xu Ho sent at 7/5/2023  7:20 AM CDT -----  Has a question about his B12 results running high pt num [phone]

## 2023-07-05 NOTE — TELEPHONE ENCOUNTER
Pt called regarding labs drawn by Enrico Jauregui notified patient Enrico stated labs were ok no changes in medications.

## 2023-07-27 ENCOUNTER — OFFICE VISIT (OUTPATIENT)
Dept: FAMILY MEDICINE | Facility: CLINIC | Age: 73
End: 2023-07-27
Payer: MEDICARE

## 2023-07-27 VITALS
BODY MASS INDEX: 30.21 KG/M2 | HEART RATE: 73 BPM | OXYGEN SATURATION: 95 % | RESPIRATION RATE: 20 BRPM | TEMPERATURE: 98 F | SYSTOLIC BLOOD PRESSURE: 132 MMHG | HEIGHT: 70 IN | WEIGHT: 211 LBS | DIASTOLIC BLOOD PRESSURE: 63 MMHG

## 2023-07-27 DIAGNOSIS — E66.9 OBESITY, CLASS I, BMI 30-34.9: ICD-10-CM

## 2023-07-27 DIAGNOSIS — M79.89 SWELLING OF BOTH LOWER EXTREMITIES: Primary | ICD-10-CM

## 2023-07-27 DIAGNOSIS — Z13.31 POSITIVE DEPRESSION SCREENING: ICD-10-CM

## 2023-07-27 PROBLEM — E66.811 OBESITY, CLASS I, BMI 30-34.9: Status: ACTIVE | Noted: 2023-07-27

## 2023-07-27 PROCEDURE — 99213 OFFICE O/P EST LOW 20 MIN: CPT | Mod: ,,, | Performed by: NURSE PRACTITIONER

## 2023-07-27 PROCEDURE — 99213 PR OFFICE/OUTPT VISIT, EST, LEVL III, 20-29 MIN: ICD-10-PCS | Mod: ,,, | Performed by: NURSE PRACTITIONER

## 2023-07-27 NOTE — PROGRESS NOTES
[facility] - FAMILY MEDICINE       PATIENT NAME: Tray Jalloh   : 1950    AGE: 73 y.o. DATE OF ENCOUNTER: 23    MRN: [id number]      PCP: MOLLY Murry    Reason for Visit / Chief Complaint:  Foot Swelling (Patient presents to the clinic complaints of feet swelling on and off for 2wks/Vitamin b-12, folate? )         274}    Subjective:     HPI:    Presents as a walk-in c/o feet swelling off & on x 2 wks.  Sometimes ankles swell too.  Admits had been working out at the gym, but has been sitting around more lately due to had to stay w/ mom & [employer] events.  Wasn't sure if he should go to the gym while swelling.  Elevating legs helps.  Retired now; snacks more, loves cornbread, chips, & sweets; has gained weight & is heaviest wt ever. Adds salt on tomatoes.  Had recent thorough cardiac work-up.  Nothing aside from usual chronic SOB w/ exertion.  Denies CP.    Concerned about elevated folic acid level.   Latest Reference Range & Units 23 15:37   Folate 3.1 - 17.5 ng/mL >20.0 (H)   Vitamin B-12 193 - 986 pg/mL 951       Review of Systems:   Review of Systems   Constitutional: Negative.    HENT:  Congestion: chronic rhinitis.    Respiratory:  Positive for shortness of breath (chronic, intermittent d/t hx COPD). Negative for cough and wheezing.    Cardiovascular: Negative.    Gastrointestinal: Negative.    Genitourinary: Negative.    Musculoskeletal:  Positive for arthralgias, back pain and joint swelling (left knee, chronic).   Skin: Negative.    Neurological: Negative.      Allergies and Meds: 274}     Review of patient's allergies indicates:   Allergen Reactions    Bee pollen     Grass pollen- grass standard         Current Outpatient Medications:     acetaminophen (TYLENOL) 325 MG tablet, Take 325 mg by mouth every 6 (six) hours as needed for Pain., Disp: , Rfl:     amLODIPine (NORVASC) 5 MG tablet, Take 1 tablet (5 mg total) by mouth once daily., Disp: 90  tablet, Rfl: 3    ascorbic acid, vitamin C, (VITAMIN C) 1000 MG tablet, Take 1,000 mg by mouth once daily., Disp: , Rfl:     budesonide-glycopyr-formoterol (BREZTRI AEROSPHERE) 160-9-4.8 mcg/actuation HFAA, Inhale 2 puffs into the lungs 2 (two) times a day. Rinse mouth after use., Disp: 32.1 g, Rfl: 3    cetirizine (ZYRTEC) 10 MG tablet, Take 1 tablet (10 mg total) by mouth once daily., Disp: 90 tablet, Rfl: 3    cholecalciferol, vitamin D3, (VITAMIN D3) 50 mcg (2,000 unit) Cap, Take 1 capsule by mouth once daily., Disp: , Rfl:     CRESTOR 20 mg tablet, Take 1 tablet by mouth once daily. Take 1/2 tablet by mouth every evening, Disp: , Rfl:     cyanocobalamin (VITAMIN B-12) 1000 MCG tablet, Take 100 mcg by mouth once daily., Disp: , Rfl:     diclofenac sodium (VOLTAREN) 1 % Gel, Apply topically 4 (four) times daily. (Patient taking differently: Apply topically 4 (four) times daily as needed.), Disp: 900 g, Rfl: 5    diphenhydrAMINE (BENADRYL) 25 mg capsule, Take 25 mg by mouth every 6 (six) hours as needed for Itching., Disp: , Rfl:     folic acid/multivit-min/lutein (CENTRUM SILVER ORAL), Take 1 tablet by mouth once daily., Disp: , Rfl:     hydrocortisone 2.5 % cream, Apply 30 g/kg topically 2 (two) times daily as needed. Apply to face for dry skin, Disp: , Rfl:     ipratropium-albuteroL (COMBIVENT RESPIMAT)  mcg/actuation inhaler, Inhale 1 puff into the lungs every 6 (six) hours as needed for Wheezing. Rescue, Disp: 12 g, Rfl: 1    ketoconazole (NIZORAL) 2 % cream, Apply topically daily as needed., Disp: , Rfl:     lisinopriL (PRINIVIL,ZESTRIL) 40 MG tablet, Take 1 tablet (40 mg total) by mouth once daily., Disp: 90 tablet, Rfl: 3    metaxalone (SKELAXIN) 800 MG tablet, Take 1 tablet (800 mg total) by mouth daily as needed for Pain., Disp: 90 tablet, Rfl: 1    methenamine (HIPREX) 1 gram Tab, Take 1 tablet (1 g total) by mouth 2 (two) times daily., Disp: 180 tablet, Rfl: 3    omega-3 fatty acids/fish oil  (FISH OIL-OMEGA-3 FATTY ACIDS) 300-1,000 mg capsule, Take 1 capsule by mouth 2 (two) times a day., Disp: , Rfl:     omeprazole (PRILOSEC) 20 MG capsule, Take 1 capsule (20 mg total) by mouth once daily., Disp: 90 capsule, Rfl: 3    pregabalin (LYRICA) 75 MG capsule, Take 1 capsule by mouth twice daily, Disp: 60 capsule, Rfl: 5    tolterodine (DETROL LA) 2 MG Cp24, Take 2 mg by mouth once daily., Disp: , Rfl:     triamcinolone acetonide 0.1% (KENALOG) 0.1 % cream, Apply 15 g topically 2 (two) times daily as needed. Apply to back for dry skin and itching, Disp: , Rfl:     vitamin E 1000 UNIT capsule, Take 1,000 Units by mouth once daily., Disp: , Rfl:     zinc gluconate 50 mg tablet, Take 50 mg by mouth once daily., Disp: , Rfl:     Labs:274}   I have reviewed labs below:  Lab Results   Component Value Date    WBC 7.46 06/07/2023    RBC 4.06 (L) 06/07/2023    HGB 12.5 (L) 06/07/2023    HCT 39.1 (L) 06/07/2023     06/07/2023     06/07/2023    K 4.1 06/07/2023     (H) 06/07/2023    CALCIUM 9.5 06/07/2023     (H) 06/07/2023    BUN 20 (H) 06/07/2023    CREATININE 1.39 (H) 06/07/2023    ESTGFRAFRICA 65 12/03/2020    EGFRNONAA 70 05/12/2022    ALT 20 06/07/2023    AST 14 (L) 06/07/2023    INR 1.19 07/28/2020    CHOL 140 05/15/2023    TRIG 105 05/15/2023    HDL 40 05/15/2023    LDLCALC 79 05/15/2023    TSH 1.950 06/07/2023    PSA <0.010 05/15/2023    HGBA1C 5.6 11/16/2022    MICROALBUR 3.1 (H) 05/12/2022       Medical History: 274}     Past Medical History:   Diagnosis Date    Cervical radiculopathy     Chronic pain     Chronic renal impairment     COPD (chronic obstructive pulmonary disease)     COVID-19 12/27/2022    Depression     Depressive disorder 5/12/2022    Digestive disorder     GERD (gastroesophageal reflux disease)     History of prediabetes 11/16/2022    Hypercholesteremia     Hypertension     Left ventricular hypertrophy     Lumbosacral spondylosis     Positive colorectal cancer  "screening using Cologuard test 12/15/2022    Prediabetes     Pulmonary nodules     repeat CT due 12/03/21      Health Maintenance: 274}     Health Maintenance         Date Due Completion Date    Influenza Vaccine (1) 09/01/2023 10/9/2022 (Done)    Override on 10/9/2022: Done    Hemoglobin A1c (Prediabetes) 11/16/2023 11/16/2022    Colorectal Cancer Screening 02/15/2028 2/15/2023    Override on 9/4/2012: Done (Dr. Hare. Repeat in 10 years. Scanned into documents.)    Lipid Panel 05/15/2028 5/15/2023    TETANUS VACCINE 10/27/2030 10/27/2020            Objective:  274}   /63 (BP Location: Right arm, Patient Position: Sitting, BP Method: Large (Automatic))   Pulse 73   Temp 97.6 °F (36.4 °C) (Oral)   Resp 20   Ht 5' 10" (1.778 m)   Wt 95.7 kg (211 lb)   SpO2 95%   BMI 30.28 kg/m²     Wt Readings from Last 3 Encounters:   07/27/23 95.7 kg (211 lb)   06/29/23 92.1 kg (203 lb)   06/07/23 92.1 kg (203 lb)     BP Readings from Last 3 Encounters:   07/27/23 132/63   06/29/23 (!) 145/83   06/07/23 124/70     Body mass index is 30.28 kg/m².     Physical Exam  Vitals and nursing note reviewed.   Constitutional:       General: He is not in acute distress.     Appearance: Normal appearance. He is not ill-appearing.   HENT:      Head: Normocephalic.   Eyes:      Conjunctiva/sclera: Conjunctivae normal.   Cardiovascular:      Rate and Rhythm: Normal rate and regular rhythm.      Heart sounds: Normal heart sounds.   Pulmonary:      Effort: Pulmonary effort is normal. No respiratory distress.      Breath sounds: Normal breath sounds.   Musculoskeletal:      Cervical back: Neck supple.      Right lower leg: Edema (trace non-pitting, pedal & ankles) present.      Left lower leg: Edema (trace non-pitting, pedal & ankles) present.   Skin:     General: Skin is warm and dry.   Neurological:      Mental Status: He is alert and oriented to person, place, and time.        Assessment and Plan: 274}     1. Swelling of both lower " extremities  Comments:  Avoid prolonged sitting.  Ankle pumps.  Decrease sodium intake. Resume exercise, decrease intake, & work on losing weight. Drink water.  Avoid diuretic for now.    2. Positive depression screening  Comments:  I have reviewed the positive depression score with the patient and found that no additional intervention is needed at this time. Declines depression on PHQ-9.    3. Obesity, Class I, BMI 30-34.9      Return to clinic 11/15/23 as scheduled or sooner if swelling persists or worsens.    Signature:  MOLLY Murry

## 2023-07-27 NOTE — PATIENT INSTRUCTIONS
Patient Education       Preventing Falls   The Basics   Written by the doctors and editors at [facility]   Am I at risk of falling? -- Your risk of falling increases as you grow older. That's because getting older can make it harder to walk steadily and keep your balance. Also, the effects of falls are more serious in older people.  Overall, 3 to 4 out of every 10 people over the age of 65 fall each year. Up to 75 percent of people who fracture a hip never recover to the point they were before they had their fracture. If you have fallen in the past, you are at higher risk of falling again.  Several things can increase your risk of a fall, including:  Illness  A change in the medicines you take  An unsafe or unfamiliar setting (for example, a room with rugs or furniture that might trip you, or an area you don't know well)  How can my doctor help me to avoid falling? -- Your doctor can talk to you about the following things:  Past falls - It is important to tell your doctor about any times you have fallen or almost fallen. He or she can then suggest ways to prevent another fall.  Your health conditions - Some health problems can put you at risk of falling. These include conditions that affect eyesight, hearing, muscle strength, or balance.  The medicines you take - Certain medicines can increase the risk of falling. These include some medicines that are used for sleeping problems, anxiety, high blood pressure, or depression. Adding new medicines, or changing doses of some medicines, can also affect your risk of falling.  The more your doctor knows about your situation, the better he or she will be able to help you. For example, if you fell because you have a condition that causes pain, your doctor might suggest treatments to deal with the pain. Or if one of your medicines is making you dizzy and more likely to fall, your doctor might switch you to a different medicine.  Is there anything I can do on my own? -- Yes. To  help keep from falling, you can:  Make your home safer - To avoid falling at home, get rid of things that might make you trip or slip. This might include furniture, electrical cords, clutter, and loose rugs (figure 1). Keep your home well-lit so that you can easily see where you are going. Avoid storing things in high places so you don't have to reach or climb.  Wear sturdy shoes that fit well - Wearing shoes with high heels or slippery soles, or shoes that are too loose, can lead to falls. Walking around in bare feet, or only socks, can also increase your risk of falling.  Take vitamin D pills - Taking vitamin D might lower the risk of falls in older people. This is because vitamin D helps make bones and muscles stronger. Your doctor can talk to you about whether you should take extra vitamin D, and how much.  Stay active - Exercising on a regular basis can help lower your risk of falling. It might also help prevent you from getting hurt if you do fall. It is best to do a few different activities that help with both strength and balance. There are many kinds of exercise that can be safe for older people. These include walking, swimming, and Pérez Chi (a Chinese martial art that involves slow, gentle movements).  Use a cane, walker, and other safety devices - If your doctor recommends that you use a cane or walker, be sure that it's the right size and you know how to use it. There are other devices that might help you avoid falling, too. These include grab bars or a sturdy seat for the shower, non-slip bath mats, and hand rails or treads for the stairs (to prevent slipping).  If you worry that you could fall, there are also alarm buttons that let you call for help if you fall and can't get up.  What should I do if I fall? -- If you fall, see your doctor right away, even if you aren't hurt. Your doctor can try to figure out what caused you to fall, and how likely you are to fall again. He or she will do an exam and  talk to you about your health problems, medicines, and activities. Then he or she can suggest things you can do to avoid falling again.  Many older people have a hard time recovering after a fall. Doing things to prevent falling can help you to protect your health and independence.  All topics are updated as new evidence becomes available and our peer review process is complete.  This topic retrieved from [employer] on: Sep 21, 2021.  Topic 57929 Version 18.0  Release: 29.4.2 - C29.263  © 2021 UpToDate, Inc. and/or its affiliates. All rights reserved.  figure 1: How to avoid falling at home     This picture shows some of the things that can cause a fall in your home. Look around and remove any loose rugs, electrical cords, clutter, or furniture that could trip you.  Graphic 49585 Version 1.0    Consumer Information Use and Disclaimer   This information is not specific medical advice and does not replace information you receive from your health care provider. This is only a brief summary of general information. It does NOT include all information about conditions, illnesses, injuries, tests, procedures, treatments, therapies, discharge instructions or life-style choices that may apply to you. You must talk with your health care provider for complete information about your health and treatment options. This information should not be used to decide whether or not to accept your health care provider's advice, instructions or recommendations. Only your health care provider has the knowledge and training to provide advice that is right for you. The use of this information is governed by the [employer] End User License Agreement, available at https://[email or web address]/en/solutions/[employer]/about/pavan.The use of [employer] content is governed by the [employer] Terms of Use. ©2021 UpToDate, Inc. All rights reserved.  Copyright   © 2021 UpToDate, Inc. and/or its affiliates. All rights reserved.

## 2023-08-15 ENCOUNTER — TELEPHONE (OUTPATIENT)
Dept: ORTHOPEDICS | Facility: CLINIC | Age: 73
End: 2023-08-15
Payer: MEDICARE

## 2023-08-15 NOTE — TELEPHONE ENCOUNTER
----- Message from Rashmi Neff sent at 8/15/2023  4:30 PM CDT -----  Pt said knees are killing him. He said he isnt sched to see you til Nov but needs something now. Please call [phone].

## 2023-08-23 DIAGNOSIS — M17.12 PRIMARY OSTEOARTHRITIS OF LEFT KNEE: ICD-10-CM

## 2023-08-23 DIAGNOSIS — M17.11 PRIMARY OSTEOARTHRITIS OF RIGHT KNEE: Primary | ICD-10-CM

## 2023-08-24 ENCOUNTER — HOSPITAL ENCOUNTER (OUTPATIENT)
Dept: RADIOLOGY | Facility: HOSPITAL | Age: 73
Discharge: HOME | End: 2023-08-24
Attending: ORTHOPAEDIC SURGERY
Payer: MEDICARE

## 2023-08-24 ENCOUNTER — OFFICE VISIT (OUTPATIENT)
Dept: ORTHOPEDICS | Facility: CLINIC | Age: 73
End: 2023-08-24
Payer: MEDICARE

## 2023-08-24 DIAGNOSIS — M17.11 PRIMARY OSTEOARTHRITIS OF RIGHT KNEE: Primary | ICD-10-CM

## 2023-08-24 DIAGNOSIS — M17.12 PRIMARY OSTEOARTHRITIS OF LEFT KNEE: ICD-10-CM

## 2023-08-24 DIAGNOSIS — M17.11 PRIMARY OSTEOARTHRITIS OF RIGHT KNEE: ICD-10-CM

## 2023-08-24 PROCEDURE — 99999PBSHW PR PBB SHADOW TECHNICAL ONLY FILED TO HB: Mod: PBBFAC,,,

## 2023-08-24 PROCEDURE — 73562 X-RAY EXAM OF KNEE 3: CPT | Mod: TC,50

## 2023-08-24 PROCEDURE — 73562 X-RAY EXAM OF KNEE 3: CPT | Mod: 26,50,, | Performed by: ORTHOPAEDIC SURGERY

## 2023-08-24 PROCEDURE — 99214 OFFICE O/P EST MOD 30 MIN: CPT | Mod: S$PBB,25,, | Performed by: ORTHOPAEDIC SURGERY

## 2023-08-24 PROCEDURE — 20610 LARGE JOINT ASPIRATION/INJECTION: R KNEE: ICD-10-PCS | Mod: S$PBB,RT,, | Performed by: ORTHOPAEDIC SURGERY

## 2023-08-24 PROCEDURE — 99214 PR OFFICE/OUTPT VISIT, EST, LEVL IV, 30-39 MIN: ICD-10-PCS | Mod: S$PBB,25,, | Performed by: ORTHOPAEDIC SURGERY

## 2023-08-24 PROCEDURE — 99999PBSHW PR PBB SHADOW TECHNICAL ONLY FILED TO HB: ICD-10-PCS | Mod: PBBFAC,,,

## 2023-08-24 PROCEDURE — 73562 XR KNEE 3 VIEW BILATERAL: ICD-10-PCS | Mod: 26,50,, | Performed by: ORTHOPAEDIC SURGERY

## 2023-08-24 PROCEDURE — 20610 DRAIN/INJ JOINT/BURSA W/O US: CPT | Mod: PBBFAC,RT | Performed by: ORTHOPAEDIC SURGERY

## 2023-08-24 PROCEDURE — 99212 OFFICE O/P EST SF 10 MIN: CPT | Mod: PBBFAC,25 | Performed by: ORTHOPAEDIC SURGERY

## 2023-08-24 RX ORDER — BUPIVACAINE HYDROCHLORIDE 5 MG/ML
3 INJECTION, SOLUTION PERINEURAL
Status: DISCONTINUED | OUTPATIENT
Start: 2023-08-24 | End: 2023-08-24 | Stop reason: HOSPADM

## 2023-08-24 RX ORDER — TRIAMCINOLONE ACETONIDE 40 MG/ML
40 INJECTION, SUSPENSION INTRA-ARTICULAR; INTRAMUSCULAR
Status: DISCONTINUED | OUTPATIENT
Start: 2023-08-24 | End: 2023-08-24 | Stop reason: HOSPADM

## 2023-08-24 RX ADMIN — TRIAMCINOLONE ACETONIDE 40 MG: 400 INJECTION, SUSPENSION INTRA-ARTICULAR; INTRAMUSCULAR at 01:08

## 2023-08-24 RX ADMIN — BUPIVACAINE HYDROCHLORIDE 3 ML: 5 INJECTION, SOLUTION PERINEURAL at 01:08

## 2023-08-24 NOTE — PROGRESS NOTES
CC:  Knee pain    73 y.o. Male returns to clinic for a follow up visit regarding knee pain.      He is in a lot of pain. He is ready to discuss surgery for his right knee. His knee pain is interfering with his normal activity. He has retired and states that has helped some. He is using a can to assist in ambulation.       Past Medical History:   Diagnosis Date    Cervical radiculopathy     Chronic pain     Chronic renal impairment     COPD (chronic obstructive pulmonary disease)     COVID-19 12/27/2022    Depression     Depressive disorder 5/12/2022    Digestive disorder     GERD (gastroesophageal reflux disease)     History of prediabetes 11/16/2022    Hypercholesteremia     Hypertension     Left ventricular hypertrophy     Lumbosacral spondylosis     Positive colorectal cancer screening using Cologuard test 12/15/2022    Prediabetes     Pulmonary nodules     repeat CT due 12/03/21     Past Surgical History:   Procedure Laterality Date    ABDOMINAL SURGERY      inguinal hernia repair    BACK SURGERY      Bilateral L3-S1 MBB Bilateral 2-, 1-, 1-, 12-    Dr Jorge Parker    EYE SURGERY      HERNIA REPAIR      KNEE ARTHROSCOPY W/ MENISCAL REPAIR Right     KNEE ARTHROSCOPY W/ MENISCECTOMY Left 01/11/2023    Procedure: ARTHROSCOPY, KNEE, WITH MENISCECTOMY;  Surgeon: Dada Enriquez MD;  Location: [facility] OR;  Service: Orthopedics;  Laterality: Left;    KNEE SURGERY Left     LATERAL RETINACULA RELEASE OF KNEE Left 01/11/2023    Procedure: ARTHROSCOPIC RELEASE, KNEE, LATERAL RETINACULA;  Surgeon: Dada Enriquez MD;  Location: [facility] OR;  Service: Orthopedics;  Laterality: Left;    PROSTATECTOMY  03/1996    prostate cancer    RADIOFREQUENCY ABLATION OF LUMBAR MEDIAL BRANCH NERVE AT SINGLE LEVEL Right 04/01/2021    Procedure: Radiofrequency Ablation, Nerve, Spinal, Lumbar, Medial Branch, 1 Level;  Surgeon: Jordana Patel MD;  Location: [facility];  Service: Pain  Management;  Laterality: Right;  Right L3-S1 RFTC    RADIOFREQUENCY ABLATION OF LUMBAR MEDIAL BRANCH NERVE AT SINGLE LEVEL Right 02/09/2023    Procedure: Radiofrequency Ablation, Nerve, Spinal, Lumbar, Medial Branch, Level L4-S1;  Surgeon: Jordana Patel MD;  Location: [facility];  Service: Pain Management;  Laterality: Right;  bonita left after right is done    RADIOFREQUENCY ABLATION OF LUMBAR MEDIAL BRANCH NERVE AT SINGLE LEVEL Left 02/28/2023    Procedure: RADIOFREQUENCY ABLATION, NERVE, SPINAL, LUMBAR, MEDIAL BRANCH, 1 LEVEL;  Surgeon: Jordana Patel MD;  Location: [facility];  Service: Pain Management;  Laterality: Left;    RADIOFREQUENCY THERMOCOAGULATION Bilateral 03/20/2012    Dr Parker    RADIOFREQUENCY THERMOCOAGULATION Left 03/18/2021    Procedure: RADIOFREQUENCY THERMAL COAGULATION;  Surgeon: Jordana Patel MD;  Location: [facility];  Service: Pain Management;  Laterality: Left;  BILATERAL L3-S1 RFTC LEFT SIDE FIRST    SELECTIVE INJECTION OF ANESTHETIC AGENT AROUND LUMBAR SPINAL NERVE ROOT BY TRANSFORAMINAL APPROACH Right 01/13/2022    Procedure: Right L4-5,5-S1 TFESI;  Surgeon: Jordana Patel MD;  Location: [facility];  Service: Pain Management;  Laterality: Right;  PT AWARE TO BE TESTED VIA PC    SHOULDER ARTHROSCOPY Right     SPINE SURGERY  1995    VASECTOMY  Mar 1996         PHYSICAL EXAMINATION:  There were no vitals taken for this visit.  General    Nursing note and vitals reviewed.  Constitutional: He is oriented to person, place, and time. He appears well-developed and well-nourished.   HENT:   Head: Normocephalic and atraumatic.   Nose: Nose normal.   Eyes: Pupils are equal, round, and reactive to light.   Neck: Neck supple.   Cardiovascular:  Normal rate, regular rhythm and intact distal pulses.            Pulmonary/Chest: Effort normal. No respiratory distress. He exhibits no tenderness.   Abdominal: Soft. He exhibits no distension. There is no abdominal  tenderness.   Neurological: He is alert and oriented to person, place, and time. He has normal reflexes. He displays normal reflexes. No cranial nerve deficit. He exhibits normal muscle tone.   Psychiatric: He has a normal mood and affect. His behavior is normal. Judgment and thought content normal.     General Musculoskeletal Exam   Gait: antalgic       Right Knee Exam   Right knee exam is normal.    Inspection   Swelling: present  Effusion: present  Deformity: present    Tenderness   The patient is tender to palpation of the medial joint line.    Crepitus   The patient has crepitus of the patella and medial joint line.    Range of Motion   Extension:  abnormal   Flexion:  abnormal     Tests   Meniscus   Ozzy:  Medial - positive   Ligament Examination   Lachman: normal (-1 to 2mm)   PCL-Posterior Drawer: normal (0 to 2mm)     MCL - Valgus: normal (0 to 2mm)  LCL - Varus: normal  Pivot Shift: normal (Equal)  Reverse Pivot Shift: normal (Equal)  Dial Test at 30 degrees: normal (< 5 degrees)  Dial Test at 90 degrees: normal (< 5 degrees)  Posterior Sag Test: negative  Posterolateral Corner: unstable (>15 degrees difference)  Patella   Patellar Tracking: normal  Q-Angle at 90 degrees: normal  Patellar Grind: positive    Other   Sensation: normal    Left Knee Exam     Inspection   Deformity: absent    Tenderness   The patient tender to palpation of the medial joint line and lateral joint line.    Crepitus   The patient has crepitus of the patella.    Range of Motion   Extension:  normal   Flexion:  abnormal     Tests   Meniscus   Ozzy:  Medial - positive Lateral - positive  Stability   Lachman: normal (-1 to 2mm)   PCL-Posterior Drawer: normal (0 to 2mm)  MCL - Valgus: normal (0 to 2mm)  LCL - Varus: normal (0 to 2mm)  Posterior Sag Test: negative  Patella   Passive Patellar Tilt: lateral tilt  Patellar Tracking: normal  Patellar Grind: positive  J-Sign: J sign absent    Other   Muscle Tightness: hamstring  tightness  Sensation: normal    Muscle Strength   Right Lower Extremity   Quadriceps:  5/5   Hamstrin/5   Left Lower Extremity   Hip Abduction: 5/5   Quadriceps:  5/5   Hamstrin/5     Reflexes     Left Side  Quadriceps:  2+    Right Side   Quadriceps:  2+    Vascular Exam     Right Pulses  Dorsalis Pedis:      2+  Posterior Tibial:      2+            IMAGING:  X-Ray Knee 3 View Bilateral    Result Date: 2023  See Procedure Notes for results. IMPRESSION: Please see Ortho procedure notes for report.  This procedure was auto-finalized by: Virtual Radiologist  3  Radiographs of the left were reviewed.  There is mild varus alignment.  Medial compartment joint space reveals advanced narrowing.  The lateral compartment demonstrates mild joint space narrowing.  Patellofemoral compartment reveals moderate joint space narrowing  No fracture is identified.  No evidence of pathologic bone.     3 Radiographs of the right were reviewed.  There is mild varus alignment.  Medial compartment joint space reveals advanced narrowing.  The lateral compartment demonstrates mild joint space narrowing.  Patellofemoral compartment reveals mild joint space narrowing  No fracture is identified.  No evidence of pathologic bone.       ASSESSMENT:      ICD-10-CM ICD-9-CM   1. Primary osteoarthritis of right knee  M17.11 715.16   2. Primary osteoarthritis of left knee  M17.12 715.16       PLAN:     -Findings and treatment options were discussed with the patient  -All questions answered  Natural history and expected course discussed. Questions answered.  Educational materials distributed.  Rest, ice, compression, and elevation (RICE) therapy.  Arthrocentesis. See procedure note.      Injection Rt knee.  We discussed   We would a discussion today about treatment options going forward we discussed total knee arthroplasty versus repeat arthroscopy in his right knee.  He is had a previous arthroscopy in his right knee.  His pain is  really severe his right knee.  We elected today to proceed with the injection.  He is going to see me back in about 6-8 weeks.  If his injections fail to get long-lasting relief from a discuss surgical options further at that visit.  I think a total knee arthroplasty or knee arthroscopy would represent reasonable options at this point.  There are no Patient Instructions on file for this visit.      No orders of the defined types were placed in this encounter.        Large Joint Aspiration/Injection: R knee    Date/Time: 8/24/2023 1:45 PM    Performed by: Dada Enriquez MD  Authorized by: Dada Enriquez MD    Consent Done?:  Yes (Verbal)  Indications:  Pain  Local anesthesia used?: No      Details:  Needle Size:  22 G  Approach:  Superior  Location:  Knee  Site:  R knee  Medications:  3 mL BUPivacaine 0.5 % (5 mg/mL); 40 mg triamcinolone acetonide 40 mg/mL  Patient tolerance:  Patient tolerated the procedure well with no immediate complications

## 2023-09-20 ENCOUNTER — OFFICE VISIT (OUTPATIENT)
Dept: CARDIOLOGY | Facility: CLINIC | Age: 73
End: 2023-09-20
Payer: MEDICARE

## 2023-09-20 VITALS
DIASTOLIC BLOOD PRESSURE: 80 MMHG | BODY MASS INDEX: 29.78 KG/M2 | OXYGEN SATURATION: 96 % | WEIGHT: 208 LBS | HEART RATE: 86 BPM | SYSTOLIC BLOOD PRESSURE: 140 MMHG | HEIGHT: 70 IN

## 2023-09-20 DIAGNOSIS — R06.02 SHORTNESS OF BREATH: Primary | ICD-10-CM

## 2023-09-20 PROCEDURE — 99214 OFFICE O/P EST MOD 30 MIN: CPT | Mod: S$PBB,,, | Performed by: STUDENT IN AN ORGANIZED HEALTH CARE EDUCATION/TRAINING PROGRAM

## 2023-09-20 PROCEDURE — 99215 OFFICE O/P EST HI 40 MIN: CPT | Mod: PBBFAC | Performed by: STUDENT IN AN ORGANIZED HEALTH CARE EDUCATION/TRAINING PROGRAM

## 2023-09-20 PROCEDURE — 99214 PR OFFICE/OUTPT VISIT, EST, LEVL IV, 30-39 MIN: ICD-10-PCS | Mod: S$PBB,,, | Performed by: STUDENT IN AN ORGANIZED HEALTH CARE EDUCATION/TRAINING PROGRAM

## 2023-09-20 NOTE — PROGRESS NOTES
PCP: Elizabeth Lange FNP    [facility]val[facility])    Subjective:   Tray Jalloh is a 73 y.o. male with hx of hypertension, hyperlipidemia who presents for a follow-up visit.     9/20/23: Presents for follow-up.  Endorses shortness of breath with exertion with lower extremity edema.  Notes that chest pain has resolved.    5/30/23: Has previously seen Dr. Hernández however, it has been over 3 years (last seen in 2019). He complained of chest pain radiating down to left arm and was re-referred to cardiology by Dr. Avalos and was scheduled for NPV with me.  Intermittent central chest pain w/ radiation to left arm since last 4-5 months. Notes this pain is different from previous episodes 3 years ago. Associated w/ singing and talking. He works out at the gym. He does weights but only does about 5 minutes of cardio at a time as he is limited by knee pain. This level of activity does not precipitate chest pain. Complains of long-standing hx shortness of breath with exertion, attributes it to COPD. Improves with inhalers.    Fhx: Father ( CAD. CABG in his early 60s, multiple MI, h/o cardiac arrest per report), paternal uncle had an MI as well    Shx: Former smoker 2 PPD for 30 pack years. Quit in 2001.     EKG 5/30/23: Sinus rhythm with 1st degree AV block    ECHO:  Normal biventricular function in 2019    CATH: .  Angiographically normal coronaries in 2019    NM MPI 6/29/23  FINDINGS:  The quality of the study is good.     Stress SPECT images demonstrate homogenous distribution of the tracer throughout the left ventricle. On the resting images, there is matched homogenous distribution of the tracer throughout the left ventricle.     The gated post-stress images reveal normal wall motion and normal systolic wall thickening with an estimated LVEF of 77 %. The LV cavity (is not) dilated with an end-diastolic volume of (100 ml- normal less than 140) ml and an end-systolic volume of  (23 ml- normal less than 70) ml.     Normal TID ratio - 1.02     Impression:     1.  Scintigraphically negative for ischemia or infarct.  2. the global left ventricular systolic function is normal with an LV ejection fraction of 77 % and no evidence of LV dilatation. Wall motion is normal.     Lab Results   Component Value Date     06/07/2023    K 4.1 06/07/2023     (H) 06/07/2023    CO2 27 06/07/2023    BUN 20 (H) 06/07/2023    CREATININE 1.39 (H) 06/07/2023    CALCIUM 9.5 06/07/2023    ANIONGAP 8 06/07/2023    ESTGFRAFRICA 65 12/03/2020    EGFRNONAA 70 05/12/2022       Lab Results   Component Value Date    CHOL 140 05/15/2023    CHOL 119 05/12/2022    CHOL 114 09/30/2021     Lab Results   Component Value Date    HDL 40 05/15/2023    HDL 40 05/12/2022    HDL 51 09/30/2021     Lab Results   Component Value Date    LDLCALC 79 05/15/2023    LDLCALC 63 05/12/2022    LDLCALC 47 09/30/2021     Lab Results   Component Value Date    TRIG 105 05/15/2023    TRIG 78 05/12/2022    TRIG 80 09/30/2021     Lab Results   Component Value Date    CHOLHDL 3.5 05/15/2023    CHOLHDL 3.0 05/12/2022    CHOLHDL 2.2 09/30/2021       Lab Results   Component Value Date    WBC 7.46 06/07/2023    HGB 12.5 (L) 06/07/2023    HCT 39.1 (L) 06/07/2023    MCV 96.3 (H) 06/07/2023     06/07/2023           Current Outpatient Medications:     acetaminophen (TYLENOL) 325 MG tablet, Take 325 mg by mouth every 6 (six) hours as needed for Pain., Disp: , Rfl:     amLODIPine (NORVASC) 5 MG tablet, Take 1 tablet (5 mg total) by mouth once daily., Disp: 90 tablet, Rfl: 3    ascorbic acid, vitamin C, (VITAMIN C) 1000 MG tablet, Take 1,000 mg by mouth once daily., Disp: , Rfl:     budesonide-glycopyr-formoterol (BREZTRI AEROSPHERE) 160-9-4.8 mcg/actuation HFAA, Inhale 2 puffs into the lungs 2 (two) times a day. Rinse mouth after use., Disp: 32.1 g, Rfl: 3    cetirizine (ZYRTEC) 10 MG tablet, Take 1 tablet (10 mg total) by mouth once daily.,  Disp: 90 tablet, Rfl: 3    cholecalciferol, vitamin D3, (VITAMIN D3) 50 mcg (2,000 unit) Cap, Take 1 capsule by mouth once daily., Disp: , Rfl:     CRESTOR 20 mg tablet, Take 1 tablet by mouth once daily. Take 1/2 tablet by mouth every evening, Disp: , Rfl:     cyanocobalamin (VITAMIN B-12) 1000 MCG tablet, Take 100 mcg by mouth once daily., Disp: , Rfl:     diclofenac sodium (VOLTAREN) 1 % Gel, Apply topically 4 (four) times daily. (Patient taking differently: Apply topically 4 (four) times daily as needed.), Disp: 900 g, Rfl: 5    diphenhydrAMINE (BENADRYL) 25 mg capsule, Take 25 mg by mouth every 6 (six) hours as needed for Itching., Disp: , Rfl:     folic acid/multivit-min/lutein (CENTRUM SILVER ORAL), Take 1 tablet by mouth once daily., Disp: , Rfl:     hydrocortisone 2.5 % cream, Apply 30 g/kg topically 2 (two) times daily as needed. Apply to face for dry skin, Disp: , Rfl:     ipratropium-albuteroL (COMBIVENT RESPIMAT)  mcg/actuation inhaler, Inhale 1 puff into the lungs every 6 (six) hours as needed for Wheezing. Rescue, Disp: 12 g, Rfl: 1    ketoconazole (NIZORAL) 2 % cream, Apply topically daily as needed., Disp: , Rfl:     lisinopriL (PRINIVIL,ZESTRIL) 40 MG tablet, Take 1 tablet (40 mg total) by mouth once daily., Disp: 90 tablet, Rfl: 3    metaxalone (SKELAXIN) 800 MG tablet, Take 1 tablet (800 mg total) by mouth daily as needed for Pain., Disp: 90 tablet, Rfl: 1    methenamine (HIPREX) 1 gram Tab, Take 1 tablet (1 g total) by mouth 2 (two) times daily., Disp: 180 tablet, Rfl: 3    omega-3 fatty acids/fish oil (FISH OIL-OMEGA-3 FATTY ACIDS) 300-1,000 mg capsule, Take 1 capsule by mouth 2 (two) times a day., Disp: , Rfl:     omeprazole (PRILOSEC) 20 MG capsule, Take 1 capsule (20 mg total) by mouth once daily., Disp: 90 capsule, Rfl: 3    pregabalin (LYRICA) 75 MG capsule, Take 1 capsule by mouth twice daily, Disp: 60 capsule, Rfl: 5    tolterodine (DETROL LA) 2 MG Cp24, Take 2 mg by mouth once  daily., Disp: , Rfl:     triamcinolone acetonide 0.1% (KENALOG) 0.1 % cream, Apply 15 g topically 2 (two) times daily as needed. Apply to back for dry skin and itching, Disp: , Rfl:     vitamin E 1000 UNIT capsule, Take 1,000 Units by mouth once daily., Disp: , Rfl:     zinc gluconate 50 mg tablet, Take 50 mg by mouth once daily., Disp: , Rfl:     Review of Systems   Constitutional:  Negative for chills, diaphoresis, fever and malaise/fatigue.   Respiratory:  Negative for cough and shortness of breath.    Cardiovascular:  Negative for chest pain, palpitations, orthopnea, claudication, leg swelling and PND.   Gastrointestinal:  Negative for abdominal pain, heartburn, nausea and vomiting.   Neurological:  Negative for dizziness.       Objective:   There were no vitals taken for this visit.    Physical Exam  Constitutional:       General: He is not in acute distress.     Appearance: Normal appearance.   Cardiovascular:      Rate and Rhythm: Normal rate and regular rhythm.      Pulses: Normal pulses.      Heart sounds: Normal heart sounds. No murmur heard.     No friction rub. No gallop.   Pulmonary:      Effort: Pulmonary effort is normal.      Breath sounds: Normal breath sounds. No wheezing or rales.   Musculoskeletal:      Right lower leg: No edema.      Left lower leg: No edema.   Skin:     General: Skin is warm and dry.   Neurological:      Mental Status: He is alert.           Assessment:     No diagnosis found.        Plan:   No problem-specific Assessment & Plan notes found for this encounter.    Chest pain (resolved)  Not associated with exertion, radiates to left arm. Since last 4-5 months  Limited by knee pain and can only do 5 minutes of cardiovascular exercise at a time  Risk factors: HTN, HLD, former smoking, FH  Pharmacologic stress test with nuclear imaging negative for ischemia or infarct    Dyspnea on exertion  Schedule echo    Hypertension  Increase amlodipine to 10 mg daily    F/U in 3 months.

## 2023-09-20 NOTE — PATIENT INSTRUCTIONS
Echo scheduled for September 25, 2023 at 1:00pm  Increase amlodipine to 10 mg daily  Follow-up in 3 months

## 2023-09-25 ENCOUNTER — HOSPITAL ENCOUNTER (OUTPATIENT)
Dept: CARDIOLOGY | Facility: HOSPITAL | Age: 73
Discharge: HOME | End: 2023-09-25
Attending: STUDENT IN AN ORGANIZED HEALTH CARE EDUCATION/TRAINING PROGRAM
Payer: MEDICARE

## 2023-09-25 DIAGNOSIS — R06.02 SHORTNESS OF BREATH: ICD-10-CM

## 2023-09-25 LAB
AORTIC ROOT ANNULUS: 2.8 CM
AORTIC VALVE CUSP SEPERATION: 2.14 CM
AV INDEX (PROSTH): 0.9
AV MEAN GRADIENT: 3 MMHG
AV PEAK GRADIENT: 4 MMHG
AV VALVE AREA BY VELOCITY RATIO: 3.52 CM²
AV VALVE AREA: 2.84 CM²
AV VELOCITY RATIO: 1.12
CV ECHO LV RWT: 0.86 CM
DOP CALC AO PEAK VEL: 1 M/S
DOP CALC AO VTI: 22.8 CM
DOP CALC LVOT AREA: 3.1 CM2
DOP CALC LVOT DIAMETER: 2 CM
DOP CALC LVOT PEAK VEL: 1.12 M/S
DOP CALC LVOT STROKE VOLUME: 64.68 CM3
DOP CALCLVOT PEAK VEL VTI: 20.6 CM
E WAVE DECELERATION TIME: 294.49 MSEC
E/A RATIO: 0.47
E/E' RATIO: 6 M/S
ECHO LV POSTERIOR WALL: 1.53 CM (ref 0.6–1.1)
FRACTIONAL SHORTENING: 42 % (ref 28–44)
GLOBAL LONGITUIDAL STRAIN: -22 %
INTERVENTRICULAR SEPTUM: 1.39 CM (ref 0.6–1.1)
IVC DIAMETER: 1.67 CM
LA MAJOR: 3.01 CM
LEFT ATRIUM SIZE: 2.96 CM
LEFT INTERNAL DIMENSION IN SYSTOLE: 2.07 CM (ref 2.1–4)
LEFT VENTRICLE DIASTOLIC VOLUME: 52.66 ML
LEFT VENTRICLE SYSTOLIC VOLUME: 13.85 ML
LEFT VENTRICULAR INTERNAL DIMENSION IN DIASTOLE: 3.55 CM (ref 3.5–6)
LEFT VENTRICULAR MASS: 188.72 G
LV LATERAL E/E' RATIO: 6 M/S
LV SEPTAL E/E' RATIO: 6 M/S
LVOT MG: 2.7 MMHG
LVOT MV: 0.78 CM/S
MV PEAK A VEL: 0.89 M/S
MV PEAK E VEL: 0.42 M/S
MV STENOSIS PRESSURE HALF TIME: 85.4 MS
MV VALVE AREA P 1/2 METHOD: 2.58 CM2
OHS LV EJECTION FRACTION SIMPSONS BIPLANE MOD: 65 %
PISA TR MAX VEL: 2.55 M/S
PV PEAK GRADIENT: 4 MMHG
PV PEAK VELOCITY: 0.96 M/S
RA MAJOR: 3.37 CM
RA PRESSURE ESTIMATED: 3 MMHG
RIGHT VENTRICULAR END-DIASTOLIC DIMENSION: 3.41 CM
RV TB RVSP: 6 MMHG
TDI LATERAL: 0.07 M/S
TDI SEPTAL: 0.07 M/S
TDI: 0.07 M/S
TR MAX PG: 26 MMHG
TRICUSPID ANNULAR PLANE SYSTOLIC EXCURSION: 2.42 CM
TV REST PULMONARY ARTERY PRESSURE: 29 MMHG

## 2023-09-25 PROCEDURE — 93306 TTE W/DOPPLER COMPLETE: CPT | Mod: 26,,, | Performed by: STUDENT IN AN ORGANIZED HEALTH CARE EDUCATION/TRAINING PROGRAM

## 2023-09-25 PROCEDURE — 93306 TTE W/DOPPLER COMPLETE: CPT

## 2023-09-25 PROCEDURE — 93306 ECHO (CUPID ONLY): ICD-10-PCS | Mod: 26,,, | Performed by: STUDENT IN AN ORGANIZED HEALTH CARE EDUCATION/TRAINING PROGRAM

## 2023-10-12 ENCOUNTER — OFFICE VISIT (OUTPATIENT)
Dept: ORTHOPEDICS | Facility: CLINIC | Age: 73
End: 2023-10-12
Payer: MEDICARE

## 2023-10-12 DIAGNOSIS — M17.11 PRIMARY OSTEOARTHRITIS OF RIGHT KNEE: Primary | ICD-10-CM

## 2023-10-12 PROCEDURE — 99212 OFFICE O/P EST SF 10 MIN: CPT | Mod: PBBFAC | Performed by: ORTHOPAEDIC SURGERY

## 2023-10-12 PROCEDURE — 99212 OFFICE O/P EST SF 10 MIN: CPT | Mod: S$PBB,,, | Performed by: ORTHOPAEDIC SURGERY

## 2023-10-12 PROCEDURE — 99212 PR OFFICE/OUTPT VISIT, EST, LEVL II, 10-19 MIN: ICD-10-PCS | Mod: S$PBB,,, | Performed by: ORTHOPAEDIC SURGERY

## 2023-10-13 NOTE — PROGRESS NOTES
CC:  Knee pain    73 y.o. Male returns to clinic for a follow up visit regarding knee pain.       Status injections have helped.  He wants to discuss knee replacement surgery but he is unsure about any particular timing there.       Past Medical History:   Diagnosis Date    Cervical radiculopathy     Chronic pain     Chronic renal impairment     COPD (chronic obstructive pulmonary disease)     COVID-19 12/27/2022    Depression     Depressive disorder 5/12/2022    Digestive disorder     GERD (gastroesophageal reflux disease)     History of prediabetes 11/16/2022    Hypercholesteremia     Hypertension     Left ventricular hypertrophy     Lumbosacral spondylosis     Positive colorectal cancer screening using Cologuard test 12/15/2022    Prediabetes     Pulmonary nodules     repeat CT due 12/03/21     Past Surgical History:   Procedure Laterality Date    ABDOMINAL SURGERY      inguinal hernia repair    BACK SURGERY      Bilateral L3-S1 MBB Bilateral 2-, 1-, 1-, 12-    Dr Jorge Parker    EYE SURGERY      HERNIA REPAIR      KNEE ARTHROSCOPY W/ MENISCAL REPAIR Right     KNEE ARTHROSCOPY W/ MENISCECTOMY Left 01/11/2023    Procedure: ARTHROSCOPY, KNEE, WITH MENISCECTOMY;  Surgeon: Dada Enriquez MD;  Location: [facility] OR;  Service: Orthopedics;  Laterality: Left;    KNEE SURGERY Left     LATERAL RETINACULA RELEASE OF KNEE Left 01/11/2023    Procedure: ARTHROSCOPIC RELEASE, KNEE, LATERAL RETINACULA;  Surgeon: Dada Enriquez MD;  Location: [facility] OR;  Service: Orthopedics;  Laterality: Left;    PROSTATECTOMY  03/1996    prostate cancer    RADIOFREQUENCY ABLATION OF LUMBAR MEDIAL BRANCH NERVE AT SINGLE LEVEL Right 04/01/2021    Procedure: Radiofrequency Ablation, Nerve, Spinal, Lumbar, Medial Branch, 1 Level;  Surgeon: Jordana Patel MD;  Location: [facility];  Service: Pain Management;  Laterality: Right;  Right L3-S1 RFTC    RADIOFREQUENCY ABLATION OF LUMBAR MEDIAL  BRANCH NERVE AT SINGLE LEVEL Right 02/09/2023    Procedure: Radiofrequency Ablation, Nerve, Spinal, Lumbar, Medial Branch, Level L4-S1;  Surgeon: Jordana Patel MD;  Location: [facility]IN [facility];  Service: Pain Management;  Laterality: Right;  bonita left after right is done    RADIOFREQUENCY ABLATION OF LUMBAR MEDIAL BRANCH NERVE AT SINGLE LEVEL Left 02/28/2023    Procedure: RADIOFREQUENCY ABLATION, NERVE, SPINAL, LUMBAR, MEDIAL BRANCH, 1 LEVEL;  Surgeon: Jordana Patel MD;  Location: [facility];  Service: Pain Management;  Laterality: Left;    RADIOFREQUENCY THERMOCOAGULATION Bilateral 03/20/2012    Dr Parker    RADIOFREQUENCY THERMOCOAGULATION Left 03/18/2021    Procedure: RADIOFREQUENCY THERMAL COAGULATION;  Surgeon: Jordana Patel MD;  Location: [facility]IN [facility];  Service: Pain Management;  Laterality: Left;  BILATERAL L3-S1 RFTC LEFT SIDE FIRST    SELECTIVE INJECTION OF ANESTHETIC AGENT AROUND LUMBAR SPINAL NERVE ROOT BY TRANSFORAMINAL APPROACH Right 01/13/2022    Procedure: Right L4-5,5-S1 TFESI;  Surgeon: Jordana Patel MD;  Location: [facility]IN [facility];  Service: Pain Management;  Laterality: Right;  PT AWARE TO BE TESTED VIA PC    SHOULDER ARTHROSCOPY Right     SPINE SURGERY  1995    VASECTOMY  Mar 1996         PHYSICAL EXAMINATION:  There were no vitals taken for this visit.  General    Constitutional: He is oriented to person, place, and time. He appears well-developed and well-nourished.   HENT:   Head: Normocephalic and atraumatic.   Eyes: EOM are normal. Pupils are equal, round, and reactive to light.   Neck: Neck supple.   Cardiovascular:  Normal rate, regular rhythm and intact distal pulses.            Pulmonary/Chest: Effort normal and breath sounds normal. No respiratory distress.   Abdominal: There is no abdominal tenderness. There is no guarding.   Neurological: He is alert and oriented to person, place, and time. He has normal reflexes. No cranial nerve deficit. He exhibits normal  muscle tone. Coordination normal.   Psychiatric: He has a normal mood and affect. His behavior is normal. Judgment and thought content normal.           Right Knee Exam     Tenderness   The patient is tender to palpation of the lateral retinaculum and condyle.    Range of Motion   Extension:  normal   Flexion:  normal     Tests   Meniscus   Ozzy:  Medial - negative   Ligament Examination   Lachman: normal (-1 to 2mm)   Pivot Shift: normal (Equal)    Left Knee Exam     Inspection   Erythema: absent  Scars: absent  Swelling: absent  Bruising: absent    Tenderness   The patient tender to palpation of the patella.    Crepitus   The patient has crepitus of the patella.    Range of Motion   Extension:  normal   Flexion:  normal     Tests   Meniscus   Ozzy:  Medial - positive   Stability   Lachman: normal (-1 to 2mm)   Patella   Passive Patellar Tilt: lateral tilt  Patellar Tracking: normal  Q-Angle at 90 degrees: normal  Patellar Grind: positive    Other   Sensation: normal    Muscle Strength   Right Lower Extremity   Hip Abduction: 5/5   Quadriceps:  5/5   Hamstrin/5     Vascular Exam       Left Pulses  Dorsalis Pedis:      2+  Posterior Tibial:      2+            IMAGING:    ASSESSMENT:      ICD-10-CM ICD-9-CM   1. Primary osteoarthritis of right knee  M17.11 715.16       PLAN:     -Findings and treatment options were discussed with the patient  -All questions answered  Natural history and expected course discussed. Questions answered.  Educational materials distributed.  Injection still providing some benefit.  We briefly discussed knee arthroplasty as well he is not ready to consider knee arthroplasty at this time will follow up as needed    There are no Patient Instructions on file for this visit.      No orders of the defined types were placed in this encounter.        Procedures

## 2023-10-26 ENCOUNTER — TELEPHONE (OUTPATIENT)
Dept: FAMILY MEDICINE | Facility: CLINIC | Age: 73
End: 2023-10-26
Payer: MEDICARE

## 2023-10-26 NOTE — TELEPHONE ENCOUNTER
----- Message from Sabiha Friedman sent at 10/26/2023  9:00 AM CDT -----  Pt need you to contact him because [facility] and the are  concerned about his breathing and ask gigi can send in something PT # [phone]

## 2023-10-27 RX ORDER — PREGABALIN 75 MG/1
75 CAPSULE ORAL 2 TIMES DAILY
Qty: 60 CAPSULE | Refills: 5 | Status: SHIPPED | OUTPATIENT
Start: 2023-10-27 | End: 2023-11-06

## 2023-10-27 NOTE — TELEPHONE ENCOUNTER
----- Message from Kanchan Akers sent at 10/27/2023  9:13 AM CDT -----  Pt calling to get refill on pregabalin (LYRICA) 75 MG capsule sent to [facility] - please call back # [phone] when done

## 2023-11-06 RX ORDER — PREGABALIN 75 MG/1
CAPSULE ORAL
Qty: 60 CAPSULE | Refills: 5 | Status: SHIPPED | OUTPATIENT
Start: 2023-11-06 | End: 2023-11-06 | Stop reason: SDUPTHER

## 2023-11-06 RX ORDER — GABAPENTIN 100 MG/1
300 CAPSULE ORAL 3 TIMES DAILY
COMMUNITY
End: 2023-11-06

## 2023-11-06 RX ORDER — PREGABALIN 75 MG/1
CAPSULE ORAL
Qty: 60 CAPSULE | Refills: 5 | Status: SHIPPED | OUTPATIENT
Start: 2023-11-06 | End: 2024-03-26 | Stop reason: SDUPTHER

## 2023-11-06 RX ORDER — GABAPENTIN 100 MG/1
300 CAPSULE ORAL 3 TIMES DAILY
Status: CANCELLED | OUTPATIENT
Start: 2023-11-06

## 2023-11-06 NOTE — TELEPHONE ENCOUNTER
Patient notified medication will be sent on Monday.  States he has enough until Elizabeth comes back Monday

## 2023-11-06 NOTE — TELEPHONE ENCOUNTER
----- Message from Becki James sent at 11/6/2023  9:55 AM CST -----  Pregabalin to [facility]

## 2023-11-09 NOTE — TELEPHONE ENCOUNTER
I am not the prescriber for gabapentin and it appears it was discontinued at some point and he is now on Lyrica/pregabalin.  Dr. Milan Akers was the previous prescriber for gabapentin.

## 2023-11-13 NOTE — TELEPHONE ENCOUNTER
Patient notified to contact Dr Akers's office regarding gabapentin.  States he just picked up a prescription for lyrica from pharmacy.

## 2023-11-21 ENCOUNTER — OFFICE VISIT (OUTPATIENT)
Dept: ORTHOPEDICS | Facility: CLINIC | Age: 73
End: 2023-11-21
Payer: MEDICARE

## 2023-11-21 DIAGNOSIS — M17.11 PRIMARY OSTEOARTHRITIS OF RIGHT KNEE: Primary | ICD-10-CM

## 2023-11-21 DIAGNOSIS — M17.12 OSTEOARTHRITIS OF LEFT KNEE, UNSPECIFIED OSTEOARTHRITIS TYPE: ICD-10-CM

## 2023-11-21 PROCEDURE — 99499 UNLISTED E&M SERVICE: CPT | Mod: S$PBB,,, | Performed by: ORTHOPAEDIC SURGERY

## 2023-11-21 PROCEDURE — 99999PBSHW PR PBB SHADOW TECHNICAL ONLY FILED TO HB: ICD-10-PCS | Mod: PBBFAC,,,

## 2023-11-21 PROCEDURE — 20610 DRAIN/INJ JOINT/BURSA W/O US: CPT | Mod: 50,PBBFAC | Performed by: ORTHOPAEDIC SURGERY

## 2023-11-21 PROCEDURE — 99999PBSHW PR PBB SHADOW TECHNICAL ONLY FILED TO HB: Mod: PBBFAC,,,

## 2023-11-21 PROCEDURE — 99214 OFFICE O/P EST MOD 30 MIN: CPT | Mod: PBBFAC | Performed by: ORTHOPAEDIC SURGERY

## 2023-11-21 PROCEDURE — 99499 NO LOS: ICD-10-PCS | Mod: S$PBB,,, | Performed by: ORTHOPAEDIC SURGERY

## 2023-11-21 PROCEDURE — 20610 LARGE JOINT ASPIRATION/INJECTION: BILATERAL KNEE: ICD-10-PCS | Mod: 50,S$PBB,, | Performed by: ORTHOPAEDIC SURGERY

## 2023-11-21 RX ADMIN — Medication 60 MG: at 02:11

## 2023-11-21 RX ADMIN — TRIAMCINOLONE ACETONIDE 40 MG: 400 INJECTION, SUSPENSION INTRA-ARTICULAR; INTRAMUSCULAR at 02:11

## 2023-11-21 RX ADMIN — BUPIVACAINE HYDROCHLORIDE 2 ML: 2.5 INJECTION, SOLUTION INFILTRATION; PERINEURAL at 02:11

## 2023-11-21 NOTE — PROGRESS NOTES
CC:  Knee pain    73 y.o. Male returns to clinic for a follow up visit regarding bilateral knee pain.       Patient had had visco injection in the past with good results. He is here today for a repeat Durolane injection.        Past Medical History:   Diagnosis Date    Cervical radiculopathy     Chronic pain     Chronic renal impairment     COPD (chronic obstructive pulmonary disease)     COVID-19 12/27/2022    Depression     Depressive disorder 5/12/2022    Digestive disorder     GERD (gastroesophageal reflux disease)     History of prediabetes 11/16/2022    Hypercholesteremia     Hypertension     Left ventricular hypertrophy     Lumbosacral spondylosis     Positive colorectal cancer screening using Cologuard test 12/15/2022    Prediabetes     Pulmonary nodules     repeat CT due 12/03/21     Past Surgical History:   Procedure Laterality Date    ABDOMINAL SURGERY      inguinal hernia repair    BACK SURGERY      Bilateral L3-S1 MBB Bilateral 2-, 1-, 1-, 12-    Dr Jorge Parker    EYE SURGERY      HERNIA REPAIR      KNEE ARTHROSCOPY W/ MENISCAL REPAIR Right     KNEE ARTHROSCOPY W/ MENISCECTOMY Left 01/11/2023    Procedure: ARTHROSCOPY, KNEE, WITH MENISCECTOMY;  Surgeon: Dada Enriquez MD;  Location: [facility] OR;  Service: Orthopedics;  Laterality: Left;    KNEE SURGERY Left     LATERAL RETINACULA RELEASE OF KNEE Left 01/11/2023    Procedure: ARTHROSCOPIC RELEASE, KNEE, LATERAL RETINACULA;  Surgeon: Dada Enriquez MD;  Location: [facility] OR;  Service: Orthopedics;  Laterality: Left;    PROSTATECTOMY  03/1996    prostate cancer    RADIOFREQUENCY ABLATION OF LUMBAR MEDIAL BRANCH NERVE AT SINGLE LEVEL Right 04/01/2021    Procedure: Radiofrequency Ablation, Nerve, Spinal, Lumbar, Medial Branch, 1 Level;  Surgeon: Jordana Patel MD;  Location: [facility];  Service: Pain Management;  Laterality: Right;  Right L3-S1 RFTC    RADIOFREQUENCY ABLATION OF LUMBAR MEDIAL BRANCH  NERVE AT SINGLE LEVEL Right 02/09/2023    Procedure: Radiofrequency Ablation, Nerve, Spinal, Lumbar, Medial Branch, Level L4-S1;  Surgeon: Jordana Patel MD;  Location: [facility];  Service: Pain Management;  Laterality: Right;  bonita left after right is done    RADIOFREQUENCY ABLATION OF LUMBAR MEDIAL BRANCH NERVE AT SINGLE LEVEL Left 02/28/2023    Procedure: RADIOFREQUENCY ABLATION, NERVE, SPINAL, LUMBAR, MEDIAL BRANCH, 1 LEVEL;  Surgeon: Jordana Patel MD;  Location: [facility];  Service: Pain Management;  Laterality: Left;    RADIOFREQUENCY THERMOCOAGULATION Bilateral 03/20/2012    Dr Parker    RADIOFREQUENCY THERMOCOAGULATION Left 03/18/2021    Procedure: RADIOFREQUENCY THERMAL COAGULATION;  Surgeon: Jordana Patel MD;  Location: [facility];  Service: Pain Management;  Laterality: Left;  BILATERAL L3-S1 RFTC LEFT SIDE FIRST    SELECTIVE INJECTION OF ANESTHETIC AGENT AROUND LUMBAR SPINAL NERVE ROOT BY TRANSFORAMINAL APPROACH Right 01/13/2022    Procedure: Right L4-5,5-S1 TFESI;  Surgeon: Jordana Patel MD;  Location: [facility];  Service: Pain Management;  Laterality: Right;  PT AWARE TO BE TESTED VIA PC    SHOULDER ARTHROSCOPY Right     SPINE SURGERY  1995    VASECTOMY  Mar 1996         PHYSICAL EXAMINATION:  There were no vitals taken for this visit.  General    Constitutional: He is oriented to person, place, and time. He appears well-developed and well-nourished.   HENT:   Head: Normocephalic and atraumatic.   Eyes: EOM are normal. Pupils are equal, round, and reactive to light.   Neck: Neck supple.   Cardiovascular:  Normal rate, regular rhythm and intact distal pulses.            Pulmonary/Chest: Effort normal and breath sounds normal. No respiratory distress.   Abdominal: There is no abdominal tenderness. There is no guarding.   Neurological: He is alert and oriented to person, place, and time. He has normal reflexes. No cranial nerve deficit. He exhibits normal muscle  tone. Coordination normal.   Psychiatric: He has a normal mood and affect. His behavior is normal. Judgment and thought content normal.           Right Knee Exam     Tenderness   The patient is tender to palpation of the lateral retinaculum and condyle.    Range of Motion   Extension:  normal   Flexion:  normal     Tests   Meniscus   Ozzy:  Medial - negative   Ligament Examination   Lachman: normal (-1 to 2mm)   Pivot Shift: normal (Equal)    Left Knee Exam     Inspection   Erythema: absent  Scars: absent  Swelling: absent  Bruising: absent    Tenderness   The patient tender to palpation of the patella.    Crepitus   The patient has crepitus of the patella.    Range of Motion   Extension:  normal   Flexion:  normal     Tests   Meniscus   Ozzy:  Medial - positive   Stability   Lachman: normal (-1 to 2mm)   Patella   Passive Patellar Tilt: lateral tilt  Patellar Tracking: normal  Q-Angle at 90 degrees: normal  Patellar Grind: positive    Other   Sensation: normal    Muscle Strength   Right Lower Extremity   Hip Abduction: 5/5   Quadriceps:  5/5   Hamstrin/5     Vascular Exam       Left Pulses  Dorsalis Pedis:      2+  Posterior Tibial:      2+            IMAGING:  No results found.     ASSESSMENT:      ICD-10-CM ICD-9-CM   1. Primary osteoarthritis of right knee  M17.11 715.16   2. Osteoarthritis of left knee, unspecified osteoarthritis type  M17.12 715.96       PLAN:     -Findings and treatment options were discussed with the patient  -All questions answered  Natural history and expected course discussed. Questions answered.  Educational materials distributed.   Injections given today.  See the attached procedure note.  Will follow up as needed.    There are no Patient Instructions on file for this visit.      Orders Placed This Encounter   Procedures    Large Joint Aspiration/Injection: bilateral knee    Prior authorization Order         Procedures

## 2023-11-21 NOTE — PROCEDURES
Large Joint Aspiration/Injection: bilateral knee    Date/Time: 11/21/2023 2:30 PM    Performed by: Dada Enriquez MD  Authorized by: Dada Enriquez MD    Consent Done?:  Yes (Verbal)  Indications:  Pain  Local anesthesia used?: No      Details:  Needle Size:  22 G  Approach:  Superior  Location:  Knee  Laterality:  Bilateral  Site:  Bilateral knee  Medications (Right):  2 mL BUPivacaine 0.25% (2.5 mg/ml) 0.25 % (2.5 mg/mL); 40 mg triamcinolone acetonide 40 mg/mL; 60 mg hyaluronate sodium, stabilized (DUROLANE) 60 mg/3 mL  Medications (Left):  2 mL BUPivacaine 0.25% (2.5 mg/ml) 0.25 % (2.5 mg/mL); 40 mg triamcinolone acetonide 40 mg/mL; 60 mg hyaluronate sodium, stabilized (DUROLANE) 60 mg/3 mL  Patient tolerance:  Patient tolerated the procedure well with no immediate complications

## 2023-11-22 ENCOUNTER — OFFICE VISIT (OUTPATIENT)
Dept: PULMONOLOGY | Facility: CLINIC | Age: 73
End: 2023-11-22
Payer: MEDICARE

## 2023-11-22 VITALS
WEIGHT: 208 LBS | SYSTOLIC BLOOD PRESSURE: 148 MMHG | BODY MASS INDEX: 29.78 KG/M2 | HEART RATE: 96 BPM | OXYGEN SATURATION: 96 % | DIASTOLIC BLOOD PRESSURE: 72 MMHG | RESPIRATION RATE: 20 BRPM | HEIGHT: 70 IN

## 2023-11-22 DIAGNOSIS — R06.2 WHEEZING: ICD-10-CM

## 2023-11-22 DIAGNOSIS — R06.09 DYSPNEA ON EXERTION: Primary | ICD-10-CM

## 2023-11-22 PROCEDURE — 99215 OFFICE O/P EST HI 40 MIN: CPT | Mod: PBBFAC | Performed by: STUDENT IN AN ORGANIZED HEALTH CARE EDUCATION/TRAINING PROGRAM

## 2023-11-22 PROCEDURE — 99204 OFFICE O/P NEW MOD 45 MIN: CPT | Mod: S$PBB,,, | Performed by: STUDENT IN AN ORGANIZED HEALTH CARE EDUCATION/TRAINING PROGRAM

## 2023-11-22 PROCEDURE — 99204 PR OFFICE/OUTPT VISIT, NEW, LEVL IV, 45-59 MIN: ICD-10-PCS | Mod: S$PBB,,, | Performed by: STUDENT IN AN ORGANIZED HEALTH CARE EDUCATION/TRAINING PROGRAM

## 2023-11-22 RX ORDER — TRIAMCINOLONE ACETONIDE 40 MG/ML
40 INJECTION, SUSPENSION INTRA-ARTICULAR; INTRAMUSCULAR
Status: DISCONTINUED | OUTPATIENT
Start: 2023-11-21 | End: 2023-11-22 | Stop reason: HOSPADM

## 2023-11-22 RX ORDER — TOLTERODINE 2 MG/1
1 CAPSULE, EXTENDED RELEASE ORAL DAILY
COMMUNITY
Start: 2023-10-25 | End: 2023-12-12 | Stop reason: SDUPTHER

## 2023-11-22 RX ORDER — BUPIVACAINE HYDROCHLORIDE 2.5 MG/ML
2 INJECTION, SOLUTION INFILTRATION; PERINEURAL
Status: DISCONTINUED | OUTPATIENT
Start: 2023-11-21 | End: 2023-11-22 | Stop reason: HOSPADM

## 2023-11-22 RX ORDER — GABAPENTIN 300 MG/1
300 CAPSULE ORAL 2 TIMES DAILY
COMMUNITY
End: 2023-12-12 | Stop reason: ALTCHOICE

## 2023-11-22 NOTE — PROGRESS NOTES
[facility]  Pulmonology  NEW VISIT     Patient Name:  Tray Jalloh  Primary Care Provider: Elizabeth Lange FNP  Date of Service: 11/22/2023  Reason for Referral: COPD      Chief Complaint:  Shortness of breath    SUBJECTIVE   HPI:  Tray Jalloh is a 73 y.o. male with prior nicotine dependence (quit 2001), COPD (diagnosed remotely) on prior history of prostate cancer who presents today upon referral with complaints of shortness of breath.     Shubham reports having shortness of breath that is present on exertion.  This has gotten worse in the past 2 years. He describes his dyspnea as inability to taking a deep breath.  It is present with walking short distances less than 50 ft.  He typically has to rest to regain his breath.  He is currently  managed with respiratory which he sometimes to full forgets to take his dosing.  He has an infrequent cough that is nonproductive. He exercises regularly which include stationary calisthenics without any dyspnea during these activities.  Of note, he had COVID 19 infection in 12/2027.      Past Medical History:   Diagnosis Date    Cervical radiculopathy     Chronic pain     Chronic renal impairment     COPD (chronic obstructive pulmonary disease)     COVID-19 12/27/2022    Depression     Depressive disorder 5/12/2022    Digestive disorder     GERD (gastroesophageal reflux disease)     History of prediabetes 11/16/2022    Hypercholesteremia     Hypertension     Left ventricular hypertrophy     Lumbosacral spondylosis     Positive colorectal cancer screening using Cologuard test 12/15/2022    Prediabetes     Pulmonary nodules     repeat CT due 12/03/21       Past Surgical History:   Procedure Laterality Date    ABDOMINAL SURGERY      inguinal hernia repair    BACK SURGERY      Bilateral L3-S1 MBB Bilateral 2-, 1-, 1-, 12-    Dr Jorge Parker    EYE SURGERY      HERNIA REPAIR      KNEE ARTHROSCOPY W/ MENISCAL  REPAIR Right     KNEE ARTHROSCOPY W/ MENISCECTOMY Left 01/11/2023    Procedure: ARTHROSCOPY, KNEE, WITH MENISCECTOMY;  Surgeon: Dada Enriquez MD;  Location: [facility] OR;  Service: Orthopedics;  Laterality: Left;    KNEE SURGERY Left     LATERAL RETINACULA RELEASE OF KNEE Left 01/11/2023    Procedure: ARTHROSCOPIC RELEASE, KNEE, LATERAL RETINACULA;  Surgeon: Dada Enriquez MD;  Location: [facility] OR;  Service: Orthopedics;  Laterality: Left;    PROSTATECTOMY  03/1996    prostate cancer    RADIOFREQUENCY ABLATION OF LUMBAR MEDIAL BRANCH NERVE AT SINGLE LEVEL Right 04/01/2021    Procedure: Radiofrequency Ablation, Nerve, Spinal, Lumbar, Medial Branch, 1 Level;  Surgeon: Jordana Patel MD;  Location: [facility];  Service: Pain Management;  Laterality: Right;  Right L3-S1 RFTC    RADIOFREQUENCY ABLATION OF LUMBAR MEDIAL BRANCH NERVE AT SINGLE LEVEL Right 02/09/2023    Procedure: Radiofrequency Ablation, Nerve, Spinal, Lumbar, Medial Branch, Level L4-S1;  Surgeon: Jordana Patel MD;  Location: [facility];  Service: Pain Management;  Laterality: Right;  bonita left after right is done    RADIOFREQUENCY ABLATION OF LUMBAR MEDIAL BRANCH NERVE AT SINGLE LEVEL Left 02/28/2023    Procedure: RADIOFREQUENCY ABLATION, NERVE, SPINAL, LUMBAR, MEDIAL BRANCH, 1 LEVEL;  Surgeon: Jordana Patel MD;  Location: [facility];  Service: Pain Management;  Laterality: Left;    RADIOFREQUENCY THERMOCOAGULATION Bilateral 03/20/2012    Dr Parker    RADIOFREQUENCY THERMOCOAGULATION Left 03/18/2021    Procedure: RADIOFREQUENCY THERMAL COAGULATION;  Surgeon: Jordana Patel MD;  Location: [facility];  Service: Pain Management;  Laterality: Left;  BILATERAL L3-S1 RFTC LEFT SIDE FIRST    SELECTIVE INJECTION OF ANESTHETIC AGENT AROUND LUMBAR SPINAL NERVE ROOT BY TRANSFORAMINAL APPROACH Right 01/13/2022    Procedure: Right L4-5,5-S1 TFESI;  Surgeon: Jordana Patel MD;  Location: [facility];  Service:  Pain Management;  Laterality: Right;  PT AWARE TO BE TESTED VIA PC    SHOULDER ARTHROSCOPY Right     SPINE SURGERY      VASECTOMY  Mar 1996       Family History   Problem Relation Age of Onset    Diabetes Mother     Hypertension Father     Heart disease Father         CABG    Stroke Father     Alzheimer's disease Maternal Grandmother     Diabetes Paternal Grandmother     Heart attack Paternal Grandfather     Cancer Other     Diabetes Other     Heart disease Other     Hypertension Other     Stroke Other         Social History     Socioeconomic History    Marital status:    Tobacco Use    Smoking status: Former     Current packs/day: 0.00     Average packs/day: 2.0 packs/day for 30.0 years (60.0 ttl pk-yrs)     Types: Cigarettes, Pipe, Cigars     Quit date:      Years since quittin.9    Smokeless tobacco: Never   Substance and Sexual Activity    Alcohol use: Not Currently    Drug use: Never    Sexual activity: Not Currently     Partners: Female     Birth control/protection: Abstinence     Comment: Sterile   Social History Narrative    [place] exposure [place]; worked at a [employer] 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     He served in the [employer] with agent orange exposure when stationed in [employer].        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     He served in the [employer] with agent orange exposure when stationed in [employer].        Review of patient's allergies indicates:   Allergen Reactions    Bee pollen     Grass pollen-figueroa grass standard         Medications: Medications reviewed to include over the counter medications.    Review of Systems: A focused ROS was completed and found to be negative except for that mentioned above.      OBJECTIVE   PHYSICAL EXAM:  Vitals:     "11/22/23 0919   BP: (!) 148/72   BP Location: Left arm   Patient Position: Sitting   BP Method: Large (Manual)   Pulse: 96   Resp: 20   SpO2: 96%   Weight: 94.3 kg (208 lb)   Height: 5' 10" (1.778 m)        GENERAL: NAD  HEENT: normocephalic, non-icteric conjunctivae, moist oral mucosa, purple bluish discoloration of lips with blanching  RESPIRATORY: clear to auscultation, no wheezing, rales or rhonchi  CARDIOVASCULAR: Regular rate and rhythm, no murmurs rubs or gallops.  MUSCULOSKELETAL: No clubbing or cyanosis; no pedal edema  NEUROLOGIC: AO ×3, no gross deficits  PSYCH: Normal mood and affect    LABS:  Imaging reviewed and notable for CT Chest 12/2022 with stable bilateral well defined rounded nodules all subcentimeter and some calcified, no emphysema    IMAGING:  Imaging reviewed and notable for CXR 01/2023 with no consolidation, infiltrate or pleural effusion    TTE:  09/2023:  LVEF 65%, normal diastolic function, normal RV size, TAPSE 2.42, trace MR, mild TR, trace [place], PASP 29    LUNG FUNCTION TESTING:  None available to review or report    ASSESSMENT & PLAN     1. Dyspnea on exertion  Assessment & Plan:  73-year-old male with COPD and prior nicotine dependence (quit 2001, 30 pack years) with history notable for asbestos exposure when working in [place] and imaging to date including CT chest 12/2022 with calcified while rounded subcentimeter nodules and no emphysema presents for evaluation of progressive dyspnea on exertion in the past 2 years.  Of note, patient had an unremarkable COVID-19 infection in December 2022.  Cardiac workup has included a stress test and TTE with no inducible ischemia and normal LV and RV function.  No ongoing exposures at this time.  Plan for workup with PFT, 6 minute walk distance and ABG.  He was instructed to take his Breztri daily.  Vaccines are up-to-date including annual influenza vaccine.    Orders:  -     Complete PFT w/ bronchodilator; Future  -     Arterial blood gas; " Future  -     Stress test, pulmonary; Future    2. Wheezing  -     Complete PFT w/ bronchodilator; Future  -     Stress test, pulmonary; Future           Follow up in about 6 weeks (around 1/3/2024) for PFT RESULTS, EXAM RESULTS.      Case was discussed with patient; all questions were answered to patient's satisfaction and patient verbalized understanding.     Rosalia Preciado MD  Pulmonary Medicine  [facility]  Phone: [phone]

## 2023-11-22 NOTE — ASSESSMENT & PLAN NOTE
73-year-old male with COPD and prior nicotine dependence (quit 2001, 30 pack years) with history notable for asbestos exposure when working in [place] and imaging to date including CT chest 12/2022 with calcified while rounded subcentimeter nodules and no emphysema presents for evaluation of progressive dyspnea on exertion in the past 2 years.  Of note, patient had an unremarkable COVID-19 infection in December 2022.  Cardiac workup has included a stress test and TTE with no inducible ischemia and normal LV and RV function.  No ongoing exposures at this time.  Plan for workup with PFT, 6 minute walk distance and ABG.  He was instructed to take his Breztri daily.  Vaccines are up-to-date including annual influenza vaccine.

## 2023-12-07 ENCOUNTER — OFFICE VISIT (OUTPATIENT)
Dept: NEUROLOGY | Facility: CLINIC | Age: 73
End: 2023-12-07
Payer: MEDICARE

## 2023-12-07 VITALS
WEIGHT: 213 LBS | BODY MASS INDEX: 30.49 KG/M2 | HEIGHT: 70 IN | DIASTOLIC BLOOD PRESSURE: 86 MMHG | OXYGEN SATURATION: 95 % | SYSTOLIC BLOOD PRESSURE: 138 MMHG | HEART RATE: 71 BPM

## 2023-12-07 DIAGNOSIS — G25.0 ESSENTIAL TREMOR: Primary | ICD-10-CM

## 2023-12-07 PROCEDURE — 99212 OFFICE O/P EST SF 10 MIN: CPT | Mod: S$PBB,,, | Performed by: NURSE PRACTITIONER

## 2023-12-07 PROCEDURE — 99215 OFFICE O/P EST HI 40 MIN: CPT | Mod: PBBFAC | Performed by: NURSE PRACTITIONER

## 2023-12-07 PROCEDURE — 99212 PR OFFICE/OUTPT VISIT, EST, LEVL II, 10-19 MIN: ICD-10-PCS | Mod: S$PBB,,, | Performed by: NURSE PRACTITIONER
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
## 2023-12-12 ENCOUNTER — TELEPHONE (OUTPATIENT)
Dept: FAMILY MEDICINE | Facility: CLINIC | Age: 73
End: 2023-12-12
Payer: MEDICARE

## 2023-12-12 ENCOUNTER — OFFICE VISIT (OUTPATIENT)
Dept: FAMILY MEDICINE | Facility: CLINIC | Age: 73
End: 2023-12-12
Payer: MEDICARE

## 2023-12-12 VITALS
SYSTOLIC BLOOD PRESSURE: 108 MMHG | TEMPERATURE: 98 F | WEIGHT: 209 LBS | HEIGHT: 70 IN | HEART RATE: 76 BPM | RESPIRATION RATE: 20 BRPM | OXYGEN SATURATION: 96 % | DIASTOLIC BLOOD PRESSURE: 61 MMHG | BODY MASS INDEX: 29.92 KG/M2

## 2023-12-12 DIAGNOSIS — N18.2 CHRONIC RENAL IMPAIRMENT, STAGE 2 (MILD): Chronic | ICD-10-CM

## 2023-12-12 DIAGNOSIS — Z87.898 HISTORY OF PREDIABETES: ICD-10-CM

## 2023-12-12 DIAGNOSIS — I10 ESSENTIAL HYPERTENSION: Primary | Chronic | ICD-10-CM

## 2023-12-12 DIAGNOSIS — Z79.899 ENCOUNTER FOR LONG-TERM (CURRENT) USE OF OTHER MEDICATIONS: ICD-10-CM

## 2023-12-12 DIAGNOSIS — M62.838 MUSCLE SPASM: ICD-10-CM

## 2023-12-12 LAB
ANION GAP SERPL CALCULATED.3IONS-SCNC: 10 MMOL/L (ref 7–16)
BUN SERPL-MCNC: 23 MG/DL (ref 7–18)
BUN/CREAT SERPL: 18 (ref 6–20)
CALCIUM SERPL-MCNC: 9 MG/DL (ref 8.5–10.1)
CHLORIDE SERPL-SCNC: 112 MMOL/L (ref 98–107)
CO2 SERPL-SCNC: 23 MMOL/L (ref 21–32)
CREAT SERPL-MCNC: 1.3 MG/DL (ref 0.7–1.3)
EGFR (NO RACE VARIABLE) (RUSH/TITUS): 58 ML/MIN/1.73M2
EST. AVERAGE GLUCOSE BLD GHB EST-MCNC: 108 MG/DL
GLUCOSE SERPL-MCNC: 95 MG/DL (ref 74–106)
HBA1C MFR BLD HPLC: 5.4 % (ref 4.5–6.6)
POTASSIUM SERPL-SCNC: 4.6 MMOL/L (ref 3.5–5.1)
SODIUM SERPL-SCNC: 140 MMOL/L (ref 136–145)

## 2023-12-12 PROCEDURE — 80048 BASIC METABOLIC PNL TOTAL CA: CPT | Mod: ,,, | Performed by: CLINICAL MEDICAL LABORATORY

## 2023-12-12 PROCEDURE — 99214 PR OFFICE/OUTPT VISIT, EST, LEVL IV, 30-39 MIN: ICD-10-PCS | Mod: ,,, | Performed by: NURSE PRACTITIONER

## 2023-12-12 PROCEDURE — 83036 HEMOGLOBIN A1C: ICD-10-PCS | Mod: ,,, | Performed by: CLINICAL MEDICAL LABORATORY

## 2023-12-12 PROCEDURE — 99214 OFFICE O/P EST MOD 30 MIN: CPT | Mod: ,,, | Performed by: NURSE PRACTITIONER

## 2023-12-12 PROCEDURE — 80048 BASIC METABOLIC PANEL: ICD-10-PCS | Mod: ,,, | Performed by: CLINICAL MEDICAL LABORATORY

## 2023-12-12 PROCEDURE — 83036 HEMOGLOBIN GLYCOSYLATED A1C: CPT | Mod: ,,, | Performed by: CLINICAL MEDICAL LABORATORY

## 2023-12-12 RX ORDER — METAXALONE 800 MG/1
800 TABLET ORAL NIGHTLY PRN
Qty: 90 TABLET | Refills: 3 | Status: SHIPPED | OUTPATIENT
Start: 2023-12-12

## 2023-12-12 RX ORDER — BISACODYL 5 MG/1
1 TABLET, COATED ORAL DAILY
Qty: 90 TABLET | Refills: 3 | Status: SHIPPED | OUTPATIENT
Start: 2023-12-12 | End: 2024-12-11

## 2023-12-12 NOTE — TELEPHONE ENCOUNTER
----- Message from Sabiha Friedman sent at 12/12/2023  2:06 PM CST -----  Pt need something else sent in Multivitamin because they dont have the one gigi sent in to the base Pt # [phone]

## 2023-12-12 NOTE — PROGRESS NOTES
[facility] - FAMILY MEDICINE       PATIENT NAME: Tray Jalloh   : 1950    AGE: 73 y.o. DATE OF ENCOUNTER: 23    MRN: [id number]      PCP: Elizabeth Lange FNP    Reason for Visit / Chief Complaint:  Follow-up (Patient presents to the clinic for a 6m f/u htn), Hypertension, and Medication Problem (Had question about pregablin, gabapentin, and skelaxin. /RSV vaccine/Prescription for multivitamin at [facility]. /)         274}    Subjective:     HPI:    Presents for 6 mth f/u HTN & has questions.  Reports BP has been high and one of his providers advised increasing amlodipine to 10 mg daily.  BP is is still a little high at home but improving, will call when needs refills of 10 mg and is finishing 5 mg 2 tablets daily now.    Wants rx for MVI to [facility] pharmacy.    Takes pregabalin in am and gabapentin & metaxalone at night to help him rest and states someone mentioned he shouldn't be taking all those meds together.  Is seeking clarity.    Review of Systems:   Review of Systems   Constitutional: Negative.    HENT:  Positive for sore throat. Congestion: chronic rhinitis.   Respiratory:  Positive for shortness of breath (chronic, intermittent d/t hx COPD). Negative for cough and wheezing.    Cardiovascular: Negative.    Gastrointestinal: Negative.    Genitourinary: Negative.    Musculoskeletal:  Positive for arthralgias, back pain and joint swelling (left knee, chronic).   Skin: Negative.    Neurological: Negative.        Allergies and Meds: 274}     Review of patient's allergies indicates:   Allergen Reactions    Bee pollen     Grass pollen- grass standard         Current Outpatient Medications:     acetaminophen (TYLENOL) 325 MG tablet, Take 325 mg by mouth every 6 (six) hours as needed for Pain., Disp: , Rfl:     amLODIPine (NORVASC) 5 MG tablet, Take 1 tablet (5 mg total) by mouth once daily. (Patient taking differently: Take 10 mg by mouth once daily.), Disp: 90 tablet,  Rfl: 3    ascorbic acid, vitamin C, (VITAMIN C) 1000 MG tablet, Take 1,000 mg by mouth once daily., Disp: , Rfl:     budesonide-glycopyr-formoterol (BREZTRI AEROSPHERE) 160-9-4.8 mcg/actuation HFAA, Inhale 2 puffs into the lungs 2 (two) times a day. Rinse mouth after use. (Patient taking differently: Inhale 2 puffs into the lungs once daily. Rinse mouth after use.), Disp: 32.1 g, Rfl: 3    cetirizine (ZYRTEC) 10 MG tablet, Take 1 tablet (10 mg total) by mouth once daily., Disp: 90 tablet, Rfl: 3    cholecalciferol, vitamin D3, (VITAMIN D3) 50 mcg (2,000 unit) Cap, Take 1 capsule by mouth once daily., Disp: , Rfl:     CRESTOR 20 mg tablet, Take 10 mg by mouth once daily., Disp: , Rfl:     cyanocobalamin (VITAMIN B-12) 1000 MCG tablet, Take 100 mcg by mouth once daily., Disp: , Rfl:     diclofenac sodium (VOLTAREN) 1 % Gel, Apply topically 4 (four) times daily. (Patient taking differently: Apply topically 4 (four) times daily as needed.), Disp: 900 g, Rfl: 5    diphenhydrAMINE (BENADRYL) 25 mg capsule, Take 25 mg by mouth every 6 (six) hours as needed for Itching., Disp: , Rfl:     folic acid/multivit-min/lutein (CENTRUM SILVER ORAL), Take 1 tablet by mouth once daily., Disp: , Rfl:     hydrocortisone 2.5 % cream, Apply 30 g/kg topically 2 (two) times daily as needed. Apply to face for dry skin, Disp: , Rfl:     ipratropium-albuteroL (COMBIVENT RESPIMAT)  mcg/actuation inhaler, Inhale 1 puff into the lungs every 6 (six) hours as needed for Wheezing. Rescue, Disp: 12 g, Rfl: 1    ketoconazole (NIZORAL) 2 % cream, Apply topically daily as needed., Disp: , Rfl:     lisinopriL (PRINIVIL,ZESTRIL) 40 MG tablet, Take 1 tablet (40 mg total) by mouth once daily., Disp: 90 tablet, Rfl: 3    methenamine (HIPREX) 1 gram Tab, Take 1 tablet (1 g total) by mouth 2 (two) times daily., Disp: 180 tablet, Rfl: 3    omega-3 fatty acids/fish oil (FISH OIL-OMEGA-3 FATTY ACIDS) 300-1,000 mg capsule, Take 1 capsule by mouth 2 (two)  times a day., Disp: , Rfl:     omeprazole (PRILOSEC) 20 MG capsule, Take 1 capsule (20 mg total) by mouth once daily., Disp: 90 capsule, Rfl: 3    pregabalin (LYRICA) 75 MG capsule, TAKE ONE (1) CAPSULE BY MOUTH TWO (2)  TIMES DAILY., Disp: 60 capsule, Rfl: 5    tolterodine (DETROL LA) 2 MG Cp24, Take 2 mg by mouth once daily., Disp: , Rfl:     triamcinolone acetonide 0.1% (KENALOG) 0.1 % cream, Apply 15 g topically 2 (two) times daily as needed. Apply to back for dry skin and itching, Disp: , Rfl:     vitamin E 1000 UNIT capsule, Take 1,000 Units by mouth once daily., Disp: , Rfl:     zinc gluconate 50 mg tablet, Take 50 mg by mouth once daily., Disp: , Rfl:     metaxalone (SKELAXIN) 800 MG tablet, Take 1 tablet (800 mg total) by mouth nightly as needed for Pain., Disp: 90 tablet, Rfl: 3    multivitamin Tab, Take 1 tablet by mouth once daily., Disp: 90 tablet, Rfl: 3    multivitamin-minerals-lutein (MULTIVITAMIN 50 PLUS) Tab, Take 1 tablet by mouth once daily., Disp: 90 tablet, Rfl: 3    Labs:274}   I have reviewed labs below:  Lab Results   Component Value Date    WBC 7.46 06/07/2023    RBC 4.06 (L) 06/07/2023    HGB 12.5 (L) 06/07/2023    HCT 39.1 (L) 06/07/2023     06/07/2023     06/07/2023    K 4.1 06/07/2023     (H) 06/07/2023    CALCIUM 9.5 06/07/2023     (H) 06/07/2023    BUN 20 (H) 06/07/2023    CREATININE 1.39 (H) 06/07/2023    ESTGFRAFRICA 65 12/03/2020    EGFRNONAA 70 05/12/2022    ALT 20 06/07/2023    AST 14 (L) 06/07/2023    INR 1.19 07/28/2020    CHOL 140 05/15/2023    TRIG 105 05/15/2023    HDL 40 05/15/2023    LDLCALC 79 05/15/2023    TSH 1.950 06/07/2023    PSA <0.010 05/15/2023    HGBA1C 5.6 11/16/2022    MICROALBUR 3.1 (H) 05/12/2022       Medical History: 274}     Past Medical History:   Diagnosis Date    Cervical radiculopathy     Chronic pain     Chronic renal impairment     COPD (chronic obstructive pulmonary disease)     COVID-19 12/27/2022    Depression      Depressive disorder 2022    Digestive disorder     GERD (gastroesophageal reflux disease)     History of prediabetes 2022    Hypercholesteremia     Hypertension     Left ventricular hypertrophy     Lumbosacral spondylosis     Positive colorectal cancer screening using Cologuard test 12/15/2022    Prediabetes     Pulmonary nodules     repeat CT due 21      Social History     Tobacco Use   Smoking Status Former    Current packs/day: 0.00    Average packs/day: 2.0 packs/day for 30.0 years (60.0 ttl pk-yrs)    Types: Cigarettes, Pipe, Cigars    Quit date:     Years since quittin.9   Smokeless Tobacco Never      Past Surgical History:   Procedure Laterality Date    ABDOMINAL SURGERY      inguinal hernia repair    BACK SURGERY      Bilateral L3-S1 MBB Bilateral 2-, 2021, 1-, 12-    Dr Jorge Parker    EYE SURGERY      HERNIA REPAIR      KNEE ARTHROSCOPY W/ MENISCAL REPAIR Right     KNEE ARTHROSCOPY W/ MENISCECTOMY Left 2023    Procedure: ARTHROSCOPY, KNEE, WITH MENISCECTOMY;  Surgeon: Dada Enriquez MD;  Location: [facility] OR;  Service: Orthopedics;  Laterality: Left;    KNEE SURGERY Left     LATERAL RETINACULA RELEASE OF KNEE Left 2023    Procedure: ARTHROSCOPIC RELEASE, KNEE, LATERAL RETINACULA;  Surgeon: Dada Enriquez MD;  Location: [facility] OR;  Service: Orthopedics;  Laterality: Left;    PROSTATECTOMY  1996    prostate cancer    RADIOFREQUENCY ABLATION OF LUMBAR MEDIAL BRANCH NERVE AT SINGLE LEVEL Right 2021    Procedure: Radiofrequency Ablation, Nerve, Spinal, Lumbar, Medial Branch, 1 Level;  Surgeon: Jordana Patel MD;  Location: [facility];  Service: Pain Management;  Laterality: Right;  Right L3-S1 RFTC    RADIOFREQUENCY ABLATION OF LUMBAR MEDIAL BRANCH NERVE AT SINGLE LEVEL Right 2023    Procedure: Radiofrequency Ablation, Nerve, Spinal, Lumbar, Medial Branch, Level L4-S1;  Surgeon: Jordana Patel MD;   Location: [facility];  Service: Pain Management;  Laterality: Right;  bonita left after right is done    RADIOFREQUENCY ABLATION OF LUMBAR MEDIAL BRANCH NERVE AT SINGLE LEVEL Left 02/28/2023    Procedure: RADIOFREQUENCY ABLATION, NERVE, SPINAL, LUMBAR, MEDIAL BRANCH, 1 LEVEL;  Surgeon: Jordana Patel MD;  Location: [facility];  Service: Pain Management;  Laterality: Left;    RADIOFREQUENCY THERMOCOAGULATION Bilateral 03/20/2012    Dr Parker    RADIOFREQUENCY THERMOCOAGULATION Left 03/18/2021    Procedure: RADIOFREQUENCY THERMAL COAGULATION;  Surgeon: Jordana Patel MD;  Location: [facility];  Service: Pain Management;  Laterality: Left;  BILATERAL L3-S1 RFTC LEFT SIDE FIRST    SELECTIVE INJECTION OF ANESTHETIC AGENT AROUND LUMBAR SPINAL NERVE ROOT BY TRANSFORAMINAL APPROACH Right 01/13/2022    Procedure: Right L4-5,5-S1 TFESI;  Surgeon: Jordana Patel MD;  Location: [facility];  Service: Pain Management;  Laterality: Right;  PT AWARE TO BE TESTED VIA PC    SHOULDER ARTHROSCOPY Right     SPINE SURGERY  1995    VASECTOMY  Mar 1996        Health Maintenance: 274}     Health Maintenance         Date Due Completion Date    RSV Vaccine (Age 60+ and Pregnant patients) (1 - 1-dose 60+ series) Never done ---    Hemoglobin A1c (Prediabetes) 12/12/2024 12/12/2023    Colorectal Cancer Screening 02/15/2028 2/15/2023    Override on 9/4/2012: Done (Dr. Hare. Repeat in 10 years. Scanned into documents.)    Lipid Panel 05/15/2028 5/15/2023    TETANUS VACCINE 10/27/2030 10/27/2020          Immunization History   Administered Date(s) Administered    COVID-19, MRNA, LN-S, PF (MODERNA FULL 0.5 ML DOSE) 03/02/2021, 03/30/2021    Influenza 10/14/2009, 11/01/2011, 10/01/2012, 10/01/2014, 10/01/2022    Influenza - High Dose - PF (65 years and older) 11/08/2005, 12/13/2006, 11/06/2007, 10/23/2008, 10/26/2021    Influenza - Quadrivalent - High Dose - PF (65 years and older) 10/27/2020    Pneumococcal  "11/06/2007    Pneumococcal Conjugate - 13 Valent 08/17/2016    Pneumococcal Polysaccharide - 23 Valent 11/06/2007, 01/03/2018    Td (Adult), Unspecified Formulation 06/11/2004    Tdap 10/27/2020    Zoster Recombinant 10/27/2020, 01/27/2021     Objective:  274}   /61 (BP Location: Right arm, Patient Position: Sitting, BP Method: X-Large (Automatic))   Pulse 76   Temp 97.9 °F (36.6 °C) (Oral)   Resp 20   Ht 5' 10" (1.778 m)   Wt 94.8 kg (209 lb)   SpO2 96%   BMI 29.99 kg/m²     Wt Readings from Last 3 Encounters:   12/12/23 94.8 kg (209 lb)   12/07/23 96.6 kg (213 lb)   11/22/23 94.3 kg (208 lb)     BP Readings from Last 3 Encounters:   12/12/23 108/61   12/07/23 138/86   11/22/23 (!) 148/72     Body mass index is 29.99 kg/m².     Physical Exam  Vitals and nursing note reviewed.   Constitutional:       General: He is not in acute distress.     Appearance: Normal appearance.   HENT:      Head: Normocephalic.   Eyes:      Conjunctiva/sclera: Conjunctivae normal.   Neck:      Thyroid: No thyromegaly or thyroid tenderness.      Trachea: Trachea normal.   Cardiovascular:      Rate and Rhythm: Normal rate and regular rhythm.      Pulses: Normal pulses.      Heart sounds: Normal heart sounds.   Pulmonary:      Effort: Pulmonary effort is normal. No respiratory distress.      Breath sounds: Normal breath sounds.   Musculoskeletal:      Cervical back: Neck supple.      Right lower leg: No edema.      Left lower leg: No edema.   Lymphadenopathy:      Cervical: No cervical adenopathy.   Skin:     General: Skin is warm and dry.   Neurological:      Mental Status: He is alert and oriented to person, place, and time.   Psychiatric:         Mood and Affect: Mood normal.         Behavior: Behavior normal.          Assessment and Plan: 274}     1. Essential hypertension  Comments:  Improved, continue amlodipine 10 mg daily.    2. Muscle spasm  Comments:  Controlled, continue metaxalone at bedtime as needed.  Orders:  -    "  metaxalone (SKELAXIN) 800 MG tablet; Take 1 tablet (800 mg total) by mouth nightly as needed for Pain.  Dispense: 90 tablet; Refill: 3    3. Chronic renal impairment, stage 2 (mild)  Comments:  GFR has been stable and improved, recheck BMP today for stability.  Orders:  -     Basic Metabolic Panel; Future; Expected date: 12/12/2023    4. Encounter for long-term (current) use of other medications  -     Hemoglobin A1C; Future; Expected date: 12/12/2023  -     Basic Metabolic Panel; Future; Expected date: 12/12/2023    5. History of prediabetes  Comments:  Lab last A1c was normal but is time for yearly A1c.  Orders:  -     Hemoglobin A1C; Future; Expected date: 12/12/2023    Other orders  -     multivitamin-minerals-lutein (MULTIVITAMIN 50 PLUS) Tab; Take 1 tablet by mouth once daily.  Dispense: 90 tablet; Refill: 3  -     multivitamin Tab; Take 1 tablet by mouth once daily.  Dispense: 90 tablet; Refill: 3       Advised to get RSV vaccine at local pharmacy.  Rx for generic multivitamin 50+ to [facility] pharmacy.  Advised to stop gabapentin.    Return to clinic 6-mth f/u HTN, CKD, med refills, fasting for labs; and sooner as needed.    Future Appointments   Date Time Provider Department Center   12/20/2023  9:45 AM Hanh Man MD KO CARD [facility]   1/3/2024  9:00 AM RESPIRATORY THERAPY, [facility] PULM FUNCTION SERVICES [facility]   1/3/2024  9:30 AM RESPIRATORY THERAPY, [facility] PULM FUNCTION SERVICES [facility]   1/3/2024 10:30 AM RESPIRATORY THERAPY, [facility] PULM FUNCTION SERVICES [facility]   1/3/2024 10:40 AM Rosalia Preciado MD [facility]  PULM [facility]   2/22/2024  2:45 PM Dada Enriquez MD KO ORTHO [facility]   5/16/2024  9:00 AM Elizabeth Lange FNP [facility]   6/19/2024 10:20 AM Elizabeth Lange FNP [facility]   12/9/2024  8:30 AM Enrico Jauregui FNP [facility] NEURO [facility]        Signature:  MOLLY Murry

## 2023-12-13 NOTE — TELEPHONE ENCOUNTER
I just sent a generic multivitamin for 50+ so that anything they have can be substituted.  There are hundreds of types of multivitamins in the system.  I can't send rx over and over until we find the one they have.  They can substitute what they have because I did NOT specify a certain kind and my rx says substitution permitted OR he can find out the specific one they have so it can be sent.

## 2023-12-20 ENCOUNTER — OFFICE VISIT (OUTPATIENT)
Dept: CARDIOLOGY | Facility: CLINIC | Age: 73
End: 2023-12-20
Payer: MEDICARE

## 2023-12-20 VITALS
OXYGEN SATURATION: 95 % | DIASTOLIC BLOOD PRESSURE: 70 MMHG | HEIGHT: 70 IN | WEIGHT: 209.81 LBS | BODY MASS INDEX: 30.04 KG/M2 | HEART RATE: 71 BPM | SYSTOLIC BLOOD PRESSURE: 130 MMHG

## 2023-12-20 DIAGNOSIS — R06.02 SHORTNESS OF BREATH: Primary | ICD-10-CM

## 2023-12-20 PROCEDURE — 99214 OFFICE O/P EST MOD 30 MIN: CPT | Mod: S$PBB,,, | Performed by: STUDENT IN AN ORGANIZED HEALTH CARE EDUCATION/TRAINING PROGRAM

## 2023-12-20 PROCEDURE — 93010 ELECTROCARDIOGRAM REPORT: CPT | Mod: S$PBB,,, | Performed by: STUDENT IN AN ORGANIZED HEALTH CARE EDUCATION/TRAINING PROGRAM

## 2023-12-20 PROCEDURE — 99215 OFFICE O/P EST HI 40 MIN: CPT | Mod: PBBFAC | Performed by: STUDENT IN AN ORGANIZED HEALTH CARE EDUCATION/TRAINING PROGRAM

## 2023-12-20 PROCEDURE — 93005 ELECTROCARDIOGRAM TRACING: CPT | Mod: PBBFAC | Performed by: STUDENT IN AN ORGANIZED HEALTH CARE EDUCATION/TRAINING PROGRAM

## 2023-12-20 RX ORDER — FLUTICASONE PROPIONATE 50 MCG
1 SPRAY, SUSPENSION (ML) NASAL DAILY
COMMUNITY

## 2023-12-20 NOTE — PROGRESS NOTES
PCP: Elizabeth Lange FNP    [facility]erring Provider[facility]val[facility])    Subjective:   Tray Jalloh is a 73 y.o. male with hx of hypertension, hyperlipidemia who presents for a follow-up visit.     12/20/23:  Endorses dyspnea on exertion, stable, unchanged from prior. Echo results discussed w/ pt.    9/20/23: Presents for follow-up.  Endorses shortness of breath with exertion with lower extremity edema.  Notes that chest pain has resolved. Stress test results discussed w/ pt. -> negative for ischemia. Echo ordered    5/30/23: Has previously seen Dr. Hernández however, it has been over 3 years (last seen in 2019). He complained of chest pain radiating down to left arm and was re-referred to cardiology by Dr. Avalos and was scheduled for NPV with me.  Intermittent central chest pain w/ radiation to left arm since last 4-5 months. Notes this pain is different from previous episodes 3 years ago. Associated w/ singing and talking. He works out at the gym. He does weights but only does about 5 minutes of cardio at a time as he is limited by knee pain. This level of activity does not precipitate chest pain. Complains of long-standing hx shortness of breath with exertion, attributes it to COPD. Improves with inhalers. Stress test ordered.    Fhx: Father ( CAD. CABG in his early 60s, multiple MI, h/o cardiac arrest per report), paternal uncle had an MI as well    Shx: Former smoker 2 PPD for 30 pack years. Quit in 2001.     EKG 5/30/23: Sinus rhythm with 1st degree AV block    ECHO:  Results for orders placed during the hospital encounter of 09/25/23    Echo    Interpretation Summary    Left Ventricle: The left ventricle is normal in size. Mildly increased wall thickness. Normal wall motion. There is normal systolic function. Biplane (2D) method of discs ejection fraction is 65%. Global longitudinal strain is normal. There is normal diastolic function.    Right Ventricle: Normal right ventricular  cavity size. Systolic function is normal.    Aortic Valve: The aortic valve is a trileaflet valve.    Tricuspid Valve: There is mild regurgitation.    Pulmonary Artery: The estimated pulmonary artery systolic pressure is 29 mmHg.    IVC/SVC: Normal venous pressure at 3 mmHg.      CATH: .  Angiographically normal coronaries in 2019    NM MPI 6/29/23  FINDINGS:  The quality of the study is good.     Stress SPECT images demonstrate homogenous distribution of the tracer throughout the left ventricle. On the resting images, there is matched homogenous distribution of the tracer throughout the left ventricle.     The gated post-stress images reveal normal wall motion and normal systolic wall thickening with an estimated LVEF of 77 %. The LV cavity (is not) dilated with an end-diastolic volume of (100 ml- normal less than 140) ml and an end-systolic volume of (23 ml- normal less than 70) ml.     Normal TID ratio - 1.02     Impression:     1.  Scintigraphically negative for ischemia or infarct.  2. the global left ventricular systolic function is normal with an LV ejection fraction of 77 % and no evidence of LV dilatation. Wall motion is normal.     Lab Results   Component Value Date     12/12/2023    K 4.6 12/12/2023     (H) 12/12/2023    CO2 23 12/12/2023    BUN 23 (H) 12/12/2023    CREATININE 1.30 12/12/2023    CALCIUM 9.0 12/12/2023    ANIONGAP 10 12/12/2023    ESTGFRAFRICA 65 12/03/2020    EGFRNONAA 70 05/12/2022       Lab Results   Component Value Date    CHOL 140 05/15/2023    CHOL 119 05/12/2022    CHOL 114 09/30/2021     Lab Results   Component Value Date    HDL 40 05/15/2023    HDL 40 05/12/2022    HDL 51 09/30/2021     Lab Results   Component Value Date    LDLCALC 79 05/15/2023    LDLCALC 63 05/12/2022    LDLCALC 47 09/30/2021     Lab Results   Component Value Date    TRIG 105 05/15/2023    TRIG 78 05/12/2022    TRIG 80 09/30/2021     Lab Results   Component Value Date    CHOLHDL 3.5 05/15/2023     CHOLHDL 3.0 05/12/2022    CHOLHDL 2.2 09/30/2021       Lab Results   Component Value Date    WBC 7.46 06/07/2023    HGB 12.5 (L) 06/07/2023    HCT 39.1 (L) 06/07/2023    MCV 96.3 (H) 06/07/2023     06/07/2023           Current Outpatient Medications:     acetaminophen (TYLENOL) 325 MG tablet, Take 325 mg by mouth every 6 (six) hours as needed for Pain., Disp: , Rfl:     amLODIPine (NORVASC) 5 MG tablet, Take 1 tablet (5 mg total) by mouth once daily. (Patient taking differently: Take 10 mg by mouth once daily.), Disp: 90 tablet, Rfl: 3    ascorbic acid, vitamin C, (VITAMIN C) 1000 MG tablet, Take 1,000 mg by mouth once daily., Disp: , Rfl:     budesonide-glycopyr-formoterol (BREZTRI AEROSPHERE) 160-9-4.8 mcg/actuation HFAA, Inhale 2 puffs into the lungs 2 (two) times a day. Rinse mouth after use. (Patient taking differently: Inhale 2 puffs into the lungs once daily. Rinse mouth after use.), Disp: 32.1 g, Rfl: 3    cetirizine (ZYRTEC) 10 MG tablet, Take 1 tablet (10 mg total) by mouth once daily., Disp: 90 tablet, Rfl: 3    cholecalciferol, vitamin D3, (VITAMIN D3) 50 mcg (2,000 unit) Cap, Take 1 capsule by mouth once daily., Disp: , Rfl:     CRESTOR 20 mg tablet, Take 10 mg by mouth once daily., Disp: , Rfl:     cyanocobalamin (VITAMIN B-12) 1000 MCG tablet, Take 100 mcg by mouth once daily., Disp: , Rfl:     diclofenac sodium (VOLTAREN) 1 % Gel, Apply topically 4 (four) times daily. (Patient taking differently: Apply topically 4 (four) times daily as needed.), Disp: 900 g, Rfl: 5    diphenhydrAMINE (BENADRYL) 25 mg capsule, Take 25 mg by mouth every 6 (six) hours as needed for Itching., Disp: , Rfl:     fluticasone propionate (FLONASE) 50 mcg/actuation nasal spray, 1 spray by Each Nostril route once daily., Disp: , Rfl:     folic acid/multivit-min/lutein (CENTRUM SILVER ORAL), Take 1 tablet by mouth once daily., Disp: , Rfl:     hydrocortisone 2.5 % cream, Apply 30 g/kg topically 2 (two) times daily as  needed. Apply to face for dry skin, Disp: , Rfl:     ipratropium-albuteroL (COMBIVENT RESPIMAT)  mcg/actuation inhaler, Inhale 1 puff into the lungs every 6 (six) hours as needed for Wheezing. Rescue, Disp: 12 g, Rfl: 1    ketoconazole (NIZORAL) 2 % cream, Apply topically daily as needed., Disp: , Rfl:     lisinopriL (PRINIVIL,ZESTRIL) 40 MG tablet, Take 1 tablet (40 mg total) by mouth once daily., Disp: 90 tablet, Rfl: 3    metaxalone (SKELAXIN) 800 MG tablet, Take 1 tablet (800 mg total) by mouth nightly as needed for Pain., Disp: 90 tablet, Rfl: 3    methenamine (HIPREX) 1 gram Tab, Take 1 tablet (1 g total) by mouth 2 (two) times daily., Disp: 180 tablet, Rfl: 3    multivitamin Tab, Take 1 tablet by mouth once daily., Disp: 90 tablet, Rfl: 3    multivitamin-minerals-lutein (MULTIVITAMIN 50 PLUS) Tab, Take 1 tablet by mouth once daily., Disp: 90 tablet, Rfl: 3    omega-3 fatty acids/fish oil (FISH OIL-OMEGA-3 FATTY ACIDS) 300-1,000 mg capsule, Take 1 capsule by mouth 2 (two) times a day., Disp: , Rfl:     omeprazole (PRILOSEC) 20 MG capsule, Take 1 capsule (20 mg total) by mouth once daily., Disp: 90 capsule, Rfl: 3    pregabalin (LYRICA) 75 MG capsule, TAKE ONE (1) CAPSULE BY MOUTH TWO (2)  TIMES DAILY., Disp: 60 capsule, Rfl: 5    tolterodine (DETROL LA) 2 MG Cp24, Take 2 mg by mouth once daily., Disp: , Rfl:     triamcinolone acetonide 0.1% (KENALOG) 0.1 % cream, Apply 15 g topically 2 (two) times daily as needed. Apply to back for dry skin and itching, Disp: , Rfl:     vitamin E 1000 UNIT capsule, Take 1,000 Units by mouth once daily., Disp: , Rfl:     zinc gluconate 50 mg tablet, Take 50 mg by mouth once daily., Disp: , Rfl:     Review of Systems   Constitutional:  Negative for chills, diaphoresis, fever and malaise/fatigue.   Respiratory:  Negative for cough and shortness of breath.    Cardiovascular:  Negative for chest pain, palpitations, orthopnea, claudication, leg swelling and PND.  "  Gastrointestinal:  Negative for abdominal pain, heartburn, nausea and vomiting.   Neurological:  Negative for dizziness.       Objective:   /70 (BP Location: Left arm, Patient Position: Sitting)   Pulse 71   Ht 5' 10" (1.778 m)   Wt 95.2 kg (209 lb 12.8 oz)   SpO2 95%   BMI 30.10 kg/m²     Physical Exam  Constitutional:       General: He is not in acute distress.     Appearance: Normal appearance.   Cardiovascular:      Rate and Rhythm: Normal rate and regular rhythm.      Pulses: Normal pulses.      Heart sounds: Normal heart sounds. No murmur heard.     No friction rub. No gallop.   Pulmonary:      Effort: Pulmonary effort is normal.      Breath sounds: Normal breath sounds. No wheezing or rales.   Musculoskeletal:      Right lower leg: No edema.      Left lower leg: No edema.   Skin:     General: Skin is warm and dry.   Neurological:      Mental Status: He is alert.           Assessment:     No diagnosis found.        Plan:   No problem-specific Assessment & Plan notes found for this encounter.    Chest pain (resolved)  Not associated with exertion, radiates to left arm. Since last 4-5 months  Limited by knee pain and can only do 5 minutes of cardiovascular exercise at a time  Risk factors: HTN, HLD, former smoking, FH  Pharmacologic stress test with nuclear imaging negative for ischemia or infarct    Dyspnea on exertion  Echo with normal biventricular function and no significant valvular disease   Appears euvolemic on exam   ProBNP normal  Consider noncardiac etiology for dyspnea     Hypertension  Controlled on current regimen    Follow-up in 6 months     Addendum 6/17/24:  Perioperative cardiovascular risk assessment requested for upcoming total knee arthroplasty on 06/19/24    Recent EKG 06/10/2024 personally reviewed:  Normal sinus rhythm, first-degree AV block, no ischemic changes.  No significant change compared to prior EKG  Lexiscan negative for ischemia or infarct June 20, 2023  Patient is " acceptable risk and okay to proceed with surgery from cardiac standpoint     none

## 2024-01-03 ENCOUNTER — CLINICAL SUPPORT (OUTPATIENT)
Dept: PULMONOLOGY | Facility: HOSPITAL | Age: 74
End: 2024-01-03
Attending: NURSE PRACTITIONER
Payer: MEDICARE

## 2024-01-03 ENCOUNTER — CLINICAL SUPPORT (OUTPATIENT)
Dept: PULMONOLOGY | Facility: HOSPITAL | Age: 74
End: 2024-01-03
Attending: STUDENT IN AN ORGANIZED HEALTH CARE EDUCATION/TRAINING PROGRAM
Payer: MEDICARE

## 2024-01-03 ENCOUNTER — OFFICE VISIT (OUTPATIENT)
Dept: PULMONOLOGY | Facility: CLINIC | Age: 74
End: 2024-01-03
Payer: MEDICARE

## 2024-01-03 VITALS — HEIGHT: 70 IN | OXYGEN SATURATION: 96 % | WEIGHT: 209 LBS | BODY MASS INDEX: 29.92 KG/M2

## 2024-01-03 VITALS
OXYGEN SATURATION: 95 % | WEIGHT: 209 LBS | SYSTOLIC BLOOD PRESSURE: 138 MMHG | RESPIRATION RATE: 18 BRPM | BODY MASS INDEX: 29.92 KG/M2 | DIASTOLIC BLOOD PRESSURE: 64 MMHG | HEART RATE: 91 BPM | HEIGHT: 70 IN

## 2024-01-03 DIAGNOSIS — R06.09 DYSPNEA ON EXERTION: ICD-10-CM

## 2024-01-03 DIAGNOSIS — D50.9 IRON DEFICIENCY ANEMIA, UNSPECIFIED IRON DEFICIENCY ANEMIA TYPE: Primary | ICD-10-CM

## 2024-01-03 DIAGNOSIS — R06.2 WHEEZING: ICD-10-CM

## 2024-01-03 DIAGNOSIS — R91.1 PULMONARY NODULE: ICD-10-CM

## 2024-01-03 DIAGNOSIS — I10 ESSENTIAL HYPERTENSION: Chronic | ICD-10-CM

## 2024-01-03 LAB
HCO3 UR-SCNC: 24.5 MMOL/L (ref 21–28)
PCO2 BLDA: 36 MMHG (ref 35–48)
PH SMN: 7.44 [PH] (ref 7.35–7.45)
PO2 BLDA: 69 MMHG (ref 83–108)
POC BASE EXCESS: 0.6 MMOL/L (ref -2–3)
POC SATURATED O2: 94 % (ref 95–98)

## 2024-01-03 PROCEDURE — 94729 DIFFUSING CAPACITY: CPT | Mod: 26,,, | Performed by: STUDENT IN AN ORGANIZED HEALTH CARE EDUCATION/TRAINING PROGRAM

## 2024-01-03 PROCEDURE — 82803 BLOOD GASES ANY COMBINATION: CPT

## 2024-01-03 PROCEDURE — 94618 PULMONARY STRESS TESTING: CPT

## 2024-01-03 PROCEDURE — 94060 EVALUATION OF WHEEZING: CPT

## 2024-01-03 PROCEDURE — 94729 DIFFUSING CAPACITY: CPT

## 2024-01-03 PROCEDURE — 94060 EVALUATION OF WHEEZING: CPT | Mod: 26,59,, | Performed by: STUDENT IN AN ORGANIZED HEALTH CARE EDUCATION/TRAINING PROGRAM

## 2024-01-03 PROCEDURE — 99214 OFFICE O/P EST MOD 30 MIN: CPT | Mod: S$PBB,25,, | Performed by: STUDENT IN AN ORGANIZED HEALTH CARE EDUCATION/TRAINING PROGRAM

## 2024-01-03 PROCEDURE — 99215 OFFICE O/P EST HI 40 MIN: CPT | Mod: PBBFAC,25 | Performed by: STUDENT IN AN ORGANIZED HEALTH CARE EDUCATION/TRAINING PROGRAM

## 2024-01-03 PROCEDURE — 94726 PLETHYSMOGRAPHY LUNG VOLUMES: CPT

## 2024-01-03 PROCEDURE — 36600 WITHDRAWAL OF ARTERIAL BLOOD: CPT

## 2024-01-03 PROCEDURE — 94618 PULMONARY STRESS TESTING: CPT | Mod: 26,,, | Performed by: STUDENT IN AN ORGANIZED HEALTH CARE EDUCATION/TRAINING PROGRAM

## 2024-01-03 PROCEDURE — 94726 PLETHYSMOGRAPHY LUNG VOLUMES: CPT | Mod: 26,,, | Performed by: STUDENT IN AN ORGANIZED HEALTH CARE EDUCATION/TRAINING PROGRAM

## 2024-01-03 PROCEDURE — 27100098 HC SPACER

## 2024-01-03 RX ORDER — AMLODIPINE BESYLATE 5 MG/1
10 TABLET ORAL DAILY
Qty: 180 TABLET | Refills: 3
Start: 2024-01-03 | End: 2024-01-17 | Stop reason: SDUPTHER

## 2024-01-03 RX ORDER — ALBUTEROL SULFATE 90 UG/1
2 AEROSOL, METERED RESPIRATORY (INHALATION) EVERY 6 HOURS PRN
Qty: 8 G | Refills: 3 | Status: SHIPPED | OUTPATIENT
Start: 2024-01-03

## 2024-01-03 NOTE — PROGRESS NOTES
[facility]  Pulmonology  NEW VISIT     Patient Name:  Tray Jalloh  Primary Care Provider: Elizabeth Lange FNP  Date of Service: 01/03/2024    Chief Complaint:  Shortness of breath with walking    SUBJECTIVE   HPI:  Tray Jalloh is a 73 y.o. male with prior nicotine dependence (quit 2001), COPD (diagnosed remotely) on prior history of prostate cancer who presents today for follow up visit. Last seen 11/2023 with plan for  PFT, 6MWT and ABG.     Shubham reports feeling well on this evaluation.  He has stable shortness of breath that occurs with exertion outside with walking.  He is still able to exercise every other day with a stationary bike.  He is established with Cardiology and underwent nuclear stress test that was normal with no ischemic changes.  He has had no interval presentations to the ED or hospitalizations.    Initial HPI:  Shubham reports having shortness of breath that is present on exertion.  This has gotten worse in the past 2 years. He describes his dyspnea as inability to taking a deep breath.  It is present with walking short distances less than 50 ft.  He typically has to rest to regain his breath.  He is currently managed with Breztri which he sometimes forgets to take his dosing.  He has an infrequent cough that is nonproductive. He exercises regularly which include stationary calisthenics without any dyspnea during these activities.  Of note, he had COVID 19 infection in 12/2022.      Past Medical History:   Diagnosis Date    Cervical radiculopathy     Chronic pain     Chronic renal impairment     COVID-19 12/27/2022    Depression     Depressive disorder 05/12/2022    Digestive disorder     GERD (gastroesophageal reflux disease)     History of prediabetes 11/16/2022    Hypercholesteremia     Hypertension     Left ventricular hypertrophy     Lumbosacral spondylosis     Positive colorectal cancer screening using Cologuard test 12/15/2022    Prediabetes      Pulmonary nodules     repeat CT due 12/03/21       Past Surgical History:   Procedure Laterality Date    ABDOMINAL SURGERY      inguinal hernia repair    BACK SURGERY      Bilateral L3-S1 MBB Bilateral 2-, 1-, 1-, 12-    Dr Jorge Parker    EYE SURGERY      HERNIA REPAIR      KNEE ARTHROSCOPY W/ MENISCAL REPAIR Right     KNEE ARTHROSCOPY W/ MENISCECTOMY Left 01/11/2023    Procedure: ARTHROSCOPY, KNEE, WITH MENISCECTOMY;  Surgeon: Dada Enriuqez MD;  Location: [facility] OR;  Service: Orthopedics;  Laterality: Left;    KNEE SURGERY Left     LATERAL RETINACULA RELEASE OF KNEE Left 01/11/2023    Procedure: ARTHROSCOPIC RELEASE, KNEE, LATERAL RETINACULA;  Surgeon: Dada Enriquez MD;  Location: [facility];  Service: Orthopedics;  Laterality: Left;    PROSTATECTOMY  03/1996    prostate cancer    RADIOFREQUENCY ABLATION OF LUMBAR MEDIAL BRANCH NERVE AT SINGLE LEVEL Right 04/01/2021    Procedure: Radiofrequency Ablation, Nerve, Spinal, Lumbar, Medial Branch, 1 Level;  Surgeon: Jordana Patel MD;  Location: [facility];  Service: Pain Management;  Laterality: Right;  Right L3-S1 RFTC    RADIOFREQUENCY ABLATION OF LUMBAR MEDIAL BRANCH NERVE AT SINGLE LEVEL Right 02/09/2023    Procedure: Radiofrequency Ablation, Nerve, Spinal, Lumbar, Medial Branch, Level L4-S1;  Surgeon: Jordana Patel MD;  Location: [facility];  Service: Pain Management;  Laterality: Right;  bonita left after right is done    RADIOFREQUENCY ABLATION OF LUMBAR MEDIAL BRANCH NERVE AT SINGLE LEVEL Left 02/28/2023    Procedure: RADIOFREQUENCY ABLATION, NERVE, SPINAL, LUMBAR, MEDIAL BRANCH, 1 LEVEL;  Surgeon: Jordana Patel MD;  Location: [facility];  Service: Pain Management;  Laterality: Left;    RADIOFREQUENCY THERMOCOAGULATION Bilateral 03/20/2012    Dr Parker    RADIOFREQUENCY THERMOCOAGULATION Left 03/18/2021    Procedure: RADIOFREQUENCY THERMAL COAGULATION;  Surgeon: Jordana Patel MD;   Location: [facility];  Service: Pain Management;  Laterality: Left;  BILATERAL L3-S1 RFTC LEFT SIDE FIRST    SELECTIVE INJECTION OF ANESTHETIC AGENT AROUND LUMBAR SPINAL NERVE ROOT BY TRANSFORAMINAL APPROACH Right 2022    Procedure: Right L4-5,5-S1 TFESI;  Surgeon: Jordana Patel MD;  Location: [facility];  Service: Pain Management;  Laterality: Right;  PT AWARE TO BE TESTED VIA PC    SHOULDER ARTHROSCOPY Right     SPINE SURGERY      VASECTOMY  Mar 1996       Family History   Problem Relation Age of Onset    Diabetes Mother     Hypertension Father     Heart disease Father         CABG    Stroke Father     Alzheimer's disease Maternal Grandmother     Diabetes Paternal Grandmother     Heart attack Paternal Grandfather     Cancer Other     Diabetes Other     Heart disease Other     Hypertension Other     Stroke Other         Social History     Socioeconomic History    Marital status:    Tobacco Use    Smoking status: Former     Current packs/day: 0.00     Average packs/day: 2.0 packs/day for 30.0 years (60.0 ttl pk-yrs)     Types: Cigarettes, Pipe, Cigars     Quit date:      Years since quittin.0    Smokeless tobacco: Never   Substance and Sexual Activity    Alcohol use: Not Currently    Drug use: Never    Sexual activity: Not Currently     Partners: Female     Birth control/protection: Abstinence     Comment: Sterile   Social History Narrative    Asbestos exposure when [place] in [place]; 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     He served in the [employer] with agent orange exposure when stationed in [place].        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     He served in the [employer] with agent orange exposure when stationed in [place].   "      Review of patient's allergies indicates:   Allergen Reactions    Bee pollen     Grass pollen-june grass standard         Medications: Medications reviewed to include over the counter medications.    Review of Systems: A focused ROS was completed and found to be negative except for that mentioned above.      OBJECTIVE   PHYSICAL EXAM:  Vitals:    01/03/24 0913   BP: 138/64   BP Location: Left arm   Patient Position: Sitting   BP Method: Large (Manual)   Pulse: 91   Resp: 18   SpO2: 95%   Weight: 94.8 kg (209 lb)   Height: 5' 10" (1.778 m)          GENERAL: NAD  HEENT: normocephalic, non-icteric conjunctivae, moist oral mucosa, purple bluish discoloration of lips with blanching  RESPIRATORY: clear to auscultation, no wheezing, rales or rhonchi  CARDIOVASCULAR: Regular rate and rhythm, no murmurs rubs or gallops.  MUSCULOSKELETAL: No clubbing or cyanosis; no pedal edema  NEUROLOGIC: AO ×3, no gross deficits  PSYCH: Normal mood and affect    LABS:  Lab studies reviewed and notable for ABG 01/2024 7.44/36/69/24.5/94, SCr 1.3-1.4, CO2 23-27, NTproBNP 67 (12/2023) H/H 12.5/39.1    IMAGING:  Imaging reviewed and notable for CXR 01/2023 with no consolidation, infiltrate or pleural effusion, CT Chest 12/2022 with stable bilateral well defined rounded nodules all subcentimeter and some calcified, no emphysema    TTE:  09/2023:  LVEF 65%, normal diastolic function, normal RV size, TAPSE 2.42, trace MR, mild TR, trace FL, PASP 29    LUNG FUNCTION TESTING:    SPIROMETRY/PFT:  01/2024 Pre Post   FVC 3.99/-0.27 3.89/-0.42   FEV1 3.10/-0.06 3.12/-0.02   FEV1/FVC 78/0.26 80/0.52   TLC 6.04/-1.03    FRC  3.16/-0.68    RV 2.05/-1.00    DLCO 19.57/-1.44        6MWD:  Date Distance (ft) Resting SpO2; Renny SpO2 O2 Required   01/2024 1370 96%, RA; 93% none              ASSESSMENT & PLAN     1. Iron deficiency anemia, unspecified iron deficiency anemia type  Assessment & Plan:  Lab work 06/2023 with normocytic anemia H/H 12.5/39.1. " Ferritin <100, B12 and folate replete. He is undergone workup with a colonoscopy for positive Cologuard 02/2023 that demonstrated diverticular and s/p polypectomy.  We will obtain repeat CBC today.  Etiology of his dyspnea is secondary to his chronic anemia.  Dietary interventions including increased iron intake with green leafy vegetables and protein discussed.    Orders:  -     CBC Auto Differential; Future; Expected date: 01/03/2024    2. Essential hypertension  -     amLODIPine (NORVASC) 5 MG tablet; Take 2 tablets (10 mg total) by mouth once daily.  Dispense: 180 tablet; Refill: 3    3. Pulmonary nodule  Overview:  repeat CT due 12/03/21    Assessment & Plan:  Prior CT imaging with scattered pulmonary nodules all subcentimeter in some with calcification.  Stable for 2 years.  We will obtain CT chest to complete 3 year follow-up and if stable no further imaging required.  In addition, additional indication for CT chest is to complete evaluation of dyspnea on exertion.    Orders:  -     CT Chest Without Contrast; Future; Expected date: 01/03/2024    4. Dyspnea on exertion  Assessment & Plan:  73-year-old male with prior nicotine dependence (quit 2001, 30 pack years) with history notable for asbestos exposure when working in [place] and imaging to date including CT chest 12/2022 with calcified while rounded subcentimeter nodules and no emphysema presents for evaluation of progressive dyspnea on exertion in the past 2 years. He has established with Cardiology with workup has included a stress test and TTE with no inducible ischemia and normal LV and RV function.  No ongoing exposures at this time.  Of note, patient had an unremarkable COVID-19 infection in December 2022. Vaccines are up-to-date including annual influenza vaccine. Plan for management as follows:  - will obtain CT Chest non con as follow up imaging post COVID, f/u for pulmonary nodules as well as AMBRIZ w/u  - PFTs are normal with no obstruction or  restriction. There is a diffusion deficit and and wide A-a gradient in this patient with anemia. Please note that this patient does not have COPD or emphysema, to this end this diagnosis has been removed from his history.  - start CAROLE PRN  - stop Breztri and DuoNebs  - component of deconditioning remains for exertional symptoms; discussed advancing his exercises including increasing the resistance for cycling exercise as well as increasing his exercise time    Orders:  -     albuterol (VENTOLIN HFA) 90 mcg/actuation inhaler; Inhale 2 puffs into the lungs every 6 (six) hours as needed for Wheezing or Shortness of Breath. Rescue  Dispense: 8 g; Refill: 3        Follow up in about 3 months (around 4/3/2024) for CT CHEST RESULTS, Routine follow up.      Case was discussed with patient; all questions were answered to patient's satisfaction and patient verbalized understanding.     Rosalia Preciado MD  Pulmonary Medicine  [facility]  Phone: [phone]

## 2024-01-03 NOTE — ASSESSMENT & PLAN NOTE
Prior CT imaging with scattered pulmonary nodules all subcentimeter in some with calcification.  Stable for 2 years.  We will obtain CT chest to complete 3 year follow-up and if stable no further imaging required.  In addition, additional indication for CT chest is to complete evaluation of dyspnea on exertion.

## 2024-01-03 NOTE — PROCEDURES
SIX MINUTE WALK DISTANCE  BASELINE VITALS  HR: 75   BP: 148/79  SPO2: 96% on RA    END OF STUDY VITALS:  HR: 118  BP: 163/73  SPO2: 95% on RA    RECOVERY VITALS:  After 60 s rest  HR: 91  SPO2: 96%    Patient walked 1370 ft with 0 rests.   SpO2 leonel was 94% at 1 minutes.       IMPRESSION:  Patient has tachycardia with ambulation that resolves with rest.  Patient does not need oxygen supplementation at rest at rest or with ambulation.      Rosalia Preciado MD  Pulmonary and Critical Care  [facility]

## 2024-01-03 NOTE — PROGRESS NOTES
AB.44/36/69/24.5/94%  The ABG demonstrates hypoxia with wide A-a gradient of 35.7 where expected A-a gradient for age is 22.    Rosalia Preciado MD  Pulmonary and Critical Care  [facility]

## 2024-01-03 NOTE — PROCEDURES
PFT REPORT:  SPIROMETRY:  The pre-BD FVC is normal, 3.99L/-0.27 Z score.  The pre-BD FEV1 is normal, 3.10L/-0.06 Z score.  Thre pre-BD FEV1/FVC ratio is 78/0.26 Z score.    The post-BD FVC is normal, 3.89L/-0.42 Z score.  The post-BD FEV1 is normal, 3.12L/-0.02 Z score.  The post-BD FEV1/FVC ratio is 80/0.52 Z score.    The is no significant bronchodilator effect.    The flow volume loop is normal.    LUNG VOLUMES:  The TLC is normal, 6.04L/-1.03 Z score.  The FRC is normal, 3.16L/-0.68 Z score.  The RV is normal, 2.05L/-1.00 Z score.    DIFFUSION CAPACITY:  The diffusion capacity is not corrected for hemoglobin and is normal, 19.57/-1.44 Z score.    AB.44/36/69/24.5/94    Interpretation:  Spirometry is normal.  The is no significant bronchodilator effect.  The flow volume loop is normal.  Lung volumes are normal.  There DLCO is low normal.   Normal PFTs.     The ABG demonstrates hypoxia with wide A-a gradient of 35.7 where expected A-a gradient for age is 22.    Rosalia Preciado MD  Pulmonary Medicine  [facility]

## 2024-01-03 NOTE — PROGRESS NOTES
ABG DRAWN FROM LEFT RADIAL. POSITIVE GATO'S TEST. PRESSURE HELD FIVE MINUTES. TOLERATED WELL WITH NO ADVERSE REACTION. D/C IN GOOD CONDITION.

## 2024-01-03 NOTE — ASSESSMENT & PLAN NOTE
73-year-old male with prior nicotine dependence (quit 2001, 30 pack years) with history notable for asbestos exposure when working in [place] and imaging to date including CT chest 12/2022 with calcified while rounded subcentimeter nodules and no emphysema presents for evaluation of progressive dyspnea on exertion in the past 2 years. He has established with Cardiology with workup has included a stress test and TTE with no inducible ischemia and normal LV and RV function.  No ongoing exposures at this time.  Of note, patient had an unremarkable COVID-19 infection in December 2022. Vaccines are up-to-date including annual influenza vaccine. Plan for management as follows:  - will obtain CT Chest non con as follow up imaging post COVID, f/u for pulmonary nodules as well as AMBRIZ w/u  - PFTs are normal with no obstruction or restriction. There is a diffusion deficit and and wide A-a gradient in this patient with anemia. Please note that this patient does not have COPD or emphysema, to this end this diagnosis has been removed from his history.  - start CAROLE PRN  - stop Breztri and DuoNebs  - component of deconditioning remains for exertional symptoms; discussed advancing his exercises including increasing the resistance for cycling exercise as well as increasing his exercise time

## 2024-01-03 NOTE — ASSESSMENT & PLAN NOTE
Lab work 06/2023 with normocytic anemia H/H 12.5/39.1. Ferritin <100, B12 and folate replete. He is undergone workup with a colonoscopy for positive Cologuard 02/2023 that demonstrated diverticular and s/p polypectomy.  We will obtain repeat CBC today.  Etiology of his dyspnea is secondary to his chronic anemia.  Dietary interventions including increased iron intake with green leafy vegetables and protein discussed.

## 2024-01-09 DIAGNOSIS — Z71.89 COMPLEX CARE COORDINATION: ICD-10-CM

## 2024-01-17 ENCOUNTER — TELEPHONE (OUTPATIENT)
Dept: FAMILY MEDICINE | Facility: CLINIC | Age: 74
End: 2024-01-17
Payer: MEDICARE

## 2024-01-17 DIAGNOSIS — I10 ESSENTIAL HYPERTENSION: Chronic | ICD-10-CM

## 2024-01-17 RX ORDER — AMLODIPINE BESYLATE 5 MG/1
10 TABLET ORAL DAILY
Qty: 180 TABLET | Refills: 3
Start: 2024-01-17 | End: 2024-01-23 | Stop reason: SDUPTHER

## 2024-01-17 NOTE — TELEPHONE ENCOUNTER
----- Message from Sabiha Friedman sent at 1/17/2024 12:58 PM CST -----  Pt need refill amlopipine sent [employer]

## 2024-01-23 DIAGNOSIS — I10 ESSENTIAL HYPERTENSION: Chronic | ICD-10-CM

## 2024-01-23 RX ORDER — AMLODIPINE BESYLATE 5 MG/1
10 TABLET ORAL DAILY
Qty: 180 TABLET | Refills: 3 | Status: ON HOLD | OUTPATIENT
Start: 2024-01-23 | End: 2025-01-22

## 2024-01-23 NOTE — TELEPHONE ENCOUNTER
----- Message from Sabiha Friedman sent at 1/23/2024  8:00 AM CST -----  Pt need you to call him because his meds is not at the Pharmacy PT # [phone]

## 2024-01-31 ENCOUNTER — PATIENT MESSAGE (OUTPATIENT)
Dept: PULMONOLOGY | Facility: CLINIC | Age: 74
End: 2024-01-31
Payer: MEDICARE

## 2024-02-01 ENCOUNTER — OFFICE VISIT (OUTPATIENT)
Dept: FAMILY MEDICINE | Facility: CLINIC | Age: 74
End: 2024-02-01
Payer: MEDICARE

## 2024-02-01 VITALS
HEART RATE: 77 BPM | DIASTOLIC BLOOD PRESSURE: 79 MMHG | WEIGHT: 208 LBS | RESPIRATION RATE: 16 BRPM | OXYGEN SATURATION: 98 % | HEIGHT: 70 IN | BODY MASS INDEX: 29.78 KG/M2 | SYSTOLIC BLOOD PRESSURE: 138 MMHG | TEMPERATURE: 98 F

## 2024-02-01 DIAGNOSIS — J02.9 SORE THROAT: ICD-10-CM

## 2024-02-01 DIAGNOSIS — J06.9 ACUTE URI: Primary | ICD-10-CM

## 2024-02-01 LAB
CTP QC/QA: YES
CTP QC/QA: YES
FLUAV AG NPH QL: NEGATIVE
FLUBV AG NPH QL: NEGATIVE
S PYO RRNA THROAT QL PROBE: NEGATIVE

## 2024-02-01 PROCEDURE — 87880 STREP A ASSAY W/OPTIC: CPT | Mod: RHCUB

## 2024-02-01 PROCEDURE — 99214 OFFICE O/P EST MOD 30 MIN: CPT | Mod: ,,, | Performed by: FAMILY MEDICINE

## 2024-02-01 PROCEDURE — 87804 INFLUENZA ASSAY W/OPTIC: CPT | Mod: RHCUB

## 2024-02-01 NOTE — PROGRESS NOTES
Camilo Tellez MD    [address], [place] [address]  Phone: [phone]     Subjective     Name: Tray Jalloh   YOB: 1950 (73 y.o.)  MRN: [id number]  Visit Date: 2/1/2024   Chief Complaint: Sore Throat (SYMPTOMS X 2 DAYS), PND, SINUS DRAINAGE, and Headache        HISTORY OF PRESENT ILLNESS:  Mr. rTay Jalloh is a 73-year-old male with a past medical history of HTN, HLD, Pulmonary Nodules, and GERD who presents to the [facility] for viral like symptoms for the past day.     At this time Upper Respiratory Infection: Patient complains of symptoms of a URI. Symptoms include congestion, sore throat, and PND. Onset of symptoms was 1 day ago, unchanged since that time. He also c/o facial pain and headache described as frontal pressure for the past 1 day .  He is drinking moderate amounts of fluids.     Patient mentioned that he has taken  Flonase nasal spray, benadryl and tylenol with moderate relief. Denies any n/v/d, cough, chest pain or dyspnea. In clinic tested for streptococcus pharyngitis and influenza with negative results. At this time, will manage patient acute URI with supportive care therapy.       PAST MEDICAL HISTORY:  Significant Diagnoses - Patient  has a past medical history of Cervical radiculopathy, Chronic pain, Chronic renal impairment, COVID-19 (12/27/2022), Depression, Depressive disorder (05/12/2022), Digestive disorder, GERD (gastroesophageal reflux disease), History of prediabetes (11/16/2022), Hypercholesteremia, Hypertension, Left ventricular hypertrophy, Lumbosacral spondylosis, Positive colorectal cancer screening using Cologuard test (12/15/2022), Prediabetes, and Pulmonary nodules.  Medications - Patient has a current medication list which includes the following long-term medication(s): albuterol, amlodipine, cetirizine, crestor, diclofenac sodium, hydrocortisone, ketoconazole, lisinopril, metaxalone,  methenamine, multivitamin, multivitamin 50 plus, fish oil-omega-3 fatty acids, omeprazole, pregabalin, and tolterodine.   Allergies - Patient is allergic to bee pollen and grass pollen-figueroa grass standard.  Surgeries - Patient  has a past surgical history that includes Hernia repair; Back surgery; Radiofrequency thermocoagulation (Bilateral, 03/20/2012); Prostatectomy (03/1996); Abdominal surgery; Radiofrequency thermocoagulation (Left, 03/18/2021); Eye surgery; Radiofrequency ablation of lumbar medial branch nerve at single level (Right, 04/01/2021); Bilateral L3-S1 MBB (Bilateral, 2-, 1-, 1-, 12-); Selective injection of anesthetic agent around lumbar spinal nerve root by transforaminal approach (Right, 01/13/2022); Spine surgery (1995); Vasectomy (Mar 1996); Knee arthroscopy w/ meniscal repair (Right); Shoulder arthroscopy (Right); Knee arthroscopy w/ meniscectomy (Left, 01/11/2023); Lateral retinacula release of knee (Left, 01/11/2023); Radiofrequency ablation of lumbar medial branch nerve at single level (Right, 02/09/2023); Knee surgery (Left); and Radiofrequency ablation of lumbar medial branch nerve at single level (Left, 02/28/2023).  Family History - Patient family history includes Alzheimer's disease in his maternal grandmother; Cancer in an other family member; Diabetes in his mother, paternal grandmother, and another family member; Heart attack in his paternal grandfather; Heart disease in his father and another family member; Hypertension in his father and another family member; Stroke in his father and another family member.      SOCIAL HISTORY:  Tobacco - Patient  reports that he quit smoking about 23 years ago. His smoking use included cigarettes, pipe, and cigars. He has a 60.0 pack-year smoking history. He has never used smokeless tobacco.   Alcohol - Patient  reports that he does not currently use alcohol.   Recreational Drugs - Patient  reports no history of drug use.        Review of Systems   Constitutional:  Positive for fatigue. Negative for activity change, appetite change, chills and fever.   HENT:  Positive for nasal congestion, postnasal drip, sinus pressure/congestion and sore throat.    Respiratory:  Negative for chest tightness and shortness of breath.    Cardiovascular:  Negative for chest pain.   Gastrointestinal:  Negative for diarrhea, nausea and vomiting.   Genitourinary: Negative.    Musculoskeletal:  Positive for myalgias.   Neurological:  Positive for headaches.        Past Medical History:   Diagnosis Date    Cervical radiculopathy     Chronic pain     Chronic renal impairment     COVID-19 12/27/2022    Depression     Depressive disorder 05/12/2022    Digestive disorder     GERD (gastroesophageal reflux disease)     History of prediabetes 11/16/2022    Hypercholesteremia     Hypertension     Left ventricular hypertrophy     Lumbosacral spondylosis     Positive colorectal cancer screening using Cologuard test 12/15/2022    Prediabetes     Pulmonary nodules     repeat CT due 12/03/21        Review of patient's allergies indicates:   Allergen Reactions    Bee pollen     Grass pollen-june grass standard         Past Surgical History:   Procedure Laterality Date    ABDOMINAL SURGERY      inguinal hernia repair    BACK SURGERY      Bilateral L3-S1 MBB Bilateral 2-, 1-, 1-, 12-    Dr Jorge Parker    EYE SURGERY      HERNIA REPAIR      KNEE ARTHROSCOPY W/ MENISCAL REPAIR Right     KNEE ARTHROSCOPY W/ MENISCECTOMY Left 01/11/2023    Procedure: ARTHROSCOPY, KNEE, WITH MENISCECTOMY;  Surgeon: Dada Enriquez MD;  Location: [facility];  Service: Orthopedics;  Laterality: Left;    KNEE SURGERY Left     LATERAL RETINACULA RELEASE OF KNEE Left 01/11/2023    Procedure: ARTHROSCOPIC RELEASE, KNEE, LATERAL RETINACULA;  Surgeon: Dada Enriquez MD;  Location: [facility];  Service: Orthopedics;  Laterality: Left;    PROSTATECTOMY  03/1996     prostate cancer    RADIOFREQUENCY ABLATION OF LUMBAR MEDIAL BRANCH NERVE AT SINGLE LEVEL Right 04/01/2021    Procedure: Radiofrequency Ablation, Nerve, Spinal, Lumbar, Medial Branch, 1 Level;  Surgeon: Jordana Patel MD;  Location: [facility];  Service: Pain Management;  Laterality: Right;  Right L3-S1 RFTC    RADIOFREQUENCY ABLATION OF LUMBAR MEDIAL BRANCH NERVE AT SINGLE LEVEL Right 02/09/2023    Procedure: Radiofrequency Ablation, Nerve, Spinal, Lumbar, Medial Branch, Level L4-S1;  Surgeon: Jordana Patel MD;  Location: [facility];  Service: Pain Management;  Laterality: Right;  bonita left after right is done    RADIOFREQUENCY ABLATION OF LUMBAR MEDIAL BRANCH NERVE AT SINGLE LEVEL Left 02/28/2023    Procedure: RADIOFREQUENCY ABLATION, NERVE, SPINAL, LUMBAR, MEDIAL BRANCH, 1 LEVEL;  Surgeon: Jordana Patel MD;  Location: [facility];  Service: Pain Management;  Laterality: Left;    RADIOFREQUENCY THERMOCOAGULATION Bilateral 03/20/2012    Dr Jettu    RADIOFREQUENCY THERMOCOAGULATION Left 03/18/2021    Procedure: RADIOFREQUENCY THERMAL COAGULATION;  Surgeon: Jordana Patel MD;  Location: [facility];  Service: Pain Management;  Laterality: Left;  BILATERAL L3-S1 RFTC LEFT SIDE FIRST    SELECTIVE INJECTION OF ANESTHETIC AGENT AROUND LUMBAR SPINAL NERVE ROOT BY TRANSFORAMINAL APPROACH Right 01/13/2022    Procedure: Right L4-5,5-S1 TFESI;  Surgeon: oJrdana Patel MD;  Location: [facility];  Service: Pain Management;  Laterality: Right;  PT AWARE TO BE TESTED VIA PC    SHOULDER ARTHROSCOPY Right     SPINE SURGERY  1995    VASECTOMY  Mar 1996        Family History   Problem Relation Age of Onset    Diabetes Mother     Hypertension Father     Heart disease Father         CABG    Stroke Father     Alzheimer's disease Maternal Grandmother     Diabetes Paternal Grandmother     Heart attack Paternal Grandfather     Cancer Other     Diabetes Other     Heart disease Other      Hypertension Other     Stroke Other        Current Outpatient Medications   Medication Instructions    acetaminophen (TYLENOL) 325 mg, Oral, Every 6 hours PRN    albuterol (VENTOLIN HFA) 90 mcg/actuation inhaler 2 puffs, Inhalation, Every 6 hours PRN, Rescue    amLODIPine (NORVASC) 10 mg, Oral, Daily    ascorbic acid (vitamin C) (VITAMIN C) 1,000 mg, Oral, Daily    cetirizine (ZYRTEC) 10 mg, Oral, Daily    cholecalciferol, vitamin D3, (VITAMIN D3) 50 mcg (2,000 unit) Cap 1 capsule, Oral, Daily    CRESTOR 10 mg, Oral, Daily    cyanocobalamin (VITAMIN B-12) 100 mcg, Oral, Daily    diclofenac sodium (VOLTAREN) 1 % Gel Topical (Top), 4 times daily    diphenhydrAMINE (BENADRYL) 25 mg, Oral, Every 6 hours PRN    fluticasone propionate (FLONASE) 50 mcg/actuation nasal spray 1 spray, Each Nostril, Daily    folic acid/multivit-min/lutein (CENTRUM SILVER ORAL) 1 tablet, Oral, Daily    hydrocortisone 2.5 % cream 30 g/kg, Topical (Top), 2 times daily PRN, Apply to face for dry skin     ketoconazole (NIZORAL) 2 % cream Topical (Top), Daily PRN    lisinopriL (PRINIVIL,ZESTRIL) 40 mg, Oral, Daily    metaxalone (SKELAXIN) 800 mg, Oral, Nightly PRN    methenamine (HIPREX) 1 g, Oral, 2 times daily    multivitamin Tab 1 tablet, Oral, Daily    multivitamin-minerals-lutein (MULTIVITAMIN 50 PLUS) Tab 1 tablet, Oral, Daily    omega-3 fatty acids/fish oil (FISH OIL-OMEGA-3 FATTY ACIDS) 300-1,000 mg capsule 1 capsule, Oral, 2 times daily    omeprazole (PRILOSEC) 20 mg, Oral, Daily    pregabalin (LYRICA) 75 MG capsule TAKE ONE (1) CAPSULE BY MOUTH TWO (2)  TIMES DAILY.    tolterodine (DETROL LA) 2 mg, Oral, Daily    triamcinolone acetonide 0.1% (KENALOG) 15 g, Topical (Top), 2 times daily PRN, Apply to back for dry skin and itching     vitamin E 1,000 Units, Oral, Daily    zinc gluconate 50 mg, Oral, Daily        Objective     /79 (BP Location: Left arm, Patient Position: Sitting, BP Method: Medium (Automatic))   Pulse 77   Temp 98  "°F (36.7 °C) (Oral)   Resp 16   Ht 5' 10" (1.778 m)   Wt 94.3 kg (208 lb)   SpO2 98%   BMI 29.84 kg/m²     Physical Exam  Vitals and nursing note reviewed.   Constitutional:       General: He is not in acute distress.     Appearance: Normal appearance. He is normal weight. He is not ill-appearing.   HENT:      Head: Normocephalic and atraumatic.      Right Ear: Tympanic membrane and ear canal normal.      Left Ear: Tympanic membrane and ear canal normal.      Nose: Congestion present.      Mouth/Throat:      Mouth: Mucous membranes are moist.      Pharynx: No oropharyngeal exudate or posterior oropharyngeal erythema.   Eyes:      Extraocular Movements: Extraocular movements intact.      Conjunctiva/sclera: Conjunctivae normal.   Cardiovascular:      Rate and Rhythm: Normal rate.      Heart sounds: Normal heart sounds.   Pulmonary:      Breath sounds: Normal breath sounds.   Abdominal:      General: There is no distension.   Musculoskeletal:         General: Normal range of motion.      Cervical back: Normal range of motion.   Skin:     Coloration: Skin is not jaundiced.      Findings: No erythema.   Neurological:      Mental Status: He is alert.        All recently obtained labs have been reviewed and discussed in detail with the patient.   Assessment     1. Acute URI    2. Sore throat         Plan     Problem List Items Addressed This Visit          ENT    Sore throat    Relevant Orders    POCT rapid strep A (Completed)    POCT Influenza A/B Rapid Antigen (Completed)    Acute URI - Primary     02/01  - Reports viral-like symptoms for the past 24hrs  -Experiencing PND, HA, nasal congestion with rhinorrhea  -Denies any n/v/d/chest pain or dyspnea  -At visit both streptococcus pharyngitis and influenza were negative results   -At this time, supportive care with hydration, rest, Flonase nasal spray two times a day in both nostril, tylenol for fever/pain  -RTC if symptoms worsens or seek medical attention at local " emergency department                 All orders:  Orders Placed This Encounter    POCT rapid strep A    POCT Influenza A/B Rapid Antigen          No follow-ups on file.    Instructed patient that if symptoms fail to improve or worsen patient should seek immediate medical attention or report to the nearest emergency department. Patient expressed verbal agreement and understanding to this plan of care.   Camilo Tellez MD  Family Medicine Residency PGY-1    [facility]

## 2024-02-01 NOTE — ASSESSMENT & PLAN NOTE
02/01  - Reports viral-like symptoms for the past 24hrs  -Experiencing PND, HA, nasal congestion with rhinorrhea  -Denies any n/v/d/chest pain or dyspnea  -At visit both streptococcus pharyngitis and influenza were negative results   -At this time, supportive care with hydration, rest, Flonase nasal spray two times a day in both nostril, tylenol for fever/pain  -RTC if symptoms worsens or seek medical attention at local emergency department

## 2024-02-22 ENCOUNTER — OFFICE VISIT (OUTPATIENT)
Dept: ORTHOPEDICS | Facility: CLINIC | Age: 74
End: 2024-02-22
Payer: MEDICARE

## 2024-02-22 DIAGNOSIS — M17.0 PRIMARY LOCALIZED OSTEOARTHRITIS OF KNEES, BILATERAL: ICD-10-CM

## 2024-02-22 DIAGNOSIS — M17.12 PRIMARY OSTEOARTHRITIS OF LEFT KNEE: Primary | ICD-10-CM

## 2024-02-22 DIAGNOSIS — M17.0 PRIMARY LOCALIZED OSTEOARTHRITIS OF KNEES, BILATERAL: Primary | ICD-10-CM

## 2024-02-22 PROCEDURE — 99999PBSHW PR PBB SHADOW TECHNICAL ONLY FILED TO HB: Mod: PBBFAC,,,

## 2024-02-22 PROCEDURE — 20610 DRAIN/INJ JOINT/BURSA W/O US: CPT | Mod: PBBFAC | Performed by: NURSE PRACTITIONER

## 2024-02-22 PROCEDURE — 99212 OFFICE O/P EST SF 10 MIN: CPT | Mod: PBBFAC,25 | Performed by: NURSE PRACTITIONER

## 2024-02-22 PROCEDURE — 20610 DRAIN/INJ JOINT/BURSA W/O US: CPT | Mod: PBBFAC,50 | Performed by: NURSE PRACTITIONER

## 2024-02-22 PROCEDURE — 99212 OFFICE O/P EST SF 10 MIN: CPT | Mod: S$PBB,25,, | Performed by: NURSE PRACTITIONER

## 2024-02-22 RX ORDER — TRIAMCINOLONE ACETONIDE 40 MG/ML
40 INJECTION, SUSPENSION INTRA-ARTICULAR; INTRAMUSCULAR
Status: DISCONTINUED | OUTPATIENT
Start: 2024-02-22 | End: 2024-02-22 | Stop reason: HOSPADM

## 2024-02-22 RX ADMIN — TRIAMCINOLONE ACETONIDE 40 MG: 40 INJECTION, SUSPENSION INTRA-ARTICULAR; INTRAMUSCULAR at 10:02

## 2024-02-22 NOTE — PROGRESS NOTES
73 y.o. Male returns to clinic for a follow up visit regarding     ICD-10-CM ICD-9-CM   1. Primary osteoarthritis of left knee  M17.12 715.16   2. Primary localized osteoarthritis of knees, bilateral  M17.0 715.16        Pateint is here today for bilateral knee steroid injections. He had visco injections 3 months ago and has done well.        Past Medical History:   Diagnosis Date    Cervical radiculopathy     Chronic pain     Chronic renal impairment     COVID-19 12/27/2022    Depression     Depressive disorder 05/12/2022    Digestive disorder     GERD (gastroesophageal reflux disease)     History of prediabetes 11/16/2022    Hypercholesteremia     Hypertension     Left ventricular hypertrophy     Lumbosacral spondylosis     Positive colorectal cancer screening using Cologuard test 12/15/2022    Prediabetes     Pulmonary nodules     repeat CT due 12/03/21     Past Surgical History:   Procedure Laterality Date    ABDOMINAL SURGERY      inguinal hernia repair    BACK SURGERY      Bilateral L3-S1 MBB Bilateral 2-, 1-, 1-, 12-    Dr Jorge Parker    EYE SURGERY      HERNIA REPAIR      KNEE ARTHROSCOPY W/ MENISCAL REPAIR Right     KNEE ARTHROSCOPY W/ MENISCECTOMY Left 01/11/2023    Procedure: ARTHROSCOPY, KNEE, WITH MENISCECTOMY;  Surgeon: Dada Enriquez MD;  Location: [facility];  Service: Orthopedics;  Laterality: Left;    KNEE SURGERY Left     LATERAL RETINACULA RELEASE OF KNEE Left 01/11/2023    Procedure: ARTHROSCOPIC RELEASE, KNEE, LATERAL RETINACULA;  Surgeon: Dada Enriquez MD;  Location: [facility];  Service: Orthopedics;  Laterality: Left;    PROSTATECTOMY  03/1996    prostate cancer    RADIOFREQUENCY ABLATION OF LUMBAR MEDIAL BRANCH NERVE AT SINGLE LEVEL Right 04/01/2021    Procedure: Radiofrequency Ablation, Nerve, Spinal, Lumbar, Medial Branch, 1 Level;  Surgeon: Jordana Patel MD;  Location: [facility];  Service: Pain Management;  Laterality: Right;   Right L3-S1 RFTC    RADIOFREQUENCY ABLATION OF LUMBAR MEDIAL BRANCH NERVE AT SINGLE LEVEL Right 02/09/2023    Procedure: Radiofrequency Ablation, Nerve, Spinal, Lumbar, Medial Branch, Level L4-S1;  Surgeon: Jordana Patel MD;  [facility];  Service: Pain Management;  Laterality: Right;  bonita left after right is done    RADIOFREQUENCY ABLATION OF LUMBAR MEDIAL BRANCH NERVE AT SINGLE LEVEL Left 02/28/2023    Procedure: RADIOFREQUENCY ABLATION, NERVE, SPINAL, LUMBAR, MEDIAL BRANCH, 1 LEVEL;  Surgeon: Jordana Patel MD;  [facility];  Service: Pain Management;  Laterality: Left;    RADIOFREQUENCY THERMOCOAGULATION Bilateral 03/20/2012    Dr Parker    RADIOFREQUENCY THERMOCOAGULATION Left 03/18/2021    Procedure: RADIOFREQUENCY THERMAL COAGULATION;  Surgeon: Jordana Patel MD;  [facility];  Service: Pain Management;  Laterality: Left;  BILATERAL L3-S1 RFTC LEFT SIDE FIRST    SELECTIVE INJECTION OF ANESTHETIC AGENT AROUND LUMBAR SPINAL NERVE ROOT BY TRANSFORAMINAL APPROACH Right 01/13/2022    Procedure: Right L4-5,5-S1 TFESI;  Surgeon: Jordana Patel MD;  [facility];  Service: Pain Management;  Laterality: Right;  PT AWARE TO BE TESTED VIA PC    SHOULDER ARTHROSCOPY Right     SPINE SURGERY  1995    VASECTOMY  Mar 1996         PHYSICAL EXAMINATION:    General    Constitutional: He is oriented to person, place, and time. He appears well-nourished.   HENT:   Head: Normocephalic and atraumatic.   Eyes: Pupils are equal, round, and reactive to light.   Neck: Neck supple.   Cardiovascular:  Normal rate and regular rhythm.            Pulmonary/Chest: Effort normal. No respiratory distress.   Abdominal: There is no abdominal tenderness. There is no guarding.   Neurological: He is alert and oriented to person, place, and time. He has normal reflexes.   Psychiatric: He has a normal mood and affect. His behavior is normal. Judgment and thought content normal.            Right Knee Exam     Inspection   Swelling: present  Effusion: present    Tenderness   The patient is tender to palpation of the medial joint line and lateral joint line.    Crepitus   The patient has crepitus of the patella.    Range of Motion   Extension:  normal   Flexion:  abnormal     Tests   Meniscus   Ozzy:  Medial - positive   Ligament Examination   Lachman: normal (-1 to 2mm)   PCL-Posterior Drawer: normal (0 to 2mm)     Patella   Patellar Tracking: normal  Patellar Grind: positive    Other   Sensation: normal    Left Knee Exam     Inspection   Swelling: present  Effusion: present    Tenderness   The patient tender to palpation of the medial joint line and lateral joint line.    Crepitus   The patient has crepitus of the patella.    Range of Motion   Extension:  normal   Flexion:  abnormal     Tests   Meniscus   Ozzy:  Medial - positive   Stability   Lachman: normal (-1 to 2mm)   PCL-Posterior Drawer: normal (0 to 2mm)  Patella   Patellar Tracking: normal    Other   Sensation: normal    Muscle Strength   Right Lower Extremity   Quadriceps:  5/5   Hamstrin/5   Left Lower Extremity   Quadriceps:  5/5   Hamstrin/5     Reflexes     Left Side  Achilles:  2+  Quadriceps:  2+    Right Side   Achilles:  2+  Quadriceps:  2+    Vascular Exam     Right Pulses  Dorsalis Pedis:      2+  Posterior Tibial:      2+        Left Pulses  Dorsalis Pedis:      2+  Posterior Tibial:      2+          IMAGING:  No results found.     ASSESSMENT:      ICD-10-CM ICD-9-CM   1. Primary osteoarthritis of left knee  M17.12 715.16   2. Primary localized osteoarthritis of knees, bilateral  M17.0 715.16       PLAN:     -Findings and treatment options were discussed with the patient  -All questions answered    Bilateral knee injections today  He would like to have visco injections next visit    There are no Patient Instructions on file for this visit.      Orders Placed This Encounter   Procedures    Large Joint  Aspiration/Injection: L supra patellar bursa    Large Joint Aspiration/Injection: R supra patellar bursa         Large Joint Aspiration/Injection: L supra patellar bursa    Date/Time: 2/22/2024 10:40 AM    Performed by: Mihaela Salamanca FNP  Authorized by: Mihaela Salamanca FNP    Consent Done?:  Yes (Verbal)  Indications:  Pain  Site marked: the procedure site was marked    Local anesthetic:  Bupivacaine 0.25% without epinephrine    Details:  Needle Size:  22 G  Location:  Knee  Site:  L supra patellar bursa  Medications:  40 mg triamcinolone acetonide 40 mg/mL  Patient tolerance:  Patient tolerated the procedure well with no immediate complications  Large Joint Aspiration/Injection: R supra patellar bursa    Date/Time: 2/22/2024 10:40 AM    Performed by: Mihaela Salamanca FNP  Authorized by: Mihaela Salamanca FNP    Consent Done?:  Yes (Verbal)  Indications:  Pain  Site marked: the procedure site was marked    Local anesthetic:  Bupivacaine 0.25% without epinephrine    Details:  Needle Size:  22 G  Location:  Knee  Site:  R supra patellar bursa  Medications:  40 mg triamcinolone acetonide 40 mg/mL  Patient tolerance:  Patient tolerated the procedure well with no immediate complications

## 2024-02-23 NOTE — TELEPHONE ENCOUNTER
Your colonoscopy pathology results show NO cancer or infections. Dr. Hare removed a polyp, so he recommends repeating your colonoscopy in 5 years. Please continue to follow recommendations given after your colonoscopy and call our office with any questions or concerns.          ----- Message from Mingo Hare MD sent at 2/16/2023  4:54 PM CST -----  Place pt on list for colonoscopy in 5 yrs. This polyp was destroyed by heat during cautery removal of polyp.  
No

## 2024-03-25 ENCOUNTER — HOSPITAL ENCOUNTER (OUTPATIENT)
Dept: RADIOLOGY | Facility: HOSPITAL | Age: 74
Discharge: HOME | End: 2024-03-25
Attending: STUDENT IN AN ORGANIZED HEALTH CARE EDUCATION/TRAINING PROGRAM
Payer: MEDICARE

## 2024-03-25 DIAGNOSIS — R91.1 PULMONARY NODULE: ICD-10-CM

## 2024-03-25 PROCEDURE — 71250 CT THORAX DX C-: CPT | Mod: TC

## 2024-03-25 PROCEDURE — 71250 CT THORAX DX C-: CPT | Mod: 26,,, | Performed by: STUDENT IN AN ORGANIZED HEALTH CARE EDUCATION/TRAINING PROGRAM

## 2024-03-26 RX ORDER — PREGABALIN 75 MG/1
CAPSULE ORAL
Qty: 60 CAPSULE | Refills: 5 | Status: ON HOLD | OUTPATIENT
Start: 2024-03-26

## 2024-03-26 NOTE — TELEPHONE ENCOUNTER
----- Message from Claudette Kaur sent at 3/26/2024  9:13 AM CDT -----  Regarding: refill  Patient needs a refill on pregabalin (LYRICA) 75 MG capsule called into [facility] pharmacy at [phone].  Please call patient at [phone] if you have any questions. Thanks!

## 2024-04-09 ENCOUNTER — OFFICE VISIT (OUTPATIENT)
Dept: PULMONOLOGY | Facility: CLINIC | Age: 74
End: 2024-04-09
Payer: MEDICARE

## 2024-04-09 VITALS
OXYGEN SATURATION: 95 % | HEIGHT: 70 IN | RESPIRATION RATE: 16 BRPM | HEART RATE: 91 BPM | SYSTOLIC BLOOD PRESSURE: 128 MMHG | BODY MASS INDEX: 28.2 KG/M2 | DIASTOLIC BLOOD PRESSURE: 60 MMHG | WEIGHT: 197 LBS

## 2024-04-09 DIAGNOSIS — R91.1 PULMONARY NODULE: ICD-10-CM

## 2024-04-09 DIAGNOSIS — J43.8 OTHER EMPHYSEMA: ICD-10-CM

## 2024-04-09 DIAGNOSIS — D50.8 IRON DEFICIENCY ANEMIA SECONDARY TO INADEQUATE DIETARY IRON INTAKE: ICD-10-CM

## 2024-04-09 DIAGNOSIS — R68.89 FORGETFULNESS: ICD-10-CM

## 2024-04-09 DIAGNOSIS — R06.09 DYSPNEA ON EXERTION: Primary | ICD-10-CM

## 2024-04-09 PROCEDURE — 99215 OFFICE O/P EST HI 40 MIN: CPT | Mod: PBBFAC | Performed by: STUDENT IN AN ORGANIZED HEALTH CARE EDUCATION/TRAINING PROGRAM

## 2024-04-09 PROCEDURE — 99214 OFFICE O/P EST MOD 30 MIN: CPT | Mod: S$PBB,,, | Performed by: STUDENT IN AN ORGANIZED HEALTH CARE EDUCATION/TRAINING PROGRAM

## 2024-04-09 NOTE — ASSESSMENT & PLAN NOTE
Lab work 06/2023 with normocytic anemia H/H 12.5/39.1. Ferritin <100, B12 and folate replete. He is undergone workup with a colonoscopy for positive Cologuard 02/2023 that demonstrated diverticular and s/p polypectomy.  Repeat CBC with stable H/H. Continue to recommend dietary interventions including increased iron intake with green leafy vegetables and protein discussed.

## 2024-04-09 NOTE — ASSESSMENT & PLAN NOTE
73-year-old male with prior nicotine dependence (quit 2001, 30 pack years) with history notable for asbestos exposure when working in [place] and imaging to date including CT chest 12/2022 with calcified  rounded subcentimeter nodules and repeat imaging 03/2024 with stable nodules and mild paraseptal emphysema presents for evaluation of progressive dyspnea on exertion in the past 2 years. He has established with Cardiology with workup has included a stress test and TTE with no inducible ischemia and normal LV and RV function.  No ongoing exposures at this time.  Of note, patient had an unremarkable COVID-19 infection in December 2022. Vaccines are up-to-date including annual influenza vaccine. Plan for management as follows:  - referral to cardiopulmonary rehabilitation   -- component of deconditioning remains for exertional symptoms despite advancing his exercises including increasing the resistance for cycling exercise as well as increasing his exercise time  - PFTs are normal with no obstruction or restriction. There is a diffusion deficit and and wide A-a gradient in this patient with anemia; management of anemia as above  - cont CAROLE PRN; no maintenance inhalers indicated at this time (previously stopped Breztri and Dinobs)

## 2024-04-09 NOTE — ASSESSMENT & PLAN NOTE
Prior CT imaging with scattered pulmonary nodules all subcentimeter in some with calcification. Repeat CT Chest 03/2024 demonstrates stability for 3 years. No additional imaging is required.

## 2024-04-09 NOTE — ASSESSMENT & PLAN NOTE
He has increased forgetfulness per personal history.  Currently established with Neurology for evaluation of tremor.  We will obtain a sooner appointment for evaluation of this forgetfulness with patient history consistent with short-term memory deficits.

## 2024-04-09 NOTE — ASSESSMENT & PLAN NOTE
Mild paraseptal emphysema on imaging. Has quit smoking and can assume no progression in lifetime with smoking cessation.

## 2024-04-09 NOTE — PROGRESS NOTES
[facility]  Pulmonology  ESTABLISHED VISIT     Patient Name:  Tray Jalloh  Primary Care Provider: Elizabeth Lange FNP  Date of Service: 04/09/2024    Chief Complaint:  Shortness of breath with walking    SUBJECTIVE   HPI:  Tray Jalloh is a 73 y.o. male with prior nicotine dependence (quit 2001), COPD (diagnosed remotely) on prior history of prostate cancer who presents today for follow up visit. Last seen 01/2024 with plan for CBC and CT Chest non conKiet Jalloh reports feeling well on this evaluation. He endorses forgetfulness that is worse in the past few months. He has shortness of breath with exertion. He continues to exercise with stationary bicycle. He has had no interval presentations to the ED or hospitalizations.    Initial HPI:  Shubham reports having shortness of breath that is present on exertion.  This has gotten worse in the past 2 years. He describes his dyspnea as inability to taking a deep breath.  It is present with walking short distances less than 50 ft.  He typically has to rest to regain his breath.  He is currently managed with Breztri which he sometimes forgets to take his dosing.  He has an infrequent cough that is nonproductive. He exercises regularly which include stationary calisthenics without any dyspnea during these activities.  Of note, he had COVID 19 infection in 12/2022.  He is established with Cardiology and underwent nuclear stress test that was normal with no ischemic changes      Past Medical History:   Diagnosis Date    Cervical radiculopathy     Chronic pain     Chronic renal impairment     COVID-19 12/27/2022    Depression     Depressive disorder 05/12/2022    Digestive disorder     GERD (gastroesophageal reflux disease)     History of prediabetes 11/16/2022    Hypercholesteremia     Hypertension     Left ventricular hypertrophy     Lumbosacral spondylosis     Positive colorectal cancer screening using Cologuard test 12/15/2022     Prediabetes     Pulmonary nodules     repeat CT due 12/03/21       Past Surgical History:   Procedure Laterality Date    ABDOMINAL SURGERY      inguinal hernia repair    BACK SURGERY      Bilateral L3-S1 MBB Bilateral 2-, 1-, 1-, 12-    Dr Jorge Parker    EYE SURGERY      HERNIA REPAIR      KNEE ARTHROSCOPY W/ MENISCAL REPAIR Right     KNEE ARTHROSCOPY W/ MENISCECTOMY Left 01/11/2023    Procedure: ARTHROSCOPY, KNEE, WITH MENISCECTOMY;  Surgeon: Dada Enriquez MD;  Location: [facility] OR;  Service: Orthopedics;  Laterality: Left;    KNEE SURGERY Left     LATERAL RETINACULA RELEASE OF KNEE Left 01/11/2023    Procedure: ARTHROSCOPIC RELEASE, KNEE, LATERAL RETINACULA;  Surgeon: Dada Enriquez MD;  Location: [facility] OR;  Service: Orthopedics;  Laterality: Left;    PROSTATECTOMY  03/1996    prostate cancer    RADIOFREQUENCY ABLATION OF LUMBAR MEDIAL BRANCH NERVE AT SINGLE LEVEL Right 04/01/2021    Procedure: Radiofrequency Ablation, Nerve, Spinal, Lumbar, Medial Branch, 1 Level;  Surgeon: Jordana Patel MD;  Location: [facility];  Service: Pain Management;  Laterality: Right;  Right L3-S1 RFTC    RADIOFREQUENCY ABLATION OF LUMBAR MEDIAL BRANCH NERVE AT SINGLE LEVEL Right 02/09/2023    Procedure: Radiofrequency Ablation, Nerve, Spinal, Lumbar, Medial Branch, Level L4-S1;  Surgeon: Jordana Patel MD;  Location: [facility];  Service: Pain Management;  Laterality: Right;  bonita left after right is done    RADIOFREQUENCY ABLATION OF LUMBAR MEDIAL BRANCH NERVE AT SINGLE LEVEL Left 02/28/2023    Procedure: RADIOFREQUENCY ABLATION, NERVE, SPINAL, LUMBAR, MEDIAL BRANCH, 1 LEVEL;  Surgeon: Jordana Patel MD;  Location: [facility];  Service: Pain Management;  Laterality: Left;    RADIOFREQUENCY THERMOCOAGULATION Bilateral 03/20/2012    Dr Parker    RADIOFREQUENCY THERMOCOAGULATION Left 03/18/2021    Procedure: RADIOFREQUENCY THERMAL COAGULATION;  Surgeon: Jordana AKINS  MD Jorge;  Location: [facility];  Service: Pain Management;  Laterality: Left;  BILATERAL L3-S1 RFTC LEFT SIDE FIRST    SELECTIVE INJECTION OF ANESTHETIC AGENT AROUND LUMBAR SPINAL NERVE ROOT BY TRANSFORAMINAL APPROACH Right 2022    Procedure: Right L4-5,5-S1 TFESI;  Surgeon: Jordana Patel MD;  Location: [facility];  Service: Pain Management;  Laterality: Right;  PT AWARE TO BE TESTED VIA PC    SHOULDER ARTHROSCOPY Right     SPINE SURGERY      VASECTOMY  Mar 1996       Family History   Problem Relation Age of Onset    Diabetes Mother     Hypertension Father     Heart disease Father         CABG    Stroke Father     Alzheimer's disease Maternal Grandmother     Diabetes Paternal Grandmother     Heart attack Paternal Grandfather     Cancer Other     Diabetes Other     Heart disease Other     Hypertension Other     Stroke Other         Social History     Socioeconomic History    Marital status:    Tobacco Use    Smoking status: Former     Current packs/day: 0.00     Average packs/day: 2.0 packs/day for 30.0 years (60.0 ttl pk-yrs)     Types: Cigarettes, Pipe, Cigars     Quit date:      Years since quittin.2    Smokeless tobacco: Never   Substance and Sexual Activity    Alcohol use: Not Currently    Drug use: Never    Sexual activity: Not Currently     Partners: Female     Birth control/protection: Abstinence     Comment: Sterile   Social History Narrative    Asbestos exposure [place]; 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     He served in the [employer] with agent orange exposure when stationed in [employer].        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     He served in the [employer] with agent orange exposure when stationed in  [place].        Review of patient's allergies indicates:   Allergen Reactions    Bee pollen     Grass pollen-june grass standard         Medications: Medications reviewed to include over the counter medications.    Review of Systems: A focused ROS was completed and found to be negative except for that mentioned above.      OBJECTIVE   PHYSICAL EXAM:  Vitals:    04/09/24 1024   BP: 128/60   BP Location: Left arm   Patient Position: Sitting   BP Method: Large (Manual)   Pulse: 91   Resp: 16   SpO2: 95%   Weight: 89.4 kg (197 lb)   Height: 5' 10" (1.778 m)            GENERAL: NAD  HEENT: normocephalic, non-icteric conjunctivae, moist oral mucosa, purple bluish discoloration of lips with blanching  RESPIRATORY: clear to auscultation, no wheezing, rales or rhonchi  CARDIOVASCULAR: Regular rate and rhythm, no murmurs rubs or gallops.  MUSCULOSKELETAL: No clubbing or cyanosis; no pedal edema  NEUROLOGIC: AO ×3, no gross deficits  PSYCH: Normal mood and affect    LABS:  Lab studies reviewed and notable for ABG 01/2024 7.44/36/69/24.5/94, SCr 1.3-1.4, CO2 23-27, NTproBNP 67 (12/2023) H/H 12.5/39.1; CBC 01/2024 with stable H/H 12.8/38.3 MCV 94.8, SEos 270    IMAGING:  Imaging reviewed and notable for CXR 01/2023 with no consolidation, infiltrate or pleural effusion, CT Chest 12/2022 with stable bilateral well defined rounded nodules all subcentimeter and some calcified, no emphysema. CT Chest 03/2024: mild biapical fibrotic changes, mild paraseptal emphysema most visible in UBALDO coronal views, stable LLL nodule 6 mm, overall stable imaging when compared to 08/2022     TTE:  09/2023:  LVEF 65%, normal diastolic function, normal RV size, TAPSE 2.42, trace MR, mild TR, trace WI, PASP 29    LUNG FUNCTION TESTING:    SPIROMETRY/PFT:  01/2024 Pre Post   FVC 3.99/-0.27 3.89/-0.42   FEV1 3.10/-0.06 3.12/-0.02   FEV1/FVC 78/0.26 80/0.52   TLC 6.04/-1.03    FRC  3.16/-0.68    RV 2.05/-1.00    DLCO 19.57/-1.44        6MWD:  Date Distance " (ft) Resting SpO2; Renny SpO2 O2 Required   01/2024 1370 96%, RA; 93% none              ASSESSMENT & PLAN     1. Dyspnea on exertion  Assessment & Plan:  73-year-old male with prior nicotine dependence (quit 2001, 30 pack years) with history notable for asbestos exposure when working in [place] and imaging to date including CT chest 12/2022 with calcified  rounded subcentimeter nodules and repeat imaging 03/2024 with stable nodules and mild paraseptal emphysema presents for evaluation of progressive dyspnea on exertion in the past 2 years. He has established with Cardiology with workup has included a stress test and TTE with no inducible ischemia and normal LV and RV function.  No ongoing exposures at this time.  Of note, patient had an unremarkable COVID-19 infection in December 2022. Vaccines are up-to-date including annual influenza vaccine. Plan for management as follows:  - referral to cardiopulmonary rehabilitation   -- component of deconditioning remains for exertional symptoms despite advancing his exercises including increasing the resistance for cycling exercise as well as increasing his exercise time  - PFTs are normal with no obstruction or restriction. There is a diffusion deficit and and wide A-a gradient in this patient with anemia; management of anemia as above  - cont CAROLE PRN; no maintenance inhalers indicated at this time (previously stopped Breztri and DuoNebs)    Orders:  -     Ambulatory referral/consult to Pulmonary Rehab; Future; Expected date: 04/16/2024    2. Other emphysema  Assessment & Plan:  Mild paraseptal emphysema on imaging. Has quit smoking and can assume no progression in lifetime with smoking cessation.    Orders:  -     Ambulatory referral/consult to Pulmonary Rehab; Future; Expected date: 04/16/2024    3. Forgetfulness  Assessment & Plan:  He has increased forgetfulness per personal history.  Currently established with Neurology for evaluation of tremor.  We will obtain a  sooner appointment for evaluation of this forgetfulness with patient history consistent with short-term memory deficits.      4. Iron deficiency anemia secondary to inadequate dietary iron intake  Assessment & Plan:  Lab work 06/2023 with normocytic anemia H/H 12.5/39.1. Ferritin <100, B12 and folate replete. He is undergone workup with a colonoscopy for positive Cologuard 02/2023 that demonstrated diverticular and s/p polypectomy.  Repeat CBC with stable H/H. Continue to recommend dietary interventions including increased iron intake with green leafy vegetables and protein discussed.      5. Pulmonary nodule  Overview:  repeat CT due 12/03/21    Assessment & Plan:  Prior CT imaging with scattered pulmonary nodules all subcentimeter in some with calcification. Repeat CT Chest 03/2024 demonstrates stability for 3 years. No additional imaging is required.             Follow up in about 1 year (around 4/9/2025).      Case was discussed with patient; all questions were answered to patient's satisfaction and patient verbalized understanding.     Rosalia Preciado MD  Pulmonary Medicine  [facility]  Phone: [phone]

## 2024-04-09 NOTE — LETTER
April 9, 2024        MOLLY Mensah  [address] [place] [address] Neurology  [place] [address]             [facility] - Pulmonology  [address] [facility] [place] [address]  Phone: [phone]  Fax: [phone]   Patient: Tray Jalloh   MR Number: [id number]   YOB: 1950   Date of Visit: 4/9/2024       Dear Dr. Jauregui:    Tray Jalloh is established with you and reports increasing forgetfulness. We are scheduling an earlier visit with you for evaluation.      If you have questions, please do not hesitate to call me. I look forward to following Tray along with you.    Sincerely,      Rosalia Preciado MD           CC  No Recipients

## 2024-05-01 ENCOUNTER — OFFICE VISIT (OUTPATIENT)
Dept: ORTHOPEDICS | Facility: CLINIC | Age: 74
End: 2024-05-01
Payer: MEDICARE

## 2024-05-01 DIAGNOSIS — M17.0 PRIMARY LOCALIZED OSTEOARTHRITIS OF KNEES, BILATERAL: Primary | ICD-10-CM

## 2024-05-01 DIAGNOSIS — M17.11 PRIMARY OSTEOARTHRITIS OF RIGHT KNEE: ICD-10-CM

## 2024-05-01 PROCEDURE — 99212 OFFICE O/P EST SF 10 MIN: CPT | Mod: S$PBB,,, | Performed by: NURSE PRACTITIONER

## 2024-05-01 PROCEDURE — 99213 OFFICE O/P EST LOW 20 MIN: CPT | Mod: PBBFAC | Performed by: NURSE PRACTITIONER

## 2024-05-01 RX ORDER — TRAMADOL HYDROCHLORIDE 50 MG/1
50 TABLET ORAL EVERY 8 HOURS PRN
Qty: 25 TABLET | Refills: 0 | Status: ON HOLD | OUTPATIENT
Start: 2024-05-01

## 2024-05-01 NOTE — PROGRESS NOTES
CC:  Knee pain    73 y.o. Male returns to clinic for a follow up visit regarding knee pain.       Patient would like to have repeat injections today as his right knee has become extremely painful.   He has had both steroid and visco injections in the past.  Last steroid injection was in February.  It has only been 2 months.  It is not time for him to have another injection yet.  He is considering discussing total knee arthroplasty in June.  He does have a conference to attend early June and then possibly after that time.  He is ambulating with the use of a cane today.  He has had multiple injections in both of his knees.  Reports his pain is constant, affects his activities and sleep.    He states he has had to start using a cane to take some pressure off his right knee.        Past Medical History:   Diagnosis Date    Cervical radiculopathy     Chronic pain     Chronic renal impairment     COVID-19 12/27/2022    Depression     Depressive disorder 05/12/2022    Digestive disorder     GERD (gastroesophageal reflux disease)     History of prediabetes 11/16/2022    Hypercholesteremia     Hypertension     Left ventricular hypertrophy     Lumbosacral spondylosis     Positive colorectal cancer screening using Cologuard test 12/15/2022    Prediabetes     Pulmonary nodules     repeat CT due 12/03/21     Past Surgical History:   Procedure Laterality Date    ABDOMINAL SURGERY      inguinal hernia repair    BACK SURGERY      Bilateral L3-S1 MBB Bilateral 2-, 1-, 1-, 12-    Dr Jorge Parker    EYE SURGERY      HERNIA REPAIR      KNEE ARTHROSCOPY W/ MENISCAL REPAIR Right     KNEE ARTHROSCOPY W/ MENISCECTOMY Left 01/11/2023    Procedure: ARTHROSCOPY, KNEE, WITH MENISCECTOMY;  Surgeon: Dada Enriquez MD;  Location: [facility];  Service: Orthopedics;  Laterality: Left;    KNEE SURGERY Left     LATERAL RETINACULA RELEASE OF KNEE Left 01/11/2023    Procedure: ARTHROSCOPIC RELEASE, KNEE, LATERAL  RETINACULA;  Surgeon: Dada Enriquez MD;  Location: [facility] OR;  Service: Orthopedics;  Laterality: Left;    PROSTATECTOMY  03/1996    prostate cancer    RADIOFREQUENCY ABLATION OF LUMBAR MEDIAL BRANCH NERVE AT SINGLE LEVEL Right 04/01/2021    Procedure: Radiofrequency Ablation, Nerve, Spinal, Lumbar, Medial Branch, 1 Level;  Surgeon: Jordana Patel MD;  Location: [facility];  Service: Pain Management;  Laterality: Right;  Right L3-S1 RFTC    RADIOFREQUENCY ABLATION OF LUMBAR MEDIAL BRANCH NERVE AT SINGLE LEVEL Right 02/09/2023    Procedure: Radiofrequency Ablation, Nerve, Spinal, Lumbar, Medial Branch, Level L4-S1;  Surgeon: Jordana Patel MD;  Location: [facility];  Service: Pain Management;  Laterality: Right;  bonita left after right is done    RADIOFREQUENCY ABLATION OF LUMBAR MEDIAL BRANCH NERVE AT SINGLE LEVEL Left 02/28/2023    Procedure: RADIOFREQUENCY ABLATION, NERVE, SPINAL, LUMBAR, MEDIAL BRANCH, 1 LEVEL;  Surgeon: Jordana Patel MD;  Location: [facility];  Service: Pain Management;  Laterality: Left;    RADIOFREQUENCY THERMOCOAGULATION Bilateral 03/20/2012    Dr Parker    RADIOFREQUENCY THERMOCOAGULATION Left 03/18/2021    Procedure: RADIOFREQUENCY THERMAL COAGULATION;  Surgeon: Jordana Patel MD;  Location: [facility];  Service: Pain Management;  Laterality: Left;  BILATERAL L3-S1 RFTC LEFT SIDE FIRST    SELECTIVE INJECTION OF ANESTHETIC AGENT AROUND LUMBAR SPINAL NERVE ROOT BY TRANSFORAMINAL APPROACH Right 01/13/2022    Procedure: Right L4-5,5-S1 TFESI;  Surgeon: Jordana Patel MD;  Location: [facility];  Service: Pain Management;  Laterality: Right;  PT AWARE TO BE TESTED VIA PC    SHOULDER ARTHROSCOPY Right     SPINE SURGERY  1995    VASECTOMY  Mar 1996         PHYSICAL EXAMINATION:  There were no vitals taken for this visit.  General    Constitutional: He is oriented to person, place, and time. He appears well-nourished.   HENT:   Head:  Normocephalic and atraumatic.   Eyes: Pupils are equal, round, and reactive to light.   Neck: Neck supple.   Cardiovascular:  Normal rate and regular rhythm.            Pulmonary/Chest: Effort normal. No respiratory distress.   Abdominal: There is no abdominal tenderness. There is no guarding.   Neurological: He is alert and oriented to person, place, and time. He has normal reflexes.   Psychiatric: He has a normal mood and affect. His behavior is normal. Judgment and thought content normal.           Right Knee Exam     Inspection   Swelling: present  Effusion: present    Tenderness   The patient is tender to palpation of the medial joint line and lateral joint line.    Crepitus   The patient has crepitus of the patella.    Range of Motion   Extension:  normal   Flexion:  abnormal     Tests   Meniscus   Ozzy:  Medial - positive   Ligament Examination   Lachman: normal (-1 to 2mm)   PCL-Posterior Drawer: normal (0 to 2mm)     Patella   Patellar Tracking: normal  Patellar Grind: positive    Other   Sensation: normal    Left Knee Exam     Inspection   Swelling: present  Effusion: present    Tenderness   The patient tender to palpation of the medial joint line and lateral joint line.    Crepitus   The patient has crepitus of the patella.    Range of Motion   Extension:  normal   Flexion:  abnormal     Tests   Meniscus   Ozzy:  Medial - positive   Stability   Lachman: normal (-1 to 2mm)   PCL-Posterior Drawer: normal (0 to 2mm)  Patella   Patellar Tracking: normal    Other   Sensation: normal    Muscle Strength   Right Lower Extremity   Quadriceps:  5/5   Hamstrin/5   Left Lower Extremity   Quadriceps:  5/5   Hamstrin/5     Reflexes     Left Side  Achilles:  2+  Quadriceps:  2+    Right Side   Achilles:  2+  Quadriceps:  2+    Vascular Exam     Right Pulses  Dorsalis Pedis:      2+  Posterior Tibial:      2+        Left Pulses  Dorsalis Pedis:      2+  Posterior Tibial:      2+        IMAGING:  No  results found.     ASSESSMENT:      ICD-10-CM ICD-9-CM   1. Primary localized osteoarthritis of knees, bilateral  M17.0 715.16       PLAN:     -Findings and treatment options were discussed with the patient  -All questions answered  Natural history and expected course discussed. Questions answered.  Educational materials distributed.  Reduction in offending activity.  OTC analgesics as needed.  Patient would like to proceed with discussion of total knee arthroplasty on the right in June.  We will have him return to clinic with Dr. Enriquez to discuss surgery.  At this time we will start the process of preop clearance and lab work.    There are no Patient Instructions on file for this visit.      No orders of the defined types were placed in this encounter.        Procedures

## 2024-05-02 ENCOUNTER — OFFICE VISIT (OUTPATIENT)
Dept: NEUROLOGY | Facility: CLINIC | Age: 74
End: 2024-05-02
Payer: MEDICARE

## 2024-05-02 VITALS
HEART RATE: 83 BPM | BODY MASS INDEX: 27.77 KG/M2 | OXYGEN SATURATION: 96 % | DIASTOLIC BLOOD PRESSURE: 70 MMHG | SYSTOLIC BLOOD PRESSURE: 114 MMHG | WEIGHT: 194 LBS | HEIGHT: 70 IN

## 2024-05-02 DIAGNOSIS — R41.89 COGNITIVE IMPAIRMENT: Primary | ICD-10-CM

## 2024-05-02 DIAGNOSIS — G47.30 SLEEP APNEA, UNSPECIFIED TYPE: ICD-10-CM

## 2024-05-02 DIAGNOSIS — G25.0 ESSENTIAL TREMOR: ICD-10-CM

## 2024-05-02 PROCEDURE — 99215 OFFICE O/P EST HI 40 MIN: CPT | Mod: PBBFAC | Performed by: NURSE PRACTITIONER

## 2024-05-02 PROCEDURE — 99213 OFFICE O/P EST LOW 20 MIN: CPT | Mod: S$PBB,,, | Performed by: NURSE PRACTITIONER

## 2024-05-02 NOTE — PATIENT INSTRUCTIONS
Reviewed prior CT head and labwork  Strongly recommend sleep study  Continue current medications  MMSE 30/30  No medications recommended at this time
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
## 2024-05-07 DIAGNOSIS — M17.11 PRIMARY OSTEOARTHRITIS OF RIGHT KNEE: Primary | ICD-10-CM

## 2024-05-07 DIAGNOSIS — Z01.818 PREPROCEDURAL EXAMINATION: Primary | ICD-10-CM

## 2024-05-07 RX ORDER — TRANEXAMIC ACID 10 MG/ML
1000 INJECTION, SOLUTION INTRAVENOUS
Status: CANCELLED | OUTPATIENT
Start: 2024-05-07

## 2024-05-07 RX ORDER — SODIUM CHLORIDE 9 MG/ML
INJECTION, SOLUTION INTRAVENOUS CONTINUOUS
Status: CANCELLED | OUTPATIENT
Start: 2024-05-07

## 2024-05-07 RX ORDER — MUPIROCIN 20 MG/G
OINTMENT TOPICAL
Status: CANCELLED | OUTPATIENT
Start: 2024-05-07

## 2024-05-10 DIAGNOSIS — G47.30 SLEEP APNEA, UNSPECIFIED: Primary | ICD-10-CM

## 2024-05-12 ENCOUNTER — PROCEDURE VISIT (OUTPATIENT)
Dept: SLEEP MEDICINE | Facility: HOSPITAL | Age: 74
End: 2024-05-12
Attending: INTERNAL MEDICINE
Payer: MEDICARE

## 2024-05-12 ENCOUNTER — HOSPITAL ENCOUNTER (EMERGENCY)
Facility: HOSPITAL | Age: 74
Discharge: HOME | End: 2024-05-12
Payer: MEDICARE

## 2024-05-12 VITALS
SYSTOLIC BLOOD PRESSURE: 142 MMHG | BODY MASS INDEX: 27.77 KG/M2 | TEMPERATURE: 99 F | OXYGEN SATURATION: 97 % | WEIGHT: 194 LBS | HEIGHT: 70 IN | RESPIRATION RATE: 18 BRPM | HEART RATE: 90 BPM | DIASTOLIC BLOOD PRESSURE: 92 MMHG

## 2024-05-12 DIAGNOSIS — G47.30 SLEEP APNEA, UNSPECIFIED: ICD-10-CM

## 2024-05-12 DIAGNOSIS — R53.1 WEAKNESS: ICD-10-CM
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
## 2024-05-12 DIAGNOSIS — N30.00 ACUTE CYSTITIS WITHOUT HEMATURIA: ICD-10-CM

## 2024-05-12 LAB
ANION GAP SERPL CALCULATED.3IONS-SCNC: 10 MMOL/L (ref 7–16)
BACTERIA #/AREA URNS HPF: ABNORMAL /HPF
BASOPHILS # BLD AUTO: 0.1 K/UL (ref 0–0.2)
BASOPHILS NFR BLD AUTO: 1 % (ref 0–1)
BILIRUB UR QL STRIP: NEGATIVE
BUN SERPL-MCNC: 26 MG/DL (ref 7–18)
BUN/CREAT SERPL: 18 (ref 6–20)
CALCIUM SERPL-MCNC: 9.1 MG/DL (ref 8.5–10.1)
CHLORIDE SERPL-SCNC: 109 MMOL/L (ref 98–107)
CLARITY UR: ABNORMAL
CO2 SERPL-SCNC: 21 MMOL/L (ref 21–32)
COLOR UR: YELLOW
CREAT SERPL-MCNC: 1.47 MG/DL (ref 0.7–1.3)
DIFFERENTIAL METHOD BLD: ABNORMAL
EGFR (NO RACE VARIABLE) (RUSH/TITUS): 50 ML/MIN/1.73M2
EOSINOPHIL # BLD AUTO: 0.1 K/UL (ref 0–0.5)
EOSINOPHIL NFR BLD AUTO: 1 % (ref 1–4)
ERYTHROCYTE [DISTWIDTH] IN BLOOD BY AUTOMATED COUNT: 13.8 % (ref 11.5–14.5)
GLUCOSE SERPL-MCNC: 109 MG/DL (ref 74–106)
GLUCOSE UR STRIP-MCNC: NORMAL MG/DL
HCT VFR BLD AUTO: 42.7 % (ref 40–54)
HGB BLD-MCNC: 12.8 G/DL (ref 13.5–18)
IMM GRANULOCYTES # BLD AUTO: 0.03 K/UL (ref 0–0.04)
IMM GRANULOCYTES NFR BLD: 0.3 % (ref 0–0.4)
KETONES UR STRIP-SCNC: NEGATIVE MG/DL
LEUKOCYTE ESTERASE UR QL STRIP: ABNORMAL
LYMPHOCYTES # BLD AUTO: 1.47 K/UL (ref 1–4.8)
LYMPHOCYTES NFR BLD AUTO: 14 % (ref 27–41)
MCH RBC QN AUTO: 30.7 PG (ref 27–31)
MCHC RBC AUTO-ENTMCNC: 30 G/DL (ref 32–36)
MCV RBC AUTO: 102.4 FL (ref 80–96)
MONOCYTES # BLD AUTO: 0.69 K/UL (ref 0–0.8)
MONOCYTES NFR BLD AUTO: 6.6 % (ref 2–6)
MPC BLD CALC-MCNC: 9.4 FL (ref 9.4–12.4)
MUCOUS, UA: ABNORMAL /LPF
NEUTROPHILS # BLD AUTO: 8.12 K/UL (ref 1.8–7.7)
NEUTROPHILS NFR BLD AUTO: 77.1 % (ref 53–65)
NITRITE UR QL STRIP: POSITIVE
NRBC # BLD AUTO: 0 X10E3/UL
NRBC, AUTO (.00): 0 %
OHS QRS DURATION: 88 MS
OHS QTC CALCULATION: 400 MS
PH UR STRIP: 6 PH UNITS
PLATELET # BLD AUTO: 217 K/UL (ref 150–400)
POTASSIUM SERPL-SCNC: 3.5 MMOL/L (ref 3.5–5.1)
PROT UR QL STRIP: 70
RBC # BLD AUTO: 4.17 M/UL (ref 4.6–6.2)
RBC # UR STRIP: ABNORMAL /UL
RBC #/AREA URNS HPF: 4 /HPF
SODIUM SERPL-SCNC: 136 MMOL/L (ref 136–145)
SP GR UR STRIP: 1.03
SQUAMOUS #/AREA URNS LPF: ABNORMAL /HPF
UROBILINOGEN UR STRIP-ACNC: NORMAL MG/DL
WBC # BLD AUTO: 10.51 K/UL (ref 4.5–11)
WBC #/AREA URNS HPF: >182 /HPF
WBC CLUMPS, UA: ABNORMAL /HPF

## 2024-05-12 PROCEDURE — 81001 URINALYSIS AUTO W/SCOPE: CPT | Performed by: NURSE PRACTITIONER

## 2024-05-12 PROCEDURE — 99284 EMERGENCY DEPT VISIT MOD MDM: CPT | Mod: ,,, | Performed by: NURSE PRACTITIONER

## 2024-05-12 PROCEDURE — 99285 EMERGENCY DEPT VISIT HI MDM: CPT | Mod: 25

## 2024-05-12 PROCEDURE — 95810 POLYSOM 6/> YRS 4/> PARAM: CPT | Mod: PO

## 2024-05-12 PROCEDURE — 87086 URINE CULTURE/COLONY COUNT: CPT | Performed by: NURSE PRACTITIONER

## 2024-05-12 PROCEDURE — 93010 ELECTROCARDIOGRAM REPORT: CPT | Mod: ,,, | Performed by: HOSPITALIST

## 2024-05-12 PROCEDURE — 93005 ELECTROCARDIOGRAM TRACING: CPT

## 2024-05-12 PROCEDURE — 85025 COMPLETE CBC W/AUTO DIFF WBC: CPT | Performed by: NURSE PRACTITIONER

## 2024-05-12 PROCEDURE — 80048 BASIC METABOLIC PNL TOTAL CA: CPT | Performed by: NURSE PRACTITIONER

## 2024-05-12 RX ORDER — NITROFURANTOIN 25; 75 MG/1; MG/1
100 CAPSULE ORAL 2 TIMES DAILY
Qty: 20 CAPSULE | Refills: 0 | Status: SHIPPED | OUTPATIENT
Start: 2024-05-12 | End: 2024-05-16 | Stop reason: ALTCHOICE

## 2024-05-12 NOTE — ED PROVIDER NOTES
"Encounter Date: 5/12/2024       History     Chief Complaint   Patient presents with    Fall     PRESENTS TO EMERGENCY DEPARTMMENT WITH COMPLAINT OF FALL ON FRIDAY. REPORTS THAT HE HAS FALLEN 2 TIMES SINCE. LAST NIGHT FELL AND WAS IN THE FLOOR FOR 30 MINUTES      Pt presents with a fall in his home on Friday and fell this morning. Pt states the falls were from "his knees giving out on him." Pt is scheduled for knee surgery. Pt denies pain and ambulated to the room with a cane. Pt states his only complaint today is weakness and decreased appetite. He states he did not get dizzy or loose consciousness with the falls and did not hit his head.         Review of patient's allergies indicates:   Allergen Reactions    Bee pollen     Grass pollen-june grass standard      Past Medical History:   Diagnosis Date    Cervical radiculopathy     Chronic pain     Chronic renal impairment     COVID-19 12/27/2022    Depression     Depressive disorder 05/12/2022    Digestive disorder     GERD (gastroesophageal reflux disease)     History of prediabetes 11/16/2022    Hypercholesteremia     Hypertension     Left ventricular hypertrophy     Lumbosacral spondylosis     Positive colorectal cancer screening using Cologuard test 12/15/2022    Prediabetes     Pulmonary nodules     repeat CT due 12/03/21     Past Surgical History:   Procedure Laterality Date    ABDOMINAL SURGERY      inguinal hernia repair    BACK SURGERY      Bilateral L3-S1 MBB Bilateral 2-, 1-, 1-, 12-    Dr Jorge Parker    EYE SURGERY      HERNIA REPAIR      KNEE ARTHROSCOPY W/ MENISCAL REPAIR Right     KNEE ARTHROSCOPY W/ MENISCECTOMY Left 01/11/2023    Procedure: ARTHROSCOPY, KNEE, WITH MENISCECTOMY;  Surgeon: Dada Enriquez MD;  Location: [facility];  Service: Orthopedics;  Laterality: Left;    KNEE SURGERY Left     LATERAL RETINACULA RELEASE OF KNEE Left 01/11/2023    Procedure: ARTHROSCOPIC RELEASE, KNEE, LATERAL RETINACULA;  " Surgeon: Dada Enriquez MD;  Location: [facility];  Service: Orthopedics;  Laterality: Left;    PROSTATECTOMY  03/1996    prostate cancer    RADIOFREQUENCY ABLATION OF LUMBAR MEDIAL BRANCH NERVE AT SINGLE LEVEL Right 04/01/2021    Procedure: Radiofrequency Ablation, Nerve, Spinal, Lumbar, Medial Branch, 1 Level;  Surgeon: Jordana Patel MD;  Location: [facility];  Service: Pain Management;  Laterality: Right;  Right L3-S1 RFTC    RADIOFREQUENCY ABLATION OF LUMBAR MEDIAL BRANCH NERVE AT SINGLE LEVEL Right 02/09/2023    Procedure: Radiofrequency Ablation, Nerve, Spinal, Lumbar, Medial Branch, Level L4-S1;  Surgeon: Jordana Patel MD;  Location: [facility];  Service: Pain Management;  Laterality: Right;  bonita left after right is done    RADIOFREQUENCY ABLATION OF LUMBAR MEDIAL BRANCH NERVE AT SINGLE LEVEL Left 02/28/2023    Procedure: RADIOFREQUENCY ABLATION, NERVE, SPINAL, LUMBAR, MEDIAL BRANCH, 1 LEVEL;  Surgeon: Jordana Patel MD;  Location: [facility];  Service: Pain Management;  Laterality: Left;    RADIOFREQUENCY THERMOCOAGULATION Bilateral 03/20/2012    Dr Parker    RADIOFREQUENCY THERMOCOAGULATION Left 03/18/2021    Procedure: RADIOFREQUENCY THERMAL COAGULATION;  Surgeon: Jordana Patel MD;  Location: [facility];  Service: Pain Management;  Laterality: Left;  BILATERAL L3-S1 RFTC LEFT SIDE FIRST    SELECTIVE INJECTION OF ANESTHETIC AGENT AROUND LUMBAR SPINAL NERVE ROOT BY TRANSFORAMINAL APPROACH Right 01/13/2022    Procedure: Right L4-5,5-S1 TFESI;  Surgeon: Jordana Patel MD;  Location: [facility];  Service: Pain Management;  Laterality: Right;  PT AWARE TO BE TESTED VIA PC    SHOULDER ARTHROSCOPY Right     SPINE SURGERY  1995    VASECTOMY  Mar 1996     Family History   Problem Relation Name Age of Onset    Diabetes Mother Brittany Tannermadhavi     Hypertension Father Erik Shaygonzalo     Heart disease Father Erik Shaygonzalo         CABG    Stroke  Father Erik Jalloh     Alzheimer's disease Maternal Grandmother      Diabetes Paternal Grandmother Joselyn Jalloh     Heart attack Paternal Grandfather      Cancer Other      Diabetes Other      Heart disease Other      Hypertension Other      Stroke Other       Social History     Tobacco Use    Smoking status: Former     Current packs/day: 0.00     Average packs/day: 2.0 packs/day for 30.0 years (60.0 ttl pk-yrs)     Types: Cigarettes, Pipe, Cigars     Quit date:      Years since quittin.3    Smokeless tobacco: Never   Substance Use Topics    Alcohol use: Not Currently    Drug use: Never     Review of Systems   Constitutional:  Positive for appetite change. Negative for fever.   HENT:  Negative for sore throat.    Respiratory:  Negative for shortness of breath.    Cardiovascular:  Negative for chest pain.   Gastrointestinal:  Negative for nausea.   Genitourinary:  Negative for dysuria.   Musculoskeletal:  Negative for back pain.   Skin:  Negative for rash.   Neurological:  Positive for weakness.   Hematological:  Does not bruise/bleed easily.   Psychiatric/Behavioral:  Negative for behavioral problems.        Physical Exam     Initial Vitals [24 0922]   BP Pulse Resp Temp SpO2   (!) 142/92 90 18 98.8 °F (37.1 °C) 97 %      MAP       --         Physical Exam    Nursing note and vitals reviewed.  Constitutional: He appears well-developed.   Cardiovascular:  Normal rate and regular rhythm.           Pulmonary/Chest: Breath sounds normal.   Musculoskeletal:         General: Normal range of motion.     Neurological: He is alert and oriented to person, place, and time. He displays normal reflexes. No cranial nerve deficit or sensory deficit.   Psychiatric: He has a normal mood and affect. Thought content normal.         Medical Screening Exam   See Full Note    ED Course   Procedures  Labs Reviewed   URINALYSIS, REFLEX TO URINE CULTURE - Abnormal; Notable for the following components:       Result  Value    Leukocytes, UA Large (*)     Nitrites, UA Positive (*)     Protein, UA 70 (*)     Blood, UA Trace (*)     All other components within normal limits   BASIC METABOLIC PANEL - Abnormal; Notable for the following components:    Chloride 109 (*)     Glucose 109 (*)     BUN 26 (*)     Creatinine 1.47 (*)     eGFR 50 (*)     All other components within normal limits   CBC WITH DIFFERENTIAL - Abnormal; Notable for the following components:    RBC 4.17 (*)     Hemoglobin 12.8 (*)     .4 (*)     MCHC 30.0 (*)     Neutrophils % 77.1 (*)     Lymphocytes % 14.0 (*)     Monocytes % 6.6 (*)     Neutrophils, Abs 8.12 (*)     All other components within normal limits   URINALYSIS, MICROSCOPIC - Abnormal; Notable for the following components:    WBC, UA >182 (*)     RBC, UA 4 (*)     Bacteria, UA Moderate (*)     Squamous Epithelial Cells, UA Occasional (*)     WBC Clumps Occasional (*)     Mucous Occasional (*)     All other components within normal limits   CULTURE, URINE   CBC W/ AUTO DIFFERENTIAL    Narrative:     The following orders were created for panel order CBC auto differential.  Procedure                               Abnormality         Status                     ---------                               -----------         ------                     CBC with Differential[[id number]]       Abnormal            Final result                 Please view results for these tests on the individual orders.          Imaging Results              CT Head Without Contrast (Final result)  Result time 05/12/24 11:45:05      Final result by Hermse Strauss MD (05/12/24 11:45:05)                   Impression:      1. No acute intracranial abnormality.    2.  similar mild parenchymal atrophy and findings suggestive of sequela of chronic microvascular ischemia.    Place of service: [facility]      Electronically signed by: Hermes Strauss  Date:    05/12/2024  Time:    11:45               Narrative:     EXAMINATION:  CT HEAD WITHOUT CONTRAST    CLINICAL HISTORY:  falls;    TECHNIQUE:  Axial CT imaging from the vertex to skull the skull base was performed without contrast. Total DLP: 1051 mGy*cm    Dose reduction:    The CT exam was performed using one or more dose reduction techniques: Automatic exposure control, automated adjustment of the MA and/or kVP according to patient size, or use of iterative reconstruction technique.    COMPARISON:  06/20/2023.    FINDINGS:  Atrophy is noted with prominence of sulci and ventricles.  The basilar cisterns are within normal limits in appearance. There is no evidence of hydrocephalus, midline shift or mass effect.  Periventricular hypodensity changes are noted which do not demonstrate mass effect on are favored to represent sequela of chronic microvascular ischemia.  And white matter differentiation is preserved. There is no CT evidence of acute intracranial hemorrhage or infarction.  Posterior right no fossa of CSF density probable arachnoid cyst appears unchanged.    The calvarium is intact. The visualized orbits and globes appear within normal limits.  Several left ethmoidal air cells are partially opacified.  Otherwise, the paranasal sinuses and mastoid air cells are predominantly clear. Scalp soft tissues appear unremarkable.                                       X-Ray Chest PA And Lateral (Final result)  Result time 05/12/24 11:37:27      Final result by Hermes Strauss MD (05/12/24 11:37:27)                   Impression:      No acute cardiopulmonary process.    Place of service: [facility]      Electronically signed by: Hermes Strauss  Date:    05/12/2024  Time:    11:37               Narrative:    EXAMINATION:  XR CHEST PA AND LATERAL    CLINICAL HISTORY:  Weakness    TECHNIQUE:  PA and lateral    COMPARISON:  01/04/2023    FINDINGS:  The cardiomediastinal silhouette is within normal limits.  The lungs are clear. There is no pneumothorax or pleural  "effusion.    There is no acute osseous or soft tissue abnormality.                                       Medications - No data to display  Medical Decision Making  Pt presents with a fall in his home on Friday and fell this morning. Pt states the falls were from "his knees giving out on him." Pt is scheduled for knee surgery. Pt denies pain and ambulated to the room with a cane. Pt states his only complaint today is weakness and decreased appetite. He states he did not get dizzy or loose consciousness with the falls and did not hit his head.     Amount and/or Complexity of Data Reviewed  Labs: ordered.     Details: Cbc, cmp stable, UA- large leukocytes, positive nitrates   Radiology: ordered.     Details: Chest xray is normal , ct head-no acute findings     Risk  Prescription drug management.  Risk Details: Pt is discharged home in stable condition with diagnosis of UTI and falls.                                       Clinical Impression:   Final diagnoses:  [R53.1] Weakness  [W19.XXXA] Fall, initial encounter (Primary)  [N30.00] Acute cystitis without hematuria        ED Disposition Condition    Discharge Stable          ED Prescriptions       Medication Sig Dispense Start Date End Date Auth. Provider    nitrofurantoin, macrocrystal-monohydrate, (MACROBID) 100 MG capsule Take 1 capsule (100 mg total) by mouth 2 (two) times daily. for 10 days 20 capsule 5/12/2024 5/22/2024 Mary Anne Aguero FNP          Follow-up Information    None          Mary Anne Aguero FNP  05/12/24 1250    "

## 2024-05-12 NOTE — ED TRIAGE NOTES
Fall (PRESENTS TO EMERGENCY DEPARTMMENT WITH COMPLAINT OF FALL ON FRIDAY. REPORTS THAT HE HAS FALLEN 2 TIMES SINCE. LAST NIGHT FELL AND WAS IN THE FLOOR FOR 30 MINUTES )

## 2024-05-13 ENCOUNTER — TELEPHONE (OUTPATIENT)
Dept: EMERGENCY MEDICINE | Facility: HOSPITAL | Age: 74
End: 2024-05-13
Payer: MEDICARE

## 2024-05-13 DIAGNOSIS — G47.33 OBSTRUCTIVE SLEEP APNEA (ADULT) (PEDIATRIC): Primary | ICD-10-CM

## 2024-05-14 LAB — UA COMPLETE W REFLEX CULTURE PNL UR: ABNORMAL

## 2024-05-16 ENCOUNTER — OFFICE VISIT (OUTPATIENT)
Dept: FAMILY MEDICINE | Facility: CLINIC | Age: 74
End: 2024-05-16
Payer: MEDICARE

## 2024-05-16 VITALS
OXYGEN SATURATION: 96 % | HEART RATE: 72 BPM | BODY MASS INDEX: 28.35 KG/M2 | DIASTOLIC BLOOD PRESSURE: 62 MMHG | HEIGHT: 70 IN | SYSTOLIC BLOOD PRESSURE: 98 MMHG | RESPIRATION RATE: 20 BRPM | TEMPERATURE: 98 F | WEIGHT: 198 LBS

## 2024-05-16 DIAGNOSIS — R45.89 DEPRESSED MOOD: ICD-10-CM

## 2024-05-16 DIAGNOSIS — B96.20 E. COLI UTI (URINARY TRACT INFECTION): Primary | ICD-10-CM

## 2024-05-16 DIAGNOSIS — N39.0 E. COLI UTI (URINARY TRACT INFECTION): Primary | ICD-10-CM

## 2024-05-16 DIAGNOSIS — N18.31 CHRONIC KIDNEY DISEASE, STAGE 3A: Chronic | ICD-10-CM

## 2024-05-16 PROBLEM — J06.9 ACUTE URI: Status: RESOLVED | Noted: 2024-02-01 | Resolved: 2024-05-16

## 2024-05-16 PROBLEM — J02.9 SORE THROAT: Status: RESOLVED | Noted: 2022-03-15 | Resolved: 2024-05-16

## 2024-05-16 PROCEDURE — 96372 THER/PROPH/DIAG INJ SC/IM: CPT | Mod: ,,, | Performed by: NURSE PRACTITIONER

## 2024-05-16 PROCEDURE — 99213 OFFICE O/P EST LOW 20 MIN: CPT | Mod: ,,, | Performed by: NURSE PRACTITIONER

## 2024-05-16 RX ORDER — FLUOXETINE 10 MG/1
10 CAPSULE ORAL DAILY
Qty: 30 CAPSULE | Refills: 0 | Status: ON HOLD | OUTPATIENT
Start: 2024-05-16

## 2024-05-16 RX ORDER — CEFTRIAXONE 1 G/1
1 INJECTION, POWDER, FOR SOLUTION INTRAMUSCULAR; INTRAVENOUS
Status: COMPLETED | OUTPATIENT
Start: 2024-05-16 | End: 2024-05-16

## 2024-05-16 RX ORDER — TRIMETHOPRIM 100 MG/1
100 TABLET ORAL EVERY 12 HOURS
Qty: 20 TABLET | Refills: 0 | Status: SHIPPED | OUTPATIENT
Start: 2024-05-16 | End: 2024-06-02 | Stop reason: SDUPTHER

## 2024-05-16 RX ADMIN — CEFTRIAXONE 1 G: 1 INJECTION, POWDER, FOR SOLUTION INTRAMUSCULAR; INTRAVENOUS at 10:05

## 2024-05-16 NOTE — ASSESSMENT & PLAN NOTE
Hx depression.  Not controlled now, more tearful.  Resume fluoxetine 10 mg daily and will discuss possible dose increase at f/u, was on 20 mg in past.

## 2024-05-16 NOTE — PATIENT INSTRUCTIONS
Patient Education       Preventing Falls   The Basics   Written by the doctors and editors at [facility]   Am I at risk of falling? -- Your risk of falling increases as you grow older. That's because getting older can make it harder to walk steadily and keep your balance. Also, the effects of falls are more serious in older people.  Overall, 3 to 4 out of every 10 people over the age of 65 fall each year. Up to 75 percent of people who fracture a hip never recover to the point they were before they had their fracture. If you have fallen in the past, you are at higher risk of falling again.  Several things can increase your risk of a fall, including:  Illness  A change in the medicines you take  An unsafe or unfamiliar setting (for example, a room with rugs or furniture that might trip you, or an area you don't know well)  How can my doctor help me to avoid falling? -- Your doctor can talk to you about the following things:  Past falls - It is important to tell your doctor about any times you have fallen or almost fallen. He or she can then suggest ways to prevent another fall.  Your health conditions - Some health problems can put you at risk of falling. These include conditions that affect eyesight, hearing, muscle strength, or balance.  The medicines you take - Certain medicines can increase the risk of falling. These include some medicines that are used for sleeping problems, anxiety, high blood pressure, or depression. Adding new medicines, or changing doses of some medicines, can also affect your risk of falling.  The more your doctor knows about your situation, the better he or she will be able to help you. For example, if you fell because you have a condition that causes pain, your doctor might suggest treatments to deal with the pain. Or if one of your medicines is making you dizzy and more likely to fall, your doctor might switch you to a different medicine.  Is there anything I can do on my own? -- Yes. To  help keep from falling, you can:  Make your home safer - To avoid falling at home, get rid of things that might make you trip or slip. This might include furniture, electrical cords, clutter, and loose rugs (figure 1). Keep your home well-lit so that you can easily see where you are going. Avoid storing things in high places so you don't have to reach or climb.  Wear sturdy shoes that fit well - Wearing shoes with high heels or slippery soles, or shoes that are too loose, can lead to falls. Walking around in bare feet, or only socks, can also increase your risk of falling.  Take vitamin D pills - Taking vitamin D might lower the risk of falls in older people. This is because vitamin D helps make bones and muscles stronger. Your doctor can talk to you about whether you should take extra vitamin D, and how much.  Stay active - Exercising on a regular basis can help lower your risk of falling. It might also help prevent you from getting hurt if you do fall. It is best to do a few different activities that help with both strength and balance. There are many kinds of exercise that can be safe for older people. These include walking, swimming, and Pérez Chi (a Chinese martial art that involves slow, gentle movements).  Use a cane, walker, and other safety devices - If your doctor recommends that you use a cane or walker, be sure that it's the right size and you know how to use it. There are other devices that might help you avoid falling, too. These include grab bars or a sturdy seat for the shower, non-slip bath mats, and hand rails or treads for the stairs (to prevent slipping).  If you worry that you could fall, there are also alarm buttons that let you call for help if you fall and can't get up.  What should I do if I fall? -- If you fall, see your doctor right away, even if you aren't hurt. Your doctor can try to figure out what caused you to fall, and how likely you are to fall again. He or she will do an exam and  talk to you about your health problems, medicines, and activities. Then he or she can suggest things you can do to avoid falling again.  Many older people have a hard time recovering after a fall. Doing things to prevent falling can help you to protect your health and independence.  All topics are updated as new evidence becomes available and our peer review process is complete.  This topic retrieved from [employer] on: Sep 21, 2021.  Topic 70330 Version 18.0  Release: 29.4.2 - C29.263  © 2021 UpToDate, Inc. and/or its affiliates. All rights reserved.  figure 1: How to avoid falling at home     This picture shows some of the things that can cause a fall in your home. Look around and remove any loose rugs, electrical cords, clutter, or furniture that could trip you.  Graphic 22485 Version 1.0    Consumer Information Use and Disclaimer   This information is not specific medical advice and does not replace information you receive from your health care provider. This is only a brief summary of general information. It does NOT include all information about conditions, illnesses, injuries, tests, procedures, treatments, therapies, discharge instructions or life-style choices that may apply to you. You must talk with your health care provider for complete information about your health and treatment options. This information should not be used to decide whether or not to accept your health care provider's advice, instructions or recommendations. Only your health care provider has the knowledge and training to provide advice that is right for you. The use of this information is governed by the [employer] End User License Agreement, available at https://www.WEISSENHAUS.Extreme DA/en/solutions/PhantomAlert.[email or web address]/pavan.The use of [employer] content is governed by the [employer] Terms of Use. ©2021 UpToDate, Inc. All rights reserved.  Copyright   © 2021 UpToDate, Inc. and/or its affiliates. All rights reserved.     Patient/Caregiver provided printed discharge information.

## 2024-05-16 NOTE — ASSESSMENT & PLAN NOTE
Culture reviewed and will change to stronger treatment due to history and TKR surgery planned in 1 mth.    Stop macrobid.  Rocephin 1 GM IM  Start trimethoprim & complete as prescribed.  F/u 2 wks.

## 2024-05-16 NOTE — PROGRESS NOTES
[facility] - FAMILY MEDICINE       PATIENT NAME: Tray Jalloh   : 1950    AGE: 73 y.o. DATE OF ENCOUNTER: 24    MRN: [id number]      PCP: Elizabeth Lange FNP    Reason for Visit / Chief Complaint:     274}  Chief Complaint   Patient presents with    Fall     Friday night fell in bathroom, did not hurt, but unable to get up. Fell again Saturday.     Had gone to ER on 24 here at [facility]:prescribed him medicine for UTI.    Fatigue     Macrobid causes fatigue.     Anorexia     Having loss of appetite.        Subjective:     HPI:    Presents for ER f/u for falls and UTI.  Feeling very weak and fatigued.  Also very emotional and feeling down, needs to resume med, previously on fluoxetine.    Fri night got OOB to use bathroom, sat on toilet and when he got up he fell in bathroom, had no strength and couldn't get up for an hour then the next night got OOB to get another blanket and fell  Went to ER  am,  24, CVA ruled out.  Dx w/ UTI.  Wasn't advised to hold methenamine while on antibiotic.    Has urinary incontinence and wears pads.  Hx prostatectomy d/t cancer.    Review of Systems:     Review of Systems   Constitutional:  Positive for fatigue. Negative for fever.   Respiratory: Negative.  Negative for cough and wheezing. Shortness of breath: chronic, intermittent, hx COPD.   Cardiovascular: Negative.    Gastrointestinal: Negative.    Genitourinary: Negative.    Musculoskeletal:  Positive for arthralgias, back pain, gait problem and joint swelling (left knee, chronic).   Skin: Negative.    Neurological:  Positive for weakness.   Psychiatric/Behavioral:  Positive for dysphoric mood. Negative for self-injury and suicidal ideas.        Allergies and Meds: 274}     Review of patient's allergies indicates:   Allergen Reactions    Bee pollen     Grass pollen- grass standard         Current Outpatient Medications   Medication Sig Dispense Refill    acetaminophen  (TYLENOL) 325 MG tablet Take 325 mg by mouth every 6 (six) hours as needed for Pain.      albuterol (VENTOLIN HFA) 90 mcg/actuation inhaler Inhale 2 puffs into the lungs every 6 (six) hours as needed for Wheezing or Shortness of Breath. Rescue 8 g 3    amLODIPine (NORVASC) 5 MG tablet Take 2 tablets (10 mg total) by mouth once daily. 180 tablet 3    ascorbic acid, vitamin C, (VITAMIN C) 1000 MG tablet Take 1,000 mg by mouth once daily.      cetirizine (ZYRTEC) 10 MG tablet Take 1 tablet (10 mg total) by mouth once daily. 90 tablet 3    cholecalciferol, vitamin D3, (VITAMIN D3) 50 mcg (2,000 unit) Cap Take 1 capsule by mouth once daily.      CRESTOR 20 mg tablet Take 10 mg by mouth once daily.      cyanocobalamin (VITAMIN B-12) 1000 MCG tablet Take 100 mcg by mouth once daily.      diclofenac sodium (VOLTAREN) 1 % Gel Apply topically 4 (four) times daily. (Patient taking differently: Apply topically 4 (four) times daily as needed.) 900 g 5    diphenhydrAMINE (BENADRYL) 25 mg capsule Take 25 mg by mouth every 6 (six) hours as needed for Itching.      fluticasone propionate (FLONASE) 50 mcg/actuation nasal spray 1 spray by Each Nostril route daily as needed for Rhinitis or Allergies.      hydrocortisone 2.5 % cream Apply 30 g/kg topically 2 (two) times daily as needed. Apply to face for dry skin      ketoconazole (NIZORAL) 2 % cream Apply topically daily as needed.      lisinopriL (PRINIVIL,ZESTRIL) 40 MG tablet Take 1 tablet (40 mg total) by mouth once daily. 90 tablet 3    metaxalone (SKELAXIN) 800 MG tablet Take 1 tablet (800 mg total) by mouth nightly as needed for Pain. 90 tablet 3    methenamine (HIPREX) 1 gram Tab Take 1 tablet (1 g total) by mouth 2 (two) times daily. 180 tablet 3    multivitamin Tab Take 1 tablet by mouth once daily. 90 tablet 3    omega-3 fatty acids/fish oil (FISH OIL-OMEGA-3 FATTY ACIDS) 300-1,000 mg capsule Take 1 capsule by mouth 2 (two) times a day.      omeprazole (PRILOSEC) 20 MG  capsule Take 1 capsule (20 mg total) by mouth once daily. 90 capsule 3    pregabalin (LYRICA) 75 MG capsule TAKE ONE (1) CAPSULE BY MOUTH TWO (2)  TIMES DAILY. 60 capsule 5    tolterodine (DETROL LA) 2 MG Cp24 Take 2 mg by mouth once daily.      traMADoL (ULTRAM) 50 mg tablet Take 1 tablet (50 mg total) by mouth every 8 (eight) hours as needed for Pain. 25 tablet 0    triamcinolone acetonide 0.1% (KENALOG) 0.1 % cream Apply 15 g topically 2 (two) times daily as needed. Apply to back for dry skin and itching      vitamin E 1000 UNIT capsule Take 1,000 Units by mouth once daily.      zinc gluconate 50 mg tablet Take 50 mg by mouth once daily.      FLUoxetine 10 MG capsule Take 1 capsule (10 mg total) by mouth once daily. 30 capsule 0    trimethoprim (TRIMPEX) 100 mg Tab Take 1 tablet (100 mg total) by mouth every 12 (twelve) hours. for 10 days 20 tablet 0     No current facility-administered medications for this visit.       Labs:274}   I have reviewed labs below:   Latest Reference Range & Units 05/12/24 12:08   Color, UA Colorless, Straw, Yellow, Light Yellow, Dark Yellow  Yellow   Appearance, UA Clear  Turbid   Specific Gravity, UA <=1.030  1.027   pH, UA 5.0 to 8.0 pH Units 6.0   Protein, UA Negative  70 !   Glucose, UA Normal mg/dL Normal   Ketones, UA Negative mg/dL Negative   Blood, UA Negative  Trace !   NITRITE UA Negative  Positive !   UROBILINOGEN UA 0.2, 1.0, Normal mg/dL Normal   Bilirubin (UA) Negative  Negative   Leukocyte Esterase, UA Negative  Large !   Bacteria, UA None Seen /hpf Moderate !   Squamous Epithelial Cells, UA None Seen /HPF Occasional !   WBC Clumps, UA None Seen /hpf Occasional !     Urine Culture >100,000 Escherichia coli Abnormal            Resulting Agency:     Susceptibility     Escherichia coli     Not Specified     Ampicillin >16 µg/ml Resistant     Ampicillin/Sulbactam >16/8 µg/ml Resistant     Aztreonam <=4 µg/ml Sensitive     Cefazolin >16 µg/ml Resistant     Cefepime <=2 µg/ml  Sensitive     Ceftazidime <=1 µg/ml Sensitive     Ceftazidime/Avibactam <=8 µg/ml Sensitive     Ceftriaxone <=1 µg/ml Sensitive     Ciprofloxacin <=0.25 µg/ml Sensitive     Ertapenem <=0.5 µg/ml Sensitive     Gentamicin <=2 µg/ml Sensitive     Meropenem <=1 µg/ml Sensitive     Nitrofurantoin <=32 µg/ml Sensitive     Piperacillin/Tazobactam <=8 µg/ml Sensitive     Tetracycline <=4 µg/ml Sensitive     Tobramycin <=2 µg/ml Sensitive     Trimeth/Sulfa <=2/38 µg/ml Sensitive               Linear View         Specimen Collected: 05/12/24 12:08 CDT Last Resulted: 05/14/24 07:19 CDT           Lab Results   Component Value Date    WBC 10.51 05/12/2024    RBC 4.17 (L) 05/12/2024    HGB 12.8 (L) 05/12/2024    HCT 42.7 05/12/2024     05/12/2024     05/12/2024    K 3.5 05/12/2024     (H) 05/12/2024    CALCIUM 9.1 05/12/2024     (H) 05/12/2024    BUN 26 (H) 05/12/2024    CREATININE 1.47 (H) 05/12/2024    ESTGFRAFRICA 65 12/03/2020    EGFRNONAA 70 05/12/2022    ALT 20 06/07/2023    AST 14 (L) 06/07/2023    INR 1.19 07/28/2020    CHOL 140 05/15/2023    TRIG 105 05/15/2023    HDL 40 05/15/2023    LDLCALC 79 05/15/2023    TSH 1.950 06/07/2023    PSA <0.010 05/15/2023    HGBA1C 5.4 12/12/2023    MICROALBUR 3.1 (H) 05/12/2022     Medical History: 274}     Past Medical History:   Diagnosis Date    Cervical radiculopathy     Chronic pain     Chronic renal impairment     COVID-19 12/27/2022    Depression     Depressive disorder 05/12/2022    Digestive disorder     GERD (gastroesophageal reflux disease)     History of prediabetes 11/16/2022    Hypercholesteremia     Hypertension     Left ventricular hypertrophy     Lumbosacral spondylosis     Positive colorectal cancer screening using Cologuard test 12/15/2022    Prediabetes     Pulmonary nodules     repeat CT due 12/03/21      Social History     Tobacco Use   Smoking Status Former    Current packs/day: 0.00    Average packs/day: 2.0 packs/day for 30.0 years (60.0  ttl pk-yrs)    Types: Cigarettes, Pipe, Cigars    Quit date:     Years since quittin.3    Passive exposure: Past   Smokeless Tobacco Never      Past Surgical History:   Procedure Laterality Date    ABDOMINAL SURGERY      inguinal hernia repair    BACK SURGERY      Bilateral L3-S1 MBB Bilateral 2-, 2021, 1-, 12-    Dr Jorge Parker    EYE SURGERY      HERNIA REPAIR      KNEE ARTHROSCOPY W/ MENISCAL REPAIR Right     KNEE ARTHROSCOPY W/ MENISCECTOMY Left 2023    Procedure: ARTHROSCOPY, KNEE, WITH MENISCECTOMY;  Surgeon: Dada Enriquez MD;  Location: [facility] ORTHO OR;  Service: Orthopedics;  Laterality: Left;    KNEE SURGERY Left     LATERAL RETINACULA RELEASE OF KNEE Left 2023    Procedure: ARTHROSCOPIC RELEASE, KNEE, LATERAL RETINACULA;  Surgeon: Dada Enriquez MD;  Location: [facility] ORTHO OR;  Service: Orthopedics;  Laterality: Left;    PROSTATECTOMY  1996    prostate cancer    RADIOFREQUENCY ABLATION OF LUMBAR MEDIAL BRANCH NERVE AT SINGLE LEVEL Right 2021    Procedure: Radiofrequency Ablation, Nerve, Spinal, Lumbar, Medial Branch, 1 Level;  Surgeon: Jordana Patel MD;  Location: [facility] PAIN MGMT;  Service: Pain Management;  Laterality: Right;  Right L3-S1 RFTC    RADIOFREQUENCY ABLATION OF LUMBAR MEDIAL BRANCH NERVE AT SINGLE LEVEL Right 2023    Procedure: Radiofrequency Ablation, Nerve, Spinal, Lumbar, Medial Branch, Level L4-S1;  Surgeon: Jordana Patel MD;  Location: [facility] PAIN MGMT;  Service: Pain Management;  Laterality: Right;  bonita left after right is done    RADIOFREQUENCY ABLATION OF LUMBAR MEDIAL BRANCH NERVE AT SINGLE LEVEL Left 2023    Procedure: RADIOFREQUENCY ABLATION, NERVE, SPINAL, LUMBAR, MEDIAL BRANCH, 1 LEVEL;  Surgeon: Jordana Patel MD;  Location: [facility] PAIN MGMT;  Service: Pain Management;  Laterality: Left;    RADIOFREQUENCY THERMOCOAGULATION Bilateral 2012    Dr Parker    RADIOFREQUENCY THERMOCOAGULATION  "Left 03/18/2021    Procedure: RADIOFREQUENCY THERMAL COAGULATION;  Surgeon: Jordana Patel MD;  Location: [facility]IN MGMT;  Service: Pain Management;  Laterality: Left;  BILATERAL L3-S1 RFTC LEFT SIDE FIRST    SELECTIVE INJECTION OF ANESTHETIC AGENT AROUND LUMBAR SPINAL NERVE ROOT BY TRANSFORAMINAL APPROACH Right 01/13/2022    Procedure: Right L4-5,5-S1 TFESI;  Surgeon: Jordana Patel MD;  Location: [facility] PAIN MGMT;  Service: Pain Management;  Laterality: Right;  PT AWARE TO BE TESTED VIA PC    SHOULDER ARTHROSCOPY Right     SPINE SURGERY  1995    VASECTOMY  Mar 1996        Health Maintenance: {      Health Maintenance         Date Due Completion Date    Annual UACr 05/12/2023 5/12/2022    Hemoglobin A1c (Prediabetes) 12/12/2024 12/12/2023    Colorectal Cancer Screening 02/15/2028 2/15/2023    Override on 9/4/2012: Done (Dr. Hare. Repeat in 10 years. Scanned into documents.)    Lipid Panel 05/15/2028 5/15/2023    TETANUS VACCINE 10/27/2030 10/27/2020          Immunization History   Administered Date(s) Administered    COVID-19, MRNA, LN-S, PF (MODERNA FULL 0.5 ML DOSE) 03/02/2021, 03/30/2021    Influenza 10/14/2009, 11/01/2011, 10/01/2012, 10/01/2014, 10/01/2022    Influenza - High Dose - PF (65 years and older) 11/08/2005, 12/13/2006, 11/06/2007, 10/23/2008, 10/26/2021    Influenza - Quadrivalent - High Dose - PF (65 years and older) 10/27/2020    Pneumococcal 11/06/2007    Pneumococcal Conjugate - 13 Valent 08/17/2016    Pneumococcal Polysaccharide - 23 Valent 11/06/2007, 01/03/2018    Td (Adult), Unspecified Formulation 06/11/2004    Tdap 10/27/2020    Zoster Recombinant 10/27/2020, 01/27/2021       Objective:  274}   Vital Signs  Temp: 97.7 °F (36.5 °C)  Temp Source: Oral  Pulse: 72  Resp: 20  SpO2: 96 %  BP: 98/62  BP Location: Right arm  Patient Position: Sitting  Pain Score:   5  Pain Loc: Knee  Height and Weight  Height: 5' 10" (177.8 cm)  Weight: 89.8 kg (198 lb)  BSA (Calculated - sq m): 2.11 " sq meters  BMI (Calculated): 28.4  Weight in (lb) to have BMI = 25: 173.9    Over the last two weeks how often have you been bothered by little interest or pleasure in doing things: 0  Over the last two weeks how often have you been bothered by feeling down, depressed or hopeless: 0  PHQ-2 Total Score: 0  PHQ-9 Score: 0  PHQ-9 Interpretation: Minimal or None    Wt Readings from Last 3 Encounters:   05/16/24 89.8 kg (198 lb)   05/12/24 88 kg (194 lb)   05/02/24 88 kg (194 lb)     Physical Exam  Vitals and nursing note reviewed.   Constitutional:       General: He is not in acute distress.     Appearance: Normal appearance.   HENT:      Head: Normocephalic.   Eyes:      Conjunctiva/sclera: Conjunctivae normal.   Neck:      Thyroid: No thyromegaly or thyroid tenderness.      Trachea: Trachea normal.   Cardiovascular:      Rate and Rhythm: Normal rate and regular rhythm.      Pulses: Normal pulses.      Heart sounds: Normal heart sounds.   Pulmonary:      Effort: Pulmonary effort is normal. No respiratory distress.      Breath sounds: Normal breath sounds. No wheezing, rhonchi or rales.   Abdominal:      Palpations: Abdomen is soft.      Tenderness: There is no abdominal tenderness. There is no right CVA tenderness or left CVA tenderness.   Musculoskeletal:      Cervical back: Neck supple.      Right lower leg: No edema.      Left lower leg: No edema.   Lymphadenopathy:      Cervical: No cervical adenopathy.   Skin:     General: Skin is warm and dry.   Neurological:      Mental Status: He is alert and oriented to person, place, and time.      Motor: Weakness (generalized) present.      Gait: Gait abnormal (using cane).   Psychiatric:         Attention and Perception: Attention normal.         Mood and Affect: Mood is depressed. Affect is tearful.         Speech: Speech normal.         Behavior: Behavior normal. Behavior is cooperative.         Thought Content: Thought content normal. Thought content is not paranoid.  Thought content does not include homicidal or suicidal ideation.         Cognition and Memory: Cognition normal.         Judgment: Judgment normal.          Assessment and Plan: 274}     1. E. coli UTI (urinary tract infection)  Assessment & Plan:  Culture reviewed and will change to stronger treatment due to history and TKR surgery planned in 1 mth.    Stop macrobid.  Rocephin 1 GM IM  Start trimethoprim & complete as prescribed.  F/u 2 wks.    Orders:  -     trimethoprim (TRIMPEX) 100 mg Tab; Take 1 tablet (100 mg total) by mouth every 12 (twelve) hours. for 10 days  Dispense: 20 tablet; Refill: 0  -     cefTRIAXone injection 1 g    2. Chronic kidney disease, stage 3a  Assessment & Plan:  Stable, continue monitoring.      3. Depressed mood  Assessment & Plan:  Hx depression.  Not controlled now, more tearful.  Resume fluoxetine 10 mg daily and will discuss possible dose increase at f/u, was on 20 mg in past.    Orders:  -     FLUoxetine 10 MG capsule; Take 1 capsule (10 mg total) by mouth once daily.  Dispense: 30 capsule; Refill: 0      Diagnosis, risks, benefits, and side effects of meds and treatment plan were discussed with the patient.  Any workup results obtained in office or prior to appointment were reviewed.  Patient to call or follow-up with any new or worsening symptoms or problems prior to next appointment.  Go to ER for any urgent complications.  All questions were answered to the satisfaction of the patient, and pt verbalized understanding and agreement to treatment plan.      Follow up in about 2 weeks (around 5/30/2024) for UTI.    Signature:  MOLLY Murry-BC    Future Appointments   Date Time Provider Department Center   6/3/2024  7:30 PM SLEEP LAB, [facility] SLEEP [facility]   6/10/2024  1:40 PM Elizabeth Lange FNP [facility]   6/13/2024  9:30 AM Dada Enriquez MD [facility] ORTHO [facility]   6/19/2024 10:20 AM Elizabeth Lange FNP [facility]    7/17/2024  8:00 AM AWV NURSE, [facility]   12/9/2024  8:30 AM Enrico Jauregui FNP [facility] NEURO [facility]   4/9/2025 10:00 AM Rosalia Preciado MD [facility]

## 2024-05-21 DIAGNOSIS — R13.10 DYSPHAGIA, UNSPECIFIED TYPE: ICD-10-CM

## 2024-05-21 DIAGNOSIS — R09.A2 GLOBUS SENSATION: Primary | ICD-10-CM

## 2024-05-30 ENCOUNTER — OFFICE VISIT (OUTPATIENT)
Dept: FAMILY MEDICINE | Facility: CLINIC | Age: 74
End: 2024-05-30
Payer: MEDICARE

## 2024-05-30 VITALS
HEART RATE: 80 BPM | DIASTOLIC BLOOD PRESSURE: 69 MMHG | OXYGEN SATURATION: 95 % | SYSTOLIC BLOOD PRESSURE: 121 MMHG | BODY MASS INDEX: 28.35 KG/M2 | WEIGHT: 198 LBS | TEMPERATURE: 98 F | HEIGHT: 70 IN | RESPIRATION RATE: 20 BRPM

## 2024-05-30 DIAGNOSIS — Z87.440 HISTORY OF CHRONIC URINARY TRACT INFECTION: ICD-10-CM

## 2024-05-30 DIAGNOSIS — J06.9 VIRAL UPPER RESPIRATORY INFECTION: ICD-10-CM

## 2024-05-30 DIAGNOSIS — I10 ESSENTIAL HYPERTENSION: Chronic | ICD-10-CM

## 2024-05-30 DIAGNOSIS — K21.9 GASTROESOPHAGEAL REFLUX DISEASE WITHOUT ESOPHAGITIS: Chronic | ICD-10-CM

## 2024-05-30 DIAGNOSIS — N39.0 E. COLI UTI (URINARY TRACT INFECTION): Primary | ICD-10-CM

## 2024-05-30 DIAGNOSIS — F33.2 MODERATELY SEVERE RECURRENT MAJOR DEPRESSION: Chronic | ICD-10-CM

## 2024-05-30 DIAGNOSIS — B96.20 E. COLI UTI (URINARY TRACT INFECTION): Primary | ICD-10-CM

## 2024-05-30 DIAGNOSIS — N18.31 CHRONIC KIDNEY DISEASE, STAGE 3A: Chronic | ICD-10-CM

## 2024-05-30 LAB
BILIRUB UR QL STRIP: NEGATIVE
CLARITY UR: CLEAR
COLOR UR: YELLOW
CREAT UR-MCNC: 244 MG/DL (ref 39–259)
CTP QC/QA: YES
GLUCOSE UR STRIP-MCNC: NORMAL MG/DL
KETONES UR STRIP-SCNC: NEGATIVE MG/DL
LEUKOCYTE ESTERASE UR QL STRIP: ABNORMAL
MICROALBUMIN UR-MCNC: 2.5 MG/DL (ref 0–2.8)
MICROALBUMIN/CREAT RATIO PNL UR: 10.2 MG/G (ref 0–30)
MOLECULAR STREP A: NEGATIVE
MUCOUS, UA: ABNORMAL /LPF
NITRITE UR QL STRIP: NEGATIVE
PH UR STRIP: 6 PH UNITS
POC MOLECULAR INFLUENZA A AGN: NEGATIVE
POC MOLECULAR INFLUENZA B AGN: NEGATIVE
PROT UR QL STRIP: 20
RBC # UR STRIP: NEGATIVE /UL
RBC #/AREA URNS HPF: 1 /HPF
SARS-COV-2 RDRP RESP QL NAA+PROBE: NEGATIVE
SP GR UR STRIP: 1.03
SQUAMOUS #/AREA URNS LPF: ABNORMAL /HPF
UROBILINOGEN UR STRIP-ACNC: NORMAL MG/DL
WBC #/AREA URNS HPF: 11 /HPF

## 2024-05-30 PROCEDURE — 99214 OFFICE O/P EST MOD 30 MIN: CPT | Mod: ,,, | Performed by: NURSE PRACTITIONER

## 2024-05-30 PROCEDURE — 87077 CULTURE AEROBIC IDENTIFY: CPT | Mod: ,,, | Performed by: CLINICAL MEDICAL LABORATORY

## 2024-05-30 PROCEDURE — 87086 URINE CULTURE/COLONY COUNT: CPT | Mod: ,,, | Performed by: CLINICAL MEDICAL LABORATORY

## 2024-05-30 PROCEDURE — 81001 URINALYSIS AUTO W/SCOPE: CPT | Mod: ,,, | Performed by: CLINICAL MEDICAL LABORATORY

## 2024-05-30 PROCEDURE — 87186 SC STD MICRODIL/AGAR DIL: CPT | Mod: ,,, | Performed by: CLINICAL MEDICAL LABORATORY

## 2024-05-30 PROCEDURE — 87635 SARS-COV-2 COVID-19 AMP PRB: CPT | Mod: RHCUB | Performed by: NURSE PRACTITIONER

## 2024-05-30 PROCEDURE — 82043 UR ALBUMIN QUANTITATIVE: CPT | Mod: ,,, | Performed by: CLINICAL MEDICAL LABORATORY

## 2024-05-30 PROCEDURE — 87502 INFLUENZA DNA AMP PROBE: CPT | Mod: RHCUB | Performed by: NURSE PRACTITIONER

## 2024-05-30 PROCEDURE — 82570 ASSAY OF URINE CREATININE: CPT | Mod: ,,, | Performed by: CLINICAL MEDICAL LABORATORY

## 2024-05-30 PROCEDURE — 87651 STREP A DNA AMP PROBE: CPT | Mod: RHCUB | Performed by: NURSE PRACTITIONER

## 2024-05-30 RX ORDER — OMEPRAZOLE 20 MG/1
20 CAPSULE, DELAYED RELEASE ORAL DAILY
Qty: 90 CAPSULE | Refills: 3 | Status: ON HOLD | OUTPATIENT
Start: 2024-05-30

## 2024-05-30 RX ORDER — LISINOPRIL 40 MG/1
40 TABLET ORAL DAILY
Qty: 90 TABLET | Refills: 3 | Status: ON HOLD | OUTPATIENT
Start: 2024-05-30

## 2024-05-30 RX ORDER — MAGNESIUM OXIDE 420 MG/1
420 TABLET ORAL DAILY
Status: ON HOLD | COMMUNITY
Start: 2024-05-19 | End: 2025-05-20

## 2024-05-30 RX ORDER — METHENAMINE HIPPURATE 1000 MG/1
1 TABLET ORAL 2 TIMES DAILY
Qty: 180 TABLET | Refills: 3 | Status: ON HOLD | OUTPATIENT
Start: 2024-05-30

## 2024-05-30 NOTE — PROGRESS NOTES
[facility] - FAMILY MEDICINE       PATIENT NAME: Tray Jalloh   : 1950    AGE: 73 y.o. DATE OF ENCOUNTER: 24    MRN: [id number]      PCP: Elizabeth Lange FNP    Subjective:     Reason for Visit / Chief Complaint:     274}  Chief Complaint   Patient presents with    Sinus Problem    Sore Throat    Cough    Urinary Tract Infection    Depression    Follow-up     Pt presents to the clinic for 2wk f/u       HPI:    Presents for 2 wk f/u treatment for UTI and resuming fluoxetine for recurrent depression.  States hasn't restarted fluoxetine yet because he has been taking benadryl for URI s/s and was concerned he would be too sleepy.  Reports UTI s/s resolved completely with trimethoprim.    Visited grandkids, 1 of them was sick, 3 days ago he developed sore throat & upper resp s/s.    Review of Systems:     Review of Systems   Constitutional:  Positive for fatigue. Negative for fever.   HENT:  Positive for congestion, postnasal drip, rhinorrhea and sore throat. Negative for ear pain.    Respiratory: Negative.  Negative for cough and wheezing. Shortness of breath: chronic, intermittent, hx COPD.   Cardiovascular: Negative.    Gastrointestinal: Negative.    Genitourinary: Negative.    Musculoskeletal:  Positive for arthralgias, back pain, gait problem and joint swelling (left knee, chronic).   Skin: Negative.    Neurological:  Negative for weakness.   Psychiatric/Behavioral:  Negative for dysphoric mood, self-injury and suicidal ideas.        Allergies and Meds: 274}     Review of patient's allergies indicates:   Allergen Reactions    Bee pollen     Grass pollen- grass standard         Current Outpatient Medications   Medication Sig Dispense Refill    acetaminophen (TYLENOL) 325 MG tablet Take 325 mg by mouth every 6 (six) hours as needed for Pain.      albuterol (VENTOLIN HFA) 90 mcg/actuation inhaler Inhale 2 puffs into the lungs every 6 (six) hours as needed for Wheezing or  Shortness of Breath. Rescue 8 g 3    amLODIPine (NORVASC) 5 MG tablet Take 2 tablets (10 mg total) by mouth once daily. 180 tablet 3    ascorbic acid, vitamin C, (VITAMIN C) 1000 MG tablet Take 1,000 mg by mouth once daily.      cetirizine (ZYRTEC) 10 MG tablet Take 1 tablet (10 mg total) by mouth once daily. 90 tablet 3    cholecalciferol, vitamin D3, (VITAMIN D3) 50 mcg (2,000 unit) Cap Take 1 capsule by mouth once daily.      CRESTOR 20 mg tablet Take 10 mg by mouth once daily.      cyanocobalamin (VITAMIN B-12) 1000 MCG tablet Take 100 mcg by mouth once daily.      diclofenac sodium (VOLTAREN) 1 % Gel Apply topically 4 (four) times daily. (Patient taking differently: Apply topically 4 (four) times daily as needed.) 900 g 5    diphenhydrAMINE (BENADRYL) 25 mg capsule Take 25 mg by mouth every 6 (six) hours as needed for Itching.      FLUoxetine 10 MG capsule Take 1 capsule (10 mg total) by mouth once daily. (Patient taking differently: Take 10 mg by mouth once daily. As needed) 30 capsule 0    fluticasone propionate (FLONASE) 50 mcg/actuation nasal spray 1 spray by Each Nostril route daily as needed for Rhinitis or Allergies.      hydrocortisone 2.5 % cream Apply 30 g/kg topically 2 (two) times daily as needed. Apply to face for dry skin      ketoconazole (NIZORAL) 2 % cream Apply topically daily as needed.      magnesium oxide 420 mg Tab Take 420 mg by mouth once daily.      metaxalone (SKELAXIN) 800 MG tablet Take 1 tablet (800 mg total) by mouth nightly as needed for Pain. 90 tablet 3    multivitamin Tab Take 1 tablet by mouth once daily. 90 tablet 3    omega-3 fatty acids/fish oil (FISH OIL-OMEGA-3 FATTY ACIDS) 300-1,000 mg capsule Take 1 capsule by mouth 2 (two) times a day.      pregabalin (LYRICA) 75 MG capsule TAKE ONE (1) CAPSULE BY MOUTH TWO (2)  TIMES DAILY. (Patient taking differently: Take 75 mg by mouth 2 (two) times daily. TAKE ONE (1) CAPSULE BY MOUTH TWO (2)  TIMES DAILY.) 60 capsule 5     tolterodine (DETROL LA) 2 MG Cp24 Take 2 mg by mouth once daily.      traMADoL (ULTRAM) 50 mg tablet Take 1 tablet (50 mg total) by mouth every 8 (eight) hours as needed for Pain. 25 tablet 0    triamcinolone acetonide 0.1% (KENALOG) 0.1 % cream Apply 15 g topically 2 (two) times daily as needed. Apply to back for dry skin and itching      vitamin E 1000 UNIT capsule Take 1,000 Units by mouth once daily.      zinc gluconate 50 mg tablet Take 50 mg by mouth once daily.      lisinopriL (PRINIVIL,ZESTRIL) 40 MG tablet Take 1 tablet (40 mg total) by mouth once daily. 90 tablet 3    methenamine (HIPREX) 1 gram Tab Take 1 tablet (1 g total) by mouth 2 (two) times daily. 180 tablet 3    omeprazole (PRILOSEC) 20 MG capsule Take 1 capsule (20 mg total) by mouth once daily. 90 capsule 3     No current facility-administered medications for this visit.       Labs:274}   I have reviewed labs below:    Lab Results   Component Value Date    WBC 10.51 05/12/2024    RBC 4.17 (L) 05/12/2024    HGB 12.8 (L) 05/12/2024    HCT 42.7 05/12/2024     05/12/2024     05/12/2024    K 3.5 05/12/2024     (H) 05/12/2024    CALCIUM 9.1 05/12/2024     (H) 05/12/2024    BUN 26 (H) 05/12/2024    CREATININE 1.47 (H) 05/12/2024    ESTGFRAFRICA 65 12/03/2020    EGFRNONAA 70 05/12/2022    ALT 20 06/07/2023    AST 14 (L) 06/07/2023    INR 1.19 07/28/2020    CHOL 140 05/15/2023    TRIG 105 05/15/2023    HDL 40 05/15/2023    LDLCALC 79 05/15/2023    TSH 1.950 06/07/2023    PSA <0.010 05/15/2023    HGBA1C 5.4 12/12/2023    MICROALBUR 3.1 (H) 05/12/2022       Point Of Care Testing (if applicable) :  Lab Results   Component Value Date    [id number] Negative 05/30/2024    FLUAMOLEC Negative 05/30/2024    FLUBMOLEC Negative 05/30/2024    MOLSTREPAPOC Negative 05/30/2024       Medical History: 274}     Past Medical History:   Diagnosis Date    Cervical radiculopathy     Chronic pain     Chronic renal impairment     COVID-19 12/27/2022     Depression     Depressive disorder 2022    Digestive disorder     GERD (gastroesophageal reflux disease)     History of prediabetes 2022    Hypercholesteremia     Hypertension     Left ventricular hypertrophy     Lumbosacral spondylosis     Positive colorectal cancer screening using Cologuard test 12/15/2022    Prediabetes     Pulmonary nodules     repeat CT due 21      Social History     Tobacco Use   Smoking Status Former    Current packs/day: 0.00    Average packs/day: 2.0 packs/day for 30.0 years (60.0 ttl pk-yrs)    Types: Cigarettes, Pipe, Cigars    Quit date:     Years since quittin.4    Passive exposure: Past   Smokeless Tobacco Never      Past Surgical History:   Procedure Laterality Date    ABDOMINAL SURGERY      inguinal hernia repair    BACK SURGERY      Bilateral L3-S1 MBB Bilateral 2-, 2021, 1-, 12-    Dr Jorge Parker    EYE SURGERY      HERNIA REPAIR      KNEE ARTHROSCOPY W/ MENISCAL REPAIR Right     KNEE ARTHROSCOPY W/ MENISCECTOMY Left 2023    Procedure: ARTHROSCOPY, KNEE, WITH MENISCECTOMY;  Surgeon: Dada Enriquez MD;  Location: [facility] OR;  Service: Orthopedics;  Laterality: Left;    KNEE SURGERY Left     LATERAL RETINACULA RELEASE OF KNEE Left 2023    Procedure: ARTHROSCOPIC RELEASE, KNEE, LATERAL RETINACULA;  Surgeon: Dada Enriquez MD;  Location: [facility] OR;  Service: Orthopedics;  Laterality: Left;    PROSTATECTOMY  1996    prostate cancer    RADIOFREQUENCY ABLATION OF LUMBAR MEDIAL BRANCH NERVE AT SINGLE LEVEL Right 2021    Procedure: Radiofrequency Ablation, Nerve, Spinal, Lumbar, Medial Branch, 1 Level;  Surgeon: Jordana Patel MD;  Location: [facility] PAIN MGMT;  Service: Pain Management;  Laterality: Right;  Right L3-S1 RFTC    RADIOFREQUENCY ABLATION OF LUMBAR MEDIAL BRANCH NERVE AT SINGLE LEVEL Right 2023    Procedure: Radiofrequency Ablation, Nerve, Spinal, Lumbar, Medial Branch, Level  L4-S1;  Surgeon: Jordana Patel MD;  Location: [facility] PAIN MGMT;  Service: Pain Management;  Laterality: Right;  bonita left after right is done    RADIOFREQUENCY ABLATION OF LUMBAR MEDIAL BRANCH NERVE AT SINGLE LEVEL Left 02/28/2023    Procedure: RADIOFREQUENCY ABLATION, NERVE, SPINAL, LUMBAR, MEDIAL BRANCH, 1 LEVEL;  Surgeon: Jordana Patel MD;  Location: [facility] PAIN [facility]MT;  Service: Pain Management;  Laterality: Left;    RADIOFREQUENCY THERMOCOAGULATION Bilateral 03/20/2012    Dr Parker    RADIOFREQUENCY THERMOCOAGULATION Left 03/18/2021    Procedure: RADIOFREQUENCY THERMAL COAGULATION;  Surgeon: Jordana Patel MD;  Location: [facility] PAIN [facility]MT;  Service: Pain Management;  Laterality: Left;  BILATERAL L3-S1 RFTC LEFT SIDE FIRST    SELECTIVE INJECTION OF ANESTHETIC AGENT AROUND LUMBAR SPINAL NERVE ROOT BY TRANSFORAMINAL APPROACH Right 01/13/2022    Procedure: Right L4-5,5-S1 TFESI;  Surgeon: Jordana Patel MD;  Location: [facility] PAIN MGMT;  Service: Pain Management;  Laterality: Right;  PT AWARE TO BE TESTED VIA PC    SHOULDER ARTHROSCOPY Right     SPINE SURGERY  1995    VASECTOMY  Mar 1996        Health Maintenance: {      Health Maintenance         Date Due Completion Date    Annual UACr 05/30/2025 5/30/2024    Colorectal Cancer Screening 02/15/2028 2/15/2023    Override on 9/4/2012: Done (Dr. Hare. Repeat in 10 years. Scanned into documents.)    Lipid Panel 05/15/2028 5/15/2023    TETANUS VACCINE 10/27/2030 10/27/2020          Immunization History   Administered Date(s) Administered    COVID-19, MRNA, LN-S, PF (MODERNA FULL 0.5 ML DOSE) 03/02/2021, 03/30/2021    Influenza 10/14/2009, 11/01/2011, 10/01/2012, 10/01/2014, 10/01/2022    Influenza - High Dose - PF (65 years and older) 11/08/2005, 12/13/2006, 11/06/2007, 10/23/2008, 10/26/2021    Influenza - Quadrivalent - High Dose - PF (65 years and older) 10/27/2020    Pneumococcal 11/06/2007    Pneumococcal Conjugate - 13 Valent 08/17/2016    Pneumococcal  "Polysaccharide - 23 Valent 11/06/2007, 01/03/2018    Td (Adult), Unspecified Formulation 06/11/2004    Tdap 10/27/2020    Zoster Recombinant 10/27/2020, 01/27/2021       Objective:  274}   Vital Signs  Temp: 98.2 °F (36.8 °C)  Temp Source: Oral  Pulse: 80  Resp: 20  SpO2: 95 %  BP: 121/69  BP Location: Right arm  Patient Position: Sitting  Pain Score:   5  Pain Loc: Throat  Height and Weight  Height: 5' 10" (177.8 cm)  Weight: 89.8 kg (198 lb)  BSA (Calculated - sq m): 2.11 sq meters  BMI (Calculated): 28.4  Weight in (lb) to have BMI = 25: 173.9    Over the last two weeks how often have you been bothered by little interest or pleasure in doing things: 1  Over the last two weeks how often have you been bothered by feeling down, depressed or hopeless: 1  PHQ-2 Total Score: 2  PHQ-9 Score: 0  PHQ-9 Interpretation: Minimal or None    Wt Readings from Last 3 Encounters:   05/30/24 89.8 kg (198 lb)   05/16/24 89.8 kg (198 lb)   05/12/24 88 kg (194 lb)     Physical Exam  Vitals and nursing note reviewed.   Constitutional:       General: He is not in acute distress.     Appearance: Normal appearance. He is ill-appearing.   HENT:      Head: Normocephalic.      Right Ear: Hearing, tympanic membrane, ear canal and external ear normal.      Left Ear: Hearing, tympanic membrane, ear canal and external ear normal.      Nose: Mucosal edema and congestion present. No rhinorrhea.      Right Turbinates: Swollen.      Left Turbinates: Swollen.      Right Sinus: No maxillary sinus tenderness or frontal sinus tenderness.      Left Sinus: No maxillary sinus tenderness or frontal sinus tenderness.      Mouth/Throat:      Lips: Pink.      Mouth: Mucous membranes are moist.      Tongue: No lesions.      Pharynx: Uvula midline. Posterior oropharyngeal erythema present. No pharyngeal swelling, oropharyngeal exudate or uvula swelling.   Eyes:      General:         Right eye: No discharge.         Left eye: No discharge.      " Conjunctiva/sclera: Conjunctivae normal.      Pupils: Pupils are equal, round, and reactive to light.   Cardiovascular:      Rate and Rhythm: Normal rate and regular rhythm.      Pulses: Normal pulses.      Heart sounds:      Gallop present.   Pulmonary:      Effort: Pulmonary effort is normal. No respiratory distress.      Breath sounds: Normal breath sounds. No wheezing, rhonchi or rales.   Abdominal:      Palpations: Abdomen is soft.      Tenderness: There is no abdominal tenderness. There is no right CVA tenderness or left CVA tenderness.   Musculoskeletal:      Cervical back: No rigidity.      Right lower leg: No edema.      Left lower leg: No edema.   Lymphadenopathy:      Cervical: No cervical adenopathy.   Skin:     General: Skin is warm and dry.      Coloration: Skin is not jaundiced or pale.      Findings: No rash.   Neurological:      General: No focal deficit present.      Mental Status: He is alert and oriented to person, place, and time.      Gait: Gait abnormal (using cane).   Psychiatric:         Mood and Affect: Mood normal.         Behavior: Behavior normal.         Thought Content: Thought content normal.         Judgment: Judgment normal.          Assessment and Plan: 274}     1. E. coli UTI (urinary tract infection)  Assessment & Plan:  Reports resolution of s/s with trimethoprim.  Recheck UA w/ reflex culture to ensure resolution due to upcoming surgery.    Orders:  -     Urinalysis, Reflex to Urine Culture; Future; Expected date: 05/30/2024  -     Urinalysis, Microscopic  -     Urine culture    2. Gastroesophageal reflux disease without esophagitis  -     omeprazole (PRILOSEC) 20 MG capsule; Take 1 capsule (20 mg total) by mouth once daily.  Dispense: 90 capsule; Refill: 3    3. Essential hypertension  Assessment & Plan:  Controlled, continue lisinopril.    Orders:  -     lisinopriL (PRINIVIL,ZESTRIL) 40 MG tablet; Take 1 tablet (40 mg total) by mouth once daily.  Dispense: 90 tablet; Refill:  3    4. History of chronic urinary tract infection  -     methenamine (HIPREX) 1 gram Tab; Take 1 tablet (1 g total) by mouth 2 (two) times daily.  Dispense: 180 tablet; Refill: 3    5. Chronic kidney disease, stage 3a  -     Microalbumin/Creatinine Ratio, Urine; Future; Expected date: 05/30/2024  -     Basic Metabolic Panel; Future; Expected date: 05/30/2024    6. Moderately severe recurrent major depression  Assessment & Plan:  Has resumed fluoxetine 10 mg daily yet because he has been taking benadryl & felt he would be too sleepy, but he doesn't seem as down and emotional today as on previous visit.  Encouraged to resume fluoxetine soon as prescribed.      7. Viral upper respiratory infection  Assessment & Plan:  Hydrate, rest, supportive care.  OTC coricidin HBP prn. Call or f/u if s/s progress or persist.    Orders:  -     POCT Influenza A/B Molecular  -     POCT Strep A, Molecular  -     POCT COVID-19 Rapid Screening      Diagnosis, risks, benefits, and side effects of any meds and treatment plan were discussed with the patient.  Any diagnostic testing results obtained in office or prior to appointment were reviewed.  Patient to call or follow-up with any new or worsening symptoms or problems prior to next appointment.  Go to ER for any urgent complications.  All questions were answered to the satisfaction of the patient, and pt verbalized understanding and agreement to treatment plan.      Follow up for cancel 6/19/24 appt with me and keep surgical clearance appt 6/10/24.    Signature:  MOLLY Murry-BC    Future Appointments   Date Time Provider Department Center   6/3/2024  8:00 AM [facility] GI ROOM 01 RAS ENDO [facility]   6/3/2024  7:30 PM SLEEP LAB, [facility] SLEEP [facility]   6/10/2024  1:40 PM Elizabeth Lange FNP [facility]   6/13/2024  9:30 AM Dada Enriquez MD [facility] ORTHO [facility]   7/17/2024  8:00 AM AWV NURSE, [facility] FAMILY MEDICINE [facility] BRYANT Pacheco  [place]   12/9/2024  8:30 AM Enrico Jauregui FNP OB NEURO [facility]   4/9/2025 10:00 AM [facility]

## 2024-06-02 DIAGNOSIS — N39.0 E. COLI UTI (URINARY TRACT INFECTION): ICD-10-CM

## 2024-06-02 DIAGNOSIS — B96.20 E. COLI UTI (URINARY TRACT INFECTION): ICD-10-CM

## 2024-06-02 PROBLEM — K92.9 DIGESTIVE DISORDER: Status: RESOLVED | Noted: 2022-05-12 | Resolved: 2024-06-02

## 2024-06-02 PROBLEM — E66.9 OBESITY, CLASS I, BMI 30-34.9: Status: RESOLVED | Noted: 2023-07-27 | Resolved: 2024-06-02

## 2024-06-02 PROBLEM — E66.811 OBESITY, CLASS I, BMI 30-34.9: Status: RESOLVED | Noted: 2023-07-27 | Resolved: 2024-06-02

## 2024-06-02 LAB — UA COMPLETE W REFLEX CULTURE PNL UR: ABNORMAL

## 2024-06-02 RX ORDER — TRIMETHOPRIM 100 MG/1
100 TABLET ORAL EVERY 12 HOURS
Qty: 20 TABLET | Refills: 0 | Status: SHIPPED | OUTPATIENT
Start: 2024-06-02 | End: 2024-06-04 | Stop reason: SDUPTHER

## 2024-06-02 NOTE — ASSESSMENT & PLAN NOTE
Has resumed fluoxetine 10 mg daily yet because he has been taking benadryl & felt he would be too sleepy, but he doesn't seem as down and emotional today as on previous visit.  Encouraged to resume fluoxetine soon as prescribed.

## 2024-06-02 NOTE — ASSESSMENT & PLAN NOTE
Reports resolution of s/s with trimethoprim.  Recheck UA w/ reflex culture to ensure resolution due to upcoming surgery.

## 2024-06-03 ENCOUNTER — ANESTHESIA EVENT (OUTPATIENT)
Dept: GASTROENTEROLOGY | Facility: HOSPITAL | Age: 74
End: 2024-06-03
Payer: MEDICARE

## 2024-06-03 ENCOUNTER — HOSPITAL ENCOUNTER (OUTPATIENT)
Dept: GASTROENTEROLOGY | Facility: HOSPITAL | Age: 74
Discharge: HOME | End: 2024-06-03
Admitting: INTERNAL MEDICINE
Payer: MEDICARE

## 2024-06-03 ENCOUNTER — ANESTHESIA (OUTPATIENT)
Dept: GASTROENTEROLOGY | Facility: HOSPITAL | Age: 74
End: 2024-06-03
Payer: MEDICARE

## 2024-06-03 ENCOUNTER — PROCEDURE VISIT (OUTPATIENT)
Dept: SLEEP MEDICINE | Facility: HOSPITAL | Age: 74
End: 2024-06-03
Attending: INTERNAL MEDICINE
Payer: MEDICARE

## 2024-06-03 VITALS
HEIGHT: 70 IN | WEIGHT: 194 LBS | OXYGEN SATURATION: 95 % | SYSTOLIC BLOOD PRESSURE: 114 MMHG | RESPIRATION RATE: 12 BRPM | DIASTOLIC BLOOD PRESSURE: 72 MMHG | TEMPERATURE: 97 F | BODY MASS INDEX: 27.77 KG/M2 | HEART RATE: 59 BPM

## 2024-06-03 DIAGNOSIS — G47.33 OBSTRUCTIVE SLEEP APNEA (ADULT) (PEDIATRIC): ICD-10-CM

## 2024-06-03 DIAGNOSIS — R09.A2 GLOBUS SENSATION: ICD-10-CM

## 2024-06-03 PROCEDURE — 37000008 HC ANESTHESIA 1ST 15 MINUTES

## 2024-06-03 PROCEDURE — 25000003 PHARM REV CODE 250: Performed by: ANESTHESIOLOGY

## 2024-06-03 PROCEDURE — 88305 TISSUE EXAM BY PATHOLOGIST: CPT | Mod: 26,,, | Performed by: PATHOLOGY

## 2024-06-03 PROCEDURE — 95811 POLYSOM 6/>YRS CPAP 4/> PARM: CPT | Mod: PO

## 2024-06-03 PROCEDURE — 27201423 OPTIME MED/SURG SUP & DEVICES STERILE SUPPLY

## 2024-06-03 PROCEDURE — D9220A PRA ANESTHESIA: Mod: ,,, | Performed by: ANESTHESIOLOGY

## 2024-06-03 PROCEDURE — 43239 EGD BIOPSY SINGLE/MULTIPLE: CPT | Mod: 59,,, | Performed by: INTERNAL MEDICINE

## 2024-06-03 PROCEDURE — 43248 EGD GUIDE WIRE INSERTION: CPT | Mod: ,,, | Performed by: INTERNAL MEDICINE

## 2024-06-03 PROCEDURE — 88305 TISSUE EXAM BY PATHOLOGIST: CPT | Mod: TC,SUR | Performed by: INTERNAL MEDICINE

## 2024-06-03 PROCEDURE — 43239 EGD BIOPSY SINGLE/MULTIPLE: CPT | Mod: 59 | Performed by: INTERNAL MEDICINE

## 2024-06-03 PROCEDURE — 43248 EGD GUIDE WIRE INSERTION: CPT | Performed by: INTERNAL MEDICINE

## 2024-06-03 PROCEDURE — 63600175 PHARM REV CODE 636 W HCPCS: Performed by: ANESTHESIOLOGY

## 2024-06-03 RX ORDER — SODIUM CHLORIDE 0.9 % (FLUSH) 0.9 %
10 SYRINGE (ML) INJECTION
Status: DISCONTINUED | OUTPATIENT
Start: 2024-06-03 | End: 2024-06-04 | Stop reason: HOSPADM

## 2024-06-03 RX ORDER — PROPOFOL 10 MG/ML
VIAL (ML) INTRAVENOUS
Status: DISCONTINUED | OUTPATIENT
Start: 2024-06-03 | End: 2024-06-03

## 2024-06-03 RX ORDER — FENTANYL CITRATE 50 UG/ML
INJECTION, SOLUTION INTRAMUSCULAR; INTRAVENOUS
Status: DISCONTINUED | OUTPATIENT
Start: 2024-06-03 | End: 2024-06-03

## 2024-06-03 RX ORDER — LIDOCAINE HYDROCHLORIDE 20 MG/ML
INJECTION, SOLUTION EPIDURAL; INFILTRATION; INTRACAUDAL; PERINEURAL
Status: DISCONTINUED | OUTPATIENT
Start: 2024-06-03 | End: 2024-06-03

## 2024-06-03 RX ORDER — SODIUM CHLORIDE 9 MG/ML
INJECTION, SOLUTION INTRAVENOUS CONTINUOUS PRN
Status: DISCONTINUED | OUTPATIENT
Start: 2024-06-03 | End: 2024-06-03

## 2024-06-03 RX ADMIN — FENTANYL CITRATE 50 MCG: 50 INJECTION INTRAMUSCULAR; INTRAVENOUS at 09:06

## 2024-06-03 RX ADMIN — FENTANYL CITRATE 50 MCG: 50 INJECTION INTRAMUSCULAR; INTRAVENOUS at 10:06

## 2024-06-03 RX ADMIN — PROPOFOL 25 MG: 10 INJECTION, EMULSION INTRAVENOUS at 10:06

## 2024-06-03 RX ADMIN — SODIUM CHLORIDE: 9 INJECTION, SOLUTION INTRAVENOUS at 09:06

## 2024-06-03 RX ADMIN — PROPOFOL 50 MG: 10 INJECTION, EMULSION INTRAVENOUS at 09:06

## 2024-06-03 RX ADMIN — LIDOCAINE HYDROCHLORIDE 50 MG: 20 INJECTION, SOLUTION INTRAVENOUS at 09:06

## 2024-06-03 NOTE — H&P
Gastroenterology Pre-procedure H&P    History of Present Illness    Tray Jalloh is a 73 y.o. male that  has a past medical history of Cervical radiculopathy, Chronic pain, Chronic renal impairment, COVID-19 (12/27/2022), Depression, Depressive disorder (05/12/2022), Digestive disorder, GERD (gastroesophageal reflux disease), History of prediabetes (11/16/2022), Hypercholesteremia, Hypertension, Left ventricular hypertrophy, Lumbosacral spondylosis, Positive colorectal cancer screening using Cologuard test (12/15/2022), Prediabetes, and Pulmonary nodules.     Patient with GERD and globus sensation with dysphagia to solids here for EGD. Has had dilation in the past with improvement in symptoms       Past Medical History:   Diagnosis Date    Cervical radiculopathy     Chronic pain     Chronic renal impairment     COVID-19 12/27/2022    Depression     Depressive disorder 05/12/2022    Digestive disorder     GERD (gastroesophageal reflux disease)     History of prediabetes 11/16/2022    Hypercholesteremia     Hypertension     Left ventricular hypertrophy     Lumbosacral spondylosis     Positive colorectal cancer screening using Cologuard test 12/15/2022    Prediabetes     Pulmonary nodules     repeat CT due 12/03/21       Past Surgical History:   Procedure Laterality Date    ABDOMINAL SURGERY      inguinal hernia repair    BACK SURGERY      Bilateral L3-S1 MBB Bilateral 2-, 1-, 1-, 12-    Dr Jorge Parker    EYE SURGERY      HERNIA REPAIR      KNEE ARTHROSCOPY W/ MENISCAL REPAIR Right     KNEE ARTHROSCOPY W/ MENISCECTOMY Left 01/11/2023    Procedure: ARTHROSCOPY, KNEE, WITH MENISCECTOMY;  Surgeon: Dada Enriquez MD;  Location: [facility];  Service: Orthopedics;  Laterality: Left;    KNEE SURGERY Left     LATERAL RETINACULA RELEASE OF KNEE Left 01/11/2023    Procedure: ARTHROSCOPIC RELEASE, KNEE, LATERAL RETINACULA;  Surgeon: Dada Enriquez MD;  Location: [facility];   Service: Orthopedics;  Laterality: Left;    PROSTATECTOMY  03/1996    prostate cancer    RADIOFREQUENCY ABLATION OF LUMBAR MEDIAL BRANCH NERVE AT SINGLE LEVEL Right 04/01/2021    Procedure: Radiofrequency Ablation, Nerve, Spinal, Lumbar, Medial Branch, 1 Level;  Surgeon: Jordana Patel MD;  Location: [facility];  Service: Pain Management;  Laterality: Right;  Right L3-S1 RFTC    RADIOFREQUENCY ABLATION OF LUMBAR MEDIAL BRANCH NERVE AT SINGLE LEVEL Right 02/09/2023    Procedure: Radiofrequency Ablation, Nerve, Spinal, Lumbar, Medial Branch, Level L4-S1;  Surgeon: Jordana Patel MD;  Location: [facility];  Service: Pain Management;  Laterality: Right;  bonita left after right is done    RADIOFREQUENCY ABLATION OF LUMBAR MEDIAL BRANCH NERVE AT SINGLE LEVEL Left 02/28/2023    Procedure: RADIOFREQUENCY ABLATION, NERVE, SPINAL, LUMBAR, MEDIAL BRANCH, 1 LEVEL;  Surgeon: Jordana Patel MD;  Location: [facility];  Service: Pain Management;  Laterality: Left;    RADIOFREQUENCY THERMOCOAGULATION Bilateral 03/20/2012    Dr Parker    RADIOFREQUENCY THERMOCOAGULATION Left 03/18/2021    Procedure: RADIOFREQUENCY THERMAL COAGULATION;  Surgeon: Jordana Patel MD;  Location: [facility];  Service: Pain Management;  Laterality: Left;  BILATERAL L3-S1 RFTC LEFT SIDE FIRST    SELECTIVE INJECTION OF ANESTHETIC AGENT AROUND LUMBAR SPINAL NERVE ROOT BY TRANSFORAMINAL APPROACH Right 01/13/2022    Procedure: Right L4-5,5-S1 TFESI;  Surgeon: Jordana Patel MD;  Location: [facility];  Service: Pain Management;  Laterality: Right;  PT AWARE TO BE TESTED VIA PC    SHOULDER ARTHROSCOPY Right     SPINE SURGERY  1995    VASECTOMY  Mar 1996       Family History   Problem Relation Name Age of Onset    Diabetes Mother Brittany Shaygonzalo     Hypertension Father Erik Efraín Jalloh     Heart disease Father Erik Shaygonzalo         CABG    Stroke Father Erik Efraín Jalloh     Alzheimer's disease  Maternal Grandmother      Diabetes Paternal Grandmother Joselyn Jalloh     Heart attack Paternal Grandfather      Cancer Other      Diabetes Other      Heart disease Other      Hypertension Other      Stroke Other         Social History     Socioeconomic History    Marital status:    Tobacco Use    Smoking status: Former     Current packs/day: 0.00     Average packs/day: 2.0 packs/day for 30.0 years (60.0 ttl pk-yrs)     Types: Cigarettes, Pipe, Cigars     Quit date:      Years since quittin.4     Passive exposure: Past    Smokeless tobacco: Never   Substance and Sexual Activity    Alcohol use: Not Currently    Drug use: Never    Sexual activity: Not Currently     Partners: Female     Birth control/protection: Abstinence     Comment: Sterile   Social History Narrative    Asbestos exposure when [place] in [place]; 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     He served in the [employer] with agent orange exposure when stationed in [place].      Social Determinants of Health     Financial Resource Strain: Low Risk  (2024)    Overall Financial Resource Strain (CARDIA)     Difficulty of Paying Living Expenses: Not hard at all   Food Insecurity: No Food Insecurity (2024)    Hunger Vital Sign     Worried About Running Out of Food in the Last Year: Never true     Ran Out of Food in the Last Year: Never true   Physical Activity: Unknown (2024)    Exercise Vital Sign     Days of Exercise per Week: 7 days   Stress: No Stress Concern Present (2024)    [employer] [place] [employer] - Occupational Stress Questionnaire     Feeling of Stress : Only a little   Housing Stability: Unknown (2024)    Housing Stability Vital Sign     Unable to Pay for Housing in the Last Year: No       Current Outpatient Medications   Medication Sig Dispense Refill    acetaminophen (TYLENOL) 325 MG tablet Take 325 mg by mouth every 6 (six)  hours as needed for Pain.      albuterol (VENTOLIN HFA) 90 mcg/actuation inhaler Inhale 2 puffs into the lungs every 6 (six) hours as needed for Wheezing or Shortness of Breath. Rescue 8 g 3    amLODIPine (NORVASC) 5 MG tablet Take 2 tablets (10 mg total) by mouth once daily. 180 tablet 3    ascorbic acid, vitamin C, (VITAMIN C) 1000 MG tablet Take 1,000 mg by mouth once daily.      cetirizine (ZYRTEC) 10 MG tablet Take 1 tablet (10 mg total) by mouth once daily. 90 tablet 3    cholecalciferol, vitamin D3, (VITAMIN D3) 50 mcg (2,000 unit) Cap Take 1 capsule by mouth once daily.      CRESTOR 20 mg tablet Take 10 mg by mouth once daily.      cyanocobalamin (VITAMIN B-12) 1000 MCG tablet Take 100 mcg by mouth once daily.      diclofenac sodium (VOLTAREN) 1 % Gel Apply topically 4 (four) times daily. (Patient taking differently: Apply topically 4 (four) times daily as needed.) 900 g 5    diphenhydrAMINE (BENADRYL) 25 mg capsule Take 25 mg by mouth every 6 (six) hours as needed for Itching.      FLUoxetine 10 MG capsule Take 1 capsule (10 mg total) by mouth once daily. (Patient taking differently: Take 10 mg by mouth once daily. As needed) 30 capsule 0    fluticasone propionate (FLONASE) 50 mcg/actuation nasal spray 1 spray by Each Nostril route daily as needed for Rhinitis or Allergies.      hydrocortisone 2.5 % cream Apply 30 g/kg topically 2 (two) times daily as needed. Apply to face for dry skin      ketoconazole (NIZORAL) 2 % cream Apply topically daily as needed.      lisinopriL (PRINIVIL,ZESTRIL) 40 MG tablet Take 1 tablet (40 mg total) by mouth once daily. 90 tablet 3    magnesium oxide 420 mg Tab Take 420 mg by mouth once daily.      metaxalone (SKELAXIN) 800 MG tablet Take 1 tablet (800 mg total) by mouth nightly as needed for Pain. 90 tablet 3    methenamine (HIPREX) 1 gram Tab Take 1 tablet (1 g total) by mouth 2 (two) times daily. 180 tablet 3    multivitamin Tab Take 1 tablet by mouth once daily. 90 tablet  "3    omega-3 fatty acids/fish oil (FISH OIL-OMEGA-3 FATTY ACIDS) 300-1,000 mg capsule Take 1 capsule by mouth 2 (two) times a day.      omeprazole (PRILOSEC) 20 MG capsule Take 1 capsule (20 mg total) by mouth once daily. 90 capsule 3    pregabalin (LYRICA) 75 MG capsule TAKE ONE (1) CAPSULE BY MOUTH TWO (2)  TIMES DAILY. (Patient taking differently: Take 75 mg by mouth 2 (two) times daily. TAKE ONE (1) CAPSULE BY MOUTH TWO (2)  TIMES DAILY.) 60 capsule 5    tolterodine (DETROL LA) 2 MG Cp24 Take 2 mg by mouth once daily.      traMADoL (ULTRAM) 50 mg tablet Take 1 tablet (50 mg total) by mouth every 8 (eight) hours as needed for Pain. 25 tablet 0    triamcinolone acetonide 0.1% (KENALOG) 0.1 % cream Apply 15 g topically 2 (two) times daily as needed. Apply to back for dry skin and itching      trimethoprim (TRIMPEX) 100 mg Tab Take 1 tablet (100 mg total) by mouth every 12 (twelve) hours. for 10 days 20 tablet 0    vitamin E 1000 UNIT capsule Take 1,000 Units by mouth once daily.      zinc gluconate 50 mg tablet Take 50 mg by mouth once daily.       No current facility-administered medications for this encounter.       Review of patient's allergies indicates:   Allergen Reactions    Bee pollen     Grass pollen-figueroa grass standard        Objective:  Vitals:    06/03/24 0813   BP: 133/74   Pulse: 73   Resp: 17   Temp: 97.9 °F (36.6 °C)   TempSrc: Oral   SpO2: 96%   Weight: 88 kg (194 lb)   Height: 5' 10" (1.778 m)        GEN: normal appearing, NAD, AAO x3  HENT: NCAT, anicteric, OP benign  CV: normal rate, regular rhythm  RESP: CTA, symmetric rise, unlabored  ABD: soft, ND, no guarding or TTP  SKIN: warm and dry  NEURO: grossly afocal    Assessment and Plan:    Proceed with:    EGD for dysphagia, globus sensation       Wilfredo Friedman MD  Gastroenterology  "

## 2024-06-03 NOTE — DISCHARGE INSTRUCTIONS
Procedure Date  6/3/24     Impression  Overall Impression:   The upper third of the esophagus, middle third of the esophagus, lower third of the esophagus, Z-line, cardia, fundus of the stomach, body of the stomach, incisura, antrum, duodenal bulb, 1st part of the duodenum and 2nd part of the duodenum appeared normal. Performed random biopsy to rule out eosinophilic esophagitis.  Dilated in the esophagus with Savary-Boogie dilator to 51 Fr ending size. Dilation caused improved passage of the scope        Recommendation  Await pathology results  If symptoms don't resolve with dilation, please call or message us on [facility], and I would recommend referral to ENT for further evaluation of globus sensation    Follow up as scheduled or sooner prn       Outcome of procedure: successful EGD  Disposition: patient to recovery following procedure; discharge to home when appropriate parameters met  Provisions for follow up: please call my office for any unexpected symptoms like chest or abdominal pain or bleeding following your procedure.  Final Diagnosis: dysphagia     THE NURSE WILL CALL YOU WITH YOUR BIOPSY RESULTS IN A FEW DAYS. IF YOU HAVE  [facility] MYCHART YOUR RESULTS WILL APPEAR THERE AS WELL.  NO DRIVING, OPERATING EQUIPMENT, OR SIGNING LEGAL DOCUMENTS FOR 24 HOURS.

## 2024-06-03 NOTE — PROGRESS NOTES
Call pt and review results. There was still a little growth on his culture and with his upcoming surgery, he needs to be infection free so I sent another 10 days of trimethoprim (to [facility], let me know if that isn't right).

## 2024-06-03 NOTE — TRANSFER OF CARE
"Anesthesia Transfer of Care Note    Patient: Tray Jalloh    Procedure(s) Performed: * EGD*    Patient location: GI    Anesthesia Type: general    Transport from OR: Transported from OR on room air with adequate spontaneous ventilation. Continuous ECG monitoring in transport. Continuous SpO2 monitoring in transport    Post pain: adequate analgesia    Post assessment: no apparent anesthetic complications    Post vital signs: stable    Level of consciousness: awake and responds to stimulation    Nausea/Vomiting: no nausea/vomiting    Complications: none    Transfer of care protocol was followed      Last vitals: Visit Vitals  BP (!) 80/57   Pulse 62   Temp 36.1 °C (97 °F)   Resp 12   Ht 5' 10" (1.778 m)   Wt 88 kg (194 lb)   SpO2 100%   BMI 27.84 kg/m²     "

## 2024-06-03 NOTE — ANESTHESIA POSTPROCEDURE EVALUATION
Anesthesia Post Evaluation    Patient: Tray Jalloh    Procedure(s) Performed: * EGD*    Final Anesthesia Type: MAC      Patient location during evaluation: GI PACU  Patient participation: Yes- Able to Participate  Level of consciousness: awake and alert, oriented and awake  Post-procedure vital signs: reviewed and stable  Pain management: adequate  Airway patency: patent    PONV status at discharge: No PONV  Anesthetic complications: no      Cardiovascular status: blood pressure returned to baseline, hemodynamically stable and stable  Respiratory status: unassisted and spontaneous ventilation  Hydration status: euvolemic  Follow-up not needed.              Vitals Value Taken Time   /72 06/03/24 1035   Temp 36.1 °C (97 °F) 06/03/24 1014   Pulse 60 06/03/24 1037   Resp 10 06/03/24 1037   SpO2 95 % 06/03/24 1037   Vitals shown include unfiled device data.      Event Time   Out of Recovery 11:13:14         Pain/Radha Score: Radha Score: 10 (6/3/2024 10:24 AM)

## 2024-06-03 NOTE — PROGRESS NOTES
Also make sure to tell him he needs to hold Hiprex (methenamine) while on trimethoprim for UTI.  Resume hiprex after antibiotic is complete.

## 2024-06-03 NOTE — ANESTHESIA PREPROCEDURE EVALUATION
06/03/2024  Tray Jalloh is a 73 y.o., male.  Current Outpatient Medications on File Prior to Encounter   Medication Sig Dispense Refill    acetaminophen (TYLENOL) 325 MG tablet Take 325 mg by mouth every 6 (six) hours as needed for Pain.      albuterol (VENTOLIN HFA) 90 mcg/actuation inhaler Inhale 2 puffs into the lungs every 6 (six) hours as needed for Wheezing or Shortness of Breath. Rescue 8 g 3    amLODIPine (NORVASC) 5 MG tablet Take 2 tablets (10 mg total) by mouth once daily. 180 tablet 3    ascorbic acid, vitamin C, (VITAMIN C) 1000 MG tablet Take 1,000 mg by mouth once daily.      cetirizine (ZYRTEC) 10 MG tablet Take 1 tablet (10 mg total) by mouth once daily. 90 tablet 3    cholecalciferol, vitamin D3, (VITAMIN D3) 50 mcg (2,000 unit) Cap Take 1 capsule by mouth once daily.      CRESTOR 20 mg tablet Take 10 mg by mouth once daily.      cyanocobalamin (VITAMIN B-12) 1000 MCG tablet Take 100 mcg by mouth once daily.      diclofenac sodium (VOLTAREN) 1 % Gel Apply topically 4 (four) times daily. (Patient taking differently: Apply topically 4 (four) times daily as needed.) 900 g 5    diphenhydrAMINE (BENADRYL) 25 mg capsule Take 25 mg by mouth every 6 (six) hours as needed for Itching.      FLUoxetine 10 MG capsule Take 1 capsule (10 mg total) by mouth once daily. (Patient taking differently: Take 10 mg by mouth once daily. As needed) 30 capsule 0    fluticasone propionate (FLONASE) 50 mcg/actuation nasal spray 1 spray by Each Nostril route daily as needed for Rhinitis or Allergies.      hydrocortisone 2.5 % cream Apply 30 g/kg topically 2 (two) times daily as needed. Apply to face for dry skin      ketoconazole (NIZORAL) 2 % cream Apply topically daily as needed.      lisinopriL (PRINIVIL,ZESTRIL) 40 MG tablet Take 1 tablet (40 mg total) by mouth once daily. 90 tablet 3    magnesium oxide  420 mg Tab Take 420 mg by mouth once daily.      metaxalone (SKELAXIN) 800 MG tablet Take 1 tablet (800 mg total) by mouth nightly as needed for Pain. 90 tablet 3    methenamine (HIPREX) 1 gram Tab Take 1 tablet (1 g total) by mouth 2 (two) times daily. 180 tablet 3    multivitamin Tab Take 1 tablet by mouth once daily. 90 tablet 3    omega-3 fatty acids/fish oil (FISH OIL-OMEGA-3 FATTY ACIDS) 300-1,000 mg capsule Take 1 capsule by mouth 2 (two) times a day.      omeprazole (PRILOSEC) 20 MG capsule Take 1 capsule (20 mg total) by mouth once daily. 90 capsule 3    pregabalin (LYRICA) 75 MG capsule TAKE ONE (1) CAPSULE BY MOUTH TWO (2)  TIMES DAILY. (Patient taking differently: Take 75 mg by mouth 2 (two) times daily. TAKE ONE (1) CAPSULE BY MOUTH TWO (2)  TIMES DAILY.) 60 capsule 5    tolterodine (DETROL LA) 2 MG Cp24 Take 2 mg by mouth once daily.      traMADoL (ULTRAM) 50 mg tablet Take 1 tablet (50 mg total) by mouth every 8 (eight) hours as needed for Pain. 25 tablet 0    triamcinolone acetonide 0.1% (KENALOG) 0.1 % cream Apply 15 g topically 2 (two) times daily as needed. Apply to back for dry skin and itching      trimethoprim (TRIMPEX) 100 mg Tab Take 1 tablet (100 mg total) by mouth every 12 (twelve) hours. for 10 days 20 tablet 0    vitamin E 1000 UNIT capsule Take 1,000 Units by mouth once daily.      zinc gluconate 50 mg tablet Take 50 mg by mouth once daily.       No current facility-administered medications on file prior to encounter.     Active Ambulatory Problems     Diagnosis Date Noted    Spondylosis of lumbosacral region without myelopathy or radiculopathy 04/01/2021    Essential hypertension     Chronic pain     Gastroesophageal reflux disease without esophagitis     Pulmonary nodule     Hypercholesteremia     Pain and numbness of right upper extremity 09/30/2021    Lumbosacral radiculopathy 01/26/2022    Polyarthralgia 01/26/2022    Dyspnea on exertion 03/15/2022    Viral upper respiratory  infection 03/15/2022    Forgetfulness 05/12/2022    History of prostate cancer 11/16/2022    Chronic osteoarthritis 11/16/2022    Anemia of chronic disease 11/16/2022    Primary osteoarthritis of left knee 01/04/2023    Chronic pain of left knee 01/24/2023    Diverticulosis of colon 02/15/2023    Polyp of rectum 02/15/2023    Thoracogenic scoliosis of thoracolumbar region 03/20/2023    Swelling of both lower extremities 07/27/2023    Essential tremor 12/07/2023    Iron deficiency anemia 01/03/2024    Other emphysema 04/09/2024    Sleep apnea 05/02/2024    Cognitive impairment 05/02/2024    Chronic kidney disease, stage 3a 05/16/2024    E. coli UTI (urinary tract infection) 05/16/2024    Moderately severe recurrent major depression 05/16/2024    History of chronic urinary tract infection 05/30/2024     Resolved Ambulatory Problems     Diagnosis Date Noted    Spondylosis of lumbosacral spine without myelopathy 03/18/2021    Prediabetes     Fever 03/15/2022    Sore throat 03/15/2022    Digestive disorder 05/12/2022    Depressive disorder 05/12/2022    COPD, mild     Left-sided chest pain 07/18/2022    Costochondral chest pain 07/18/2022    History of prediabetes 11/16/2022    Positive colorectal cancer screening using Cologuard test 12/15/2022    COVID-19 12/27/2022    Status post lateral meniscectomy of left knee 01/20/2023    Decreased range of motion (ROM) of left knee 01/20/2023    Weakness of left leg 01/20/2023    Abrasion, face w/o infection 02/21/2023    Obesity, Class I, BMI 30-34.9 07/27/2023    Acute URI 02/01/2024     Past Medical History:   Diagnosis Date    Cervical radiculopathy     Chronic renal impairment     Depression     GERD (gastroesophageal reflux disease)     Hypertension     Left ventricular hypertrophy     Lumbosacral spondylosis     Pulmonary nodules      Past Surgical History:   Procedure Laterality Date    ABDOMINAL SURGERY      inguinal hernia repair    BACK SURGERY      Bilateral L3-S1  MBB Bilateral 2-, 1-, 1-, 12-    Dr Jorge Parker    EYE SURGERY      HERNIA REPAIR      KNEE ARTHROSCOPY W/ MENISCAL REPAIR Right     KNEE ARTHROSCOPY W/ MENISCECTOMY Left 01/11/2023    Procedure: ARTHROSCOPY, KNEE, WITH MENISCECTOMY;  Surgeon: Dada Enriquez MD;  Location: [facility] OR;  Service: Orthopedics;  Laterality: Left;    KNEE SURGERY Left     LATERAL RETINACULA RELEASE OF KNEE Left 01/11/2023    Procedure: ARTHROSCOPIC RELEASE, KNEE, LATERAL RETINACULA;  Surgeon: Dada Enriquez MD;  Location: [facility] OR;  Service: Orthopedics;  Laterality: Left;    PROSTATECTOMY  03/1996    prostate cancer    RADIOFREQUENCY ABLATION OF LUMBAR MEDIAL BRANCH NERVE AT SINGLE LEVEL Right 04/01/2021    Procedure: Radiofrequency Ablation, Nerve, Spinal, Lumbar, Medial Branch, 1 Level;  Surgeon: Jordana Patel MD;  Location: [facility];  Service: Pain Management;  Laterality: Right;  Right L3-S1 RFTC    RADIOFREQUENCY ABLATION OF LUMBAR MEDIAL BRANCH NERVE AT SINGLE LEVEL Right 02/09/2023    Procedure: Radiofrequency Ablation, Nerve, Spinal, Lumbar, Medial Branch, Level L4-S1;  Surgeon: Jordana Patel MD;  Location: [facility];  Service: Pain Management;  Laterality: Right;  bonita left after right is done    RADIOFREQUENCY ABLATION OF LUMBAR MEDIAL BRANCH NERVE AT SINGLE LEVEL Left 02/28/2023    Procedure: RADIOFREQUENCY ABLATION, NERVE, SPINAL, LUMBAR, MEDIAL BRANCH, 1 LEVEL;  Surgeon: Jordana Patel MD;  Location: [facility];  Service: Pain Management;  Laterality: Left;    RADIOFREQUENCY THERMOCOAGULATION Bilateral 03/20/2012    Dr Parker    RADIOFREQUENCY THERMOCOAGULATION Left 03/18/2021    Procedure: RADIOFREQUENCY THERMAL COAGULATION;  Surgeon: Jordana Patel MD;  Location: [facility];  Service: Pain Management;  Laterality: Left;  BILATERAL L3-S1 RFTC LEFT SIDE FIRST    SELECTIVE INJECTION OF ANESTHETIC AGENT AROUND LUMBAR SPINAL NERVE ROOT BY  TRANSFORAMINAL APPROACH Right 01/13/2022    Procedure: Right L4-5,5-S1 TFESI;  Surgeon: Jordana Patel MD;  Location: [facility];  Service: Pain Management;  Laterality: Right;  PT AWARE TO BE TESTED VIA PC    SHOULDER ARTHROSCOPY Right     SPINE SURGERY  1995    VASECTOMY  Mar 1996         Pre-op Assessment    I have reviewed the Patient Summary Reports.    I have reviewed the NPO Status.   I have reviewed the Medications.     Review of Systems  Anesthesia Hx:  No problems with previous Anesthesia             Denies Family Hx of Anesthesia complications.    Denies Personal Hx of Anesthesia complications.                    Social:  Non-Smoker, No Alcohol Use       Hematology/Oncology:    Oncology Normal    -- Anemia:                                  EENT/Dental:  EENT/Dental Normal           Cardiovascular:  Exercise tolerance: good   Hypertension   Denies MI.            hyperlipidemia   ECG has been reviewed.                          Pulmonary:   COPD, mild   Shortness of breath  Sleep Apnea                Renal/:  Chronic Renal Disease, CKD                Hepatic/GI:     GERD             Musculoskeletal:  Arthritis               Neurological:    Denies CVA. Neuromuscular Disease,           Pain Etiology/Diagnosis, Cervical Radiculopathy, Lumbar Radiculopathy                           Endocrine:  Endocrine Normal            Dermatological:  Skin Normal    Psych:  Psychiatric History  depression                Chemistry        Component Value Date/Time     05/12/2024 1009    K 3.5 05/12/2024 1009     (H) 05/12/2024 1009    CO2 21 05/12/2024 1009    BUN 26 (H) 05/12/2024 1009    CREATININE 1.47 (H) 05/12/2024 1009     (H) 05/12/2024 1009        Component Value Date/Time    CALCIUM 9.1 05/12/2024 1009    ALKPHOS 51 06/07/2023 1537    AST 14 (L) 06/07/2023 1537    ALT 20 06/07/2023 1537    BILITOT 0.3 06/07/2023 1537    ESTGFRAFRICA 65 12/03/2020 1107    EGFRNONAA 70 05/12/2022 1004         Lab Results   Component Value Date    WBC 10.51 05/12/2024    HGB 12.8 (L) 05/12/2024    HCT 42.7 05/12/2024    .4 (H) 05/12/2024     05/12/2024             Physical Exam  General: Well nourished, Cooperative, Alert and Oriented    Airway:  Mallampati: II   Mouth Opening: Normal  TM Distance: Normal  Tongue: Normal  Neck ROM: Normal ROM    Dental:  Partial Dentures    Chest/Lungs:  Normal Respiratory Rate      Results for orders placed or performed during the hospital encounter of 05/12/24   EKG 12-lead    Collection Time: 05/12/24  9:41 AM   Result Value Ref Range    QRS Duration 88 ms    OHS QTC Calculation 400 ms    Narrative    Test Reason : R53.1,    Vent. Rate : 077 BPM     Atrial Rate : 077 BPM     P-R Int : 204 ms          QRS Dur : 088 ms      QT Int : 354 ms       P-R-T Axes : 077 070 060 degrees     QTc Int : 400 ms    Normal sinus rhythm  Septal infarct ,age undetermined  Abnormal ECG  When compared with ECG of 20-DEC-2023 10:40,  Premature ventricular complexes are no longer Present  Confirmed by Joshua Hurd MD ([id number]) on 5/12/2024 5:34:00 PM    Referred By: [email or web address]   SELF           Confirmed By:Joshua Hurd MD         Anesthesia Plan  Type of Anesthesia, risks & benefits discussed:    Anesthesia Type: Gen Natural Airway  Intra-op Monitoring Plan: Standard ASA Monitors  Post Op Pain Control Plan: multimodal analgesia  Induction:  IV  Airway Plan: Direct  Informed Consent: Informed consent signed with the Patient and all parties understand the risks and agree with anesthesia plan.  All questions answered. Patient consented to blood products? Yes  ASA Score: 3  Day of Surgery Review of History & Physical: H&P Update referred to the surgeon/provider.I have interviewed and examined the patient. I have reviewed the patient's H&P dated: There are no significant changes.     Ready For Surgery From Anesthesia Perspective.     .

## 2024-06-04 ENCOUNTER — TELEPHONE (OUTPATIENT)
Dept: FAMILY MEDICINE | Facility: CLINIC | Age: 74
End: 2024-06-04
Payer: MEDICARE

## 2024-06-04 DIAGNOSIS — N39.0 E. COLI UTI (URINARY TRACT INFECTION): ICD-10-CM

## 2024-06-04 DIAGNOSIS — J20.9 ACUTE BRONCHITIS, UNSPECIFIED ORGANISM: Primary | ICD-10-CM

## 2024-06-04 DIAGNOSIS — B96.20 E. COLI UTI (URINARY TRACT INFECTION): ICD-10-CM

## 2024-06-04 RX ORDER — BENZONATATE 200 MG/1
200 CAPSULE ORAL 3 TIMES DAILY PRN
Qty: 30 CAPSULE | Refills: 0 | Status: SHIPPED | OUTPATIENT
Start: 2024-06-04 | End: 2024-06-05 | Stop reason: SDUPTHER

## 2024-06-04 RX ORDER — TRIMETHOPRIM 100 MG/1
100 TABLET ORAL EVERY 12 HOURS
Qty: 20 TABLET | Refills: 0 | Status: SHIPPED | OUTPATIENT
Start: 2024-06-04 | End: 2024-06-05 | Stop reason: SDUPTHER

## 2024-06-04 NOTE — TELEPHONE ENCOUNTER
----- Message from Nida Roa sent at 6/4/2024  8:36 AM CDT -----  Pt called and asked could he get his trimethoprim and some cough meds sent to the [facility] it was called into the [facility] and he needs it to go to the [facility] pt call back is [phone]

## 2024-06-05 ENCOUNTER — TELEPHONE (OUTPATIENT)
Dept: FAMILY MEDICINE | Facility: CLINIC | Age: 74
End: 2024-06-05
Payer: MEDICARE

## 2024-06-05 DIAGNOSIS — J20.9 ACUTE BRONCHITIS, UNSPECIFIED ORGANISM: ICD-10-CM

## 2024-06-05 DIAGNOSIS — N39.0 E. COLI UTI (URINARY TRACT INFECTION): ICD-10-CM

## 2024-06-05 DIAGNOSIS — B96.20 E. COLI UTI (URINARY TRACT INFECTION): ICD-10-CM

## 2024-06-05 RX ORDER — BENZONATATE 200 MG/1
200 CAPSULE ORAL 3 TIMES DAILY PRN
Qty: 30 CAPSULE | Refills: 0 | Status: SHIPPED | OUTPATIENT
Start: 2024-06-05 | End: 2024-06-15

## 2024-06-05 RX ORDER — TRIMETHOPRIM 100 MG/1
100 TABLET ORAL EVERY 12 HOURS
Qty: 20 TABLET | Refills: 0 | Status: SHIPPED | OUTPATIENT
Start: 2024-06-05 | End: 2024-06-15

## 2024-06-05 NOTE — TELEPHONE ENCOUNTER
----- Message from Nida Roa sent at 6/5/2024  7:41 AM CDT -----  Pt called and said the [facility] dont carry the meds u called in and now he wants them sent to [facility] [address]

## 2024-06-10 ENCOUNTER — HOSPITAL ENCOUNTER (OUTPATIENT)
Dept: RADIOLOGY | Facility: HOSPITAL | Age: 74
Discharge: HOME | End: 2024-06-10
Attending: ORTHOPAEDIC SURGERY
Payer: MEDICARE

## 2024-06-10 ENCOUNTER — CLINICAL SUPPORT (OUTPATIENT)
Dept: CARDIOLOGY | Facility: CLINIC | Age: 74
End: 2024-06-10
Payer: MEDICARE

## 2024-06-10 ENCOUNTER — OFFICE VISIT (OUTPATIENT)
Dept: FAMILY MEDICINE | Facility: CLINIC | Age: 74
End: 2024-06-10
Payer: MEDICARE

## 2024-06-10 VITALS
WEIGHT: 196 LBS | HEIGHT: 70 IN | TEMPERATURE: 98 F | SYSTOLIC BLOOD PRESSURE: 130 MMHG | BODY MASS INDEX: 28.06 KG/M2 | DIASTOLIC BLOOD PRESSURE: 72 MMHG | OXYGEN SATURATION: 96 % | HEART RATE: 80 BPM | RESPIRATION RATE: 18 BRPM

## 2024-06-10 DIAGNOSIS — N39.0 E. COLI UTI (URINARY TRACT INFECTION): ICD-10-CM

## 2024-06-10 DIAGNOSIS — N18.31 CHRONIC KIDNEY DISEASE, STAGE 3A: Chronic | ICD-10-CM

## 2024-06-10 DIAGNOSIS — Z01.818 PREPROCEDURAL EXAMINATION: Primary | ICD-10-CM

## 2024-06-10 DIAGNOSIS — Z01.818 PREPROCEDURAL EXAMINATION: ICD-10-CM

## 2024-06-10 DIAGNOSIS — B96.20 E. COLI UTI (URINARY TRACT INFECTION): ICD-10-CM

## 2024-06-10 DIAGNOSIS — D63.8 ANEMIA OF CHRONIC DISEASE: Chronic | ICD-10-CM

## 2024-06-10 DIAGNOSIS — M25.562 CHRONIC PAIN OF LEFT KNEE: Chronic | ICD-10-CM

## 2024-06-10 DIAGNOSIS — M17.12 PRIMARY OSTEOARTHRITIS OF LEFT KNEE: Chronic | ICD-10-CM

## 2024-06-10 DIAGNOSIS — G89.29 CHRONIC PAIN OF LEFT KNEE: Chronic | ICD-10-CM

## 2024-06-10 PROBLEM — G25.0 ESSENTIAL TREMOR: Chronic | Status: ACTIVE | Noted: 2023-12-07

## 2024-06-10 LAB
ALBUMIN SERPL BCP-MCNC: 3.6 G/DL (ref 3.5–5)
ALBUMIN/GLOB SERPL: 0.8 {RATIO}
ALP SERPL-CCNC: 54 U/L (ref 45–115)
ALT SERPL W P-5'-P-CCNC: 21 U/L (ref 16–61)
ANION GAP SERPL CALCULATED.3IONS-SCNC: 8 MMOL/L (ref 7–16)
APTT PPP: 28.7 SECONDS (ref 25.2–37.3)
AST SERPL W P-5'-P-CCNC: 17 U/L (ref 15–37)
BASOPHILS # BLD AUTO: 0.09 K/UL (ref 0–0.2)
BASOPHILS NFR BLD AUTO: 0.9 % (ref 0–1)
BILIRUB SERPL-MCNC: 0.3 MG/DL (ref ?–1.2)
BILIRUB UR QL STRIP: NEGATIVE
BUN SERPL-MCNC: 26 MG/DL (ref 7–18)
BUN/CREAT SERPL: 16 (ref 6–20)
CALCIUM SERPL-MCNC: 9.6 MG/DL (ref 8.5–10.1)
CHLORIDE SERPL-SCNC: 110 MMOL/L (ref 98–107)
CLARITY UR: CLEAR
CO2 SERPL-SCNC: 26 MMOL/L (ref 21–32)
COLOR UR: NORMAL
CREAT SERPL-MCNC: 1.64 MG/DL (ref 0.7–1.3)
DIFFERENTIAL METHOD BLD: ABNORMAL
EGFR (NO RACE VARIABLE) (RUSH/TITUS): 44 ML/MIN/1.73M2
EOSINOPHIL # BLD AUTO: 0.41 K/UL (ref 0–0.5)
EOSINOPHIL NFR BLD AUTO: 4.2 % (ref 1–4)
ERYTHROCYTE [DISTWIDTH] IN BLOOD BY AUTOMATED COUNT: 12.9 % (ref 11.5–14.5)
GLOBULIN SER-MCNC: 4.5 G/DL (ref 2–4)
GLUCOSE SERPL-MCNC: 109 MG/DL (ref 74–106)
GLUCOSE UR STRIP-MCNC: NORMAL MG/DL
HCT VFR BLD AUTO: 38.6 % (ref 40–54)
HGB BLD-MCNC: 12.5 G/DL (ref 13.5–18)
IMM GRANULOCYTES # BLD AUTO: 0.06 K/UL (ref 0–0.04)
IMM GRANULOCYTES NFR BLD: 0.6 % (ref 0–0.4)
INDIRECT COOMBS: NORMAL
INR BLD: 0.91
KETONES UR STRIP-SCNC: NEGATIVE MG/DL
LEUKOCYTE ESTERASE UR QL STRIP: NEGATIVE
LYMPHOCYTES # BLD AUTO: 2.52 K/UL (ref 1–4.8)
LYMPHOCYTES NFR BLD AUTO: 25.6 % (ref 27–41)
MCH RBC QN AUTO: 30.7 PG (ref 27–31)
MCHC RBC AUTO-ENTMCNC: 32.4 G/DL (ref 32–36)
MCV RBC AUTO: 94.8 FL (ref 80–96)
MONOCYTES # BLD AUTO: 0.46 K/UL (ref 0–0.8)
MONOCYTES NFR BLD AUTO: 4.7 % (ref 2–6)
MPC BLD CALC-MCNC: 10 FL (ref 9.4–12.4)
NEUTROPHILS # BLD AUTO: 6.3 K/UL (ref 1.8–7.7)
NEUTROPHILS NFR BLD AUTO: 64 % (ref 53–65)
NITRITE UR QL STRIP: NEGATIVE
NRBC # BLD AUTO: 0 X10E3/UL
NRBC, AUTO (.00): 0 %
PH UR STRIP: 6 PH UNITS
PLATELET # BLD AUTO: 292 K/UL (ref 150–400)
POTASSIUM SERPL-SCNC: 4.2 MMOL/L (ref 3.5–5.1)
PROT SERPL-MCNC: 8.1 G/DL (ref 6.4–8.2)
PROT UR QL STRIP: NEGATIVE
PROTHROMBIN TIME: 12.2 SECONDS (ref 11.7–14.7)
RBC # BLD AUTO: 4.07 M/UL (ref 4.6–6.2)
RBC # UR STRIP: NEGATIVE /UL
RH BLD: NORMAL
SODIUM SERPL-SCNC: 140 MMOL/L (ref 136–145)
SP GR UR STRIP: 1.02
SPECIMEN OUTDATE: NORMAL
UROBILINOGEN UR STRIP-ACNC: NORMAL MG/DL
WBC # BLD AUTO: 9.84 K/UL (ref 4.5–11)

## 2024-06-10 PROCEDURE — 71046 X-RAY EXAM CHEST 2 VIEWS: CPT | Mod: 26,,, | Performed by: RADIOLOGY

## 2024-06-10 PROCEDURE — 99212 OFFICE O/P EST SF 10 MIN: CPT | Mod: PBBFAC,25

## 2024-06-10 PROCEDURE — 81003 URINALYSIS AUTO W/O SCOPE: CPT | Mod: QW,,, | Performed by: CLINICAL MEDICAL LABORATORY

## 2024-06-10 PROCEDURE — 93005 ELECTROCARDIOGRAM TRACING: CPT | Mod: PBBFAC | Performed by: HOSPITALIST

## 2024-06-10 PROCEDURE — 71046 X-RAY EXAM CHEST 2 VIEWS: CPT | Mod: TC

## 2024-06-10 PROCEDURE — 86900 BLOOD TYPING SEROLOGIC ABO: CPT | Mod: ,,, | Performed by: CLINICAL MEDICAL LABORATORY

## 2024-06-10 PROCEDURE — 85025 COMPLETE CBC W/AUTO DIFF WBC: CPT | Mod: ,,, | Performed by: CLINICAL MEDICAL LABORATORY

## 2024-06-10 PROCEDURE — 86901 BLOOD TYPING SEROLOGIC RH(D): CPT | Mod: ,,, | Performed by: CLINICAL MEDICAL LABORATORY

## 2024-06-10 PROCEDURE — 93010 ELECTROCARDIOGRAM REPORT: CPT | Mod: S$PBB,,, | Performed by: HOSPITALIST

## 2024-06-10 PROCEDURE — 80053 COMPREHEN METABOLIC PANEL: CPT | Mod: ,,, | Performed by: CLINICAL MEDICAL LABORATORY

## 2024-06-10 PROCEDURE — 86850 RBC ANTIBODY SCREEN: CPT | Mod: ,,, | Performed by: CLINICAL MEDICAL LABORATORY

## 2024-06-10 PROCEDURE — 99213 OFFICE O/P EST LOW 20 MIN: CPT | Mod: ,,, | Performed by: NURSE PRACTITIONER

## 2024-06-10 PROCEDURE — 99999 PR PBB SHADOW E&M-EST. PATIENT-LVL II: CPT | Mod: PBBFAC,,,

## 2024-06-10 RX ORDER — METHENAMINE HIPPURATE 1000 MG/1
1 TABLET ORAL 2 TIMES DAILY
Qty: 180 TABLET | Refills: 3 | Status: CANCELLED | OUTPATIENT
Start: 2024-06-10

## 2024-06-10 RX ORDER — FLUOXETINE 10 MG/1
10 CAPSULE ORAL DAILY
Qty: 30 CAPSULE | Refills: 0 | Status: CANCELLED | OUTPATIENT
Start: 2024-06-10

## 2024-06-10 RX ORDER — OMEPRAZOLE 20 MG/1
20 CAPSULE, DELAYED RELEASE ORAL DAILY
Qty: 90 CAPSULE | Refills: 3 | Status: CANCELLED | OUTPATIENT
Start: 2024-06-10

## 2024-06-10 NOTE — ASSESSMENT & PLAN NOTE
Complete trimethoprim as prescribed.  Recheck UA with reflex to culture if indicated to ensure resolution of infection.  Resume Hiprex after completion of trimethoprim due to history of recurrent UTIs.

## 2024-06-10 NOTE — ASSESSMENT & PLAN NOTE
Preop labs as ordered per Dr. Dada Enriquez and recheck UA for f/u treatment of UTI.  No x-ray tech in clinic so will go to the imaging center for chest x-ray and EKG at the [facility] as ordered per Dr. Dada Enriquez for preop clearance.    He is clear for surgery from a primary care standpoint pending clearance of recent UTI and as long as no abnormalities noted on chest x-ray or EKG that would warrant the need for further clearance.  If EKG is abnormal, he is established with Cardiology, Dr. Hanh Man.

## 2024-06-10 NOTE — ASSESSMENT & PLAN NOTE
Lab Results   Component Value Date    WBC 10.51 05/12/2024    HGB 12.8 (L) 05/12/2024    HCT 42.7 05/12/2024    .4 (H) 05/12/2024     05/12/2024     Stable hemoglobin 11.9-12.8 x 2+ years.

## 2024-06-12 DIAGNOSIS — M25.561 RIGHT KNEE PAIN, UNSPECIFIED CHRONICITY: Primary | ICD-10-CM

## 2024-06-13 ENCOUNTER — OFFICE VISIT (OUTPATIENT)
Dept: ORTHOPEDICS | Facility: CLINIC | Age: 74
End: 2024-06-13
Payer: MEDICARE

## 2024-06-13 ENCOUNTER — HOSPITAL ENCOUNTER (OUTPATIENT)
Dept: RADIOLOGY | Facility: HOSPITAL | Age: 74
Discharge: HOME | End: 2024-06-13
Attending: ORTHOPAEDIC SURGERY
Payer: MEDICARE

## 2024-06-13 DIAGNOSIS — M17.11 PRIMARY OSTEOARTHRITIS OF RIGHT KNEE: Primary | ICD-10-CM

## 2024-06-13 DIAGNOSIS — M25.561 RIGHT KNEE PAIN, UNSPECIFIED CHRONICITY: ICD-10-CM

## 2024-06-13 PROCEDURE — 73564 X-RAY EXAM KNEE 4 OR MORE: CPT | Mod: TC,RT

## 2024-06-13 PROCEDURE — 99215 OFFICE O/P EST HI 40 MIN: CPT | Mod: S$PBB,,, | Performed by: ORTHOPAEDIC SURGERY

## 2024-06-13 PROCEDURE — 99999 PR PBB SHADOW E&M-EST. PATIENT-LVL II: CPT | Mod: PBBFAC,,, | Performed by: ORTHOPAEDIC SURGERY

## 2024-06-13 PROCEDURE — 99212 OFFICE O/P EST SF 10 MIN: CPT | Mod: PBBFAC,25 | Performed by: ORTHOPAEDIC SURGERY

## 2024-06-13 PROCEDURE — 73564 X-RAY EXAM KNEE 4 OR MORE: CPT | Mod: 26,RT,, | Performed by: ORTHOPAEDIC SURGERY

## 2024-06-13 NOTE — H&P (VIEW-ONLY)
CC:  Knee pain    73 y.o. Male returns to clinic for a follow up visit regarding knee pain.       Patient is here for recheck right knee before his surgery next week.       Past Medical History:   Diagnosis Date    Cervical radiculopathy     Chronic pain     Chronic renal impairment     COVID-19 12/27/2022    Depression     Depressive disorder 05/12/2022    Digestive disorder     GERD (gastroesophageal reflux disease)     History of prediabetes 11/16/2022    Hypercholesteremia     Hypertension     Left ventricular hypertrophy     Lumbosacral spondylosis     Positive colorectal cancer screening using Cologuard test 12/15/2022    Prediabetes     Pulmonary nodules     repeat CT due 12/03/21     Past Surgical History:   Procedure Laterality Date    ABDOMINAL SURGERY      inguinal hernia repair    BACK SURGERY      Bilateral L3-S1 MBB Bilateral 2-, 1-, 1-, 12-    Dr Jorge Parker    EYE SURGERY      HERNIA REPAIR      KNEE ARTHROSCOPY W/ MENISCAL REPAIR Right     KNEE ARTHROSCOPY W/ MENISCECTOMY Left 01/11/2023    Procedure: ARTHROSCOPY, KNEE, WITH MENISCECTOMY;  Surgeon: Dada Enriquez MD;  Location: [facility] OR;  Service: Orthopedics;  Laterality: Left;    KNEE SURGERY Left     LATERAL RETINACULA RELEASE OF KNEE Left 01/11/2023    Procedure: ARTHROSCOPIC RELEASE, KNEE, LATERAL RETINACULA;  Surgeon: Dada Enriquez MD;  Location: [facility];  Service: Orthopedics;  Laterality: Left;    PROSTATECTOMY  03/1996    prostate cancer    RADIOFREQUENCY ABLATION OF LUMBAR MEDIAL BRANCH NERVE AT SINGLE LEVEL Right 04/01/2021    Procedure: Radiofrequency Ablation, Nerve, Spinal, Lumbar, Medial Branch, 1 Level;  Surgeon: Jordana Patel MD;  Location: [facility];  Service: Pain Management;  Laterality: Right;  Right L3-S1 RFTC    RADIOFREQUENCY ABLATION OF LUMBAR MEDIAL BRANCH NERVE AT SINGLE LEVEL Right 02/09/2023    Procedure: Radiofrequency Ablation, Nerve, Spinal, Lumbar,  Medial Branch, Level L4-S1;  Surgeon: Jordana Patel MD;  Location: [facility] PAIN [facility]MT;  Service: Pain Management;  Laterality: Right;  bonita left after right is done    RADIOFREQUENCY ABLATION OF LUMBAR MEDIAL BRANCH NERVE AT SINGLE LEVEL Left 02/28/2023    Procedure: RADIOFREQUENCY ABLATION, NERVE, SPINAL, LUMBAR, MEDIAL BRANCH, 1 LEVEL;  Surgeon: Jordana Patel MD;  Location: [facility];  Service: Pain Management;  Laterality: Left;    RADIOFREQUENCY THERMOCOAGULATION Bilateral 03/20/2012    Dr Parker    RADIOFREQUENCY THERMOCOAGULATION Left 03/18/2021    Procedure: RADIOFREQUENCY THERMAL COAGULATION;  Surgeon: Jordana Patel MD;  Location: [facility];  Service: Pain Management;  Laterality: Left;  BILATERAL L3-S1 RFTC LEFT SIDE FIRST    SELECTIVE INJECTION OF ANESTHETIC AGENT AROUND LUMBAR SPINAL NERVE ROOT BY TRANSFORAMINAL APPROACH Right 01/13/2022    Procedure: Right L4-5,5-S1 TFESI;  Surgeon: Jordana Patel MD;  Location: [facility]IN [facility];  Service: Pain Management;  Laterality: Right;  PT AWARE TO BE TESTED VIA PC    SHOULDER ARTHROSCOPY Right     SPINE SURGERY  1995    VASECTOMY  Mar 1996         PHYSICAL EXAMINATION:  There were no vitals taken for this visit.  General    Nursing note and vitals reviewed.  Constitutional: He is oriented to person, place, and time. He appears well-developed and well-nourished.   HENT:   Head: Normocephalic and atraumatic.   Nose: Nose normal.   Eyes: Pupils are equal, round, and reactive to light.   Neck: Neck supple.   Cardiovascular:  Normal rate, regular rhythm and intact distal pulses.            Pulmonary/Chest: Effort normal. No respiratory distress. He exhibits no tenderness.   Abdominal: Soft. He exhibits no distension. There is no abdominal tenderness.   Neurological: He is alert and oriented to person, place, and time. He has normal reflexes.   Psychiatric: He has a normal mood and affect. His behavior is normal. Judgment and thought content  normal.     General Musculoskeletal Exam   Gait: antalgic       Right Knee Exam     Inspection   Swelling: present    Tenderness   The patient is tender to palpation of the medial joint line and lateral joint line.    Crepitus   The patient has crepitus of the patella, medial joint line and lateral joint line.    Range of Motion   Extension:  abnormal   Flexion:  abnormal     Tests   Meniscus   Ozzy:  Medial - positive   Ligament Examination   Lachman: normal (-1 to 2mm)   PCL-Posterior Drawer: normal (0 to 2mm)     MCL - Valgus: normal (0 to 2mm)  LCL - Varus: normal  Pivot Shift: normal (Equal)  Reverse Pivot Shift: normal (Equal)  Dial Test at 30 degrees: normal (< 5 degrees)  Dial Test at 90 degrees: normal (< 5 degrees)  Posterolateral Corner: stable  Patella   Patellar Tracking: normal  Q-Angle at 90 degrees: normal  Patellar Grind: positive    Other   Sensation: normal    Muscle Strength   Right Lower Extremity   Quadriceps:  5/5   Hamstrin/5     Reflexes     Right Side   Quadriceps:  2+    Vascular Exam     Right Pulses  Dorsalis Pedis:      2+  Posterior Tibial:      2+            IMAGING:  X-Ray Knee Complete 4 Or More Views Right    Result Date: 2024  See Procedure Notes for results. IMPRESSION: Please see Ortho procedure notes for report.  This procedure was auto-finalized by: Virtual Radiologist  Four views right knee were obtained today demonstrating the presence of severe medial joint space narrowing of the medial compartment knee with varus alignment.  X-Ray Chest PA And Lateral    Result Date: 6/10/2024  EXAMINATION: XR CHEST PA AND LATERAL CLINICAL HISTORY: Encounter for other preprocedural examination COMPARISON: 12 May 2024 TECHNIQUE: XR CHEST PA AND LATERAL FINDINGS: The heart and mediastinum are normal in size and configuration.  The pulmonary vascularity is normal in caliber.  No lung infiltrates, effusions, pneumothorax or other abnormality is demonstrated.     No evidence of  cardiopulmonary disease. Electronically signed by: Fantasma Lutz Date:    06/10/2024 Time:    15:24    ASSESSMENT:      ICD-10-CM ICD-9-CM   1. Primary osteoarthritis of right knee  M17.11 715.16       PLAN:     -Findings and treatment options were discussed with the patient  -All questions answered    We will likely stay over night after surgery we discharged home the next day to home health.    The conservative options including NSAIDs, activity modification, physical therapy, corticosteroid injection, and viscosupplimentation were discussed. He is interested in surgical intervention at this time.    The surgical process of knee replacement was discussed in detail with the patient including a detailed discussion of the procedure itself (including visual model, x-ray review, and literature review). The typical perioperative and post-operative course was discussed and perioperative risks were discussed to the patient's satisfaction.  Risks and complications discussed included but were not limited to the risks of anesthetic complications, infection, bleeding, wound healing complications, aseptic loosening, instability, limb length inequality, neurologic dysfunction including numbness,  DVT, pulmonary embolism, perioperative medical risks (cardiac, pulmonary, renal, neurologic), and death and the patient elects to proceed. We will initiate pre-operative medical evaluation and clearance and set a provisional date for surgical intervention according to the patient's schedule.   I have discussed anticoagulation with aspirin and coumadin and in low risk patients I have recommended aspirin twice a day.  25 minutes was spent in direct consultation with the patient counselling him on the items listed above.    Plan for Rt TKA  There are no Patient Instructions on file for this visit.      No orders of the defined types were placed in this encounter.        Procedures

## 2024-06-13 NOTE — PROGRESS NOTES
CC:  Knee pain    73 y.o. Male returns to clinic for a follow up visit regarding knee pain.       Patient is here for recheck right knee before his surgery next week.       Past Medical History:   Diagnosis Date    Cervical radiculopathy     Chronic pain     Chronic renal impairment     COVID-19 12/27/2022    Depression     Depressive disorder 05/12/2022    Digestive disorder     GERD (gastroesophageal reflux disease)     History of prediabetes 11/16/2022    Hypercholesteremia     Hypertension     Left ventricular hypertrophy     Lumbosacral spondylosis     Positive colorectal cancer screening using Cologuard test 12/15/2022    Prediabetes     Pulmonary nodules     repeat CT due 12/03/21     Past Surgical History:   Procedure Laterality Date    ABDOMINAL SURGERY      inguinal hernia repair    BACK SURGERY      Bilateral L3-S1 MBB Bilateral 2-, 1-, 1-, 12-    Dr Jorge Parker    EYE SURGERY      HERNIA REPAIR      KNEE ARTHROSCOPY W/ MENISCAL REPAIR Right     KNEE ARTHROSCOPY W/ MENISCECTOMY Left 01/11/2023    Procedure: ARTHROSCOPY, KNEE, WITH MENISCECTOMY;  Surgeon: Dada Enriquez MD;  Location: [facility];  Service: Orthopedics;  Laterality: Left;    KNEE SURGERY Left     LATERAL RETINACULA RELEASE OF KNEE Left 01/11/2023    Procedure: ARTHROSCOPIC RELEASE, KNEE, LATERAL RETINACULA;  Surgeon: Dada Enriquez MD;  Location: [facility];  Service: Orthopedics;  Laterality: Left;    PROSTATECTOMY  03/1996    prostate cancer    RADIOFREQUENCY ABLATION OF LUMBAR MEDIAL BRANCH NERVE AT SINGLE LEVEL Right 04/01/2021    Procedure: Radiofrequency Ablation, Nerve, Spinal, Lumbar, Medial Branch, 1 Level;  Surgeon: Jordana Patel MD;  Location: [facility];  Service: Pain Management;  Laterality: Right;  Right L3-S1 RFTC    RADIOFREQUENCY ABLATION OF LUMBAR MEDIAL BRANCH NERVE AT SINGLE LEVEL Right 02/09/2023    Procedure: Radiofrequency Ablation, Nerve, Spinal, Lumbar,  Medial Branch, Level L4-S1;  Surgeon: Jordana Patel MD;  Location: [facility];  Service: Pain Management;  Laterality: Right;  bonita left after right is done    RADIOFREQUENCY ABLATION OF LUMBAR MEDIAL BRANCH NERVE AT SINGLE LEVEL Left 02/28/2023    Procedure: RADIOFREQUENCY ABLATION, NERVE, SPINAL, LUMBAR, MEDIAL BRANCH, 1 LEVEL;  Surgeon: Jordana Patel MD;  Location: [facility];  Service: Pain Management;  Laterality: Left;    RADIOFREQUENCY THERMOCOAGULATION Bilateral 03/20/2012    Dr Parker    RADIOFREQUENCY THERMOCOAGULATION Left 03/18/2021    Procedure: RADIOFREQUENCY THERMAL COAGULATION;  Surgeon: Jordana Patel MD;  Location: [facility];  Service: Pain Management;  Laterality: Left;  BILATERAL L3-S1 RFTC LEFT SIDE FIRST    SELECTIVE INJECTION OF ANESTHETIC AGENT AROUND LUMBAR SPINAL NERVE ROOT BY TRANSFORAMINAL APPROACH Right 01/13/2022    Procedure: Right L4-5,5-S1 TFESI;  Surgeon: Jordana Patel MD;  Location: [facility];  Service: Pain Management;  Laterality: Right;  PT AWARE TO BE TESTED VIA PC    SHOULDER ARTHROSCOPY Right     SPINE SURGERY  1995    VASECTOMY  Mar 1996         PHYSICAL EXAMINATION:  There were no vitals taken for this visit.  General    Nursing note and vitals reviewed.  Constitutional: He is oriented to person, place, and time. He appears well-developed and well-nourished.   HENT:   Head: Normocephalic and atraumatic.   Nose: Nose normal.   Eyes: Pupils are equal, round, and reactive to light.   Neck: Neck supple.   Cardiovascular:  Normal rate, regular rhythm and intact distal pulses.            Pulmonary/Chest: Effort normal. No respiratory distress. He exhibits no tenderness.   Abdominal: Soft. He exhibits no distension. There is no abdominal tenderness.   Neurological: He is alert and oriented to person, place, and time. He has normal reflexes.   Psychiatric: He has a normal mood and affect. His behavior is normal. Judgment and thought content  normal.     General Musculoskeletal Exam   Gait: antalgic       Right Knee Exam     Inspection   Swelling: present    Tenderness   The patient is tender to palpation of the medial joint line and lateral joint line.    Crepitus   The patient has crepitus of the patella, medial joint line and lateral joint line.    Range of Motion   Extension:  abnormal   Flexion:  abnormal     Tests   Meniscus   Ozzy:  Medial - positive   Ligament Examination   Lachman: normal (-1 to 2mm)   PCL-Posterior Drawer: normal (0 to 2mm)     MCL - Valgus: normal (0 to 2mm)  LCL - Varus: normal  Pivot Shift: normal (Equal)  Reverse Pivot Shift: normal (Equal)  Dial Test at 30 degrees: normal (< 5 degrees)  Dial Test at 90 degrees: normal (< 5 degrees)  Posterolateral Corner: stable  Patella   Patellar Tracking: normal  Q-Angle at 90 degrees: normal  Patellar Grind: positive    Other   Sensation: normal    Muscle Strength   Right Lower Extremity   Quadriceps:  5/5   Hamstrin/5     Reflexes     Right Side   Quadriceps:  2+    Vascular Exam     Right Pulses  Dorsalis Pedis:      2+  Posterior Tibial:      2+            IMAGING:  X-Ray Knee Complete 4 Or More Views Right    Result Date: 2024  See Procedure Notes for results. IMPRESSION: Please see Ortho procedure notes for report.  This procedure was auto-finalized by: Virtual Radiologist  Four views right knee were obtained today demonstrating the presence of severe medial joint space narrowing of the medial compartment knee with varus alignment.  X-Ray Chest PA And Lateral    Result Date: 6/10/2024  EXAMINATION: XR CHEST PA AND LATERAL CLINICAL HISTORY: Encounter for other preprocedural examination COMPARISON: 12 May 2024 TECHNIQUE: XR CHEST PA AND LATERAL FINDINGS: The heart and mediastinum are normal in size and configuration.  The pulmonary vascularity is normal in caliber.  No lung infiltrates, effusions, pneumothorax or other abnormality is demonstrated.     No evidence of  cardiopulmonary disease. Electronically signed by: Fantasma Lutz Date:    06/10/2024 Time:    15:24    ASSESSMENT:      ICD-10-CM ICD-9-CM   1. Primary osteoarthritis of right knee  M17.11 715.16       PLAN:     -Findings and treatment options were discussed with the patient  -All questions answered    We will likely stay over night after surgery we discharged home the next day to home health.    The conservative options including NSAIDs, activity modification, physical therapy, corticosteroid injection, and viscosupplimentation were discussed. He is interested in surgical intervention at this time.    The surgical process of knee replacement was discussed in detail with the patient including a detailed discussion of the procedure itself (including visual model, x-ray review, and literature review). The typical perioperative and post-operative course was discussed and perioperative risks were discussed to the patient's satisfaction.  Risks and complications discussed included but were not limited to the risks of anesthetic complications, infection, bleeding, wound healing complications, aseptic loosening, instability, limb length inequality, neurologic dysfunction including numbness,  DVT, pulmonary embolism, perioperative medical risks (cardiac, pulmonary, renal, neurologic), and death and the patient elects to proceed. We will initiate pre-operative medical evaluation and clearance and set a provisional date for surgical intervention according to the patient's schedule.   I have discussed anticoagulation with aspirin and coumadin and in low risk patients I have recommended aspirin twice a day.  25 minutes was spent in direct consultation with the patient counselling him on the items listed above.    Plan for Rt TKA  There are no Patient Instructions on file for this visit.      No orders of the defined types were placed in this encounter.        Procedures

## 2024-06-19 ENCOUNTER — ANESTHESIA (OUTPATIENT)
Dept: SURGERY | Facility: HOSPITAL | Age: 74
End: 2024-06-19
Payer: MEDICARE

## 2024-06-19 ENCOUNTER — ANESTHESIA EVENT (OUTPATIENT)
Dept: SURGERY | Facility: HOSPITAL | Age: 74
End: 2024-06-19
Payer: MEDICARE

## 2024-06-19 ENCOUNTER — HOSPITAL ENCOUNTER (OUTPATIENT)
Facility: HOSPITAL | Age: 74
Discharge: HOME | End: 2024-06-20
Attending: ORTHOPAEDIC SURGERY | Admitting: ORTHOPAEDIC SURGERY
Payer: MEDICARE

## 2024-06-19 DIAGNOSIS — M17.11 PRIMARY OSTEOARTHRITIS OF RIGHT KNEE: Primary | ICD-10-CM

## 2024-06-19 PROCEDURE — 25000003 PHARM REV CODE 250: Performed by: ORTHOPAEDIC SURGERY

## 2024-06-19 PROCEDURE — 63600175 PHARM REV CODE 636 W HCPCS: Performed by: ANESTHESIOLOGY

## 2024-06-19 PROCEDURE — 71000033 HC RECOVERY, INTIAL HOUR: Performed by: ORTHOPAEDIC SURGERY

## 2024-06-19 PROCEDURE — 25000003 PHARM REV CODE 250: Performed by: NURSE ANESTHETIST, CERTIFIED REGISTERED

## 2024-06-19 PROCEDURE — 27201423 OPTIME MED/SURG SUP & DEVICES STERILE SUPPLY: Performed by: ORTHOPAEDIC SURGERY

## 2024-06-19 PROCEDURE — C1776 JOINT DEVICE (IMPLANTABLE): HCPCS | Performed by: ORTHOPAEDIC SURGERY

## 2024-06-19 PROCEDURE — C1769 GUIDE WIRE: HCPCS | Performed by: ORTHOPAEDIC SURGERY

## 2024-06-19 PROCEDURE — 63600175 PHARM REV CODE 636 W HCPCS: Performed by: NURSE ANESTHETIST, CERTIFIED REGISTERED

## 2024-06-19 PROCEDURE — 27000655: Performed by: ANESTHESIOLOGY

## 2024-06-19 PROCEDURE — 37000009 HC ANESTHESIA EA ADD 15 MINS: Performed by: ORTHOPAEDIC SURGERY

## 2024-06-19 PROCEDURE — 27447 TOTAL KNEE ARTHROPLASTY: CPT | Mod: RT,,, | Performed by: ORTHOPAEDIC SURGERY

## 2024-06-19 PROCEDURE — 27000177 HC AIRWAY, LARYNGEAL MASK: Performed by: ANESTHESIOLOGY

## 2024-06-19 PROCEDURE — 20985 CPTR-ASST DIR MS PX: CPT | Mod: ,,, | Performed by: ORTHOPAEDIC SURGERY

## 2024-06-19 PROCEDURE — 63600175 PHARM REV CODE 636 W HCPCS: Performed by: ORTHOPAEDIC SURGERY

## 2024-06-19 PROCEDURE — 36000713 HC OR TIME LEV V EA ADD 15 MIN: Performed by: ORTHOPAEDIC SURGERY

## 2024-06-19 PROCEDURE — 27000716 HC OXISENSOR PROBE, ANY SIZE: Performed by: ANESTHESIOLOGY

## 2024-06-19 PROCEDURE — 36000712 HC OR TIME LEV V 1ST 15 MIN: Performed by: ORTHOPAEDIC SURGERY

## 2024-06-19 PROCEDURE — 27000510 HC BLANKET BAIR HUGGER ANY SIZE: Performed by: ANESTHESIOLOGY

## 2024-06-19 PROCEDURE — 37000008 HC ANESTHESIA 1ST 15 MINUTES: Performed by: ORTHOPAEDIC SURGERY

## 2024-06-19 RX ORDER — ATORVASTATIN CALCIUM 20 MG/1
20 TABLET, FILM COATED ORAL DAILY
Status: DISCONTINUED | OUTPATIENT
Start: 2024-06-20 | End: 2024-06-20 | Stop reason: HOSPADM

## 2024-06-19 RX ORDER — ONDANSETRON 4 MG/1
8 TABLET, ORALLY DISINTEGRATING ORAL EVERY 8 HOURS PRN
Status: DISCONTINUED | OUTPATIENT
Start: 2024-06-19 | End: 2024-06-20 | Stop reason: HOSPADM

## 2024-06-19 RX ORDER — PANTOPRAZOLE SODIUM 40 MG/1
40 TABLET, DELAYED RELEASE ORAL DAILY
Status: DISCONTINUED | OUTPATIENT
Start: 2024-06-20 | End: 2024-06-20 | Stop reason: HOSPADM

## 2024-06-19 RX ORDER — CEFAZOLIN 2 G/1
INJECTION, POWDER, FOR SOLUTION INTRAMUSCULAR; INTRAVENOUS
Status: DISCONTINUED | OUTPATIENT
Start: 2024-06-19 | End: 2024-06-19

## 2024-06-19 RX ORDER — AMLODIPINE BESYLATE 10 MG/1
10 TABLET ORAL DAILY
Status: DISCONTINUED | OUTPATIENT
Start: 2024-06-20 | End: 2024-06-20 | Stop reason: HOSPADM

## 2024-06-19 RX ORDER — ACETAMINOPHEN 10 MG/ML
1000 INJECTION, SOLUTION INTRAVENOUS ONCE
Status: COMPLETED | OUTPATIENT
Start: 2024-06-19 | End: 2024-06-19

## 2024-06-19 RX ORDER — LIDOCAINE HYDROCHLORIDE 20 MG/ML
INJECTION, SOLUTION EPIDURAL; INFILTRATION; INTRACAUDAL; PERINEURAL
Status: DISCONTINUED | OUTPATIENT
Start: 2024-06-19 | End: 2024-06-19

## 2024-06-19 RX ORDER — HYDROMORPHONE HYDROCHLORIDE 2 MG/ML
INJECTION, SOLUTION INTRAMUSCULAR; INTRAVENOUS; SUBCUTANEOUS
Status: DISCONTINUED | OUTPATIENT
Start: 2024-06-19 | End: 2024-06-19

## 2024-06-19 RX ORDER — LISINOPRIL 40 MG/1
40 TABLET ORAL DAILY
Status: DISCONTINUED | OUTPATIENT
Start: 2024-06-20 | End: 2024-06-20 | Stop reason: HOSPADM

## 2024-06-19 RX ORDER — ROPIVACAINE HYDROCHLORIDE 7.5 MG/ML
INJECTION, SOLUTION EPIDURAL; PERINEURAL
Status: COMPLETED | OUTPATIENT
Start: 2024-06-19 | End: 2024-06-19

## 2024-06-19 RX ORDER — POLYETHYLENE GLYCOL 3350 17 G/17G
17 POWDER, FOR SOLUTION ORAL DAILY
Status: DISCONTINUED | OUTPATIENT
Start: 2024-06-20 | End: 2024-06-20 | Stop reason: HOSPADM

## 2024-06-19 RX ORDER — HYDROMORPHONE HYDROCHLORIDE 2 MG/ML
0.5 INJECTION, SOLUTION INTRAMUSCULAR; INTRAVENOUS; SUBCUTANEOUS EVERY 5 MIN PRN
Status: DISCONTINUED | OUTPATIENT
Start: 2024-06-19 | End: 2024-06-19 | Stop reason: HOSPADM

## 2024-06-19 RX ORDER — MEPERIDINE HYDROCHLORIDE 25 MG/ML
25 INJECTION INTRAMUSCULAR; INTRAVENOUS; SUBCUTANEOUS EVERY 10 MIN PRN
Status: DISCONTINUED | OUTPATIENT
Start: 2024-06-19 | End: 2024-06-19 | Stop reason: HOSPADM

## 2024-06-19 RX ORDER — ASPIRIN 325 MG
325 TABLET ORAL DAILY
Status: DISCONTINUED | OUTPATIENT
Start: 2024-06-20 | End: 2024-06-20 | Stop reason: HOSPADM

## 2024-06-19 RX ORDER — MUPIROCIN 20 MG/G
OINTMENT TOPICAL
Status: DISCONTINUED | OUTPATIENT
Start: 2024-06-19 | End: 2024-06-19 | Stop reason: HOSPADM

## 2024-06-19 RX ORDER — SODIUM CHLORIDE 9 MG/ML
INJECTION, SOLUTION INTRAVENOUS CONTINUOUS
Status: DISCONTINUED | OUTPATIENT
Start: 2024-06-19 | End: 2024-06-19

## 2024-06-19 RX ORDER — DIPHENHYDRAMINE HYDROCHLORIDE 50 MG/ML
25 INJECTION INTRAMUSCULAR; INTRAVENOUS EVERY 6 HOURS PRN
Status: DISCONTINUED | OUTPATIENT
Start: 2024-06-19 | End: 2024-06-19 | Stop reason: HOSPADM

## 2024-06-19 RX ORDER — CELECOXIB 100 MG/1
200 CAPSULE ORAL 2 TIMES DAILY
Status: DISCONTINUED | OUTPATIENT
Start: 2024-06-19 | End: 2024-06-20 | Stop reason: HOSPADM

## 2024-06-19 RX ORDER — ZOLPIDEM TARTRATE 5 MG/1
5 TABLET ORAL NIGHTLY PRN
Status: DISCONTINUED | OUTPATIENT
Start: 2024-06-19 | End: 2024-06-20 | Stop reason: HOSPADM

## 2024-06-19 RX ORDER — ROPIVACAINE/EPI/CLONIDINE/KET 2.46-0.005
SYRINGE (ML) INJECTION
Status: DISCONTINUED | OUTPATIENT
Start: 2024-06-19 | End: 2024-06-19 | Stop reason: HOSPADM

## 2024-06-19 RX ORDER — ONDANSETRON HYDROCHLORIDE 2 MG/ML
4 INJECTION, SOLUTION INTRAVENOUS DAILY PRN
Status: DISCONTINUED | OUTPATIENT
Start: 2024-06-19 | End: 2024-06-19 | Stop reason: HOSPADM

## 2024-06-19 RX ORDER — FAMOTIDINE 20 MG/1
20 TABLET, FILM COATED ORAL 2 TIMES DAILY
Status: DISCONTINUED | OUTPATIENT
Start: 2024-06-19 | End: 2024-06-20 | Stop reason: HOSPADM

## 2024-06-19 RX ORDER — TRANEXAMIC ACID 10 MG/ML
1000 INJECTION, SOLUTION INTRAVENOUS
Status: DISCONTINUED | OUTPATIENT
Start: 2024-06-19 | End: 2024-06-19 | Stop reason: HOSPADM

## 2024-06-19 RX ORDER — PREGABALIN 75 MG/1
75 CAPSULE ORAL 2 TIMES DAILY
Status: DISCONTINUED | OUTPATIENT
Start: 2024-06-19 | End: 2024-06-20 | Stop reason: HOSPADM

## 2024-06-19 RX ORDER — LOPERAMIDE HYDROCHLORIDE 2 MG/1
4 CAPSULE ORAL ONCE
Status: DISCONTINUED | OUTPATIENT
Start: 2024-06-19 | End: 2024-06-20 | Stop reason: HOSPADM

## 2024-06-19 RX ORDER — FENTANYL CITRATE 50 UG/ML
INJECTION, SOLUTION INTRAMUSCULAR; INTRAVENOUS
Status: DISCONTINUED | OUTPATIENT
Start: 2024-06-19 | End: 2024-06-19

## 2024-06-19 RX ORDER — TRANEXAMIC ACID 10 MG/ML
1000 INJECTION, SOLUTION INTRAVENOUS
Status: COMPLETED | OUTPATIENT
Start: 2024-06-19 | End: 2024-06-19

## 2024-06-19 RX ORDER — MORPHINE SULFATE 10 MG/ML
4 INJECTION INTRAMUSCULAR; INTRAVENOUS; SUBCUTANEOUS EVERY 5 MIN PRN
Status: DISCONTINUED | OUTPATIENT
Start: 2024-06-19 | End: 2024-06-19 | Stop reason: HOSPADM

## 2024-06-19 RX ORDER — DOCUSATE SODIUM 100 MG/1
100 CAPSULE, LIQUID FILLED ORAL EVERY 12 HOURS
Status: DISCONTINUED | OUTPATIENT
Start: 2024-06-19 | End: 2024-06-20 | Stop reason: HOSPADM

## 2024-06-19 RX ORDER — OXYCODONE HYDROCHLORIDE 5 MG/1
5 TABLET ORAL EVERY 4 HOURS PRN
Status: DISCONTINUED | OUTPATIENT
Start: 2024-06-19 | End: 2024-06-20 | Stop reason: HOSPADM

## 2024-06-19 RX ORDER — IPRATROPIUM BROMIDE AND ALBUTEROL SULFATE 2.5; .5 MG/3ML; MG/3ML
3 SOLUTION RESPIRATORY (INHALATION) ONCE
Status: DISCONTINUED | OUTPATIENT
Start: 2024-06-19 | End: 2024-06-19 | Stop reason: HOSPADM

## 2024-06-19 RX ORDER — SODIUM CHLORIDE 0.9 % (FLUSH) 0.9 %
3 SYRINGE (ML) INJECTION EVERY 6 HOURS PRN
Status: DISCONTINUED | OUTPATIENT
Start: 2024-06-19 | End: 2024-06-20 | Stop reason: HOSPADM

## 2024-06-19 RX ORDER — PROPOFOL 10 MG/ML
VIAL (ML) INTRAVENOUS
Status: DISCONTINUED | OUTPATIENT
Start: 2024-06-19 | End: 2024-06-19

## 2024-06-19 RX ORDER — PHENYLEPHRINE HYDROCHLORIDE 10 MG/ML
INJECTION INTRAVENOUS
Status: DISCONTINUED | OUTPATIENT
Start: 2024-06-19 | End: 2024-06-19

## 2024-06-19 RX ORDER — LANOLIN ALCOHOL/MO/W.PET/CERES
400 CREAM (GRAM) TOPICAL DAILY
Status: DISCONTINUED | OUTPATIENT
Start: 2024-06-20 | End: 2024-06-20 | Stop reason: HOSPADM

## 2024-06-19 RX ORDER — ACETAMINOPHEN 500 MG
1000 TABLET ORAL EVERY 8 HOURS
Status: DISCONTINUED | OUTPATIENT
Start: 2024-06-20 | End: 2024-06-20 | Stop reason: HOSPADM

## 2024-06-19 RX ORDER — MUPIROCIN 20 MG/G
1 OINTMENT TOPICAL 2 TIMES DAILY
Status: DISCONTINUED | OUTPATIENT
Start: 2024-06-19 | End: 2024-06-20 | Stop reason: HOSPADM

## 2024-06-19 RX ORDER — BISACODYL 10 MG/1
10 SUPPOSITORY RECTAL DAILY PRN
Status: DISCONTINUED | OUTPATIENT
Start: 2024-06-19 | End: 2024-06-20 | Stop reason: HOSPADM

## 2024-06-19 RX ORDER — ACETAMINOPHEN 10 MG/ML
1000 INJECTION, SOLUTION INTRAVENOUS ONCE
Status: DISCONTINUED | OUTPATIENT
Start: 2024-06-19 | End: 2024-06-20 | Stop reason: HOSPADM

## 2024-06-19 RX ORDER — DEXTROSE MONOHYDRATE 50 MG/ML
INJECTION, SOLUTION INTRAVENOUS
Status: DISCONTINUED | OUTPATIENT
Start: 2024-06-19 | End: 2024-06-20 | Stop reason: HOSPADM

## 2024-06-19 RX ORDER — DEXAMETHASONE SODIUM PHOSPHATE 4 MG/ML
INJECTION, SOLUTION INTRA-ARTICULAR; INTRALESIONAL; INTRAMUSCULAR; INTRAVENOUS; SOFT TISSUE
Status: DISCONTINUED | OUTPATIENT
Start: 2024-06-19 | End: 2024-06-19

## 2024-06-19 RX ORDER — ONDANSETRON HYDROCHLORIDE 2 MG/ML
INJECTION, SOLUTION INTRAVENOUS
Status: DISCONTINUED | OUTPATIENT
Start: 2024-06-19 | End: 2024-06-19

## 2024-06-19 RX ORDER — OXYCODONE HYDROCHLORIDE 5 MG/1
10 TABLET ORAL EVERY 4 HOURS PRN
Status: DISCONTINUED | OUTPATIENT
Start: 2024-06-19 | End: 2024-06-20 | Stop reason: HOSPADM

## 2024-06-19 RX ORDER — MIDAZOLAM HYDROCHLORIDE 1 MG/ML
INJECTION INTRAMUSCULAR; INTRAVENOUS
Status: DISCONTINUED | OUTPATIENT
Start: 2024-06-19 | End: 2024-06-19

## 2024-06-19 RX ADMIN — ROPIVACAINE HYDROCHLORIDE 40 ML: 7.5 INJECTION, SOLUTION EPIDURAL; PERINEURAL at 02:06

## 2024-06-19 RX ADMIN — MUPIROCIN 1 G: 20 OINTMENT TOPICAL at 08:06

## 2024-06-19 RX ADMIN — ACETAMINOPHEN 1000 MG: 10 INJECTION, SOLUTION INTRAVENOUS at 05:06

## 2024-06-19 RX ADMIN — FAMOTIDINE 20 MG: 20 TABLET, FILM COATED ORAL at 08:06

## 2024-06-19 RX ADMIN — MIDAZOLAM HYDROCHLORIDE 2 MG: 1 INJECTION, SOLUTION INTRAMUSCULAR; INTRAVENOUS at 02:06

## 2024-06-19 RX ADMIN — ONDANSETRON 4 MG: 2 INJECTION INTRAMUSCULAR; INTRAVENOUS at 02:06

## 2024-06-19 RX ADMIN — VANCOMYCIN HYDROCHLORIDE 1500 MG: 1 INJECTION, POWDER, LYOPHILIZED, FOR SOLUTION INTRAVENOUS at 02:06

## 2024-06-19 RX ADMIN — FENTANYL CITRATE 75 MCG: 50 INJECTION INTRAMUSCULAR; INTRAVENOUS at 03:06

## 2024-06-19 RX ADMIN — DEXAMETHASONE SODIUM PHOSPHATE 10 MG: 4 INJECTION, SOLUTION INTRA-ARTICULAR; INTRALESIONAL; INTRAMUSCULAR; INTRAVENOUS; SOFT TISSUE at 02:06

## 2024-06-19 RX ADMIN — FENTANYL CITRATE 25 MCG: 50 INJECTION INTRAMUSCULAR; INTRAVENOUS at 02:06

## 2024-06-19 RX ADMIN — CELECOXIB 200 MG: 100 CAPSULE ORAL at 09:06

## 2024-06-19 RX ADMIN — PHENYLEPHRINE HYDROCHLORIDE 200 MCG: 10 INJECTION INTRAVENOUS at 04:06

## 2024-06-19 RX ADMIN — HYDROMORPHONE HYDROCHLORIDE 1 MG: 2 INJECTION, SOLUTION INTRAMUSCULAR; INTRAVENOUS; SUBCUTANEOUS at 03:06

## 2024-06-19 RX ADMIN — HYDROMORPHONE HYDROCHLORIDE 0.5 MG: 2 INJECTION, SOLUTION INTRAMUSCULAR; INTRAVENOUS; SUBCUTANEOUS at 03:06

## 2024-06-19 RX ADMIN — TRANEXAMIC ACID 1000 MG: 10 INJECTION, SOLUTION INTRAVENOUS at 04:06

## 2024-06-19 RX ADMIN — CEFAZOLIN 2 G: 2 INJECTION, POWDER, FOR SOLUTION INTRAMUSCULAR; INTRAVENOUS at 02:06

## 2024-06-19 RX ADMIN — OXYCODONE 10 MG: 5 TABLET ORAL at 08:06

## 2024-06-19 RX ADMIN — SODIUM CHLORIDE: 9 INJECTION, SOLUTION INTRAVENOUS at 11:06

## 2024-06-19 RX ADMIN — PREGABALIN 75 MG: 75 CAPSULE ORAL at 08:06

## 2024-06-19 RX ADMIN — DOCUSATE SODIUM 100 MG: 100 CAPSULE, LIQUID FILLED ORAL at 08:06

## 2024-06-19 RX ADMIN — DEXTROSE MONOHYDRATE: 50 INJECTION, SOLUTION INTRAVENOUS at 09:06

## 2024-06-19 RX ADMIN — CEFAZOLIN 2 G: 2 INJECTION, POWDER, FOR SOLUTION INTRAMUSCULAR; INTRAVENOUS at 10:06

## 2024-06-19 RX ADMIN — PROPOFOL 150 MG: 10 INJECTION, EMULSION INTRAVENOUS at 02:06

## 2024-06-19 RX ADMIN — HYDROMORPHONE HYDROCHLORIDE 0.5 MG: 2 INJECTION, SOLUTION INTRAMUSCULAR; INTRAVENOUS; SUBCUTANEOUS at 04:06

## 2024-06-19 RX ADMIN — TRANEXAMIC ACID 1000 MG: 10 INJECTION, SOLUTION INTRAVENOUS at 03:06

## 2024-06-19 RX ADMIN — LIDOCAINE HYDROCHLORIDE 100 MG: 20 INJECTION, SOLUTION INTRAVENOUS at 02:06

## 2024-06-19 NOTE — PLAN OF CARE
[facility] - Orthopedic Periop Services  Initial Discharge Assessment       Primary Care Provider: Elizabeth Lange FNP    Admission Diagnosis: Primary osteoarthritis of right knee [M17.11]    Admission Date: 6/19/2024  Expected Discharge Date:     Transition of Care Barriers: None    Payor: MEDICARE / Plan: MEDICARE PART A & B / Product Type: Government /     Extended Emergency Contact Information  Primary Emergency Contact: CEE NICHOLS  Address: [address] [place] [address] [facility]  Home Phone: [phone]  Work Phone: [phone]  Mobile Phone: [phone]  Relation: Spouse  Preferred language: English   needed? No    Discharge Plan A: Home with family, Home Health  Discharge Plan B: Home with family, Home Health      [facility] [place] - [address]  [address] [place] [address]  [address]  [facility] [address]  Phone: [phone] Fax: [phone]      Initial Assessment (most recent)       Adult Discharge Assessment - 06/19/24 1412          Discharge Assessment    Assessment Type Discharge Planning Assessment     Source of Information patient;family     People in Home spouse     Do you expect to return to your current living situation? Yes     Do you have help at home or someone to help you manage your care at home? Yes     Who are your caregiver(s) and their phone number(s)? Cee Nichols- Spouse [phone]     Prior to hospitilization cognitive status: Unable to Assess     Current cognitive status: Alert/Oriented     Walking or Climbing Stairs Difficulty no     Dressing/Bathing Difficulty no     Home Accessibility stairs to enter home     Number of Stairs, Main Entrance two     Equipment Currently Used at Home none     Readmission within 30 days? No     Patient currently being followed by outpatient case management? No     Do you currently have service(s) that help you manage your care at home? No     Do you take prescription  medications? Yes     Do you have prescription coverage? Yes     Coverage Medicare     Do you have any problems affording any of your prescribed medications? No     Is the patient taking medications as prescribed? yes     Who is going to help you get home at discharge? Spouse     How do you get to doctors appointments? car, drives self     Are you on dialysis? No     Do you take coumadin? No     Discharge Plan A Home with family;Home Health     Discharge Plan B Home with family;Home Health     DME Needed Upon Discharge  none     Discharge Plan discussed with: Spouse/sig other;Patient     Name(s) and Number(s) Larissa Jalloh- Spouse     Transition of Care Barriers None                   JASON spoke with pt and spouse, Larissa at bedside. Pt lives at home with spouse, not current with  and has no dme. Pt plans to dc home with [facility] and will need a rw from [facility], choice obtained. JASON faxed referral to [facility] for rw and informed [place] of referral and dc tomorrow. JASON faxed referral to [facility] and informed Lisa of referral. SDOH questions completed 3 weeks ago and I.M. not obtained. JASON will cont to follow for further dc needs.

## 2024-06-19 NOTE — TRANSFER OF CARE
"Anesthesia Transfer of Care Note    Patient: Tray Jalloh    Procedure(s) Performed: Procedure(s) (LRB):  ROBOTIC ARTHROPLASTY, KNEE, TOTAL (Right)    Patient location: PACU    Anesthesia Type: general    Transport from OR: Transported from OR on 6-10 L/min O2 by face mask with adequate spontaneous ventilation    Post pain: adequate analgesia    Post assessment: no apparent anesthetic complications    Post vital signs: stable    Level of consciousness: awake and alert    Nausea/Vomiting: no nausea/vomiting    Complications: none    Transfer of care protocol was followed      Last vitals: Visit Vitals  BP (!) 169/82 (BP Location: Left forearm, Patient Position: Lying)   Pulse 84   Temp 36.9 °C (98.5 °F) (Oral)   Resp 16   Ht 5' 10" (1.778 m)   Wt 88 kg (194 lb)   SpO2 97%   BMI 27.84 kg/m²     "

## 2024-06-19 NOTE — ANESTHESIA PREPROCEDURE EVALUATION
06/19/2024  Tray Jalloh is a 73 y.o., male.      Pre-op Assessment    I have reviewed the Patient Summary Reports.     I have reviewed the Nursing Notes. I have reviewed the NPO Status.   I have reviewed the Medications.     Review of Systems  Anesthesia Hx:             Denies Family Hx of Anesthesia complications.    Denies Personal Hx of Anesthesia complications.                    Social:  Non-Smoker, No Alcohol Use       Cardiovascular:     Hypertension              ECG has been reviewed.       Shortness of Breath                    Hypertension         Pulmonary:   COPD   Shortness of breath  Sleep Apnea    Chronic Obstructive Pulmonary Disease (COPD):           Obstructive Sleep Apnea (LUIS ALFREDO).           Renal/:  Chronic Renal Disease        Kidney Function/Disease             Hepatic/GI:     GERD      Gerd          Musculoskeletal:  Musculoskeletal Normal         Arthritis          Neurological:    Neuromuscular Disease,           Arthritis                         Neuromuscular Disease   Psych:  Psychiatric History                  Physical Exam  General: Well nourished, Cooperative, Alert and Oriented    Airway:  Mallampati: II / II  Mouth Opening: Normal  TM Distance: Normal  Neck ROM: Normal ROM    Dental:  Intact    Chest/Lungs:  Clear to auscultation    Heart:  Rate: Normal  Rhythm: Regular Rhythm  Sounds: Normal        Chemistry        Component Value Date/Time     06/10/2024 1444    K 4.2 06/10/2024 1444     (H) 06/10/2024 1444    CO2 26 06/10/2024 1444    BUN 26 (H) 06/10/2024 1444    CREATININE 1.64 (H) 06/10/2024 1444     (H) 06/10/2024 1444        Component Value Date/Time    CALCIUM 9.6 06/10/2024 1444    ALKPHOS 54 06/10/2024 1444    AST 17 06/10/2024 1444    ALT 21 06/10/2024 1444    BILITOT 0.3 06/10/2024 1444    ESTGFRAFRICA 65 12/03/2020 1107    EGFRNONAA 70  05/12/2022 1004        Lab Results   Component Value Date    WBC 9.84 06/10/2024    HGB 12.5 (L) 06/10/2024    HCT 38.6 (L) 06/10/2024     06/10/2024     Results for orders placed or performed during the hospital encounter of 05/12/24   EKG 12-lead    Collection Time: 05/12/24  9:41 AM   Result Value Ref Range    QRS Duration 88 ms    OHS QTC Calculation 400 ms    Narrative    Test Reason : R53.1,    Vent. Rate : 077 BPM     Atrial Rate : 077 BPM     P-R Int : 204 ms          QRS Dur : 088 ms      QT Int : 354 ms       P-R-T Axes : 077 070 060 degrees     QTc Int : 400 ms    Normal sinus rhythm  Septal infarct ,age undetermined  Abnormal ECG  When compared with ECG of 20-DEC-2023 10:40,  Premature ventricular complexes are no longer Present  Confirmed by Joshua Hurd MD ([id number]) on 5/12/2024 5:34:00 PM    Referred By: [email or web address]   SELF           Confirmed By:Joshua Hurd MD         Anesthesia Plan  Type of Anesthesia, risks & benefits discussed:    Anesthesia Type: Gen ETT, Regional  Intra-op Monitoring Plan: Standard ASA Monitors  Post Op Pain Control Plan: multimodal analgesia and peripheral nerve block  Induction:  IV  Airway Plan: Direct  Informed Consent: Informed consent signed with the Patient and all parties understand the risks and agree with anesthesia plan.  All questions answered.   ASA Score: 3  Day of Surgery Review of History & Physical: H&P Update referred to the surgeon/provider.I have interviewed and examined the patient. I have reviewed the patient's H&P dated: There are no significant changes.     Ready For Surgery From Anesthesia Perspective.     .

## 2024-06-19 NOTE — OP NOTE
Department of Orthopedic Surgery    Operative Note  NAME: Tray Jalloh    : 1950      DATE: 2024      PREOPERATIVE DIAGNOSIS: Primary osteoarthritis of right knee [M17.11]    POSTOPERATIVE DIAGNOSIS:  Primary osteoarthritis of right knee [M17.11]    PROCEDURE: right Total Knee Arthroplasty    SURGEON: Dada Enriquez    ASSISTANT: Reggie Burgos    ANESTHESIA: General + adductor canal block    BLOOD LOSS: 35 cc    TOURNIQUET TIME:  51 mins    DRAINS: None    IMPLANTS:   Implant Name Type Inv. Item Serial No.  Lot No. LRB No. Used Action   VELYS ARRAY DRILL  MM X 4 MM      Right 2 Implanted and Explanted   VELYS ARRAY DRILL  MM X 4 MM      Right 2 Implanted and Explanted   DEPUY SYNTHES ATTUNE TIBIAL BASE POROCOAT ROTATING PLATFORM SIZE 7 CEMENTLESS     [id number] Right 1 Implanted   DEPUY SYNTHES ATTUNE FEMORAL POROCOAT CRUCIATE RETAINING RIGHT 6 CEMENTLESS     [id number] Right 1 Implanted   DEPUY SYNTHES ATTUNE TIBIAL INSERT ROTATING PLATFORM CRUCIATE RETAINING 6 MM 6 AOX     [id number] Right 1 Implanted       Attune Pressfit Tibial Baseplate, Cruciate Retaining, Size 7  Attune Press Fit Femoral Component, Size 6  Attune Rotating Platform, Cruciate retaining 6 polyethylene spacer       INDICATIONS FOR PROCEDURE:   [unfilled] is a 73 y.o.-year-old with debilitating right-sided knee pain despite nonoperative measures and interested in knee arthroplasty.    PROCEDURE IN DETAIL:  After obtaining informed consent and starting the patient on preoperative IV antibiotics, the patient was taken back to the Operating   Room. Anesthesia was performed by Anesthesia Team. The right lower  extremity was prepped and draped in normal sterile fashion.  An Esmarch bandage was used to exsanguinate the affected lower extremity and the pneumatic tourniquet  was inflated to 300 mmHg.  A standard longitudinal midline incision was made beginning 3 fingerbreadths above the superior pole of the patella  extending down to the tibial tubercle.  Full-thickness skin flaps were raised.  A medial parapatellar arthrotomy was made. The patella was then everted.    A release of the proximal medial tibia and MCL was then undertaken.  The infrapatella fat pad was resected. At this point guide pins were placed along the proximal tibia and distal femur.  A utilized [employer] robotic assistance in this case and took the knee and hip through an arc of motion to find the center of the hip.  Way points were then marked along the tibia and femur.  After the appropriate registration was performed robotic assistant was utilized to create distal femoral and proximal tibial cuts.  An extension block was used to verify appropriate alignment of the joint at this point.  I then utilized a tensioner in order to verify the appropriate tension within the flexion gap and selected the appropriate parameters for rotation of the femoral component.  The femoral cuts were then performed at this point making anterior posterior and chamfer cuts appropriately.  Trials were then positioned on the femur and a floating trial was placed in the tibia and the knee was taken through a range of motion and satisfactory stability and alignment was able to be achieved.   Trial components were placed on the femur and tibia.   Flexion and extension gaps were symmetric and balanced at this point.    The trial components demonstrated proper range of motion and stability.    The patella was left alone as we saw no evidence of arthritis    The final components were then implanted.  The tibial component was placed first, followed by the femoral component.  A trial polyethylene was placed. ,the tourniquet was released.  Bleeders were coagulated.  1 gram of TXA was given at this point. The appropriate dose of pericapsular injection was then administered, focusing on the posterior capsule.  Final polyethylene component was placed.  Satisfactory range of motion and stability  was demonstrated.    The extensor retinaculum was then clsoed with #1 Vicryl figure of eight sutures with the knee in slight flexion, the subcutaneous tissue with 2-0 Vicryl, skin reapproximated with staples.  A sterile dressing was applied, as was a knee immobilizer.  Patient was then taken to the recovery room in stable condition.

## 2024-06-19 NOTE — ANESTHESIA PROCEDURE NOTES
Peripheral Block    Patient location during procedure: OR   Block not for primary anesthetic.  Reason for block: at surgeon's request and post-op pain management   Post-op Pain Location: Right knee   Start time: 6/19/2024 2:47 PM  Timeout: 6/19/2024 2:45 PM   End time: 6/19/2024 2:51 PM    Staffing  Authorizing Provider: Eric Rm MD  Performing Provider: Eric Rm MD    Staffing  Performed by: Eric Rm MD  Authorized by: Eric Rm MD    Preanesthetic Checklist  Completed: patient identified, risks and benefits discussed, pre-op evaluation and timeout performed  Peripheral Block  Patient position: supine  Prep: ChloraPrep  Block type: adductor canal  Laterality: right  Injection technique: single shot  Needle  Needle localization: ultrasound guidance     Assessment  Injection assessment: negative aspiration    Medications:    Medications: ROPIvacaine (NAROPIN) injection 0.75% - Perineural   40 mL - 6/19/2024 2:51:00 PM    Additional Notes  No complications.

## 2024-06-19 NOTE — ANESTHESIA PROCEDURE NOTES
Intubation    Date/Time: 6/19/2024 2:40 PM    Performed by: Kenn Liang CRNA  Authorized by: Eric Rm MD    Intubation:     Induction:  Intravenous    Intubated:  Postinduction    Mask Ventilation:  Easy mask    Attempts:  1    Attempted By:  CRNA    Difficult Airway Encountered?: No      Airway Device:  Supraglottic airway/LMA    Airway Device Size:  4.0    Style/Cuff Inflation:  Cuffed (inflated to minimal occlusive pressure)    Placement Verified By:  Capnometry    Complicating Factors:  None    Findings Post-Intubation:  BS equal bilateral

## 2024-06-19 NOTE — OR NURSING
1658 RECEIVED PT FROM OR PT RESTING WITH EYES CLOSED. ORAL AIRWAY IN PLACE,    1715 ORAL AIRWAY REMOVED. PT MOANING,. CO PAIN , DR MARCIAL NOTIFIED OF COMPLAINTS. UNABLE TO GIVEN ADDITIONAL NARCOTICS AT THIS TIME DUE TO RR... ORDERS RECEIVED    1730 PT DOZING INTERMITTANTLY. PT AROUSES AND CO PAIN, THEN DRIFTS BACK TO SLEEP.     1745 PT RESTING QUIETLY. STILL CO PAIN, RATES PAIN 7    1755 PT TO ROOM VIA STRETCHER    1810 PT RELEASED TO CHERELLE JARAMILLO RN  171/74, 85, 16, 97%(2L NC), 97.5  PT AWAKE ALERT. NO FAMILY IN ROOM , SR UP FOR SAFETY. CALL BELL ON BED

## 2024-06-20 VITALS
SYSTOLIC BLOOD PRESSURE: 133 MMHG | OXYGEN SATURATION: 96 % | DIASTOLIC BLOOD PRESSURE: 73 MMHG | TEMPERATURE: 98 F | WEIGHT: 194 LBS | HEART RATE: 87 BPM | BODY MASS INDEX: 27.77 KG/M2 | RESPIRATION RATE: 16 BRPM | HEIGHT: 70 IN

## 2024-06-20 LAB
ANION GAP SERPL CALCULATED.3IONS-SCNC: 11 MMOL/L (ref 7–16)
BASOPHILS # BLD AUTO: 0.02 K/UL (ref 0–0.2)
BASOPHILS NFR BLD AUTO: 0.1 % (ref 0–1)
BUN SERPL-MCNC: 22 MG/DL (ref 7–18)
BUN/CREAT SERPL: 17 (ref 6–20)
CALCIUM SERPL-MCNC: 8.3 MG/DL (ref 8.5–10.1)
CHLORIDE SERPL-SCNC: 112 MMOL/L (ref 98–107)
CO2 SERPL-SCNC: 20 MMOL/L (ref 21–32)
CREAT SERPL-MCNC: 1.26 MG/DL (ref 0.7–1.3)
DIFFERENTIAL METHOD BLD: ABNORMAL
EGFR (NO RACE VARIABLE) (RUSH/TITUS): 61 ML/MIN/1.73M2
EOSINOPHIL # BLD AUTO: 0.01 K/UL (ref 0–0.5)
EOSINOPHIL NFR BLD AUTO: 0.1 % (ref 1–4)
ERYTHROCYTE [DISTWIDTH] IN BLOOD BY AUTOMATED COUNT: 13 % (ref 11.5–14.5)
GLUCOSE SERPL-MCNC: 140 MG/DL (ref 74–106)
HCT VFR BLD AUTO: 33.8 % (ref 40–54)
HGB BLD-MCNC: 10.8 G/DL (ref 13.5–18)
IMM GRANULOCYTES # BLD AUTO: 0.08 K/UL (ref 0–0.04)
IMM GRANULOCYTES NFR BLD: 0.6 % (ref 0–0.4)
LYMPHOCYTES # BLD AUTO: 1.5 K/UL (ref 1–4.8)
LYMPHOCYTES NFR BLD AUTO: 10.8 % (ref 27–41)
MCH RBC QN AUTO: 30.6 PG (ref 27–31)
MCHC RBC AUTO-ENTMCNC: 32 G/DL (ref 32–36)
MCV RBC AUTO: 95.8 FL (ref 80–96)
MONOCYTES # BLD AUTO: 0.39 K/UL (ref 0–0.8)
MONOCYTES NFR BLD AUTO: 2.8 % (ref 2–6)
MPC BLD CALC-MCNC: 10.7 FL (ref 9.4–12.4)
NEUTROPHILS # BLD AUTO: 11.86 K/UL (ref 1.8–7.7)
NEUTROPHILS NFR BLD AUTO: 85.6 % (ref 53–65)
NRBC # BLD AUTO: 0 X10E3/UL
NRBC, AUTO (.00): 0 %
PLATELET # BLD AUTO: 232 K/UL (ref 150–400)
PLATELET MORPHOLOGY: ABNORMAL
POTASSIUM SERPL-SCNC: 4.4 MMOL/L (ref 3.5–5.1)
RBC # BLD AUTO: 3.53 M/UL (ref 4.6–6.2)
RBC MORPH BLD: NORMAL
SODIUM SERPL-SCNC: 139 MMOL/L (ref 136–145)
WBC # BLD AUTO: 13.86 K/UL (ref 4.5–11)

## 2024-06-20 PROCEDURE — 80048 BASIC METABOLIC PNL TOTAL CA: CPT | Performed by: ORTHOPAEDIC SURGERY

## 2024-06-20 PROCEDURE — 63600175 PHARM REV CODE 636 W HCPCS: Performed by: ORTHOPAEDIC SURGERY

## 2024-06-20 PROCEDURE — 97110 THERAPEUTIC EXERCISES: CPT

## 2024-06-20 PROCEDURE — 36415 COLL VENOUS BLD VENIPUNCTURE: CPT | Performed by: ORTHOPAEDIC SURGERY

## 2024-06-20 PROCEDURE — 85025 COMPLETE CBC W/AUTO DIFF WBC: CPT | Performed by: ORTHOPAEDIC SURGERY

## 2024-06-20 PROCEDURE — 97116 GAIT TRAINING THERAPY: CPT

## 2024-06-20 PROCEDURE — 97161 PT EVAL LOW COMPLEX 20 MIN: CPT

## 2024-06-20 PROCEDURE — 25000003 PHARM REV CODE 250: Performed by: ORTHOPAEDIC SURGERY

## 2024-06-20 PROCEDURE — 97166 OT EVAL MOD COMPLEX 45 MIN: CPT

## 2024-06-20 RX ORDER — PREGABALIN 75 MG/1
75 CAPSULE ORAL 2 TIMES DAILY
Status: CANCELLED | OUTPATIENT
Start: 2024-06-20

## 2024-06-20 RX ORDER — NAPROXEN SODIUM 220 MG/1
325 TABLET, FILM COATED ORAL DAILY
Status: DISCONTINUED | OUTPATIENT
Start: 2024-06-21 | End: 2024-06-20

## 2024-06-20 RX ORDER — ONDANSETRON 4 MG/1
8 TABLET, ORALLY DISINTEGRATING ORAL EVERY 8 HOURS PRN
Status: CANCELLED | OUTPATIENT
Start: 2024-06-20

## 2024-06-20 RX ORDER — OXYCODONE AND ACETAMINOPHEN 10; 325 MG/1; MG/1
1 TABLET ORAL EVERY 6 HOURS PRN
Qty: 35 TABLET | Refills: 0 | Status: SHIPPED | OUTPATIENT
Start: 2024-06-20

## 2024-06-20 RX ORDER — ONDANSETRON 4 MG/1
8 TABLET, ORALLY DISINTEGRATING ORAL EVERY 8 HOURS PRN
Qty: 30 TABLET | Refills: 0 | Status: SHIPPED | OUTPATIENT
Start: 2024-06-20

## 2024-06-20 RX ORDER — AMOXICILLIN 250 MG
1 CAPSULE ORAL 2 TIMES DAILY
Qty: 60 TABLET | Refills: 0 | Status: SHIPPED | OUTPATIENT
Start: 2024-06-20

## 2024-06-20 RX ORDER — LOPERAMIDE HYDROCHLORIDE 2 MG/1
4 CAPSULE ORAL ONCE
Status: CANCELLED | OUTPATIENT
Start: 2024-06-20 | End: 2024-06-20

## 2024-06-20 RX ORDER — DOCUSATE SODIUM 100 MG/1
100 CAPSULE, LIQUID FILLED ORAL EVERY 12 HOURS
Status: CANCELLED | OUTPATIENT
Start: 2024-06-20

## 2024-06-20 RX ORDER — POLYETHYLENE GLYCOL 3350 17 G/17G
17 POWDER, FOR SOLUTION ORAL DAILY
Status: CANCELLED | OUTPATIENT
Start: 2024-06-20

## 2024-06-20 RX ORDER — CELECOXIB 100 MG/1
200 CAPSULE ORAL 2 TIMES DAILY
Status: CANCELLED | OUTPATIENT
Start: 2024-06-20

## 2024-06-20 RX ORDER — MUPIROCIN 20 MG/G
1 OINTMENT TOPICAL 2 TIMES DAILY
Status: CANCELLED | OUTPATIENT
Start: 2024-06-20 | End: 2024-06-25

## 2024-06-20 RX ORDER — ACETAMINOPHEN 500 MG
1000 TABLET ORAL EVERY 8 HOURS
Status: CANCELLED | OUTPATIENT
Start: 2024-06-20 | End: 2024-06-21

## 2024-06-20 RX ORDER — PREDNISONE 5 MG/1
5 TABLET ORAL DAILY
Qty: 21 TABLET | Refills: 0 | Status: SHIPPED | OUTPATIENT
Start: 2024-06-20 | End: 2024-07-11

## 2024-06-20 RX ORDER — SODIUM CHLORIDE 0.9 % (FLUSH) 0.9 %
3 SYRINGE (ML) INJECTION EVERY 6 HOURS PRN
Status: DISCONTINUED | OUTPATIENT
Start: 2024-06-20 | End: 2024-06-20 | Stop reason: HOSPADM

## 2024-06-20 RX ORDER — BISACODYL 10 MG/1
10 SUPPOSITORY RECTAL DAILY PRN
Status: CANCELLED | OUTPATIENT
Start: 2024-06-20

## 2024-06-20 RX ORDER — ZOLPIDEM TARTRATE 5 MG/1
5 TABLET ORAL NIGHTLY PRN
Status: CANCELLED | OUTPATIENT
Start: 2024-06-20

## 2024-06-20 RX ORDER — OXYCODONE HYDROCHLORIDE 5 MG/1
5 TABLET ORAL EVERY 4 HOURS PRN
Status: CANCELLED | OUTPATIENT
Start: 2024-06-20

## 2024-06-20 RX ORDER — FAMOTIDINE 20 MG/1
20 TABLET, FILM COATED ORAL 2 TIMES DAILY
Status: CANCELLED | OUTPATIENT
Start: 2024-06-20

## 2024-06-20 RX ORDER — ASPIRIN 81 MG/1
81 TABLET ORAL 2 TIMES DAILY
Qty: 60 TABLET | Refills: 0 | Status: SHIPPED | OUTPATIENT
Start: 2024-06-20 | End: 2024-07-20

## 2024-06-20 RX ORDER — ACETAMINOPHEN 10 MG/ML
1000 INJECTION, SOLUTION INTRAVENOUS ONCE
Status: CANCELLED | OUTPATIENT
Start: 2024-06-20 | End: 2024-06-20

## 2024-06-20 RX ORDER — OXYCODONE HYDROCHLORIDE 5 MG/1
10 TABLET ORAL EVERY 4 HOURS PRN
Status: CANCELLED | OUTPATIENT
Start: 2024-06-20

## 2024-06-20 RX ORDER — CELECOXIB 200 MG/1
200 CAPSULE ORAL 2 TIMES DAILY
Qty: 60 CAPSULE | Refills: 0 | Status: SHIPPED | OUTPATIENT
Start: 2024-06-20

## 2024-06-20 RX ADMIN — ZOLPIDEM TARTRATE 5 MG: 5 TABLET, COATED ORAL at 12:06

## 2024-06-20 RX ADMIN — ASPIRIN 325 MG ORAL TABLET 325 MG: 325 PILL ORAL at 09:06

## 2024-06-20 RX ADMIN — LISINOPRIL 40 MG: 40 TABLET ORAL at 09:06

## 2024-06-20 RX ADMIN — ATORVASTATIN CALCIUM 20 MG: 20 TABLET, FILM COATED ORAL at 09:06

## 2024-06-20 RX ADMIN — ACETAMINOPHEN 1000 MG: 500 TABLET ORAL at 06:06

## 2024-06-20 RX ADMIN — CELECOXIB 200 MG: 100 CAPSULE ORAL at 09:06

## 2024-06-20 RX ADMIN — OXYCODONE 10 MG: 5 TABLET ORAL at 09:06

## 2024-06-20 RX ADMIN — AMLODIPINE BESYLATE 10 MG: 10 TABLET ORAL at 09:06

## 2024-06-20 RX ADMIN — OXYCODONE 10 MG: 5 TABLET ORAL at 02:06

## 2024-06-20 RX ADMIN — PREGABALIN 75 MG: 75 CAPSULE ORAL at 09:06

## 2024-06-20 RX ADMIN — DOCUSATE SODIUM 100 MG: 100 CAPSULE, LIQUID FILLED ORAL at 09:06

## 2024-06-20 RX ADMIN — Medication 400 MG: at 09:06

## 2024-06-20 RX ADMIN — FAMOTIDINE 20 MG: 20 TABLET, FILM COATED ORAL at 09:06

## 2024-06-20 RX ADMIN — POLYETHYLENE GLYCOL 3350 17 G: 17 POWDER, FOR SOLUTION ORAL at 09:06

## 2024-06-20 RX ADMIN — MUPIROCIN 1 G: 20 OINTMENT TOPICAL at 09:06

## 2024-06-20 RX ADMIN — ACETAMINOPHEN 1000 MG: 500 TABLET ORAL at 02:06

## 2024-06-20 RX ADMIN — PANTOPRAZOLE SODIUM 40 MG: 40 TABLET, DELAYED RELEASE ORAL at 09:06

## 2024-06-20 RX ADMIN — CEFAZOLIN 2 G: 2 INJECTION, POWDER, FOR SOLUTION INTRAMUSCULAR; INTRAVENOUS at 06:06

## 2024-06-20 NOTE — ANESTHESIA POSTPROCEDURE EVALUATION
Anesthesia Post Evaluation    Patient: Tray Jalloh    Procedure(s) Performed: Procedure(s) (LRB):  ROBOTIC ARTHROPLASTY, KNEE, TOTAL (Right)    Final Anesthesia Type: general      Patient location during evaluation: PACU  Post-procedure vital signs: reviewed and stable  Airway patency: patent  LUIS ALFREDO mitigation strategies: Multimodal analgesia  PONV status at discharge: No PONV  Anesthetic complications: no      Cardiovascular status: hemodynamically stable  Respiratory status: unassisted  Hydration status: euvolemic  Follow-up not needed.              Vitals Value Taken Time   /77 06/19/24 1933   Temp 36.6 °C (97.9 °F) 06/19/24 1933   Pulse 86 06/19/24 1812   Resp 18 06/19/24 2006   SpO2 97 % 06/19/24 1812         Event Time   Out of Recovery 17:55:00         Pain/Radha Score: Pain Rating Prior to Med Admin: 7 (6/19/2024  8:06 PM)  Pain Rating Post Med Admin: 7 (6/19/2024  5:45 PM)  Radha Score: 9 (6/19/2024  5:30 PM)

## 2024-06-20 NOTE — PLAN OF CARE
[facility] Telemetry  Discharge Final Note    Primary Care Provider: Elizabeth Lange FNP    Expected Discharge Date:     Final Discharge Note (most recent)       Final Note - 06/20/24 1008          Final Note    Assessment Type Final Discharge Note     Anticipated Discharge Disposition Home-Health Care Svc     What phone number can be called within the next 1-3 days to see how you are doing after discharge? [phone]        Post-Acute Status    Post-Acute Authorization HME;Home Health     HME Status Set-up Complete/Auth obtained     Home Health Status Set-up Complete/Auth obtained     Patient choice form signed by patient/caregiver List with quality metrics by geographic area provided;List from CMS Compare     Discharge Delays None known at this time                     After-discharge care                Durable Medical Equipment       [facility]   Service: Durable Medical Equipment    [address] [place] [address] [place] [address]   Phone: [phone]                 Home Medical Care       [facility]   Service: Home Rehabilitation    [address] [facility] [place] [address]   Phone: [phone]                     Pt to [place] home today with [facility]. RW delivered to [facility] room. SW informed Lisa at [facility] of [place] today. No other needs.

## 2024-06-20 NOTE — DISCHARGE SUMMARY
[facility]  [facility]charge Note  Short Stay    Procedure(s) (LRB):  ROBOTIC ARTHROPLASTY, KNEE, TOTAL (Right)      OUTCOME: Patient tolerated treatment/procedure well without complication and is now ready for discharge.    DISPOSITION: Home or Self Care    FINAL DIAGNOSIS:  Primary osteoarthritis of right knee    FOLLOWUP: In clinic    DISCHARGE INSTRUCTIONS:  No discharge procedures on file.      Clinical Reference Documents Added to Patient Instructions         Document    ACID REFLUX AND GASTROESOPHAGEAL REFLUX DISEASE IN ADULTS (ENGLISH)    ANEMIA CAUSED BY LOW IRON (ENGLISH)    HIGH BLOOD PRESSURE IN ADULTS (ENGLISH)    HOW TO PUT ON AND TAKE OFF COMPRESSION STOCKINGS (ENGLISH)    HOW TO USE AN INCENTIVE SPIROMETER (ENGLISH)    KNEE IMMOBILIZER DISCHARGE INSTRUCTIONS (ENGLISH)    KNEE PAIN (ENGLISH)    OSTEOARTHRITIS (ENGLISH)    POSTOP TOTAL KNEE REPLACEMENT EXERCISES LYING DOWN (ENGLISH)    POSTOP TOTAL KNEE REPLACEMENT EXERCISES SEATED OR STANDING (ENGLISH)    TOTAL KNEE REPLACEMENT DISCHARGE INSTRUCTIONS  (ENGLISH)            TIME SPENT ON DISCHARGE: 9 minutes

## 2024-06-20 NOTE — DISCHARGE INSTRUCTIONS
*Keep dressing dry and intact, do not remove dressing, if dressing becomes wet or bloody notify home health staff.  Swingbed/Home Health will change your dressing post of day #3 and day #10, give them that special dressing we sent home with you. If patient has a CARLITO system leave on for 7 days then change dressing.  *Continue incentive spirometry at least every 2 hours while awake.  *Continue white stockings remove 2 times a day for 1 hour and replace. Once dressing is changed they will apply the other stocking to surgery leg.  *Elevate surgery leg at ankle on pillow, no pillow under knees.  *Take laxative of choice to have a bowel movement at least by tomorrow and then every other day.  *Increase fluids by mouth.  *Dr. Dada Enriquez total knees should wear knee immobilizer at night and remove during the day.  *Staples will be removed at follow up appointment  *Notify swingbed/home health staff if any concerns.

## 2024-06-20 NOTE — PLAN OF CARE
Problem: Physical Therapy  Goal: Physical Therapy Goal  Description: Short Term Goals to be met by: 24    Patient will increase functional independence with mobility by performin. Supine to sit with Modified [place]  2. Sit to stand transfer with Modified [place]  3. Bed to chair transfer with Modified [place] using Rolling Walker, WBAT R LE  4. Gait  x 100 feet with Modified [place] using Rolling Walker, WBAT R LE.   5. Lower extremity exercise program x30 reps per handout, with assistance as needed  6. Knee ROM 0-90  7. Pt to negotiate 2 steps with 1 rail with SBA    Long Term Goals to be met by: 24    Pt will regain full independent functional mobility to return to prior activities of daily living.   Outcome: Progressing     PT eval completed. Please see eval note for details.

## 2024-06-20 NOTE — PT/OT/SLP EVAL
Occupational Therapy   Evaluation    Name: Tray Jalloh  MRN: [id number]  Admitting Diagnosis: Primary osteoarthritis of right knee  Recent Surgery: Procedure(s) (LRB):  ROBOTIC ARTHROPLASTY, KNEE, TOTAL (Right) 1 Day Post-Op    Recommendations:     Discharge Recommendations: Moderate Intensity Therapy  Discharge Equipment Recommendations:  walker, rolling  Barriers to discharge:  None    Assessment:     Tray Jalloh is a 73 y.o. male with a medical diagnosis of Primary osteoarthritis of right knee.  He presents with weakness. Performance deficits affecting function: decreased strength, endurance, gait instability, decreased self care skills .      Rehab Prognosis: Good; patient would benefit from acute skilled OT services to address these deficits and reach maximum level of function.       Plan:     Patient to be seen 5 x/week to address the above listed problems via self-care/home management, therapeutic activities, therapeutic exercises  Plan of Care Expires:    Plan of Care Reviewed with: patient, spouse    Subjective     Chief Complaint: Pt had no complaints  Patient/Family Comments/goals: return home    Occupational Profile:  Living Environment: Pt lives at home with wife  Previous level of function: I with all ADLS/IADLs  Roles and Routines: self care  Equipment Used at Home:    Assistance upon Discharge: Pt will have assist from wife and PT from     Pain/Comfort:  Pain Rating 1: 0/10    Patients cultural, spiritual, Methodist conflicts given the current situation:      Objective:     Communicated with: RN prior to session.  Patient found up in chair with   upon OT entry to room.    General Precautions: Standard, fall  Orthopedic Precautions:    Braces:    Respiratory Status: Room air    Occupational Performance:    Bed Mobility:        Functional Mobility/Transfers:  Patient completed Sit <> Stand Transfer with supervision  with  rolling walker   Functional Mobility: Pt took 4-5 steps  using RW    Activities of Daily Living:      Cognitive/Visual Perceptual:      Physical Exam:  Upper Extremity Range of Motion:     -       Right Upper Extremity: WFL  -       Left Upper Extremity: WFL  Upper Extremity Strength:    -       Right Upper Extremity: WFL  -       Left Upper Extremity: WFL    AMPAC 6 Click ADL:  AMPAC Total Score: 22    Treatment & Education:  Pt and wife educated on role and scope of OT practice  Pt educated on importance of OOB activities  Pt instructed to not get up with out assistance    Patient left up in chair with call button in reach, chair alarm on, and wife present    GOALS:   Multidisciplinary Problems       Occupational Therapy Goals          Problem: Occupational Therapy    Goal Priority Disciplines Outcome Interventions   Occupational Therapy Goal     OT, PT/OT Progressing    Description: STG:  Pt will perform grooming with setup  Pt will bathe with I'ly  Pt will perform UE dressing with I/ly  Pt will perform LE dressing with Mod I  Pt will sit EOB x 20 min with no assistance  Pt will transfer bed/chair/bsc with Mod I  Pt will perform standing task x 10 min with supervision assistance  Pt will tolerate 30 minutes of tx without fatigue      LT.Restore to max I with self care and mobility.                         History:     Past Medical History:   Diagnosis Date    Cervical radiculopathy     Chronic pain     Chronic renal impairment     COVID-19 2022    Depression     Depressive disorder 2022    Digestive disorder     GERD (gastroesophageal reflux disease)     History of prediabetes 2022    Hypercholesteremia     Hypertension     Left ventricular hypertrophy     Lumbosacral spondylosis     Positive colorectal cancer screening using Cologuard test 12/15/2022    Prediabetes     Pulmonary nodules     repeat CT due 21         Past Surgical History:   Procedure Laterality Date    ABDOMINAL SURGERY      inguinal hernia repair    BACK SURGERY       Bilateral L3-S1 MBB Bilateral 2-, 1-, 1-, 12-    Dr Jorge Parker    EYE SURGERY      HERNIA REPAIR      KNEE ARTHROSCOPY W/ MENISCAL REPAIR Right     KNEE ARTHROSCOPY W/ MENISCECTOMY Left 01/11/2023    Procedure: ARTHROSCOPY, KNEE, WITH MENISCECTOMY;  Surgeon: Dada Enriquez MD;  Location: [facility] ORTHO OR;  Service: Orthopedics;  Laterality: Left;    KNEE SURGERY Left     LATERAL RETINACULA RELEASE OF KNEE Left 01/11/2023    Procedure: ARTHROSCOPIC RELEASE, KNEE, LATERAL RETINACULA;  Surgeon: Dada Enriquez MD;  Location: [facility] ORTHO OR;  Service: Orthopedics;  Laterality: Left;    PROSTATECTOMY  03/1996    prostate cancer    RADIOFREQUENCY ABLATION OF LUMBAR MEDIAL BRANCH NERVE AT SINGLE LEVEL Right 04/01/2021    Procedure: Radiofrequency Ablation, Nerve, Spinal, Lumbar, Medial Branch, 1 Level;  Surgeon: Jordana Patel MD;  Location: [facility] PAIN MGMT;  Service: Pain Management;  Laterality: Right;  Right L3-S1 RFTC    RADIOFREQUENCY ABLATION OF LUMBAR MEDIAL BRANCH NERVE AT SINGLE LEVEL Right 02/09/2023    Procedure: Radiofrequency Ablation, Nerve, Spinal, Lumbar, Medial Branch, Level L4-S1;  Surgeon: Jordana Patel MD;  Location: [facility] PAIN MGMT;  Service: Pain Management;  Laterality: Right;  bonita left after right is done    RADIOFREQUENCY ABLATION OF LUMBAR MEDIAL BRANCH NERVE AT SINGLE LEVEL Left 02/28/2023    Procedure: RADIOFREQUENCY ABLATION, NERVE, SPINAL, LUMBAR, MEDIAL BRANCH, 1 LEVEL;  Surgeon: Jordana Patel MD;  Location: [facility] PAIN MGMT;  Service: Pain Management;  Laterality: Left;    RADIOFREQUENCY THERMOCOAGULATION Bilateral 03/20/2012    Dr Parker    RADIOFREQUENCY THERMOCOAGULATION Left 03/18/2021    Procedure: RADIOFREQUENCY THERMAL COAGULATION;  Surgeon: Jordana Patel MD;  Location: [facility] PAIN MGMT;  Service: Pain Management;  Laterality: Left;  BILATERAL L3-S1 RFTC LEFT SIDE FIRST    ROBOTIC ARTHROPLASTY, KNEE Right 6/19/2024    Procedure:  ROBOTIC ARTHROPLASTY, KNEE, TOTAL;  Surgeon: Dada Enriquez MD;  Location: [facility] OR;  Service: Orthopedics;  Laterality: Right;    SELECTIVE INJECTION OF ANESTHETIC AGENT AROUND LUMBAR SPINAL NERVE ROOT BY TRANSFORAMINAL APPROACH Right 01/13/2022    Procedure: Right L4-5,5-S1 TFESI;  Surgeon: Jordana Patel MD;  Location: [facility] PAIN MGMT;  Service: Pain Management;  Laterality: Right;  PT AWARE TO BE TESTED VIA PC    SHOULDER ARTHROSCOPY Right     SPINE SURGERY  1995    VASECTOMY  Mar 1996       Time Tracking:     OT Date of Treatment:    OT Start Time: 1030  OT Stop Time: 1050  OT Total Time (min): 20 min    Billable Minutes:Evaluation 20 6/20/2024

## 2024-06-20 NOTE — NURSING
Pt discharged home with wife in personal vehicle at this time. Pt was given 2 [place]AYTON hose, incentive spirometer, rolling walker, mupirocin, knee immobilizer, urinal, discharge medications, and island dressing. Instructed to wear knee immobilizer at night, wife and patient verbalized understanding. Educated on use of incentive spirometer, pt did return demonstration. JOHNSON at discharge.

## 2024-06-20 NOTE — PT/OT/SLP EVAL
Physical Therapy Evaluation and Treatment    Patient Name: Tray Jalloh   MRN: [id number]  Recent Surgery: Procedure(s) (LRB):  ROBOTIC ARTHROPLASTY, KNEE, TOTAL (Right) 1 Day Post-Op    Recommendations:     Discharge Recommendations:     Discharge Equipment Recommendations: walker, rolling   Barriers to discharge: Increased level of assist and Ongoing medical treatment    Assessment:     Tray Jalloh is a 73 y.o. male admitted with a medical diagnosis of Primary osteoarthritis of right knee. He presents with the following impairments/functional limitations: weakness, impaired endurance, impaired functional mobility, gait instability, decreased lower extremity function, pain, decreased ROM, orthopedic precautions. Pt has undergone R TKA and plans to d/c home today with wife and HHC. R knee ROM 10-83 degrees sitting EOB for flexion.    Rehab Prognosis: Good; patient would benefit from acute PT services to address these deficits and reach maximum level of function.    Plan:     During this hospitalization, patient to be seen BID (5x/wk; daily 2x/wk) to address the above listed problems via gait training, therapeutic activities, therapeutic exercises    Plan of Care Expires: 07/20/24    Subjective     Chief Complaint: R TKA  Patient Comments/Goals: agreeable  Pain/Comfort:  Pain Rating 1: 0/10  Location - Side 1: Right  Location 1: knee  Pain Addressed 1: Pre-medicate for activity, Reposition, Distraction  Pain Rating Post-Intervention 1: 8/10    Social History:  Living Environment: Patient lives with their spouse in a mobile home with number of outside stair(s): 2 with L rail  Prior Level of Function: Prior to admission, patient was modified independent with ADLs using straight cane for mobility and driving and retired  Equipment Used at Home: cane, straight, CPAP  DME owned (not currently used): none  Assistance Upon Discharge: family    Objective:     Communicated with CHERELLE Soriano RN prior to session.  Patient found HOB elevated with peripheral IV, oxygen upon PT entry to room.    General Precautions: Standard, fall   Orthopedic Precautions: RLE weight bearing as tolerated   Braces: Knee immobilizer    Respiratory Status: Nasal cannula, flow 2 L/min    Exams:  RLE ROM: WFL except knee 10-83 degrees  RLE Strength: Deficits: 3-/5  LLE ROM: WNL  LLE Strength: WFL  Cognitive: Patient is oriented to Person, Place, Time, Situation  Sensation:    -       Intact    Functional Mobility:  Gait belt applied - Yes  Bed Mobility  Supine to Sit: stand by assistance and contact guard assistance for LE management and increased time  Transfers  Sit to Stand: contact guard assistance with rolling walker and with cues for hand placement, foot placement, and weight bearing precautions  Gait  Patient ambulated 38ft with rolling walker and contact guard assistance. Patient required cues for heel strike, position in walker, sequencing, step through gait pattern, stride length, upright posture, and weight bearing status to increase independence and safety. Patient required cues ~ 50% of the time.  Balance  Sitting: GOOD: Maintains balance through MODERATE excursions of active trunk movement  Standing: FAIR: Needs CONTACT GUARD during gait  Stairs: Pt ascended/descended 2 stair(s) with No Assistive Device with left handrail with Minimal Assistance.     Therapeutic Activities and Exercises:  Patient educated on role of acute care PT and PT POC, safety while in hospital including calling nurse for mobility, and call light usage.  Educated about weightbearing precautions and provided cuing for adherence as appropriate during session.  Patient performed 1 set(s) of 15 repetitions of the following bed level exercises: ankle pumps, quad sets, glut sets, heel slides, and hip abduction and seated exercises: long arc quads, marches, and knee flexion for bilateral LE. Patient required skilled PT for instruction of exercises and appropriate cues to  perform exercises safely and appropriately.  Educated about importance of OOB mobility and remaining up in chair most of the day.  Edcuated about pursed lip breathing technique and cued for use with mobility.  Education:   Importance of keeping surgical knee straight when in chair or in bed. Pillow/towel roll should not be behind the knee but may be placed under calf or heel in order to further assist with passive knee extension.   Weight bearing as tolerated right lower extremity  Purpose and operation of cryotherapy. Discussed necessity of pillowcase or thin towel being placed between skin and ice pack.   TKR protocol exercises to perform BID x 30 reps. Handout provided.   Rolling walker should be used for safe ambulation until progressed by HHPT or Outpatient PT.   Mobility during hospital stay with staff assistance for safety     AM-PAC 6 CLICK MOBILITY  Total Score:20    Patient left up in chair with all lines intact, call button in reach, and RN notified.    GOALS:   Multidisciplinary Problems       Physical Therapy Goals          Problem: Physical Therapy    Goal Priority Disciplines Outcome Goal Variances Interventions   Physical Therapy Goal     PT, PT/OT Progressing     Description: Short Term Goals to be met by: 24    Patient will increase functional independence with mobility by performin. Supine to sit with Modified [place]  2. Sit to stand transfer with Modified [place]  3. Bed to chair transfer with Modified [place] using Rolling Walker, WBAT R LE  4. Gait  x 100 feet with Modified [place] using Rolling Walker, WBAT R LE.   5. Lower extremity exercise program x30 reps per handout, with assistance as needed  6. Knee ROM 0-90  7. Pt to negotiate 2 steps with 1 rail with SBA    Long Term Goals to be met by: 24    Pt will regain full independent functional mobility to return to prior activities of daily living.                        History:     Past Medical History:    Diagnosis Date    Cervical radiculopathy     Chronic pain     Chronic renal impairment     COVID-19 12/27/2022    Depression     Depressive disorder 05/12/2022    Digestive disorder     GERD (gastroesophageal reflux disease)     History of prediabetes 11/16/2022    Hypercholesteremia     Hypertension     Left ventricular hypertrophy     Lumbosacral spondylosis     Positive colorectal cancer screening using Cologuard test 12/15/2022    Prediabetes     Pulmonary nodules     repeat CT due 12/03/21       Past Surgical History:   Procedure Laterality Date    ABDOMINAL SURGERY      inguinal hernia repair    BACK SURGERY      Bilateral L3-S1 MBB Bilateral 2-, 1-, 1-, 12-    Dr Jorge Parker    EYE SURGERY      HERNIA REPAIR      KNEE ARTHROSCOPY W/ MENISCAL REPAIR Right     KNEE ARTHROSCOPY W/ MENISCECTOMY Left 01/11/2023    Procedure: ARTHROSCOPY, KNEE, WITH MENISCECTOMY;  Surgeon: Dada Enriquez MD;  Location: [facility] OR;  Service: Orthopedics;  Laterality: Left;    KNEE SURGERY Left     LATERAL RETINACULA RELEASE OF KNEE Left 01/11/2023    Procedure: ARTHROSCOPIC RELEASE, KNEE, LATERAL RETINACULA;  Surgeon: Dada Enriquez MD;  Location: [facility] ORTHO OR;  Service: Orthopedics;  Laterality: Left;    PROSTATECTOMY  03/1996    prostate cancer    RADIOFREQUENCY ABLATION OF LUMBAR MEDIAL BRANCH NERVE AT SINGLE LEVEL Right 04/01/2021    Procedure: Radiofrequency Ablation, Nerve, Spinal, Lumbar, Medial Branch, 1 Level;  Surgeon: Jordana Patel MD;  Location: [facility] PAIN [facility];  Service: Pain Management;  Laterality: Right;  Right L3-S1 RFTC    RADIOFREQUENCY ABLATION OF LUMBAR MEDIAL BRANCH NERVE AT SINGLE LEVEL Right 02/09/2023    Procedure: Radiofrequency Ablation, Nerve, Spinal, Lumbar, Medial Branch, Level L4-S1;  Surgeon: Jordana Patel MD;  Location: [facility] PAIN [facility];  Service: Pain Management;  Laterality: Right;  bonita left after right is done    RADIOFREQUENCY ABLATION OF  LUMBAR MEDIAL BRANCH NERVE AT SINGLE LEVEL Left 02/28/2023    Procedure: RADIOFREQUENCY ABLATION, NERVE, SPINAL, LUMBAR, MEDIAL BRANCH, 1 LEVEL;  Surgeon: Jordana Patel MD;  Location: [facility] PAIN MGMT;  Service: Pain Management;  Laterality: Left;    RADIOFREQUENCY THERMOCOAGULATION Bilateral 03/20/2012    Dr Parker    RADIOFREQUENCY THERMOCOAGULATION Left 03/18/2021    Procedure: RADIOFREQUENCY THERMAL COAGULATION;  Surgeon: Jordana Patel MD;  Location: [facility] PAIN MGMT;  Service: Pain Management;  Laterality: Left;  BILATERAL L3-S1 RFTC LEFT SIDE FIRST    ROBOTIC ARTHROPLASTY, KNEE Right 6/19/2024    Procedure: ROBOTIC ARTHROPLASTY, KNEE, TOTAL;  Surgeon: Dada Enriquez MD;  Location: [facility] ORTHO OR;  Service: Orthopedics;  Laterality: Right;    SELECTIVE INJECTION OF ANESTHETIC AGENT AROUND LUMBAR SPINAL NERVE ROOT BY TRANSFORAMINAL APPROACH Right 01/13/2022    Procedure: Right L4-5,5-S1 TFESI;  Surgeon: Jordana Patel MD;  Location: [facility]IN MGMT;  Service: Pain Management;  Laterality: Right;  PT AWARE TO BE TESTED VIA PC    SHOULDER ARTHROSCOPY Right     SPINE SURGERY  1995    VASECTOMY  Mar 1996       Time Tracking:     PT Received On: 06/20/24  PT Start Time: 0908  PT Stop Time: 0957  PT Total Time (min): 49 min     Billable Minutes: Evaluation 15, Gait Training 10, and Therapeutic Exercise 20 6/20/2024

## 2024-06-20 NOTE — PLAN OF CARE
Problem: Occupational Therapy  Goal: Occupational Therapy Goal  Description: STG:  Pt will perform grooming with setup  Pt will bathe with I'ly  Pt will perform UE dressing with I/ly  Pt will perform LE dressing with Mod I  Pt will sit EOB x 20 min with no assistance  Pt will transfer bed/chair/bsc with Mod I  Pt will perform standing task x 10 min with supervision assistance  Pt will tolerate 30 minutes of tx without fatigue      LT.Restore to max I with self care and mobility.    Outcome: Progressing

## 2024-06-21 PROCEDURE — G0180 MD CERTIFICATION HHA PATIENT: HCPCS | Mod: ,,, | Performed by: ORTHOPAEDIC SURGERY

## 2024-06-22 LAB
OHS QRS DURATION: 80 MS
OHS QTC CALCULATION: 401 MS

## 2024-07-02 ENCOUNTER — OFFICE VISIT (OUTPATIENT)
Dept: ORTHOPEDICS | Facility: CLINIC | Age: 74
End: 2024-07-02
Payer: MEDICARE

## 2024-07-02 DIAGNOSIS — M17.11 PRIMARY OSTEOARTHRITIS OF RIGHT KNEE: Primary | ICD-10-CM

## 2024-07-02 PROCEDURE — 99024 POSTOP FOLLOW-UP VISIT: CPT | Mod: ,,, | Performed by: NURSE PRACTITIONER

## 2024-07-02 PROCEDURE — 99999 PR PBB SHADOW E&M-EST. PATIENT-LVL II: CPT | Mod: PBBFAC,,, | Performed by: NURSE PRACTITIONER

## 2024-07-02 PROCEDURE — 99212 OFFICE O/P EST SF 10 MIN: CPT | Mod: PBBFAC | Performed by: NURSE PRACTITIONER

## 2024-07-02 RX ORDER — OXYCODONE AND ACETAMINOPHEN 10; 325 MG/1; MG/1
1 TABLET ORAL EVERY 6 HOURS PRN
Qty: 35 TABLET | Refills: 0 | Status: SHIPPED | OUTPATIENT
Start: 2024-07-02

## 2024-07-02 NOTE — PROGRESS NOTES
Post-Operative Total Knee        Robotic Arthroplasty, Knee, Total - Right 6/19/2024       Pt is here today for First post-operative followup of his No surgery found.  he is doing well.  We have reviewed his findings and discussed plan of care and future treatment options, including the physical therapy plan.    Patient is 2 weeks postop right total knee arthroplasty, doing well.  Incision looks good today.  Staples removed and Steri-Strips applied.  No signs of infection.  Reports he has had had a good bit of pain.  He is ambulating with a walker.  Currently has home health PT.  We will set up outpatient physical therapy to start in a week.  He is requesting a refill on pain medication.    Physical Exam:     The affected knee was inspected today.   The wound is healing well with no signs of erythema or warmth.  There is no drainage.  No clinical signs or symptoms of infection are present.   ROM: 5-90  -Chuy's sign.  2+ pulses are present.         Assessment and Plan  1. Primary osteoarthritis of right knee  - Ambulatory referral/consult to Physical/Occupational Therapy; Future            We will continue post-operative physical therapy until the patient feels that they are independent with a home rehab program.  Return to work status/ return to activities status was discussed today in detail. All questions were answered.    The use of stockings and DVT prophylaxis was discussed.  Will follow up in additional 4-6 weeks with radiographs at that time.   We will set up outpatient PT.  Percocet refilled.  Return to clinic with Dr. Enriquez in 4 weeks  There are no Patient Instructions on file for this visit.

## 2024-07-10 ENCOUNTER — EXTERNAL HOME HEALTH (OUTPATIENT)
Dept: HOME HEALTH SERVICES | Facility: HOSPITAL | Age: 74
End: 2024-07-10
Payer: MEDICARE

## 2024-07-10 NOTE — PROGRESS NOTES
"See PLAN OF CARE     Sup Visit performed today with DANIEL Figueroa and DANIEL Marlow.  All goals and treatment plan reviewed. Will work toward completion of all goals set.     Plans for first treatment:    Knee Exercises        Bike/Nustep  5 minutes on Nustep   Calf Stretch 4 x 15 seconds    Hamstring Stretch 4 x 15 seconds    Quad Stretch 4 x 15 seconds                Cybex Knee Extension     Cybex Hamstring Curls 2 x 10 with 4 plates   Cybex Hip - Abduction bilateral  2 x 10 with 2 plates   Cybex Hip - Flexion      Cybex Hip - Extension                    Therapeutic Activity x   minutes  Forward step ups 6" 2 x 10  Heel raises 2 x 10  Squats 2 x 10  Cybex leg press bilateral 3 x 10 with 6 plates  Cybex leg press single 2 x 10 with 3 plates     Neuro-re-ed x minutes     Lateral  step downs 4"  x 10  Forward step downs 4" x 10  Cable TKE's 2 x 10 with 2-3 second hold with 4 plates     "

## 2024-07-11 RX ORDER — OXYCODONE AND ACETAMINOPHEN 10; 325 MG/1; MG/1
1 TABLET ORAL EVERY 6 HOURS PRN
Qty: 35 TABLET | Refills: 0 | Status: SHIPPED | OUTPATIENT
Start: 2024-07-11

## 2024-07-11 NOTE — TELEPHONE ENCOUNTER
----- Message from Rashmi Neff sent at 7/11/2024 12:01 PM CDT -----  Needs refill on oxyCODONE-acetaminophen (PERCOCET)  mg per tablet to [facility].   Who Called: Tray Jalloh    Refill or New Rx:Refill  RX Name and Strength:oxyCODONE-acetaminophen (PERCOCET)  mg per tablet        Preferred Method of Contact: Phone Call  Patient's Preferred Phone Number on File: [phone]   Best Call Back Number, if different:  Additional Information:

## 2024-07-15 ENCOUNTER — CLINICAL SUPPORT (OUTPATIENT)
Dept: REHABILITATION | Facility: HOSPITAL | Age: 74
End: 2024-07-15
Payer: MEDICARE

## 2024-07-15 DIAGNOSIS — Z96.651 S/P TOTAL KNEE ARTHROPLASTY, RIGHT: Primary | ICD-10-CM

## 2024-07-15 DIAGNOSIS — M17.11 PRIMARY OSTEOARTHRITIS OF RIGHT KNEE: ICD-10-CM

## 2024-07-15 PROCEDURE — 97162 PT EVAL MOD COMPLEX 30 MIN: CPT

## 2024-07-15 PROCEDURE — 97110 THERAPEUTIC EXERCISES: CPT
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
## 2024-07-16 NOTE — PROGRESS NOTES
"  [facility]   Physical Therapy Treatment Note     Name: Tray Jalloh  Clinic Number: [id number]    Visit Date: 7/17/2024     Therapy Diagnosis:  right total knee arthroplasty  Physician: Mihaela Salamanca FNP     Physician Orders: PT Eval and Treat   Medical Diagnosis from Referral: right total knee arthroplasty  Evaluation Date: 7/15/2024  DOS: 6/19/2024  Authorization Period Expiration: 7/2/2025  Plan of Care Expiration: 9/13/2024  Visit # / Visits authorized: 2/ 16   FOTO: 34/100    Time In: 3:17 pm  Time Out: 4:00 pm  Total Billable Time: 43 minutes    Precautions: Standard  Prior Level of Function: Independent  Current Level of Function: Impaired mobility using RW    Subjective     Pt reports: doing his home exercises without difficulty.  He was compliant with home exercise program.    Pain: 3/10 with pain medicine   Location: right knee      Objective       Tray received therapeutic exercises to develop strength, ROM, and flexibility for 15 minutes including:    Knee Exercises        Bike/Nustep 10 minutes on Nustep   Calf Stretch 4 x 15 seconds    Hamstring Stretch 4 x 15 seconds    Quad Stretch 4 x 15 seconds                Cybex Knee Extension     Cybex Hamstring Curls 2 x 10 with 4 plates   Cybex Hip - Abduction bilateral  2 x 10 with 2 plates   Cybex Hip - Flexion      Cybex Hip - Extension                    Therapeutic Activity x 20 minutes  Forward step ups 6" 2 x 10  Heel raises 2 x 10  Squats 2 x 10  Cybex leg press bilateral 3 x 10 with 6 plates  Cybex leg press single 2 x 10 with 3 plates     Neuro-re-ed x 8 minutes     Lateral  step downs 4"  x 10  Forward step downs 4" x 10          Home Exercises Provided and Patient Education Provided     Education provided: Review of HOME EXERCISE PROGRAM     Written Home Exercises Provided: Patient instructed to cont prior HEP.  Exercises were reviewed and Tray was able to demonstrate them prior to the end of " the session.  Tray demonstrated good  understanding of the education provided.     See EMR under Patient Instructions for exercises provided prior visit.    Assessment     First treatment since evaluation with PLAN OF CARE initiated today. Patient required moderate demonstration and cues for proper technique. Patient tolerated treatment well with fatigue noted. Will continue with PLAN OF CARE and progress as tolerated.        Tray is a 74 y.o. male referred to outpatient Physical Therapy with a medical diagnosis of  right total knee arthroplasty. Patient presents with Right knee pain, edema, abnormal gait, impaired balance, decreased right knee motion and weakness of right lower extremity. Tray reports: that right knee has been bothering him for couple years managing with injections. The 3 months prior to surgery patient had to start using a cane to walk. Patient underwent right TKA 6/19/2024. Patient will benefit from skilled Physical Therapy intervention to address all deficits and help patient to return to their prior level of function.      Tray Is progressing well towards his goals.   Pt prognosis is Good.     Pt will continue to benefit from skilled outpatient physical therapy to address the deficits listed in the problem list box on initial evaluation, provide pt/family education and to maximize pt's level of independence in the home and community environment.     Pt's spiritual, cultural and educational needs considered and pt agreeable to plan of care and goals.     Anticipated barriers to physical therapy: None    Goals:  Short Term Goals: 4 weeks   1. Independent with Home Exercise Program   2. Increase Right Knee Passive Range of Motion to 0 Degrees to 120 Degrees  3. Increase Right Knee Strength to grossly 4/5  4. Patient will ambulate 500 feet with Normal Gait pattern with complaints of pain Less than or Equal to 4/10.       Long Term Goals: 8 weeks   1. Patient will increase Right Knee  Strength to grossly 4+/5 or greater  2. Patient will ambulate 1000+ feet with complaints of pain Less than or Equal to 2/10.   3. Increase Right Knee Active Range of Motion to 0 Degrees to 110 Degrees    Plan     Plan of care Certification: 7/15/2024 to 9/13/2024.     Outpatient Physical Therapy 2 times weekly for 8 weeks to include the following interventions: 18046 [therapeutic exercise], 41462 [neuromuscular re-education], 44049 [manual therapy], 81556 [therapeutic activities], 05487 [unattended electrical stimulation], and 77322 [vasopneumatic device]    JANIE OSBORNE, PT  7/16/2024

## 2024-07-17 ENCOUNTER — CLINICAL SUPPORT (OUTPATIENT)
Dept: REHABILITATION | Facility: HOSPITAL | Age: 74
End: 2024-07-17
Payer: MEDICARE

## 2024-07-17 ENCOUNTER — OFFICE VISIT (OUTPATIENT)
Dept: FAMILY MEDICINE | Facility: CLINIC | Age: 74
End: 2024-07-17
Payer: MEDICARE

## 2024-07-17 VITALS
SYSTOLIC BLOOD PRESSURE: 132 MMHG | DIASTOLIC BLOOD PRESSURE: 68 MMHG | BODY MASS INDEX: 25.73 KG/M2 | TEMPERATURE: 98 F | HEIGHT: 72 IN | RESPIRATION RATE: 16 BRPM | OXYGEN SATURATION: 97 % | WEIGHT: 190 LBS | HEART RATE: 68 BPM

## 2024-07-17 DIAGNOSIS — Z96.651 S/P TOTAL KNEE ARTHROPLASTY, RIGHT: Primary | ICD-10-CM

## 2024-07-17 DIAGNOSIS — G25.0 ESSENTIAL TREMOR: Chronic | ICD-10-CM

## 2024-07-17 DIAGNOSIS — N18.31 CHRONIC KIDNEY DISEASE, STAGE 3A: Chronic | ICD-10-CM

## 2024-07-17 DIAGNOSIS — G47.30 SLEEP APNEA, UNSPECIFIED TYPE: Chronic | ICD-10-CM

## 2024-07-17 DIAGNOSIS — Z00.00 ENCOUNTER FOR INITIAL ANNUAL WELLNESS VISIT (AWV) IN MEDICARE PATIENT: Primary | ICD-10-CM

## 2024-07-17 DIAGNOSIS — Z12.5 SCREENING FOR PROSTATE CANCER: ICD-10-CM

## 2024-07-17 DIAGNOSIS — I10 ESSENTIAL HYPERTENSION: Chronic | ICD-10-CM

## 2024-07-17 DIAGNOSIS — K21.9 GASTROESOPHAGEAL REFLUX DISEASE WITHOUT ESOPHAGITIS: Chronic | ICD-10-CM

## 2024-07-17 PROBLEM — N39.0 E. COLI UTI (URINARY TRACT INFECTION): Status: RESOLVED | Noted: 2024-05-16 | Resolved: 2024-07-17

## 2024-07-17 PROBLEM — B96.20 E. COLI UTI (URINARY TRACT INFECTION): Status: RESOLVED | Noted: 2024-05-16 | Resolved: 2024-07-17

## 2024-07-17 LAB
ANION GAP SERPL CALCULATED.3IONS-SCNC: 10 MMOL/L (ref 7–16)
BUN SERPL-MCNC: 27 MG/DL (ref 7–18)
BUN/CREAT SERPL: 20 (ref 6–20)
CALCIUM SERPL-MCNC: 9.2 MG/DL (ref 8.5–10.1)
CHLORIDE SERPL-SCNC: 107 MMOL/L (ref 98–107)
CO2 SERPL-SCNC: 26 MMOL/L (ref 21–32)
CREAT SERPL-MCNC: 1.35 MG/DL (ref 0.7–1.3)
EGFR (NO RACE VARIABLE) (RUSH/TITUS): 55 ML/MIN/1.73M2
GLUCOSE SERPL-MCNC: 109 MG/DL (ref 74–106)
POTASSIUM SERPL-SCNC: 4.2 MMOL/L (ref 3.5–5.1)
PSA SERPL-MCNC: 0.01 NG/ML
SODIUM SERPL-SCNC: 139 MMOL/L (ref 136–145)

## 2024-07-17 PROCEDURE — 80048 BASIC METABOLIC PNL TOTAL CA: CPT | Mod: ,,, | Performed by: CLINICAL MEDICAL LABORATORY

## 2024-07-17 PROCEDURE — 97530 THERAPEUTIC ACTIVITIES: CPT

## 2024-07-17 PROCEDURE — G0103 PSA SCREENING: HCPCS | Mod: ,,, | Performed by: CLINICAL MEDICAL LABORATORY

## 2024-07-17 PROCEDURE — G0438 PPPS, INITIAL VISIT: HCPCS | Mod: ,,, | Performed by: NURSE PRACTITIONER

## 2024-07-17 PROCEDURE — 97112 NEUROMUSCULAR REEDUCATION: CPT

## 2024-07-17 PROCEDURE — 97110 THERAPEUTIC EXERCISES: CPT

## 2024-07-17 RX ORDER — ASPIRIN 81 MG/1
81 TABLET ORAL 2 TIMES DAILY
Qty: 60 TABLET | Refills: 0 | Status: SHIPPED | OUTPATIENT
Start: 2024-07-17 | End: 2024-08-16

## 2024-07-17 RX ORDER — METAXALONE 800 MG/1
800 TABLET ORAL NIGHTLY
COMMUNITY

## 2024-07-17 RX ORDER — AMOXICILLIN 250 MG
1 CAPSULE ORAL 2 TIMES DAILY
Qty: 60 TABLET | Refills: 0 | Status: SHIPPED | OUTPATIENT
Start: 2024-07-17

## 2024-07-17 NOTE — PATIENT INSTRUCTIONS
Counseling and Referral of Other Preventative  (Italic type indicates deductible and co-insurance are waived)    Patient Name: Tray Jalloh  Today's Date: 7/17/2024    Health Maintenance         Date Due Completion Date    Influenza Vaccine (1) 09/01/2024 12/12/2023    Override on 10/9/2022: Done    Annual UACr 05/30/2025 5/30/2024    Colorectal Cancer Screening 02/15/2028 2/15/2023    Override on 9/4/2012: Done (Dr. Hare. Repeat in 10 years. Scanned into documents.)    Lipid Panel 05/15/2028 5/15/2023    TETANUS VACCINE 10/27/2030 10/27/2020          No orders of the defined types were placed in this encounter.      The following information is provided to all patients.  This information is to help you find resources for any of the problems found today that may be affecting your health:                  Living healthy guide: ms.gov    Understanding Diabetes: www.diabetes.org      Eating healthy: www.cdc.gov/healthyweight      CDC home safety checklist: www.cdc.gov/steadi/patient.html      Agency on Aging: ms.gov    Alcoholics anonymous (AA): www.aa.org      Physical Activity: www.nima.nih.gov/aj3wexa      Tobacco use: ms.gov

## 2024-07-17 NOTE — PROGRESS NOTES
[facility] - FAMILY MEDICINE       PATIENT NAME: Tray Jalloh   : 1950    AGE: 74 y.o. DATE OF ENCOUNTER: 24    MRN: [id number]      Tray Jalloh presented for an Initial Medicare AWV and comprehensive Health Risk Assessment today. The following components were reviewed and updated:    Medical history  Family History  Social history  Allergies and Current Medications  Health Risk Assessment  Health Maintenance  Care Team         ** See Completed Assessments for Annual Wellness Visit within the encounter summary.**         The following assessments were completed:  Living Situation  CAGE  Depression Screening  Timed Get Up and Go  Whisper Test  Cognitive Function Screening  Nutrition Screening  ADL Screening  PAQ Screening        Opioid documentationdoes have a current opioid prescription.      Patient accepted further discussion regarding opioid medication use.      Patient is currently taking oxycodone narcotic for knee pain.        Pain level today is 5/10.       In addition to narcotic pain medications, patient is also using acetaminophen for pain control.       Patient is not followed by a specialist currently for their pain and will not be referred today.       Patient's opioid risk potential based on ORT-OUD tool:       Kenn each box that applies   No   Yes     Family history of substance abuse   Alcohol [x] []   Illegal drugs [x] []   Rx drugs [x] []     Personal history of substance abuse   Alcohol [x] []   Illegal drugs [x] []   Rx drugs [x] []     Age between 16-45 years   [x]   []     Patient with ADD, OCD, Bipolar disorder, schizoprenia   [x]   []     Patient with depression   []   [x]                         Scoring total                                             1                    Non-opioid treatment options have been discussed today and added to the patient's after visit summary.       Vitals:    24 0827   BP: 132/68   BP Location: Right arm    Patient Position: Sitting   Pulse: 68   Resp: 16   Temp: 97.7 °F (36.5 °C)   TempSrc: Oral   SpO2: 97%   Weight: 86.2 kg (190 lb)   Height: 6' (1.829 m)     Body mass index is 25.77 kg/m².  Physical Exam  Vitals and nursing note reviewed.   Constitutional:       General: He is not in acute distress.     Appearance: Normal appearance. He is not ill-appearing.   HENT:      Head: Normocephalic.   Eyes:      Conjunctiva/sclera: Conjunctivae normal.   Cardiovascular:      Rate and Rhythm: Normal rate and regular rhythm.      Heart sounds: Normal heart sounds.   Pulmonary:      Effort: Pulmonary effort is normal. No respiratory distress.      Breath sounds: Normal breath sounds.   Skin:     General: Skin is warm and dry.      Coloration: Skin is not jaundiced or pale.   Neurological:      Mental Status: He is alert and oriented to person, place, and time.      Motor: Weakness (mild generalized) present.      Gait: Gait abnormal (Using assistive device for stability.).   Psychiatric:         Mood and Affect: Mood normal.         Behavior: Behavior normal.         Thought Content: Thought content normal.         Judgment: Judgment normal.             Diagnoses and health risks identified today and associated recommendations/orders:    1. Encounter for initial annual wellness visit (AWV) in Medicare patient    2. Essential hypertension  Assessment & Plan:  Controlled, continue lisinopril.      3. Chronic kidney disease, stage 3a  Assessment & Plan:  Stable, continue monitoring.  Avoid nephrotoxins including NSAIDs.      4. Gastroesophageal reflux disease without esophagitis  Assessment & Plan:  Controlled  Continue omeprazole 20 mg daily.      5. Essential tremor  Assessment & Plan:  Stable, not on treatment.      6. Sleep apnea, unspecified type  Assessment & Plan:  Stable, followed by sleep medicine.      7. Screening for prostate cancer  -     PSA, Screening; Future; Expected date: 07/17/2024    8. BMI  25.0-25.9,adult       Provided Tray with a 5-10 year written screening schedule and personal prevention plan. Recommendations were developed using the USPSTF age appropriate recommendations. Education, counseling, and referrals were provided as needed. After Visit Summary printed and given to patient which includes a list of additional screenings\tests needed.      Follow up in about 1 year (around 7/17/2025) for Medicare AWV, and otherwise follow-up as routinely scheduled.    Signature:  Elizabeth Lange [facility]      Covid vaccine - declined, PSA level - ordered this visit.    I offered to discuss advanced care planning, including how to pick a person who would make decisions for you if you were unable to make them for yourself, called a health care power of , and what kind of decisions you might make such as use of life sustaining treatments such as ventilators and tube feeding when faced with a life limiting illness recorded on a living will that they will need to know. (How you want to be cared for as you near the end of your natural life)     X Patient is interested in learning more about how to make advanced directives.  I provided them paperwork and offered to discuss this with them.

## 2024-07-22 ENCOUNTER — TELEPHONE (OUTPATIENT)
Dept: ORTHOPEDICS | Facility: CLINIC | Age: 74
End: 2024-07-22
Payer: MEDICARE

## 2024-07-22 RX ORDER — OXYCODONE AND ACETAMINOPHEN 10; 325 MG/1; MG/1
1 TABLET ORAL EVERY 6 HOURS PRN
Qty: 35 TABLET | Refills: 0 | Status: SHIPPED | OUTPATIENT
Start: 2024-07-22

## 2024-07-22 NOTE — TELEPHONE ENCOUNTER
----- Message from Faye Villatoro sent at 7/22/2024  8:15 AM CDT -----  Regarding: REFILL  Who Called: Tray Jalloh    Refill or New Rx:Refill  RX Name and Strength:OXYCODONE 10  How is the patient currently taking it? (ex. 1XDay):  Is this a 30 day or 90 day RX:  Local or Mail Order:LOCAL  List of preferred pharmacies on file (remove unneeded): [unfilled]  If different Pharmacy is requested, enter Pharmacy information here including location and phone number: [facility]   Ordering Provider:HENRIK      Preferred Method of Contact: Phone Call  Patient's Preferred Phone Number on File: [phone]   Best Call Back Number, if different:  Additional Information:

## 2024-07-24 ENCOUNTER — CLINICAL SUPPORT (OUTPATIENT)
Dept: REHABILITATION | Facility: HOSPITAL | Age: 74
End: 2024-07-24
Payer: MEDICARE

## 2024-07-24 DIAGNOSIS — Z96.651 S/P TOTAL KNEE ARTHROPLASTY, RIGHT: Primary | ICD-10-CM

## 2024-07-24 PROCEDURE — 97530 THERAPEUTIC ACTIVITIES: CPT | Mod: CQ

## 2024-07-24 PROCEDURE — 97110 THERAPEUTIC EXERCISES: CPT | Mod: CQ

## 2024-07-24 NOTE — PROGRESS NOTES
"  [facility]   Physical Therapy Treatment Note     Name: Tray Jalloh  Clinic Number: [id number]    Visit Date: 7/24/2024     Therapy Diagnosis:  right total knee arthroplasty  Physician: Mihaela Salamanca FNP     Physician Orders: PT Eval and Treat   Medical Diagnosis from Referral: right total knee arthroplasty  Evaluation Date: 7/15/2024  DOS: 6/19/2024  Authorization Period Expiration: 7/2/2025  Plan of Care Expiration: 9/13/2024  Visit # / Visits authorized: 3/ 16   FOTO: 34/100    Time In: 3:17 pm  Time Out: 4:00  pm  Total Billable Time: 43 minutes    Precautions: Standard  Prior Level of Function: Independent  Current Level of Function: Impaired mobility using RW    Subjective     Pt reports: dull aching pain in the knee. Sharp pain has gone away. Doing his home exercises without difficulty.  He was compliant with home exercise program.    Pain: 2/10 with pain medicine   Location: right knee      Objective       Tray received therapeutic exercises to develop strength, ROM, and flexibility for 20 minutes including:    Knee Exercises     Bike/Nustep 7 minutes on Nustep   Calf Stretch 4 x 15 seconds    Hamstring Stretch 4 x 15 seconds    Quad Stretch 4 x 15 seconds  (105 degrees knee flexion)               Cybex Knee Extension     Cybex Hamstring Curls 2 x 10 with 5 plates   Cybex Hip - Abduction bilateral  2 x 10 with 2 plates   Cybex Hip - Flexion      Cybex Hip - Extension                    Therapeutic Activity x 23 minutes to improve functional performance    Forward step ups 6" 2 x 10  Heel raises 3 x 10  Squats 2 x 10  Cybex leg press bilateral 3 x 10 with 6 plates  Cybex leg press single 2 x 10 with 4 plates      Neuro-re-ed x 0 minutes    Lateral  step downs 4"  x 10  Forward step downs 4" x 10  Cable TKE's 2 x 10 with 2-3 second hold with 4 plates   STRAIGHT LEG RAISE     Home Exercises Provided and Patient Education Provided     Education provided: Review " of HOME EXERCISE PROGRAM     Written Home Exercises Provided: Patient instructed to cont prior HEP.  Exercises were reviewed and Tray was able to demonstrate them prior to the end of the session.  Tray demonstrated good  understanding of the education provided.     See EMR under Patient Instructions for exercises provided prior visit.    Assessment       Continued with PLAN OF CARE initiated today. Patient required moderate demonstration and cues for proper technique and safety. Patient tolerated treatment well with fatigue noted form progressive resistive exercises and increased weight on cybex single leg press. Will continue with PLAN OF CARE and progress as tolerated.     PMH from evaluation:  Tray is a 74 y.o. male referred to outpatient Physical Therapy with a medical diagnosis of  right total knee arthroplasty. Patient presents with Right knee pain, edema, abnormal gait, impaired balance, decreased right knee motion and weakness of right lower extremity. Tray reports: that right knee has been bothering him for couple years managing with injections. The 3 months prior to surgery patient had to start using a cane to walk. Patient underwent right TKA 6/19/2024. Patient will benefit from skilled Physical Therapy intervention to address all deficits and help patient to return to their prior level of function.      Tray Is progressing well towards his goals.   Pt prognosis is Good.     Pt will continue to benefit from skilled outpatient physical therapy to address the deficits listed in the problem list box on initial evaluation, provide pt/family education and to maximize pt's level of independence in the home and community environment.     Pt's spiritual, cultural and educational needs considered and pt agreeable to plan of care and goals.     Anticipated barriers to physical therapy: None    Goals:  Short Term Goals: 4 weeks   1. Independent with Home Exercise Program   2. Increase Right Knee  Passive Range of Motion to 0 Degrees to 120 Degrees  3. Increase Right Knee Strength to grossly 4/5  4. Patient will ambulate 500 feet with Normal Gait pattern with complaints of pain Less than or Equal to 4/10.       Long Term Goals: 8 weeks   1. Patient will increase Right Knee Strength to grossly 4+/5 or greater  2. Patient will ambulate 1000+ feet with complaints of pain Less than or Equal to 2/10.   3. Increase Right Knee Active Range of Motion to 0 Degrees to 110 Degrees    Plan     Plan of care Certification: 7/15/2024 to 9/13/2024.     Outpatient Physical Therapy 2 times weekly for 8 weeks to include the following interventions: 47370 [therapeutic exercise], 52663 [neuromuscular re-education], 11337 [manual therapy], 89758 [therapeutic activities], 16727 [unattended electrical stimulation], and 02171 [vasopneumatic device]    Omari Bolanos, ROX  7/24/2024

## 2024-07-26 ENCOUNTER — CLINICAL SUPPORT (OUTPATIENT)
Dept: REHABILITATION | Facility: HOSPITAL | Age: 74
End: 2024-07-26
Payer: MEDICARE

## 2024-07-26 DIAGNOSIS — Z96.651 S/P TOTAL KNEE ARTHROPLASTY, RIGHT: Primary | ICD-10-CM

## 2024-07-26 PROCEDURE — 97110 THERAPEUTIC EXERCISES: CPT | Mod: CQ

## 2024-07-26 PROCEDURE — 97112 NEUROMUSCULAR REEDUCATION: CPT | Mod: CQ

## 2024-07-26 PROCEDURE — 97530 THERAPEUTIC ACTIVITIES: CPT | Mod: CQ

## 2024-07-26 PROCEDURE — 97014 ELECTRIC STIMULATION THERAPY: CPT | Mod: CQ

## 2024-07-26 NOTE — PROGRESS NOTES
[facility]   Physical Therapy Treatment Note     Name: Tray Jalloh  Clinic Number: [id number]    Visit Date: 7/26/2024     Therapy Diagnosis:  right total knee arthroplasty  Physician: Mihaela Salamanca FNP     Physician Orders: PT Eval and Treat   Medical Diagnosis from Referral: right total knee arthroplasty  Evaluation Date: 7/15/2024  DOS: 6/19/2024  Authorization Period Expiration: 7/2/2025  Plan of Care Expiration: 9/13/2024  Visit # / Visits authorized: 4 / 16   FOTO: 34/100  PTA Visit: 1/5    Time In: 8:50 am  Time Out: 9:50 am  Total Billable Time: 55 billable minutes    Precautions: Standard  Prior Level of Function: Independent  Current Level of Function: Impaired mobility using RW    Subjective     Pt reports: some increased soreness after doing HEP   He was compliant with home exercise program.    Pain: 2/10 with pain medicine   Location: right knee      Objective     AROM: -15 deg to -11 after MHP to -8 after [place] estim   Quad Lag: -11 deg to -11 deg after [place] estim  Tray received therapeutic exercises to develop strength, ROM, and flexibility for 15 minutes including:    Knee Exercises     Bike/Nustep 5 minutes on Nustep   Calf Stretch 4 x 15 seconds    Hamstring Stretch 4 x 15 seconds                Cybex Knee Extension     Cybex Hip - Flexion      Cybex Hip - Extension                    Therapeutic Activity x 8 minutes to improve functional performance  Heel raises 3 x 10  Squats 2 x 10       Neuro-re-ed x  30 minutes    Cable TKE's 2 x 10 with 2-3 second hold with 4 plates   STRAIGHT LEG RAISE with [place] e-stim 4 minutes   SAQ with [place] e-stim 4 minutes   Cybex leg press bilateral 3 x 10 with 8 plates with rock into TERMINAL KNEE EXTENSION   Cybex leg press single 2 x 10 with 4 plates with rock into TERMINAL KNEE EXTENSION       Maltese E-stim x 12 minutes, 20 sec on/10 sec off; 2 sec ramp time; with exercises noted in NEURO        Home  Exercises Provided and Patient Education Provided     Education provided: Review of HOME EXERCISE PROGRAM     Written Home Exercises Provided: Patient instructed to cont prior HEP.  Exercises were reviewed and Tray was able to demonstrate them prior to the end of the session.  Tray demonstrated good  understanding of the education provided.     See EMR under Patient Instructions for exercises provided prior visit.    Assessment     Patient's knee extension was measured at the beginning of the session; AROM measured -15 degrees with inability to perform SLR. After applying russian e-stim with a MHP patient's knee extension measured -11 degrees. Following [place] e-stim with a few exercises, patient's knee extension to -8 degrees with a -11 degree quad lag. Patient tolerated treatment well with complaints muscle fatigue noted. Focused on pt getting knee into end range extension to help with quad function and VMO activation. Continue to progress patient's strengthening and mobility exercises. Time billed reflects time spent one on one with pt.    PMH from evaluation:  Tray is a 74 y.o. male referred to outpatient Physical Therapy with a medical diagnosis of  right total knee arthroplasty. Patient presents with Right knee pain, edema, abnormal gait, impaired balance, decreased right knee motion and weakness of right lower extremity. Tray reports: that right knee has been bothering him for couple years managing with injections. The 3 months prior to surgery patient had to start using a cane to walk. Patient underwent right TKA 6/19/2024. Patient will benefit from skilled Physical Therapy intervention to address all deficits and help patient to return to their prior level of function.      Tray Is progressing well towards his goals.   Pt prognosis is Good.     Pt will continue to benefit from skilled outpatient physical therapy to address the deficits listed in the problem list box on initial evaluation,  provide pt/family education and to maximize pt's level of independence in the home and community environment.     Pt's spiritual, cultural and educational needs considered and pt agreeable to plan of care and goals.     Anticipated barriers to physical therapy: None    Goals:  Short Term Goals: 4 weeks   1. Independent with Home Exercise Program   2. Increase Right Knee Passive Range of Motion to 0 Degrees to 120 Degrees  3. Increase Right Knee Strength to grossly 4/5  4. Patient will ambulate 500 feet with Normal Gait pattern with complaints of pain Less than or Equal to 4/10.       Long Term Goals: 8 weeks   1. Patient will increase Right Knee Strength to grossly 4+/5 or greater  2. Patient will ambulate 1000+ feet with complaints of pain Less than or Equal to 2/10.   3. Increase Right Knee Active Range of Motion to 0 Degrees to 110 Degrees    Plan     Plan of care Certification: 7/15/2024 to 9/13/2024.     Outpatient Physical Therapy 2 times weekly for 8 weeks to include the following interventions: 51201 [therapeutic exercise], 05137 [neuromuscular re-education], 32377 [manual therapy], 74438 [therapeutic activities], 11477 [unattended electrical stimulation], and 16403 [vasopneumatic device]    Virgil Boothe, PTA  7/26/2024

## 2024-07-27 ENCOUNTER — OFFICE VISIT (OUTPATIENT)
Dept: FAMILY MEDICINE | Facility: CLINIC | Age: 74
End: 2024-07-27
Payer: MEDICARE

## 2024-07-27 ENCOUNTER — APPOINTMENT (OUTPATIENT)
Dept: RADIOLOGY | Facility: CLINIC | Age: 74
End: 2024-07-27
Attending: NURSE PRACTITIONER
Payer: MEDICARE

## 2024-07-27 VITALS
OXYGEN SATURATION: 95 % | HEART RATE: 84 BPM | BODY MASS INDEX: 25.73 KG/M2 | RESPIRATION RATE: 20 BRPM | HEIGHT: 72 IN | TEMPERATURE: 98 F | WEIGHT: 190 LBS | SYSTOLIC BLOOD PRESSURE: 153 MMHG | DIASTOLIC BLOOD PRESSURE: 82 MMHG

## 2024-07-27 DIAGNOSIS — R35.0 FREQUENT URINATION: ICD-10-CM

## 2024-07-27 DIAGNOSIS — R10.9 ABDOMINAL PAIN, UNSPECIFIED ABDOMINAL LOCATION: ICD-10-CM

## 2024-07-27 DIAGNOSIS — N39.0 RECURRENT UTI (URINARY TRACT INFECTION): Primary | ICD-10-CM

## 2024-07-27 PROBLEM — Z77.29 EXPOSURE TO POTENTIALLY HAZARDOUS SUBSTANCE: Status: ACTIVE | Noted: 2024-07-27

## 2024-07-27 PROBLEM — J44.9 CHRONIC OBSTRUCTIVE PULMONARY DISEASE, UNSPECIFIED: Status: ACTIVE | Noted: 2024-01-03

## 2024-07-27 PROBLEM — M17.0 BILATERAL PRIMARY OSTEOARTHRITIS OF KNEE: Status: ACTIVE | Noted: 2024-07-27

## 2024-07-27 PROBLEM — R51.9 HEADACHE: Status: ACTIVE | Noted: 2024-07-27

## 2024-07-27 LAB
BASOPHILS # BLD AUTO: 0.07 K/UL (ref 0–0.2)
BASOPHILS NFR BLD AUTO: 0.8 % (ref 0–1)
BILIRUB SERPL-MCNC: NEGATIVE MG/DL
BLOOD URINE, POC: ABNORMAL
CLARITY, POC UA: CLEAR
COLOR, POC UA: YELLOW
DIFFERENTIAL METHOD BLD: ABNORMAL
EOSINOPHIL # BLD AUTO: 0.1 K/UL (ref 0–0.5)
EOSINOPHIL NFR BLD AUTO: 1.2 % (ref 1–4)
ERYTHROCYTE [DISTWIDTH] IN BLOOD BY AUTOMATED COUNT: 13.4 % (ref 11.5–14.5)
GLUCOSE UR QL STRIP: NEGATIVE
HCT VFR BLD AUTO: 41.8 % (ref 40–54)
HGB BLD-MCNC: 13.4 G/DL (ref 13.5–18)
IMM GRANULOCYTES # BLD AUTO: 0.03 K/UL (ref 0–0.04)
IMM GRANULOCYTES NFR BLD: 0.4 % (ref 0–0.4)
KETONES UR QL STRIP: ABNORMAL
LEUKOCYTE ESTERASE URINE, POC: NEGATIVE
LYMPHOCYTES # BLD AUTO: 2.19 K/UL (ref 1–4.8)
LYMPHOCYTES NFR BLD AUTO: 25.9 % (ref 27–41)
MCH RBC QN AUTO: 30.5 PG (ref 27–31)
MCHC RBC AUTO-ENTMCNC: 32.1 G/DL (ref 32–36)
MCV RBC AUTO: 95.2 FL (ref 80–96)
MONOCYTES # BLD AUTO: 0.51 K/UL (ref 0–0.8)
MONOCYTES NFR BLD AUTO: 6 % (ref 2–6)
MPC BLD CALC-MCNC: 9 FL (ref 9.4–12.4)
NEUTROPHILS # BLD AUTO: 5.56 K/UL (ref 1.8–7.7)
NEUTROPHILS NFR BLD AUTO: 65.7 % (ref 53–65)
NITRITE, POC UA: NEGATIVE
NRBC # BLD AUTO: 0 X10E3/UL
NRBC, AUTO (.00): 0 %
PH, POC UA: 6
PLATELET # BLD AUTO: 369 K/UL (ref 150–400)
PROTEIN, POC: NEGATIVE
RBC # BLD AUTO: 4.39 M/UL (ref 4.6–6.2)
SPECIFIC GRAVITY, POC UA: 1.02
UROBILINOGEN, POC UA: 0.2
WBC # BLD AUTO: 8.46 K/UL (ref 4.5–11)

## 2024-07-27 PROCEDURE — 87186 SC STD MICRODIL/AGAR DIL: CPT | Mod: ,,, | Performed by: CLINICAL MEDICAL LABORATORY

## 2024-07-27 PROCEDURE — 87086 URINE CULTURE/COLONY COUNT: CPT | Mod: ,,, | Performed by: CLINICAL MEDICAL LABORATORY

## 2024-07-27 PROCEDURE — 99214 OFFICE O/P EST MOD 30 MIN: CPT | Mod: ,,, | Performed by: NURSE PRACTITIONER

## 2024-07-27 PROCEDURE — 81003 URINALYSIS AUTO W/O SCOPE: CPT | Mod: RHCUB | Performed by: NURSE PRACTITIONER

## 2024-07-27 PROCEDURE — 74018 RADEX ABDOMEN 1 VIEW: CPT | Mod: 26,,, | Performed by: RADIOLOGY

## 2024-07-27 PROCEDURE — 96372 THER/PROPH/DIAG INJ SC/IM: CPT | Mod: ,,, | Performed by: NURSE PRACTITIONER

## 2024-07-27 PROCEDURE — 74018 RADEX ABDOMEN 1 VIEW: CPT | Mod: TC,RHCUB | Performed by: NURSE PRACTITIONER

## 2024-07-27 PROCEDURE — 85025 COMPLETE CBC W/AUTO DIFF WBC: CPT | Mod: ,,, | Performed by: CLINICAL MEDICAL LABORATORY

## 2024-07-27 PROCEDURE — 87077 CULTURE AEROBIC IDENTIFY: CPT | Mod: XU,,, | Performed by: CLINICAL MEDICAL LABORATORY

## 2024-07-27 RX ORDER — SULFAMETHOXAZOLE AND TRIMETHOPRIM 800; 160 MG/1; MG/1
1 TABLET ORAL 2 TIMES DAILY
Qty: 20 TABLET | Refills: 0 | Status: SHIPPED | OUTPATIENT
Start: 2024-07-27 | End: 2024-07-28

## 2024-07-27 RX ORDER — CEFTRIAXONE 1 G/1
1 INJECTION, POWDER, FOR SOLUTION INTRAMUSCULAR; INTRAVENOUS
Status: COMPLETED | OUTPATIENT
Start: 2024-07-27 | End: 2024-07-27

## 2024-07-27 RX ADMIN — CEFTRIAXONE 1 G: 1 INJECTION, POWDER, FOR SOLUTION INTRAMUSCULAR; INTRAVENOUS at 01:07

## 2024-07-27 NOTE — PATIENT INSTRUCTIONS
Rocephin shot given in clinic   Bactrim to start by mouth   I will call if culture shows antibiotic resistance and with CBC results   Go the emergency room with any new or worsening symptoms  Return to clinic as needed

## 2024-07-27 NOTE — PROGRESS NOTES
Subjective:       Patient ID: Tray Jalloh is a 74 y.o. male.    Chief Complaint: Abdominal Pain and Urinary Frequency (ONSET:  POST 24 HRS.  GOING 2 OR 4 TIMES AN HOUR.  )      Presents to clinic with complaint of suprapubic pain and urinary frequency.  Reports history of UTIs.  Has had a prostatectomy in the past due to prostate cancer.  He denies fever.  No severe abdominal pain.  No nausea or vomiting.  No diarrhea.      Review of Systems   Constitutional: Negative.    Respiratory: Negative.     Cardiovascular: Negative.    Gastrointestinal:  Positive for abdominal pain. Negative for diarrhea, nausea and vomiting.   Genitourinary:  Positive for frequency and urgency. Negative for dysuria, flank pain and hematuria.          Reviewed family, medical, surgical, and social history.    Objective:      BP (!) 153/82 (BP Location: Left arm, Patient Position: Sitting, BP Method: Large (Automatic))   Pulse 84   Temp 97.8 °F (36.6 °C) (Oral)   Resp 20   Ht 6' (1.829 m)   Wt 86.2 kg (190 lb)   SpO2 95%   BMI 25.77 kg/m²   Physical Exam  Vitals and nursing note reviewed.   Constitutional:       General: He is not in acute distress.     Appearance: Normal appearance. He is not ill-appearing, toxic-appearing or diaphoretic.   HENT:      Head: Normocephalic.      Mouth/Throat:      Mouth: Mucous membranes are moist.   Cardiovascular:      Rate and Rhythm: Normal rate and regular rhythm.      Heart sounds: Normal heart sounds.   Pulmonary:      Effort: Pulmonary effort is normal.      Breath sounds: Normal breath sounds.   Abdominal:      General: Abdomen is flat. Bowel sounds are normal. There is no distension.      Palpations: There is no mass.      Tenderness: There is abdominal tenderness. There is no right CVA tenderness, left CVA tenderness, guarding or rebound.      Hernia: No hernia is present.      Comments:   Very mild suprapubic tenderness   Musculoskeletal:      Cervical back: Normal range of motion  and neck supple.   Skin:     General: Skin is warm and dry.      Capillary Refill: Capillary refill takes less than 2 seconds.   Neurological:      Mental Status: He is alert and oriented to person, place, and time.   Psychiatric:         Mood and Affect: Mood normal.         Behavior: Behavior normal.         Thought Content: Thought content normal.         Judgment: Judgment normal.            Office Visit on 07/27/2024   Component Date Value Ref Range Status    Color, UA 07/27/2024 Yellow   Final    Clarity, UA 07/27/2024 Clear   Final    Spec Grav UA 07/27/2024 1.020   Final    pH, UA 07/27/2024 6.0   Final    WBC, UA 07/27/2024 Negative   Final    Nitrite, UA 07/27/2024 Negative   Final    Protein, POC 07/27/2024 Negative   Final    Glucose, UA 07/27/2024 Negative   Final    Ketones, UA 07/27/2024 Trace   Final    Bilirubin, POC 07/27/2024 Negative   Final    Urobilinogen, UA 07/27/2024 0.2   Final    Blood, UA 07/27/2024 Trace   Final    WBC 07/27/2024 8.46  4.50 - 11.00 K/uL Final    RBC 07/27/2024 4.39 (L)  4.60 - 6.20 M/uL Final    Hemoglobin 07/27/2024 13.4 (L)  13.5 - 18.0 g/dL Final    Hematocrit 07/27/2024 41.8  40.0 - 54.0 % Final    MCV 07/27/2024 95.2  80.0 - 96.0 fL Final    MCH 07/27/2024 30.5  27.0 - 31.0 pg Final    MCHC 07/27/2024 32.1  32.0 - 36.0 g/dL Final    RDW 07/27/2024 13.4  11.5 - 14.5 % Final    Platelet Count 07/27/2024 369  150 - 400 K/uL Final    MPV 07/27/2024 9.0 (L)  9.4 - 12.4 fL Final    Neutrophils % 07/27/2024 65.7 (H)  53.0 - 65.0 % Final    Lymphocytes % 07/27/2024 25.9 (L)  27.0 - 41.0 % Final    Monocytes % 07/27/2024 6.0  2.0 - 6.0 % Final    Eosinophils % 07/27/2024 1.2  1.0 - 4.0 % Final    Basophils % 07/27/2024 0.8  0.0 - 1.0 % Final    Immature Granulocytes % 07/27/2024 0.4  0.0 - 0.4 % Final    nRBC, Auto 07/27/2024 0.0  <=0.0 % Final    Neutrophils, Abs 07/27/2024 5.56  1.80 - 7.70 K/uL Final    Lymphocytes, Absolute 07/27/2024 2.19  1.00 - 4.80 K/uL Final     Monocytes, Absolute 07/27/2024 0.51  0.00 - 0.80 K/uL Final    Eosinophils, Absolute 07/27/2024 0.10  0.00 - 0.50 K/uL Final    Basophils, Absolute 07/27/2024 0.07  0.00 - 0.20 K/uL Final    Immature Granulocytes, Absolute 07/27/2024 0.03  0.00 - 0.04 K/uL Final    nRBC, Absolute 07/27/2024 0.00  <=0.00 x10e3/uL Final    Diff Type 07/27/2024 Auto   Final      Assessment:       1. Recurrent UTI (urinary tract infection)    2. Frequent urination    3. Abdominal pain, unspecified abdominal location        Plan:       Recurrent UTI (urinary tract infection)  -     POCT URINALYSIS W/O SCOPE  -     cefTRIAXone injection 1 g  -     sulfamethoxazole-trimethoprim 800-160mg (BACTRIM DS) 800-160 mg Tab; Take 1 tablet by mouth 2 (two) times daily. for 10 days  Dispense: 20 tablet; Refill: 0  -     CBC Auto Differential; Future; Expected date: 07/27/2024    Frequent urination  -     Urine culture; Future; Expected date: 07/27/2024    Abdominal pain, unspecified abdominal location  -     X-Ray Abdomen AP 1 View; Future; Expected date: 07/27/2024    Rocephin shot given in clinic   Bactrim to start by mouth   I will call if culture shows antibiotic resistance and with CBC results   Go the emergency room with any new or worsening symptoms  Return to clinic as needed          Risks, benefits, and side effects were discussed with the patient. All questions were answered to the fullest satisfaction of the patient, and pt verbalized understanding and agreement to treatment plan. Pt was to call with any new or worsening symptoms, or present to the ER.       answered to the fullest satisfaction of the patient, and pt verbalized understanding and agreement to treatment plan. Pt was to call with any new or worsening symptoms, or present to the ER.

## 2024-07-28 ENCOUNTER — CLINICAL SUPPORT (OUTPATIENT)
Dept: FAMILY MEDICINE | Facility: CLINIC | Age: 74
End: 2024-07-28
Payer: MEDICARE

## 2024-07-28 VITALS
SYSTOLIC BLOOD PRESSURE: 133 MMHG | OXYGEN SATURATION: 98 % | HEART RATE: 90 BPM | RESPIRATION RATE: 22 BRPM | HEIGHT: 72 IN | DIASTOLIC BLOOD PRESSURE: 72 MMHG | TEMPERATURE: 98 F | WEIGHT: 190 LBS | BODY MASS INDEX: 25.73 KG/M2

## 2024-07-28 DIAGNOSIS — N39.0 URINARY TRACT INFECTION WITHOUT HEMATURIA, SITE UNSPECIFIED: Primary | ICD-10-CM

## 2024-07-28 PROCEDURE — 96372 THER/PROPH/DIAG INJ SC/IM: CPT | Mod: ,,, | Performed by: NURSE PRACTITIONER

## 2024-07-28 PROCEDURE — 99499 UNLISTED E&M SERVICE: CPT | Mod: ,,, | Performed by: NURSE PRACTITIONER

## 2024-07-28 RX ORDER — AMOXICILLIN AND CLAVULANATE POTASSIUM 875; 125 MG/1; MG/1
1 TABLET, FILM COATED ORAL 2 TIMES DAILY
Qty: 20 TABLET | Refills: 0 | Status: SHIPPED | OUTPATIENT
Start: 2024-07-28 | End: 2024-08-07

## 2024-07-28 RX ORDER — CEFTRIAXONE 1 G/1
1 INJECTION, POWDER, FOR SOLUTION INTRAMUSCULAR; INTRAVENOUS
Status: COMPLETED | OUTPATIENT
Start: 2024-07-28 | End: 2024-07-28

## 2024-07-28 RX ORDER — AMOXICILLIN AND CLAVULANATE POTASSIUM 875; 125 MG/1; MG/1
1 TABLET, FILM COATED ORAL 2 TIMES DAILY
Qty: 20 TABLET | Refills: 0 | Status: SHIPPED | OUTPATIENT
Start: 2024-07-28 | End: 2024-07-28

## 2024-07-28 RX ADMIN — CEFTRIAXONE 1 G: 1 INJECTION, POWDER, FOR SOLUTION INTRAMUSCULAR; INTRAVENOUS at 05:07

## 2024-07-28 NOTE — PROGRESS NOTES
States still having dysuria and suprapubic pain.  Reports the Rocephin shot helped a lot but it wore off this morning.  Urine culture positive for group B strep.  No fever.  Going to repeat Rocephin shot and Augmentin sent to the pharmacy.  He was advised to go the emergency room with the new or worsening symptoms.  Verbalized understanding.

## 2024-07-28 NOTE — PROGRESS NOTES
Notified of group B strep UTI.  Stop Bactrim and start Augmentin.  States Rocephin shot helped but now he is back in pain.  Advised him to go the emergency room.    Offered for him to come back and get another shot of Rocephin as well. Verbalized understanding.

## 2024-07-29 ENCOUNTER — OFFICE VISIT (OUTPATIENT)
Dept: UROLOGY | Facility: CLINIC | Age: 74
End: 2024-07-29
Payer: MEDICARE

## 2024-07-29 ENCOUNTER — CLINICAL SUPPORT (OUTPATIENT)
Dept: REHABILITATION | Facility: HOSPITAL | Age: 74
End: 2024-07-29
Payer: MEDICARE

## 2024-07-29 VITALS
BODY MASS INDEX: 24.92 KG/M2 | HEIGHT: 72 IN | DIASTOLIC BLOOD PRESSURE: 84 MMHG | TEMPERATURE: 98 F | SYSTOLIC BLOOD PRESSURE: 149 MMHG | OXYGEN SATURATION: 95 % | HEART RATE: 105 BPM | WEIGHT: 184 LBS

## 2024-07-29 DIAGNOSIS — N39.45 CONTINUOUS LEAKAGE OF URINE: ICD-10-CM

## 2024-07-29 DIAGNOSIS — Z85.46 HISTORY OF PROSTATE CANCER: ICD-10-CM

## 2024-07-29 DIAGNOSIS — R35.0 FREQUENCY OF URINATION: ICD-10-CM

## 2024-07-29 DIAGNOSIS — Z96.651 S/P TOTAL KNEE ARTHROPLASTY, RIGHT: Primary | ICD-10-CM

## 2024-07-29 DIAGNOSIS — R35.1 NOCTURIA: ICD-10-CM

## 2024-07-29 DIAGNOSIS — N39.0 RECURRENT UTI: Primary | ICD-10-CM

## 2024-07-29 PROCEDURE — 99999 PR PBB SHADOW E&M-EST. PATIENT-LVL IV: CPT | Mod: PBBFAC,,, | Performed by: NURSE PRACTITIONER

## 2024-07-29 PROCEDURE — 97112 NEUROMUSCULAR REEDUCATION: CPT | Mod: KX,CQ

## 2024-07-29 PROCEDURE — 99214 OFFICE O/P EST MOD 30 MIN: CPT | Mod: S$PBB,,, | Performed by: NURSE PRACTITIONER

## 2024-07-29 PROCEDURE — 99214 OFFICE O/P EST MOD 30 MIN: CPT | Mod: PBBFAC | Performed by: NURSE PRACTITIONER

## 2024-07-29 PROCEDURE — 97110 THERAPEUTIC EXERCISES: CPT | Mod: KX,CQ

## 2024-07-29 RX ORDER — TOLTERODINE 4 MG/1
CAPSULE, EXTENDED RELEASE ORAL
Qty: 90 CAPSULE | Refills: 3 | Status: SHIPPED | OUTPATIENT
Start: 2024-07-29

## 2024-07-29 RX ORDER — TOLTERODINE 4 MG/1
CAPSULE, EXTENDED RELEASE ORAL
Qty: 90 CAPSULE | Refills: 3 | Status: SHIPPED | OUTPATIENT
Start: 2024-07-29 | End: 2024-07-29 | Stop reason: CLARIF

## 2024-07-29 NOTE — PATIENT INSTRUCTIONS
Hold Hiprex (Methenamine Hippurate) for now   Continue Augmentin 875-125 mg while culture is pending -- may need to change antibiotics   Renew and increase Detrol LA 4 mg one capsule daily   SHERYL on August 6, 2024 at 1:30 pm to evaluate recurrent UTI's   Cystoscopy with Dr. Sanders on August 6, 2024 at 2:00 pm to evaluate recurrent UTI's post prostatectomy   Further recommendations after work up

## 2024-07-29 NOTE — PROGRESS NOTES
Subjective     Patient ID: Tray Jalloh is a 74 y.o. male.    Chief Complaint: No chief complaint on file.    This pleasant 74 year old male presents to clinic with wife as a new patient referral from JOHANNA Butcher for recurrent UTI's.  Patient states he had a radical prostatectomy by Dr. Reid and Vice in 1996 for prostate cancer. His urine culture grew >100,000 Streptococcus agalactiae (Group B) And 20,000 Gram-negative Bacilli on July 27, 2024.  The culture is not final and he was started on bactrim ds one twice a day.  He was then switched to Augmentin 875-125 mg one twice a day x 10 days.  We are still waiting on the final culture results.  He had two positive urine cultures in May 2024.  He is on Hiprex for UTI suppression but is not taking this medication while on antibiotics. He is also on Detrol LA 2 mg for incontinence and wears pads.  I discussed this with Dr. Sanders, the patient and wife.  Dr. Sanders recommends doing a Urological work up with SHERYL and Cystoscopy to evaluate symptoms and possible stricture.  They are agreeable and this will be scheduled as outlined in the plan.  He will continue the Augmentin 875-125 mg as prescribed while the culture is pending. We will increase the Detrol LA to 4 mg and continue the pads. He denies glaucoma. He reports incomplete bladder emptying, frequency, intermittency, nocturia 4 times a night and incontinence.  His PVR is 039 mls.   He denies  dysuria, hematuria, or urgency.   He denies fever, chills, or vomiting.  He denies bladder or back pain.  I discussed the plan in detail with Dr. Sanders, the patient and wife and they are in agreement with the plan.  All their questions were answered at today's visit.       Urine culture history:   Urine culture grew  >100,000 Streptococcus agalactiae (Group B) And 20,000 Gram-negative Bacilli on July 27, 2024   Urine culture grew 8,000 Escherichia coli on 05/30/2024  Urine culture grew >100,000 Escherichia coli on 05/12/2024    -----------------------------------------------------------------------------------------------------------------------  [July 29, 2024].           Review of Systems   Constitutional:  Negative for activity change and fever.   HENT:  Negative for hearing loss and trouble swallowing.    Eyes:  Negative for visual disturbance.   Respiratory:  Negative for cough, shortness of breath and wheezing.    Cardiovascular:  Negative for chest pain.   Gastrointestinal:  Negative for abdominal pain, diarrhea, nausea and vomiting.   Endocrine: Negative for polyuria.   Genitourinary:  Positive for bladder incontinence and frequency. Negative for decreased urine volume, difficulty urinating, discharge, dysuria, enuresis, erectile dysfunction, flank pain, genital sores, hematuria, penile pain, penile swelling, scrotal swelling, testicular pain and urgency.        Recurrent UTI        History of prostate cancer s/p prostatectomy in 1996       Nocturia    Musculoskeletal:  Negative for back pain and gait problem.   Integumentary:  Negative for rash.   Neurological:  Negative for speech difficulty and weakness.   Psychiatric/Behavioral:  Negative for behavioral problems and confusion.           Objective     Physical Exam  Vitals and nursing note reviewed.   Constitutional:       General: He is not in acute distress.     Appearance: Normal appearance. He is not ill-appearing, toxic-appearing or diaphoretic.   HENT:      Head: Normocephalic.   Eyes:      Extraocular Movements: Extraocular movements intact.   Cardiovascular:      Rate and Rhythm: Normal rate and regular rhythm.      Heart sounds: Normal heart sounds.   Pulmonary:      Effort: Pulmonary effort is normal. No respiratory distress.      Breath sounds: Normal breath sounds. No wheezing, rhonchi or rales.   Abdominal:      General: Bowel sounds are normal.      Palpations: Abdomen is soft.      Tenderness: There is no abdominal tenderness. There is no right CVA tenderness,  left CVA tenderness, guarding or rebound.   Musculoskeletal:         General: Normal range of motion.      Cervical back: Normal range of motion. No rigidity.   Skin:     General: Skin is warm and dry.   Neurological:      General: No focal deficit present.      Mental Status: He is alert and oriented to person, place, and time.      Motor: No weakness.      Coordination: Coordination normal.      Comments: Wheelchair    Psychiatric:         Mood and Affect: Mood normal.         Behavior: Behavior normal.         Thought Content: Thought content normal.          Assessment and Plan     Problem List Items Addressed This Visit          Renal/    Recurrent UTI - Primary    Relevant Orders    US Kidney    Continuous leakage of urine    Relevant Medications    tolterodine (DETROL LA) 4 MG 24 hr capsule    Frequency of urination    Nocturia       Oncology    History of prostate cancer        Hold Hiprex (Methenamine Hippurate) for now   Continue Augmentin 875-125 mg while culture is pending -- may need to change antibiotics   Renew and increase Detrol LA 4 mg one capsule daily   SHERYL on August 6, 2024 at 1:30 pm to evaluate recurrent UTI's   Cystoscopy with Dr. Sanders on August 6, 2024 at 2:00 pm to evaluate recurrent UTI's post prostatectomy   Further recommendations after work up

## 2024-07-29 NOTE — PROGRESS NOTES
[facility]   Physical Therapy Treatment Note     Name: Tray Jalloh  Clinic Number: [id number]    Visit Date: 7/29/2024     Therapy Diagnosis:  right total knee arthroplasty  Physician: Mihaela Salamanca FNP     Physician Orders: PT Eval and Treat   Medical Diagnosis from Referral: right total knee arthroplasty  Evaluation Date: 7/15/2024  DOS: 6/19/2024  Authorization Period Expiration: 7/2/2025  Plan of Care Expiration: 9/13/2024  Visit # / Visits authorized: 5 / 16   FOTO: 34/100  PTA Visit: 1/5    Time In: 2:30 pm  Time Out: 3:02 pm  Total Billable Time: 32 billable minutes    Precautions: Standard  Prior Level of Function: Independent  Current Level of Function: Impaired mobility using RW    Subjective     Pt reports: he is not sure what he is able to tolerate today due to having a UTI and not feeling well. [facility] and got a shot. He went to Dr. Sanders's office today due to still not feeling well.  He almost cancelled therapy today    He was compliant with home exercise program.    Pain: 2/10 with pain medicine   Location: right knee      Objective     AROM: -10 deg to -8 after MHP to -6 after Egyptian estim   Quad Lag: -9 deg to -9 deg after Egyptian ESTIM    Tray received therapeutic exercises to develop strength, ROM, and flexibility for 20  minutes including:    Knee Exercises     Bike/Nustep 5 minutes on Nustep (not performed today)    Calf Stretch 4 x 15 seconds    Hamstring Stretch 4 x 15 seconds    Quad Stretch 4 x 15 seconds  (110 degrees knee flexion)   Heel raises  X 20          Cybex Knee Extension     Cybex Hamstring Curls 2 x 10 with 5 plates (not performed today)    Cybex Hip - Abduction bilateral  2 x 10 with 2 plates (not performed today)    Cybex Hip - Flexion      Cybex Hip - Extension                    Therapeutic Activity x 0 minutes to improve functional performance for 0 minutes  Heel raises 3 x  10  Squats 2 x 10     Neuro-re-ed x  12 minutes    Cable TKE's 2 x 10 with 2-3 second hold with 4 plates (not performed today)     Quad sets with Colombian estim x 4 minutes   STRAIGHT LEG RAISE with Colombian e-stim 4 minutes   SAQ with Colombian e-stim 4 minutes     Cybex leg press bilateral 3 x 10 with 8 plates with rock into TERMINAL KNEE EXTENSION (not performed today)   Cybex leg press single 2 x 10 with 4 plates with rock into TERMINAL KNEE EXTENSION (not performed today)     Ethiopian E-stim x 12 minutes, 20 sec on/10 sec off; 2 sec ramp time; with exercises noted in NEURO    Home Exercises Provided and Patient Education Provided     Education provided: Review of HOME EXERCISE PROGRAM     Written Home Exercises Provided: Patient instructed to cont prior HEP.  Exercises were reviewed and Tray was able to demonstrate them prior to the end of the session.  Tray demonstrated good  understanding of the education provided.     See EMR under Patient Instructions for exercises provided prior visit.    Assessment     Patient arrived with reports of he is not sure what he is able to tolerate in therapy today due to having a UTI and not feeling well. [facility] and got [facility] shot. He went to Dr. Sanders's office today due to still not feeling well.  He almost cancelled his therapy appt today due to pain from his UTI. Per Patient request, therapist kept tx session light and focused on mobility and stretching of the right knee. Started with Colombian neuromuscular electrical stimulation during quad sets, short arc quad, and SLR. He arrived with -10 degrees extension resting and after neuromuscular electrical stimulation, his extension was -6. Emphasized importance of getting the knee fully straight. He does have -9 degrees of a quad lag with SLR. Patient tolerated treatment well with complaints muscle fatigue noted. Will plan to continue to progress patient's strengthening and mobility  exercises.    PMH from evaluation:  Tray is a 74 y.o. male referred to outpatient Physical Therapy with a medical diagnosis of  right total knee arthroplasty. Patient presents with Right knee pain, edema, abnormal gait, impaired balance, decreased right knee motion and weakness of right lower extremity. Tray reports: that right knee has been bothering him for couple years managing with injections. The 3 months prior to surgery patient had to start using a cane to walk. Patient underwent right TKA 6/19/2024. Patient will benefit from skilled Physical Therapy intervention to address all deficits and help patient to return to their prior level of function.      Tary Is progressing well towards his goals.   Pt prognosis is Good.     Pt will continue to benefit from skilled outpatient physical therapy to address the deficits listed in the problem list box on initial evaluation, provide pt/family education and to maximize pt's level of independence in the home and community environment.     Pt's spiritual, cultural and educational needs considered and pt agreeable to plan of care and goals.     Anticipated barriers to physical therapy: None    Goals:  Short Term Goals: 4 weeks   1. Independent with Home Exercise Program   2. Increase Right Knee Passive Range of Motion to 0 Degrees to 120 Degrees  3. Increase Right Knee Strength to grossly 4/5  4. Patient will ambulate 500 feet with Normal Gait pattern with complaints of pain Less than or Equal to 4/10.       Long Term Goals: 8 weeks   1. Patient will increase Right Knee Strength to grossly 4+/5 or greater  2. Patient will ambulate 1000+ feet with complaints of pain Less than or Equal to 2/10.   3. Increase Right Knee Active Range of Motion to 0 Degrees to 110 Degrees    Plan     Plan of care Certification: 7/15/2024 to 9/13/2024.     Outpatient Physical Therapy 2 times weekly for 8 weeks to include the following interventions: 39006 [therapeutic exercise], 24112  [neuromuscular re-education], 68138 [manual therapy], 83885 [therapeutic activities], 01931 [unattended electrical stimulation], and 24144 [vasopneumatic device]    Omari Bolanos, ROX  7/29/2024

## 2024-07-30 ENCOUNTER — TELEPHONE (OUTPATIENT)
Dept: UROLOGY | Facility: CLINIC | Age: 74
End: 2024-07-30
Payer: MEDICARE

## 2024-07-30 LAB
UA COMPLETE W REFLEX CULTURE PNL UR: ABNORMAL
UA COMPLETE W REFLEX CULTURE PNL UR: ABNORMAL

## 2024-07-30 RX ORDER — SULFAMETHOXAZOLE AND TRIMETHOPRIM 800; 160 MG/1; MG/1
1 TABLET ORAL 2 TIMES DAILY
Qty: 14 TABLET | Refills: 0 | Status: SHIPPED | OUTPATIENT
Start: 2024-07-30 | End: 2024-08-07

## 2024-07-30 NOTE — PROGRESS NOTES
Notify of urine culture showing two organisms. Rocephin covers both which is what he got IM. Finish Augmentin and repeat urine culture after finishes. RTC with any worsening or new s/s.

## 2024-07-30 NOTE — TELEPHONE ENCOUNTER
----- Message from Beckie Cee sent at 7/29/2024  2:50 PM CDT -----  Who Called: Magalis   Caller is requesting assistance/information from provider's office.    Preferred Method of Contact: Phone Call  Patient's Preferred Phone Number on File: [phone]   Best Call Back Number, if different:[phone]   Additional Information: Magalis states that they have an appt and ultrasound for 08/06 at 1:30 for  , not showing any appts on chart. Magalis also states that 08/06 will not be a good day and would like   Anytime mon,wed,Friday around 1:30-2  I called and spoke with the pt.  I told him I am just seeing the message, but found his appt has been changed from 8-6-24 to 8-5-24 SHERYL at 1pm and cysto with Dr Sanders 8-5-24 at 1:30pm.  He voiced understanding and that someone did call him about the change.

## 2024-07-31 ENCOUNTER — CLINICAL SUPPORT (OUTPATIENT)
Dept: REHABILITATION | Facility: HOSPITAL | Age: 74
End: 2024-07-31
Payer: MEDICARE

## 2024-07-31 DIAGNOSIS — Z96.651 S/P TOTAL KNEE ARTHROPLASTY, RIGHT: Primary | ICD-10-CM

## 2024-07-31 PROCEDURE — 97112 NEUROMUSCULAR REEDUCATION: CPT | Mod: CQ

## 2024-07-31 PROCEDURE — 97530 THERAPEUTIC ACTIVITIES: CPT | Mod: CQ

## 2024-07-31 PROCEDURE — 97110 THERAPEUTIC EXERCISES: CPT | Mod: CQ

## 2024-07-31 NOTE — PROGRESS NOTES
[facility]   Physical Therapy Treatment Note     Name: Tray Jalloh  Clinic Number: [id number]    Visit Date: 7/31/2024     Therapy Diagnosis:  right total knee arthroplasty  Physician: Mihaela Salamanca FNP     Physician Orders: PT Eval and Treat   Medical Diagnosis from Referral: right total knee arthroplasty  Evaluation Date: 7/15/2024  DOS: 6/19/2024  Authorization Period Expiration: 7/2/2025  Plan of Care Expiration: 9/13/2024  Visit # / Visits authorized: 6 / 16   FOTO: 34/100  PTA Visit: 4/5    Time In: 3:22 pm  Time Out: 4:12 pm  Total Billable Time: 44 billable minutes    Precautions: Standard  Prior Level of Function: Independent  Current Level of Function: Impaired mobility using RW    Subjective     Pt reports: he is feeling a lot better today   He was compliant with home exercise program.    Pain: 2/10 with pain medicine   Location: right knee      Objective     AROM: -11 deg to -8 after MHP   Quad Lag: -6    Tray received therapeutic exercises to develop strength, ROM, and flexibility for 15 minutes including:    Knee Exercises     Bike/Nustep 5 minutes on Nustep (not performed today)    Calf Stretch 4 x 20 seconds    Hamstring Stretch 4 x 20 seconds    Quad Stretch 4 x 20 seconds  (110 degrees knee flexion)   Heel raises  X 30          Cybex Knee Extension     Cybex Hamstring Curls 2 x 10 with 5 plates    Cybex Hip - Abduction bilateral  2 x 10 with 2 plates (not performed today)    Cybex Hip - Flexion      Cybex Hip - Extension                    Therapeutic Activity x 9 minutes to improve functional performance for 0 minutes  Heel raises 3 x 10  Squats 2 x 10     Neuro-re-ed x  20 minutes    Cable TKE's 2 x 10 with 2-3 second hold with 4 plates (not performed today)     QS with MHP for 5 minutes   SAQ x 20 with a 5 second hold with green strap   SLR x 20 with a 5 second hold with green strap   LAQ x 20 with a 5 second hold with green strap  Cybex leg  press bilateral 3 x 10 with 8 plates with rock into TERMINAL KNEE EXTENSION   Cybex leg press single 2 x 10 with 5 plates with rock into TERMINAL KNEE EXTENSION       Home Exercises Provided and Patient Education Provided     Education provided: Review of HOME EXERCISE PROGRAM     Written Home Exercises Provided: Patient instructed to cont prior HEP.  Exercises were reviewed and Tray was able to demonstrate them prior to the end of the session.  Tray demonstrated good  understanding of the education provided.     See EMR under Patient Instructions for exercises provided prior visit.    Assessment     Patient is feeling a lot better today. AROM was measured at the start of the session and was -11 degrees with a -6 degree quad lag. MHP was applied for 5 minutes while patient performed QS and knee extension improved to -8 degrees. Patient tolerated treatment well today with no complaints of difficulty or pain. Patient is progressing steadily toward his treatment goals. Continue POC as prescribed.     PMH from evaluation:  Tray is a 74 y.o. male referred to outpatient Physical Therapy with a medical diagnosis of  right total knee arthroplasty. Patient presents with Right knee pain, edema, abnormal gait, impaired balance, decreased right knee motion and weakness of right lower extremity. Tray reports: that right knee has been bothering him for couple years managing with injections. The 3 months prior to surgery patient had to start using a cane to walk. Patient underwent right TKA 6/19/2024. Patient will benefit from skilled Physical Therapy intervention to address all deficits and help patient to return to their prior level of function.      Tray Is progressing well towards his goals.   Pt prognosis is Good.     Pt will continue to benefit from skilled outpatient physical therapy to address the deficits listed in the problem list box on initial evaluation, provide pt/family education and to maximize pt's  level of independence in the home and community environment.     Pt's spiritual, cultural and educational needs considered and pt agreeable to plan of care and goals.     Anticipated barriers to physical therapy: None    Goals:  Short Term Goals: 4 weeks   1. Independent with Home Exercise Program   2. Increase Right Knee Passive Range of Motion to 0 Degrees to 120 Degrees  3. Increase Right Knee Strength to grossly 4/5  4. Patient will ambulate 500 feet with Normal Gait pattern with complaints of pain Less than or Equal to 4/10.       Long Term Goals: 8 weeks   1. Patient will increase Right Knee Strength to grossly 4+/5 or greater  2. Patient will ambulate 1000+ feet with complaints of pain Less than or Equal to 2/10.   3. Increase Right Knee Active Range of Motion to 0 Degrees to 110 Degrees    Plan     Plan of care Certification: 7/15/2024 to 9/13/2024.     Outpatient Physical Therapy 2 times weekly for 8 weeks to include the following interventions: 52700 [therapeutic exercise], 37540 [neuromuscular re-education], 96871 [manual therapy], 83101 [therapeutic activities], 53033 [unattended electrical stimulation], and 22762 [vasopneumatic device]    Virgil Boothe, PTA  7/31/2024

## 2024-08-05 ENCOUNTER — PROCEDURE VISIT (OUTPATIENT)
Dept: UROLOGY | Facility: CLINIC | Age: 74
End: 2024-08-05
Payer: MEDICARE

## 2024-08-05 ENCOUNTER — HOSPITAL ENCOUNTER (OUTPATIENT)
Dept: RADIOLOGY | Facility: HOSPITAL | Age: 74
Discharge: HOME | End: 2024-08-05
Attending: NURSE PRACTITIONER
Payer: MEDICARE

## 2024-08-05 ENCOUNTER — CLINICAL SUPPORT (OUTPATIENT)
Dept: REHABILITATION | Facility: HOSPITAL | Age: 74
End: 2024-08-05
Payer: MEDICARE

## 2024-08-05 VITALS
SYSTOLIC BLOOD PRESSURE: 129 MMHG | BODY MASS INDEX: 25.47 KG/M2 | WEIGHT: 188 LBS | DIASTOLIC BLOOD PRESSURE: 72 MMHG | HEIGHT: 72 IN | OXYGEN SATURATION: 95 % | TEMPERATURE: 99 F | HEART RATE: 83 BPM

## 2024-08-05 DIAGNOSIS — Z96.651 S/P TOTAL KNEE ARTHROPLASTY, RIGHT: Primary | ICD-10-CM

## 2024-08-05 DIAGNOSIS — N39.0 RECURRENT UTI: ICD-10-CM

## 2024-08-05 DIAGNOSIS — N32.3 DIVERTICULA, BLADDER: ICD-10-CM

## 2024-08-05 DIAGNOSIS — N39.0 RECURRENT UTI: Primary | ICD-10-CM

## 2024-08-05 DIAGNOSIS — R35.0 FREQUENCY OF URINATION: ICD-10-CM

## 2024-08-05 DIAGNOSIS — R35.1 NOCTURIA: ICD-10-CM

## 2024-08-05 DIAGNOSIS — N39.45 CONTINUOUS LEAKAGE OF URINE: ICD-10-CM

## 2024-08-05 PROCEDURE — 97110 THERAPEUTIC EXERCISES: CPT | Mod: CQ

## 2024-08-05 PROCEDURE — 76775 US EXAM ABDO BACK WALL LIM: CPT | Mod: 26,,, | Performed by: RADIOLOGY

## 2024-08-05 PROCEDURE — 97112 NEUROMUSCULAR REEDUCATION: CPT | Mod: CQ

## 2024-08-05 PROCEDURE — 52000 CYSTOURETHROSCOPY: CPT | Mod: PBBFAC | Performed by: UROLOGY

## 2024-08-05 PROCEDURE — 76775 US EXAM ABDO BACK WALL LIM: CPT | Mod: TC

## 2024-08-05 PROCEDURE — 52000 CYSTOURETHROSCOPY: CPT | Mod: S$PBB,,, | Performed by: UROLOGY

## 2024-08-05 PROCEDURE — 96372 THER/PROPH/DIAG INJ SC/IM: CPT | Mod: PBBFAC | Performed by: UROLOGY

## 2024-08-05 PROCEDURE — 99999PBSHW PR PBB SHADOW TECHNICAL ONLY FILED TO HB: Mod: PBBFAC,,,

## 2024-08-05 PROCEDURE — 97530 THERAPEUTIC ACTIVITIES: CPT | Mod: CQ

## 2024-08-05 RX ORDER — LIDOCAINE HYDROCHLORIDE 20 MG/ML
JELLY TOPICAL
Status: COMPLETED | OUTPATIENT
Start: 2024-08-05 | End: 2024-08-05

## 2024-08-05 RX ORDER — CEFTRIAXONE 1 G/1
1 INJECTION, POWDER, FOR SOLUTION INTRAMUSCULAR; INTRAVENOUS ONCE
Status: COMPLETED | OUTPATIENT
Start: 2024-08-05 | End: 2024-08-05

## 2024-08-05 RX ORDER — CEFTRIAXONE 1 G/1
1 INJECTION, POWDER, FOR SOLUTION INTRAMUSCULAR; INTRAVENOUS ONCE
Qty: 1 G | Refills: 0 | Status: SHIPPED | OUTPATIENT
Start: 2024-08-05 | End: 2024-08-05 | Stop reason: CLARIF

## 2024-08-05 RX ORDER — LIDOCAINE HYDROCHLORIDE 10 MG/ML
2.3 INJECTION, SOLUTION INFILTRATION; PERINEURAL ONCE
Status: COMPLETED | OUTPATIENT
Start: 2024-08-05 | End: 2024-08-05

## 2024-08-05 RX ADMIN — CEFTRIAXONE 1 G: 1 INJECTION, POWDER, FOR SOLUTION INTRAMUSCULAR; INTRAVENOUS at 02:08

## 2024-08-05 RX ADMIN — LIDOCAINE HYDROCHLORIDE 2.3 ML: 10 INJECTION, SOLUTION INFILTRATION; PERINEURAL at 02:08

## 2024-08-05 RX ADMIN — LIDOCAINE HYDROCHLORIDE 10 ML: 20 JELLY TOPICAL at 02:08

## 2024-08-07 ENCOUNTER — CLINICAL SUPPORT (OUTPATIENT)
Dept: REHABILITATION | Facility: HOSPITAL | Age: 74
End: 2024-08-07
Payer: MEDICARE

## 2024-08-07 DIAGNOSIS — Z96.651 S/P TOTAL KNEE ARTHROPLASTY, RIGHT: Primary | ICD-10-CM

## 2024-08-07 DIAGNOSIS — Z96.651 S/P TOTAL KNEE REPLACEMENT, RIGHT: Primary | ICD-10-CM

## 2024-08-07 PROCEDURE — 97110 THERAPEUTIC EXERCISES: CPT | Mod: KX

## 2024-08-07 PROCEDURE — 97530 THERAPEUTIC ACTIVITIES: CPT | Mod: KX

## 2024-08-07 PROCEDURE — 97112 NEUROMUSCULAR REEDUCATION: CPT | Mod: KX

## 2024-08-08 ENCOUNTER — OFFICE VISIT (OUTPATIENT)
Dept: ORTHOPEDICS | Facility: CLINIC | Age: 74
End: 2024-08-08
Payer: MEDICARE

## 2024-08-08 ENCOUNTER — HOSPITAL ENCOUNTER (OUTPATIENT)
Dept: RADIOLOGY | Facility: HOSPITAL | Age: 74
Discharge: HOME | End: 2024-08-08
Attending: ORTHOPAEDIC SURGERY
Payer: MEDICARE

## 2024-08-08 DIAGNOSIS — Z96.651 S/P TOTAL KNEE REPLACEMENT, RIGHT: Primary | ICD-10-CM

## 2024-08-08 DIAGNOSIS — Z96.651 S/P TOTAL KNEE REPLACEMENT, RIGHT: ICD-10-CM

## 2024-08-08 PROCEDURE — 99214 OFFICE O/P EST MOD 30 MIN: CPT | Mod: PBBFAC,25 | Performed by: ORTHOPAEDIC SURGERY

## 2024-08-08 PROCEDURE — 99024 POSTOP FOLLOW-UP VISIT: CPT | Mod: ,,, | Performed by: ORTHOPAEDIC SURGERY

## 2024-08-08 PROCEDURE — 73562 X-RAY EXAM OF KNEE 3: CPT | Mod: TC,RT

## 2024-08-08 PROCEDURE — 99999 PR PBB SHADOW E&M-EST. PATIENT-LVL IV: CPT | Mod: PBBFAC,,, | Performed by: ORTHOPAEDIC SURGERY

## 2024-08-08 RX ORDER — CYCLOBENZAPRINE HCL 5 MG
5 TABLET ORAL NIGHTLY
Qty: 60 TABLET | Refills: 3 | Status: SHIPPED | OUTPATIENT
Start: 2024-08-08 | End: 2024-11-06

## 2024-08-08 RX ORDER — CYCLOBENZAPRINE HCL 5 MG
5 TABLET ORAL NIGHTLY
Qty: 60 TABLET | Refills: 3 | Status: SHIPPED | OUTPATIENT
Start: 2024-08-08 | End: 2024-08-08

## 2024-08-08 NOTE — PROGRESS NOTES
Post-Operative Total Knee        Robotic Arthroplasty, Knee, Total - Right 6/19/2024       Pt is here today for Second post-operative followup of his No surgery found.      he is doing well.    He is still doing PT here at [facility].     We have reviewed his findings and discussed plan of care and future treatment options, including the physical therapy plan.        Physical Exam:     The affected knee was inspected today.   The wound is healing well with no signs of erythema or warmth.  There is no drainage.  No clinical signs or symptoms of infection are present.   ROM: 0-120    -Chuy's sign.  2+ pulses are present.     X-Ray Knee AP LAT with Sunrise Right    Result Date: 8/8/2024  See Procedure Notes for results. IMPRESSION: Please see Ortho procedure notes for report.  This procedure was auto-finalized by: Virtual Radiologist  3 Radiographs of the right knee were obtained today.  The prosthesis appears to be in excellent overall alignment.  No evidence of loosening or fracture is demonstrated.  Components appear to be appropriately positioned with no adverse interval change.     US Kidney    Result Date: 8/5/2024  EXAMINATION: US KIDNEY CLINICAL HISTORY: Urinary tract infection, site not specified TECHNIQUE: Gray scale and color Doppler imaging of both kidneys performed COMPARISON: None. FINDINGS: Kidneys are normal in size and echogenicity. No hydronephrosis or nephrolithiasis is seen. The right renal length is 10.07 cm. The left renal length is 10.2 cm. No free fluid or other abnormality is seen.     No evidence of abnormality demonstrated. Ultrasound images stored and captured. Electronically signed by: Fantasma Lutz Date:    08/05/2024 Time:    13:51        Assessment and Plan  1. S/P total knee replacement, right            We will continue post-operative physical therapy until the patient feels that they are independent with a home rehab program.  Return to work status/ return to activities status was  discussed today in detail. All questions were answered.    The use of stockings and DVT prophylaxis was discussed.  Will follow up in additional 4-6 weeks with radiographs at that time.     There are no Patient Instructions on file for this visit.

## 2024-08-09 DIAGNOSIS — Z71.89 COMPLEX CARE COORDINATION: ICD-10-CM

## 2024-08-12 ENCOUNTER — CLINICAL SUPPORT (OUTPATIENT)
Dept: REHABILITATION | Facility: HOSPITAL | Age: 74
End: 2024-08-12
Payer: MEDICARE

## 2024-08-12 DIAGNOSIS — Z96.651 S/P TOTAL KNEE ARTHROPLASTY, RIGHT: Primary | ICD-10-CM

## 2024-08-12 PROCEDURE — 97112 NEUROMUSCULAR REEDUCATION: CPT

## 2024-08-12 PROCEDURE — 97530 THERAPEUTIC ACTIVITIES: CPT

## 2024-08-12 PROCEDURE — 97110 THERAPEUTIC EXERCISES: CPT

## 2024-08-12 NOTE — PROGRESS NOTES
" [facility]   Physical Therapy Treatment Note     Name: Tray Jalloh  Clinic Number: [id number]    Visit Date: 8/12/2024     Therapy Diagnosis:  right total knee arthroplasty  Physician: Mihaela Salamanca FNP     Physician Orders: PT Eval and Treat   Medical Diagnosis from Referral: right total knee arthroplasty  Evaluation Date: 7/15/2024  DOS: 6/19/2024  Authorization Period Expiration: 7/2/2025  Plan of Care Expiration: 9/13/2024  Visit # / Visits authorized: 9 / 16   FOTO: 34/100  PTA Visit: 5/5    Time In: 3:15 pm  Time Out: 4:00 pm  Total Billable Time: 45 minutes    Precautions: Standard  Prior Level of Function: Independent  Current Level of Function: Impaired mobility using RW    Subjective     Pt reports: that he fell this morning due to slipping on his dog's blanket. Patient states that he hit both knees and it hurt at the time but feels better now.   He was compliant with home exercise program.    Pain: 2/10 with pain medicine   Location: right knee      Objective     AROM: -11 deg to -8 after Heel Prop stretch  Quad Lag: -6    Tray received therapeutic exercises to develop strength, ROM, and flexibility for 15 minutes including:    Knee Exercises     Bike/Nustep 5 minutes on Nustep     Calf Stretch 4 x 15 seconds    Hamstring Stretch 4 x 15 seconds    Quad Stretch 4 x 15 seconds               Cybex Knee Extension  3 x 10 with 3 plates   Cybex Hamstring Curls 3 x 10 with 6 plates with end range stretch after each set for 30 seconds                               Therapeutic Activity x 15 minutes to improve functional performance for 15 minutes  Forward step ups 6" 2 x 10  Heel raises 3 x 10  Squats 2 x 10  Cybex leg press bilateral 3 x 10 with 8 plates   Cybex leg press single 3 x 10 with 5 plates      Neuro-re-ed x  15 minutes    Lateral  step downs 4"  x 10  Forward step downs 4" x 10  Cybex Hip - Abduction bilateral  2 x 10 with 2 plates    Cybex Hip - " Flexion  2 x 10 with 2 plates   Cybex Hip - Extension 2 x 10 with 2 plates           Home Exercises Provided and Patient Education Provided     Education provided: Review of HOME EXERCISE PROGRAM     Written Home Exercises Provided: Patient instructed to cont prior HEP.  Exercises were reviewed and Tray was able to demonstrate them prior to the end of the session.  Tray demonstrated good  understanding of the education provided.     See EMR under Patient Instructions for exercises provided prior visit.    Assessment     Patient reports that he fell this morning due to slipping on his dog's blanket. Patient states that he hit both knees and it hurt at the time but feels better now. No loosening noted of prosthesis with knee stable. Increased weight on hamstring curls and added Cybex knee extensions today. Patient demonstrates improved heel strike and knee extension during gait. Will continue to advance patient as tolerated and assess knee again Wednesday.             PMH from evaluation:  Tray is a 74 y.o. male referred to outpatient Physical Therapy with a medical diagnosis of  right total knee arthroplasty. Patient presents with Right knee pain, edema, abnormal gait, impaired balance, decreased right knee motion and weakness of right lower extremity. Tray reports: that right knee has been bothering him for couple years managing with injections. The 3 months prior to surgery patient had to start using a cane to walk. Patient underwent right TKA 6/19/2024. Patient will benefit from skilled Physical Therapy intervention to address all deficits and help patient to return to their prior level of function.      Tray Is progressing well towards his goals.   Pt prognosis is Good.     Pt will continue to benefit from skilled outpatient physical therapy to address the deficits listed in the problem list box on initial evaluation, provide pt/family education and to maximize pt's level of independence in the  home and community environment.     Pt's spiritual, cultural and educational needs considered and pt agreeable to plan of care and goals.     Anticipated barriers to physical therapy: None    Goals:  Short Term Goals: 4 weeks   1. Independent with Home Exercise Program   2. Increase Right Knee Passive Range of Motion to 0 Degrees to 120 Degrees  3. Increase Right Knee Strength to grossly 4/5  4. Patient will ambulate 500 feet with Normal Gait pattern with complaints of pain Less than or Equal to 4/10.       Long Term Goals: 8 weeks   1. Patient will increase Right Knee Strength to grossly 4+/5 or greater  2. Patient will ambulate 1000+ feet with complaints of pain Less than or Equal to 2/10.   3. Increase Right Knee Active Range of Motion to 0 Degrees to 110 Degrees    Plan     Plan of care Certification: 7/15/2024 to 9/13/2024.     Outpatient Physical Therapy 2 times weekly for 8 weeks to include the following interventions: 19910 [therapeutic exercise], 30846 [neuromuscular re-education], 42127 [manual therapy], 87745 [therapeutic activities], 77050 [unattended electrical stimulation], and 88864 [vasopneumatic device]    JANIE OSBORNE, PT  8/12/2024

## 2024-08-14 ENCOUNTER — CLINICAL SUPPORT (OUTPATIENT)
Dept: REHABILITATION | Facility: HOSPITAL | Age: 74
End: 2024-08-14
Payer: MEDICARE

## 2024-08-14 DIAGNOSIS — Z96.651 S/P TOTAL KNEE ARTHROPLASTY, RIGHT: Primary | ICD-10-CM

## 2024-08-14 PROCEDURE — 97112 NEUROMUSCULAR REEDUCATION: CPT

## 2024-08-14 PROCEDURE — 97110 THERAPEUTIC EXERCISES: CPT

## 2024-08-14 PROCEDURE — 97530 THERAPEUTIC ACTIVITIES: CPT

## 2024-08-14 NOTE — PROGRESS NOTES
"  [facility]   Physical Therapy Treatment Note     Name: Tray Jalloh  Clinic Number: [id number]    Visit Date: 8/14/2024     Therapy Diagnosis:  right total knee arthroplasty  Physician: Mihaela Salamanca FNP     Physician Orders: PT Eval and Treat   Medical Diagnosis from Referral: right total knee arthroplasty  Evaluation Date: 7/15/2024  DOS: 6/19/2024  Authorization Period Expiration: 7/2/2025  Plan of Care Expiration: 9/13/2024  Visit # / Visits authorized: 10 / 17   FOTO: 34/100  PTA Visit: 5/5    Time In: 3:15 pm  Time Out: 4:00 pm  Total Billable Time: 45 minutes    Precautions: Standard  Prior Level of Function: Independent  Current Level of Function: Impaired mobility using RW    Subjective     Pt reports: that he was sore and stiff yesterday from fall on Sunday but feels better today.  He was compliant with home exercise program.    Pain: 2/10 right knee   Location: right knee      Objective     AROM:   Quad Lag:     Tray received therapeutic exercises to develop strength, ROM, and flexibility for 15 minutes including:    Knee Exercises     Bike/Nustep 5 minutes on Nustep     Calf Stretch 4 x 15 seconds    Hamstring Stretch 4 x 15 seconds    Quad Stretch 4 x 15 seconds               Cybex Knee Extension  3 x 10 with 3 plates   Cybex Hamstring Curls 3 x 10 with 6 plates with end range stretch after each set for 30 seconds                               Therapeutic Activity x 15 minutes to improve functional performance for 15 minutes  Forward step ups 6" 2 x 10  Heel raises 3 x 10  Squats 2 x 10  Cybex leg press bilateral 3 x 10 with 8 plates   Cybex leg press single 3 x 10 with 5 plates      Neuro-re-ed x  15 minutes    Lateral  step downs 4"  x 10  Forward step downs 4" x 10  Cybex Hip - Abduction bilateral  2 x 10 with 2 plates    Cybex Hip - Flexion  2 x 10 with 2 plates   Cybex Hip - Extension 2 x 10 with 2 plates           Home Exercises Provided and " Patient Education Provided     Education provided: Review of HOME EXERCISE PROGRAM     Written Home Exercises Provided: Patient instructed to cont prior HEP.  Exercises were reviewed and Tray was able to demonstrate them prior to the end of the session.  Tray demonstrated good  understanding of the education provided.     See EMR under Patient Instructions for exercises provided prior visit.    Assessment     Patient reports that he was stiff and sore yesterday from fall he had but feels better today. Assessed patient's knees again today with no bruising noted and right knee exhibiting no loosening of components. Patient performed all exercises well and is making steady strength gains. Patient has met 3 out of 4 STG's. Will continue with PLAN OF CARE and advance toward achievement of all goals.        PMH from evaluation:  Tray is a 74 y.o. male referred to outpatient Physical Therapy with a medical diagnosis of  right total knee arthroplasty. Patient presents with Right knee pain, edema, abnormal gait, impaired balance, decreased right knee motion and weakness of right lower extremity. Tray reports: that right knee has been bothering him for couple years managing with injections. The 3 months prior to surgery patient had to start using a cane to walk. Patient underwent right TKA 6/19/2024. Patient will benefit from skilled Physical Therapy intervention to address all deficits and help patient to return to their prior level of function.      Tray Is progressing well towards his goals.   Pt prognosis is Good.     Pt will continue to benefit from skilled outpatient physical therapy to address the deficits listed in the problem list box on initial evaluation, provide pt/family education and to maximize pt's level of independence in the home and community environment.     Pt's spiritual, cultural and educational needs considered and pt agreeable to plan of care and goals.     Anticipated barriers to  physical therapy: None    Goals:  Short Term Goals: 4 weeks   1. Independent with Home Exercise Program : Met  2. Increase Right Knee Passive Range of Motion to 0 Degrees to 120 Degrees : Met  3. Increase Right Knee Strength to grossly 4/5 : Met  4. Patient will ambulate 500 feet with Normal Gait pattern with complaints of pain Less than or Equal to 4/10. Partially met with 2/10 pain rating but continues to exhibit flexed knee gait.       Long Term Goals: 8 weeks   1. Patient will increase Right Knee Strength to grossly 4+/5 or greater : Ongoing  2. Patient will ambulate 1000+ feet with complaints of pain Less than or Equal to 2/10. : Ongoing  3. Increase Right Knee Active Range of Motion to 0 Degrees to 110 Degrees : Ongoing    Plan     Plan of care Certification: 7/15/2024 to 9/13/2024.     Outpatient Physical Therapy 2 times weekly for 8 weeks to include the following interventions: 03036 [therapeutic exercise], 87944 [neuromuscular re-education], 62843 [manual therapy], 16761 [therapeutic activities], 52837 [unattended electrical stimulation], and 88710 [vasopneumatic device]    JANIE OSBORNE, PT  8/14/2024

## 2024-08-15 ENCOUNTER — TELEPHONE (OUTPATIENT)
Dept: FAMILY MEDICINE | Facility: CLINIC | Age: 74
End: 2024-08-15
Payer: MEDICARE

## 2024-08-15 NOTE — TELEPHONE ENCOUNTER
----- Message from MOLLY Goldstein sent at 7/30/2024 10:54 AM CDT -----  Notify of urine culture showing two organisms. Rocephin covers both which is what he got IM. Finish Augmentin and repeat urine culture after finishes. RTC with any worsening or new s/s.   Floridalma called - asking if it is ok to take Melatonin. Will it effect her supply & is ok to take while nursing Junior ().

## 2024-08-18 ENCOUNTER — TELEPHONE (OUTPATIENT)
Dept: FAMILY MEDICINE | Facility: CLINIC | Age: 74
End: 2024-08-18
Payer: MEDICARE

## 2024-08-18 ENCOUNTER — PATIENT MESSAGE (OUTPATIENT)
Dept: FAMILY MEDICINE | Facility: CLINIC | Age: 74
End: 2024-08-18
Payer: MEDICARE

## 2024-08-19 ENCOUNTER — CLINICAL SUPPORT (OUTPATIENT)
Dept: REHABILITATION | Facility: HOSPITAL | Age: 74
End: 2024-08-19
Payer: MEDICARE

## 2024-08-19 DIAGNOSIS — Z96.651 S/P TOTAL KNEE ARTHROPLASTY, RIGHT: Primary | ICD-10-CM

## 2024-08-19 PROCEDURE — 97530 THERAPEUTIC ACTIVITIES: CPT | Mod: KX,CQ

## 2024-08-19 PROCEDURE — 97110 THERAPEUTIC EXERCISES: CPT | Mod: KX,CQ

## 2024-08-19 PROCEDURE — 97112 NEUROMUSCULAR REEDUCATION: CPT | Mod: KX,CQ

## 2024-08-19 NOTE — PROGRESS NOTES
" [facility]   Physical Therapy Treatment Note     Name: Tray Jalloh  Clinic Number: [id number]    Visit Date: 8/19/2024     Therapy Diagnosis:  right total knee arthroplasty  Physician: Mihaela Salamanca FNP     Physician Orders: PT Eval and Treat   Medical Diagnosis from Referral: right total knee arthroplasty  Evaluation Date: 7/15/2024  DOS: 6/19/2024  Authorization Period Expiration: 7/2/2025  Plan of Care Expiration: 9/13/2024  Visit # / Visits authorized: 11 / 17   FOTO: 34/100  PTA Visit: 1/5    Time In: 3:17 pm  Time Out: 4:00 pm  Total Billable Time: 43 minutes    Precautions: Standard  Prior Level of Function: Independent  Current Level of Function: Impaired mobility using RW    Subjective     Pt reports: he cut grass this morning but is a little stiff/sore coming into therapy this afternoon.   He was compliant with home exercise program.    Pain: 2/10 right knee   Location: right knee      Objective     AROM:   Quad Lag:     Tray received therapeutic exercises to develop strength, ROM, and flexibility for 15 minutes including:    Knee Exercises     Bike/Nustep 5 minutes on Nustep     Calf Stretch 4 x 15 seconds    Hamstring Stretch 4 x 15 seconds    Quad Stretch 4 x 15 seconds               Cybex Knee Extension  3 x 10 with 3 plates   Cybex Hamstring Curls 3 x 10 with 6 plates with end range stretch after each set for 30 seconds                               Therapeutic Activity x 18 minutes to improve functional performance   Forward step ups 6" 2 x 10  Heel raises 3 x 10  Squats 2 x 10  Cybex leg press bilateral 3 x 10 with 8 plates with rock into terminal knee extension   Cybex leg press single 3 x 10 with 5 plates (RLE)     Neuro-re-ed x  10 minutes    Lateral  step downs 6"  x 15  Forward step downs 4" x 10    Cybex Hip - Abduction bilateral  2 x 10 with 2 plates NTV   Cybex Hip - Flexion  2 x 10 with 2 plates NTV   Cybex Hip - Extension 2 x 10 with 2 " plates NTV         Home Exercises Provided and Patient Education Provided     Education provided: Review of HOME EXERCISE PROGRAM     Written Home Exercises Provided: Patient instructed to cont prior HEP.  Exercises were reviewed and Tray was able to demonstrate them prior to the end of the session.  Tray demonstrated good  understanding of the education provided.     See EMR under Patient Instructions for exercises provided prior visit.    Assessment     Patient reports that he cut grass this morning but the knee is a little stiff/sore coming into therapy this afternoon. Patient performed all exercises well and is making steady strength gains. Patient has met 3 out of 4 STG's. Emphasized quad control into terminal knee extension today during exercises. Will continue with PLAN OF CARE and advance toward achievement of all goals.      PMH from evaluation:  Tray is a 74 y.o. male referred to outpatient Physical Therapy with a medical diagnosis of  right total knee arthroplasty. Patient presents with Right knee pain, edema, abnormal gait, impaired balance, decreased right knee motion and weakness of right lower extremity. Tray reports: that right knee has been bothering him for couple years managing with injections. The 3 months prior to surgery patient had to start using a cane to walk. Patient underwent right TKA 6/19/2024. Patient will benefit from skilled Physical Therapy intervention to address all deficits and help patient to return to their prior level of function.      Tray Is progressing well towards his goals.   Pt prognosis is Good.     Pt will continue to benefit from skilled outpatient physical therapy to address the deficits listed in the problem list box on initial evaluation, provide pt/family education and to maximize pt's level of independence in the home and community environment.     Pt's spiritual, cultural and educational needs considered and pt agreeable to plan of care and  goals.     Anticipated barriers to physical therapy: None    Goals:  Short Term Goals: 4 weeks   1. Independent with Home Exercise Program : Met  2. Increase Right Knee Passive Range of Motion to 0 Degrees to 120 Degrees : Met  3. Increase Right Knee Strength to grossly 4/5 : Met  4. Patient will ambulate 500 feet with Normal Gait pattern with complaints of pain Less than or Equal to 4/10. Partially met with 2/10 pain rating but continues to exhibit flexed knee gait.       Long Term Goals: 8 weeks   1. Patient will increase Right Knee Strength to grossly 4+/5 or greater : Ongoing  2. Patient will ambulate 1000+ feet with complaints of pain Less than or Equal to 2/10. : Ongoing  3. Increase Right Knee Active Range of Motion to 0 Degrees to 110 Degrees : Ongoing    Plan     Plan of care Certification: 7/15/2024 to 9/13/2024.     Outpatient Physical Therapy 2 times weekly for 8 weeks to include the following interventions: 16060 [therapeutic exercise], 50163 [neuromuscular re-education], 14472 [manual therapy], 76790 [therapeutic activities], 63028 [unattended electrical stimulation], and 27009 [vasopneumatic device]    Omari Bolanos PTA  8/19/2024

## 2024-08-21 ENCOUNTER — CLINICAL SUPPORT (OUTPATIENT)
Dept: REHABILITATION | Facility: HOSPITAL | Age: 74
End: 2024-08-21
Payer: MEDICARE

## 2024-08-21 DIAGNOSIS — Z96.651 S/P TOTAL KNEE ARTHROPLASTY, RIGHT: Primary | ICD-10-CM

## 2024-08-21 PROCEDURE — 97016 VASOPNEUMATIC DEVICE THERAPY: CPT

## 2024-08-21 PROCEDURE — 97112 NEUROMUSCULAR REEDUCATION: CPT

## 2024-08-21 PROCEDURE — 97110 THERAPEUTIC EXERCISES: CPT

## 2024-08-21 PROCEDURE — 97530 THERAPEUTIC ACTIVITIES: CPT

## 2024-08-21 NOTE — PROGRESS NOTES
"  [facility]   Physical Therapy Treatment Note     Name: Tray Jalloh  Clinic Number: [id number]    Visit Date: 8/21/2024     Therapy Diagnosis:  right total knee arthroplasty  Physician: Mihaela Salamanca FNP     Physician Orders: PT Eval and Treat   Medical Diagnosis from Referral: right total knee arthroplasty  Evaluation Date: 7/15/2024  DOS: 6/19/2024  Authorization Period Expiration: 7/2/2025  Plan of Care Expiration: 9/13/2024  Visit # / Visits authorized: 12 / 17   FOTO: 34/100  PTA Visit: 1/5    Time In: 3:15 pm  Time Out: 4:15 pm  Total Billable Time: 55 minutes    Precautions: Standard  Prior Level of Function: Independent  Current Level of Function: Impaired mobility using RW    Subjective     Pt reports: increased swelling and stiffness right knee today.  He was compliant with home exercise program.    Pain: 2/10 right knee   Location: right knee      Objective     AROM:   Quad Lag:     Tray received therapeutic exercises to develop strength, ROM, and flexibility for 15 minutes including:    Knee Exercises     Bike/Nustep 5 minutes on Nustep     Calf Stretch 4 x 15 seconds    Hamstring Stretch 4 x 15 seconds    Quad Stretch 4 x 15 seconds               Cybex Hamstring Curls 3 x 10 with 6 plates with end range stretch after each set for 30 seconds                               Therapeutic Activity x 17 minutes to improve functional performance   Forward step ups 6" 2 x 10  Squats 2 x 10  Cybex leg press bilateral 3 x 10 with 8 plates   Cybex leg press single 3 x 10 with 5 plates (RLE)     Neuro-re-ed x  8 minutes    Lateral  step downs 6"  2 x 10  Forward step downs 6" 2 x 10    Cybex Hip - Abduction bilateral  2 x 10 with 2 plates NTV   Cybex Hip - Flexion  2 x 10 with 2 plates NTV   Cybex Hip - Extension 2 x 10 with 2 plates NTV       GameReady x 15 minutes    Home Exercises Provided and Patient Education Provided     Education provided: Review of HOME " EXERCISE PROGRAM     Written Home Exercises Provided: Patient instructed to cont prior HEP.  Exercises were reviewed and Tray was able to demonstrate them prior to the end of the session.  Tray demonstrated good  understanding of the education provided.     See EMR under Patient Instructions for exercises provided prior visit.    Assessment     Patient reports that he has increased edema and stiffness today. Feel that patient's increased activity with mowing grass has caused temporary flare up. Instructed patient to ice at home on days he increases activity. Education performed on stretches to do at home for flexion and extension. Patient continues to demonstrate improvements in strength and functional endurance. Utilized GameReady at end of treatment to reduce edema and inflammation. Will continue with PLAN OF CARE and advance as tolerated.      PMH from evaluation:  Tray is a 74 y.o. male referred to outpatient Physical Therapy with a medical diagnosis of  right total knee arthroplasty. Patient presents with Right knee pain, edema, abnormal gait, impaired balance, decreased right knee motion and weakness of right lower extremity. Tray reports: that right knee has been bothering him for couple years managing with injections. The 3 months prior to surgery patient had to start using a cane to walk. Patient underwent right TKA 6/19/2024. Patient will benefit from skilled Physical Therapy intervention to address all deficits and help patient to return to their prior level of function.      Tray Is progressing well towards his goals.   Pt prognosis is Good.     Pt will continue to benefit from skilled outpatient physical therapy to address the deficits listed in the problem list box on initial evaluation, provide pt/family education and to maximize pt's level of independence in the home and community environment.     Pt's spiritual, cultural and educational needs considered and pt agreeable to plan of  care and goals.     Anticipated barriers to physical therapy: None    Goals:  Short Term Goals: 4 weeks   1. Independent with Home Exercise Program : Met  2. Increase Right Knee Passive Range of Motion to 0 Degrees to 120 Degrees : Met  3. Increase Right Knee Strength to grossly 4/5 : Met  4. Patient will ambulate 500 feet with Normal Gait pattern with complaints of pain Less than or Equal to 4/10. Partially met with 2/10 pain rating but continues to exhibit flexed knee gait.       Long Term Goals: 8 weeks   1. Patient will increase Right Knee Strength to grossly 4+/5 or greater : Ongoing  2. Patient will ambulate 1000+ feet with complaints of pain Less than or Equal to 2/10. : Ongoing  3. Increase Right Knee Active Range of Motion to 0 Degrees to 110 Degrees : Ongoing    Plan     Plan of care Certification: 7/15/2024 to 9/13/2024.     Outpatient Physical Therapy 2 times weekly for 8 weeks to include the following interventions: 31146 [therapeutic exercise], 54044 [neuromuscular re-education], 56574 [manual therapy], 54391 [therapeutic activities], 06116 [unattended electrical stimulation], and 94827 [vasopneumatic device]    JANIE OSBORNE, PT  8/21/2024

## 2024-08-26 ENCOUNTER — CLINICAL SUPPORT (OUTPATIENT)
Dept: REHABILITATION | Facility: HOSPITAL | Age: 74
End: 2024-08-26
Payer: MEDICARE

## 2024-08-26 DIAGNOSIS — Z96.651 S/P TOTAL KNEE ARTHROPLASTY, RIGHT: Primary | ICD-10-CM

## 2024-08-26 PROCEDURE — 97110 THERAPEUTIC EXERCISES: CPT | Mod: CQ

## 2024-08-26 PROCEDURE — 97112 NEUROMUSCULAR REEDUCATION: CPT | Mod: CQ

## 2024-08-26 PROCEDURE — 97530 THERAPEUTIC ACTIVITIES: CPT | Mod: CQ

## 2024-08-26 NOTE — PROGRESS NOTES
"  [facility]   Physical Therapy Treatment Note     Name: Tray Jalloh  Clinic Number: [id number]    Visit Date: 8/26/2024     Therapy Diagnosis:  right total knee arthroplasty  Physician: Mihaela Salamanca FNP     Physician Orders: PT Eval and Treat   Medical Diagnosis from Referral: right total knee arthroplasty  Evaluation Date: 7/15/2024  DOS: 6/19/2024  Authorization Period Expiration: 7/2/2025  Plan of Care Expiration: 9/13/2024  Visit # / Visits authorized: 13 / 17   FOTO: 34/100  PTA Visit: 1/5    Time In: 3:15 pm  Time Out: 3:56 pm  Total Billable Time: 41 minutes    Precautions: Standard  Prior Level of Function: Independent  Current Level of Function: Impaired mobility using RW    Subjective     Pt reports: some stiffness and swelling in his knee   He was compliant with home exercise program.    Pain: 2/10 right knee   Location: right knee      Objective     AROM:   Quad Lag:     Tray received therapeutic exercises to develop strength, ROM, and flexibility for 17 minutes including:    Knee Exercises     Bike/Nustep 5 minutes on Nustep     Calf Stretch 4 x 15 seconds    Hamstring Stretch 4 x 15 seconds    Quad Stretch 4 x 15 seconds               Cybex Hamstring Curls 3 x 10 with 6 plates with end range stretch after each set for 30 seconds        Ideal Stretch  10x10 sec                      Therapeutic Activity x 15 minutes to improve functional performance   Forward step ups 6" 2 x 10  Squats 2 x 10  Cybex leg press bilateral 3 x 10 with 8 plates   Cybex leg press single 3 x 10 with 5 plates (RLE)     Neuro-re-ed x  8 minutes    Lateral  step downs 6"  2 x 10  Forward step downs 6" 2 x 10    Cybex Hip - Abduction bilateral  2 x 10 with 2 plates NTV   Cybex Hip - Flexion  2 x 10 with 2 plates NTV   Cybex Hip - Extension 2 x 10 with 2 plates NTV       GameReady x 15 minutes    Home Exercises Provided and Patient Education Provided     Education provided: Review " of HOME EXERCISE PROGRAM     Written Home Exercises Provided: Patient instructed to cont prior HEP.  Exercises were reviewed and Tray was able to demonstrate them prior to the end of the session.  Tray demonstrated good  understanding of the education provided.     See EMR under Patient Instructions for exercises provided prior visit.    Assessment     Pt doing well with no acute complaints. Resting extension at -8 deg of extension with -8 deg quad lag. Progressed CKC strengthening exercises with focus on control into TERMINAL KNEE EXTENSION. Pt tolerated session well with just fatigue noted. Will progress as able,       PMH from evaluation:  Tray is a 74 y.o. male referred to outpatient Physical Therapy with a medical diagnosis of  right total knee arthroplasty. Patient presents with Right knee pain, edema, abnormal gait, impaired balance, decreased right knee motion and weakness of right lower extremity. Tray reports: that right knee has been bothering him for couple years managing with injections. The 3 months prior to surgery patient had to start using a cane to walk. Patient underwent right TKA 6/19/2024. Patient will benefit from skilled Physical Therapy intervention to address all deficits and help patient to return to their prior level of function.      Tray Is progressing well towards his goals.   Pt prognosis is Good.     Pt will continue to benefit from skilled outpatient physical therapy to address the deficits listed in the problem list box on initial evaluation, provide pt/family education and to maximize pt's level of independence in the home and community environment.     Pt's spiritual, cultural and educational needs considered and pt agreeable to plan of care and goals.     Anticipated barriers to physical therapy: None    Goals:  Short Term Goals: 4 weeks   1. Independent with Home Exercise Program : Met  2. Increase Right Knee Passive Range of Motion to 0 Degrees to 120 Degrees :  Met  3. Increase Right Knee Strength to grossly 4/5 : Met  4. Patient will ambulate 500 feet with Normal Gait pattern with complaints of pain Less than or Equal to 4/10. Partially met with 2/10 pain rating but continues to exhibit flexed knee gait.       Long Term Goals: 8 weeks   1. Patient will increase Right Knee Strength to grossly 4+/5 or greater : Ongoing  2. Patient will ambulate 1000+ feet with complaints of pain Less than or Equal to 2/10. : Ongoing  3. Increase Right Knee Active Range of Motion to 0 Degrees to 110 Degrees : Ongoing    Plan     Plan of care Certification: 7/15/2024 to 9/13/2024.     Outpatient Physical Therapy 2 times weekly for 8 weeks to include the following interventions: 55118 [therapeutic exercise], 59275 [neuromuscular re-education], 74858 [manual therapy], 32352 [therapeutic activities], 54395 [unattended electrical stimulation], and 25537 [vasopneumatic device]    Virgil Boothe, ROX  8/26/2024

## 2024-08-27 DIAGNOSIS — F33.2 MODERATELY SEVERE RECURRENT MAJOR DEPRESSION: Primary | Chronic | ICD-10-CM

## 2024-08-27 RX ORDER — FLUOXETINE 10 MG/1
10 CAPSULE ORAL DAILY
Qty: 90 CAPSULE | Refills: 1 | Status: SHIPPED | OUTPATIENT
Start: 2024-08-27

## 2024-08-27 NOTE — TELEPHONE ENCOUNTER
----- Message from Nida Roa sent at 8/27/2024  3:04 PM CDT -----  Pt needs a refill on ur depression meds sent to [facility].. good call back is  [phone]

## 2024-08-28 ENCOUNTER — CLINICAL SUPPORT (OUTPATIENT)
Dept: REHABILITATION | Facility: HOSPITAL | Age: 74
End: 2024-08-28
Payer: MEDICARE

## 2024-08-28 DIAGNOSIS — Z96.651 S/P TOTAL KNEE ARTHROPLASTY, RIGHT: Primary | ICD-10-CM

## 2024-08-28 PROCEDURE — 97530 THERAPEUTIC ACTIVITIES: CPT | Mod: CQ

## 2024-08-28 PROCEDURE — 97112 NEUROMUSCULAR REEDUCATION: CPT | Mod: CQ

## 2024-08-28 PROCEDURE — 97110 THERAPEUTIC EXERCISES: CPT | Mod: CQ

## 2024-08-28 NOTE — PROGRESS NOTES
"  [facility]   Physical Therapy Treatment Note     Name: Tray Jalloh  Clinic Number: [id number]    Visit Date: 8/28/2024     Therapy Diagnosis:  right total knee arthroplasty  Physician: Mihaela Salamanca FNP     Physician Orders: PT Eval and Treat   Medical Diagnosis from Referral: right total knee arthroplasty  Evaluation Date: 7/15/2024  DOS: 6/19/2024  Authorization Period Expiration: 7/2/2025  Plan of Care Expiration: 9/13/2024  Visit # / Visits authorized: 14 / 17   FOTO: 34/100  PTA Visit: 2/5    Time In: 3:17 pm  Time Out: 3:57 pm  Total Billable Time: 40 minutes    Precautions: Standard  Prior Level of Function: Independent  Current Level of Function: Impaired mobility using RW    Subjective     Pt reports: knee is feeling good   He was compliant with home exercise program.    Pain: 2/10 right knee   Location: right knee      Objective     AROM:   Quad Lag:     Tray received therapeutic exercises to develop strength, ROM, and flexibility for 17 minutes including:    Knee Exercises     Bike/Nustep 5 minutes on Nustep     Calf Stretch 4 x 15 seconds    Hamstring Stretch 4 x 15 seconds    Quad Stretch 4 x 15 seconds               Cybex Hamstring Curls 3 x 10 with 6 plates with end range stretch after each set for 30 seconds        Ideal Stretch  10x10 sec                      Therapeutic Activity x 13 minutes to improve functional performance   Forward step ups 6" 2 x 10  Squats 2 x 10  Goblet Squats 2x10 20#  Cybex leg press bilateral 3 x 10 with 8 plates   Cybex leg press single 3 x 10 with 5 plates (RLE)     Neuro-re-ed x  10 minutes    Lateral  step downs 6"  2 x 10  Forward step downs 6" 2 x 10    Cybex Hip - Abduction bilateral  2 x 10 with 2 plates    Cybex Hip - Flexion  2 x 10 with 2 plates    Cybex Hip - Extension 2 x 10 with 2 plates          Home Exercises Provided and Patient Education Provided     Education provided: Review of HOME EXERCISE PROGRAM "     Written Home Exercises Provided: Patient instructed to cont prior HEP.  Exercises were reviewed and Tray was able to demonstrate them prior to the end of the session.  Tray demonstrated good  understanding of the education provided.     See EMR under Patient Instructions for exercises provided prior visit.    Assessment     Pt tolerated exercises today with fatigue noted. Focused on control into TERMINAL KNEE EXTENSION and getting into end range extension. Pt is making good progress in his strength and needs to continue diligently working on his HEP. Will progress as able.       PMH from evaluation:  Tray is a 74 y.o. male referred to outpatient Physical Therapy with a medical diagnosis of  right total knee arthroplasty. Patient presents with Right knee pain, edema, abnormal gait, impaired balance, decreased right knee motion and weakness of right lower extremity. Tray reports: that right knee has been bothering him for couple years managing with injections. The 3 months prior to surgery patient had to start using a cane to walk. Patient underwent right TKA 6/19/2024. Patient will benefit from skilled Physical Therapy intervention to address all deficits and help patient to return to their prior level of function.      Tray Is progressing well towards his goals.   Pt prognosis is Good.     Pt will continue to benefit from skilled outpatient physical therapy to address the deficits listed in the problem list box on initial evaluation, provide pt/family education and to maximize pt's level of independence in the home and community environment.     Pt's spiritual, cultural and educational needs considered and pt agreeable to plan of care and goals.     Anticipated barriers to physical therapy: None    Goals:  Short Term Goals: 4 weeks   1. Independent with Home Exercise Program : Met  2. Increase Right Knee Passive Range of Motion to 0 Degrees to 120 Degrees : Met  3. Increase Right Knee Strength to  grossly 4/5 : Met  4. Patient will ambulate 500 feet with Normal Gait pattern with complaints of pain Less than or Equal to 4/10. Partially met with 2/10 pain rating but continues to exhibit flexed knee gait.       Long Term Goals: 8 weeks   1. Patient will increase Right Knee Strength to grossly 4+/5 or greater : Ongoing  2. Patient will ambulate 1000+ feet with complaints of pain Less than or Equal to 2/10. : Ongoing  3. Increase Right Knee Active Range of Motion to 0 Degrees to 110 Degrees : Ongoing    Plan     Plan of care Certification: 7/15/2024 to 9/13/2024.     Outpatient Physical Therapy 2 times weekly for 8 weeks to include the following interventions: 99494 [therapeutic exercise], 74816 [neuromuscular re-education], 74094 [manual therapy], 20104 [therapeutic activities], 93253 [unattended electrical stimulation], and 04553 [vasopneumatic device]    Virgil Boothe, ROX  8/28/2024

## 2024-09-03 ENCOUNTER — CLINICAL SUPPORT (OUTPATIENT)
Dept: REHABILITATION | Facility: HOSPITAL | Age: 74
End: 2024-09-03
Payer: MEDICARE

## 2024-09-03 DIAGNOSIS — Z96.651 S/P TOTAL KNEE ARTHROPLASTY, RIGHT: Primary | ICD-10-CM

## 2024-09-03 PROCEDURE — 97530 THERAPEUTIC ACTIVITIES: CPT | Mod: CQ

## 2024-09-03 PROCEDURE — 97112 NEUROMUSCULAR REEDUCATION: CPT | Mod: CQ

## 2024-09-03 NOTE — PROGRESS NOTES
"  [facility]   Physical Therapy Treatment Note     Name: Tray Jalloh  Clinic Number: [id number]    Visit Date: 9/3/2024     Therapy Diagnosis:  right total knee arthroplasty  Physician: Mihaela Salamanca FNP     Physician Orders: PT Eval and Treat   Medical Diagnosis from Referral: right total knee arthroplasty  Evaluation Date: 7/15/2024  DOS: 6/19/2024  Authorization Period Expiration: 7/2/2025  Plan of Care Expiration: 9/13/2024  Visit # / Visits authorized: 15 / 17   FOTO: 34/100  PTA Visit: 2/5    Time In: 3:17 pm  Time Out: 4:00 pm  Total Billable Time: 26 billable minutes    Precautions: Standard  Prior Level of Function: Independent  Current Level of Function: Impaired mobility using RW    Subjective     Pt reports: knee feels good   He was compliant with home exercise program.    Pain: 2/10 right knee   Location: right knee      Objective     AROM: -9 deg  Quad Lag: -5 deg  9/3/2024    Tray received therapeutic exercises to develop strength, ROM, and flexibility for 7 minutes including:    Knee Exercises     Bike/Nustep 5 minutes on Nustep     Calf Stretch 4 x 15 seconds    Hamstring Stretch 4 x 15 seconds    Quad Stretch 4 x 15 seconds               Cybex Hamstring Curls 3 x 10 with 6 plates with end range stretch after each set for 30 seconds        Ideal Stretch  10x10 sec   Cybex leg press bilateral 3 x 10 with 8 plates   Cybex leg press single 3 x 10 with 5 plates (RLE)                   Therapeutic Activity x 8 minutes to improve functional performance   Forward step ups 6" 2 x 10  Squats 2 x 10  Goblet Squats 2x10   Forward step downs 6" 2 x 10       Neuro-re-ed x  8 minutes    Lateral  step downs 6"  2 x 10    Cybex Hip - Abduction bilateral  2 x 10 with 2 plates    Cybex Hip - Flexion  2 x 10 with 2 plates    Cybex Hip - Extension 2 x 10 with 2 plates          Home Exercises Provided and Patient Education Provided     Education provided: Review of HOME " EXERCISE PROGRAM     Written Home Exercises Provided: Patient instructed to cont prior HEP.  Exercises were reviewed and Tray was able to demonstrate them prior to the end of the session.  Tray demonstrated good  understanding of the education provided.     See EMR under Patient Instructions for exercises provided prior visit.    Assessment     RANGE OF MOTION a little tight into extension as noted above. Still mild quad lag at -5 upon arrival. Progressed LOWER EXTREMITY strengthening with emphasis on getting knee into TERMINAL KNEE EXTENSION to help with quad and stretch HS. Pt tolerated session well. Will progress as able. Time billed reflects time spent one on one with pt.       PMH from evaluation:  Tray is a 74 y.o. male referred to outpatient Physical Therapy with a medical diagnosis of  right total knee arthroplasty. Patient presents with Right knee pain, edema, abnormal gait, impaired balance, decreased right knee motion and weakness of right lower extremity. Tray reports: that right knee has been bothering him for couple years managing with injections. The 3 months prior to surgery patient had to start using a cane to walk. Patient underwent right TKA 6/19/2024. Patient will benefit from skilled Physical Therapy intervention to address all deficits and help patient to return to their prior level of function.      Tray Is progressing well towards his goals.   Pt prognosis is Good.     Pt will continue to benefit from skilled outpatient physical therapy to address the deficits listed in the problem list box on initial evaluation, provide pt/family education and to maximize pt's level of independence in the home and community environment.     Pt's spiritual, cultural and educational needs considered and pt agreeable to plan of care and goals.     Anticipated barriers to physical therapy: None    Goals:  Short Term Goals: 4 weeks   1. Independent with Home Exercise Program : Met  2. Increase  Right Knee Passive Range of Motion to 0 Degrees to 120 Degrees : Met  3. Increase Right Knee Strength to grossly 4/5 : Met  4. Patient will ambulate 500 feet with Normal Gait pattern with complaints of pain Less than or Equal to 4/10. Partially met with 2/10 pain rating but continues to exhibit flexed knee gait.       Long Term Goals: 8 weeks   1. Patient will increase Right Knee Strength to grossly 4+/5 or greater : Ongoing  2. Patient will ambulate 1000+ feet with complaints of pain Less than or Equal to 2/10. : Ongoing  3. Increase Right Knee Active Range of Motion to 0 Degrees to 110 Degrees : Ongoing    Plan     Plan of care Certification: 7/15/2024 to 9/13/2024.     Outpatient Physical Therapy 2 times weekly for 8 weeks to include the following interventions: 39289 [therapeutic exercise], 36690 [neuromuscular re-education], 71360 [manual therapy], 53000 [therapeutic activities], 43530 [unattended electrical stimulation], and 92867 [vasopneumatic device]    Virgil Boothe, ROX  9/3/2024

## 2024-09-05 ENCOUNTER — CLINICAL SUPPORT (OUTPATIENT)
Dept: REHABILITATION | Facility: HOSPITAL | Age: 74
End: 2024-09-05
Payer: MEDICARE

## 2024-09-05 DIAGNOSIS — Z96.651 S/P TOTAL KNEE ARTHROPLASTY, RIGHT: Primary | ICD-10-CM

## 2024-09-05 PROCEDURE — 97110 THERAPEUTIC EXERCISES: CPT | Mod: CQ

## 2024-09-05 PROCEDURE — 97112 NEUROMUSCULAR REEDUCATION: CPT | Mod: CQ

## 2024-09-05 PROCEDURE — 97530 THERAPEUTIC ACTIVITIES: CPT | Mod: CQ

## 2024-09-05 NOTE — PROGRESS NOTES
"  [facility]   Physical Therapy Treatment Note     Name: Tray Jalloh  Clinic Number: [id number]    Visit Date: 9/5/2024     Therapy Diagnosis:  right total knee arthroplasty  Physician: Mihaela Salamanca FNP     Physician Orders: PT Eval and Treat   Medical Diagnosis from Referral: right total knee arthroplasty  Evaluation Date: 7/15/2024  DOS: 6/19/2024  Authorization Period Expiration: 7/2/2025  Plan of Care Expiration: 9/13/2024  Visit # / Visits authorized: 16 / 17   FOTO: 34/100  PTA Visit: 4/5    Time In: 3:15 pm  Time Out: 3:55 pm  Total Billable Time: 40 billable minutes    Precautions: Standard  Prior Level of Function: Independent  Current Level of Function: Impaired mobility using RW    Subjective     Pt reports: no complaints today. Knee feels pretty good.   He was compliant with home exercise program.    Pain: 2/10 right knee   Location: right knee      Objective     AROM: -9 deg  Quad Lag: -5 deg  9/3/2024    Tray received therapeutic exercises to develop strength, ROM, and flexibility for 20 minutes including:    Knee Exercises     Bike/Nustep 5 minutes on Nustep     Calf Stretch 4 x 15 seconds    Hamstring Stretch 4 x 15 seconds    Quad Stretch 4 x 15 seconds               Cybex Knee Extension  3 x 10 with 3 plates   Cybex Hamstring Curls 3 x 10 with 6 plates with end range stretch after each set for 30 seconds        Cybex leg press bilateral  3 x 10 with 8 plates     Cybex leg press single   3 x 10 with 5 plates (RLE)            Therapeutic Activity x 8 minutes to improve functional performance   Forward step ups 6" 2 x 10  Squats 3 x 10  Goblet Squats 2 x 10 (not performed today)   Forward step downs 6" 2 x 10 (not performed today)     Neuro-re-ed x  12 minutes    Lateral  step downs 6"  2 x 10    Cybex Hip - Abduction bilateral  2 x 10 with 2 plates    Cybex Hip - Flexion  2 x 10 with 2 plates    Cybex Hip - Extension 2 x 10 with 2 plates          Home " Exercises Provided and Patient Education Provided     Education provided: Review of HOME EXERCISE PROGRAM     Written Home Exercises Provided: Patient instructed to cont prior HEP.  Exercises were reviewed and Tray was able to demonstrate them prior to the end of the session.  Tray demonstrated good  understanding of the education provided.     See EMR under Patient Instructions for exercises provided prior visit.    Assessment     Patient reports doing well today. RANGE OF MOTION a little tight into extension as noted above. Still mild quad lag at -5. Progressed LOWER EXTREMITY strengthening with emphasis on getting knee into TERMINAL KNEE EXTENSION to help with quad control and stretch HS. Pt tolerated session well. Will progress as able.     PMH from evaluation:  Tray is a 74 y.o. male referred to outpatient Physical Therapy with a medical diagnosis of  right total knee arthroplasty. Patient presents with Right knee pain, edema, abnormal gait, impaired balance, decreased right knee motion and weakness of right lower extremity. Tray reports: that right knee has been bothering him for couple years managing with injections. The 3 months prior to surgery patient had to start using a cane to walk. Patient underwent right TKA 6/19/2024. Patient will benefit from skilled Physical Therapy intervention to address all deficits and help patient to return to their prior level of function.      Tray Is progressing well towards his goals.   Pt prognosis is Good.     Pt will continue to benefit from skilled outpatient physical therapy to address the deficits listed in the problem list box on initial evaluation, provide pt/family education and to maximize pt's level of independence in the home and community environment.     Pt's spiritual, cultural and educational needs considered and pt agreeable to plan of care and goals.     Anticipated barriers to physical therapy: None    Goals:  Short Term Goals: 4 weeks    1. Independent with Home Exercise Program : Met  2. Increase Right Knee Passive Range of Motion to 0 Degrees to 120 Degrees : Met  3. Increase Right Knee Strength to grossly 4/5 : Met  4. Patient will ambulate 500 feet with Normal Gait pattern with complaints of pain Less than or Equal to 4/10. Partially met with 2/10 pain rating but continues to exhibit flexed knee gait.       Long Term Goals: 8 weeks   1. Patient will increase Right Knee Strength to grossly 4+/5 or greater : Ongoing  2. Patient will ambulate 1000+ feet with complaints of pain Less than or Equal to 2/10. : Ongoing  3. Increase Right Knee Active Range of Motion to 0 Degrees to 110 Degrees : Ongoing    Plan     Plan of care Certification: 7/15/2024 to 9/13/2024.     Outpatient Physical Therapy 2 times weekly for 8 weeks to include the following interventions: 16889 [therapeutic exercise], 53381 [neuromuscular re-education], 29043 [manual therapy], 56958 [therapeutic activities], 25558 [unattended electrical stimulation], and 26666 [vasopneumatic device]    Omari Bolanos PTA  9/5/2024

## 2024-09-09 ENCOUNTER — CLINICAL SUPPORT (OUTPATIENT)
Dept: REHABILITATION | Facility: HOSPITAL | Age: 74
End: 2024-09-09
Payer: MEDICARE

## 2024-09-09 DIAGNOSIS — Z96.651 S/P TOTAL KNEE ARTHROPLASTY, RIGHT: Primary | ICD-10-CM

## 2024-09-09 PROCEDURE — 97110 THERAPEUTIC EXERCISES: CPT

## 2024-09-09 PROCEDURE — 97530 THERAPEUTIC ACTIVITIES: CPT

## 2024-09-09 PROCEDURE — 97112 NEUROMUSCULAR REEDUCATION: CPT

## 2024-09-09 NOTE — PROGRESS NOTES
"  [facility]   Physical Therapy Treatment Note     Name: Tray Jalloh  Clinic Number: [id number]    Visit Date: 9/9/2024     Therapy Diagnosis:  right total knee arthroplasty  Physician: Mihaela Salamanca FNP     Physician Orders: PT Eval and Treat   Medical Diagnosis from Referral: right total knee arthroplasty  Evaluation Date: 7/15/2024  DOS: 6/19/2024  Authorization Period Expiration: 7/2/2025  Plan of Care Expiration: 9/13/2024  Visit # / Visits authorized: 15 / 16   FOTO: 34/100  PTA Visit: 4/5    Time In: 3:15 pm  Time Out: 3:55 pm  Total Billable Time: 40 billable minutes    Precautions: Standard  Prior Level of Function: Independent  Current Level of Function: Impaired mobility using RW    Subjective     Pt reports: No pain today  He was compliant with home exercise program.    Pain: 0/10 right knee   Location: right knee      Objective     AROM: -5 degrees  Quad Lag: -8 degrees      Tray received therapeutic exercises to develop strength, ROM, and flexibility for 18 minutes including:    Knee Exercises     Bike/Nustep 5 minutes on Nustep     Calf Stretch 4 x 15 seconds    Hamstring Stretch 4 x 15 seconds    Quad Stretch 4 x 15 seconds               Cybex Knee Extension  3 x 10 with 3 plates   Cybex Hamstring Curls 3 x 10 with 6 plates with end range stretch after each set for 30 seconds        Cybex leg press bilateral  3 x 10 with 8 plates     Cybex leg press single   3 x 10 with 5 plates (RLE)            Therapeutic Activity x 8 minutes to improve functional performance   Forward step ups 6" 2 x 10  Squats 3 x 10  Goblet Squats 2 x 10 (not performed today)       Neuro-re-ed x  14 minutes    Lateral  step downs 6"  2 x 10  Forward step downs 6" 2 x 10  Cybex Hip - Abduction bilateral  2 x 10 with 2 plates    Cybex Hip - Flexion  2 x 10 with 2 plates    Cybex Hip - Extension 2 x 10 with 2 plates        Home Exercises Provided and Patient Education Provided "     Education provided: Review of HOME EXERCISE PROGRAM     Written Home Exercises Provided: Patient instructed to cont prior HEP.  Exercises were reviewed and Tray was able to demonstrate them prior to the end of the session.  Tray demonstrated good  understanding of the education provided.     See EMR under Patient Instructions for exercises provided prior visit.    Assessment     Patient has responded well to treatment with most goals met. Patient has 0 degrees passive extension but continues to struggle with terminal extension. Patient instructed to work more at home on his active extension with SHORT ARC QUAD and STRAIGHT LEG RAISE along with focusing on good heel strike and full knee extension during ambulation. Patient plans to start going to gym after discharge. Patient will be discharged next visit.     PMH from evaluation:  Tray is a 74 y.o. male referred to outpatient Physical Therapy with a medical diagnosis of  right total knee arthroplasty. Patient presents with Right knee pain, edema, abnormal gait, impaired balance, decreased right knee motion and weakness of right lower extremity. Tray reports: that right knee has been bothering him for couple years managing with injections. The 3 months prior to surgery patient had to start using a cane to walk. Patient underwent right TKA 6/19/2024. Patient will benefit from skilled Physical Therapy intervention to address all deficits and help patient to return to their prior level of function.      Tray Is progressing well towards his goals.   Pt prognosis is Good.     Pt will continue to benefit from skilled outpatient physical therapy to address the deficits listed in the problem list box on initial evaluation, provide pt/family education and to maximize pt's level of independence in the home and community environment.     Pt's spiritual, cultural and educational needs considered and pt agreeable to plan of care and goals.     Anticipated  barriers to physical therapy: None    Goals:  Short Term Goals: 4 weeks   1. Independent with Home Exercise Program : Met  2. Increase Right Knee Passive Range of Motion to 0 Degrees to 120 Degrees : Met  3. Increase Right Knee Strength to grossly 4/5 : Met  4. Patient will ambulate 500 feet with Normal Gait pattern with complaints of pain Less than or Equal to 4/10. Partially met with no pain but continues to exhibit flexed knee gait.       Long Term Goals: 8 weeks   1. Patient will increase Right Knee Strength to grossly 4+/5 or greater : Met  2. Patient will ambulate 1000+ feet with complaints of pain Less than or Equal to 2/10. : Met  3. Increase Right Knee Active Range of Motion to 0 Degrees to 110 Degrees : Partially met with current motion -5 - 110 degrees    Plan     Plan of care Certification: 7/15/2024 to 9/13/2024.     Outpatient Physical Therapy 2 times weekly for 8 weeks to include the following interventions: 90529 [therapeutic exercise], 46887 [neuromuscular re-education], 59202 [manual therapy], 84685 [therapeutic activities], 60151 [unattended electrical stimulation], and 75318 [vasopneumatic device]    JANIE OSBORNE, PT  9/9/2024

## 2024-09-11 ENCOUNTER — CLINICAL SUPPORT (OUTPATIENT)
Dept: REHABILITATION | Facility: HOSPITAL | Age: 74
End: 2024-09-11
Payer: MEDICARE

## 2024-09-11 DIAGNOSIS — Z96.651 S/P TOTAL KNEE ARTHROPLASTY, RIGHT: Primary | ICD-10-CM

## 2024-09-11 PROCEDURE — 97110 THERAPEUTIC EXERCISES: CPT | Mod: CQ

## 2024-09-11 PROCEDURE — 97530 THERAPEUTIC ACTIVITIES: CPT | Mod: CQ

## 2024-09-11 NOTE — PATIENT INSTRUCTIONS
Access Code: [email or web address]  URL: [email or web address]  Date: 09/11/2024  Prepared by: José Boothe    Exercises  - Standing Hamstring Stretch on Chair  - 1 x daily - 7 x weekly - 1 sets - 4 reps - 20 sec hold  - Standing Terminal Knee Extension with Resistance  - 1 x daily - 7 x weekly - 3 sets - 10 reps  - Standing Heel Raise  - 1 x daily - 7 x weekly - 3 sets - 10 reps  - Step Up  - 1 x daily - 7 x weekly - 2 sets - 10 reps  - Lateral Step Up  - 1 x daily - 7 x weekly - 2 sets - 10 reps

## 2024-09-11 NOTE — PROGRESS NOTES
"  [facility]   Physical Therapy Treatment Note     Name: Tray Jalloh  Clinic Number: [id number]    Visit Date: 9/11/2024     Therapy Diagnosis:  right total knee arthroplasty  Physician: Mihaela Salamanca FNP     Physician Orders: PT Eval and Treat   Medical Diagnosis from Referral: right total knee arthroplasty  Evaluation Date: 7/15/2024  DOS: 6/19/2024  Authorization Period Expiration: 7/2/2025  Plan of Care Expiration: 9/13/2024  Visit # / Visits authorized: 16 / 16   FOTO: 34/100 ; 70/100 on 9/11/2024  PTA Visit: 1/5    Time In: 3:15 pm  Time Out: 3:41 pm  Total Billable Time: 23 billable minutes    Precautions: Standard  Prior Level of Function: Independent  Current Level of Function: Impaired mobility using RW    Subjective     Pt reports: No pain today; ready to discharge today   He was compliant with home exercise program.    Pain: 0/10 right knee   Location: right knee      Objective     AROM Flexion: 121 deg  AROM Ext: -4 deg  9/11/2024      Tray received therapeutic exercises to develop strength, ROM, and flexibility for 12 minutes including:    Knee Exercises     Bike/Nustep 5 minutes on Nustep     Calf Stretch 4 x 15 seconds    Hamstring Stretch 4 x 15 seconds    Quad Stretch 4 x 15 seconds                        Therapeutic Activity x 11 minutes to improve functional performance   Forward step ups 6" 2 x 10  Heel raises 3 x 10  Lateral Steps, 6" 20x       Neuro-re-ed x  0 minutes    Lateral  step downs 6"  2 x 10  Forward step downs 6" 2 x 10  Cybex Hip - Abduction bilateral  2 x 10 with 2 plates    Cybex Hip - Flexion  2 x 10 with 2 plates    Cybex Hip - Extension 2 x 10 with 2 plates        Home Exercises Provided and Patient Education Provided     Education provided: Review of HOME EXERCISE PROGRAM     Written Home Exercises Provided: Patient instructed to cont prior HEP.  Exercises were reviewed and Tray was able to demonstrate them prior to the end " of the session.  Tray demonstrated good  understanding of the education provided.     See EMR under Patient Instructions for exercises provided prior visit.    Assessment     Pt doing well today as met all of his goals of rehab. Pt demonstrates good form with exercises and has no issues with HEP. Pt DC to HEP at end of session. Educated pt to keep up with his HEP diligently.     PMH from evaluation:  Tray is a 74 y.o. male referred to outpatient Physical Therapy with a medical diagnosis of  right total knee arthroplasty. Patient presents with Right knee pain, edema, abnormal gait, impaired balance, decreased right knee motion and weakness of right lower extremity. Tray reports: that right knee has been bothering him for couple years managing with injections. The 3 months prior to surgery patient had to start using a cane to walk. Patient underwent right TKA 6/19/2024. Patient will benefit from skilled Physical Therapy intervention to address all deficits and help patient to return to their prior level of function.      Tray Is progressing well towards his goals.   Pt prognosis is Good.     Pt will continue to benefit from skilled outpatient physical therapy to address the deficits listed in the problem list box on initial evaluation, provide pt/family education and to maximize pt's level of independence in the home and community environment.     Pt's spiritual, cultural and educational needs considered and pt agreeable to plan of care and goals.     Anticipated barriers to physical therapy: None    Goals:  Short Term Goals: 4 weeks   1. Independent with Home Exercise Program : Met  2. Increase Right Knee Passive Range of Motion to 0 Degrees to 120 Degrees : Met  3. Increase Right Knee Strength to grossly 4/5 : Met  4. Patient will ambulate 500 feet with Normal Gait pattern with complaints of pain Less than or Equal to 4/10. Partially met with no pain but continues to exhibit flexed knee gait.       Long  Term Goals: 8 weeks   1. Patient will increase Right Knee Strength to grossly 4+/5 or greater : Met  2. Patient will ambulate 1000+ feet with complaints of pain Less than or Equal to 2/10. : Met  3. Increase Right Knee Active Range of Motion to 0 Degrees to 110 Degrees : Partially met with current motion -5 - 110 degrees    Plan     Plan of care Certification: 7/15/2024 to 9/13/2024.     Outpatient Physical Therapy 2 times weekly for 8 weeks to include the following interventions: 90368 [therapeutic exercise], 76198 [neuromuscular re-education], 79438 [manual therapy], 05656 [therapeutic activities], 49579 [unattended electrical stimulation], and 03635 [vasopneumatic device]    Virgil Boothe, PTA  9/11/2024

## 2024-09-18 DIAGNOSIS — Z96.651 S/P TOTAL KNEE REPLACEMENT, RIGHT: Primary | ICD-10-CM

## 2024-09-19 ENCOUNTER — HOSPITAL ENCOUNTER (OUTPATIENT)
Dept: RADIOLOGY | Facility: HOSPITAL | Age: 74
Discharge: HOME | End: 2024-09-19
Attending: ORTHOPAEDIC SURGERY
Payer: MEDICARE

## 2024-09-19 ENCOUNTER — OFFICE VISIT (OUTPATIENT)
Dept: ORTHOPEDICS | Facility: CLINIC | Age: 74
End: 2024-09-19
Payer: MEDICARE

## 2024-09-19 DIAGNOSIS — Z96.651 S/P TOTAL KNEE REPLACEMENT, RIGHT: ICD-10-CM

## 2024-09-19 DIAGNOSIS — M17.12 PRIMARY OSTEOARTHRITIS OF LEFT KNEE: Primary | ICD-10-CM

## 2024-09-19 PROCEDURE — 99999 PR PBB SHADOW E&M-EST. PATIENT-LVL IV: CPT | Mod: PBBFAC,,, | Performed by: ORTHOPAEDIC SURGERY

## 2024-09-19 PROCEDURE — 99999PBSHW PR PBB SHADOW TECHNICAL ONLY FILED TO HB: Mod: PBBFAC,,,

## 2024-09-19 PROCEDURE — 99214 OFFICE O/P EST MOD 30 MIN: CPT | Mod: PBBFAC,25 | Performed by: ORTHOPAEDIC SURGERY

## 2024-09-19 PROCEDURE — 20610 DRAIN/INJ JOINT/BURSA W/O US: CPT | Mod: PBBFAC | Performed by: ORTHOPAEDIC SURGERY

## 2024-09-19 PROCEDURE — 73562 X-RAY EXAM OF KNEE 3: CPT | Mod: TC,RT

## 2024-09-19 RX ADMIN — BUPIVACAINE HYDROCHLORIDE 3 ML: 5 INJECTION, SOLUTION PERINEURAL at 03:09

## 2024-09-19 RX ADMIN — TRIAMCINOLONE ACETONIDE 40 MG: 400 INJECTION, SUSPENSION INTRA-ARTICULAR; INTRAMUSCULAR at 03:09

## 2024-09-19 NOTE — PROCEDURES
Large Joint Aspiration/Injection: L knee    Date/Time: 9/19/2024 3:15 PM    Performed by: Ddaa Enriquez MD  Authorized by: Dada Enriquez MD    Consent Done?:  Yes (Verbal)  Indications:  Pain    Details:  Needle Size:  22 G  Approach:  Superior  Location:  Knee  Site:  L knee  Medications:  3 mL BUPivacaine 0.5 % (5 mg/mL); 40 mg triamcinolone acetonide 40 mg/mL  Patient tolerance:  Patient tolerated the procedure well with no immediate complications

## 2024-09-19 NOTE — PROGRESS NOTES
Post-Operative Total Knee        No surgery found No surgery found      Pt is here today for Third post-operative followup of his No surgery found.  he is doing well and has completed therapy.  We have reviewed his findings and discussed plan of care and future treatment options, including the physical therapy plan.        Physical Exam:     The affected knee was inspected today.   The wound is healing well with no signs of erythema or warmth.  There is no drainage.  No clinical signs or symptoms of infection are present.   ROM: 0-120  -Chuy's sign.  2+ pulses are present.     X-Ray Knee AP LAT with Sunrise Right    Result Date: 9/19/2024  See Procedure Notes for results. IMPRESSION: Please see Ortho procedure notes for report.  This procedure was auto-finalized by: Virtual Radiologist   Three views right knee were obtained today demonstrating a stable appearing total prosthesis in excellent alignment    Assessment and Plan  1. S/P total knee replacement, right            We will continue post-operative physical therapy until the patient feels that they are independent with a home rehab program.  Return to work status/ return to activities status was discussed today in detail. All questions were answered.    The use of stockings and DVT prophylaxis was discussed.  Will follow up in additional 12 weeks with radiographs at that time.     There are no Patient Instructions on file for this visit.

## 2024-09-22 RX ORDER — TRIAMCINOLONE ACETONIDE 40 MG/ML
40 INJECTION, SUSPENSION INTRA-ARTICULAR; INTRAMUSCULAR
Status: DISCONTINUED | OUTPATIENT
Start: 2024-09-19 | End: 2024-09-22 | Stop reason: HOSPADM

## 2024-09-22 RX ORDER — BUPIVACAINE HYDROCHLORIDE 5 MG/ML
3 INJECTION, SOLUTION PERINEURAL
Status: DISCONTINUED | OUTPATIENT
Start: 2024-09-19 | End: 2024-09-22 | Stop reason: HOSPADM

## 2024-10-02 RX ORDER — PREGABALIN 75 MG/1
CAPSULE ORAL
Qty: 60 CAPSULE | Refills: 5 | Status: SHIPPED | OUTPATIENT
Start: 2024-10-02

## 2024-10-02 NOTE — TELEPHONE ENCOUNTER
----- Message from Rashmi sent at 10/2/2024 11:05 AM CDT -----  Pt asking if he still needs to continue pregabalin (LYRICA) 75 MG capsule, he will need a new RX sent to [facility]. Please let him know if he is to continue.  Who Called: Tray Jalloh    Refill or New Rx:Refill  RX Name and Strength:pregabalin (LYRICA) 75 MG capsule [4        Patient's Preferred Phone Number on File: [phone]   Best Call Back Number, if different:  Additional Information:

## 2024-10-28 PROBLEM — N39.0 RECURRENT UTI: Status: RESOLVED | Noted: 2024-07-29 | Resolved: 2024-10-28

## 2024-11-07 DIAGNOSIS — M25.562 LEFT KNEE PAIN, UNSPECIFIED CHRONICITY: Primary | ICD-10-CM

## 2024-11-08 ENCOUNTER — HOSPITAL ENCOUNTER (OUTPATIENT)
Dept: RADIOLOGY | Facility: HOSPITAL | Age: 74
Discharge: HOME | End: 2024-11-08
Attending: NURSE PRACTITIONER
Payer: MEDICARE

## 2024-11-08 ENCOUNTER — OFFICE VISIT (OUTPATIENT)
Dept: ORTHOPEDICS | Facility: CLINIC | Age: 74
End: 2024-11-08
Payer: MEDICARE

## 2024-11-08 DIAGNOSIS — M17.12 PRIMARY OSTEOARTHRITIS OF LEFT KNEE: Primary | ICD-10-CM

## 2024-11-08 DIAGNOSIS — M25.562 LEFT KNEE PAIN, UNSPECIFIED CHRONICITY: ICD-10-CM

## 2024-11-08 PROCEDURE — 99999 PR PBB SHADOW E&M-EST. PATIENT-LVL II: CPT | Mod: PBBFAC,,, | Performed by: NURSE PRACTITIONER

## 2024-11-08 PROCEDURE — 20610 DRAIN/INJ JOINT/BURSA W/O US: CPT | Mod: PBBFAC | Performed by: NURSE PRACTITIONER

## 2024-11-08 PROCEDURE — 99212 OFFICE O/P EST SF 10 MIN: CPT | Mod: PBBFAC,25 | Performed by: NURSE PRACTITIONER

## 2024-11-08 PROCEDURE — 73562 X-RAY EXAM OF KNEE 3: CPT | Mod: TC,LT

## 2024-11-08 PROCEDURE — 73562 X-RAY EXAM OF KNEE 3: CPT | Mod: 26,LT,, | Performed by: RADIOLOGY

## 2024-11-08 PROCEDURE — 99999PBSHW PR PBB SHADOW TECHNICAL ONLY FILED TO HB: Mod: JZ,PBBFAC,,

## 2024-11-08 RX ADMIN — Medication 48 MG: at 09:11

## 2024-11-08 NOTE — PROGRESS NOTES
CC:  Knee pain    74 y.o. Male returns to clinic for a follow up visit regarding knee pain.       Patient is here today requesting an injection in his left knee.   He had a steroid on 9/19/2024. It is too soon to repeat a steroid injection.   He would like visco injection today  He is 4 months status post right total knee arthroplasty and doing well in regards to his right knee.  Reports his big concern is his left knee at this time     Past Medical History:   Diagnosis Date    Cervical radiculopathy     Chronic pain     Chronic renal impairment     COVID-19 12/27/2022    Depression     Depressive disorder 05/12/2022    Digestive disorder     GERD (gastroesophageal reflux disease)     History of prediabetes 11/16/2022    Hypercholesteremia     Hypertension     Left ventricular hypertrophy     Lumbosacral spondylosis     Positive colorectal cancer screening using Cologuard test 12/15/2022    Prediabetes     Pulmonary nodules     repeat CT due 12/03/21     Past Surgical History:   Procedure Laterality Date    ABDOMINAL SURGERY      inguinal hernia repair    BACK SURGERY      Bilateral L3-S1 MBB Bilateral 2-, 1-, 1-, 12-    Dr Jorge Parker    EYE SURGERY      HERNIA REPAIR      KNEE ARTHROSCOPY W/ MENISCAL REPAIR Right     KNEE ARTHROSCOPY W/ MENISCECTOMY Left 01/11/2023    Procedure: ARTHROSCOPY, KNEE, WITH MENISCECTOMY;  Surgeon: Dada Enriquez MD;  Location: [facility];  Service: Orthopedics;  Laterality: Left;    KNEE SURGERY Left     LATERAL RETINACULA RELEASE OF KNEE Left 01/11/2023    Procedure: ARTHROSCOPIC RELEASE, KNEE, LATERAL RETINACULA;  Surgeon: Dada Enriquez MD;  Location: [facility];  Service: Orthopedics;  Laterality: Left;    PROSTATECTOMY  03/1996    prostate cancer    RADIOFREQUENCY ABLATION OF LUMBAR MEDIAL BRANCH NERVE AT SINGLE LEVEL Right 04/01/2021    Procedure: Radiofrequency Ablation, Nerve, Spinal, Lumbar, Medial Branch, 1 Level;  Surgeon:  Jordana Patel MD;  Location: [facility];  Service: Pain Management;  Laterality: Right;  Right L3-S1 RFTC    RADIOFREQUENCY ABLATION OF LUMBAR MEDIAL BRANCH NERVE AT SINGLE LEVEL Right 02/09/2023    Procedure: Radiofrequency Ablation, Nerve, Spinal, Lumbar, Medial Branch, Level L4-S1;  Surgeon: Jordana Patel MD;  Location: [facility];  Service: Pain Management;  Laterality: Right;  bonita left after right is done    RADIOFREQUENCY ABLATION OF LUMBAR MEDIAL BRANCH NERVE AT SINGLE LEVEL Left 02/28/2023    Procedure: RADIOFREQUENCY ABLATION, NERVE, SPINAL, LUMBAR, MEDIAL BRANCH, 1 LEVEL;  Surgeon: Jordana Patel MD;  Location: [facility];  Service: Pain Management;  Laterality: Left;    RADIOFREQUENCY THERMOCOAGULATION Bilateral 03/20/2012    Dr Parker    RADIOFREQUENCY THERMOCOAGULATION Left 03/18/2021    Procedure: RADIOFREQUENCY THERMAL COAGULATION;  Surgeon: Jordana Patel MD;  Location: [facility];  Service: Pain Management;  Laterality: Left;  BILATERAL L3-S1 RFTC LEFT SIDE FIRST    ROBOTIC ARTHROPLASTY, KNEE Right 6/19/2024    Procedure: ROBOTIC ARTHROPLASTY, KNEE, TOTAL;  Surgeon: Dada Enriquez MD;  Location: [facility];  Service: Orthopedics;  Laterality: Right;    SELECTIVE INJECTION OF ANESTHETIC AGENT AROUND LUMBAR SPINAL NERVE ROOT BY TRANSFORAMINAL APPROACH Right 01/13/2022    Procedure: Right L4-5,5-S1 TFESI;  Surgeon: Jordana Patel MD;  Location: [facility];  Service: Pain Management;  Laterality: Right;  PT AWARE TO BE TESTED VIA PC    SHOULDER ARTHROSCOPY Right     SPINE SURGERY  1995    VASECTOMY  Mar 1996         PHYSICAL EXAMINATION:  There were no vitals taken for this visit.  General    Constitutional: He is oriented to person, place, and time. He appears well-nourished.   HENT:   Head: Normocephalic and atraumatic.   Eyes: Pupils are equal, round, and reactive to light.   Neck: Neck supple.   Cardiovascular:  Normal rate and regular rhythm.             Pulmonary/Chest: Effort normal. No respiratory distress.   Abdominal: There is no abdominal tenderness. There is no guarding.   Neurological: He is alert and oriented to person, place, and time. He has normal reflexes.   Psychiatric: He has a normal mood and affect. His behavior is normal. Judgment and thought content normal.           Right Knee Exam     Tests   Patella   Patellar Grind: positive    Left Knee Exam     Inspection   Swelling: present  Effusion: present    Tenderness   The patient tender to palpation of the medial joint line and lateral joint line.    Crepitus   The patient has crepitus of the patella.    Range of Motion   Extension:  normal   Flexion:  abnormal     Tests   Meniscus   Ozzy:  Medial - positive   Stability   Lachman: normal (-1 to 2mm)   PCL-Posterior Drawer: normal (0 to 2mm)  Patella   Patellar Tracking: normal  Patellar Grind: positive    Other   Sensation: normal    Muscle Strength   Right Lower Extremity   Quadriceps:  5/5   Hamstrin/5   Left Lower Extremity   Quadriceps:  5/5   Hamstrin/5       IMAGING:  No results found.     ASSESSMENT:      ICD-10-CM ICD-9-CM   1. Primary osteoarthritis of left knee  M17.12 715.16       PLAN:     -Findings and treatment options were discussed with the patient  -All questions answered  Natural history and expected course discussed. Questions answered.  Educational materials distributed.  Reduction in offending activity.  OTC analgesics as needed.  Arthrocentesis. See procedure note.  Left knee Synvisc-One injection today.  Return to clinic in 6 months for recheck    There are no Patient Instructions on file for this visit.      No orders of the defined types were placed in this encounter.        Large Joint Aspiration/Injection: L patellar bursa    Date/Time: 2024 9:40 AM    Performed by: Mihaela Salamanca FNP  Authorized by: Mihaela Salamanca FNP    Consent Done?:  Yes (Verbal)  Indications:  Pain  Site marked: the  procedure site was marked    Local anesthetic:  Bupivacaine 0.25% without epinephrine    Details:  Needle Size:  22 G  Location:  Knee  Site:  L patellar bursa  Medications:  48 mg hylan g-f 20 48 mg/6 mL  Patient tolerance:  Patient tolerated the procedure well with no immediate complications

## 2024-11-13 PROBLEM — Z96.651 S/P TOTAL KNEE ARTHROPLASTY, RIGHT: Status: RESOLVED | Noted: 2024-07-15 | Resolved: 2024-11-13

## 2024-12-09 ENCOUNTER — OFFICE VISIT (OUTPATIENT)
Dept: NEUROLOGY | Facility: CLINIC | Age: 74
End: 2024-12-09
Payer: MEDICARE

## 2024-12-09 VITALS
HEIGHT: 72 IN | BODY MASS INDEX: 27.5 KG/M2 | SYSTOLIC BLOOD PRESSURE: 118 MMHG | WEIGHT: 203 LBS | DIASTOLIC BLOOD PRESSURE: 78 MMHG | HEART RATE: 86 BPM | OXYGEN SATURATION: 97 % | RESPIRATION RATE: 18 BRPM

## 2024-12-09 DIAGNOSIS — G25.0 ESSENTIAL TREMOR: Primary | ICD-10-CM

## 2024-12-09 DIAGNOSIS — R41.89 COGNITIVE IMPAIRMENT: ICD-10-CM

## 2024-12-09 PROCEDURE — 99999 PR PBB SHADOW E&M-EST. PATIENT-LVL V: CPT | Mod: PBBFAC,,, | Performed by: NURSE PRACTITIONER

## 2024-12-09 PROCEDURE — 99215 OFFICE O/P EST HI 40 MIN: CPT | Mod: PBBFAC | Performed by: NURSE PRACTITIONER

## 2024-12-09 PROCEDURE — 99212 OFFICE O/P EST SF 10 MIN: CPT | Mod: S$PBB,,, | Performed by: NURSE PRACTITIONER
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
## 2024-12-09 NOTE — PATIENT INSTRUCTIONS
Reviewed prior CT head and labwork  Continue use of C-pap  Continue current medications  MMSE 30/30  No medications recommended at this time

## 2024-12-18 DIAGNOSIS — Z96.651 S/P TOTAL KNEE REPLACEMENT, RIGHT: Primary | ICD-10-CM

## 2024-12-19 ENCOUNTER — OFFICE VISIT (OUTPATIENT)
Dept: ORTHOPEDICS | Facility: CLINIC | Age: 74
End: 2024-12-19
Payer: MEDICARE

## 2024-12-19 ENCOUNTER — TELEPHONE (OUTPATIENT)
Dept: ORTHOPEDICS | Facility: CLINIC | Age: 74
End: 2024-12-19
Payer: MEDICARE

## 2024-12-19 ENCOUNTER — HOSPITAL ENCOUNTER (OUTPATIENT)
Dept: RADIOLOGY | Facility: HOSPITAL | Age: 74
Discharge: HOME | End: 2024-12-19
Attending: ORTHOPAEDIC SURGERY
Payer: MEDICARE

## 2024-12-19 DIAGNOSIS — M17.12 PRIMARY OSTEOARTHRITIS OF LEFT KNEE: Primary | ICD-10-CM

## 2024-12-19 DIAGNOSIS — Z96.651 S/P TOTAL KNEE REPLACEMENT, RIGHT: ICD-10-CM

## 2024-12-19 PROCEDURE — 99999 PR PBB SHADOW E&M-EST. PATIENT-LVL II: CPT | Mod: PBBFAC,,, | Performed by: ORTHOPAEDIC SURGERY

## 2024-12-19 PROCEDURE — 99214 OFFICE O/P EST MOD 30 MIN: CPT | Mod: S$PBB,,, | Performed by: ORTHOPAEDIC SURGERY

## 2024-12-19 PROCEDURE — 99212 OFFICE O/P EST SF 10 MIN: CPT | Mod: PBBFAC,25 | Performed by: ORTHOPAEDIC SURGERY

## 2024-12-19 PROCEDURE — 73562 X-RAY EXAM OF KNEE 3: CPT | Mod: TC,RT

## 2024-12-19 NOTE — PROGRESS NOTES
CC:  Knee pain    74 y.o. Male returns to clinic for a follow up visit s/p right TKA performed on 06/19/2024. Patient is doing very well overall with good ROM.     He states his left knee is doing poorly and would like to discuss surgical options.            Past Medical History:   Diagnosis Date    Cervical radiculopathy     Chronic pain     Chronic renal impairment     COVID-19 12/27/2022    Depression     Depressive disorder 05/12/2022    Digestive disorder     GERD (gastroesophageal reflux disease)     History of prediabetes 11/16/2022    Hypercholesteremia     Hypertension     Left ventricular hypertrophy     Lumbosacral spondylosis     Positive colorectal cancer screening using Cologuard test 12/15/2022    Prediabetes     Pulmonary nodules     repeat CT due 12/03/21     Past Surgical History:   Procedure Laterality Date    ABDOMINAL SURGERY      inguinal hernia repair    BACK SURGERY      Bilateral L3-S1 MBB Bilateral 2-, 1-, 1-, 12-    Dr Jorge Parker    EYE SURGERY      HERNIA REPAIR      KNEE ARTHROSCOPY W/ MENISCAL REPAIR Right     KNEE ARTHROSCOPY W/ MENISCECTOMY Left 01/11/2023    Procedure: ARTHROSCOPY, KNEE, WITH MENISCECTOMY;  Surgeon: Dada Enriquez MD;  Location: [facility] OR;  Service: Orthopedics;  Laterality: Left;    KNEE SURGERY Left     LATERAL RETINACULA RELEASE OF KNEE Left 01/11/2023    Procedure: ARTHROSCOPIC RELEASE, KNEE, LATERAL RETINACULA;  Surgeon: Dada Enriquez MD;  Location: [facility] OR;  Service: Orthopedics;  Laterality: Left;    PROSTATECTOMY  03/1996    prostate cancer    RADIOFREQUENCY ABLATION OF LUMBAR MEDIAL BRANCH NERVE AT SINGLE LEVEL Right 04/01/2021    Procedure: Radiofrequency Ablation, Nerve, Spinal, Lumbar, Medial Branch, 1 Level;  Surgeon: Jordana Patel MD;  Location: [facility] PAIN [facility];  Service: Pain Management;  Laterality: Right;  Right L3-S1 RFTC    RADIOFREQUENCY ABLATION OF LUMBAR MEDIAL BRANCH NERVE AT SINGLE  LEVEL Right 02/09/2023    Procedure: Radiofrequency Ablation, Nerve, Spinal, Lumbar, Medial Branch, Level L4-S1;  Surgeon: Jordana Patel MD;  Location: [facility];  Service: Pain Management;  Laterality: Right;  bonita left after right is done    RADIOFREQUENCY ABLATION OF LUMBAR MEDIAL BRANCH NERVE AT SINGLE LEVEL Left 02/28/2023    Procedure: RADIOFREQUENCY ABLATION, NERVE, SPINAL, LUMBAR, MEDIAL BRANCH, 1 LEVEL;  Surgeon: Jordana Patel MD;  Location: [facility];  Service: Pain Management;  Laterality: Left;    RADIOFREQUENCY THERMOCOAGULATION Bilateral 03/20/2012    Dr Jettu    RADIOFREQUENCY THERMOCOAGULATION Left 03/18/2021    Procedure: RADIOFREQUENCY THERMAL COAGULATION;  Surgeon: Jordana Patel MD;  Location: [facility];  Service: Pain Management;  Laterality: Left;  BILATERAL L3-S1 RFTC LEFT SIDE FIRST    ROBOTIC ARTHROPLASTY, KNEE Right 6/19/2024    Procedure: ROBOTIC ARTHROPLASTY, KNEE, TOTAL;  Surgeon: Dada Enriquez MD;  Location: [facility]THO OR;  Service: Orthopedics;  Laterality: Right;    SELECTIVE INJECTION OF ANESTHETIC AGENT AROUND LUMBAR SPINAL NERVE ROOT BY TRANSFORAMINAL APPROACH Right 01/13/2022    Procedure: Right L4-5,5-S1 TFESI;  Surgeon: Jordana Patel MD;  Location: [facility];  Service: Pain Management;  Laterality: Right;  PT AWARE TO BE TESTED VIA PC    SHOULDER ARTHROSCOPY Right     SPINE SURGERY  1995    VASECTOMY  Mar 1996         PHYSICAL EXAMINATION:  There were no vitals taken for this visit.  General    Constitutional: He is oriented to person, place, and time. He appears well-developed and well-nourished.   HENT:   Head: Normocephalic and atraumatic.   Eyes: Pupils are equal, round, and reactive to light.   Cardiovascular:  Normal rate and intact distal pulses.            Pulmonary/Chest: Effort normal. No respiratory distress. He exhibits no tenderness.   Abdominal: Soft. There is no abdominal tenderness.   Neurological: He is alert and  oriented to person, place, and time. He has normal reflexes.   Psychiatric: He has a normal mood and affect. His behavior is normal. Thought content normal.           Right Knee Exam     Inspection   Erythema: absent  Scars: present  Swelling: absent  Effusion: absent  Deformity: absent  Bruising: absent    Range of Motion   Extension:  normal   Flexion:  normal     Tests   Meniscus   Ozzy:  Medial - negative Lateral - negative  Ligament Examination   Lachman: normal (-1 to 2mm)   Patella   Patellar apprehension: negative  Patellar Tracking: normal  Q-Angle at 90 degrees: normal    Other   Meniscal Cyst: absent    Left Knee Exam     Range of Motion   Extension:  normal   Flexion:  normal     Tests   Meniscus   Ozzy:  Medial - negative Lateral - negative  Stability   Lachman: normal (-1 to 2mm)   PCL-Posterior Drawer: normal (0 to 2mm)  Patella   Patellar apprehension: negative  Patellar Tracking: normal  Q-Angle at 90 degrees: normal    Other   Meniscal Cyst: absent  Sensation: normal    Muscle Strength   Right Lower Extremity   Quadriceps:  5/5   Hamstrin/5   Left Lower Extremity   Quadriceps:  5/5   Hamstrin/5     Reflexes     Right Side   Achilles:  2+  Quadriceps:  2+    Vascular Exam       Left Pulses  Dorsalis Pedis:      2+  Posterior Tibial:      2+            IMAGING:  X-Ray Knee AP LAT with Sunrise Right    Result Date: 2024  See Procedure Notes for results. IMPRESSION: Please see Ortho procedure notes for report.  This procedure was auto-finalized by: Virtual Radiologist     3 views right knee reveal a well positioned TKA .     I reviewed left knee radiographs- severe medial joint space narrowing and varus alignment consistent with Kellengren reji grade III-IV OA.   ASSESSMENT:      ICD-10-CM ICD-9-CM   1. Primary osteoarthritis of left knee  M17.12 715.16   2. S/P total knee replacement, right  Z96.651 V43.65       PLAN:     -Findings and treatment options were discussed with  the patient  -All questions answered  The conservative options including NSAIDs, activity modification, physical therapy, corticosteroid injection, and viscosupplimentation were discussed. He is interested in surgical intervention at this time.    The surgical process of knee replacement was discussed in detail with the patient including a detailed discussion of the procedure itself (including visual model, x-ray review, and literature review). The typical perioperative and post-operative course was discussed and perioperative risks were discussed to the patient's satisfaction.  Risks and complications discussed included but were not limited to the risks of anesthetic complications, infection, bleeding, wound healing complications, aseptic loosening, instability, limb length inequality, neurologic dysfunction including numbness,  DVT, pulmonary embolism, perioperative medical risks (cardiac, pulmonary, renal, neurologic), and death and the patient elects to proceed. We will initiate pre-operative medical evaluation and clearance and set a provisional date for surgical intervention according to the patient's schedule.   I have discussed anticoagulation with aspirin and coumadin and in low risk patients I have recommended aspirin twice a day.  25 minutes was spent in direct consultation with the patient counselling him on the items listed above.  Plan for left TKA    There are no Patient Instructions on file for this visit.      No orders of the defined types were placed in this encounter.        Procedures

## 2024-12-19 NOTE — TELEPHONE ENCOUNTER
----- Message from Lulu sent at 12/19/2024  4:19 PM CST -----  Regarding: Reschedule Surgery  Who Called: Tray Jalloh    Caller is requesting assistance/information from provider's office.    Needs to reschedule surgery from March to June.      Preferred Method of Contact: Phone Call  Patient's Preferred Phone Number on File: [phone]   Best Call Back Number, if different:  Additional Information:

## 2025-01-27 NOTE — TELEPHONE ENCOUNTER
----- Message from Sabiha sent at 1/24/2025  7:55 AM CST -----  multivitamin Tab sent to [facility]

## 2025-01-28 ENCOUNTER — OFFICE VISIT (OUTPATIENT)
Dept: FAMILY MEDICINE | Facility: CLINIC | Age: 75
End: 2025-01-28
Payer: MEDICARE

## 2025-01-28 VITALS
SYSTOLIC BLOOD PRESSURE: 113 MMHG | HEART RATE: 77 BPM | BODY MASS INDEX: 27.09 KG/M2 | DIASTOLIC BLOOD PRESSURE: 66 MMHG | OXYGEN SATURATION: 95 % | HEIGHT: 72 IN | RESPIRATION RATE: 20 BRPM | WEIGHT: 200 LBS | TEMPERATURE: 98 F

## 2025-01-28 DIAGNOSIS — D63.8 ANEMIA OF CHRONIC DISEASE: ICD-10-CM

## 2025-01-28 DIAGNOSIS — I10 ESSENTIAL HYPERTENSION: Primary | Chronic | ICD-10-CM

## 2025-01-28 DIAGNOSIS — N18.31 CHRONIC KIDNEY DISEASE, STAGE 3A: ICD-10-CM

## 2025-01-28 DIAGNOSIS — F33.2 MODERATELY SEVERE RECURRENT MAJOR DEPRESSION: Chronic | ICD-10-CM

## 2025-01-28 DIAGNOSIS — Z79.899 OTHER LONG TERM (CURRENT) DRUG THERAPY: ICD-10-CM

## 2025-01-28 DIAGNOSIS — E78.49 OTHER HYPERLIPIDEMIA: Chronic | ICD-10-CM

## 2025-01-28 DIAGNOSIS — J43.8 OTHER EMPHYSEMA: Chronic | ICD-10-CM

## 2025-01-28 PROBLEM — G47.33 OSA ON CPAP: Chronic | Status: ACTIVE | Noted: 2024-05-02

## 2025-01-28 PROBLEM — J06.9 VIRAL UPPER RESPIRATORY INFECTION: Status: RESOLVED | Noted: 2022-03-15 | Resolved: 2025-01-28

## 2025-01-28 PROBLEM — Z77.29 EXPOSURE TO POTENTIALLY HAZARDOUS SUBSTANCE: Status: RESOLVED | Noted: 2024-07-27 | Resolved: 2025-01-28

## 2025-01-28 PROBLEM — Z01.818 PREPROCEDURAL EXAMINATION: Status: RESOLVED | Noted: 2024-06-10 | Resolved: 2025-01-28

## 2025-01-28 LAB
ALBUMIN SERPL BCP-MCNC: 4 G/DL (ref 3.4–4.8)
ALBUMIN/GLOB SERPL: 1.2 {RATIO}
ALP SERPL-CCNC: 56 U/L (ref 40–150)
ALT SERPL W P-5'-P-CCNC: 8 U/L
ANION GAP SERPL CALCULATED.3IONS-SCNC: 12 MMOL/L (ref 7–16)
AST SERPL W P-5'-P-CCNC: 14 U/L (ref 5–34)
BASOPHILS # BLD AUTO: 0.08 K/UL (ref 0–0.2)
BASOPHILS NFR BLD AUTO: 1.2 % (ref 0–1)
BILIRUB SERPL-MCNC: 0.3 MG/DL
BUN SERPL-MCNC: 25 MG/DL (ref 8–26)
BUN/CREAT SERPL: 19 (ref 6–20)
CALCIUM SERPL-MCNC: 9 MG/DL (ref 8.8–10)
CHLORIDE SERPL-SCNC: 108 MMOL/L (ref 98–107)
CHOLEST SERPL-MCNC: 106 MG/DL
CHOLEST/HDLC SERPL: 3.5 {RATIO}
CO2 SERPL-SCNC: 23 MMOL/L (ref 23–31)
CREAT SERPL-MCNC: 1.32 MG/DL (ref 0.72–1.25)
CREAT UR-MCNC: 183 MG/DL (ref 23–375)
DIFFERENTIAL METHOD BLD: ABNORMAL
EGFR (NO RACE VARIABLE) (RUSH/TITUS): 57 ML/MIN/1.73M2
EOSINOPHIL # BLD AUTO: 0.38 K/UL (ref 0–0.5)
EOSINOPHIL NFR BLD AUTO: 5.6 % (ref 1–4)
ERYTHROCYTE [DISTWIDTH] IN BLOOD BY AUTOMATED COUNT: 12.4 % (ref 11.5–14.5)
EST. AVERAGE GLUCOSE BLD GHB EST-MCNC: 120 MG/DL
FERRITIN SERPL-MCNC: 58 NG/ML (ref 22–275)
FOLATE SERPL-MCNC: 9.5 NG/ML (ref 7–31.4)
GLOBULIN SER-MCNC: 3.4 G/DL (ref 2–4)
GLUCOSE SERPL-MCNC: 95 MG/DL (ref 82–115)
HBA1C MFR BLD HPLC: 5.8 %
HCT VFR BLD AUTO: 40.3 % (ref 40–54)
HDLC SERPL-MCNC: 30 MG/DL (ref 35–60)
HGB BLD-MCNC: 12.8 G/DL (ref 13.5–18)
IMM GRANULOCYTES # BLD AUTO: 0.02 K/UL (ref 0–0.04)
IMM GRANULOCYTES NFR BLD: 0.3 % (ref 0–0.4)
IRON SATN MFR SERPL: 32 % (ref 20–50)
IRON SERPL-MCNC: 77 UG/DL (ref 65–175)
LDLC SERPL CALC-MCNC: 50 MG/DL
LDLC/HDLC SERPL: 1.7 {RATIO}
LYMPHOCYTES # BLD AUTO: 2.17 K/UL (ref 1–4.8)
LYMPHOCYTES NFR BLD AUTO: 31.9 % (ref 27–41)
MCH RBC QN AUTO: 30.3 PG (ref 27–31)
MCHC RBC AUTO-ENTMCNC: 31.8 G/DL (ref 32–36)
MCV RBC AUTO: 95.5 FL (ref 80–96)
MICROALBUMIN UR-MCNC: 1.5 MG/DL
MICROALBUMIN/CREAT RATIO PNL UR: 8.2 MG/G (ref 0–30)
MONOCYTES # BLD AUTO: 0.48 K/UL (ref 0–0.8)
MONOCYTES NFR BLD AUTO: 7.1 % (ref 2–6)
MPC BLD CALC-MCNC: 10.1 FL (ref 9.4–12.4)
NEUTROPHILS # BLD AUTO: 3.67 K/UL (ref 1.8–7.7)
NEUTROPHILS NFR BLD AUTO: 53.9 % (ref 53–65)
NONHDLC SERPL-MCNC: 76 MG/DL
NRBC # BLD AUTO: 0 X10E3/UL
NRBC, AUTO (.00): 0 %
PLATELET # BLD AUTO: 281 K/UL (ref 150–400)
POTASSIUM SERPL-SCNC: 4 MMOL/L (ref 3.5–5.1)
PROT SERPL-MCNC: 7.4 G/DL (ref 5.8–7.6)
RBC # BLD AUTO: 4.22 M/UL (ref 4.6–6.2)
SODIUM SERPL-SCNC: 139 MMOL/L (ref 136–145)
TIBC SERPL-MCNC: 162 UG/DL (ref 69–240)
TIBC SERPL-MCNC: 239 UG/DL (ref 250–450)
TRANSFERRIN SERPL-MCNC: 201 MG/DL (ref 163–344)
TRIGL SERPL-MCNC: 132 MG/DL (ref 34–140)
TSH SERPL DL<=0.005 MIU/L-ACNC: 1.28 UIU/ML (ref 0.35–4.94)
VIT B12 SERPL-MCNC: 1322 PG/ML (ref 213–816)
VLDLC SERPL-MCNC: 26 MG/DL
WBC # BLD AUTO: 6.8 K/UL (ref 4.5–11)

## 2025-01-28 PROCEDURE — 84443 ASSAY THYROID STIM HORMONE: CPT | Mod: ,,, | Performed by: CLINICAL MEDICAL LABORATORY

## 2025-01-28 PROCEDURE — 82728 ASSAY OF FERRITIN: CPT | Mod: ,,, | Performed by: CLINICAL MEDICAL LABORATORY

## 2025-01-28 PROCEDURE — 82570 ASSAY OF URINE CREATININE: CPT | Mod: ,,, | Performed by: CLINICAL MEDICAL LABORATORY

## 2025-01-28 PROCEDURE — 83540 ASSAY OF IRON: CPT | Mod: ,,, | Performed by: CLINICAL MEDICAL LABORATORY

## 2025-01-28 PROCEDURE — 82746 ASSAY OF FOLIC ACID SERUM: CPT | Mod: ,,, | Performed by: CLINICAL MEDICAL LABORATORY

## 2025-01-28 PROCEDURE — 82043 UR ALBUMIN QUANTITATIVE: CPT | Mod: ,,, | Performed by: CLINICAL MEDICAL LABORATORY

## 2025-01-28 PROCEDURE — 83036 HEMOGLOBIN GLYCOSYLATED A1C: CPT | Mod: ,,, | Performed by: CLINICAL MEDICAL LABORATORY

## 2025-01-28 PROCEDURE — 80053 COMPREHEN METABOLIC PANEL: CPT | Mod: ,,, | Performed by: CLINICAL MEDICAL LABORATORY

## 2025-01-28 PROCEDURE — 85025 COMPLETE CBC W/AUTO DIFF WBC: CPT | Mod: ,,, | Performed by: CLINICAL MEDICAL LABORATORY

## 2025-01-28 PROCEDURE — 99214 OFFICE O/P EST MOD 30 MIN: CPT | Mod: ,,, | Performed by: NURSE PRACTITIONER

## 2025-01-28 PROCEDURE — 83550 IRON BINDING TEST: CPT | Mod: ,,, | Performed by: CLINICAL MEDICAL LABORATORY

## 2025-01-28 PROCEDURE — 80061 LIPID PANEL: CPT | Mod: ,,, | Performed by: CLINICAL MEDICAL LABORATORY

## 2025-01-28 PROCEDURE — 82607 VITAMIN B-12: CPT | Mod: ,,, | Performed by: CLINICAL MEDICAL LABORATORY

## 2025-01-28 RX ORDER — FLUOXETINE 10 MG/1
10 CAPSULE ORAL DAILY
Qty: 90 CAPSULE | Refills: 3 | Status: SHIPPED | OUTPATIENT
Start: 2025-01-28

## 2025-01-28 RX ORDER — AMLODIPINE BESYLATE 5 MG/1
10 TABLET ORAL DAILY
Qty: 180 TABLET | Refills: 3 | Status: SHIPPED | OUTPATIENT
Start: 2025-01-28 | End: 2026-01-28

## 2025-01-28 NOTE — PROGRESS NOTES
[facility]  [address] [place] [address]  Phone: [phone]  Fax: [phone]       PATIENT NAME: Tray Jalloh   : 1950    AGE: 74 y.o. DATE OF ENCOUNTER: 25    MRN: [id number]      PCP: Elizabeth Lange FNP    Subjective:   CHANGE CHIEF COMPLAINT      :[id number]}274}  Chief Complaint   Patient presents with    Hypertension    Medication Refill    Follow-up     History of Present Illness    HPI:  Patient reports ongoing issues with his left knee, including significant difficulty walking and a morning limp due to pain, which improves somewhat with movement. The right knee has a titanium implant and is less painful but occasionally stiff. He has sleep apnea and uses a CPAP machine, reporting improved sleep quality. Previously, he had 2-3 nocturnal awakenings per night but now sleeps through unless disturbed. A recent cystoscopy revealed two pockets inside his bladder where urine was not emptying properly, resulting in an infection involving two bacterial strains, including E. coli. Patient notes swelling in his legs, particularly in the left knee due to arthritis. He is under the care of Dr. Hanh Man for cardiac issues and Dr. Rosalia Preciado for pulmonary concerns. 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 Patient denies any issues with bowel movements.      ROS:  Gastrointestinal: -change in bowel habits  Musculoskeletal: +joint pain  Psychiatric: +sleep difficulty         Allergies and Meds: 274}     Review of patient's allergies indicates:   Allergen Reactions    Bee pollen     Grass pollen- grass standard         Current Outpatient Medications   Medication Sig Dispense Refill    acetaminophen (TYLENOL) 325 MG tablet Take 325 mg by mouth every 6 (six) hours as needed for Pain.      albuterol (VENTOLIN HFA) 90 mcg/actuation inhaler Inhale 2  puffs into the lungs every 6 (six) hours as needed for Wheezing or Shortness of Breath. Rescue 8 g 3    ascorbic acid, vitamin C, (VITAMIN C) 1000 MG tablet Take 1,000 mg by mouth once daily.      cetirizine (ZYRTEC) 10 MG tablet Take 1 tablet (10 mg total) by mouth once daily. 90 tablet 3    cholecalciferol, vitamin D3, (VITAMIN D3) 50 mcg (2,000 unit) Cap Take 1 capsule by mouth once daily.      CRESTOR 20 mg tablet Take 10 mg by mouth once daily.      cyanocobalamin (VITAMIN B-12) 1000 MCG tablet Take 100 mcg by mouth once daily.      cyclobenzaprine (FLEXERIL) 5 MG tablet Take 1 tablet (5 mg total) by mouth nightly. 60 tablet 3    diphenhydrAMINE (BENADRYL) 25 mg capsule Take 25 mg by mouth every 6 (six) hours as needed for Itching.      fluticasone propionate (FLONASE) 50 mcg/actuation nasal spray 1 spray by Each Nostril route daily as needed for Rhinitis or Allergies.      hydrocortisone 2.5 % cream Apply 30 g/kg topically 2 (two) times daily as needed. Apply to face for dry skin      ketoconazole (NIZORAL) 2 % cream Apply topically daily as needed.      lisinopriL (PRINIVIL,ZESTRIL) 40 MG tablet Take 1 tablet (40 mg total) by mouth once daily. 90 tablet 3    magnesium oxide 420 mg Tab Take 420 mg by mouth once daily.      methenamine (HIPREX) 1 gram Tab Take 1 tablet (1 g total) by mouth 2 (two) times daily. 180 tablet 3    multivitamin Tab Take 1 tablet by mouth once daily. 90 tablet 3    omega-3 fatty acids/fish oil (FISH OIL-OMEGA-3 FATTY ACIDS) 300-1,000 mg capsule Take 1 capsule by mouth 2 (two) times a day.      omeprazole (PRILOSEC) 20 MG capsule Take 1 capsule (20 mg total) by mouth once daily. 90 capsule 3    pregabalin (LYRICA) 75 MG capsule TAKE ONE (1) CAPSULE BY MOUTH TWO (2)  TIMES DAILY. 60 capsule 5    tolterodine (DETROL LA) 4 MG 24 hr capsule Take one capsule by mouth once daily 90 capsule 3    triamcinolone acetonide 0.1% (KENALOG) 0.1 % cream Apply 15 g topically 2 (two) times daily as  needed. Apply to back for dry skin and itching      vitamin E 1000 UNIT capsule Take 1,000 Units by mouth once daily.      zinc gluconate 50 mg tablet Take 50 mg by mouth once daily.      amLODIPine (NORVASC) 5 MG tablet Take 2 tablets (10 mg total) by mouth once daily. 180 tablet 3    FLUoxetine 10 MG capsule Take 1 capsule (10 mg total) by mouth once daily. 90 capsule 3     No current facility-administered medications for this visit.       Labs:274}   I have reviewed labs below:    Lab Results   Component Value Date    WBC 8.46 2024    RBC 4.39 (L) 2024    HGB 13.4 (L) 2024    HCT 41.8 2024     2024     2024    K 4.2 2024     2024    CALCIUM 9.2 2024     (H) 2024    BUN 27 (H) 2024    CREATININE 1.35 (H) 2024    ESTGFRAFRICA 65 2020    EGFRNONAA 70 2022    ALT 21 06/10/2024    AST 17 06/10/2024    INR 0.91 06/10/2024    CHOL 140 05/15/2023    TRIG 105 05/15/2023    HDL 40 05/15/2023    LDLCALC 79 05/15/2023    TSH 1.950 2023    PSA 0.013 2024    HGBA1C 5.4 2023    MICROALBUR 2.5 2024       Medical History: 274}     Past Medical History:   Diagnosis Date    Cervical radiculopathy     Chronic pain     Chronic renal impairment     COVID-19 2022    Depression     Depressive disorder 2022    Digestive disorder     GERD (gastroesophageal reflux disease)     History of prediabetes 2022    Hypercholesteremia     Hypertension     Left ventricular hypertrophy     Lumbosacral spondylosis     Positive colorectal cancer screening using Cologuard test 12/15/2022    Prediabetes     Pulmonary nodules     repeat CT due 21      Social History     Tobacco Use   Smoking Status Former    Current packs/day: 0.00    Average packs/day: 2.0 packs/day for 30.0 years (60.0 ttl pk-yrs)    Types: Cigarettes, Pipe, Cigars    Quit date:     Years since quittin.0    Passive  exposure: Past   Smokeless Tobacco Never      Objective:  274}   Vital Signs  Temp: 97.5 °F (36.4 °C)  Temp Source: Oral  Pulse: 77  Resp: 20  SpO2: 95 %  BP: 113/66  BP Location: Left arm  Patient Position: Sitting  Pain Score:   1  Pain Loc: Knee  Height and Weight  Height: 6' (182.9 cm)  Weight: 90.7 kg (200 lb)  BSA (Calculated - sq m): 2.15 sq meters  BMI (Calculated): 27.1  Weight in (lb) to have BMI = 25: 183.9    Over the last two weeks how often have you been bothered by little interest or pleasure in doing things: 0  Over the last two weeks how often have you been bothered by feeling down, depressed or hopeless: 0  PHQ-2 Total Score: 0  PHQ-9 Score: 4  PHQ-9 Interpretation: Minimal or None    Wt Readings from Last 3 Encounters:   01/28/25 90.7 kg (200 lb)   12/09/24 92.1 kg (203 lb)   08/05/24 85.3 kg (188 lb)     Physical Exam  Vitals and nursing note reviewed.   Constitutional:       General: He is not in acute distress.     Appearance: Normal appearance. He is not ill-appearing.   HENT:      Head: Normocephalic.   Eyes:      Conjunctiva/sclera: Conjunctivae normal.   Neck:      Trachea: Trachea normal.   Cardiovascular:      Rate and Rhythm: Normal rate and regular rhythm.      Pulses: Normal pulses.      Heart sounds: Normal heart sounds.   Pulmonary:      Effort: Pulmonary effort is normal. No respiratory distress.      Breath sounds: Normal breath sounds. No wheezing, rhonchi or rales.   Musculoskeletal:      Cervical back: Neck supple.      Right lower leg: Edema (trace) present.      Left lower leg: Edema (trace) present.   Lymphadenopathy:      Cervical: No cervical adenopathy.   Skin:     General: Skin is warm and dry.      Coloration: Skin is not jaundiced or pale.   Neurological:      Mental Status: He is alert and oriented to person, place, and time.      Motor: Weakness (mild generalized) present.      Gait: Gait abnormal (mild limp on LLE but not utilizing an assistive device).    Psychiatric:         Mood and Affect: Mood normal.         Behavior: Behavior normal.         Thought Content: Thought content normal.         Judgment: Judgment normal.          Assessment and Plan: 274}       1. Essential hypertension  -     Comprehensive Metabolic Panel; Future; Expected date: 01/28/2025  -     amLODIPine (NORVASC) 5 MG tablet; Take 2 tablets (10 mg total) by mouth once daily.  Dispense: 180 tablet; Refill: 3    2. Moderately severe recurrent major depression  -     FLUoxetine 10 MG capsule; Take 1 capsule (10 mg total) by mouth once daily.  Dispense: 90 capsule; Refill: 3    3. Chronic kidney disease, stage 3a  Assessment & Plan:  BMP  Lab Results   Component Value Date     07/17/2024    K 4.2 07/17/2024     07/17/2024    CO2 26 07/17/2024    BUN 27 (H) 07/17/2024    CREATININE 1.35 (H) 07/17/2024    CALCIUM 9.2 07/17/2024    ANIONGAP 10 07/17/2024    EGFRNORACEVR 55 (L) 07/17/2024     Stable, continue monitoring.  Avoid nephrotoxins including NSAIDs.    Orders:  -     CBC Auto Differential; Future; Expected date: 01/28/2025  -     Comprehensive Metabolic Panel; Future; Expected date: 01/28/2025  -     Microalbumin/Creatinine Ratio, Urine; Future; Expected date: 01/28/2025  -     Iron and TIBC; Future; Expected date: 01/28/2025  -     Ferritin; Future; Expected date: 01/28/2025  -     Vitamin B12 & Folate; Future; Expected date: 01/28/2025    4. Other emphysema    5. Anemia of chronic disease  -     CBC Auto Differential; Future; Expected date: 01/28/2025  -     Iron and TIBC; Future; Expected date: 01/28/2025  -     Ferritin; Future; Expected date: 01/28/2025  -     Vitamin B12 & Folate; Future; Expected date: 01/28/2025    6. Other long term (current) drug therapy  -     Comprehensive Metabolic Panel; Future; Expected date: 01/28/2025  -     TSH; Future; Expected date: 01/28/2025  -     Hemoglobin A1C; Future; Expected date: 01/28/2025  -     Vitamin B12 & Folate; Future;  Expected date: 01/28/2025    7. Other hyperlipidemia  -     Comprehensive Metabolic Panel; Future; Expected date: 01/28/2025  -     Lipid Panel; Future; Expected date: 01/28/2025        Assessment & Plan    IMPRESSION:  - Assessed knee pain, noting significant morning stiffness and difficulty walking  - Reviewed upcoming knee surgery, postponed from March to June  - Evaluated need for pre-operative labs, deciding to proceed with kidney function test due to extended surgery timeline  - Considered cholesterol check due to last lipid panel being from May 2023  - Noted improved sleep with CPAP use for sleep apnea  - Reviewed urological findings, including bladder pockets causing infection  - Assessed medication needs, noting amlodipine requires refill  - Confirmed effectiveness of fluoxetine (Prozac) for depression    MODERATELY SEVERE RECURRENT MAJOR DEPRESSION:  - Continue fluoxetine (Prozac) for depression management.  - Patient reports improved sleep with this medication.  - Instructed patient to report any changes in mood or side effects.    OTHER EMPHYSEMA:  - Continue follow-up with Dr. Rosalia Preciado for lung-related issues.  - Maintain CPAP use for sleep apnea management, which the patient reports has improved their sleep quality.    CHRONIC KIDNEY DISEASE, STAGE 3A:  - Order kidney function test as part of a complete lab panel.    HYPERTENSION:  - Refill amlodipine prescription.  - Well controlled with current treatment.    KNEE PAIN:  - Schedule pre-operative appointment once knee surgery date is confirmed in June.  - Patient to contact office for this appointment before surgery.  - Advised caution when getting up to avoid falls due to knee pain, especially in the morning.  - Patient reports difficulty walking and swelling in the left knee.  - Acknowledged severity of the condition.    ALLERGIES:  - Continue Z[facility]tec through [facility] for allergy management.    LABS:  - Order complete lab panel, including lipid  profile.    FOLLOW UP:  - Follow up on Medicare annual wellness visit scheduled for July.         Signature:  MOLLY Murry-BC    Future Appointments   Date Time Provider Department Center   2/5/2025  2:20 PM Stephen Horton NP [facility] UROL [facility]   4/9/2025 10:00 AM Rosalia Preciado MD [facility]  PULM [facility]   7/23/2025  8:00 AM AWHELEN NURSE, [facility] FAMILY MEDICINE [facility] BRYANT Sweet   12/9/2025  9:30 AM Enrico Jauregui FNP [facility] NEURO [facility]     This note was generated with the assistance of ambient listening technology. Verbal consent was obtained by the patient and accompanying visitor(s) for the recording of patient appointment to facilitate this note. I attest to having reviewed and edited the generated note for accuracy, though some syntax or spelling errors may persist. Please contact the author of this note for any clarification.

## 2025-01-29 NOTE — ASSESSMENT & PLAN NOTE
BMP  Lab Results   Component Value Date     07/17/2024    K 4.2 07/17/2024     07/17/2024    CO2 26 07/17/2024    BUN 27 (H) 07/17/2024    CREATININE 1.35 (H) 07/17/2024    CALCIUM 9.2 07/17/2024    ANIONGAP 10 07/17/2024    EGFRNORACEVR 55 (L) 07/17/2024     Stable, continue monitoring.  Avoid nephrotoxins including NSAIDs.

## 2025-02-04 ENCOUNTER — OFFICE VISIT (OUTPATIENT)
Dept: FAMILY MEDICINE | Facility: CLINIC | Age: 75
End: 2025-02-04
Payer: MEDICARE

## 2025-02-04 VITALS
WEIGHT: 197 LBS | TEMPERATURE: 99 F | BODY MASS INDEX: 26.68 KG/M2 | HEIGHT: 72 IN | DIASTOLIC BLOOD PRESSURE: 79 MMHG | HEART RATE: 83 BPM | OXYGEN SATURATION: 98 % | SYSTOLIC BLOOD PRESSURE: 136 MMHG | RESPIRATION RATE: 18 BRPM

## 2025-02-04 DIAGNOSIS — J02.9 ACUTE PHARYNGITIS, UNSPECIFIED ETIOLOGY: Primary | ICD-10-CM

## 2025-02-04 DIAGNOSIS — G89.4 CHRONIC PAIN SYNDROME: Chronic | ICD-10-CM

## 2025-02-04 DIAGNOSIS — J06.9 UPPER RESPIRATORY TRACT INFECTION, UNSPECIFIED TYPE: ICD-10-CM

## 2025-02-04 DIAGNOSIS — M47.817 SPONDYLOSIS OF LUMBOSACRAL REGION WITHOUT MYELOPATHY OR RADICULOPATHY: Chronic | ICD-10-CM

## 2025-02-04 LAB
CTP QC/QA: YES
MOLECULAR STREP A: NEGATIVE
POC MOLECULAR INFLUENZA A AGN: NEGATIVE
POC MOLECULAR INFLUENZA B AGN: NEGATIVE
SARS-COV-2 RDRP RESP QL NAA+PROBE: NEGATIVE

## 2025-02-04 PROCEDURE — 87502 INFLUENZA DNA AMP PROBE: CPT | Mod: RHCUB | Performed by: NURSE PRACTITIONER

## 2025-02-04 PROCEDURE — 99214 OFFICE O/P EST MOD 30 MIN: CPT | Mod: ,,, | Performed by: NURSE PRACTITIONER

## 2025-02-04 PROCEDURE — 87651 STREP A DNA AMP PROBE: CPT | Mod: RHCUB | Performed by: NURSE PRACTITIONER

## 2025-02-04 PROCEDURE — 87635 SARS-COV-2 COVID-19 AMP PRB: CPT | Mod: RHCUB | Performed by: NURSE PRACTITIONER

## 2025-02-04 RX ORDER — NYSTATIN 100000 [USP'U]/ML
5 SUSPENSION ORAL 4 TIMES DAILY
Qty: 200 ML | Refills: 0 | Status: SHIPPED | OUTPATIENT
Start: 2025-02-04 | End: 2025-02-04

## 2025-02-04 RX ORDER — NYSTATIN 100000 [USP'U]/ML
5 SUSPENSION ORAL 4 TIMES DAILY
Qty: 200 ML | Refills: 0 | Status: SHIPPED | OUTPATIENT
Start: 2025-02-04 | End: 2025-02-14

## 2025-02-04 NOTE — PROGRESS NOTES
[facility]  [address] [place] [address]  Phone: [phone]  Fax: [phone]       PATIENT NAME: Tray Jalloh   : 1950    AGE: 74 y.o. DATE OF ENCOUNTER: 25    MRN: [id number]      PCP: Elizabeth Lange FNP    Subjective:   CHANGE CHIEF COMPLAINT      :[id number]}274}  Chief Complaint   Patient presents with    URI     Patient presents to clinic with complaints of sore throat, headache, congestion times 3 days.      History of Present Illness    HPI:  Patient reports symptom onset 2 days ago, beginning with a sore throat initially attributed to excessive vocalization. Symptoms progressed to include generalized malaise and significant fatigue, resulting in unexpected periods of sleep and difficulty maintaining wakefulness. Patient did not use their CPAP machine last night to assess if it was contributing to the sore throat. Symptoms persisted this morning, particularly the sore throat. Patient reports nasal congestion or sinus pain. Patient has a cough attributed to throat irritation rather than a separate symptom. The condition appears more consistent with thrush than a bacterial infection.    Patient denies fever and body aches beyond baseline. Recent tests for COVID-19, influenza, and strep throat were negative.    Patient asking about clarification of his chronic pain diagnosis [facility] to [facility].    ROS:  Constitutional: -fevers, +fatigue  ENT: +nasal congestion, +sore throat  Respiratory: +cough  Musculoskeletal: -muscle pain  Psychiatric: +sleep difficulty         Allergies and Meds: 274}     Review of patient's allergies indicates:   Allergen Reactions    Bee pollen     Grass pollen- grass standard         Current Outpatient Medications   Medication Sig Dispense Refill    acetaminophen (TYLENOL) 325 MG tablet Take 325 mg by mouth every 6 (six) hours as needed for Pain.      albuterol (VENTOLIN HFA) 90 mcg/actuation inhaler Inhale 2 puffs into  the lungs every 6 (six) hours as needed for Wheezing or Shortness of Breath. Rescue 8 g 3    amLODIPine (NORVASC) 5 MG tablet Take 2 tablets (10 mg total) by mouth once daily. 180 tablet 3    ascorbic acid, vitamin C, (VITAMIN C) 1000 MG tablet Take 1,000 mg by mouth once daily.      cetirizine (ZYRTEC) 10 MG tablet Take 1 tablet (10 mg total) by mouth once daily. 90 tablet 3    cholecalciferol, vitamin D3, (VITAMIN D3) 50 mcg (2,000 unit) Cap Take 1 capsule by mouth once daily.      CRESTOR 20 mg tablet Take 10 mg by mouth once daily.      cyanocobalamin (VITAMIN B-12) 1000 MCG tablet Take 100 mcg by mouth once daily.      cyclobenzaprine (FLEXERIL) 5 MG tablet Take 1 tablet (5 mg total) by mouth nightly. 60 tablet 3    diphenhydrAMINE (BENADRYL) 25 mg capsule Take 25 mg by mouth every 6 (six) hours as needed for Itching.      FLUoxetine 10 MG capsule Take 1 capsule (10 mg total) by mouth once daily. 90 capsule 3    fluticasone propionate (FLONASE) 50 mcg/actuation nasal spray 1 spray by Each Nostril route daily as needed for Rhinitis or Allergies.      hydrocortisone 2.5 % cream Apply 30 g/kg topically 2 (two) times daily as needed. Apply to face for dry skin      ketoconazole (NIZORAL) 2 % cream Apply topically daily as needed.      lisinopriL (PRINIVIL,ZESTRIL) 40 MG tablet Take 1 tablet (40 mg total) by mouth once daily. 90 tablet 3    magnesium oxide 420 mg Tab Take 420 mg by mouth once daily.      methenamine (HIPREX) 1 gram Tab Take 1 tablet (1 g total) by mouth 2 (two) times daily. 180 tablet 3    multivitamin Tab Take 1 tablet by mouth once daily. 90 tablet 3    omega-3 fatty acids/fish oil (FISH OIL-OMEGA-3 FATTY ACIDS) 300-1,000 mg capsule Take 1 capsule by mouth 2 (two) times a day.      omeprazole (PRILOSEC) 20 MG capsule Take 1 capsule (20 mg total) by mouth once daily. 90 capsule 3    pregabalin (LYRICA) 75 MG capsule TAKE ONE (1) CAPSULE BY MOUTH TWO (2)  TIMES DAILY. 60 capsule 5    tolterodine  (DETROL LA) 4 MG 24 hr capsule Take one capsule by mouth once daily 90 capsule 3    triamcinolone acetonide 0.1% (KENALOG) 0.1 % cream Apply 15 g topically 2 (two) times daily as needed. Apply to back for dry skin and itching      vitamin E 1000 UNIT capsule Take 1,000 Units by mouth once daily.      zinc gluconate 50 mg tablet Take 50 mg by mouth once daily.      nystatin (MYCOSTATIN) 100,000 unit/mL suspension Take 5 mLs (500,000 Units total) by mouth 4 (four) times daily. for 10 days 200 mL 0     No current facility-administered medications for this visit.       Labs:274}   I have reviewed labs below:    Lab Results   Component Value Date    WBC 6.80 01/28/2025    RBC 4.22 (L) 01/28/2025    HGB 12.8 (L) 01/28/2025    HCT 40.3 01/28/2025     01/28/2025     01/28/2025    K 4.0 01/28/2025     (H) 01/28/2025    CALCIUM 9.0 01/28/2025    GLU 95 01/28/2025    BUN 25 01/28/2025    CREATININE 1.32 (H) 01/28/2025    ESTGFRAFRICA 65 12/03/2020    EGFRNONAA 70 05/12/2022    ALT 8 01/28/2025    AST 14 01/28/2025    INR 0.91 06/10/2024    CHOL 106 01/28/2025    TRIG 132 01/28/2025    HDL 30 (L) 01/28/2025    LDLCALC 50 01/28/2025    TSH 1.280 01/28/2025    PSA 0.013 07/17/2024    HGBA1C 5.8 01/28/2025    MICROALBUR 1.5 01/28/2025       Point Of Care Testing (if applicable):  Lab Results   Component Value Date    [id number] Negative 02/04/2025    FLUAMOLEC Negative 02/04/2025    FLUBMOLEC Negative 02/04/2025    MOLSTREPAPOC Negative 02/04/2025     Any diagnostic testing results obtained in office or prior to appointment were reviewed were reviewed with patient.    Medical History: 274}     Past Medical History:   Diagnosis Date    Cervical radiculopathy     Chronic pain     Chronic renal impairment     COVID-19 12/27/2022    Depression     Depressive disorder 05/12/2022    Digestive disorder     GERD (gastroesophageal reflux disease)     History of prediabetes 11/16/2022    Hypercholesteremia      Hypertension     Left ventricular hypertrophy     Lumbosacral spondylosis     Positive colorectal cancer screening using Cologuard test 12/15/2022    Prediabetes     Pulmonary nodules     repeat CT due 21      Social History     Tobacco Use   Smoking Status Former    Current packs/day: 0.00    Average packs/day: 2.0 packs/day for 30.0 years (60.0 ttl pk-yrs)    Types: Cigarettes, Pipe, Cigars    Quit date:     Years since quittin.1    Passive exposure: Past   Smokeless Tobacco Never        Objective:  274}   Vital Signs  Temp: 98.6 °F (37 °C)  Temp Source: Oral  Pulse: 83  Resp: 18  SpO2: 98 %  BP: 136/79  BP Location: Left arm  Patient Position: Sitting  Height and Weight  Height: 6' (182.9 cm)  Weight: 89.4 kg (197 lb)  BSA (Calculated - sq m): 2.13 sq meters  BMI (Calculated): 26.7  Weight in (lb) to have BMI = 25: 183.9    Over the last two weeks how often have you been bothered by little interest or pleasure in doing things: 0  Over the last two weeks how often have you been bothered by feeling down, depressed or hopeless: 0  PHQ-2 Total Score: 0  PHQ-9 Score: 4  PHQ-9 Interpretation: Minimal or None    Wt Readings from Last 3 Encounters:   25 89.4 kg (197 lb)   25 90.7 kg (200 lb)   24 92.1 kg (203 lb)     Physical Exam  Vitals and nursing note reviewed.   Constitutional:       General: He is not in acute distress.     Appearance: Normal appearance. He is ill-appearing.   HENT:      Head: Normocephalic.      Right Ear: Tympanic membrane, ear canal and external ear normal.      Left Ear: Tympanic membrane, ear canal and external ear normal.      Nose: No rhinorrhea.      Mouth/Throat:      Mouth: Mucous membranes are moist.      Pharynx: Posterior oropharyngeal erythema present. No oropharyngeal exudate.   Eyes:      Conjunctiva/sclera: Conjunctivae normal.   Cardiovascular:      Rate and Rhythm: Normal rate and regular rhythm.      Heart sounds: Normal heart sounds.    Pulmonary:      Effort: Pulmonary effort is normal. No respiratory distress.      Breath sounds: Normal breath sounds.   Musculoskeletal:      Cervical back: Neck supple.   Skin:     General: Skin is warm and dry.   Neurological:      Mental Status: He is alert and oriented to person, place, and time.          Assessment and Plan: 274}       1. Acute pharyngitis, unspecified etiology  -     Discontinue: nystatin (MYCOSTATIN) 100,000 unit/mL suspension; Take 5 mLs (1 teaspoonfull) by mouth 4 (four) times daily. for 10 days .... shake well and swallow  Dispense: 200 mL; Refill: 0  -     nystatin (MYCOSTATIN) 100,000 unit/mL suspension; Take 5 mLs (500,000 Units total) by mouth 4 (four) times daily. for 10 days  Dispense: 200 mL; Refill: 0    2. Upper respiratory tract infection, unspecified type  -     POCT Strep A, Molecular  -     POCT COVID-19 Rapid Screening  -     POCT Influenza A/B Molecular    3. 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 Chronic pain syndrome        Assessment & Plan    IMPRESSION:  - Assessed patient's sore throat and oral cavity, noting inflammation, ulcerations, and white patches suggestive of thrush rather than bacterial infection  - Determined antibiotic treatment likely ineffective; opted for antifungal approach  - Considered patient's existing diagnoses (spondylosis, thoracogenic scoliosis, lumbosacral radiculopathy, multi-joint degenerative joint disease) as rationale for chronic pain, concluding pain is somatic in nature rather than psychosomatic    ORAL THRUSH:  - Examined the patient's throat and observed inflammation, ulcerations, and white patches resembling a yeast infection.  - Assessed the condition as a yeast infection rather than a bacterial infection requiring antibiotics.  - Prescribed Nystatin Swish and Swallow for treatment.  - Suggested using peroxide rinse as an alternative treatment.  - Instructed the patient to rinse and gargle with  peroxide at home, then spit it out.  - Advised the patient to swish, gargle, and swallow the Nystatin medication.  - Recommend follow-up if sore throat does not improve after prescribed treatment.  - Examined the patient's throat and observed inflammation and ulcerations.  - Prescribed Nystatin Swish and Swallow for treatment of oral thrush.    LUMBOSACRAL RADICULOPATHY:  - Acknowledged the documented diagnosis of lumbosacral radiculopathy.  - Assessed that the patient's pain is due to actual physical conditions including lumbosacral radiculopathy.    SPONDYLOSIS:  - Acknowledged the documented diagnosis of spondylosis in lumbosacral spine.  - Assessed that the patient's pain is due to actual physical conditions including spondylosis.    THORACOGENIC SCOLIOSIS:  - Noted the patient's recent discovery of severe spinal curvature.  - Acknowledged the documented diagnosis of thoracogenic scoliosis.  - Assessed that the patient's pain is due to actual physical conditions including thoracogenic scoliosis.    MULTI-JOINT DEGENERATIVE JOINT DISEASE:  - Acknowledged that the patient has multi-joint degenerative joint disease.  - Assessed that the patient's pain is due to actual physical conditions including multi-joint degenerative joint disease.    RIGHT KNEE REPLACEMENT:  - Noted that the patient reports having had right knee replacement.  - Noted that the patient will soon have left knee replacement.    CHRONIC PAIN:  - Explained that the patient's pain originates from documented physical conditions, not psychological factors.  - Assessed that the patient's pain is not psychosomatic but due to actual physical conditions.  - Discussed that chronic pain can affect mood.  - Acknowledged that the patient has a degree of depression related to chronic pain.  - Explained the difference between somatic pain (pain with a physical cause) and psychosomatic conditions.    SPINAL INJURY:  - Noted the patient's history of hairline  fracture in spine from injury during  service.    FOLLOW UP:  - Follow up as scheduled for planned visit.         Signature:  MOLLY Murry-BC    Future Appointments   Date Time Provider Department Center   2/5/2025  2:20 PM Stephen Horton NP [facility] UROL [facility]   2/18/2025 11:30 AM [facility] US1 [facility] USIC [facility] Lisa   4/9/2025 10:00 AM Rosalia Preciado MD [facility]  PULM [facility]   7/23/2025  8:00 AM AWV NURSE, [facility] FAMILY MEDICINE [facility] BRYANT Pacheco Micki   12/9/2025  9:30 AM Enrico Jauregui FNP [facility] NEURO [facility]     This note was generated with the assistance of ambient listening technology. Verbal consent was obtained by the patient and accompanying visitor(s) for the recording of patient appointment to facilitate this note. I attest to having reviewed and edited the generated note for accuracy, though some syntax or spelling errors may persist. Please contact the author of this note for any clarification.

## 2025-02-04 NOTE — PROGRESS NOTES
Subjective     Patient ID: Tray Jalloh is a 74 y.o. male.    Chief Complaint:  Recurrent UTIs with a history of prostate cancer    This pleasant 74 year old male presents to clinic for follow-up of recurrent UTIs and history of prostate cancer.  Patient states he is doing great today.  He denies any new urological complaints.  Patient states he had a radical prostatectomy by Dr. Reid and  in 1996 for prostate cancer.  He reports his PSAs are now monitored by the [facility].  He is currently on Hiprex 1 g twice a day for recurrent UTI suppression and Detrol LA 4 mg for urge incontinence.  He is tolerating these medicines without side effects.  Patient underwent urological workup renal ultrasound and cystoscopy on August 5, 2024.  The renal ultrasound showed no evidence of abnormality demonstrated.  The cystoscopy revealed 2 bladder diverticula.  Dr. Sanders felt this may be the source of his recurrent urinary tract infections.  He recommended Hiprex long-term. He is pleased with the way he is voiding.   He reports nocturia 0-3 times a night if not wearing his CPAP and urge incontinence.  He denies dysuria, hematuria, incomplete bladder emptying, frequency, or urgency.  He denies fever, chills, nausea, or vomiting.  He denies any  pains.  He denies any signs or symptoms of a UTI.  Through shared decision-making with the patient, he [facility]. he desires to continue the Hiprex 1 g twice a day and Detrol LA 4 mg 1 daily as prescribed.  I discussed the plan in detail with the patient and he is in agreement with the plan.  All his questions were answered at today's visit.  I spent 30 minutes counseling this patient, reviewing the chart, imaging and lab.     Urine culture history:   Urine culture grew  >100,000 Streptococcus agalactiae (Group B) And 20,000 Gram-negative Bacilli on July 27, 2024   Urine culture grew 8,000 Escherichia coli on 05/30/2024  Urine  culture grew >100,000 Escherichia coli on 05/12/2024   -----------------------------------------------------------------------------------------------------------------------  [February 5, 2025].                      Review of Systems   Constitutional:  Negative for activity change and fever.   HENT:  Negative for hearing loss and trouble swallowing.    Eyes:  Negative for visual disturbance.   Respiratory:  Negative for cough, shortness of breath and wheezing.    Cardiovascular:  Negative for chest pain.   Gastrointestinal:  Negative for abdominal pain, diarrhea, nausea and vomiting.   Endocrine: Negative for polyuria.   Genitourinary:  Positive for bladder incontinence. Negative for decreased urine volume, difficulty urinating, discharge, dysuria, enuresis, erectile dysfunction, flank pain, frequency, genital sores, hematuria, penile pain, penile swelling, scrotal swelling, testicular pain and urgency.        History of prostate cancer        Recurrent UTI        Nocturia        Bladder diverticula seen on cystoscopy August 5, 2024           Musculoskeletal:  Negative for back pain and gait problem.   Integumentary:  Negative for rash.   Neurological:  Negative for speech difficulty and weakness.   Psychiatric/Behavioral:  Negative for behavioral problems and confusion.           Objective     Physical Exam  Vitals and nursing note reviewed.   Constitutional:       General: He is not in acute distress.     Appearance: Normal appearance. He is not ill-appearing, toxic-appearing or diaphoretic.   HENT:      Head: Normocephalic.   Eyes:      Extraocular Movements: Extraocular movements intact.   Cardiovascular:      Rate and Rhythm: Normal rate and regular rhythm.      Heart sounds: Normal heart sounds.   Pulmonary:      Effort: Pulmonary effort is normal. No respiratory distress.      Breath sounds: Normal breath sounds. No wheezing, rhonchi or rales.   Abdominal:      General: Bowel sounds are normal.       Palpations: Abdomen is soft.      Tenderness: There is no abdominal tenderness. There is no right CVA tenderness, left CVA tenderness, guarding or rebound.   Musculoskeletal:         General: Normal range of motion.      Cervical back: Normal range of motion. No rigidity.   Skin:     General: Skin is warm and dry.   Neurological:      General: No focal deficit present.      Mental Status: He is alert and oriented to person, place, and time.      Motor: No weakness.      Coordination: Coordination normal.      Gait: Gait normal.   Psychiatric:         Mood and Affect: Mood normal.         Behavior: Behavior normal.         Thought Content: Thought content normal.          Assessment and Plan     1. History of prostate cancer    2. Nocturia    3. Diverticula, bladder    4. Urge incontinence of urine    5. Recurrent UTI             1. Continue methenamine hippurate (Hiprex) 1 g 1 tablet twice a day for UTI suppression  2. Continue tolterodine (Detrol LA) 4 mg 1 capsule daily  3. [facility]  4. Follow-up with urology in 6 months

## 2025-02-05 ENCOUNTER — OFFICE VISIT (OUTPATIENT)
Dept: UROLOGY | Facility: CLINIC | Age: 75
End: 2025-02-05
Payer: MEDICARE

## 2025-02-05 VITALS
HEART RATE: 82 BPM | BODY MASS INDEX: 26.72 KG/M2 | WEIGHT: 197 LBS | SYSTOLIC BLOOD PRESSURE: 129 MMHG | OXYGEN SATURATION: 96 % | DIASTOLIC BLOOD PRESSURE: 72 MMHG

## 2025-02-05 DIAGNOSIS — N39.0 RECURRENT UTI: Chronic | ICD-10-CM

## 2025-02-05 DIAGNOSIS — Z85.46 HISTORY OF PROSTATE CANCER: Primary | Chronic | ICD-10-CM

## 2025-02-05 DIAGNOSIS — R35.1 NOCTURIA: ICD-10-CM

## 2025-02-05 DIAGNOSIS — N39.41 URGE INCONTINENCE OF URINE: ICD-10-CM

## 2025-02-05 DIAGNOSIS — N32.3 DIVERTICULA, BLADDER: ICD-10-CM

## 2025-02-05 PROBLEM — H90.5 SENSORINEURAL HEARING LOSS: Status: ACTIVE | Noted: 2025-01-13

## 2025-02-05 PROCEDURE — 99214 OFFICE O/P EST MOD 30 MIN: CPT | Mod: S$PBB,,, | Performed by: NURSE PRACTITIONER

## 2025-02-05 PROCEDURE — 99999 PR PBB SHADOW E&M-EST. PATIENT-LVL V: CPT | Mod: PBBFAC,,, | Performed by: NURSE PRACTITIONER

## 2025-02-05 PROCEDURE — 99215 OFFICE O/P EST HI 40 MIN: CPT | Mod: PBBFAC | Performed by: NURSE PRACTITIONER

## 2025-02-05 NOTE — PATIENT INSTRUCTIONS
1. Continue methenamine hippurate (Hiprex) 1 g 1 tablet twice a day for UTI suppression  2. Continue tolterodine (Detrol LA) 4 mg 1 capsule daily  3. [facility]  4. Follow-up with urology in 6 months

## 2025-02-18 ENCOUNTER — TELEPHONE (OUTPATIENT)
Dept: CARDIOLOGY | Facility: HOSPITAL | Age: 75
End: 2025-02-18
Payer: MEDICARE

## 2025-02-18 ENCOUNTER — HOSPITAL ENCOUNTER (OUTPATIENT)
Dept: RADIOLOGY | Facility: HOSPITAL | Age: 75
Discharge: HOME | End: 2025-02-18
Attending: INTERNAL MEDICINE
Payer: MEDICARE

## 2025-02-18 DIAGNOSIS — F17.200 SMOKER: ICD-10-CM

## 2025-02-18 PROCEDURE — 76775 US EXAM ABDO BACK WALL LIM: CPT | Mod: TC

## 2025-02-18 NOTE — TELEPHONE ENCOUNTER
Called to remind patient of upcoming Joint Class, tomorrow, 02/19/25, at 1200; verbalized understanding, but states he rescheduled his surgery for 06/04/25, and would like to attend a later class. Informed patient that doctor's office should set him up a later date for Joint Class and he can attend then; verbalized understanding.

## 2025-04-09 ENCOUNTER — OFFICE VISIT (OUTPATIENT)
Dept: PULMONOLOGY | Facility: CLINIC | Age: 75
End: 2025-04-09
Payer: MEDICARE

## 2025-04-09 VITALS
HEIGHT: 72 IN | HEART RATE: 78 BPM | RESPIRATION RATE: 18 BRPM | DIASTOLIC BLOOD PRESSURE: 68 MMHG | BODY MASS INDEX: 27.09 KG/M2 | SYSTOLIC BLOOD PRESSURE: 114 MMHG | WEIGHT: 200 LBS | OXYGEN SATURATION: 95 %

## 2025-04-09 DIAGNOSIS — R91.1 PULMONARY NODULE: ICD-10-CM

## 2025-04-09 DIAGNOSIS — J43.8 OTHER EMPHYSEMA: Primary | ICD-10-CM

## 2025-04-09 PROCEDURE — 99213 OFFICE O/P EST LOW 20 MIN: CPT | Mod: S$PBB,,, | Performed by: STUDENT IN AN ORGANIZED HEALTH CARE EDUCATION/TRAINING PROGRAM

## 2025-04-09 PROCEDURE — 99215 OFFICE O/P EST HI 40 MIN: CPT | Mod: PBBFAC | Performed by: STUDENT IN AN ORGANIZED HEALTH CARE EDUCATION/TRAINING PROGRAM

## 2025-04-09 PROCEDURE — 99999 PR PBB SHADOW E&M-EST. PATIENT-LVL V: CPT | Mod: PBBFAC,,, | Performed by: STUDENT IN AN ORGANIZED HEALTH CARE EDUCATION/TRAINING PROGRAM

## 2025-04-09 NOTE — PROGRESS NOTES
[facility]  Pulmonology  ESTABLISHED VISIT     Patient Name:  Tray Jalloh  Primary Care Provider: Elizabeth Lange FNP  Date of Service: 04/09/2025    Chief Complaint:  Shortness of breath with walking    SUBJECTIVE   HPI:  Tray Jalloh is a 74 y.o. male with prior nicotine dependence (quit 2001), COPD (diagnosed remotely) on prior history of prostate cancer who presents today for follow up visit. Last seen 01/2024 with plan for CBC and CT Chest non conKiet Jalloh reports feeling well on this assessment.  He has had intermittent shortness of breath with exertion.  He has using his albuterol inhaler intermittently as needed.  He has had no ED presentations for respiratory symptoms nor has he had any hospitalizations for any breathing symptoms.  He has planned allergy shots today in preparation of the season.  He is planned for knee surgery to his left knee later this year.    Initial HPI:  Shubham reports having shortness of breath that is present on exertion.  This has gotten worse in the past 2 years. He describes his dyspnea as inability to taking a deep breath.  It is present with walking short distances less than 50 ft.  He typically has to rest to regain his breath.  He is currently managed with Breztri which he sometimes forgets to take his dosing.  He has an infrequent cough that is nonproductive. He exercises regularly which include stationary calisthenics without any dyspnea during these activities.  Of note, he had COVID 19 infection in 12/2022. He is established with Cardiology and underwent nuclear stress test that was normal with no ischemic changes.  04/2024: reports feeling well on this evaluation. He endorses forgetfulness that is worse in the past few months. He has shortness of breath with exertion. He continues to exercise with stationary bicycle. He has had no interval presentations to the ED or hospitalizations.      Past Medical History:   Diagnosis Date     Cervical radiculopathy     Chronic pain     Chronic renal impairment     COVID-19 12/27/2022    Depression     Depressive disorder 05/12/2022    Digestive disorder     GERD (gastroesophageal reflux disease)     History of prediabetes 11/16/2022    Hypercholesteremia     Hypertension     Left ventricular hypertrophy     Lumbosacral spondylosis     Positive colorectal cancer screening using Cologuard test 12/15/2022    Prediabetes     Pulmonary nodules     repeat CT due 12/03/21       Past Surgical History:   Procedure Laterality Date    ABDOMINAL SURGERY      inguinal hernia repair    BACK SURGERY      Bilateral L3-S1 MBB Bilateral 2-, 1-, 1-, 12-    Dr Jorge Parker    EYE SURGERY      HERNIA REPAIR      JOINT REPLACEMENT  2024    KNEE ARTHROSCOPY W/ MENISCAL REPAIR Right     KNEE ARTHROSCOPY W/ MENISCECTOMY Left 01/11/2023    Procedure: ARTHROSCOPY, KNEE, WITH MENISCECTOMY;  Surgeon: Dada Enriquez MD;  Location: [facility] OR;  Service: Orthopedics;  Laterality: Left;    KNEE SURGERY Left     LATERAL RETINACULA RELEASE OF KNEE Left 01/11/2023    Procedure: ARTHROSCOPIC RELEASE, KNEE, LATERAL RETINACULA;  Surgeon: Dada Enriquez MD;  Location: [facility] OR;  Service: Orthopedics;  Laterality: Left;    PROSTATECTOMY  03/1996    prostate cancer    RADIOFREQUENCY ABLATION OF LUMBAR MEDIAL BRANCH NERVE AT SINGLE LEVEL Right 04/01/2021    Procedure: Radiofrequency Ablation, Nerve, Spinal, Lumbar, Medial Branch, 1 Level;  Surgeon: Jordana Patel MD;  Location: [facility];  Service: Pain Management;  Laterality: Right;  Right L3-S1 RFTC    RADIOFREQUENCY ABLATION OF LUMBAR MEDIAL BRANCH NERVE AT SINGLE LEVEL Right 02/09/2023    Procedure: Radiofrequency Ablation, Nerve, Spinal, Lumbar, Medial Branch, Level L4-S1;  Surgeon: Jordana Patel MD;  Location: [facility];  Service: Pain Management;  Laterality: Right;  bonita left after right is done    RADIOFREQUENCY  ABLATION OF LUMBAR MEDIAL BRANCH NERVE AT SINGLE LEVEL Left 02/28/2023    Procedure: RADIOFREQUENCY ABLATION, NERVE, SPINAL, LUMBAR, MEDIAL BRANCH, 1 LEVEL;  Surgeon: Jordana Patel MD;  Location: [facility];  Service: Pain Management;  Laterality: Left;    RADIOFREQUENCY THERMOCOAGULATION Bilateral 03/20/2012    Dr Parker    RADIOFREQUENCY THERMOCOAGULATION Left 03/18/2021    Procedure: RADIOFREQUENCY THERMAL COAGULATION;  Surgeon: Jordana Patel MD;  Location: [facility];  Service: Pain Management;  Laterality: Left;  BILATERAL L3-S1 RFTC LEFT SIDE FIRST    ROBOTIC ARTHROPLASTY, KNEE Right 06/19/2024    Procedure: ROBOTIC ARTHROPLASTY, KNEE, TOTAL;  Surgeon: Dada Enriquez MD;  Location: [facility];  Service: Orthopedics;  Laterality: Right;    SELECTIVE INJECTION OF ANESTHETIC AGENT AROUND LUMBAR SPINAL NERVE ROOT BY TRANSFORAMINAL APPROACH Right 01/13/2022    Procedure: Right L4-5,5-S1 TFESI;  Surgeon: Jordana Patel MD;  Location: [facility];  Service: Pain Management;  Laterality: Right;  PT AWARE TO BE TESTED VIA PC    SHOULDER ARTHROSCOPY Right     SPINE SURGERY  1995    VASECTOMY  Mar 1996       Family History   Problem Relation Name Age of Onset    Diabetes Mother Brittany Shaygonzalo     Arthritis Mother Brittany Jalloh     Hypertension Father Erik Tannermadhavi     Heart disease Father Erik Jalloh         CABG    Stroke Father Erik Jalloh     Alzheimer's disease Maternal Grandmother      Cancer Maternal Grandfather Dayton Zambrano     Diabetes Paternal Grandmother Joselyn Shaygonzalo     Heart attack Paternal Grandfather Dayton Zambrano     Cancer Paternal Grandfather Dayton Zambrano     Cancer Other      Diabetes Other      Heart disease Other      Hypertension Other      Stroke Other          Social History     Socioeconomic History    Marital status:    Tobacco Use    Smoking status: Former     Current packs/day: 0.00     Average  packs/day: 2.0 packs/day for 30.0 years (60.0 ttl pk-yrs)     Types: Cigarettes, Pipe, Cigars     Quit date:      Years since quittin.2     Passive exposure: Past    Smokeless tobacco: Never   Substance and Sexual Activity    Alcohol use: Not Currently    Drug use: Yes     Types: Oxycodone    Sexual activity: Not Currently     Partners: Female     Birth control/protection: Abstinence     Comment: Sterile   Social History Narrative    [place] exposure [place]; 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     He served in the [employer] with agent orange exposure when stationed in [employer].      Social Drivers of Health     Financial Resource Strain: Medium Risk (2024)    Overall Financial Resource Strain (CARDIA)     Difficulty of Paying Living Expenses: Somewhat hard   Food Insecurity: No Food Insecurity (2024)    Hunger Vital Sign     Worried About Running Out of Food in the Last Year: Never true     Ran Out of Food in the Last Year: Never true   Transportation Needs: No Transportation Needs (2024)    PRAPARE - Transportation     Lack of Transportation (Medical): No     Lack of Transportation (Non-Medical): No   Physical Activity: Sufficiently Active (2024)    Exercise Vital Sign     Days of Exercise per Week: 3 days     Minutes of Exercise per Session: 60 min   Stress: Stress Concern Present (2024)    [employer] [place] [employer] - Occupational Stress Questionnaire     Feeling of Stress : To some extent   Housing Stability: Unknown (2024)    Housing Stability Vital Sign     Unable to Pay for Housing in the Last Year: No     Homeless in the Last Year: No       Social History     Social History Narrative    Asbestos exposure when [place]; 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     He served in the [employer]  with agent orange exposure when stationed in [place].        Review of patient's allergies indicates:   Allergen Reactions    Bee pollen     Grass pollen-june grass standard         Medications: Medications reviewed to include over the counter medications.    Review of Systems: A focused ROS was completed and found to be negative except for that mentioned above.      OBJECTIVE   PHYSICAL EXAM:  Vitals:    04/09/25 0958   BP: 114/68   BP Location: Left arm   Patient Position: Sitting   Pulse: 78   Resp: 18   SpO2: 95%   Weight: 90.7 kg (200 lb)   Height: 6' (1.829 m)     GENERAL: NAD  HEENT: normocephalic, non-icteric conjunctivae, moist oral mucosa, purple bluish discoloration of lips with blanching, stable  RESPIRATORY: clear to auscultation, no wheezing, rales or rhonchi  CARDIOVASCULAR: Regular rate and rhythm, no murmurs rubs or gallops.  MUSCULOSKELETAL: No clubbing or cyanosis  NEUROLOGIC: AO ×3, no gross deficits    LABS:  Lab studies reviewed and notable for H/H 12.8/40.3, SEos 380; peak 510, CO2 23, SCr 1.32 (01/2025) ABG 01/2024 7.44/36/69/24.5/94    07/28/20 11:00 12/20/23 11:02   NT-proBNP 583 (H) 67       IMAGING:  Imaging reviewed and notable for  CT Chest 12/2022 with stable bilateral well defined rounded nodules all subcentimeter and some calcified, no emphysema. CT Chest 03/2024: mild biapical fibrotic changes, mild paraseptal emphysema most visible in UBALDO coronal views, stable LLL nodule 6 mm, overall stable imaging when compared to 08/2022 CXR 05/2024 with clear lung fields bilaterally, no consolidation, infiltrate or pleural effusion,     TTE:  09/2023:  LVEF 65%, normal diastolic function, normal RV size, TAPSE 2.42, trace MR, mild TR, trace NH, PASP 29    LUNG FUNCTION TESTING:    SPIROMETRY/PFT:  01/2024 Pre Post   FVC 3.99/-0.27 3.89/-0.42   FEV1 3.10/-0.06 3.12/-0.02   FEV1/FVC 78/0.26 80/0.52   TLC 6.04/-1.03    FRC  3.16/-0.68    RV 2.05/-1.00    DLCO 19.57/-1.44      6MWD:  Date Distance  (ft) Resting SpO2; Renny SpO2 O2 Required   01/2024 1370 96%, RA; 93% none              ASSESSMENT & PLAN     1. Other emphysema  Assessment & Plan:  75 yo M with prior nicotine dependence (quit 2001, 30 pack years) and paraseptal emphysema presents for routine follow up. History notable for asbestos exposure when working in [place] and imaging to date including CT chest 12/2022 with calcified  rounded subcentimeter nodules and repeat imaging 03/2024 with stable nodules. Of note, patient had an unremarkable COVID-19 infection in December 2022. No exacerbations since last visit and underwent knee surgery with an unremarkable postoperative period.  - cont CAROLE PRN      2. Pulmonary nodule  Overview:  Prior CT imaging with scattered pulmonary nodules all subcentimeter in some with calcification. Repeat CT Chest 03/2024 demonstrates stability for 3 years. No additional imaging is required.         Follow up if symptoms worsen or fail to improve.      Case was discussed with patient; all questions were answered to patient's satisfaction and patient verbalized understanding.     Rosalia Preciado MD  Pulmonary Medicine  [facility]  Phone: [phone]

## 2025-04-09 NOTE — ASSESSMENT & PLAN NOTE
75 yo M with prior nicotine dependence (quit 2001, 30 pack years) and paraseptal emphysema presents for routine follow up. History notable for asbestos exposure when working in [place] and imaging to date including CT chest 12/2022 with calcified  rounded subcentimeter nodules and repeat imaging 03/2024 with stable nodules. Of note, patient had an unremarkable COVID-19 infection in December 2022. No exacerbations since last visit and underwent knee surgery with an unremarkable postoperative period.  - cont CAROLE PRN

## 2025-04-16 RX ORDER — CYCLOBENZAPRINE HCL 10 MG
10 TABLET ORAL 3 TIMES DAILY PRN
Qty: 60 TABLET | Refills: 3 | Status: SHIPPED | OUTPATIENT
Start: 2025-04-16 | End: 2025-07-05

## 2025-05-27 ENCOUNTER — OFFICE VISIT (OUTPATIENT)
Dept: FAMILY MEDICINE | Facility: CLINIC | Age: 75
End: 2025-05-27
Payer: MEDICARE

## 2025-05-27 ENCOUNTER — APPOINTMENT (OUTPATIENT)
Dept: RADIOLOGY | Facility: CLINIC | Age: 75
End: 2025-05-27
Attending: NURSE PRACTITIONER
Payer: MEDICARE

## 2025-05-27 VITALS
HEART RATE: 75 BPM | DIASTOLIC BLOOD PRESSURE: 65 MMHG | SYSTOLIC BLOOD PRESSURE: 116 MMHG | RESPIRATION RATE: 18 BRPM | WEIGHT: 190.38 LBS | BODY MASS INDEX: 25.78 KG/M2 | TEMPERATURE: 98 F | OXYGEN SATURATION: 97 % | HEIGHT: 72 IN

## 2025-05-27 DIAGNOSIS — I10 ESSENTIAL HYPERTENSION: Chronic | ICD-10-CM

## 2025-05-27 DIAGNOSIS — Z01.811 PREOPERATIVE RESPIRATORY EXAMINATION: ICD-10-CM

## 2025-05-27 DIAGNOSIS — J43.8 OTHER EMPHYSEMA: Chronic | ICD-10-CM

## 2025-05-27 DIAGNOSIS — Z01.810 PREOP CARDIOVASCULAR EXAM: ICD-10-CM

## 2025-05-27 DIAGNOSIS — D63.8 ANEMIA OF CHRONIC DISEASE: ICD-10-CM

## 2025-05-27 DIAGNOSIS — Z79.899 OTHER LONG TERM (CURRENT) DRUG THERAPY: ICD-10-CM

## 2025-05-27 DIAGNOSIS — N18.31 CHRONIC KIDNEY DISEASE, STAGE 3A: ICD-10-CM

## 2025-05-27 DIAGNOSIS — M17.12 PRIMARY OSTEOARTHRITIS OF LEFT KNEE: ICD-10-CM

## 2025-05-27 DIAGNOSIS — Z01.818 PREOPERATIVE CLEARANCE: Primary | ICD-10-CM

## 2025-05-27 PROBLEM — J02.9 ACUTE PHARYNGITIS: Status: RESOLVED | Noted: 2022-03-15 | Resolved: 2025-05-27

## 2025-05-27 PROBLEM — M19.90 CHRONIC OSTEOARTHRITIS: Chronic | Status: ACTIVE | Noted: 2022-11-16

## 2025-05-27 PROBLEM — R51.9 HEADACHE: Status: RESOLVED | Noted: 2024-07-27 | Resolved: 2025-05-27

## 2025-05-27 PROBLEM — M17.0 BILATERAL PRIMARY OSTEOARTHRITIS OF KNEE: Chronic | Status: ACTIVE | Noted: 2024-07-27

## 2025-05-27 LAB
ANION GAP SERPL CALCULATED.3IONS-SCNC: 15 MMOL/L (ref 7–16)
BASOPHILS # BLD AUTO: 0.1 K/UL (ref 0–0.2)
BASOPHILS NFR BLD AUTO: 1.2 % (ref 0–1)
BILIRUB UR QL STRIP: NEGATIVE
BUN SERPL-MCNC: 22 MG/DL (ref 8–26)
BUN/CREAT SERPL: 17 (ref 6–20)
CALCIUM SERPL-MCNC: 9.8 MG/DL (ref 8.8–10)
CHLORIDE SERPL-SCNC: 108 MMOL/L (ref 98–107)
CLARITY UR: CLEAR
CO2 SERPL-SCNC: 21 MMOL/L (ref 23–31)
COLOR UR: YELLOW
CREAT SERPL-MCNC: 1.31 MG/DL (ref 0.72–1.25)
DIFFERENTIAL METHOD BLD: ABNORMAL
EGFR (NO RACE VARIABLE) (RUSH/TITUS): 57 ML/MIN/1.73M2
EOSINOPHIL # BLD AUTO: 0.43 K/UL (ref 0–0.5)
EOSINOPHIL NFR BLD AUTO: 5.1 % (ref 1–4)
ERYTHROCYTE [DISTWIDTH] IN BLOOD BY AUTOMATED COUNT: 12.1 % (ref 11.5–14.5)
GLUCOSE SERPL-MCNC: 94 MG/DL (ref 82–115)
GLUCOSE UR STRIP-MCNC: NORMAL MG/DL
HCT VFR BLD AUTO: 42.7 % (ref 40–54)
HGB BLD-MCNC: 13.6 G/DL (ref 13.5–18)
IMM GRANULOCYTES # BLD AUTO: 0.02 K/UL (ref 0–0.04)
IMM GRANULOCYTES NFR BLD: 0.2 % (ref 0–0.4)
KETONES UR STRIP-SCNC: NEGATIVE MG/DL
LEUKOCYTE ESTERASE UR QL STRIP: NEGATIVE
LYMPHOCYTES # BLD AUTO: 2.62 K/UL (ref 1–4.8)
LYMPHOCYTES NFR BLD AUTO: 31 % (ref 27–41)
MCH RBC QN AUTO: 30.4 PG (ref 27–31)
MCHC RBC AUTO-ENTMCNC: 31.9 G/DL (ref 32–36)
MCV RBC AUTO: 95.3 FL (ref 80–96)
MONOCYTES # BLD AUTO: 0.64 K/UL (ref 0–0.8)
MONOCYTES NFR BLD AUTO: 7.6 % (ref 2–6)
MPC BLD CALC-MCNC: 9.7 FL (ref 9.4–12.4)
NEUTROPHILS # BLD AUTO: 4.63 K/UL (ref 1.8–7.7)
NEUTROPHILS NFR BLD AUTO: 54.9 % (ref 53–65)
NITRITE UR QL STRIP: NEGATIVE
NRBC # BLD AUTO: 0 X10E3/UL
NRBC, AUTO (.00): 0 %
PH UR STRIP: 6 PH UNITS
PLATELET # BLD AUTO: 279 K/UL (ref 150–400)
POTASSIUM SERPL-SCNC: 4.8 MMOL/L (ref 3.5–5.1)
PROT UR QL STRIP: 10
RBC # BLD AUTO: 4.48 M/UL (ref 4.6–6.2)
RBC # UR STRIP: NEGATIVE /UL
SODIUM SERPL-SCNC: 139 MMOL/L (ref 136–145)
SP GR UR STRIP: 1.03
UROBILINOGEN UR STRIP-ACNC: NORMAL MG/DL
WBC # BLD AUTO: 8.44 K/UL (ref 4.5–11)

## 2025-05-27 PROCEDURE — 85025 COMPLETE CBC W/AUTO DIFF WBC: CPT | Mod: ,,, | Performed by: CLINICAL MEDICAL LABORATORY

## 2025-05-27 PROCEDURE — 99214 OFFICE O/P EST MOD 30 MIN: CPT | Mod: ,,, | Performed by: NURSE PRACTITIONER

## 2025-05-27 PROCEDURE — 71046 X-RAY EXAM CHEST 2 VIEWS: CPT | Mod: 26,,, | Performed by: RADIOLOGY

## 2025-05-27 PROCEDURE — 81003 URINALYSIS AUTO W/O SCOPE: CPT | Mod: QW,,, | Performed by: CLINICAL MEDICAL LABORATORY

## 2025-05-27 PROCEDURE — 93005 ELECTROCARDIOGRAM TRACING: CPT | Mod: RHCUB | Performed by: NURSE PRACTITIONER

## 2025-05-27 PROCEDURE — 80048 BASIC METABOLIC PNL TOTAL CA: CPT | Mod: ,,, | Performed by: CLINICAL MEDICAL LABORATORY

## 2025-05-27 PROCEDURE — 93010 ELECTROCARDIOGRAM REPORT: CPT | Mod: ,,, | Performed by: NURSE PRACTITIONER

## 2025-05-27 PROCEDURE — 71046 X-RAY EXAM CHEST 2 VIEWS: CPT | Mod: TC,RHCUB,FY | Performed by: NURSE PRACTITIONER

## 2025-05-27 NOTE — ASSESSMENT & PLAN NOTE
BMP  Lab Results   Component Value Date     01/28/2025    K 4.0 01/28/2025     (H) 01/28/2025    CO2 23 01/28/2025    BUN 25 01/28/2025    CREATININE 1.32 (H) 01/28/2025    CALCIUM 9.0 01/28/2025    ANIONGAP 12 01/28/2025    EGFRNORACEVR 57 (L) 01/28/2025     Stable, continue monitoring.  Avoid nephrotoxins including NSAIDs.

## 2025-05-27 NOTE — PROGRESS NOTES
[facility]  [address] [place] [address]  Phone: [phone]  Fax: [phone]       PATIENT NAME: Tray Jalloh   : 1950    AGE: 74 y.o. DATE OF ENCOUNTER: 25    MRN: [id number]      PCP: Elizabeth Lange FNP    Subjective:   CHANGE CHIEF COMPLAINT      :[id number]}274}  Chief Complaint   Patient presents with    Surgery Clearance     Patient reports to the clinic today for surgery clearance.    Health Maintenance     Care gaps up to date.    Melissa Julio CMA     History of Present Illness    HPI:  Patient is a 74-year-old male with a history of primary osteoarthritis of the left knee, scheduled for a left total knee replacement on  by Dr[facility] Dada [facility]quez, [facility]. He reports severe joint pain and expresses readiness to proceed with the surgery. Patient had his other knee replaced last year. He has a history of hypertension, which is well-controlled with a current blood pressure of 116/65, and chronic kidney disease with a stable stage 3A GFR.    Patient denies any problems with urination, including pain, burning, or frequency. He also denies any issues with his bowels.      ROS:  Genitourinary: -difficulty urinating  Musculoskeletal: +joint pain         Allergies and Meds: 274}     Review of patient's allergies indicates:   Allergen Reactions    Bee pollen     Grass pollen-figueroa grass standard         Current Outpatient Medications   Medication Sig Dispense Refill    acetaminophen (TYLENOL) 325 MG tablet Take 325 mg by mouth every 6 (six) hours as needed for Pain.      albuterol (VENTOLIN HFA) 90 mcg/actuation inhaler Inhale 2 puffs into the lungs every 6 (six) hours as needed for Wheezing or Shortness of Breath. Rescue 8 g 3    amLODIPine (NORVASC) 5 MG tablet Take 2 tablets (10 mg total) by mouth once daily. 180 tablet 3    ascorbic acid, vitamin C, (VITAMIN C) 1000 MG tablet Take 1,000 mg by mouth once daily.       cetirizine (ZYRTEC) 10 MG tablet Take 1 tablet (10 mg total) by mouth once daily. 90 tablet 3    cholecalciferol, vitamin D3, (VITAMIN D3) 50 mcg (2,000 unit) Cap Take 1 capsule by mouth once daily.      CRESTOR 20 mg tablet Take 10 mg by mouth once daily.      cyanocobalamin (VITAMIN B-12) 1000 MCG tablet Take 100 mcg by mouth once daily.      cyclobenzaprine (FLEXERIL) 10 MG tablet Take 1 tablet (10 mg total) by mouth 3 (three) times daily as needed for Muscle spasms. 60 tablet 3    diphenhydrAMINE (BENADRYL) 25 mg capsule Take 25 mg by mouth every 6 (six) hours as needed for Itching.      FLUoxetine 10 MG capsule Take 1 capsule (10 mg total) by mouth once daily. 90 capsule 3    fluticasone propionate (FLONASE) 50 mcg/actuation nasal spray 1 spray by Each Nostril route daily as needed for Rhinitis or Allergies.      hydrocortisone 2.5 % cream Apply 30 g/kg topically 2 (two) times daily as needed. Apply to face for dry skin      ketoconazole (NIZORAL) 2 % cream Apply topically daily as needed.      lisinopriL (PRINIVIL,ZESTRIL) 40 MG tablet Take 1 tablet (40 mg total) by mouth once daily. 90 tablet 3    methenamine (HIPREX) 1 gram Tab Take 1 tablet (1 g total) by mouth 2 (two) times daily. 180 tablet 3    multivitamin Tab Take 1 tablet by mouth once daily. 90 tablet 3    omega-3 fatty acids/fish oil (FISH OIL-OMEGA-3 FATTY ACIDS) 300-1,000 mg capsule Take 1 capsule by mouth 2 (two) times a day.      omeprazole (PRILOSEC) 20 MG capsule Take 1 capsule (20 mg total) by mouth once daily. 90 capsule 3    tolterodine (DETROL LA) 4 MG 24 hr capsule Take one capsule by mouth once daily 90 capsule 3    triamcinolone acetonide 0.1% (KENALOG) 0.1 % cream Apply 15 g topically 2 (two) times daily as needed. Apply to back for dry skin and itching      vitamin E 1000 UNIT capsule Take 1,000 Units by mouth once daily.      zinc gluconate 50 mg tablet Take 50 mg by mouth once daily.       No current facility-administered  medications for this visit.       Labs:274}   I have reviewed labs below:    Lab Results   Component Value Date    WBC 6.80 01/28/2025    RBC 4.22 (L) 01/28/2025    HGB 12.8 (L) 01/28/2025    HCT 40.3 01/28/2025     01/28/2025     01/28/2025    K 4.0 01/28/2025     (H) 01/28/2025    CALCIUM 9.0 01/28/2025    GLU 95 01/28/2025    BUN 25 01/28/2025    CREATININE 1.32 (H) 01/28/2025    ESTGFRAFRICA 65 12/03/2020    EGFRNONAA 70 05/12/2022    ALT 8 01/28/2025    AST 14 01/28/2025    INR 0.91 06/10/2024    CHOL 106 01/28/2025    TRIG 132 01/28/2025    HDL 30 (L) 01/28/2025    LDLCALC 50 01/28/2025    TSH 1.280 01/28/2025    PSA 0.013 07/17/2024    HGBA1C 5.8 01/28/2025    MICROALBUR 1.5 01/28/2025       Medical History: 274}     Past Medical History:   Diagnosis Date    Cervical radiculopathy     Chronic pain     Chronic renal impairment     COVID-19 12/27/2022    Depression     Depressive disorder 05/12/2022    Digestive disorder     GERD (gastroesophageal reflux disease)     History of prediabetes 11/16/2022    Hypercholesteremia     Hypertension     Left ventricular hypertrophy     Lumbosacral spondylosis     Positive colorectal cancer screening using Cologuard test 12/15/2022    Prediabetes     Pulmonary nodules     repeat CT due 12/03/21      Tobacco Use History[1]   Past Surgical History:   Procedure Laterality Date    ABDOMINAL SURGERY      inguinal hernia repair    BACK SURGERY      Bilateral L3-S1 MBB Bilateral 2-, 1-, 1-, 12-    Dr Jorge Parker    EYE SURGERY      HERNIA REPAIR      JOINT REPLACEMENT  2024    KNEE ARTHROSCOPY W/ MENISCAL REPAIR Right     KNEE ARTHROSCOPY W/ MENISCECTOMY Left 01/11/2023    Procedure: ARTHROSCOPY, KNEE, WITH MENISCECTOMY;  Surgeon: Dada Enriquez MD;  Location: [facility];  Service: Orthopedics;  Laterality: Left;    KNEE SURGERY Left     LATERAL RETINACULA RELEASE OF KNEE Left 01/11/2023    Procedure: ARTHROSCOPIC  RELEASE, KNEE, LATERAL RETINACULA;  Surgeon: Dada Enriquez MD;  Location: [facility];  Service: Orthopedics;  Laterality: Left;    PROSTATECTOMY  03/1996    prostate cancer    RADIOFREQUENCY ABLATION OF LUMBAR MEDIAL BRANCH NERVE AT SINGLE LEVEL Right 04/01/2021    Procedure: Radiofrequency Ablation, Nerve, Spinal, Lumbar, Medial Branch, 1 Level;  Surgeon: Jordana Patel MD;  Location: [facility];  Service: Pain Management;  Laterality: Right;  Right L3-S1 RFTC    RADIOFREQUENCY ABLATION OF LUMBAR MEDIAL BRANCH NERVE AT SINGLE LEVEL Right 02/09/2023    Procedure: Radiofrequency Ablation, Nerve, Spinal, Lumbar, Medial Branch, Level L4-S1;  Surgeon: Jordana Patel MD;  Location: [facility];  Service: Pain Management;  Laterality: Right;  bonita left after right is done    RADIOFREQUENCY ABLATION OF LUMBAR MEDIAL BRANCH NERVE AT SINGLE LEVEL Left 02/28/2023    Procedure: RADIOFREQUENCY ABLATION, NERVE, SPINAL, LUMBAR, MEDIAL BRANCH, 1 LEVEL;  Surgeon: Jordana Patel MD;  Location: [facility];  Service: Pain Management;  Laterality: Left;    RADIOFREQUENCY THERMOCOAGULATION Bilateral 03/20/2012    Dr Parker    RADIOFREQUENCY THERMOCOAGULATION Left 03/18/2021    Procedure: RADIOFREQUENCY THERMAL COAGULATION;  Surgeon: Jordana Patel MD;  Location: [facility];  Service: Pain Management;  Laterality: Left;  BILATERAL L3-S1 RFTC LEFT SIDE FIRST    ROBOTIC ARTHROPLASTY, KNEE Right 06/19/2024    Procedure: ROBOTIC ARTHROPLASTY, KNEE, TOTAL;  Surgeon: Dada Enriquez MD;  Location: [facility];  Service: Orthopedics;  Laterality: Right;    SELECTIVE INJECTION OF ANESTHETIC AGENT AROUND LUMBAR SPINAL NERVE ROOT BY TRANSFORAMINAL APPROACH Right 01/13/2022    Procedure: Right L4-5,5-S1 TFESI;  Surgeon: Jordana Patel MD;  Location: [facility];  Service: Pain Management;  Laterality: Right;  PT AWARE TO BE TESTED VIA PC    SHOULDER ARTHROSCOPY Right     SPINE SURGERY  1995     VASECTOMY  Mar 1996        Health Maintenance:      Health Maintenance Topics with due status: Not Due       Topic Last Completion Date    TETANUS VACCINE 10/27/2020    Colorectal Cancer Screening 02/15/2023    Annual UACr 01/28/2025    Lipid Panel 01/28/2025       Objective:  274}   Vital Signs  Temp: 97.9 °F (36.6 °C)  Temp Source: Oral  Pulse: 75  Resp: 18  SpO2: 97 %  BP: 116/65  BP Location: Left arm  Patient Position: Sitting  Pain Score: 0-No pain  Height and Weight  Height: 6' (182.9 cm)  Weight: 86.4 kg (190 lb 6.4 oz)  BSA (Calculated - sq m): 2.09 sq meters  BMI (Calculated): 25.8  Weight in (lb) to have BMI = 25: 183.9    Over the last two weeks how often have you been bothered by little interest or pleasure in doing things: 0  Over the last two weeks how often have you been bothered by feeling down, depressed or hopeless: 0  PHQ-2 Total Score: 0  PHQ-9 Score: 4  PHQ-9 Interpretation: Minimal or None    Wt Readings from Last 3 Encounters:   05/27/25 86.4 kg (190 lb 6.4 oz)   04/09/25 90.7 kg (200 lb)   02/05/25 89.4 kg (197 lb)     Physical Exam  Vitals and nursing note reviewed.   Constitutional:       General: He is not in acute distress.     Appearance: Normal appearance.   HENT:      Head: Normocephalic.      Right Ear: Tympanic membrane, ear canal and external ear normal.      Left Ear: Tympanic membrane, ear canal and external ear normal.      Nose: Nose normal.      Mouth/Throat:      Mouth: Mucous membranes are moist.      Pharynx: Oropharynx is clear.   Eyes:      Conjunctiva/sclera: Conjunctivae normal.   Neck:      Thyroid: No thyromegaly or thyroid tenderness.      Trachea: Trachea normal.   Cardiovascular:      Rate and Rhythm: Normal rate and regular rhythm.      Pulses: Normal pulses.      Heart sounds: Normal heart sounds.   Pulmonary:      Effort: Pulmonary effort is normal. No respiratory distress.      Breath sounds: Normal breath sounds. No wheezing, rhonchi or rales.   Abdominal:       Palpations: Abdomen is soft.      Tenderness: There is no abdominal tenderness.   Musculoskeletal:      Cervical back: Neck supple.      Right lower leg: No edema.      Left lower leg: No edema.   Lymphadenopathy:      Cervical: No cervical adenopathy.   Skin:     General: Skin is warm and dry.   Neurological:      General: No focal deficit present.      Mental Status: He is alert and oriented to person, place, and time.      Gait: Gait abnormal.   Psychiatric:         Attention and Perception: Attention normal.         Speech: Speech normal.         Behavior: Behavior normal. Behavior is cooperative.         Thought Content: Thought content normal. Thought content is not paranoid. Thought content does not include homicidal or suicidal ideation.         Cognition and Memory: Cognition normal.         Judgment: Judgment normal.          Assessment and Plan: 274}       1. Preoperative clearance  -     CBC Auto Differential; Future; Expected date: 05/27/2025  -     Basic Metabolic Panel; Future; Expected date: 05/27/2025  -     APTT; Future; Expected date: 05/27/2025  -     Protime-INR; Future; Expected date: 05/27/2025  -     Urinalysis, Reflex to Urine Culture; Future; Expected date: 05/27/2025    2. Preop cardiovascular exam  -     POCT EKG 12-LEAD (NOT FOR [facility] USE)    3. Preoperative respiratory examination  -     X-Ray Chest PA And Lateral; Future; Expected date: 05/27/2025    4. Primary osteoarthritis of left knee  Assessment & Plan:  Scheduled for left total knee replacement 06/04/2025 per Dr. Dada Enriquez of [facility].      5. Essential hypertension  Assessment & Plan:  Controlled, continue lisinopril.      6. Chronic kidney disease, stage 3a  Assessment & Plan:  BMP  Lab Results   Component Value Date     01/28/2025    K 4.0 01/28/2025     (H) 01/28/2025    CO2 23 01/28/2025    BUN 25 01/28/2025    CREATININE 1.32 (H) 01/28/2025    CALCIUM 9.0 01/28/2025    ANIONGAP 12  01/28/2025    EGFRNORACEVR 57 (L) 01/28/2025     Stable, continue monitoring.  Avoid nephrotoxins including NSAIDs.    Orders:  -     CBC Auto Differential; Future; Expected date: 05/27/2025  -     Basic Metabolic Panel; Future; Expected date: 05/27/2025  -     APTT; Future; Expected date: 05/27/2025  -     Protime-INR; Future; Expected date: 05/27/2025    7. Other long term (current) drug therapy  -     CBC Auto Differential; Future; Expected date: 05/27/2025  -     Basic Metabolic Panel; Future; Expected date: 05/27/2025  -     APTT; Future; Expected date: 05/27/2025  -     Protime-INR; Future; Expected date: 05/27/2025    8. Anemia of chronic disease  -     CBC Auto Differential; Future; Expected date: 05/27/2025  -     APTT; Future; Expected date: 05/27/2025  -     Protime-INR; Future; Expected date: 05/27/2025    9. Other emphysema  Assessment & Plan:  Stable  CAROLE prn          Assessment & Plan    IMPRESSION:  - Evaluated 74-year-old male for primary care clearance prior to left total knee replacement scheduled for June 4th with Dr. Dada Enriquez, of [facility].  - History of primary osteoarthritis of the left knee.  - EKG shows 1st degree AV block and otherwise sinus rhythm without ST abnormalities.  - HTN well-controlled with /65.  - Chronic renal disease stable at stage 3A GFR.    LEFT KNEE OSTEOARTHRITIS:  - Monitored patient's primary osteoarthritis of the left knee with plan for upcoming total knee replacement by Dr. Dada Enriquez.  - Evaluated joint pain to assess current state of osteoarthritis.  - No exam findings that would preclude surgery were noted.  - Surgical clearance will be sent to Dr. Enriquez once all test results are received and reviewed.    HYPERTENSION:  - Blood pressure is well controlled at 116/65, indicating effective management of hypertension.    CHRONIC KIDNEY DISEASE:  - Chronic kidney disease is stable at stage 3A, indicating current management is effective.  -  Ordered labs and urinalysis for monitoring.    FIRST DEGREE AV BLOCK:  - EKG shows first degree AV block with otherwise normal sinus rhythm, suggesting stable cardiac condition.    RIGHT ARTIFICIAL KNEE JOINT:  - Patient has history of right knee replacement performed last year, which appears stable.    FOLLOW-UP:  - Ordered chest XR, labs, and urinalysis.  - Will schedule follow-up once all test results are received and reviewed for surgical clearance.     Signature:  MOLLY Murry-BC    Future Appointments   Date Time Provider Department Center   2025  8:00 AM AWV NURSE, [facility] FAMILY MEDICINE [facility] FAMMED Stennis Micki   2025  9:30 AM Kirk Isaac MD [facility] UROL [facility]   12/3/2025  1:40 PM Elizabeth Lange FNP [facility] BRYANT Sweet   2025  9:30 AM Enrico Jauregui [facility] NEURO [facility]     This note was generated with the assistance of ambient listening technology. Verbal consent was obtained by the patient and accompanying visitor(s) for the recording of patient appointment to facilitate this note. I attest to having reviewed and edited the generated note for accuracy, though some syntax or spelling errors may persist. Please contact the author of this note for any clarification.           [1]   Social History  Tobacco Use   Smoking Status Former    Current packs/day: 0.00    Average packs/day: 2.0 packs/day for 30.0 years (60.0 ttl pk-yrs)    Types: Cigarettes, Pipe, Cigars    Quit date:     Years since quittin.4    Passive exposure: Past   Smokeless Tobacco Never

## 2025-05-27 NOTE — ASSESSMENT & PLAN NOTE
Scheduled for left total knee replacement 06/04/2025 per Dr. Dada Enriquez of [facility].

## 2025-05-28 ENCOUNTER — RESULTS FOLLOW-UP (OUTPATIENT)
Dept: FAMILY MEDICINE | Facility: CLINIC | Age: 75
End: 2025-05-28

## 2025-05-28 LAB
APTT PPP: 30.2 SECONDS (ref 25.2–37.3)
INR BLD: 0.99
PROTHROMBIN TIME: 13.2 SECONDS (ref 11.7–14.7)

## 2025-05-28 NOTE — PROGRESS NOTES
Call patient and review results. Renal function stable.  These labs for pre-surgical clearance are okay, but the PT/PTT weren't resulted.

## 2025-05-28 NOTE — PLAN OF CARE
Ss spoke with pt re surgery scheduled for 6/4. Pt lives at home with his wife and plans to return when medically ready. Pt has a rw. Pt will require hh, choice obtained for [facility], referral faxed to [facility]. Ss following    Met with pt to review discharge recommendation of hh and is agreeable to plan    Patient/family provided list of facilities in-network with patient's payor plan. Providers that are owned, operated, or affiliated with [facility] are included on the list.     Notified that referral sent to below listed facilities from in-network list based on proximity to home/family support:   [facility]  2.  3.  4.  5. (can send more than 5)    Patient/family instructed to identify preference.    Preferred Facility: (if more than 1, listed in order of descending preference)  1.  OR  Patient has declined to select a preferred provider and elects placement with the first accepting in network provider that is available to provide services as ordered by the physician       If an additional preferred facility not listed above is identified, additional referral to be sent. If above facilities unable to accept, will send additional referrals to in-network providers.

## 2025-06-02 NOTE — PROGRESS NOTES
Addendum statement clearing him for surgery added to office visit from last week.  Please send to Dr. Dada Enriquez's office.

## 2025-06-04 ENCOUNTER — ANESTHESIA (OUTPATIENT)
Dept: SURGERY | Facility: HOSPITAL | Age: 75
End: 2025-06-04
Payer: MEDICARE

## 2025-06-04 ENCOUNTER — HOSPITAL ENCOUNTER (OUTPATIENT)
Facility: HOSPITAL | Age: 75
Discharge: HOME | End: 2025-06-05
Attending: ORTHOPAEDIC SURGERY | Admitting: ORTHOPAEDIC SURGERY
Payer: MEDICARE

## 2025-06-04 ENCOUNTER — ANESTHESIA EVENT (OUTPATIENT)
Dept: SURGERY | Facility: HOSPITAL | Age: 75
End: 2025-06-04
Payer: MEDICARE

## 2025-06-04 DIAGNOSIS — G89.18 ACUTE POST-OPERATIVE PAIN: ICD-10-CM

## 2025-06-04 DIAGNOSIS — M17.12 PRIMARY OSTEOARTHRITIS OF LEFT KNEE: Primary | ICD-10-CM

## 2025-06-04 DIAGNOSIS — I10 ESSENTIAL HYPERTENSION: Chronic | ICD-10-CM

## 2025-06-04 DIAGNOSIS — Z01.818 PREOP EXAMINATION: ICD-10-CM

## 2025-06-04 DIAGNOSIS — N18.31 CHRONIC KIDNEY DISEASE, STAGE 3A: Chronic | ICD-10-CM

## 2025-06-04 LAB
INDIRECT COOMBS: NORMAL
RH BLD: NORMAL
SPECIMEN OUTDATE: NORMAL

## 2025-06-04 PROCEDURE — 27000177 HC AIRWAY, LARYNGEAL MASK: Performed by: ANESTHESIOLOGY

## 2025-06-04 PROCEDURE — 25000003 PHARM REV CODE 250: Performed by: ORTHOPAEDIC SURGERY

## 2025-06-04 PROCEDURE — 27000655: Performed by: ANESTHESIOLOGY

## 2025-06-04 PROCEDURE — 63600175 PHARM REV CODE 636 W HCPCS: Performed by: ORTHOPAEDIC SURGERY

## 2025-06-04 PROCEDURE — 63600175 PHARM REV CODE 636 W HCPCS: Performed by: ANESTHESIOLOGY

## 2025-06-04 PROCEDURE — 36415 COLL VENOUS BLD VENIPUNCTURE: CPT | Performed by: ORTHOPAEDIC SURGERY

## 2025-06-04 PROCEDURE — 36000712 HC OR TIME LEV V 1ST 15 MIN: Performed by: ORTHOPAEDIC SURGERY

## 2025-06-04 PROCEDURE — 27201423 OPTIME MED/SURG SUP & DEVICES STERILE SUPPLY: Performed by: ORTHOPAEDIC SURGERY

## 2025-06-04 PROCEDURE — D9220A PRA ANESTHESIA: Mod: ANES,,, | Performed by: ANESTHESIOLOGY

## 2025-06-04 PROCEDURE — C1776 JOINT DEVICE (IMPLANTABLE): HCPCS | Performed by: ORTHOPAEDIC SURGERY

## 2025-06-04 PROCEDURE — 25000003 PHARM REV CODE 250: Performed by: NURSE ANESTHETIST, CERTIFIED REGISTERED

## 2025-06-04 PROCEDURE — 27000716 HC OXISENSOR PROBE, ANY SIZE: Performed by: ANESTHESIOLOGY

## 2025-06-04 PROCEDURE — 36000713 HC OR TIME LEV V EA ADD 15 MIN: Performed by: ORTHOPAEDIC SURGERY

## 2025-06-04 PROCEDURE — D9220A PRA ANESTHESIA: Mod: CRNA,,, | Performed by: NURSE ANESTHETIST, CERTIFIED REGISTERED

## 2025-06-04 PROCEDURE — 27000510 HC BLANKET BAIR HUGGER ANY SIZE: Performed by: ANESTHESIOLOGY

## 2025-06-04 PROCEDURE — 71000015 HC POSTOP RECOV 1ST HR: Performed by: ORTHOPAEDIC SURGERY

## 2025-06-04 PROCEDURE — C1713 ANCHOR/SCREW BN/BN,TIS/BN: HCPCS | Performed by: ORTHOPAEDIC SURGERY

## 2025-06-04 PROCEDURE — 97161 PT EVAL LOW COMPLEX 20 MIN: CPT

## 2025-06-04 PROCEDURE — 37000009 HC ANESTHESIA EA ADD 15 MINS: Performed by: ORTHOPAEDIC SURGERY

## 2025-06-04 PROCEDURE — 86850 RBC ANTIBODY SCREEN: CPT | Performed by: ORTHOPAEDIC SURGERY

## 2025-06-04 PROCEDURE — 71000016 HC POSTOP RECOV ADDL HR: Performed by: ORTHOPAEDIC SURGERY

## 2025-06-04 PROCEDURE — 64447 NJX AA&/STRD FEMORAL NRV IMG: CPT | Mod: XU,LT,, | Performed by: ANESTHESIOLOGY

## 2025-06-04 PROCEDURE — 97165 OT EVAL LOW COMPLEX 30 MIN: CPT

## 2025-06-04 PROCEDURE — 63600175 PHARM REV CODE 636 W HCPCS: Performed by: NURSE ANESTHETIST, CERTIFIED REGISTERED

## 2025-06-04 PROCEDURE — 99214 OFFICE O/P EST MOD 30 MIN: CPT | Mod: ,,, | Performed by: INTERNAL MEDICINE

## 2025-06-04 PROCEDURE — 37000008 HC ANESTHESIA 1ST 15 MINUTES: Performed by: ORTHOPAEDIC SURGERY

## 2025-06-04 PROCEDURE — 71000033 HC RECOVERY, INTIAL HOUR: Performed by: ORTHOPAEDIC SURGERY

## 2025-06-04 PROCEDURE — 93005 ELECTROCARDIOGRAM TRACING: CPT

## 2025-06-04 PROCEDURE — 97530 THERAPEUTIC ACTIVITIES: CPT

## 2025-06-04 DEVICE — ATTUNE KNEE SYSTEM FEMORAL POROCOAT CRUCIATE RETAINING SIZE 6 LEFT CEMENTLESS
Type: IMPLANTABLE DEVICE | Site: KNEE | Status: FUNCTIONAL
Brand: ATTUNE

## 2025-06-04 DEVICE — ATTUNE KNEE SYSTEM TIBIAL BASE POROCOAT ROTATING PLATFORM SIZE 7 CEMENTLESS
Type: IMPLANTABLE DEVICE | Site: KNEE | Status: FUNCTIONAL
Brand: ATTUNE

## 2025-06-04 DEVICE — SPEEDSET FULL DOSE ANTIBIOTIC BONE CEMENT, 10 PACK CATALOG NUMBER IS 6192-1-010
Type: IMPLANTABLE DEVICE | Site: KNEE | Status: FUNCTIONAL
Brand: SIMPLEX

## 2025-06-04 DEVICE — ATTUNE PATELLA MEDIALIZED DOME 38MM CEMENTED AOX
Type: IMPLANTABLE DEVICE | Site: KNEE | Status: FUNCTIONAL
Brand: ATTUNE

## 2025-06-04 DEVICE — ATTUNE KNEE SYSTEM TIBIAL INSERT ROTATING PLATFORM CRUCIATE RETAINING SIZE 6 5MM AOX
Type: IMPLANTABLE DEVICE | Site: KNEE | Status: FUNCTIONAL
Brand: ATTUNE

## 2025-06-04 RX ORDER — AMLODIPINE BESYLATE 10 MG/1
10 TABLET ORAL DAILY
Status: DISCONTINUED | OUTPATIENT
Start: 2025-06-04 | End: 2025-06-05 | Stop reason: HOSPADM

## 2025-06-04 RX ORDER — PREGABALIN 75 MG/1
75 CAPSULE ORAL 2 TIMES DAILY
Status: DISCONTINUED | OUTPATIENT
Start: 2025-06-04 | End: 2025-06-05 | Stop reason: HOSPADM

## 2025-06-04 RX ORDER — ROPIVACAINE/EPI/CLONIDINE/KET 2.46-0.005
SYRINGE (ML) INJECTION
Status: DISCONTINUED | OUTPATIENT
Start: 2025-06-04 | End: 2025-06-04 | Stop reason: HOSPADM

## 2025-06-04 RX ORDER — GLYCOPYRROLATE 0.2 MG/ML
INJECTION INTRAMUSCULAR; INTRAVENOUS
Status: DISCONTINUED | OUTPATIENT
Start: 2025-06-04 | End: 2025-06-04

## 2025-06-04 RX ORDER — OXYCODONE HYDROCHLORIDE 5 MG/1
5 TABLET ORAL EVERY 4 HOURS PRN
Status: DISCONTINUED | OUTPATIENT
Start: 2025-06-04 | End: 2025-06-05 | Stop reason: HOSPADM

## 2025-06-04 RX ORDER — SODIUM CHLORIDE 9 MG/ML
INJECTION, SOLUTION INTRAVENOUS CONTINUOUS
Status: DISCONTINUED | OUTPATIENT
Start: 2025-06-04 | End: 2025-06-05 | Stop reason: HOSPADM

## 2025-06-04 RX ORDER — CELECOXIB 100 MG/1
200 CAPSULE ORAL 2 TIMES DAILY
Status: DISCONTINUED | OUTPATIENT
Start: 2025-06-04 | End: 2025-06-05 | Stop reason: HOSPADM

## 2025-06-04 RX ORDER — BISACODYL 10 MG/1
10 SUPPOSITORY RECTAL DAILY PRN
Status: DISCONTINUED | OUTPATIENT
Start: 2025-06-04 | End: 2025-06-05 | Stop reason: HOSPADM

## 2025-06-04 RX ORDER — ALBUTEROL SULFATE 90 UG/1
2 INHALANT RESPIRATORY (INHALATION) EVERY 6 HOURS PRN
Status: DISCONTINUED | OUTPATIENT
Start: 2025-06-04 | End: 2025-06-05 | Stop reason: HOSPADM

## 2025-06-04 RX ORDER — LIDOCAINE HYDROCHLORIDE 20 MG/ML
INJECTION, SOLUTION EPIDURAL; INFILTRATION; INTRACAUDAL; PERINEURAL
Status: DISCONTINUED | OUTPATIENT
Start: 2025-06-04 | End: 2025-06-04

## 2025-06-04 RX ORDER — ONDANSETRON HYDROCHLORIDE 2 MG/ML
4 INJECTION, SOLUTION INTRAVENOUS DAILY PRN
Status: DISCONTINUED | OUTPATIENT
Start: 2025-06-04 | End: 2025-06-04 | Stop reason: HOSPADM

## 2025-06-04 RX ORDER — DOCUSATE SODIUM 100 MG/1
100 CAPSULE, LIQUID FILLED ORAL EVERY 12 HOURS
Status: DISCONTINUED | OUTPATIENT
Start: 2025-06-04 | End: 2025-06-05

## 2025-06-04 RX ORDER — MEPERIDINE HYDROCHLORIDE 25 MG/ML
25 INJECTION INTRAMUSCULAR; INTRAVENOUS; SUBCUTANEOUS EVERY 10 MIN PRN
Status: DISCONTINUED | OUTPATIENT
Start: 2025-06-04 | End: 2025-06-04 | Stop reason: HOSPADM

## 2025-06-04 RX ORDER — PHENYLEPHRINE HYDROCHLORIDE 10 MG/ML
INJECTION INTRAVENOUS
Status: DISCONTINUED | OUTPATIENT
Start: 2025-06-04 | End: 2025-06-04

## 2025-06-04 RX ORDER — LIDOCAINE HYDROCHLORIDE 10 MG/ML
1 INJECTION, SOLUTION EPIDURAL; INFILTRATION; INTRACAUDAL; PERINEURAL ONCE AS NEEDED
Status: DISCONTINUED | OUTPATIENT
Start: 2025-06-04 | End: 2025-06-05 | Stop reason: HOSPADM

## 2025-06-04 RX ORDER — FLUOXETINE 10 MG/1
10 CAPSULE ORAL DAILY
Status: DISCONTINUED | OUTPATIENT
Start: 2025-06-04 | End: 2025-06-05 | Stop reason: HOSPADM

## 2025-06-04 RX ORDER — FENTANYL CITRATE 50 UG/ML
INJECTION, SOLUTION INTRAMUSCULAR; INTRAVENOUS
Status: DISCONTINUED | OUTPATIENT
Start: 2025-06-04 | End: 2025-06-04

## 2025-06-04 RX ORDER — CEFAZOLIN 2 G/1
2 INJECTION, POWDER, FOR SOLUTION INTRAMUSCULAR; INTRAVENOUS
Status: COMPLETED | OUTPATIENT
Start: 2025-06-04 | End: 2025-06-04

## 2025-06-04 RX ORDER — OXYCODONE HYDROCHLORIDE 5 MG/1
10 TABLET ORAL EVERY 4 HOURS PRN
Status: DISCONTINUED | OUTPATIENT
Start: 2025-06-04 | End: 2025-06-05

## 2025-06-04 RX ORDER — ACETAMINOPHEN 500 MG
1000 TABLET ORAL EVERY 8 HOURS
Status: DISCONTINUED | OUTPATIENT
Start: 2025-06-04 | End: 2025-06-05 | Stop reason: HOSPADM

## 2025-06-04 RX ORDER — DEXAMETHASONE SODIUM PHOSPHATE 4 MG/ML
INJECTION, SOLUTION INTRA-ARTICULAR; INTRALESIONAL; INTRAMUSCULAR; INTRAVENOUS; SOFT TISSUE
Status: DISCONTINUED | OUTPATIENT
Start: 2025-06-04 | End: 2025-06-04

## 2025-06-04 RX ORDER — IPRATROPIUM BROMIDE AND ALBUTEROL SULFATE 2.5; .5 MG/3ML; MG/3ML
3 SOLUTION RESPIRATORY (INHALATION) ONCE
Status: DISCONTINUED | OUTPATIENT
Start: 2025-06-04 | End: 2025-06-04 | Stop reason: HOSPADM

## 2025-06-04 RX ORDER — MUPIROCIN 20 MG/G
1 OINTMENT TOPICAL 2 TIMES DAILY
Status: DISCONTINUED | OUTPATIENT
Start: 2025-06-04 | End: 2025-06-05 | Stop reason: HOSPADM

## 2025-06-04 RX ORDER — HYDROMORPHONE HYDROCHLORIDE 2 MG/ML
0.5 INJECTION, SOLUTION INTRAMUSCULAR; INTRAVENOUS; SUBCUTANEOUS EVERY 5 MIN PRN
Status: DISCONTINUED | OUTPATIENT
Start: 2025-06-04 | End: 2025-06-04 | Stop reason: HOSPADM

## 2025-06-04 RX ORDER — MORPHINE SULFATE 10 MG/ML
4 INJECTION INTRAMUSCULAR; INTRAVENOUS; SUBCUTANEOUS EVERY 5 MIN PRN
Status: DISCONTINUED | OUTPATIENT
Start: 2025-06-04 | End: 2025-06-04 | Stop reason: HOSPADM

## 2025-06-04 RX ORDER — ACETAMINOPHEN 10 MG/ML
1000 INJECTION, SOLUTION INTRAVENOUS ONCE
Status: DISCONTINUED | OUTPATIENT
Start: 2025-06-04 | End: 2025-06-04 | Stop reason: HOSPADM

## 2025-06-04 RX ORDER — TRANEXAMIC ACID 1 G/10ML
INJECTION, SOLUTION INTRAVENOUS
Status: DISCONTINUED | OUTPATIENT
Start: 2025-06-04 | End: 2025-06-04

## 2025-06-04 RX ORDER — ZOLPIDEM TARTRATE 5 MG/1
5 TABLET ORAL NIGHTLY PRN
Status: DISCONTINUED | OUTPATIENT
Start: 2025-06-04 | End: 2025-06-05 | Stop reason: HOSPADM

## 2025-06-04 RX ORDER — ONDANSETRON 4 MG/1
8 TABLET, ORALLY DISINTEGRATING ORAL EVERY 8 HOURS PRN
Status: DISCONTINUED | OUTPATIENT
Start: 2025-06-04 | End: 2025-06-05 | Stop reason: HOSPADM

## 2025-06-04 RX ORDER — LOPERAMIDE HYDROCHLORIDE 2 MG/1
4 CAPSULE ORAL ONCE
Status: DISCONTINUED | OUTPATIENT
Start: 2025-06-04 | End: 2025-06-05 | Stop reason: HOSPADM

## 2025-06-04 RX ORDER — SODIUM CHLORIDE 0.9 % (FLUSH) 0.9 %
3 SYRINGE (ML) INJECTION EVERY 6 HOURS PRN
Status: DISCONTINUED | OUTPATIENT
Start: 2025-06-04 | End: 2025-06-05 | Stop reason: HOSPADM

## 2025-06-04 RX ORDER — DIPHENHYDRAMINE HYDROCHLORIDE 50 MG/ML
25 INJECTION, SOLUTION INTRAMUSCULAR; INTRAVENOUS EVERY 6 HOURS PRN
Status: DISCONTINUED | OUTPATIENT
Start: 2025-06-04 | End: 2025-06-04 | Stop reason: HOSPADM

## 2025-06-04 RX ORDER — PROPOFOL 10 MG/ML
VIAL (ML) INTRAVENOUS
Status: DISCONTINUED | OUTPATIENT
Start: 2025-06-04 | End: 2025-06-04

## 2025-06-04 RX ORDER — ACETAMINOPHEN 10 MG/ML
1000 INJECTION, SOLUTION INTRAVENOUS ONCE
Status: ACTIVE | OUTPATIENT
Start: 2025-06-04 | End: 2025-06-05

## 2025-06-04 RX ORDER — FAMOTIDINE 20 MG/1
20 TABLET, FILM COATED ORAL 2 TIMES DAILY
Status: DISCONTINUED | OUTPATIENT
Start: 2025-06-04 | End: 2025-06-05 | Stop reason: HOSPADM

## 2025-06-04 RX ORDER — LISINOPRIL 40 MG/1
40 TABLET ORAL DAILY
Status: DISCONTINUED | OUTPATIENT
Start: 2025-06-04 | End: 2025-06-05 | Stop reason: HOSPADM

## 2025-06-04 RX ORDER — NAPROXEN SODIUM 220 MG/1
325 TABLET, FILM COATED ORAL DAILY
Status: DISCONTINUED | OUTPATIENT
Start: 2025-06-05 | End: 2025-06-05 | Stop reason: HOSPADM

## 2025-06-04 RX ORDER — ONDANSETRON HYDROCHLORIDE 2 MG/ML
INJECTION, SOLUTION INTRAVENOUS
Status: DISCONTINUED | OUTPATIENT
Start: 2025-06-04 | End: 2025-06-04

## 2025-06-04 RX ADMIN — BUPIVACAINE HYDROCHLORIDE 2 ML: 7.5 INJECTION, SOLUTION EPIDURAL; RETROBULBAR at 07:06

## 2025-06-04 RX ADMIN — PREGABALIN 75 MG: 75 CAPSULE ORAL at 09:06

## 2025-06-04 RX ADMIN — PROPOFOL 200 MG: 10 INJECTION, EMULSION INTRAVENOUS at 07:06

## 2025-06-04 RX ADMIN — PHENYLEPHRINE HYDROCHLORIDE 0.5 MCG/KG/MIN: 10 INJECTION INTRAVENOUS at 08:06

## 2025-06-04 RX ADMIN — OXYCODONE 10 MG: 5 TABLET ORAL at 03:06

## 2025-06-04 RX ADMIN — FAMOTIDINE 20 MG: 20 TABLET, FILM COATED ORAL at 09:06

## 2025-06-04 RX ADMIN — CELECOXIB 200 MG: 100 CAPSULE ORAL at 09:06

## 2025-06-04 RX ADMIN — PREGABALIN 75 MG: 75 CAPSULE ORAL at 12:06

## 2025-06-04 RX ADMIN — SODIUM CHLORIDE: 9 INJECTION, SOLUTION INTRAVENOUS at 07:06

## 2025-06-04 RX ADMIN — DOCUSATE SODIUM 100 MG: 100 CAPSULE, LIQUID FILLED ORAL at 12:06

## 2025-06-04 RX ADMIN — ONDANSETRON 4 MG: 2 INJECTION INTRAMUSCULAR; INTRAVENOUS at 09:06

## 2025-06-04 RX ADMIN — FENTANYL CITRATE 50 MCG: 50 INJECTION, SOLUTION INTRAMUSCULAR; INTRAVENOUS at 07:06

## 2025-06-04 RX ADMIN — DOCUSATE SODIUM 100 MG: 100 CAPSULE, LIQUID FILLED ORAL at 09:06

## 2025-06-04 RX ADMIN — OXYCODONE HYDROCHLORIDE 5 MG: 5 TABLET ORAL at 11:06

## 2025-06-04 RX ADMIN — FAMOTIDINE 20 MG: 20 TABLET, FILM COATED ORAL at 12:06

## 2025-06-04 RX ADMIN — MUPIROCIN 1 G: 20 OINTMENT TOPICAL at 12:06

## 2025-06-04 RX ADMIN — VANCOMYCIN HYDROCHLORIDE 1500 MG: 1 INJECTION, POWDER, LYOPHILIZED, FOR SOLUTION INTRAVENOUS at 07:06

## 2025-06-04 RX ADMIN — SODIUM CHLORIDE: 9 INJECTION, SOLUTION INTRAVENOUS at 10:06

## 2025-06-04 RX ADMIN — PHENYLEPHRINE HYDROCHLORIDE 100 MCG: 10 INJECTION INTRAVENOUS at 08:06

## 2025-06-04 RX ADMIN — DEXAMETHASONE SODIUM PHOSPHATE 4 MG: 4 INJECTION, SOLUTION INTRA-ARTICULAR; INTRALESIONAL; INTRAMUSCULAR; INTRAVENOUS; SOFT TISSUE at 07:06

## 2025-06-04 RX ADMIN — GLYCOPYRROLATE 0.2 MG: 0.2 INJECTION INTRAMUSCULAR; INTRAVENOUS at 08:06

## 2025-06-04 RX ADMIN — PHENYLEPHRINE HYDROCHLORIDE 100 MCG: 10 INJECTION INTRAVENOUS at 07:06

## 2025-06-04 RX ADMIN — TRANEXAMIC ACID 1000 MG: 100 INJECTION, SOLUTION INTRAVENOUS at 08:06

## 2025-06-04 RX ADMIN — ROPIVACAINE HYDROCHLORIDE 30 ML: 5 INJECTION, SOLUTION EPIDURAL; INFILTRATION; PERINEURAL at 07:06

## 2025-06-04 RX ADMIN — MUPIROCIN 1 G: 20 OINTMENT TOPICAL at 09:06

## 2025-06-04 RX ADMIN — CEFAZOLIN 2 G: 2 INJECTION, POWDER, FOR SOLUTION INTRAMUSCULAR; INTRAVENOUS at 03:06

## 2025-06-04 RX ADMIN — DEXTROSE MONOHYDRATE 2 G: 50 INJECTION, SOLUTION INTRAVENOUS at 07:06

## 2025-06-04 RX ADMIN — TRANEXAMIC ACID 1000 MG: 100 INJECTION, SOLUTION INTRAVENOUS at 09:06

## 2025-06-04 RX ADMIN — LIDOCAINE HYDROCHLORIDE 50 MG: 20 INJECTION, SOLUTION INTRAVENOUS at 07:06

## 2025-06-04 RX ADMIN — CELECOXIB 200 MG: 100 CAPSULE ORAL at 12:06

## 2025-06-04 RX ADMIN — ACETAMINOPHEN 1000 MG: 500 TABLET ORAL at 09:06

## 2025-06-04 RX ADMIN — CEFAZOLIN 2 G: 2 INJECTION, POWDER, FOR SOLUTION INTRAMUSCULAR; INTRAVENOUS at 11:06

## 2025-06-04 RX ADMIN — FLUOXETINE HYDROCHLORIDE 10 MG: 10 CAPSULE ORAL at 12:06

## 2025-06-04 NOTE — CONSULTS
[facility] Orthopedic  [facility] Medicine  Consult Note    Patient Name: Tray Jalloh  MRN: [id number]  Admission Date: 6/4/2025  Hospital Length of Stay: 0 days  Attending Physician: Dada Enriquez MD   Primary Care Provider: Elizabeth Lange FNP           Patient information was obtained from spouse/SO and past medical records.     Inpatient consult to Hospitalist  Consult performed by: Parvez Watkins MD  Consult ordered by: Dada Enriquez MD        Subjective:     Principal Problem: Primary osteoarthritis of left knee    Chief Complaint: No chief complaint on file.       HPI: Mr. Jalloh is a 74 Y O male with PMH of HTN, CKD stage 3, LUIS ALFREDO/COPD, depression who presented for elective left total knee arthroplasty with Dr. Enriquez. Patient was seen post operatively. He denies chest pain, dyspnea, nausea, vomiting. He reports compliance with his home medications and OP office visits with doctors.     Past Medical History:   Diagnosis Date    Cervical radiculopathy     Chronic pain     Chronic renal impairment     COVID-19 12/27/2022    Depression     Depressive disorder 05/12/2022    Digestive disorder     GERD (gastroesophageal reflux disease)     History of prediabetes 11/16/2022    Hypercholesteremia     Hypertension     Left ventricular hypertrophy     Lumbosacral spondylosis     Positive colorectal cancer screening using Cologuard test 12/15/2022    Prediabetes     Pulmonary nodules     repeat CT due 12/03/21    Sleep apnea        Past Surgical History:   Procedure Laterality Date    ABDOMINAL SURGERY      inguinal hernia repair    BACK SURGERY      Bilateral L3-S1 MBB Bilateral 2-, 1-, 1-, 12-    Dr Jorge Farmer Elena    EYE SURGERY      HERNIA REPAIR      JOINT REPLACEMENT  2024    KNEE ARTHROSCOPY W/ MENISCAL REPAIR Right     KNEE ARTHROSCOPY W/ MENISCECTOMY Left 01/11/2023    Procedure: ARTHROSCOPY, KNEE, WITH MENISCECTOMY;  Surgeon: Dada Enriquez MD;  Location:  [facility] OR;  Service: Orthopedics;  Laterality: Left;    KNEE SURGERY Left     LATERAL RETINACULA RELEASE OF KNEE Left 01/11/2023    Procedure: ARTHROSCOPIC RELEASE, KNEE, LATERAL RETINACULA;  Surgeon: Dada Enriquez MD;  Location: [facility] OR;  Service: Orthopedics;  Laterality: Left;    PROSTATECTOMY  03/1996    prostate cancer    RADIOFREQUENCY ABLATION OF LUMBAR MEDIAL BRANCH NERVE AT SINGLE LEVEL Right 04/01/2021    Procedure: Radiofrequency Ablation, Nerve, Spinal, Lumbar, Medial Branch, 1 Level;  Surgeon: Jordana Patel MD;  Location: [facility];  Service: Pain Management;  Laterality: Right;  Right L3-S1 RFTC    RADIOFREQUENCY ABLATION OF LUMBAR MEDIAL BRANCH NERVE AT SINGLE LEVEL Right 02/09/2023    Procedure: Radiofrequency Ablation, Nerve, Spinal, Lumbar, Medial Branch, Level L4-S1;  Surgeon: Jordana Patel MD;  Location: [facility];  Service: Pain Management;  Laterality: Right;  bonita left after right is done    RADIOFREQUENCY ABLATION OF LUMBAR MEDIAL BRANCH NERVE AT SINGLE LEVEL Left 02/28/2023    Procedure: RADIOFREQUENCY ABLATION, NERVE, SPINAL, LUMBAR, MEDIAL BRANCH, 1 LEVEL;  Surgeon: Jordana Patel MD;  Location: [facility];  Service: Pain Management;  Laterality: Left;    RADIOFREQUENCY THERMOCOAGULATION Bilateral 03/20/2012    Dr Parker    RADIOFREQUENCY THERMOCOAGULATION Left 03/18/2021    Procedure: RADIOFREQUENCY THERMAL COAGULATION;  Surgeon: Jordana Patel MD;  Location: [facility];  Service: Pain Management;  Laterality: Left;  BILATERAL L3-S1 RFTC LEFT SIDE FIRST    ROBOTIC ARTHROPLASTY, KNEE Right 06/19/2024    Procedure: ROBOTIC ARTHROPLASTY, KNEE, TOTAL;  Surgeon: Dada Enriquez MD;  Location: [facility] OR;  Service: Orthopedics;  Laterality: Right;    SELECTIVE INJECTION OF ANESTHETIC AGENT AROUND LUMBAR SPINAL NERVE ROOT BY TRANSFORAMINAL APPROACH Right 01/13/2022    Procedure: Right L4-5,5-S1 TFESI;  Surgeon: Jordana Patel MD;   Location: [facility];  Service: Pain Management;  Laterality: Right;  PT AWARE TO BE TESTED VIA PC    SHOULDER ARTHROSCOPY Right     SPINE SURGERY  1995    VASECTOMY  Mar 1996       Review of patient's allergies indicates:   Allergen Reactions    Bee pollen     Grass pollen-june grass standard        No current facility-administered medications on file prior to encounter.     Current Outpatient Medications on File Prior to Encounter   Medication Sig    acetaminophen (TYLENOL) 325 MG tablet Take 325 mg by mouth every 6 (six) hours as needed for Pain.    amLODIPine (NORVASC) 5 MG tablet Take 2 tablets (10 mg total) by mouth once daily.    cetirizine (ZYRTEC) 10 MG tablet Take 1 tablet (10 mg total) by mouth once daily.    lisinopriL (PRINIVIL,ZESTRIL) 40 MG tablet Take 1 tablet (40 mg total) by mouth once daily.    omeprazole (PRILOSEC) 20 MG capsule Take 1 capsule (20 mg total) by mouth once daily.    albuterol (VENTOLIN HFA) 90 mcg/actuation inhaler Inhale 2 puffs into the lungs every 6 (six) hours as needed for Wheezing or Shortness of Breath. Rescue    ascorbic acid, vitamin C, (VITAMIN C) 1000 MG tablet Take 1,000 mg by mouth once daily.    cholecalciferol, vitamin D3, (VITAMIN D3) 50 mcg (2,000 unit) Cap Take 1 capsule by mouth once daily.    CRESTOR 20 mg tablet Take 10 mg by mouth once daily.    cyanocobalamin (VITAMIN B-12) 1000 MCG tablet Take 100 mcg by mouth once daily.    cyclobenzaprine (FLEXERIL) 10 MG tablet Take 1 tablet (10 mg total) by mouth 3 (three) times daily as needed for Muscle spasms.    diphenhydrAMINE (BENADRYL) 25 mg capsule Take 25 mg by mouth every 6 (six) hours as needed for Itching.    FLUoxetine 10 MG capsule Take 1 capsule (10 mg total) by mouth once daily.    fluticasone propionate (FLONASE) 50 mcg/actuation nasal spray 1 spray by Each Nostril route daily as needed for Rhinitis or Allergies.    hydrocortisone 2.5 % cream Apply 30 g/kg topically 2 (two) times daily as  needed. Apply to face for dry skin    ketoconazole (NIZORAL) 2 % cream Apply topically daily as needed.    methenamine (HIPREX) 1 gram Tab Take 1 tablet (1 g total) by mouth 2 (two) times daily.    multivitamin Tab Take 1 tablet by mouth once daily.    omega-3 fatty acids/fish oil (FISH OIL-OMEGA-3 FATTY ACIDS) 300-1,000 mg capsule Take 1 capsule by mouth 2 (two) times a day.    tolterodine (DETROL LA) 4 MG 24 hr capsule Take one capsule by mouth once daily    triamcinolone acetonide 0.1% (KENALOG) 0.1 % cream Apply 15 g topically 2 (two) times daily as needed. Apply to back for dry skin and itching    vitamin E 1000 UNIT capsule Take 1,000 Units by mouth once daily.    zinc gluconate 50 mg tablet Take 50 mg by mouth once daily.     Family History       Problem Relation (Age of Onset)    Alzheimer's disease Maternal Grandmother    Arthritis Mother    Cancer Maternal Grandfather, Paternal Grandfather, Other    Diabetes Mother, Paternal Grandmother, Other    Heart attack Paternal Grandfather    Heart disease Father, Other    Hypertension Father, Other    Stroke Father, Other          Tobacco Use    Smoking status: Former     Current packs/day: 0.00     Average packs/day: 2.0 packs/day for 30.0 years (60.0 ttl pk-yrs)     Types: Cigarettes, Pipe, Cigars     Quit date:      Years since quittin.4     Passive exposure: Past    Smokeless tobacco: Never   Substance and Sexual Activity    Alcohol use: Not Currently    Drug use: Yes     Types: Oxycodone    Sexual activity: Not Currently     Partners: Female     Birth control/protection: Abstinence     Comment: Sterile     Review of Systems   All other systems reviewed and are negative.    Objective:     Vital Signs (Most Recent):  Temp: 98 °F (36.7 °C) (25 0945)  Pulse: 79 (25 1030)  Resp: 12 (25 1025)  BP: (!) 113/58 (25 1030)  SpO2: (!) 94 % (25 1030) Vital Signs (24h Range):  Temp:  [97.7 °F (36.5 °C)-98 °F (36.7 °C)] 98 °F (36.7  °C)  Pulse:  [71-83] 79  Resp:  [12-18] 12  SpO2:  [91 %-96 %] 94 %  BP: (101-128)/(57-71) 113/58     Weight: 86.2 kg (190 lb)  Body mass index is 25.07 kg/m².     Physical Exam  Vitals and nursing note reviewed.   Constitutional:       Appearance: Normal appearance.   HENT:      Head: Normocephalic and atraumatic.      Nose: Nose normal.      Mouth/Throat:      Mouth: Mucous membranes are moist.   Eyes:      Extraocular Movements: Extraocular movements intact.   Cardiovascular:      Rate and Rhythm: Normal rate and regular rhythm.      Pulses: Normal pulses.      Heart sounds: Normal heart sounds.   Pulmonary:      Effort: Pulmonary effort is normal.      Breath sounds: Normal breath sounds.   Abdominal:      General: Bowel sounds are normal.      Palpations: Abdomen is soft.   Musculoskeletal:      Cervical back: Normal range of motion.      Comments: Left knee dressing in place.    Skin:     General: Skin is warm.   Neurological:      General: No focal deficit present.      Mental Status: He is alert and oriented to person, place, and time. Mental status is at baseline.   Psychiatric:         Mood and Affect: Mood normal.         Behavior: Behavior normal.          Significant Labs: All pertinent labs within the past 24 hours have been reviewed.    Significant Imaging: I have reviewed all pertinent imaging results/findings within the past 24 hours.  Assessment/Plan:     * Primary osteoarthritis of left knee  S/p Left TKR by Dr. Enriquez on 6/4/25.  PT/OT consulted.  Pain control.       Acute post-operative pain  Started on oxycodone for pain control.       Chronic kidney disease, stage 3a  Creatine stable for now. BMP reviewed- noted CrCl cannot be calculated (Patient's most recent lab result is older than the maximum 7 days allowed.). according to latest data. Based on current GFR, CKD stage is stage 3 - GFR 30-59.  Monitor UOP and serial BMP and adjust therapy as needed. Renally dose meds. Avoid nephrotoxic  medications and procedures.    LUIS ALFREDO on CPAP  Resume CPAP at bedtime.       Gastroesophageal reflux disease without esophagitis  Resume home Pepcid.       Essential hypertension  Patient's blood pressure range in the last 24 hours was: BP  Min: 101/58  Max: 128/68.The patient's inpatient anti-hypertensive regimen is listed below:  Current Antihypertensives  amLODIPine tablet 10 mg, Daily, Oral  lisinopriL tablet 40 mg, Daily, Oral    Plan  - BP is controlled, no changes needed to their regimen  - Resume home meds from POD #1.      VTE Risk Mitigation (From admission, onward)           Ordered     IP VTE LOW RISK PATIENT  Once         06/04/25 1024     Place LAYTON hose  Until discontinued         06/04/25 1024     Place sequential compression device  Until discontinued         06/04/25 1024                        Thank you for your consult. I will follow-up with patient. Please contact us if you have any additional questions.    TERESE HERNANDEZ MD  Department of Hospital Medicine   [facility]

## 2025-06-04 NOTE — TRANSFER OF CARE
"Anesthesia Transfer of Care Note    Patient: Tray Jalloh    Procedure(s) Performed: Procedure(s) (LRB):  ROBOTIC ARTHROPLASTY, KNEE, TOTAL (Left)    Patient location: PACU    Anesthesia Type: general    Transport from OR: Transported from OR on 6-10 L/min O2 by face mask with adequate spontaneous ventilation    Post pain: adequate analgesia    Post assessment: no apparent anesthetic complications    Post vital signs: stable    Level of consciousness: awake and alert    Nausea/Vomiting: no nausea/vomiting    Complications: none    Transfer of care protocol was followed      Last vitals: Visit Vitals  /68 (BP Location: Left arm, Patient Position: Lying)   Pulse 83   Temp 36.7 °C (98 °F) (Oral)   Resp 15   Ht 6' 1" (1.854 m)   Wt 86.2 kg (190 lb)   SpO2 95%   BMI 25.07 kg/m²     "

## 2025-06-04 NOTE — PLAN OF CARE
Problem: Physical Therapy  Goal: Physical Therapy Goal  Description: Short Term Goals to be met by: 25    Patient will increase functional independence with mobility by performin. Supine to sit with Modified Preston  2. Sit to stand transfer with Modified Preston  3. Bed to chair transfer with Modified Preston using Rolling Walker, WBAT L LE  4. Gait  x 100 feet with Modified Preston using Rolling Walker, WBAT L LE.   5. Lower extremity exercise program x30 reps per handout, with assistance as needed  6. Knee ROM 0-90  7. Pt to negotiate 2 steps with 1 rail with CGA.     Long Term Goals to be met by: 25    Pt will regain full independent functional mobility to return to prior activities of daily living.   Outcome: Progressing     PT eval completed. Please see eval note for details.

## 2025-06-04 NOTE — PROGRESS NOTES
941 RECEIVED TO RR SEDATED, EASILY AROUSED. ORAL AIRWAY REMOVED, ORIENTATION GIVEN. O2 VIA FM. NO RESP. DISTRESS NOTED. DRESSING LEFT KNEE D/I, IMMOBILIZER LEFT KNEE. LEFT PEDAL PULSE STRONG. IV INFUSING WELL RIGHT HAND 20G. CATH. OBSERVING CLOSELY. SEE FLOW SHEET.    1000 X-RAYS DONE AS ORDERED PER PRIYANKA PINEDA. WELL. SPINAL LEVEL T-12-L-1, NO MOVEMENT IN LOWER EXTREMITIES. NO C/O PAIN. V/S STABLE WITH HOB ELEVATED. SEE FLOW SHEET.    1025 SLEEPING EASILY AROUSED. NO C/O PAIN. NO DISTRESS NOTED. TRANSFERRED TO ROOM.

## 2025-06-04 NOTE — OP NOTE
Department of Orthopedic Surgery    Operative Note  NAME: Tray Jalloh    : 1950      DATE: 2025      PREOPERATIVE DIAGNOSIS: Osteoarthritis of left knee [M17.12]    POSTOPERATIVE DIAGNOSIS:  Osteoarthritis of left knee [M17.12]    PROCEDURE: left Total Knee Arthroplasty  56816   SURGEON: Dada Enriquez    ASSISTANT: Reggie Burgos    ANESTHESIA: Spinal + adductor canal block    BLOOD LOSS: 35 cc    TOURNIQUET TIME:  48 mins    DRAINS: None    IMPLANTS:   Implant Name Type Inv. Item Serial No.  Lot No. LRB No. Used Action   PIN VELYS ARRAY DRILL [id number] - [id number]  PIN [employer]ELYS ARRAY DRILL [id number]  [employer] [id number] Left 1 Implanted and Explanted   PIN [employer]EL[employer] ARRAY DRILL [id number] - [id number]  PIN VEL[employer] ARRAY DRILL [id number]  [employer] [id number] Left 1 Implanted and Explanted   PIN VELYS ARRAY DRILL [id number] - [id number]  PIN VELYS ARRAY DRILL [id number]  [employer] [id number] Left 1 Implanted and Explanted   PIN VELYS ARRAY DRILL [id number] - [id number]  PIN VELYS ARRAY DRILL [id number]  [employer] [id number] Left 1 Implanted and Explanted   CEMENT BONE SPEED SET - [id number]  CEMENT BONE SPEED SET  [employer] HEIDI. [id number] Left 1 Implanted   PATELLA ATTUNE MED PAT 38MM - [id number]  PATELLA ATTUNE MED PAT 38MM  [employer] [id number] Left 1 Implanted   INSERT ATTUNE CR RP SZ6 5MM - [id number]  INSERT ATTUNE CR RP SZ6 5MM  [employer] [id number] Left 1 Implanted   COMP ATTUNE POROCOAT L SZ6 - [id number]  COMP ATTUNE POROCOAT L SZ6  [employer] [id number] Left 1 Implanted   BASEPLATE ATTUNE TIB RP SZ7 - [id number]  BASEPLATE ATTUNE TIB RP SZ7  [employer] [id number] Left 1 Implanted       Attune Cemented Tibial Baseplate, Cruciate Retaining, Size 7  Attune Cemented Femoral Component, Size 6  Attune Rotating Platform, Cruciate retaining 5 polyethylene spacer   Attune Size 38 Medialized dome patella button     INDICATIONS FOR PROCEDURE:   [unfilled] is a 74  y.o.-year-old with debilitating left-sided knee pain despite nonoperative measures and interested in knee arthroplasty.    PROCEDURE IN DETAIL:  After obtaining informed consent and starting the patient on preoperative IV antibiotics, the patient was taken back to the Operating   Room. Anesthesia was performed by Anesthesia Team. The left lower  extremity was prepped and draped in normal sterile fashion.  An Esmarch bandage was used to exsanguinate the affected lower extremity and the pneumatic tourniquet  was inflated to 300 mmHg.  A standard longitudinal midline incision was made beginning 3 fingerbreadths above the superior pole of the patella extending down to the tibial tubercle.  Full-thickness skin flaps were raised.  A medial parapatellar arthrotomy was made. The patella was then everted.    A release of the proximal medial tibia and MCL was then undertaken.  The infrapatella fat pad was resected. At this point guide pins were placed along the proximal tibia and distal femur.  A utilized [employer] robotic assistance in this case and took the knee and hip through an arc of motion to find the center of the hip.  Way points were then marked along the tibia and femur.  After the appropriate registration was performed robotic assistant was utilized to create distal femoral and proximal tibial cuts.  An extension block was used to verify appropriate alignment of the joint at this point.  I then utilized a tensioner in order to verify the appropriate tension within the flexion gap and selected the appropriate parameters for rotation of the femoral component.  The femoral cuts were then performed at this point making anterior posterior and chamfer cuts appropriately.  Trials were then positioned on the femur and a floating trial was placed in the tibia and the knee was taken through a range of motion and satisfactory stability and alignment was able to be achieved.   Trial components were placed on the femur and tibia.    Flexion and extension gaps were symmetric and balanced at this point.    The trial components demonstrated proper range of motion and stability.    The patella was addressed next.  A guide was utilized to resect 9.5 mm of bone.  A drill guide was applied to flat patella surface and appropriate drill holes were made.  A trial patella button was placed.  The patella tracked nicely.  No lateral release was required.  We then Pulsavac irrigated cancellous bone and vacuum mixed the cement.      The final components were then implanted.  The tibial component was placed first, followed by the femoral component.  Excess cement was removed.  A trial polyethylene was placed.  The cemented patella was then placed and excess cement was removed.   Once the cement hardened,the tourniquet was released.  Bleeders were coagulated.  1 gram of TXA was given at this point. The appropriate dose of pericapsular injection was then administered, focusing on the posterior capsule.  Final polyethylene component was placed.  Satisfactory range of motion and stability was demonstrated.    The extensor retinaculum was then clsoed with #1 Vicryl figure of eight sutures with the knee in slight flexion, the subcutaneous tissue with 2-0 Vicryl, skin reapproximated with staples.  A sterile dressing was applied, as was a knee immobilizer.  Patient was then taken to the recovery room in stable condition.

## 2025-06-04 NOTE — SUBJECTIVE & OBJECTIVE
Past Medical History:   Diagnosis Date    Cervical radiculopathy     Chronic pain     Chronic renal impairment     COVID-19 12/27/2022    Depression     Depressive disorder 05/12/2022    Digestive disorder     GERD (gastroesophageal reflux disease)     History of prediabetes 11/16/2022    Hypercholesteremia     Hypertension     Left ventricular hypertrophy     Lumbosacral spondylosis     Positive colorectal cancer screening using Cologuard test 12/15/2022    Prediabetes     Pulmonary nodules     repeat CT due 12/03/21    Sleep apnea        Past Surgical History:   Procedure Laterality Date    ABDOMINAL SURGERY      inguinal hernia repair    BACK SURGERY      Bilateral L3-S1 MBB Bilateral 2-, 1-, 1-, 12-    Dr Jorge Parker    EYE SURGERY      HERNIA REPAIR      JOINT REPLACEMENT  2024    KNEE ARTHROSCOPY W/ MENISCAL REPAIR Right     KNEE ARTHROSCOPY W/ MENISCECTOMY Left 01/11/2023    Procedure: ARTHROSCOPY, KNEE, WITH MENISCECTOMY;  Surgeon: Dada Enriquez MD;  Location: [facility] OR;  Service: Orthopedics;  Laterality: Left;    KNEE SURGERY Left     LATERAL RETINACULA RELEASE OF KNEE Left 01/11/2023    Procedure: ARTHROSCOPIC RELEASE, KNEE, LATERAL RETINACULA;  Surgeon: Dada Enriquez MD;  Location: [facility] OR;  Service: Orthopedics;  Laterality: Left;    PROSTATECTOMY  03/1996    prostate cancer    RADIOFREQUENCY ABLATION OF LUMBAR MEDIAL BRANCH NERVE AT SINGLE LEVEL Right 04/01/2021    Procedure: Radiofrequency Ablation, Nerve, Spinal, Lumbar, Medial Branch, 1 Level;  Surgeon: Jordana Patel MD;  Location: [facility];  Service: Pain Management;  Laterality: Right;  Right L3-S1 RFTC    RADIOFREQUENCY ABLATION OF LUMBAR MEDIAL BRANCH NERVE AT SINGLE LEVEL Right 02/09/2023    Procedure: Radiofrequency Ablation, Nerve, Spinal, Lumbar, Medial Branch, Level L4-S1;  Surgeon: Jordana Patel MD;  Location: [facility];  Service: Pain Management;  Laterality:  Right;  bonita left after right is done    RADIOFREQUENCY ABLATION OF LUMBAR MEDIAL BRANCH NERVE AT SINGLE LEVEL Left 02/28/2023    Procedure: RADIOFREQUENCY ABLATION, NERVE, SPINAL, LUMBAR, MEDIAL BRANCH, 1 LEVEL;  Surgeon: Jordana Patel MD;  Location: [facility];  Service: Pain Management;  Laterality: Left;    RADIOFREQUENCY THERMOCOAGULATION Bilateral 03/20/2012    Dr Parker    RADIOFREQUENCY THERMOCOAGULATION Left 03/18/2021    Procedure: RADIOFREQUENCY THERMAL COAGULATION;  Surgeon: Jordana Patel MD;  Location: [facility];  Service: Pain Management;  Laterality: Left;  BILATERAL L3-S1 RFTC LEFT SIDE FIRST    ROBOTIC ARTHROPLASTY, KNEE Right 06/19/2024    Procedure: ROBOTIC ARTHROPLASTY, KNEE, TOTAL;  Surgeon: Dada Enriquez MD;  Location: [facility] OR;  Service: Orthopedics;  Laterality: Right;    SELECTIVE INJECTION OF ANESTHETIC AGENT AROUND LUMBAR SPINAL NERVE ROOT BY TRANSFORAMINAL APPROACH Right 01/13/2022    Procedure: Right L4-5,5-S1 TFESI;  Surgeon: Jordana Patel MD;  Location: [facility];  Service: Pain Management;  Laterality: Right;  PT AWARE TO BE TESTED VIA PC    SHOULDER ARTHROSCOPY Right     SPINE SURGERY  1995    VASECTOMY  Mar 1996       Review of patient's allergies indicates:   Allergen Reactions    Bee pollen     Grass pollen-june grass standard        No current facility-administered medications on file prior to encounter.     Current Outpatient Medications on File Prior to Encounter   Medication Sig    acetaminophen (TYLENOL) 325 MG tablet Take 325 mg by mouth every 6 (six) hours as needed for Pain.    amLODIPine (NORVASC) 5 MG tablet Take 2 tablets (10 mg total) by mouth once daily.    cetirizine (ZYRTEC) 10 MG tablet Take 1 tablet (10 mg total) by mouth once daily.    lisinopriL (PRINIVIL,ZESTRIL) 40 MG tablet Take 1 tablet (40 mg total) by mouth once daily.    omeprazole (PRILOSEC) 20 MG capsule Take 1 capsule (20 mg total) by mouth once daily.     albuterol (VENTOLIN HFA) 90 mcg/actuation inhaler Inhale 2 puffs into the lungs every 6 (six) hours as needed for Wheezing or Shortness of Breath. Rescue    ascorbic acid, vitamin C, (VITAMIN C) 1000 MG tablet Take 1,000 mg by mouth once daily.    cholecalciferol, vitamin D3, (VITAMIN D3) 50 mcg (2,000 unit) Cap Take 1 capsule by mouth once daily.    CRESTOR 20 mg tablet Take 10 mg by mouth once daily.    cyanocobalamin (VITAMIN B-12) 1000 MCG tablet Take 100 mcg by mouth once daily.    cyclobenzaprine (FLEXERIL) 10 MG tablet Take 1 tablet (10 mg total) by mouth 3 (three) times daily as needed for Muscle spasms.    diphenhydrAMINE (BENADRYL) 25 mg capsule Take 25 mg by mouth every 6 (six) hours as needed for Itching.    FLUoxetine 10 MG capsule Take 1 capsule (10 mg total) by mouth once daily.    fluticasone propionate (FLONASE) 50 mcg/actuation nasal spray 1 spray by Each Nostril route daily as needed for Rhinitis or Allergies.    hydrocortisone 2.5 % cream Apply 30 g/kg topically 2 (two) times daily as needed. Apply to face for dry skin    ketoconazole (NIZORAL) 2 % cream Apply topically daily as needed.    methenamine (HIPREX) 1 gram Tab Take 1 tablet (1 g total) by mouth 2 (two) times daily.    multivitamin Tab Take 1 tablet by mouth once daily.    omega-3 fatty acids/fish oil (FISH OIL-OMEGA-3 FATTY ACIDS) 300-1,000 mg capsule Take 1 capsule by mouth 2 (two) times a day.    tolterodine (DETROL LA) 4 MG 24 hr capsule Take one capsule by mouth once daily    triamcinolone acetonide 0.1% (KENALOG) 0.1 % cream Apply 15 g topically 2 (two) times daily as needed. Apply to back for dry skin and itching    vitamin E 1000 UNIT capsule Take 1,000 Units by mouth once daily.    zinc gluconate 50 mg tablet Take 50 mg by mouth once daily.     Family History       Problem Relation (Age of Onset)    Alzheimer's disease Maternal Grandmother    Arthritis Mother    Cancer Maternal Grandfather, Paternal Grandfather, Other     Diabetes Mother, Paternal Grandmother, Other    Heart attack Paternal Grandfather    Heart disease Father, Other    Hypertension Father, Other    Stroke Father, Other          Tobacco Use    Smoking status: Former     Current packs/day: 0.00     Average packs/day: 2.0 packs/day for 30.0 years (60.0 ttl pk-yrs)     Types: Cigarettes, Pipe, Cigars     Quit date:      Years since quittin.4     Passive exposure: Past    Smokeless tobacco: Never   Substance and Sexual Activity    Alcohol use: Not Currently    Drug use: Yes     Types: Oxycodone    Sexual activity: Not Currently     Partners: Female     Birth control/protection: Abstinence     Comment: Sterile     Review of Systems   All other systems reviewed and are negative.    Objective:     Vital Signs (Most Recent):  Temp: 98 °F (36.7 °C) (25 0945)  Pulse: 79 (25 1030)  Resp: 12 (25 1025)  BP: (!) 113/58 (25 1030)  SpO2: (!) 94 % (25 1030) Vital Signs (24h Range):  Temp:  [97.7 °F (36.5 °C)-98 °F (36.7 °C)] 98 °F (36.7 °C)  Pulse:  [71-83] 79  Resp:  [12-18] 12  SpO2:  [91 %-96 %] 94 %  BP: (101-128)/(57-71) 113/58     Weight: 86.2 kg (190 lb)  Body mass index is 25.07 kg/m².     Physical Exam  Vitals and nursing note reviewed.   Constitutional:       Appearance: Normal appearance.   HENT:      Head: Normocephalic and atraumatic.      Nose: Nose normal.      Mouth/Throat:      Mouth: Mucous membranes are moist.   Eyes:      Extraocular Movements: Extraocular movements intact.   Cardiovascular:      Rate and Rhythm: Normal rate and regular rhythm.      Pulses: Normal pulses.      Heart sounds: Normal heart sounds.   Pulmonary:      Effort: Pulmonary effort is normal.      Breath sounds: Normal breath sounds.   Abdominal:      General: Bowel sounds are normal.      Palpations: Abdomen is soft.   Musculoskeletal:      Cervical back: Normal range of motion.      Comments: Left knee dressing in place.    Skin:     General: Skin is  warm.   Neurological:      General: No focal deficit present.      Mental Status: He is alert and oriented to person, place, and time. Mental status is at baseline.   Psychiatric:         Mood and Affect: Mood normal.         Behavior: Behavior normal.          Significant Labs: All pertinent labs within the past 24 hours have been reviewed.    Significant Imaging: I have reviewed all pertinent imaging results/findings within the past 24 hours.

## 2025-06-04 NOTE — ANESTHESIA PREPROCEDURE EVALUATION
06/04/2025  Tray Jalloh is a 74 y.o., male.      Pre-op Assessment    I have reviewed the Patient Summary Reports.     I have reviewed the Nursing Notes. I have reviewed the NPO Status.   I have reviewed the Medications.     Review of Systems  Anesthesia Hx:             Denies Family Hx of Anesthesia complications.    Denies Personal Hx of Anesthesia complications.                    Social:  Non-Smoker, No Alcohol Use       Cardiovascular:     Hypertension              ECG has been reviewed.                      Hypertension         Pulmonary:   COPD   Shortness of breath  Sleep Apnea    Chronic Obstructive Pulmonary Disease (COPD):           Obstructive Sleep Apnea (LUIS ALFREDO).           Renal/:  Chronic Renal Disease        Kidney Function/Disease             Hepatic/GI:     GERD                Musculoskeletal:  Musculoskeletal Normal                Neurological:    Neuromuscular Disease,                                 Neuromuscular Disease   Psych:  Psychiatric History                  Physical Exam  General: Well nourished, Cooperative, Alert and Oriented    Airway:  Mallampati: II / II  Mouth Opening: Normal  TM Distance: Normal  Neck ROM: Normal ROM    Dental:  Intact    Chest/Lungs:  Clear to auscultation    Heart:  Rate: Normal  Rhythm: Regular Rhythm  Sounds: Normal        Chemistry        Component Value Date/Time     05/27/2025 1417    K 4.8 05/27/2025 1417     (H) 05/27/2025 1417    CO2 21 (L) 05/27/2025 1417    BUN 22 05/27/2025 1417    CREATININE 1.31 (H) 05/27/2025 1417    GLU 94 05/27/2025 1417        Component Value Date/Time    CALCIUM 9.8 05/27/2025 1417    ALKPHOS 56 01/28/2025 1547    AST 14 01/28/2025 1547    ALT 8 01/28/2025 1547    BILITOT 0.3 01/28/2025 1547    ESTGFRAFRICA 65 12/03/2020 1107    EGFRNONAA 70 05/12/2022 1004        Lab Results   Component Value Date     WBC 8.44 05/27/2025    HGB 13.6 05/27/2025    HCT 42.7 05/27/2025     05/27/2025     Results for orders placed or performed in visit on 06/10/24   EKG 12-lead    Collection Time: 06/10/24  3:29 PM   Result Value Ref Range    QRS Duration 80 ms    OHS QTC Calculation 401 ms    Narrative    Test Reason : Z01.818,    Vent. Rate : 079 BPM     Atrial Rate : 079 BPM     P-R Int : 234 ms          QRS Dur : 080 ms      QT Int : 350 ms       P-R-T Axes : 055 063 041 degrees     QTc Int : 401 ms    Sinus rhythm with 1st degree A-V block  Otherwise normal ECG  When compared with ECG of 12-MAY-2024 09:41,  MI interval has increased  Confirmed by Waldemar Touer MD ([id number]) on 6/22/2024 2:31:24 PM    Referred By: RUFUS YOON           Confirmed By:Waldemar Toure MD     Results for orders placed during the hospital encounter of 09/25/23    Echo    Interpretation Summary    Left Ventricle: The left ventricle is normal in size. Mildly increased wall thickness. Normal wall motion. There is normal systolic function. Biplane (2D) method of discs ejection fraction is 65%. Global longitudinal strain is normal. There is normal diastolic function.    Right Ventricle: Normal right ventricular cavity size. Systolic function is normal.    Aortic Valve: The aortic valve is a trileaflet valve.    Tricuspid Valve: There is mild regurgitation.    Pulmonary Artery: The estimated pulmonary artery systolic pressure is 29 mmHg.    IVC/SVC: Normal venous pressure at 3 mmHg.      Anesthesia Plan  Type of Anesthesia, risks & benefits discussed:    Anesthesia Type: Gen ETT, Regional  Intra-op Monitoring Plan: Standard ASA Monitors  Post Op Pain Control Plan: multimodal analgesia and peripheral nerve block  Induction:  IV  Airway Plan: Direct  Informed Consent: Informed consent signed with the Patient and all parties understand the risks and agree with anesthesia plan.  All questions answered.   ASA Score: 3  Day of Surgery Review of History &  Physical: H&P Update referred to the surgeon/provider.I have interviewed and examined the patient. I have reviewed the patient's H&P dated: There are no significant changes.     Ready For Surgery From Anesthesia Perspective.     .

## 2025-06-04 NOTE — ASSESSMENT & PLAN NOTE
Patient's blood pressure range in the last 24 hours was: BP  Min: 101/58  Max: 128/68.The patient's inpatient anti-hypertensive regimen is listed below:  Current Antihypertensives  amLODIPine tablet 10 mg, Daily, Oral  lisinopriL tablet 40 mg, Daily, Oral    Plan  - BP is controlled, no changes needed to their regimen  - Resume home meds from POD #1.

## 2025-06-04 NOTE — PLAN OF CARE
Problem: Occupational Therapy  Goal: Occupational Therapy Goal  Description: STG: (in 1 week)  1.Pt will perform bathing with setup and min(A)  2.Pt will perform UE dressing with setup  3.Pt will perform LE dressing with setup and min(A) with AD if needed  4.Pt will transfer bed/chair/bsc with RW and CGA  5.Pt will perform standing task x 4 min with RW and CGA  6.Tolerate 15 min of tx without fatigue.      LTG: (in 6 weeks)  Restore to max I with selfcare and mobility.     Outcome: Progressing

## 2025-06-04 NOTE — H&P
[facility]  Orthopedics  H&P    Patient Name: Tray Jalloh  MRN: [id number]  Admission Date: 6/4/2025  Primary Care Provider: Elizabeth Lange FNP    Patient information was obtained from patient.     Subjective:     Principal Problem:<principal problem not specified>    Chief Complaint: No chief complaint on file.       HPI: TO OR for Left TKA    Past Medical History:   Diagnosis Date    Cervical radiculopathy     Chronic pain     Chronic renal impairment     COVID-19 12/27/2022    Depression     Depressive disorder 05/12/2022    Digestive disorder     GERD (gastroesophageal reflux disease)     History of prediabetes 11/16/2022    Hypercholesteremia     Hypertension     Left ventricular hypertrophy     Lumbosacral spondylosis     Positive colorectal cancer screening using Cologuard test 12/15/2022    Prediabetes     Pulmonary nodules     repeat CT due 12/03/21       Past Surgical History:   Procedure Laterality Date    ABDOMINAL SURGERY      inguinal hernia repair    BACK SURGERY      Bilateral L3-S1 MBB Bilateral 2-, 1-, 1-, 12-    Dr Jorge Parker    EYE SURGERY      HERNIA REPAIR      JOINT REPLACEMENT  2024    KNEE ARTHROSCOPY W/ MENISCAL REPAIR Right     KNEE ARTHROSCOPY W/ MENISCECTOMY Left 01/11/2023    Procedure: ARTHROSCOPY, KNEE, WITH MENISCECTOMY;  Surgeon: Dada Enriquez MD;  Location: [facility];  Service: Orthopedics;  Laterality: Left;    KNEE SURGERY Left     LATERAL RETINACULA RELEASE OF KNEE Left 01/11/2023    Procedure: ARTHROSCOPIC RELEASE, KNEE, LATERAL RETINACULA;  Surgeon: Dada Enriquez MD;  Location: [facility];  Service: Orthopedics;  Laterality: Left;    PROSTATECTOMY  03/1996    prostate cancer    RADIOFREQUENCY ABLATION OF LUMBAR MEDIAL BRANCH NERVE AT SINGLE LEVEL Right 04/01/2021    Procedure: Radiofrequency Ablation, Nerve, Spinal, Lumbar, Medial Branch, 1 Level;  Surgeon: Jordana Patel MD;   Location: [facility];  Service: Pain Management;  Laterality: Right;  Right L3-S1 RFTC    RADIOFREQUENCY ABLATION OF LUMBAR MEDIAL BRANCH NERVE AT SINGLE LEVEL Right 02/09/2023    Procedure: Radiofrequency Ablation, Nerve, Spinal, Lumbar, Medial Branch, Level L4-S1;  Surgeon: Jordana Patel MD;  Location: [facility];  Service: Pain Management;  Laterality: Right;  bonita left after right is done    RADIOFREQUENCY ABLATION OF LUMBAR MEDIAL BRANCH NERVE AT SINGLE LEVEL Left 02/28/2023    Procedure: RADIOFREQUENCY ABLATION, NERVE, SPINAL, LUMBAR, MEDIAL BRANCH, 1 LEVEL;  Surgeon: Jordana Patel MD;  Location: [facility];  Service: Pain Management;  Laterality: Left;    RADIOFREQUENCY THERMOCOAGULATION Bilateral 03/20/2012    Dr Parker    RADIOFREQUENCY THERMOCOAGULATION Left 03/18/2021    Procedure: RADIOFREQUENCY THERMAL COAGULATION;  Surgeon: Jordana Patel MD;  Location: [facility];  Service: Pain Management;  Laterality: Left;  BILATERAL L3-S1 RFTC LEFT SIDE FIRST    ROBOTIC ARTHROPLASTY, KNEE Right 06/19/2024    Procedure: ROBOTIC ARTHROPLASTY, KNEE, TOTAL;  Surgeon: Dada Enriquez MD;  Location: [facility];  Service: Orthopedics;  Laterality: Right;    SELECTIVE INJECTION OF ANESTHETIC AGENT AROUND LUMBAR SPINAL NERVE ROOT BY TRANSFORAMINAL APPROACH Right 01/13/2022    Procedure: Right L4-5,5-S1 TFESI;  Surgeon: Jordana Patel MD;  Location: [facility];  Service: Pain Management;  Laterality: Right;  PT AWARE TO BE TESTED VIA PC    SHOULDER ARTHROSCOPY Right     SPINE SURGERY  1995    VASECTOMY  Mar 1996       Review of patient's allergies indicates:   Allergen Reactions    Bee pollen     Grass pollen-figueroa grass standard        Current Facility-Administered Medications   Medication    0.9% NaCl infusion    LIDOcaine (PF) 10 mg/ml (1%) injection 10 mg     Family History       Problem Relation (Age of Onset)    Alzheimer's disease Maternal Grandmother    Arthritis  Mother    Cancer Maternal Grandfather, Paternal Grandfather, Other    Diabetes Mother, Paternal Grandmother, Other    Heart attack Paternal Grandfather    Heart disease Father, Other    Hypertension Father, Other    Stroke Father, Other          Tobacco Use    Smoking status: Former     Current packs/day: 0.00     Average packs/day: 2.0 packs/day for 30.0 years (60.0 ttl pk-yrs)     Types: Cigarettes, Pipe, Cigars     Quit date:      Years since quittin.4     Passive exposure: Past    Smokeless tobacco: Never   Substance and Sexual Activity    Alcohol use: Not Currently    Drug use: Yes     Types: Oxycodone    Sexual activity: Not Currently     Partners: Female     Birth control/protection: Abstinence     Comment: Sterile     Review of Systems   Constitutional: Negative for chills and decreased appetite.   Cardiovascular:  Negative for chest pain and palpitations.   Respiratory:  Negative for cough and shortness of breath.    Gastrointestinal:  Negative for abdominal pain.   Neurological:  Negative for focal weakness, numbness, sensory change and weakness.   Psychiatric/Behavioral:  Negative for altered mental status.      Objective:     Vital Signs (Most Recent):    Vital Signs (24h Range):              There is no height or weight on file to calculate BMI.    No intake or output data in the 24 hours ending 25 0643    General    Constitutional: He is oriented to person, place, and time. He appears well-developed and well-nourished.   HENT:   Head: Normocephalic and atraumatic.   Eyes: Pupils are equal, round, and reactive to light.   Neck: Neck supple.   Cardiovascular:  Normal rate, regular rhythm and intact distal pulses.            Pulmonary/Chest: Effort normal. No respiratory distress. He exhibits no tenderness.   Abdominal: Soft. There is no abdominal tenderness.   Neurological: He is alert and oriented to person, place, and time. He has normal reflexes. He displays normal reflexes. No cranial  nerve deficit. He exhibits normal muscle tone.   Psychiatric: He has a normal mood and affect. His behavior is normal. Judgment and thought content normal.           Right Knee Exam   Right knee exam is normal.    Left Knee Exam     Inspection   Deformity: absent    Tenderness   The patient tender to palpation of the medial joint line, lateral joint line and patella.    Crepitus   The patient has crepitus of the patella and medial joint line.    Range of Motion   Extension:  normal   Flexion:  abnormal     Tests   Meniscus   Ozzy:  Medial - positive Lateral - positive  Stability   Lachman: normal (-1 to 2mm)   PCL-Posterior Drawer: normal (0 to 2mm)  MCL - Valgus: normal (0 to 2mm)  LCL - Varus: normal (0 to 2mm)  Pivot Shift: normal (Equal)  Patella   Passive Patellar Tilt: lateral tilt  Patellar Tracking: normal  Patellar Grind: positive  J-Sign: J sign absent    Other   Muscle Tightness: hamstring tightness  Sensation: normal    Muscle Strength   Left Lower Extremity   Hip Abduction: 5/5   Quadriceps:  5/5   Hamstrin/5     Reflexes     Left Side  Quadriceps:  2+      Significant Labs:   Recent Lab Results       None          All pertinent labs within the past 24 hours have been reviewed.    Significant Imaging: I have reviewed and interpreted all pertinent imaging results/findings.    Assessment/Plan:     There are no hospital problems to display for this patient.    To OR for Left TKA    Dada Enriquez MD  Orthopedics  [facility] - [facility]

## 2025-06-04 NOTE — HPI
Mr. Jalloh is a 74 Y O male with PMH of HTN, CKD stage 3, LUIS ALFREDO/COPD, depression who presented for elective left total knee arthroplasty with Dr. Enriquez. Patient was seen post operatively. He denies chest pain, dyspnea, nausea, vomiting. He reports compliance with his home medications and OP office visits with doctors.

## 2025-06-04 NOTE — ANESTHESIA PROCEDURE NOTES
Intubation    Date/Time: 6/4/2025 7:39 AM    Performed by: Barbara Moss CRNA  Authorized by: Hernesto Torres MD    Intubation:     Induction:  Intravenous    Intubated:  Postinduction    Mask Ventilation:  Easy mask    Attempts:  1    Attempted By:  CRNA    Method of Intubation:  Direct    Difficult Airway Encountered?: No      Complications:  None    Airway Device:  Supraglottic airway/LMA    Airway Device Size:  4.0    Style/Cuff Inflation:  Cuffed (inflated to minimal occlusive pressure)    Placement Verified By:  Capnometry    Complicating Factors:  None    Findings Post-Intubation:  BS equal bilateral and atraumatic/condition of teeth unchanged

## 2025-06-04 NOTE — PT/OT/SLP EVAL
Occupational Therapy   Evaluation    Name: Tray Jalloh  MRN: [id number]  Admitting Diagnosis: Primary osteoarthritis of left knee  Recent Surgery: Procedure(s) (LRB):  ROBOTIC ARTHROPLASTY, KNEE, TOTAL (Left) * Day of Surgery *    Recommendations:     Discharge Recommendations: Low Intensity Therapy  Discharge Equipment Recommendations:  none  Barriers to discharge:  None    Assessment:     Tray Jalloh is a 74 y.o. male with a medical diagnosis of Primary osteoarthritis of left knee.  He presents with alert and agreeable to evaluation. Performance deficits affecting function: gait instability, impaired functional mobility, decreased safety awareness, pain.      Rehab Prognosis: Good; patient would benefit from acute skilled OT services to address these deficits and reach maximum level of function.       Plan:     Patient to be seen 5 x/week to address the above listed problems via self-care/home management, therapeutic activities, therapeutic exercises  Plan of Care Expires: 07/09/25  Plan of Care Reviewed with: patient, spouse    Subjective     Chief Complaint: Primary osteoarthritis of left knee    Patient/Family Comments/goals: Pt plans to return home tomorrow    Occupational Profile:  Living Environment: Pt lives with his spouse with 2 steps to enter and with 1 rail. Pt has tub/shower combo in both of his bathrooms.  Previous level of function: Pt was independent at baseline.  Roles and Routines: Pt is , retired and enjoys serving at his [employer].  Equipment Used at Home: walker, rolling  Assistance upon Discharge: Pt will have assistance from his spouse    Pain/Comfort:  Pain Rating 1: 3/10  Location - Side 1: Left  Location 1: knee  Pain Addressed 1: Pre-medicate for activity, Cessation of Activity, Nurse notified  Pain Rating Post-Intervention 1: 1/10    Patients cultural, spiritual, Sikh conflicts given the current situation: no    Objective:     Communicated with: ALLEN Carreno  Huan prior to session.  Patient found HOB elevated with peripheral IV, blood pressure cuff, pulse ox (continuous) upon OT entry to room.    General Precautions: Standard, fall  Orthopedic Precautions: LLE weight bearing as tolerated  Braces: Knee immobilizer  Respiratory Status: Room air    Occupational Performance:    Bed Mobility:    Patient completed Scooting/Bridging with contact guard assistance  Patient completed Supine to Sit with minimum assistance  Patient completed Sit to Supine with minimum assistance    Functional Mobility/Transfers:  Patient completed Sit <> Stand Transfer with contact guard assistance  with  rolling walker   Functional Mobility: Pt ambulated to door and back with RW and CGA with verbal cues    Activities of Daily Living:  Upper Body Dressing: moderate assistance due to IV line  Lower Body Dressing: maximal assistance for socks    Cognitive/Visual Perceptual:  Cognitive/Psychosocial Skills:     -       Oriented to: Person, Place, and Situation   -       Follows Commands/attention:Follows one-step commands  -       Communication: clear/fluent  -       Safety awareness/insight to disability: intact   -       Mood/Affect/Coping skills/emotional control: Cooperative  Visual/Perceptual:      -wears glasses      Physical Exam:  Balance:    -       sitting with CGA/SBA, Standing with CGA with RW  Upper Extremity Range of Motion:     -       Right Upper Extremity: WFL  -       Left Upper Extremity: WFL  Upper Extremity Strength:    -       Right Upper Extremity: WFL  -       Left Upper Extremity: WFL   Strength:    -       Right Upper Extremity: WFL  -       Left Upper Extremity: WFL  Gross motor coordination:   WFL    AMPAC 6 Click ADL:  AMPAC Total Score:      Treatment & Education:  Pt educated on OT role/POC.   Importance of OOB activity with staff assistance.  Importance of sitting up in the chair throughout the day as tolerated, especially for meals   Safety during functional t/f  and mobility with use of RW  Importance of assisting with self-care activities   All questions/concerns answered within OT scope of practice      Patient left HOB elevated with all lines intact, call button in reach, RN notified, and spouse present    GOALS:   Multidisciplinary Problems       Occupational Therapy Goals          Problem: Occupational Therapy    Goal Priority Disciplines Outcome Interventions   Occupational Therapy Goal     OT, PT/OT Progressing    Description: STG: (in 1 week)  1.Pt will perform bathing with setup and min(A)  2.Pt will perform UE dressing with setup  3.Pt will perform LE dressing with setup and min(A) with AD if needed  4.Pt will transfer bed/chair/bsc with RW and CGA  5.Pt will perform standing task x 4 min with RW and CGA  6.Tolerate 15 min of tx without fatigue.      LTG: (in 6 weeks)  Restore to max I with selfcare and mobility.                          History:     Past Medical History:   Diagnosis Date    Cervical radiculopathy     Chronic pain     Chronic renal impairment     COVID-19 12/27/2022    Depression     Depressive disorder 05/12/2022    Digestive disorder     GERD (gastroesophageal reflux disease)     History of prediabetes 11/16/2022    Hypercholesteremia     Hypertension     Left ventricular hypertrophy     Lumbosacral spondylosis     Positive colorectal cancer screening using Cologuard test 12/15/2022    Prediabetes     Pulmonary nodules     repeat CT due 12/03/21    Sleep apnea          Past Surgical History:   Procedure Laterality Date    ABDOMINAL SURGERY      inguinal hernia repair    BACK SURGERY      Bilateral L3-S1 MBB Bilateral 2-, 1-, 1-, 12-    Dr Jorge Parker    EYE SURGERY      HERNIA REPAIR      JOINT REPLACEMENT  2024    KNEE ARTHROSCOPY W/ MENISCAL REPAIR Right     KNEE ARTHROSCOPY W/ MENISCECTOMY Left 01/11/2023    Procedure: ARTHROSCOPY, KNEE, WITH MENISCECTOMY;  Surgeon: Dada Enriquez MD;  Location: [facility]  OR;  Service: Orthopedics;  Laterality: Left;    KNEE SURGERY Left     LATERAL RETINACULA RELEASE OF KNEE Left 01/11/2023    Procedure: ARTHROSCOPIC RELEASE, KNEE, LATERAL RETINACULA;  Surgeon: Dada Enriquez MD;  Location: [facility];  Service: Orthopedics;  Laterality: Left;    PROSTATECTOMY  03/1996    prostate cancer    RADIOFREQUENCY ABLATION OF LUMBAR MEDIAL BRANCH NERVE AT SINGLE LEVEL Right 04/01/2021    Procedure: Radiofrequency Ablation, Nerve, Spinal, Lumbar, Medial Branch, 1 Level;  Surgeon: Jordana Patel MD;  Location: [facility];  Service: Pain Management;  Laterality: Right;  Right L3-S1 RFTC    RADIOFREQUENCY ABLATION OF LUMBAR MEDIAL BRANCH NERVE AT SINGLE LEVEL Right 02/09/2023    Procedure: Radiofrequency Ablation, Nerve, Spinal, Lumbar, Medial Branch, Level L4-S1;  Surgeon: Jordana Patel MD;  Location: [facility];  Service: Pain Management;  Laterality: Right;  bonita left after right is done    RADIOFREQUENCY ABLATION OF LUMBAR MEDIAL BRANCH NERVE AT SINGLE LEVEL Left 02/28/2023    Procedure: RADIOFREQUENCY ABLATION, NERVE, SPINAL, LUMBAR, MEDIAL BRANCH, 1 LEVEL;  Surgeon: Jordana Ptael MD;  Location: [facility];  Service: Pain Management;  Laterality: Left;    RADIOFREQUENCY THERMOCOAGULATION Bilateral 03/20/2012    Dr Parker    RADIOFREQUENCY THERMOCOAGULATION Left 03/18/2021    Procedure: RADIOFREQUENCY THERMAL COAGULATION;  Surgeon: Jordana Patel MD;  Location: [facility];  Service: Pain Management;  Laterality: Left;  BILATERAL L3-S1 RFTC LEFT SIDE FIRST    ROBOTIC ARTHROPLASTY, KNEE Right 06/19/2024    Procedure: ROBOTIC ARTHROPLASTY, KNEE, TOTAL;  Surgeon: Dada Enriquez MD;  Location: [facility];  Service: Orthopedics;  Laterality: Right;    SELECTIVE INJECTION OF ANESTHETIC AGENT AROUND LUMBAR SPINAL NERVE ROOT BY TRANSFORAMINAL APPROACH Right 01/13/2022    Procedure: Right L4-5,5-S1 TFESI;  Surgeon: Jordana Patel MD;  Location: [facility];  Service: Pain Management;  Laterality: Right;  PT AWARE TO BE TESTED VIA PC    SHOULDER ARTHROSCOPY Right     SPINE SURGERY  1995    VASECTOMY  Mar 1996       Time Tracking:     OT Date of Treatment: 06/04/25  OT Start Time: 1510  OT Stop Time: 1552  OT Total Time (min): 42 min    Billable Minutes:Evaluation low complexity    6/4/2025

## 2025-06-04 NOTE — NURSING
Patient arrived to room from PACU, alert and able to make needs known verbally. Respirations even and unlabored on room air. No concerns noted at present time. Left knee noted to be wrapped in ace with immobilizer in place and ice pack. IV noted to right hand with fluids connected. Patient transferred over to hospital bed, call light was placed in reach, bed in low position and surgery VS started on patient. Instructed to call for any needs

## 2025-06-04 NOTE — PLAN OF CARE
[facility]  Initial Discharge Assessment       Primary Care Provider: Elizabeth Lange FNP    Admission Diagnosis: Osteoarthritis of left knee [M17.12]  Preop examination [Z01.818]    Admission Date: 6/4/2025  Expected Discharge Date: 6/5/2025    Transition of Care Barriers: None    Payor: MEDICARE / Plan: MEDICARE PART A & B / Product Type: Government /     Extended Emergency Contact Information  Primary Emergency Contact: CEE NICHOLS  Address: [address] [place] [address] [facility]  Home Phone: [phone]  Work Phone: [phone]  Mobile Phone: [phone]  Relation: Spouse  Preferred language: English   needed? No    Discharge Plan A: Home with family, Home Health  Discharge Plan B: Home with family, Home Health      [facility]  [address] [place] [address]  Phone: [phone] Fax: [phone]    [facility] [address]  [address]  [facility] [address]  Phone: [phone] Fax: [phone]      Initial Assessment (most recent)       Adult Discharge Assessment - 06/04/25 1049          Discharge Assessment    Assessment Type Discharge Planning Assessment     Confirmed/corrected address, phone number and insurance Yes     Confirmed Demographics Correct on Facesheet     Source of Information patient;family     People in Home spouse     Name(s) of People in Home Cee Nichols, wife, [phone]     Do you expect to return to your current living situation? Yes     Do you have help at home or someone to help you manage your care at home? Yes     Who are your caregiver(s) and their phone number(s)? Cee Nichols, wife, [phone]     Prior to hospitilization cognitive status: Unable to Assess     Current cognitive status: Alert/Oriented     Walking or Climbing Stairs Difficulty no     Dressing/Bathing Difficulty no     Home Accessibility not  wheelchair accessible;stairs to enter home     Number of Stairs, Main Entrance two     Stair Railings, Main Entrance none     Home Layout Able to live on 1st floor     Equipment Currently Used at Home walker, rolling     Readmission within 30 days? No     Patient currently being followed by outpatient case management? No     Do you currently have service(s) that help you manage your care at home? No     Do you take prescription medications? Yes     Do you have prescription coverage? Yes     Do you have any problems affording any of your prescribed medications? No     Is the patient taking medications as prescribed? yes     Who is going to help you get home at discharge? Larissa Jalloh, wife, [phone]     How do you get to doctors appointments? car, drives self;family or friend will provide     Are you on dialysis? No     Do you take coumadin? No     Discharge Plan A Home with family;Home Health     Discharge Plan B Home with family;Home Health     DME Needed Upon Discharge  none     Discharge Plan discussed with: Patient;Spouse/sig other     Name(s) and Number(s) Larissa Jalloh, wife, [phone]     Transition of Care Barriers None                   Ss spoke with pt and family at bedside. Pt lives at home with his wife and plans to return when medically ready. Pt has rw. Pt will require hh, choice obtained for [facility], updates faxed, Ema notified. SDOH completed 2 weeks ago. SS following

## 2025-06-05 LAB
ANION GAP SERPL CALCULATED.3IONS-SCNC: 10 MMOL/L (ref 7–16)
BASOPHILS # BLD AUTO: 0.03 K/UL (ref 0–0.2)
BASOPHILS NFR BLD AUTO: 0.2 % (ref 0–1)
BUN SERPL-MCNC: 25 MG/DL (ref 8–26)
BUN/CREAT SERPL: 19 (ref 6–20)
CALCIUM SERPL-MCNC: 8.1 MG/DL (ref 8.8–10)
CHLORIDE SERPL-SCNC: 107 MMOL/L (ref 98–107)
CO2 SERPL-SCNC: 23 MMOL/L (ref 23–31)
CREAT SERPL-MCNC: 1.35 MG/DL (ref 0.72–1.25)
DIFFERENTIAL METHOD BLD: ABNORMAL
EGFR (NO RACE VARIABLE) (RUSH/TITUS): 55 ML/MIN/1.73M2
EOSINOPHIL # BLD AUTO: 0 K/UL (ref 0–0.5)
EOSINOPHIL NFR BLD AUTO: 0 % (ref 1–4)
ERYTHROCYTE [DISTWIDTH] IN BLOOD BY AUTOMATED COUNT: 12.2 % (ref 11.5–14.5)
GLUCOSE SERPL-MCNC: 139 MG/DL (ref 82–115)
HCT VFR BLD AUTO: 32.3 % (ref 40–54)
HGB BLD-MCNC: 10.8 G/DL (ref 13.5–18)
IMM GRANULOCYTES # BLD AUTO: 0.12 K/UL (ref 0–0.04)
IMM GRANULOCYTES NFR BLD: 0.7 % (ref 0–0.4)
LYMPHOCYTES # BLD AUTO: 2.06 K/UL (ref 1–4.8)
LYMPHOCYTES NFR BLD AUTO: 11.3 % (ref 27–41)
MCH RBC QN AUTO: 31.7 PG (ref 27–31)
MCHC RBC AUTO-ENTMCNC: 33.4 G/DL (ref 32–36)
MCV RBC AUTO: 94.7 FL (ref 80–96)
MONOCYTES # BLD AUTO: 0.92 K/UL (ref 0–0.8)
MONOCYTES NFR BLD AUTO: 5 % (ref 2–6)
MPC BLD CALC-MCNC: 9.8 FL (ref 9.4–12.4)
NEUTROPHILS # BLD AUTO: 15.09 K/UL (ref 1.8–7.7)
NEUTROPHILS NFR BLD AUTO: 82.8 % (ref 53–65)
NRBC # BLD AUTO: 0 X10E3/UL
NRBC, AUTO (.00): 0 %
PLATELET # BLD AUTO: 232 K/UL (ref 150–400)
POTASSIUM SERPL-SCNC: 4.5 MMOL/L (ref 3.5–5.1)
RBC # BLD AUTO: 3.41 M/UL (ref 4.6–6.2)
SODIUM SERPL-SCNC: 135 MMOL/L (ref 136–145)
WBC # BLD AUTO: 18.22 K/UL (ref 4.5–11)

## 2025-06-05 PROCEDURE — 85025 COMPLETE CBC W/AUTO DIFF WBC: CPT | Performed by: ORTHOPAEDIC SURGERY

## 2025-06-05 PROCEDURE — 97535 SELF CARE MNGMENT TRAINING: CPT | Mod: CO

## 2025-06-05 PROCEDURE — 97110 THERAPEUTIC EXERCISES: CPT | Mod: CO

## 2025-06-05 PROCEDURE — 25000003 PHARM REV CODE 250: Performed by: INTERNAL MEDICINE

## 2025-06-05 PROCEDURE — 97110 THERAPEUTIC EXERCISES: CPT

## 2025-06-05 PROCEDURE — 25000003 PHARM REV CODE 250: Performed by: ORTHOPAEDIC SURGERY

## 2025-06-05 PROCEDURE — 97116 GAIT TRAINING THERAPY: CPT

## 2025-06-05 PROCEDURE — 99214 OFFICE O/P EST MOD 30 MIN: CPT | Mod: ,,, | Performed by: INTERNAL MEDICINE

## 2025-06-05 PROCEDURE — 80048 BASIC METABOLIC PNL TOTAL CA: CPT | Performed by: ORTHOPAEDIC SURGERY

## 2025-06-05 PROCEDURE — 36415 COLL VENOUS BLD VENIPUNCTURE: CPT | Performed by: ORTHOPAEDIC SURGERY

## 2025-06-05 RX ORDER — ONDANSETRON 4 MG/1
8 TABLET, ORALLY DISINTEGRATING ORAL EVERY 8 HOURS PRN
Qty: 30 TABLET | Refills: 0 | Status: SHIPPED | OUTPATIENT
Start: 2025-06-05

## 2025-06-05 RX ORDER — ASPIRIN 81 MG/1
81 TABLET ORAL 2 TIMES DAILY
Qty: 60 TABLET | Refills: 0 | Status: SHIPPED | OUTPATIENT
Start: 2025-06-05 | End: 2025-07-05

## 2025-06-05 RX ORDER — DOCUSATE SODIUM 100 MG/1
100 CAPSULE, LIQUID FILLED ORAL EVERY 12 HOURS
Status: DISCONTINUED | OUTPATIENT
Start: 2025-06-05 | End: 2025-06-05 | Stop reason: HOSPADM

## 2025-06-05 RX ORDER — PREDNISONE 5 MG/1
5 TABLET ORAL DAILY
Qty: 20 TABLET | Refills: 0 | Status: SHIPPED | OUTPATIENT
Start: 2025-06-05

## 2025-06-05 RX ORDER — OXYCODONE AND ACETAMINOPHEN 10; 325 MG/1; MG/1
1 TABLET ORAL EVERY 6 HOURS PRN
Qty: 30 TABLET | Refills: 0 | Status: SHIPPED | OUTPATIENT
Start: 2025-06-05

## 2025-06-05 RX ORDER — OXYCODONE HYDROCHLORIDE 5 MG/1
10 TABLET ORAL EVERY 4 HOURS PRN
Status: DISCONTINUED | OUTPATIENT
Start: 2025-06-05 | End: 2025-06-05 | Stop reason: HOSPADM

## 2025-06-05 RX ORDER — AMOXICILLIN 250 MG
1 CAPSULE ORAL 2 TIMES DAILY
Qty: 60 TABLET | Refills: 0 | Status: SHIPPED | OUTPATIENT
Start: 2025-06-05

## 2025-06-05 RX ADMIN — OXYCODONE 10 MG: 5 TABLET ORAL at 11:06

## 2025-06-05 RX ADMIN — FAMOTIDINE 20 MG: 20 TABLET, FILM COATED ORAL at 08:06

## 2025-06-05 RX ADMIN — MUPIROCIN 1 G: 20 OINTMENT TOPICAL at 08:06

## 2025-06-05 RX ADMIN — ACETAMINOPHEN 1000 MG: 500 TABLET ORAL at 05:06

## 2025-06-05 RX ADMIN — ZOLPIDEM TARTRATE 5 MG: 5 TABLET, FILM COATED ORAL at 01:06

## 2025-06-05 RX ADMIN — DOCUSATE SODIUM 100 MG: 100 CAPSULE, LIQUID FILLED ORAL at 08:06

## 2025-06-05 RX ADMIN — LISINOPRIL 40 MG: 40 TABLET ORAL at 08:06

## 2025-06-05 RX ADMIN — CELECOXIB 200 MG: 100 CAPSULE ORAL at 08:06

## 2025-06-05 RX ADMIN — PREGABALIN 75 MG: 75 CAPSULE ORAL at 08:06

## 2025-06-05 RX ADMIN — ASPIRIN 81 MG CHEWABLE TABLET 324 MG: 81 TABLET CHEWABLE at 08:06

## 2025-06-05 RX ADMIN — AMLODIPINE BESYLATE 10 MG: 10 TABLET ORAL at 08:06

## 2025-06-05 RX ADMIN — FLUOXETINE HYDROCHLORIDE 10 MG: 10 CAPSULE ORAL at 08:06

## 2025-06-05 NOTE — SUBJECTIVE & OBJECTIVE
Interval History: Patient seen and examined at the bedside, reports doing ok. Pain is in control with medications.     Review of Systems   All other systems reviewed and are negative.    Objective:     Vital Signs (Most Recent):  Temp: 98.3 °F (36.8 °C) (06/05/25 0731)  Pulse: 92 (06/05/25 0731)  Resp: 18 (06/05/25 1104)  BP: 139/72 (06/05/25 0731)  SpO2: 95 % (06/05/25 0731) Vital Signs (24h Range):  Temp:  [97.5 °F (36.4 °C)-98.4 °F (36.9 °C)] 98.3 °F (36.8 °C)  Pulse:  [79-97] 92  Resp:  [18-20] 18  SpO2:  [92 %-95 %] 95 %  BP: (115-139)/(68-81) 139/72     Weight: 86.2 kg (190 lb)  Body mass index is 25.77 kg/m².    Intake/Output Summary (Last 24 hours) at 6/5/2025 1204  Last data filed at 6/5/2025 0420  Gross per 24 hour   Intake --   Output 300 ml   Net -300 ml         Physical Exam  Vitals and nursing note reviewed.   Constitutional:       Appearance: Normal appearance.   HENT:      Head: Normocephalic and atraumatic.      Nose: Nose normal.      Mouth/Throat:      Mouth: Mucous membranes are moist.   Eyes:      Extraocular Movements: Extraocular movements intact.   Cardiovascular:      Rate and Rhythm: Normal rate and regular rhythm.      Pulses: Normal pulses.      Heart sounds: Normal heart sounds.   Pulmonary:      Effort: Pulmonary effort is normal.      Breath sounds: Normal breath sounds.   Abdominal:      General: Bowel sounds are normal.      Palpations: Abdomen is soft.   Musculoskeletal:      Cervical back: Normal range of motion.      Comments: Left knee dressing in place.    Skin:     General: Skin is warm.   Neurological:      General: No focal deficit present.      Mental Status: He is alert and oriented to person, place, and time. Mental status is at baseline.   Psychiatric:         Mood and Affect: Mood normal.         Behavior: Behavior normal.               Significant Labs: All pertinent labs within the past 24 hours have been reviewed.    Significant Imaging: I have reviewed all pertinent  imaging results/findings within the past 24 hours.

## 2025-06-05 NOTE — ASSESSMENT & PLAN NOTE
Creatine stable for now. BMP reviewed- noted Estimated Creatinine Clearance: 53.5 mL/min (A) (based on SCr of 1.35 mg/dL (H)). according to latest data. Based on current GFR, CKD stage is stage 3 - GFR 30-59.  Monitor UOP and serial BMP and adjust therapy as needed. Renally dose meds. Avoid nephrotoxic medications and procedures.

## 2025-06-05 NOTE — PROGRESS NOTES
[facility]  [facility]  Progress Note    Patient Name: Tray Jalloh  MRN: [id number]  Patient Class: OP- Hospital Outpatient Surgery   Admission Date: 6/4/2025  Length of Stay: 0 days  Attending Physician: Dada Enriquez MD  Primary Care Provider: Elizabeth Lange FNP        Subjective     Principal Problem:Osteoarthritis of left knee        HPI:  Mr. Jalloh is a 74 Y O male with PMH of HTN, CKD stage 3, LUIS ALFREDO/COPD, depression who presented for elective left total knee arthroplasty with Dr. Enriquez. Patient was seen post operatively. He denies chest pain, dyspnea, nausea, vomiting. He reports compliance with his home medications and OP office visits with doctors.     Overview/Hospital Course:  6/5- Doing well post operatively, pain is in control.    Interval History: Patient seen and examined at the bedside, reports doing ok. Pain is in control with medications.     Review of Systems   All other systems reviewed and are negative.    Objective:     Vital Signs (Most Recent):  Temp: 98.3 °F (36.8 °C) (06/05/25 0731)  Pulse: 92 (06/05/25 0731)  Resp: 18 (06/05/25 1104)  BP: 139/72 (06/05/25 0731)  SpO2: 95 % (06/05/25 0731) Vital Signs (24h Range):  Temp:  [97.5 °F (36.4 °C)-98.4 °F (36.9 °C)] 98.3 °F (36.8 °C)  Pulse:  [79-97] 92  Resp:  [18-20] 18  SpO2:  [92 %-95 %] 95 %  BP: (115-139)/(68-81) 139/72     Weight: 86.2 kg (190 lb)  Body mass index is 25.77 kg/m².    Intake/Output Summary (Last 24 hours) at 6/5/2025 1204  Last data filed at 6/5/2025 0420  Gross per 24 hour   Intake --   Output 300 ml   Net -300 ml         Physical Exam  Vitals and nursing note reviewed.   Constitutional:       Appearance: Normal appearance.   HENT:      Head: Normocephalic and atraumatic.      Nose: Nose normal.      Mouth/Throat:      Mouth: Mucous membranes are moist.   Eyes:      Extraocular Movements: Extraocular movements intact.   Cardiovascular:      Rate and Rhythm: Normal rate and regular  rhythm.      Pulses: Normal pulses.      Heart sounds: Normal heart sounds.   Pulmonary:      Effort: Pulmonary effort is normal.      Breath sounds: Normal breath sounds.   Abdominal:      General: Bowel sounds are normal.      Palpations: Abdomen is soft.   Musculoskeletal:      Cervical back: Normal range of motion.      Comments: Left knee dressing in place.    Skin:     General: Skin is warm.   Neurological:      General: No focal deficit present.      Mental Status: He is alert and oriented to person, place, and time. Mental status is at baseline.   Psychiatric:         Mood and Affect: Mood normal.         Behavior: Behavior normal.               Significant Labs: All pertinent labs within the past 24 hours have been reviewed.    Significant Imaging: I have reviewed all pertinent imaging results/findings within the past 24 hours.      Assessment & Plan  Osteoarthritis of left knee  Acute post-operative pain  S/p Left TKR by Dr. Enriquez on 6/4/25.  PT/OT consulted.  Started on oxycodone for pain control.     Essential hypertension  Patient's blood pressure range in the last 24 hours was: BP  Min: 115/72  Max: 139/72.The patient's inpatient anti-hypertensive regimen is listed below:  Current Antihypertensives  amLODIPine tablet 10 mg, Daily, Oral  lisinopriL tablet 40 mg, Daily, Oral    Plan  - BP is controlled, no changes needed to their regimen  - Resume home meds from POD #1.    Gastroesophageal reflux disease without esophagitis  Resume home Pepcid.     LUIS ALFREDO on CPAP  Resume CPAP at bedtime.     Chronic kidney disease, stage 3a  Creatine stable for now. BMP reviewed- noted Estimated Creatinine Clearance: 53.5 mL/min (A) (based on SCr of 1.35 mg/dL (H)). according to latest data. Based on current GFR, CKD stage is stage 3 - GFR 30-59.  Monitor UOP and serial BMP and adjust therapy as needed. Renally dose meds. Avoid nephrotoxic medications and procedures.    VTE Risk Mitigation (From admission, onward)            Ordered     IP VTE LOW RISK PATIENT  Once         06/05/25 0628     Place sequential compression device  Until discontinued         06/05/25 0628     Place LAYTON hose  Until discontinued         06/04/25 1024     Place sequential compression device  Until discontinued         06/04/25 1024                    Discharge Planning   RU: 6/5/2025     Code Status: Prior   Medical Readiness for Discharge Date: 6/5/2025  Discharge Plan A: Home with family, Home Health                Please place Justification for DME        TERESE HERNANDEZ MD  Department of Hospital Medicine   [facility]
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
## 2025-06-05 NOTE — PT/OT/SLP EVAL
Physical Therapy Evaluation and Treatment    Patient Name: Tray Jalloh   MRN: [id number]  Recent Surgery: Procedure(s) (LRB):  ROBOTIC ARTHROPLASTY, KNEE, TOTAL (Left) * Day of Surgery *    Recommendations:     Discharge Recommendations: Low Intensity Therapy   Discharge Equipment Recommendations: none   Barriers to discharge: Increased level of assist and Ongoing medical treatment    Assessment:     Tray Jalloh is a 74 y.o. male admitted with a medical diagnosis of Primary osteoarthritis of left knee. He presents with the following impairments/functional limitations: weakness, impaired functional mobility, gait instability, impaired balance, decreased lower extremity function, pain, decreased ROM, orthopedic precautions. Pt has undergone L TKA, had his R TKA approx 1 year ago.     Rehab Prognosis: Good; patient would benefit from acute PT services to address these deficits and reach maximum level of function.    Plan:     During this hospitalization, patient to be seen BID (5x/wk; 2x/wk daily) to address the above listed problems via gait training, therapeutic activities, therapeutic exercises    Plan of Care Expires: 07/04/25    Subjective     Chief Complaint: Primary osteoarthritis of left knee   Patient Comments/Goals: agreeable  Pain/Comfort:  Pain Rating 1: 3/10  Location - Side 1: Left  Location 1: knee  Pain Addressed 1: Pre-medicate for activity, Nurse notified  Pain Rating Post-Intervention 1: 1/10    Social History:  Living Environment: Patient lives with their spouse in a mobile home with number of outside stair(s): 2 with rail  Prior Level of Function: Prior to admission, patient was independent and driving and retired  Equipment Used at Home: none  DME owned (not currently used): rolling walker  Assistance Upon Discharge: family    Objective:     Communicated with RN prior to session. Patient found HOB elevated with peripheral IV, blood pressure cuff, pulse ox (continuous) upon PT  entry to room.    General Precautions: Standard, fall   Orthopedic Precautions: LLE weight bearing as tolerated   Braces: Knee immobilizer    Respiratory Status: Room air    Exams:  RLE ROM: WFL  RLE Strength: WNL  LLE ROM: WFL except knee immobilized  LLE Strength: Deficits: 3-/5  Cognitive: Patient is oriented to Person, Place, Time, Situation  Sensation:    -       Intact    Functional Mobility:  Gait belt applied - Yes  Bed Mobility  Supine to Sit: minimum assistance for LE management  Sit to Supine: minimum assistance for LE management  Transfers  Sit to Stand: contact guard assistance with rolling walker and with cues for foot placement and weight bearing precautions  Gait  Patient ambulated 35ft with rolling walker and minimum assistance. Patient required cues for position in walker, sequencing, stride length, upright posture, and weight bearing status to increase independence and safety. Patient required cues ~ 25% of the time.  Balance  Sitting: GOOD-: Incosistently Maintains balance through MODERATE excursions of active trunk movement,     Standing: FAIR: Needs CONTACT GUARD during gait      Therapeutic Activities and Exercises:  Patient educated on role of acute care PT and PT POC, safety while in hospital including calling nurse for mobility, and call light usage.  Educated about weightbearing precautions and provided cuing for adherence as appropriate during session.  Educated about importance of OOB mobility and remaining up in chair most of the day.      AM-PAC 6 CLICK MOBILITY  Total Score:17    Patient left HOB elevated with all lines intact, call button in reach, RN notified, and spouse present.    GOALS:   Multidisciplinary Problems       Physical Therapy Goals          Problem: Physical Therapy    Goal Priority Disciplines Outcome Interventions   Physical Therapy Goal     PT, PT/OT Progressing    Description: Short Term Goals to be met by: 6/18/25    Patient will increase functional independence  with mobility by performin. Supine to sit with Modified [place]  2. Sit to stand transfer with Modified [place]  3. Bed to chair transfer with Modified [place] using Rolling Walker, WBAT L LE  4. Gait  x 100 feet with Modified [place] using Rolling Walker, WBAT L LE.   5. Lower extremity exercise program x30 reps per handout, with assistance as needed  6. Knee ROM 0-90  7. Pt to negotiate 2 steps with 1 rail with CGA.     Long Term Goals to be met by: 25    Pt will regain full independent functional mobility to return to prior activities of daily living.                        DME Justifications:  No DME recommended requiring DME justifications    History:     Past Medical History:   Diagnosis Date    Cervical radiculopathy     Chronic pain     Chronic renal impairment     COVID-19 2022    Depression     Depressive disorder 2022    Digestive disorder     GERD (gastroesophageal reflux disease)     History of prediabetes 2022    Hypercholesteremia     Hypertension     Left ventricular hypertrophy     Lumbosacral spondylosis     Positive colorectal cancer screening using Cologuard test 12/15/2022    Prediabetes     Pulmonary nodules     repeat CT due 21    Sleep apnea        Past Surgical History:   Procedure Laterality Date    ABDOMINAL SURGERY      inguinal hernia repair    BACK SURGERY      Bilateral L3-S1 MBB Bilateral 2-, 2021, 1-, 12-    Dr Jorge Parker    EYE SURGERY      HERNIA REPAIR      JOINT REPLACEMENT      KNEE ARTHROSCOPY W/ MENISCAL REPAIR Right     KNEE ARTHROSCOPY W/ MENISCECTOMY Left 2023    Procedure: ARTHROSCOPY, KNEE, WITH MENISCECTOMY;  Surgeon: Dada Enriquez MD;  Location: [facility];  Service: Orthopedics;  Laterality: Left;    KNEE SURGERY Left     LATERAL RETINACULA RELEASE OF KNEE Left 2023    Procedure: ARTHROSCOPIC RELEASE, KNEE, LATERAL RETINACULA;  Surgeon: Dada Enriquez MD;   Location: [facility];  Service: Orthopedics;  Laterality: Left;    PROSTATECTOMY  03/1996    prostate cancer    RADIOFREQUENCY ABLATION OF LUMBAR MEDIAL BRANCH NERVE AT SINGLE LEVEL Right 04/01/2021    Procedure: Radiofrequency Ablation, Nerve, Spinal, Lumbar, Medial Branch, 1 Level;  Surgeon: Jordana Patel MD;  Location: [facility];  Service: Pain Management;  Laterality: Right;  Right L3-S1 RFTC    RADIOFREQUENCY ABLATION OF LUMBAR MEDIAL BRANCH NERVE AT SINGLE LEVEL Right 02/09/2023    Procedure: Radiofrequency Ablation, Nerve, Spinal, Lumbar, Medial Branch, Level L4-S1;  Surgeon: Jordana Patel MD;  Location: [facility];  Service: Pain Management;  Laterality: Right;  bonita left after right is done    RADIOFREQUENCY ABLATION OF LUMBAR MEDIAL BRANCH NERVE AT SINGLE LEVEL Left 02/28/2023    Procedure: RADIOFREQUENCY ABLATION, NERVE, SPINAL, LUMBAR, MEDIAL BRANCH, 1 LEVEL;  Surgeon: Jordana Patel MD;  Location: [facility];  Service: Pain Management;  Laterality: Left;    RADIOFREQUENCY THERMOCOAGULATION Bilateral 03/20/2012    Dr Parker    RADIOFREQUENCY THERMOCOAGULATION Left 03/18/2021    Procedure: RADIOFREQUENCY THERMAL COAGULATION;  Surgeon: Jordana Patel MD;  Location: [facility];  Service: Pain Management;  Laterality: Left;  BILATERAL L3-S1 RFTC LEFT SIDE FIRST    ROBOTIC ARTHROPLASTY, KNEE Right 06/19/2024    Procedure: ROBOTIC ARTHROPLASTY, KNEE, TOTAL;  Surgeon: Dada Enriquez MD;  Location: [facility];  Service: Orthopedics;  Laterality: Right;    SELECTIVE INJECTION OF ANESTHETIC AGENT AROUND LUMBAR SPINAL NERVE ROOT BY TRANSFORAMINAL APPROACH Right 01/13/2022    Procedure: Right L4-5,5-S1 TFESI;  Surgeon: Jordana Patel MD;  Location: [facility];  Service: Pain Management;  Laterality: Right;  PT AWARE TO BE TESTED VIA PC    SHOULDER ARTHROSCOPY Right     SPINE SURGERY  1995    VASECTOMY  Mar 1996       Time Tracking:     PT Received On:  06/04/25  PT Start Time: 1511  PT Stop Time: 1540  PT Total Time (min): 29 min     Billable Minutes: Evaluation low complexity 15 and Therapeutic Activity 14    6/4/2025

## 2025-06-05 NOTE — ASSESSMENT & PLAN NOTE
Patient's blood pressure range in the last 24 hours was: BP  Min: 115/72  Max: 139/72.The patient's inpatient anti-hypertensive regimen is listed below:  Current Antihypertensives  amLODIPine tablet 10 mg, Daily, Oral  lisinopriL tablet 40 mg, Daily, Oral    Plan  - BP is controlled, no changes needed to their regimen  - Resume home meds from POD #1.

## 2025-06-05 NOTE — DISCHARGE INSTRUCTIONS
Hospitalist Discharge orders  *Notify your healthcare provider if you experience any of the following: temperature >100.4  *Notify your healthcare provider if you experience any of the following: redness, tenderness, or any signs or symptoms of infection (pain, swelling, redness, odor or green/yellow drainage around incision site).  *Notify your healthcare provider if you experience any of the following: difficulty breathing or increased cough.  *Notify your physician if you experience any persistent nausea, vomiting, diarrhea or headache.  *Notify your physician if you experience any of the following: severe uncontrolled pain, worsening rash, increased confusion, weakness, dizziness, lightheadedness, or visual disturbances.     *Keep dressing dry and intact, do not remove dressing, if dressing becomes wet or bloody notify home health staff.  [facility]/Home Health will change your dressing post of day #3 and day #10, give them that special dressing we sent home with you. If patient has a CARLITO system leave on for 7 days then change dressing.  *Continue incentive spirometry at least every 2 hours while awake.  *Continue white stockings remove 2 times a day for 1 hour and replace. Once dressing is changed they will apply the other stocking to surgery leg.  *Elevate surgery leg at ankle on pillow, no pillow under knees.  *Take laxative of choice to have a bowel movement at least by tomorrow and then every other day.  *Increase fluids by mouth.  *Dr. Dada Enriquez total knees should wear knee immobilizer at night and remove during the day.  *Staples will be removed at follow up appointment  *Notify [facility]/home health staff if any concerns.

## 2025-06-05 NOTE — PT/OT/SLP PROGRESS
Occupational Therapy   Treatment    Name: Tray Jalloh  MRN: [id number]  Admitting Diagnosis:  Osteoarthritis of left knee  1 Day Post-Op    Recommendations:     Discharge Recommendations: Low Intensity Therapy  Discharge Equipment Recommendations:  none  Barriers to discharge:       Assessment:     Tray Jalloh is a 74 y.o. male with a medical diagnosis of Osteoarthritis of left knee. . Performance deficits affecting function are weakness, impaired endurance, impaired self care skills, orthopedic precautions.     Rehab Prognosis:  Good; patient would benefit from acute skilled OT services to address these deficits and reach maximum level of function.       Plan:     Patient to be seen 5 x/week to address the above listed problems via self-care/home management, therapeutic activities, therapeutic exercises  Plan of Care Expires: 07/09/25  Plan of Care Reviewed with: patient, spouse    Subjective     Chief Complaint:   Patient/Family Comments/goals:   Pain/Comfort:  Pain Rating 1: 0/10    Objective:     Communicated with: Agata Greenfield RN prior to session.  Patient found HOB elevated with peripheral IV upon OT entry to room.    General Precautions: Standard, fall    Orthopedic Precautions:LLE weight bearing as tolerated  Braces: Knee immobilizer  Respiratory Status: Room air     Occupational Performance:     Bed Mobility:    Supine to sit cga  Sit to supine cga     Functional Mobility/Transfers:  Sit to stand cga  Functional Mobility:   Bed to sink in bathroom cga with rw     Activities of Daily Living:  Min a to laureano shorts  I to doff gown  I to laureano T shirt  I to brush his dentures  S to stand @ sink to rinse his dentures   To comb his hair            AMPAC 6 Click ADL:      Treatment & Education:  Pt performed rom ex's with 2 lb wt 15 reps x 2 B elbow flex/ext,abd/add,shld flex     Patient left HOB elevated with all lines intact and call button in reach    GOALS:   Multidisciplinary Problems        Occupational Therapy Goals          Problem: Occupational Therapy    Goal Priority Disciplines Outcome Interventions   Occupational Therapy Goal     OT, PT/OT Progressing    Description: STG: (in 1 week)  1.Pt will perform bathing with setup and min(A)  2.Pt will perform UE dressing with setup  3.Pt will perform LE dressing with setup and min(A) with AD if needed  4.Pt will transfer bed/chair/bsc with RW and CGA  5.Pt will perform standing task x 4 min with RW and CGA  6.Tolerate 15 min of tx without fatigue.      LTG: (in 6 weeks)  Restore to max I with selfcare and mobility.                          DME Justifications:      Time Tracking:     OT Date of Treatment: 06/05/25  OT Start Time: 0932  OT Stop Time: 1014  OT Total Time (min): 42 min    Billable Minutes:Self Care/Home Management 25  Therapeutic Exercise 15    OT/MICHAEL: MICHAEL          6/5/2025

## 2025-06-05 NOTE — ASSESSMENT & PLAN NOTE
S/p Left TKR by Dr. Enriquez on 6/4/25.  PT/OT consulted.  Started on oxycodone for pain control.

## 2025-06-05 NOTE — PLAN OF CARE
[facility]  Discharge Final Note    Primary Care Provider: Elizabeth Lange FNP    Expected Discharge Date: 6/5/2025    Final Discharge Note (most recent)       Final Note - 06/05/25 1221          Final Note    Assessment Type Final Discharge Note     Anticipated Discharge Disposition Home-Health Care Svc        Post-Acute Status    Post-Acute Authorization Home Health     Home Health Status Set-up Complete/Auth obtained     Patient choice form signed by patient/caregiver List with quality metrics by geographic area provided;List from CMS Compare;List from System Post-Acute Care     Discharge Delays None known at this time                 Pt d/c home with [facility]     Important Message from Medicare              Follow-up providers       Dada Enriquez MD   Specialty: Orthopedic Surgery    [address] [facility] [address]   Phone: [phone]       Next Steps: Follow up in 13 day(s)    Instructions: Please follow up on June 19 at 11:00 am              After-discharge care                Home Medical Care       [facility]   Service: Home Rehabilitation, Home Nursing    [address] [address]   Phone: [phone]

## 2025-06-05 NOTE — PT/OT/SLP PROGRESS
Physical Therapy Treatment    Patient Name:  Tray Jalloh   MRN:  [id number]    Recommendations:     Discharge Recommendations: Low Intensity Therapy  Discharge Equipment Recommendations: none  Barriers to discharge: None    Assessment:     Tray Jalloh is a 74 y.o. male admitted with a medical diagnosis of Osteoarthritis of left knee.  He presents with the following impairments/functional limitations: weakness, impaired functional mobility, gait instability, impaired balance, decreased lower extremity function, pain, decreased ROM, orthopedic precautions. L knee ROM 21-86 degrees.     Rehab Prognosis: Good; patient would benefit from acute skilled PT services to address these deficits and reach maximum level of function.    Recent Surgery: Procedure(s) (LRB):  ROBOTIC ARTHROPLASTY, KNEE, TOTAL (Left) 1 Day Post-Op    Plan:     During this hospitalization, patient to be seen BID (5x/wk; 2x/wk daily) to address the identified rehab impairments via gait training, therapeutic activities, therapeutic exercises and progress toward the following goals:    Plan of Care Expires:  07/04/25    Subjective     Chief Complaint: Osteoarthritis of left knee   Patient/Family Comments/goals: agreeable  Pain/Comfort: 1/10 L knee pre; 7/10 L knee post         Objective:     Communicated with SIMI Greenfield RN prior to session.  Patient found HOB elevated with peripheral IV, blood pressure cuff, pulse ox (continuous) upon PT entry to room.     General Precautions: Standard, fall  Orthopedic Precautions: LLE weight bearing as tolerated  Braces: Knee immobilizer  Respiratory Status: Room air     Functional Mobility:  Bed Mobility:     Supine to Sit: stand by assistance and contact guard assistance  Transfers:     Sit to Stand:  contact guard assistance with rolling walker  Gait: 80ft, 30ft, 110ft with RW, CGA with 1 episode of L knee buckling req Min A to maintain balance  Balance: Fair in stance   Stairs:  Pt  ascended/descended 3 stair(s) with No Assistive Device with left handrail with Contact Guard Assistance.       AM-PAC 6 CLICK MOBILITY          Treatment & Education:  Bilateral lower extremity exercise x 15 reps: ankle pumps, Quad sets, glut sets, heel slides, hip abduction/adduction, straight leg raises, Long arc quads, Marching, and hamstring curls with active assist ROM, verbal cues for sequencing and safety, and tactile cues     21-86 degrees ROM L knee.    Patient left sitting edge of bed with all lines intact, call button in reach, RN notified, and wife present..    GOALS:   Multidisciplinary Problems       Physical Therapy Goals          Problem: Physical Therapy    Goal Priority Disciplines Outcome Interventions   Physical Therapy Goal     PT, PT/OT Progressing    Description: Short Term Goals to be met by: 25    Patient will increase functional independence with mobility by performin. Supine to sit with Modified [place]  2. Sit to stand transfer with Modified [place]  3. Bed to chair transfer with Modified [place] using Rolling Walker, WBAT L LE  4. Gait  x 100 feet with Modified [place] using Rolling Walker, WBAT L LE.   5. Lower extremity exercise program x30 reps per handout, with assistance as needed  6. Knee ROM 0-90  7. Pt to negotiate 2 steps with 1 rail with CGA.     Long Term Goals to be met by: 25    Pt will regain full independent functional mobility to return to prior activities of daily living.                        DME Justifications:  No DME recommended requiring DME justifications    Time Tracking:     PT Received On: 25  PT Start Time: 1041     PT Stop Time: 1123  PT Total Time (min): 42 min     Billable Minutes: Gait Training 25 and Therapeutic Exercise 17    Treatment Type: Evaluation  PT/PTA: PT           2025

## 2025-06-06 VITALS
WEIGHT: 190 LBS | HEART RATE: 92 BPM | OXYGEN SATURATION: 95 % | RESPIRATION RATE: 18 BRPM | BODY MASS INDEX: 25.73 KG/M2 | SYSTOLIC BLOOD PRESSURE: 139 MMHG | TEMPERATURE: 98 F | HEIGHT: 72 IN | DIASTOLIC BLOOD PRESSURE: 72 MMHG

## 2025-06-06 NOTE — PROGRESS NOTES
[facility]  Wound Care    Patient Name:  Tray Jalloh   MRN:  [id number]  Date: 2025  Diagnosis: Osteoarthritis of left knee    History:     Past Medical History:   Diagnosis Date    Cervical radiculopathy     Chronic pain     Chronic renal impairment     COVID-19 2022    Depression     Depressive disorder 2022    Digestive disorder     GERD (gastroesophageal reflux disease)     History of prediabetes 2022    Hypercholesteremia     Hypertension     Left ventricular hypertrophy     Lumbosacral spondylosis     Positive colorectal cancer screening using Cologuard test 12/15/2022    Prediabetes     Pulmonary nodules     repeat CT due 21    Sleep apnea        Social History[1]    Precautions:     Allergies as of 2025 - Reviewed 2025   Allergen Reaction Noted    Bee pollen  2016    Grass pollen- grass standard  2021       WO Assessment Details/Treatment     Narrative: Seen patient for initiation of preventative skin care measures    Patient in bed, Alert. States has no skin issues except Right thumb and 4th finger with psoriasis , area dry.   Hipolito score 23    Consult wound care for any skin issues               2025         [1]   Social History  Socioeconomic History    Marital status:    Tobacco Use    Smoking status: Former     Current packs/day: 0.00     Average packs/day: 2.0 packs/day for 30.0 years (60.0 ttl pk-yrs)     Types: Cigarettes, Pipe, Cigars     Quit date:      Years since quittin.4     Passive exposure: Past    Smokeless tobacco: Never   Substance and Sexual Activity    Alcohol use: Not Currently    Drug use: Yes     Types: Oxycodone    Sexual activity: Not Currently     Partners: Female     Birth control/protection: Abstinence     Comment: Sterile   Social History Narrative    Asbestos exposure when working in [place]; 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     He served in the [employer] with agent orange exposure when stationed in [place].      Social Drivers of Health     Financial Resource Strain: Low Risk  (6/4/2025)    Overall Financial Resource Strain (CARDIA)     Difficulty of Paying Living Expenses: Not hard at all   Food Insecurity: No Food Insecurity (6/4/2025)    Hunger Vital Sign     Worried About Running Out of Food in the Last Year: Never true     Ran Out of Food in the Last Year: Never true   Transportation Needs: No Transportation Needs (6/4/2025)    PRAPARE - Transportation     Lack of Transportation (Medical): No     Lack of Transportation (Non-Medical): No   Physical Activity: Insufficiently Active (5/27/2025)    Exercise Vital Sign     Days of Exercise per Week: 2 days     Minutes of Exercise per Session: 60 min   Stress: No Stress Concern Present (6/4/2025)    [employer] - Occupational Stress Questionnaire     Feeling of Stress : Not at all   Housing Stability: Low Risk  (6/4/2025)    Housing Stability Vital Sign     Unable to Pay for Housing in the Last Year: No     Number of Times Moved in the Last Year: 0     Homeless in the Last Year: No

## 2025-06-09 RX ORDER — BUPIVACAINE HYDROCHLORIDE 7.5 MG/ML
INJECTION, SOLUTION EPIDURAL; RETROBULBAR
Status: DISCONTINUED | OUTPATIENT
Start: 2025-06-04 | End: 2025-06-09

## 2025-06-09 RX ORDER — ROPIVACAINE HYDROCHLORIDE 5 MG/ML
INJECTION, SOLUTION EPIDURAL; INFILTRATION; PERINEURAL
Status: DISCONTINUED | OUTPATIENT
Start: 2025-06-04 | End: 2025-06-09

## 2025-06-09 NOTE — ANESTHESIA PROCEDURE NOTES
Peripheral Block    Patient location during procedure: OR   Block not for primary anesthetic.  Reason for block: at surgeon's request and post-op pain management   Post-op Pain Location: left knee   Start time: 6/4/2025 7:45 AM  Timeout: 6/4/2025 7:45 AM   End time: 6/4/2025 7:45 AM    Staffing  Authorizing Provider: Hernesto Torres MD  Performing Provider: Hernesto Torres MD    Staffing  Performed by: Hernesto Torres MD  Authorized by: Hernesto Torres MD    Preanesthetic Checklist  Completed: patient identified, IV checked, site marked, risks and benefits discussed, surgical consent, monitors and equipment checked, pre-op evaluation and timeout performed  Peripheral Block  Patient position: supine  Prep: ChloraPrep  Patient monitoring: heart rate, cardiac monitor, continuous pulse ox, continuous capnometry and frequent blood pressure checks  Block type: adductor canal  Laterality: left  Injection technique: single shot  Needle  Needle type: Stimuplex   Needle gauge: 20 G  Needle length: 4 in  Needle localization: ultrasound guidance   -ultrasound image captured on disc.  Assessment  Injection assessment: negative aspiration, negative parasthesia and local visualized surrounding nerve  Paresthesia pain: none  Heart rate change: no  Slow fractionated injection: yes  Pain Tolerance: comfortable throughout block and no complaints  Medications:    Medications: ROPIvacaine (NAROPIN) injection 0.5% - Perineural   30 mL - 6/4/2025 7:45:00 AM

## 2025-06-09 NOTE — ANESTHESIA POSTPROCEDURE EVALUATION
Anesthesia Post Evaluation    Patient: Tray Jalloh    Procedure(s) Performed: Procedure(s) (LRB):  ROBOTIC ARTHROPLASTY, KNEE, TOTAL (Left)    Final Anesthesia Type: general      Patient location during evaluation: PACU  Patient participation: Yes- Able to Participate  Level of consciousness: awake and alert  Post-procedure vital signs: reviewed and stable  Pain management: adequate  Airway patency: patent  LUIS ALFREDO mitigation strategies: Multimodal analgesia and Use of major conduction anesthesia (spinal/epidural) or peripheral nerve block  PONV status at discharge: No PONV  Anesthetic complications: no      Cardiovascular status: blood pressure returned to baseline  Respiratory status: unassisted  Hydration status: euvolemic  Follow-up not needed.              Vitals Value Taken Time   /60 06/04/25 11:00   Temp 36.7 °C (98 °F) 06/04/25 09:45   Pulse 81 06/04/25 11:11   Resp 11 06/04/25 10:27   SpO2 94 % 06/04/25 11:11   Vitals shown include unfiled device data.      Event Time   Out of Recovery 10:25:00         Pain/Radha Score: No data recorded

## 2025-06-23 DIAGNOSIS — T84.82XA: Primary | ICD-10-CM

## 2025-06-26 ENCOUNTER — CLINICAL SUPPORT (OUTPATIENT)
Dept: REHABILITATION | Facility: HOSPITAL | Age: 75
End: 2025-06-26
Payer: MEDICARE

## 2025-06-26 DIAGNOSIS — G89.29 CHRONIC PAIN OF LEFT KNEE: ICD-10-CM

## 2025-06-26 DIAGNOSIS — R26.9 GAIT DIFFICULTY: ICD-10-CM

## 2025-06-26 DIAGNOSIS — M62.81 WEAKNESS OF LEFT QUADRICEPS MUSCLE: Primary | ICD-10-CM

## 2025-06-26 DIAGNOSIS — M25.562 CHRONIC PAIN OF LEFT KNEE: ICD-10-CM

## 2025-06-26 PROCEDURE — 97161 PT EVAL LOW COMPLEX 20 MIN: CPT | Mod: CQ

## 2025-06-26 PROCEDURE — 97530 THERAPEUTIC ACTIVITIES: CPT | Mod: CQ

## 2025-06-26 NOTE — PROGRESS NOTES
Outpatient Rehab    Physical Therapy Evaluation    Patient Name: Tray Jalloh  MRN: [id number]  YOB: 1950  Encounter Date: 6/26/2025    Therapy Diagnosis:   Encounter Diagnoses   Name Primary?    Weakness of left quadriceps muscle Yes    Chronic pain of left knee     Gait difficulty      Physician: Dada Enriquez MD    Physician Orders: Eval and Treat  Medical Diagnosis: Arthrofibrosis of total knee arthroplasty  Surgical Diagnosis: T84.82XA (ICD-10-CM) - Arthrofibrosis of total knee arthroplasty   Surgical Date: 6/4/2025  Days Since Last Surgery: 22    Visit # / Visits Authorized:  1 / 1  Insurance Authorization Period: 6/23/2025 to 6/23/2026  Date of Evaluation: 6/26/2025  Plan of Care Certification: 6/26/2025 to 9/18/2025     Time In: 0925   Time Out: 1010  Total Time (in minutes): 45   Total Billable Time (in minutes): 45    Intake Outcome Measure for FOTO Survey    Therapist reviewed FOTO scores for Tray Jalloh on 6/26/2025.   FOTO report - see Media section or FOTO account episode details.     Intake Score: 46%    Precautions:       Subjective   History of Present Illness  Tray is a 74 y.o. male who reports to physical therapy with a chief concern of knee pain and weakness.     The patient reports a medical diagnosis of quad weakness. The patient has experienced this issue since 06/04/25. Patient reports a surgery of T84.82XA (ICD-10-CM) - Arthrofibrosis of total knee arthroplasty. Surgery occurred on 06/04/25.         Dominant Hand: Right  History of Present Condition/Illness: Taking tylenol a lot for pain. Having muscle cramping in calf and thigh. Get unbalanced especially walking outside so have to keep walker for now.     Pain     Patient reports a current pain level of 1/10. Pain at best is reported as 1/10. Pain at worst is reported as 10/10.   Location: above and below left knee  Clinical Progression (since onset): Stable  Pain Qualities: Knife-like, Sharp, Aching,  Throbbing, Cramping, Tightness  Pain-Relieving Factors: Lying down, Medications - over-the-counter, Medications - prescription, Ice, Elevation, Exercise  Pain-Aggravating Factors: Exercise, Movement, Stair climbing, Sleeping, Walking         Review of Systems  Patient reports: Dizziness, Cancer History, and Osteoarthritis  Patient denies: Diabetes        Treatment History  Treatments  Discharged From Past 30 Days: Home health care  Previously Received Treatments: Yes  Previous Treatments: Exercise, Home exercise program, Physical therapy  Currently Receiving Treatments: No    Living Arrangements  Living Situation  Housing: Home independently  Living Arrangements: Spouse/significant other  Support Systems: Spouse/significant other    Home Setup  Type of Structure: Trailer  Home Access: Stairs with rails  Entrance Stairs - Number of Steps: 1  Entrance Stairs - Rails: Right  Number of Levels in Home: One level  Patient is able to live on main floor of home: Yes  Bathroom Toilet: Standard  Bathroom Shower/Tub: Tub/shower unit    Equipment/Treatments  Mobility Equipment: Straight cane, Wheeled walker        Employment  Employment Status: Retired          Past Medical History/Physical Systems Review:   Tray Jalloh  has a past medical history of Cervical radiculopathy, Chronic pain, Chronic renal impairment, COVID-19, Depression, Depressive disorder, Digestive disorder, GERD (gastroesophageal reflux disease), History of prediabetes, Hypercholesteremia, Hypertension, Left ventricular hypertrophy, Lumbosacral spondylosis, Positive colorectal cancer screening using Cologuard test, Prediabetes, Pulmonary nodules, and Sleep apnea.    Tray Jalloh  has a past surgical history that includes Hernia repair; Back surgery; Radiofrequency thermocoagulation (Bilateral, 03/20/2012); Prostatectomy (03/1996); Abdominal surgery; Radiofrequency thermocoagulation (Left, 03/18/2021); Eye surgery; Radiofrequency ablation of  lumbar medial branch nerve at single level (Right, 04/01/2021); Bilateral L3-S1 MBB (Bilateral, 2-, 1-, 1-, 12-); Selective injection of anesthetic agent around lumbar spinal nerve root by transforaminal approach (Right, 01/13/2022); Spine surgery (1995); Vasectomy (Mar 1996); Knee arthroscopy w/ meniscal repair (Right); Shoulder arthroscopy (Right); Knee arthroscopy w/ meniscectomy (Left, 01/11/2023); Lateral retinacula release of knee (Left, 01/11/2023); Radiofrequency ablation of lumbar medial branch nerve at single level (Right, 02/09/2023); Knee surgery (Left); Radiofrequency ablation of lumbar medial branch nerve at single level (Left, 02/28/2023); robotic arthroplasty, knee (Right, 06/19/2024); Joint replacement (2024); and robotic arthroplasty, knee (Left, 6/4/2025).    Tray has a current medication list which includes the following prescription(s): acetaminophen, albuterol, amlodipine, ascorbic acid (vitamin c), aspirin, cetirizine, cholecalciferol (vitamin d3), crestor, cyanocobalamin, cyclobenzaprine, diphenhydramine, fluoxetine, fluticasone propionate, hydrocortisone, ketoconazole, lisinopril, methenamine, multivitamin, fish oil-omega-3 fatty acids, omeprazole, ondansetron, oxycodone-acetaminophen, prednisone, senna-docusate, tolterodine, triamcinolone acetonide 0.1%, vitamin e, and zinc gluconate.    Review of patient's allergies indicates:   Allergen Reactions    Bee pollen     Grass pollen-figueroa grass standard         Objective   Posture    Increased thoracic kyphosis and Scoliosis is observed. The patient's scoliosis type is Thoraco-lumbar.               Knee Observations  Left Knee Observations  Present: Straight Leg Raise Extensor Lag (5 degrees)            Lower Extremity Sensation  General Lumbar/Lower Extremity Sensation  Impaired: Left       Left Lumbar/Lower Extremity Sensation  Diminished: Light Touch  Left Lumbar/Lower Extremity Sensation Light Touch Comment:  around lateral knee              Knee Swelling  Location of Measurement Right  (cm) Left  (cm)   20 cm Above Joint Line       10 cm Vastus Medialis Oblique       At Joint Line   45   15 cm Below Joint Line                Knee Palpation          Left Knee Palpation  Abnormal: Muscle and Bony Prominence/Bursa  Left Knee Muscle Palpation Observations: effuse  Left Knee Bony Prominence/Bursa Palpation Observations: effuse  Effusion in quads and hamstring at knee joung         Knee Range of Motion   Right Knee   Active (deg) Passive (deg) Pain   Flexion 120       Extension 4           Left Knee   Active (deg) Passive (deg) Pain   Flexion 110       Extension 10 9 Yes       Knee circumference at midline 45 cm. Active LAQ in sitting -20 degrees              Hip Strength - Planes of Motion   Right Strength Right Pain Left Strength Left  Pain   Flexion (L2) 4+   3+     Extension 4-   4-     ABduction 4  (sitting 90/90) 4     ADduction           Internal Rotation           External Rotation               Knee Strength   Right Strength Right Pain Left Strength Left  Pain   Flexion (S2) 4-   3-     Prone Flexion           Extension (L3) 4-   3-       Knee Extensor Lag  Lag Present: Left (5 degrees)       Ankle/Foot Strength - Planes of Motion   Right Strength Right Pain Left Strength Left  Pain   Dorsiflexion (L4) 3+   3+     Plantar Flexion (S1) 3+   3+     Inversion           Eversion           Great Toe Flexion           Great Toe Extension (L5)           Lesser Toes Flexion           Lesser Toes Extension                  Knee Patellar Screening       Left Patellar Mobility  Hypermobile: Medial, Lateral, Superior, and Inferior           Transfers Assessment  Sit to Stand Assistance: Independent  Toilet/Commode Transfer Assistance: Independent  Toilet/Commode Transfer Assistance Details: uses Lionel supprts to pull up from  Bathtub/Shower Transfer Assistance: Independent  Bathtub/Shower Transfer Assistance Details: with use of  walker      Ambulation Assistance Required  Surface With  Assistive Device Without Assistive Device Details   Level Independent Supervision      Uneven Independent       Curb           Stairs Assistance Required   Assistance Level Upper Extremity Support Pattern   Ascending Supervision One rail Non-reciprocal   Descending Supervision One rail Non-reciprocal        Ambulation Details  Ambulation distance was 75 meters. Flexed trunk due to scoliosis     Gait Analysis  Base of Support: Wide    Right Side Walking Observations  Decreased: Step Length  Right Foot Contact Pattern: Flat foot    Left Side Walking Observations  Decreased: Stance Time  Left Foot Contact Pattern: Flat foot  Knee Observations During Gait  Left: Decreased Knee Flexion in Swing, Decreased Knee Extension in Initial Contact, and Knee Decreased Extension in Midstance         Treatment:  Therapeutic Activity  TA 1: HEP development and education about pain management and sleeping posistioning    Time Entry(in minutes):  PT Evaluation (Low) Time Entry: 35  Therapeutic Activity Time Entry: 10    Assessment & Plan   Assessment  Tray presents with a condition of Low complexity.   Presentation of Symptoms: Stable  Will Comorbidities Impact Care: Yes  Scoliosis, right knee replacement last year    Functional Limitations: Activity tolerance, Ambulating on uneven surfaces, Carrying objects, Community integration, Decreased ambulation distance/endurance, Disrupted sleep pattern, Driving, Functional mobility, Gait limitations, Increased risk of fall, Maintaining balance, Pain with ADLs/IADLs, Painful locomotion/ambulation, Participating in leisure activities, Performing household chores, Range of motion, Standing tolerance, Transfers  Impairments: Abnormal gait, Abnormal muscle firing, Abnormal or restricted range of motion, Activity intolerance, Impaired balance, Impaired physical strength, Lack of appropriate home exercise program, Pain with functional  activity, Weight-bearing intolerance, Safety issue  Personal Factors Affecting Prognosis: Pain, Physical limitations, Transportation    Patient Goal for Therapy (PT): to get left knee as good as right and resume household duties like mowing grass  Prognosis: Good  Prognosis Details: Pt is familiar with surgery and protocol for treatment having had the other knee last year replaced. He has good family support and is highly motivated.  Assessment Details: Pt lacks left knee ROM and strength due to arthritis and resultant TKR surgery. He is having pain and swelling and using a FWW for ambulation which is not his normal practice presurgery.      Plan  From a physical therapy perspective, the patient would benefit from: Skilled Rehab Services    Planned therapy interventions include: Therapeutic exercise, Therapeutic activities, Neuromuscular re-education, Manual therapy, and Gait training.    Planned modalities to include: Biofeedback, Electrical stimulation - passive/unattended, Electrical stimulation - attended, Cryotherapy (cold pack), Infrared light therapy, Thermotherapy (hot pack), Vasopneumatic pump, and Ultrasound.        Visit Frequency: 3 times Per Week for 2 Weeks.  Other/tapered frequency details: Then 2 x week for 10 weeks    This plan was discussed with Patient.   Discussion participants: Agreed Upon Plan of Care  Plan details: Pt to be seen for post op left total knee replacement related weakness, pain, and gait difficulties.          The patient's spiritual, cultural, and educational needs were considered, and the patient is agreeable to the plan of care and goals.     Education  Education was done with Patient. The patient's learning style includes Demonstration and Pictures/video. The patient Demonstrates understanding and Verbalizes understanding.         Access Code: [email or web address] URL: [email or web address] Date: 06/26/2025 Prepared by: Liliana Belcher Exercises - Active Straight Leg Raise with Quad  Set  - 1 x daily - 7 x weekly - 3 sets - 10 reps - Seated Long Arc Quad  - 1 x daily - 7 x weekly - 3 sets - 10 reps - Seated Heel Slide  - 1 x daily - 7 x weekly - 3 sets - 10 reps - Standing Quad Set  - 1 x daily - 7 x weekly - 3 sets - 10 reps - Standing Heel Raise  - 1 x daily - 7 x weekly - 3 sets - 10 reps - Standing Knee Flexion AROM  - 1 x daily - 7 x weekly - 3 sets - 10 reps       Goals:   Active       Ambulation/movement       Patient will accommodate walking on uneven surfaces using straight cane       Start:  06/26/25    Expected End:  09/18/25            Patient will ascend 4 steps with Lionel hand rail reciprocally       Start:  06/26/25    Expected End:  09/18/25            Patient will descend 4 steps with Lionel handrails with reciprocal pattern without quad avoidance       Start:  06/26/25    Expected End:  09/18/25            Patient will ambulate with normal gait pattern with cane       Start:  06/26/25    Expected End:  09/18/25               Functional outcome       Patient will show a significant change in foto patient-reported outcome tool to demonstrate subjective improvement       Start:  06/26/25    Expected End:  09/18/25            Patient stated goal: to get left knee as good as right and resume household duties like mowing grass        Start:  06/26/25    Expected End:  09/18/25            Patient will demonstrate independence in home program for support of progression       Start:  06/26/25    Expected End:  09/18/25               Home activities       Patient will perform household outdoor maintenance like mowing grass with min discomfort        Start:  06/26/25    Expected End:  09/18/25               Pain       Patient will report pain of 0/10 demonstrating a reduction of overall pain       Start:  06/26/25    Expected End:  09/18/25               Range of Motion       Patient will achieve left knee ROM of  5-125 degrees        Start:  06/26/25    Expected End:  09/18/25                Strength       Patient will achieve left knee flexion strength of 5/5       Start:  06/26/25    Expected End:  09/18/25            Patient will achieve left knee extension strength of 5/5       Start:  06/26/25    Expected End:  09/18/25                Liliana Belcher PT, DPT

## 2025-06-30 ENCOUNTER — CLINICAL SUPPORT (OUTPATIENT)
Dept: REHABILITATION | Facility: HOSPITAL | Age: 75
End: 2025-06-30
Payer: MEDICARE

## 2025-06-30 DIAGNOSIS — M25.562 CHRONIC PAIN OF LEFT KNEE: Chronic | ICD-10-CM

## 2025-06-30 DIAGNOSIS — M62.81 WEAKNESS OF LEFT QUADRICEPS MUSCLE: Primary | ICD-10-CM

## 2025-06-30 DIAGNOSIS — R26.9 GAIT DIFFICULTY: ICD-10-CM

## 2025-06-30 DIAGNOSIS — G89.29 CHRONIC PAIN OF LEFT KNEE: Chronic | ICD-10-CM

## 2025-06-30 PROCEDURE — 97110 THERAPEUTIC EXERCISES: CPT | Mod: CQ

## 2025-06-30 PROCEDURE — 97112 NEUROMUSCULAR REEDUCATION: CPT | Mod: CQ

## 2025-06-30 NOTE — PROGRESS NOTES
Outpatient Rehab    Physical Therapy Visit    Patient Name: Tray Jalloh  MRN: [id number]  YOB: 1950  Encounter Date: 6/30/2025    Therapy Diagnosis:   Encounter Diagnoses   Name Primary?    Weakness of left quadriceps muscle Yes    Chronic pain of left knee     Gait difficulty      Physician: Dada Enriquez MD    Physician Orders: Eval and Treat  Medical Diagnosis: Arthrofibrosis of total knee arthroplasty  Surgical Diagnosis: T84.82XA (ICD-10-CM) - Arthrofibrosis of total knee arthroplasty   Surgical Date: 6/4/2025  Days Since Last Surgery: 26    Visit # / Visits Authorized:  1 / 1  Insurance Authorization Period: 6/26/2025 to 6/24/2027  Date of Evaluation: 6/26/2025  Plan of Care Certification: 6/26/2025 to 9/18/2025      PT/PTA: PTA   Number of PTA visits since last PT visit:1  Time In: 1440   Time Out: 1524  Total Time (in minutes): 44   Total Billable Time (in minutes): 43    FOTO:  Intake Score: 46%  Survey Score 2:  %  Survey Score 3:  %    Precautions:       Subjective   Patient reports he has not been using his walker or cane in last 3 days..  Pain reported as 4/10. left knee    Objective       Knee Range of Motion   Left Knee   Active (deg) Passive (deg) Pain   Flexion 115   Yes   Extension 9           Left knee Lag -15 deg           Treatment:  Therapeutic Exercise  TE 1: Nustep x 5 min  TE 2: Heel slides 10 x 10 s/h  TE 3: SB stretch x 2 min  TE 4: Hip/Knee flexion using peanut ball 20x  TE 5: Knee ROM measurements  Balance/Neuromuscular Re-Education  NMR 1: Supine QS 20 x 5 s/h  NMR 2: Supine TKE using strap 20 x 5 s/h  NMR 3: SLR using strap 20 x 5 s/h  NMR 4: Hooklying hip adduction 20 x 3 s/h  NMR 5: Hooklying hip abd RTB 20x 3 s/h    Time Entry(in minutes):  Neuromuscular Re-Education Time Entry: 23  Therapeutic Exercise Time Entry: 20    Assessment & Plan   Assessment: Received POC from Rosita Belcher DPT. Patient arrived 10 min late so quantity of exercises reflects this. Today  was his first tx since eval. He reports compliance with HEP. Focused on knee and hip ROM/strengthening within pain free range tolerating well without complaints. He ambulated in therapy gym area without AD with SBA for safety. We did advise him to use his cane if not rolling walker while out in community as he still demo a quad lag at this time. He voiced he usually uses his cane but forgot it today. His Left Knee Active Range of Motion measurements listed above. Will continue to progress as able.   Evaluation/Treatment Tolerance: Patient tolerated treatment well    The patient will continue to benefit from skilled outpatient physical therapy in order to address the deficits listed in the problem list on the initial evaluation, provide patient and family education, and maximize the patients level of independence in the home and community environments.     The patient's spiritual, cultural, and educational needs were considered, and the patient is agreeable to the plan of care and goals.           Plan: Continue with established POC.    Goals:   Active       Ambulation/movement       Patient will accommodate walking on uneven surfaces using straight cane (Progressing)       Start:  06/26/25    Expected End:  09/18/25            Patient will ascend 4 steps with Lionel hand rail reciprocally (Progressing)       Start:  06/26/25    Expected End:  09/18/25            Patient will descend 4 steps with Lionel handrails with reciprocal pattern without quad avoidance (Progressing)       Start:  06/26/25    Expected End:  09/18/25            Patient will ambulate with normal gait pattern with cane (Progressing)       Start:  06/26/25    Expected End:  09/18/25               Functional outcome       Patient will show a significant change in foto patient-reported outcome tool to demonstrate subjective improvement (Progressing)       Start:  06/26/25    Expected End:  09/18/25            Patient stated goal: to get left knee as good  as right and resume household duties like mowing grass  (Progressing)       Start:  06/26/25    Expected End:  09/18/25            Patient will demonstrate independence in home program for support of progression (Progressing)       Start:  06/26/25    Expected End:  09/18/25               Home activities       Patient will perform household outdoor maintenance like mowing grass with min discomfort  (Progressing)       Start:  06/26/25    Expected End:  09/18/25               Pain       Patient will report pain of 0/10 demonstrating a reduction of overall pain (Progressing)       Start:  06/26/25    Expected End:  09/18/25               Range of Motion       Patient will achieve left knee ROM of  5-125 degrees  (Progressing)       Start:  06/26/25    Expected End:  09/18/25               Strength       Patient will achieve left knee flexion strength of 5/5 (Progressing)       Start:  06/26/25    Expected End:  09/18/25            Patient will achieve left knee extension strength of 5/5 (Progressing)       Start:  06/26/25    Expected End:  09/18/25                Phyllis You PTA

## 2025-07-08 ENCOUNTER — CLINICAL SUPPORT (OUTPATIENT)
Dept: REHABILITATION | Facility: HOSPITAL | Age: 75
End: 2025-07-08
Payer: MEDICARE

## 2025-07-08 DIAGNOSIS — M17.12 OSTEOARTHRITIS OF LEFT KNEE, UNSPECIFIED OSTEOARTHRITIS TYPE: ICD-10-CM

## 2025-07-08 DIAGNOSIS — M62.81 WEAKNESS OF LEFT QUADRICEPS MUSCLE: Primary | ICD-10-CM

## 2025-07-08 PROCEDURE — 97530 THERAPEUTIC ACTIVITIES: CPT | Mod: CQ

## 2025-07-08 PROCEDURE — 97014 ELECTRIC STIMULATION THERAPY: CPT | Mod: CQ

## 2025-07-08 PROCEDURE — 97112 NEUROMUSCULAR REEDUCATION: CPT | Mod: CQ

## 2025-07-08 PROCEDURE — 97110 THERAPEUTIC EXERCISES: CPT | Mod: CQ

## 2025-07-08 NOTE — PROGRESS NOTES
Outpatient Rehab    Physical Therapy Visit    Patient Name: Tray Jalloh  MRN: [id number]  YOB: 1950  Encounter Date: 7/8/2025    Therapy Diagnosis:   Encounter Diagnoses   Name Primary?    Weakness of left quadriceps muscle [M62.81] Yes    Osteoarthritis of left knee, unspecified osteoarthritis type [M17.12]      Physician: Dada Enriquez MD    Physician Orders: Eval and Treat  Medical Diagnosis: Arthrofibrosis of total knee arthroplasty  Surgical Diagnosis: T84.82XA (ICD-10-CM) - Arthrofibrosis of total knee arthroplasty   Surgical Date: 6/4/2025  Days Since Last Surgery: 34    Visit # / Visits Authorized:  2 / 26  Insurance Authorization Period: 6/26/2025 to 6/24/2027  Date of Evaluation: 6/26/2025  Plan of Care Certification: 6/26/2025 to 9/18/2025      PT/PTA: PTA   Number of PTA visits since last PT visit:2  Time In: 0834   Time Out: 0929  Total Time (in minutes): 55   Total Billable Time (in minutes): 53    FOTO:  Intake Score:  %  Survey Score 2:  %  Survey Score 3:  %    Precautions:       Subjective   Patient reports he has been using his cane some..  Pain reported as 1/10.      Objective            Treatment:  Therapeutic Exercise  TE 1: Nustep x 5 min  TE 2: hamstring stretch at stairs 3 x 30 sh  TE 3: SB stretch x 2 min  TE 5: Knee ROM measurements  Balance/Neuromuscular Re-Education  NMR 1: QS with ideal stretch 10 x 10 sh  NMR 2: QS w/ NMES x 5 min  NMR 3: SLR w/ NMES and strap x 5 min  NMR 4: SAQ w/ NMES and strap x 5 min  Therapeutic Activity  TA 1: B leg press 6 pl x 30  TA 2: L leg press 3 pl x 30    Time Entry(in minutes):  E-Stim (Unattended) Time Entry: 15  Neuromuscular Re-Education Time Entry: 24  Therapeutic Activity Time Entry: 13  Therapeutic Exercise Time Entry: 16    Assessment & Plan   Assessment: Tray arrived for treatment today with -13 degrees extension, 120 degrees flexion. He received neuromuscular electrical stimulation to assist with quad recruitment. He  was able to reach -8 by end of treatment. Added leg press without complaint as well.  Evaluation/Treatment Tolerance: Patient tolerated treatment well    The patient will continue to benefit from skilled outpatient physical therapy in order to address the deficits listed in the problem list on the initial evaluation, provide patient and family education, and maximize the patients level of independence in the home and community environments.     The patient's spiritual, cultural, and educational needs were considered, and the patient is agreeable to the plan of care and goals.           Plan: Continue with established POC.    Goals:   Active       Ambulation/movement       Patient will accommodate walking on uneven surfaces using straight cane (Ongoing)       Start:  06/26/25    Expected End:  09/18/25            Patient will ascend 4 steps with Lionel hand rail reciprocally (Ongoing)       Start:  06/26/25    Expected End:  09/18/25            Patient will descend 4 steps with Lionel handrails with reciprocal pattern without quad avoidance (Ongoing)       Start:  06/26/25    Expected End:  09/18/25            Patient will ambulate with normal gait pattern with cane (Ongoing)       Start:  06/26/25    Expected End:  09/18/25               Functional outcome       Patient will show a significant change in foto patient-reported outcome tool to demonstrate subjective improvement (Ongoing)       Start:  06/26/25    Expected End:  09/18/25            Patient stated goal: to get left knee as good as right and resume household duties like mowing grass  (Ongoing)       Start:  06/26/25    Expected End:  09/18/25            Patient will demonstrate independence in home program for support of progression (Ongoing)       Start:  06/26/25    Expected End:  09/18/25               Home activities       Patient will perform household outdoor maintenance like mowing grass with min discomfort  (Ongoing)       Start:  06/26/25    Expected  End:  09/18/25               Pain       Patient will report pain of 0/10 demonstrating a reduction of overall pain (Ongoing)       Start:  06/26/25    Expected End:  09/18/25               Range of Motion       Patient will achieve left knee ROM of  5-125 degrees  (Ongoing)       Start:  06/26/25    Expected End:  09/18/25               Strength       Patient will achieve left knee flexion strength of 5/5 (Ongoing)       Start:  06/26/25    Expected End:  09/18/25            Patient will achieve left knee extension strength of 5/5 (Ongoing)       Start:  06/26/25    Expected End:  09/18/25                Merly Mcarthur PTA

## 2025-07-10 ENCOUNTER — CLINICAL SUPPORT (OUTPATIENT)
Dept: REHABILITATION | Facility: HOSPITAL | Age: 75
End: 2025-07-10
Payer: MEDICARE

## 2025-07-10 DIAGNOSIS — M62.81 WEAKNESS OF LEFT QUADRICEPS MUSCLE: Primary | ICD-10-CM

## 2025-07-10 PROCEDURE — 97112 NEUROMUSCULAR REEDUCATION: CPT | Mod: CQ

## 2025-07-10 PROCEDURE — 97110 THERAPEUTIC EXERCISES: CPT | Mod: CQ

## 2025-07-14 NOTE — PROGRESS NOTES
Outpatient Rehab    Physical Therapy Visit    Patient Name: Tray Jalloh  MRN: [id number]  YOB: 1950  Encounter Date: 7/10/2025    Therapy Diagnosis:   Encounter Diagnosis   Name Primary?    Weakness of left quadriceps muscle [M62.81] Yes     Physician: Dada Enriquez MD    Physician Orders: Eval and Treat  Medical Diagnosis: Arthrofibrosis of total knee arthroplasty  Surgical Diagnosis: T84.82XA (ICD-10-CM) - Arthrofibrosis of total knee arthroplasty   Surgical Date: 6/4/2025  Days Since Last Surgery: 39    Visit # / Visits Authorized:  3 / 26  Insurance Authorization Period: 6/26/2025 to 6/24/2027  Date of Evaluation: 6/26/2025  Plan of Care Certification: 6/26/2025 to 9/18/2025      PT/PTA:     Number of PTA visits since last PT visit:   Time In: 0830   Time Out: 0920  Total Time (in minutes): 50   Total Billable Time (in minutes): 45    FOTO:  Intake Score:  %  Survey Score 2:  %  Survey Score 3:  %    Precautions:       Subjective   haven't done ex like he should; was sore for a couple of hours after last session.  Pain reported as 0/10. took Tylenol    Objective            Treatment:  Therapeutic Exercise  TE 1: Nustep x 5 min  TE 2: hamstring stretch at stairs 3 x 30 sh  TE 3: SB stretch x 2 min with QS  TE 4: knee flexion stretch on step x 5 with 10 sec hold  TE 5: Knee ROM measurements: resting ext 12, active ext 9, ext lag 5 degrees  Balance/Neuromuscular Re-Education  NMR 1: QS with ideal stretch 10 x 10 sh  NMR 2: QS w/ NMES x 5 min  NMR 3: SLR w/ NMES  x 5 min  NMR 4: SAQ w/ NMES  x 5 min  NMR 5: CCTKE with green band x 20  Therapeutic Activity  TA 1: B leg press 6 pl x 30  TA 2: L leg press 3 pl x 30    Time Entry(in minutes):  Gait Training Time Entry: 5  Neuromuscular Re-Education Time Entry: 30  Therapeutic Activity Time Entry: 10    Assessment & Plan   Assessment: Pt needs redirecting to focus on quality of performance of exercise versus carrying on conversation. He has much  better active knee extension in standing versus supine. Still has a few steristrips in place on distal incision but looks clean dry and no signs of infection. He arrives with [place] today and still needs frequent vc for heelstrike and knee extension at initial contact..  Evaluation/Treatment Tolerance: Patient tolerated treatment well    The patient will continue to benefit from skilled outpatient physical therapy in order to address the deficits listed in the problem list on the initial evaluation, provide patient and family education, and maximize the patients level of independence in the home and community environments.     The patient's spiritual, cultural, and educational needs were considered, and the patient is agreeable to the plan of care and goals.           Plan: cont POC, issue CCTKE for HEP with theraband after reviewing next session    Goals:   Active       Ambulation/movement       Patient will accommodate walking on uneven surfaces using straight cane (Progressing)       Start:  06/26/25    Expected End:  09/18/25            Patient will ascend 4 steps with Lionel hand rail reciprocally (Progressing)       Start:  06/26/25    Expected End:  09/18/25            Patient will descend 4 steps with Lionel handrails with reciprocal pattern without quad avoidance (Progressing)       Start:  06/26/25    Expected End:  09/18/25            Patient will ambulate with normal gait pattern with cane (Progressing)       Start:  06/26/25    Expected End:  09/18/25               Functional outcome       Patient will show a significant change in foto patient-reported outcome tool to demonstrate subjective improvement (Progressing)       Start:  06/26/25    Expected End:  09/18/25            Patient stated goal: to get left knee as good as right and resume household duties like mowing grass  (Progressing)       Start:  06/26/25    Expected End:  09/18/25            Patient will demonstrate independence in home program for  support of progression (Progressing)       Start:  06/26/25    Expected End:  09/18/25               Home activities       Patient will perform household outdoor maintenance like mowing grass with min discomfort  (Progressing)       Start:  06/26/25    Expected End:  09/18/25               Pain       Patient will report pain of 0/10 demonstrating a reduction of overall pain (Progressing)       Start:  06/26/25    Expected End:  09/18/25               Range of Motion       Patient will achieve left knee ROM of  5-125 degrees  (Progressing)       Start:  06/26/25    Expected End:  09/18/25               Strength       Patient will achieve left knee flexion strength of 5/5 (Progressing)       Start:  06/26/25    Expected End:  09/18/25            Patient will achieve left knee extension strength of 5/5 (Progressing)       Start:  06/26/25    Expected End:  09/18/25                Liliana Belcher, PT, DPT

## 2025-07-16 ENCOUNTER — CLINICAL SUPPORT (OUTPATIENT)
Dept: REHABILITATION | Facility: HOSPITAL | Age: 75
End: 2025-07-16
Payer: MEDICARE

## 2025-07-16 DIAGNOSIS — M62.81 WEAKNESS OF LEFT QUADRICEPS MUSCLE: Primary | ICD-10-CM

## 2025-07-16 DIAGNOSIS — I10 ESSENTIAL HYPERTENSION: Chronic | ICD-10-CM

## 2025-07-16 PROCEDURE — 97110 THERAPEUTIC EXERCISES: CPT | Mod: CQ

## 2025-07-16 PROCEDURE — 97530 THERAPEUTIC ACTIVITIES: CPT | Mod: CQ

## 2025-07-16 RX ORDER — LISINOPRIL 40 MG/1
40 TABLET ORAL DAILY
Qty: 90 TABLET | Refills: 3 | Status: SHIPPED | OUTPATIENT
Start: 2025-07-16

## 2025-07-17 ENCOUNTER — CLINICAL SUPPORT (OUTPATIENT)
Dept: REHABILITATION | Facility: HOSPITAL | Age: 75
End: 2025-07-17
Payer: MEDICARE

## 2025-07-17 DIAGNOSIS — G89.29 CHRONIC PAIN OF LEFT KNEE: Primary | Chronic | ICD-10-CM

## 2025-07-17 DIAGNOSIS — M62.81 WEAKNESS OF LEFT QUADRICEPS MUSCLE: ICD-10-CM

## 2025-07-17 DIAGNOSIS — M25.562 CHRONIC PAIN OF LEFT KNEE: Primary | Chronic | ICD-10-CM

## 2025-07-17 PROCEDURE — 97110 THERAPEUTIC EXERCISES: CPT | Mod: CQ

## 2025-07-17 PROCEDURE — 97530 THERAPEUTIC ACTIVITIES: CPT | Mod: CQ

## 2025-07-17 NOTE — PROGRESS NOTES
Outpatient Rehab    Physical Therapy Visit    Patient Name: Tray Jalloh  MRN: [id number]  YOB: 1950  Encounter Date: 7/16/2025    Therapy Diagnosis:   Encounter Diagnosis   Name Primary?    Weakness of left quadriceps muscle [M62.81] Yes     Physician: Dada Enriquez MD    Physician Orders: Eval and Treat  Medical Diagnosis: Arthrofibrosis of total knee arthroplasty  Surgical Diagnosis: T84.82XA (ICD-10-CM) - Arthrofibrosis of total knee arthroplasty   Surgical Date: 6/4/2025  Days Since Last Surgery: 43    Visit # / Visits Authorized:  5 / 26  Insurance Authorization Period: 6/26/2025 to 6/24/2027  Date of Evaluation: 6/26/2025  Plan of Care Certification: 6/26/2025 to 9/18/2025      PT/PTA:     Number of PTA visits since last PT visit:   Time In: 0922   Time Out: 1005  Total Time (in minutes): 43   Total Billable Time (in minutes):      FOTO:  Intake Score: 46%  Survey Score 2: Not applicable for this Episode%  Survey Score 3: Not applicable for this Episode%    Precautions:         Subjective   Patient arrives late for session stating traffic was an issue. Did a lot of walking at [facility] yesterday and knee is hurting. Did not swell or get hot..  Pain reported as 3/10. Left knee and ankle    Objective            Treatment:  Therapeutic Exercise  TE 1: nustep x 5 min  TE 2: incline board QS x 20  TE 3: hamstring stretch on steps x 2 min  TE 4: knee lfexion stretch on step x 2 min  TE 5:  ext lag 5 degrees  TE 6: Cctke black tband. 20 times  TE 7: Hs pulls in sitting with black band 20 times  TE 8: Short quad with 2 pounds 20 times. Long arc quad with 2 pounds 20 times.  TE 9: Bridges times five cause pain in low back  Manual Therapy  MT 1: Extension mobs 5 minutes   Therapeutic Activity  TA 1: leg press Lionel 7 plates x 20 , LLE #4 x 20  TA 2: ascend and descend stairs BHHA 2 laps reciprocally      Time Entry(in minutes):  Manual Therapy Time Entry: 5  Therapeutic Activity Time Entry:  13  Therapeutic Exercise Time Entry: 25    Assessment & Plan   Assessment: Patient still liking extension range of motion in supine, but has functional extension. That is much better in standing no signs of swelling or redness. Incision looks clean and infection free. There are no longer Steri-Strips present still has gate deficits related to spine. No buckling noted. Only complaints of pain with bridging  Evaluation/Treatment Tolerance: Patient tolerated treatment well    The patient will continue to benefit from skilled outpatient physical therapy in order to address the deficits listed in the problem list on the initial evaluation, provide patient and family education, and maximize the patients level of independence in the home and community environments.     The patient's spiritual, cultural, and educational needs were considered, and the patient is agreeable to the plan of care and goals.           Plan: Focus on extension range of motion and extensor lag    Goals:   Active       Ambulation/movement       Patient will accommodate walking on uneven surfaces using straight cane (Ongoing)       Start:  06/26/25    Expected End:  09/18/25            Patient will ascend 4 steps with Lionel hand rail reciprocally (Ongoing)       Start:  06/26/25    Expected End:  09/18/25            Patient will descend 4 steps with Lionel handrails with reciprocal pattern without quad avoidance (Ongoing)       Start:  06/26/25    Expected End:  09/18/25            Patient will ambulate with normal gait pattern with cane (Ongoing)       Start:  06/26/25    Expected End:  09/18/25               Functional outcome       Patient will show a significant change in foto patient-reported outcome tool to demonstrate subjective improvement (Ongoing)       Start:  06/26/25    Expected End:  09/18/25            Patient stated goal: to get left knee as good as right and resume household duties like mowing grass  (Ongoing)       Start:  06/26/25     Expected End:  09/18/25            Patient will demonstrate independence in home program for support of progression (Ongoing)       Start:  06/26/25    Expected End:  09/18/25               Home activities       Patient will perform household outdoor maintenance like mowing grass with min discomfort  (Ongoing)       Start:  06/26/25    Expected End:  09/18/25               Pain       Patient will report pain of 0/10 demonstrating a reduction of overall pain (Ongoing)       Start:  06/26/25    Expected End:  09/18/25               Range of Motion       Patient will achieve left knee ROM of  5-125 degrees  (Ongoing)       Start:  06/26/25    Expected End:  09/18/25               Strength       Patient will achieve left knee flexion strength of 5/5 (Ongoing)       Start:  06/26/25    Expected End:  09/18/25            Patient will achieve left knee extension strength of 5/5 (Ongoing)       Start:  06/26/25    Expected End:  09/18/25              Liliana Belcher, PT, DPT

## 2025-07-17 NOTE — PROGRESS NOTES
Outpatient Rehab    Physical Therapy Visit    Patient Name: Tray Jalloh  MRN: [id number]  YOB: 1950  Encounter Date: 7/17/2025    Therapy Diagnosis:   Encounter Diagnoses   Name Primary?    Chronic pain of left knee [M25.562, G89.29] Yes    Weakness of left quadriceps muscle [M62.81]      Physician: Dada Enriquez MD    Physician Orders: Eval and Treat  Medical Diagnosis: Arthrofibrosis of total knee arthroplasty  Surgical Diagnosis: T84.82XA (ICD-10-CM) - Arthrofibrosis of total knee arthroplasty   Surgical Date: 6/4/2025  Days Since Last Surgery: 43    Visit # / Visits Authorized:  5 / 26  Insurance Authorization Period: 6/26/2025 to 6/24/2027  Date of Evaluation: 6/26/2025  Plan of Care Certification: 6/26/2025 to 9/18/2025      PT/PTA: PTA   Number of PTA visits since last PT visit:1  Time In: 1345   Time Out: 1420  Total Time (in minutes): 35   Total Billable Time (in minutes): 30    FOTO:  Intake Score: 46%  Survey Score 2: Not applicable for this Episode%  Survey Score 3: Not applicable for this Episode%    Precautions:         Subjective   He is hurting a little but nothing he can't handle..  Pain reported as 3/10.      Objective            Treatment:  Therapeutic Exercise  TE 1: Nustep x 5 min  TE 2: hamstring stretch at stairs 3 x 30 sh  TE 3: SB stretch x 2 min with QS  TE 4: knee flexion stretch on step x 5 with 10 sec hold  TE 7: seated hamstring curls 3 pl x 30  TE 9: Seated leg ext B 2 pl x 20  Therapeutic Activity  TA 1: B leg press 6 pl x 30  TA 2: L leg press 4 pl x 30  TA 3: fwd step ups w/ B handrail 2 x 10  TA 4: sit to stands w/o push off 4 x 5    Time Entry(in minutes):  Therapeutic Activity Time Entry: 13  Therapeutic Exercise Time Entry: 17    Assessment & Plan   Assessment: Treatment focused on functional activities today with addition of sit to stands without using hands to push off and fwd step ups at steps w/ bilateral handrails. He was also able to add seated leg  curl and seated leg extension. He complained of his previous weight being too light on unilateral leg press so increased by one plate. He continues to lack extension with gait but it matches his opposite knee in standing. He was fatigued after treatment.  Evaluation/Treatment Tolerance: Patient tolerated treatment well    The patient will continue to benefit from skilled outpatient physical therapy in order to address the deficits listed in the problem list on the initial evaluation, provide patient and family education, and maximize the patients level of independence in the home and community environments.     The patient's spiritual, cultural, and educational needs were considered, and the patient is agreeable to the plan of care and goals.           Plan: Focus on extension range of motion and extensor lag    Goals:   Active       Ambulation/movement       Patient will accommodate walking on uneven surfaces using straight cane (Ongoing)       Start:  06/26/25    Expected End:  09/18/25            Patient will ascend 4 steps with Lionel hand rail reciprocally (Ongoing)       Start:  06/26/25    Expected End:  09/18/25            Patient will descend 4 steps with Lionel handrails with reciprocal pattern without quad avoidance (Ongoing)       Start:  06/26/25    Expected End:  09/18/25            Patient will ambulate with normal gait pattern with cane (Ongoing)       Start:  06/26/25    Expected End:  09/18/25               Functional outcome       Patient will show a significant change in foto patient-reported outcome tool to demonstrate subjective improvement (Ongoing)       Start:  06/26/25    Expected End:  09/18/25            Patient stated goal: to get left knee as good as right and resume household duties like mowing grass  (Ongoing)       Start:  06/26/25    Expected End:  09/18/25            Patient will demonstrate independence in home program for support of progression (Ongoing)       Start:  06/26/25     Expected End:  09/18/25               Home activities       Patient will perform household outdoor maintenance like mowing grass with min discomfort  (Ongoing)       Start:  06/26/25    Expected End:  09/18/25               Pain       Patient will report pain of 0/10 demonstrating a reduction of overall pain (Ongoing)       Start:  06/26/25    Expected End:  09/18/25               Range of Motion       Patient will achieve left knee ROM of  5-125 degrees  (Ongoing)       Start:  06/26/25    Expected End:  09/18/25               Strength       Patient will achieve left knee flexion strength of 5/5 (Ongoing)       Start:  06/26/25    Expected End:  09/18/25            Patient will achieve left knee extension strength of 5/5 (Ongoing)       Start:  06/26/25    Expected End:  09/18/25                Merly Mcarthur PTA

## 2025-07-22 ENCOUNTER — CLINICAL SUPPORT (OUTPATIENT)
Dept: REHABILITATION | Facility: HOSPITAL | Age: 75
End: 2025-07-22
Payer: MEDICARE

## 2025-07-22 DIAGNOSIS — M62.81 WEAKNESS OF LEFT QUADRICEPS MUSCLE: Primary | ICD-10-CM

## 2025-07-22 PROCEDURE — 97140 MANUAL THERAPY 1/> REGIONS: CPT | Mod: CQ

## 2025-07-22 PROCEDURE — 97110 THERAPEUTIC EXERCISES: CPT | Mod: CQ

## 2025-07-22 NOTE — PROGRESS NOTES
"  Tray Jalloh presented for a follow-up Medicare AWV today. The following components were reviewed and updated:    Medical history  Family History  Social history  Allergies and Current Medications  Health Risk Assessment  Health Maintenance  Care Team    **See Completed Assessments for Annual Wellness visit with in the encounter summary    The following assessments were completed:  Depression Screening  Cognitive function Screening  Timed Get Up Test  Whisper Test      Opioid documentation:      Patient does not have a current opioid prescription.          Vitals:    07/23/25 0819   BP: 122/74   BP Location: Left arm   Patient Position: Sitting   Pulse: 72   Resp: 16   Temp: 97.8 °F (36.6 °C)   TempSrc: Oral   SpO2: 95%   Weight: 85.7 kg (189 lb)   Height: 5' 11.5" (1.816 m)     Body mass index is 25.99 kg/m².       Physical Exam  Constitutional:       General: He is not in acute distress.     Appearance: Normal appearance.   HENT:      Mouth/Throat:      Mouth: Mucous membranes are moist.   Eyes:      Pupils: Pupils are equal, round, and reactive to light.   Cardiovascular:      Rate and Rhythm: Normal rate and regular rhythm.      Pulses: Normal pulses.      Heart sounds: Normal heart sounds. No murmur heard.  Pulmonary:      Effort: Pulmonary effort is normal. No respiratory distress.      Breath sounds: Normal breath sounds. No wheezing, rhonchi or rales.   Abdominal:      Palpations: Abdomen is soft.      Tenderness: There is no abdominal tenderness.   Musculoskeletal:         General: Normal range of motion.      Cervical back: Neck supple.   Skin:     General: Skin is warm and dry.   Neurological:      Mental Status: He is alert and oriented to person, place, and time.      Gait: Gait abnormal.   Psychiatric:         Mood and Affect: Mood normal.         Behavior: Behavior normal.           Diagnoses and health risks identified today and associated recommendations/orders:    1. Encounter for subsequent " annual wellness visit (AWV) in Medicare patient  Screenings performed, as noted above. Personal preventative testing needs reviewed.  Return for AWV in 12 months or thereafter    2. Essential hypertension  Chronic - Controlled  B/P 122/74, HR 72  Continue lisinopril rx as prescribed   Followed by PCP    3. Chronic kidney disease, stage 3a  Chronic - Stable  Recent BUN/Creat 25/1.35, eGFR 55, Urine Microalbumin/Creat normal  Avoid nephrotoxins including NSAIDs.  Followed by PCP     4. Anemia of chronic disease  Chronic - Stable  Recent H&H 13.6/42.7  Followed by PCP    5. Other emphysema  Chronic - Stable  History notable for asbestos exposure when working in [place] and imaging to date including CT chest 12/2022 with calcified  rounded subcentimeter nodules and repeat imaging 03/2024 with stable nodules.   Continue albuterol inhaler as prescribed  Followed by Dr. JOZEF Preciado (Pulmonology)    6. Abnormality of gait and mobility  Chronic - Stable  Pt denies falls within the past 12 months  Followed by PCP    7. Other reduced mobility  Chronic - Stable  Pt denies falls within the past 12 months  Followed by PCP     There are no preventive care reminders to display for this patient.   Health Maintenance   Topic Date Due    Influenza Vaccine (1) 09/01/2025    Colorectal Cancer Screening  02/15/2028    Lipid Panel  01/28/2030    TETANUS VACCINE  10/27/2030    Hepatitis C Screening  Completed    Shingles Vaccine  Completed    RSV Vaccine (Age 60+ and Pregnant patients)  Completed    Pneumococcal Vaccines (Age 50+)  Completed    Abdominal Aortic Aneurysm Screening  Completed    COVID-19 Vaccine  Discontinued      Health Maintenance Topics with due status: Not Due       Topic Last Completion Date    TETANUS VACCINE 10/27/2020    Colorectal Cancer Screening 02/15/2023    Influenza Vaccine 11/03/2024    Lipid Panel 01/28/2025          Provided Tray with a 5-10 year written screening schedule and personal prevention plan.  Recommendations were developed using the USPSTF age appropriate recommendations. Education, counseling, and referrals were provided as needed.  After Visit Summary printed and given to patient which includes a list of additional screenings\tests needed.    No follow-ups on file.        ERIC Galaviz      I offered to discuss advanced care planning, including how to pick a person who would make decisions for you if you were unable to make them for yourself, called a health care power of , and what kind of decisions you might make such as use of life sustaining treatments such as ventilators and tube feeding when faced with a life limiting illness recorded on a living will that they will need to know. (How you want to be cared for as you near the end of your natural life)     X Patient is interested in learning more about how to make advanced directives.  I provided them paperwork and offered to discuss this with them.

## 2025-07-23 ENCOUNTER — OFFICE VISIT (OUTPATIENT)
Dept: FAMILY MEDICINE | Facility: CLINIC | Age: 75
End: 2025-07-23
Payer: MEDICARE

## 2025-07-23 VITALS
OXYGEN SATURATION: 95 % | RESPIRATION RATE: 16 BRPM | TEMPERATURE: 98 F | HEART RATE: 72 BPM | BODY MASS INDEX: 25.6 KG/M2 | DIASTOLIC BLOOD PRESSURE: 74 MMHG | SYSTOLIC BLOOD PRESSURE: 122 MMHG | HEIGHT: 72 IN | WEIGHT: 189 LBS

## 2025-07-23 DIAGNOSIS — R26.9 ABNORMALITY OF GAIT AND MOBILITY: ICD-10-CM

## 2025-07-23 DIAGNOSIS — J43.8 OTHER EMPHYSEMA: ICD-10-CM

## 2025-07-23 DIAGNOSIS — Z74.09 OTHER REDUCED MOBILITY: ICD-10-CM

## 2025-07-23 DIAGNOSIS — Z00.00 ENCOUNTER FOR SUBSEQUENT ANNUAL WELLNESS VISIT (AWV) IN MEDICARE PATIENT: Primary | ICD-10-CM

## 2025-07-23 DIAGNOSIS — N18.31 CHRONIC KIDNEY DISEASE, STAGE 3A: ICD-10-CM

## 2025-07-23 DIAGNOSIS — I10 ESSENTIAL HYPERTENSION: ICD-10-CM

## 2025-07-23 DIAGNOSIS — D63.8 ANEMIA OF CHRONIC DISEASE: ICD-10-CM

## 2025-07-23 PROCEDURE — G0439 PPPS, SUBSEQ VISIT: HCPCS | Mod: ,,, | Performed by: NURSE PRACTITIONER

## 2025-07-23 RX ORDER — CYCLOBENZAPRINE HCL 5 MG
5 TABLET ORAL NIGHTLY
COMMUNITY
Start: 2025-07-14

## 2025-07-23 RX ORDER — PREGABALIN 75 MG/1
75 CAPSULE ORAL
COMMUNITY
Start: 2025-05-06 | End: 2025-11-01

## 2025-07-23 NOTE — PATIENT INSTRUCTIONS
Counseling and Referral of Other Preventative  (Italic type indicates deductible and co-insurance are waived)    Patient Name: Tray Jalloh  Today's Date: 7/23/2025    Health Maintenance       Date Due Completion Date    Influenza Vaccine (1) 09/01/2025 11/3/2024    Override on 10/9/2022: Done    Colorectal Cancer Screening 02/15/2028 2/15/2023    Override on 9/4/2012: Done (Dr. Hare. Repeat in 10 years. Scanned into documents.)    Lipid Panel 01/28/2030 1/28/2025    TETANUS VACCINE 10/27/2030 10/27/2020        No orders of the defined types were placed in this encounter.      The following information is provided to all patients.  This information is to help you find resources for any of the problems found today that may be affecting your health:                  Living healthy guide: ms.gov    Understanding Diabetes: www.diabetes.org      Eating healthy: www.cdc.gov/healthyweight      CDC home safety checklist: www.cdc.gov/steadi/patient.html      Agency on Aging: ms.gov    Alcoholics anonymous (AA): www.aa.org      Physical Activity: www.nima.nih.gov/xa0rbmh      Tobacco use: ms.gov

## 2025-07-24 ENCOUNTER — CLINICAL SUPPORT (OUTPATIENT)
Dept: REHABILITATION | Facility: HOSPITAL | Age: 75
End: 2025-07-24
Payer: MEDICARE

## 2025-07-24 DIAGNOSIS — M62.81 WEAKNESS OF LEFT QUADRICEPS MUSCLE: Primary | ICD-10-CM

## 2025-07-24 PROCEDURE — 97110 THERAPEUTIC EXERCISES: CPT | Mod: CQ

## 2025-07-24 PROCEDURE — 97530 THERAPEUTIC ACTIVITIES: CPT | Mod: CQ

## 2025-07-24 NOTE — PROGRESS NOTES
"  Outpatient Rehab    Physical Therapy Visit    Patient Name: Tray Jalloh  MRN: [id number]  YOB: 1950  Encounter Date: 7/24/2025    Therapy Diagnosis:   Encounter Diagnosis   Name Primary?    Weakness of left quadriceps muscle Yes     Physician: Dada Enriquez MD    Physician Orders: Eval and Treat  Medical Diagnosis: Arthrofibrosis of total knee arthroplasty  Surgical Diagnosis: T84.82XA (ICD-10-CM) - Arthrofibrosis of total knee arthroplasty   Surgical Date: 6/4/2025  Days Since Last Surgery: 50    Visit # / Visits Authorized:  7 / 26  Insurance Authorization Period: 6/26/2025 to 6/24/2027  Date of Evaluation: 6/26/2025  Plan of Care Certification: 6/26/2025 to 9/18/2025      PT/PTA: PTA   Number of PTA visits since last PT visit:2  Time In: 0837   Time Out: 0915  Total Time (in minutes): 38   Total Billable Time (in minutes): 30 (spent one on one today)    FOTO:  Intake Score (%): 46  Survey Score 2 (%): Not applicable for this Episode  Survey Score 3 (%): Not applicable for this Episode    Precautions:         Subjective   patient voiced his knee wasn't too bad today..  Pain reported as 2/10. Left knee and ankle    Objective            Treatment:  Therapeutic Exercise  TE 1: Nustep x 5 min  TE 2: hamstring stretch at stairs 3 x 30 sh  TE 3: SB stretch x 2 min with QS  TE 4: knee flexion stretch on step x 5 with 10 sec hold  TE 7: seated hamstring curls 3 pl x 30  TE 9: Seated leg ext B 2 pl x 20  Balance/Neuromuscular Re-Education  NMR 1: CCTKE 3 plates x 20  Therapeutic Activity  TA 1: B leg press 6 pl x 30  TA 2: L leg press 4 pl x 30  TA 3: 6" fwd step ups w/ B handrail 2 x 10  TA 4: sit to stands w/o push off 4 x 5    Time Entry(in minutes): billable  Neuromuscular Re-Education Time Entry: 2  Therapeutic Activity Time Entry: 13  Therapeutic Exercise Time Entry: 15    Assessment & Plan   Assessment: Patient demo good effort today. He voiced his BP was a little off after performing leg " press so prolong seated rest break was needed. He was able to continue and participated well without complaints. We did have him use railing for his sit to stands today due to this. No increased left knee pain voiced post PT. Time billed reflects spending one on one with patient today.  Evaluation/Treatment Tolerance: Patient tolerated treatment well    The patient will continue to benefit from skilled outpatient physical therapy in order to address the deficits listed in the problem list on the initial evaluation, provide patient and family education, and maximize the patients level of independence in the home and community environments.     The patient's spiritual, cultural, and educational needs were considered, and the patient is agreeable to the plan of care and goals.           Plan: Focus on extension range of motion and extensor lag    Goals:   Active       Ambulation/movement       Patient will accommodate walking on uneven surfaces using straight cane (Progressing)       Start:  06/26/25    Expected End:  09/18/25            Patient will ascend 4 steps with Lionel hand rail reciprocally (Progressing)       Start:  06/26/25    Expected End:  09/18/25            Patient will descend 4 steps with Lionel handrails with reciprocal pattern without quad avoidance (Progressing)       Start:  06/26/25    Expected End:  09/18/25            Patient will ambulate with normal gait pattern with cane (Progressing)       Start:  06/26/25    Expected End:  09/18/25               Functional outcome       Patient will show a significant change in foto patient-reported outcome tool to demonstrate subjective improvement (Progressing)       Start:  06/26/25    Expected End:  09/18/25            Patient stated goal: to get left knee as good as right and resume household duties like mowing grass  (Progressing)       Start:  06/26/25    Expected End:  09/18/25            Patient will demonstrate independence in home program for support  of progression (Progressing)       Start:  06/26/25    Expected End:  09/18/25               Home activities       Patient will perform household outdoor maintenance like mowing grass with min discomfort  (Progressing)       Start:  06/26/25    Expected End:  09/18/25               Pain       Patient will report pain of 0/10 demonstrating a reduction of overall pain (Progressing)       Start:  06/26/25    Expected End:  09/18/25               Range of Motion       Patient will achieve left knee ROM of  5-125 degrees  (Progressing)       Start:  06/26/25    Expected End:  09/18/25               Strength       Patient will achieve left knee flexion strength of 5/5 (Progressing)       Start:  06/26/25    Expected End:  09/18/25            Patient will achieve left knee extension strength of 5/5 (Progressing)       Start:  06/26/25    Expected End:  09/18/25                Phyllis You PTA

## 2025-07-28 ENCOUNTER — CLINICAL SUPPORT (OUTPATIENT)
Dept: REHABILITATION | Facility: HOSPITAL | Age: 75
End: 2025-07-28
Payer: MEDICARE

## 2025-07-28 DIAGNOSIS — M62.81 WEAKNESS OF LEFT QUADRICEPS MUSCLE: Primary | ICD-10-CM

## 2025-07-28 DIAGNOSIS — K21.9 GASTROESOPHAGEAL REFLUX DISEASE WITHOUT ESOPHAGITIS: Chronic | ICD-10-CM

## 2025-07-28 DIAGNOSIS — M25.562 CHRONIC PAIN OF LEFT KNEE: ICD-10-CM

## 2025-07-28 DIAGNOSIS — G89.29 CHRONIC PAIN OF LEFT KNEE: ICD-10-CM

## 2025-07-28 PROCEDURE — 97010 HOT OR COLD PACKS THERAPY: CPT | Mod: CQ

## 2025-07-28 PROCEDURE — 97110 THERAPEUTIC EXERCISES: CPT | Mod: CQ

## 2025-07-28 PROCEDURE — 97014 ELECTRIC STIMULATION THERAPY: CPT | Mod: MUE,CQ

## 2025-07-29 ENCOUNTER — CLINICAL SUPPORT (OUTPATIENT)
Dept: REHABILITATION | Facility: HOSPITAL | Age: 75
End: 2025-07-29
Payer: MEDICARE

## 2025-07-29 DIAGNOSIS — M62.81 WEAKNESS OF LEFT QUADRICEPS MUSCLE: Primary | ICD-10-CM

## 2025-07-29 PROCEDURE — 97112 NEUROMUSCULAR REEDUCATION: CPT | Mod: CQ

## 2025-07-29 PROCEDURE — 97110 THERAPEUTIC EXERCISES: CPT | Mod: CQ

## 2025-07-29 PROCEDURE — 97014 ELECTRIC STIMULATION THERAPY: CPT | Mod: CQ

## 2025-07-29 PROCEDURE — 97010 HOT OR COLD PACKS THERAPY: CPT | Mod: CQ

## 2025-07-29 RX ORDER — OMEPRAZOLE 20 MG/1
20 CAPSULE, DELAYED RELEASE ORAL DAILY
Qty: 90 CAPSULE | Refills: 3 | Status: SHIPPED | OUTPATIENT
Start: 2025-07-29

## 2025-08-04 ENCOUNTER — CLINICAL SUPPORT (OUTPATIENT)
Dept: REHABILITATION | Facility: HOSPITAL | Age: 75
End: 2025-08-04
Payer: MEDICARE

## 2025-08-04 DIAGNOSIS — M62.81 WEAKNESS OF LEFT QUADRICEPS MUSCLE: Primary | ICD-10-CM

## 2025-08-04 PROCEDURE — 97010 HOT OR COLD PACKS THERAPY: CPT | Mod: CQ

## 2025-08-04 PROCEDURE — 97112 NEUROMUSCULAR REEDUCATION: CPT | Mod: CQ

## 2025-08-04 PROCEDURE — 97110 THERAPEUTIC EXERCISES: CPT | Mod: CQ

## 2025-08-04 PROCEDURE — 97014 ELECTRIC STIMULATION THERAPY: CPT | Mod: CQ

## 2025-08-04 NOTE — PROGRESS NOTES
Outpatient Rehab    Physical Therapy Visit    Patient Name: Tray Jalloh  MRN: [id number]  YOB: 1950  Encounter Date: 8/4/2025    Therapy Diagnosis:   Encounter Diagnosis   Name Primary?    Weakness of left quadriceps muscle [M62.81] Yes     Physician: Dada Enriquez MD    Physician Orders: Eval and Treat  Medical Diagnosis: Arthrofibrosis of total knee arthroplasty  Surgical Diagnosis: T84.82XA (ICD-10-CM) - Arthrofibrosis of total knee arthroplasty   Surgical Date: 6/4/2025  Days Since Last Surgery: 61    Visit # / Visits Authorized:  10 / 26  Insurance Authorization Period: 6/26/2025 to 6/24/2027  Date of Evaluation: 6/26/2025  Plan of Care Certification: 6/26/2025 to 9/18/2025      PT/PTA:     Number of PTA visits since last PT visit:   Time In: 1045   Time Out: 1200  Total Time (in minutes): 75   Total Billable Time (in minutes): 75    FOTO:  Intake Score (%): 46  Survey Score 2 (%): Not applicable for this Episode  Survey Score 3 (%): Not applicable for this Episode    Precautions:         Subjective   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 Feels like it doesn't want to hold me up..  Pain reported as 1/10.      Objective            Treatment:  Therapeutic Exercise  TE 1: Nustep x 5 min  TE 2: hamstring stretch at stairs 3 x 30 sh  TE 3: SB stretch x 2 min with QS  TE 4: knee flexion stretch on step x 5 with 10 sec hold  TE 5: calf raises off step x 20  TE 6: Lionel HS cursl 5 plates x 30  TE 7: Lionel knee extension 2 pates x 30  TE 8: CCTKE 3 plates x 30  TE 9: leg press 8 plates x 12, x 8; LLE leg press 5 plates x 4, 4 plates x 15  Balance/Neuromuscular Re-Education  NMR 1: [place] MS 10/20 x 15 min = 5 min: qs. SAQ, SAQ>SLR  Modalities  Electrical Stimulation (Parameters): premodulated EMS to left knee joint line x 15 min with ice pack    Time Entry(in minutes):  E-Stim (Unattended) Time Entry: 15  Hot/Cold Pack Time  Entry: 10  Neuromuscular Re-Education Time Entry: 20  Therapeutic Exercise Time Entry: 35    Assessment & Plan   Assessment: Knee feels like buckling several occasions today. Incision looks well healed and there is minimal swelling, no redness. Pt with extension lag with SLR even with estim. He walks out with better gait pattern than on arrival.   Evaluation/Treatment Tolerance: Patient tolerated treatment well    The patient will continue to benefit from skilled outpatient physical therapy in order to address the deficits listed in the problem list on the initial evaluation, provide patient and family education, and maximize the patients level of independence in the home and community environments.     The patient's spiritual, cultural, and educational needs were considered, and the patient is agreeable to the plan of care and goals.           Plan: Focus on extension range of motion and extensor lag    Goals:   Active       Ambulation/movement       Patient will accommodate walking on uneven surfaces using straight cane (Progressing)       Start:  06/26/25    Expected End:  09/18/25            Patient will ascend 4 steps with Lionel hand rail reciprocally (Progressing)       Start:  06/26/25    Expected End:  09/18/25            Patient will descend 4 steps with Lionel handrails with reciprocal pattern without quad avoidance (Progressing)       Start:  06/26/25    Expected End:  09/18/25            Patient will ambulate with normal gait pattern with cane (Progressing)       Start:  06/26/25    Expected End:  09/18/25               Functional outcome       Patient will show a significant change in foto patient-reported outcome tool to demonstrate subjective improvement (Progressing)       Start:  06/26/25    Expected End:  09/18/25            Patient stated goal: to get left knee as good as right and resume household duties like mowing grass  (Progressing)       Start:  06/26/25    Expected End:  09/18/25             Patient will demonstrate independence in home program for support of progression (Progressing)       Start:  06/26/25    Expected End:  09/18/25               Home activities       Patient will perform household outdoor maintenance like mowing grass with min discomfort  (Progressing)       Start:  06/26/25    Expected End:  09/18/25               Pain       Patient will report pain of 0/10 demonstrating a reduction of overall pain (Progressing)       Start:  06/26/25    Expected End:  09/18/25               Range of Motion       Patient will achieve left knee ROM of  5-125 degrees  (Progressing)       Start:  06/26/25    Expected End:  09/18/25               Strength       Patient will achieve left knee flexion strength of 5/5 (Progressing)       Start:  06/26/25    Expected End:  09/18/25            Patient will achieve left knee extension strength of 5/5 (Progressing)       Start:  06/26/25    Expected End:  09/18/25                Liliana Belcher, PT, DPT

## 2025-08-04 NOTE — PROGRESS NOTES
Outpatient Rehab    Physical Therapy Visit    Patient Name: Tray Jalloh  MRN: [id number]  YOB: 1950  Encounter Date: 7/28/2025    Therapy Diagnosis:   Encounter Diagnoses   Name Primary?    Weakness of left quadriceps muscle [M62.81] Yes    Chronic pain of left knee [M25.562, G89.29]      Physician: Dada Enriquez MD    Physician Orders: Eval and Treat  Medical Diagnosis: Arthrofibrosis of total knee arthroplasty  Surgical Diagnosis: T84.82XA (ICD-10-CM) - Arthrofibrosis of total knee arthroplasty   Surgical Date: 6/4/2025  Days Since Last Surgery: 60    Visit # / Visits Authorized:  9 / 26  Insurance Authorization Period: 6/26/2025 to 6/24/2027  Date of Evaluation: 6/26/2025  Plan of Care Certification: 6/26/2025 to 9/18/2025      PT/PTA: PT   Number of PTA visits since last PT visit:   Time In: 0915   Time Out: 1000  Total Time (in minutes): 45   Total Billable Time (in minutes): 45    FOTO:  Intake Score (%): 46  Survey Score 2 (%): Not applicable for this Episode  Survey Score 3 (%): Not applicable for this Episode    Precautions:         Subjective   states his knee feels swollen and is hurting more than usual. No reason he can tell..  Pain reported as 8/10.      Objective            Treatment:  Therapeutic Exercise  TE 1: Nustep x 5 min  TE 2: hamstring stretch at stairs 3 x 30 sh  TE 3: SB stretch x 2 min with QS  TE 4: knee flexion stretch on step x 5 with 10 sec hold  Manual Therapy  MT 1: knee extensoin mobs x 8 min  Modalities  Electrical Stimulation (Parameters): [place] EMS x 15 min with 10 on /20 off to left quad while prefirming on cycle: QS, SAQ, SLR x 5 min each; then premodulated EMS to left knee joint line x 15 min with ice pack    Time Entry(in minutes):  E-Stim (Unattended) Time Entry: 30  Therapeutic Exercise Time Entry: 30    Assessment & Plan   Assessment: Unsure of patients flare up but treated accordlingly. No signs of infection, but knee was warm and with edema in  area. He states relief atr end of session and he was instructed to check systemoc temperature at home and call Dr if over 101.  Evaluation/Treatment Tolerance: Patient limited by pain    The patient will continue to benefit from skilled outpatient physical therapy in order to address the deficits listed in the problem list on the initial evaluation, provide patient and family education, and maximize the patients level of independence in the home and community environments.     The patient's spiritual, cultural, and educational needs were considered, and the patient is agreeable to the plan of care and goals.           Plan: Focus on extension range of motion and extensor lag    Goals:   Active       Ambulation/movement       Patient will accommodate walking on uneven surfaces using straight cane (Progressing)       Start:  06/26/25    Expected End:  09/18/25            Patient will ascend 4 steps with Lionel hand rail reciprocally (Progressing)       Start:  06/26/25    Expected End:  09/18/25            Patient will descend 4 steps with Lionel handrails with reciprocal pattern without quad avoidance (Progressing)       Start:  06/26/25    Expected End:  09/18/25            Patient will ambulate with normal gait pattern with cane (Progressing)       Start:  06/26/25    Expected End:  09/18/25               Functional outcome       Patient will show a significant change in foto patient-reported outcome tool to demonstrate subjective improvement (Progressing)       Start:  06/26/25    Expected End:  09/18/25            Patient stated goal: to get left knee as good as right and resume household duties like mowing grass  (Progressing)       Start:  06/26/25    Expected End:  09/18/25            Patient will demonstrate independence in home program for support of progression (Progressing)       Start:  06/26/25    Expected End:  09/18/25               Home activities       Patient will perform household outdoor maintenance  like mowing grass with min discomfort  (Progressing)       Start:  06/26/25    Expected End:  09/18/25               Pain       Patient will report pain of 0/10 demonstrating a reduction of overall pain (Progressing)       Start:  06/26/25    Expected End:  09/18/25               Range of Motion       Patient will achieve left knee ROM of  5-125 degrees  (Progressing)       Start:  06/26/25    Expected End:  09/18/25               Strength       Patient will achieve left knee flexion strength of 5/5 (Progressing)       Start:  06/26/25    Expected End:  09/18/25            Patient will achieve left knee extension strength of 5/5 (Progressing)       Start:  06/26/25    Expected End:  09/18/25                Liliana Belcher, PT, DPT

## 2025-08-05 NOTE — PROGRESS NOTES
Outpatient Rehab    Physical Therapy Visit    Patient Name: Tray Jalloh  MRN: [id number]  YOB: 1950  Encounter Date: 7/22/2025    Therapy Diagnosis:   Encounter Diagnosis   Name Primary?    Weakness of left quadriceps muscle [M62.81] Yes     Physician: Dada Enriquez MD    Physician Orders: Eval and Treat  Medical Diagnosis: Arthrofibrosis of total knee arthroplasty  Surgical Diagnosis: T84.82XA (ICD-10-CM) - Arthrofibrosis of total knee arthroplasty   Surgical Date: 6/4/2025  Days Since Last Surgery: 62    Visit # / Visits Authorized:  10 / 26  Insurance Authorization Period: 6/26/2025 to 6/24/2027  Date of Evaluation: 6/26/2025  Plan of Care Certification: 6/26/2025 to 9/18/2025      PT/PTA: PT   Number of PTA visits since last PT visit:   Time In: 1345   Time Out: 1430  Total Time (in minutes): 45   Total Billable Time (in minutes): 45    FOTO:  Intake Score (%): 46  Survey Score 2 (%): Not applicable for this Episode  Survey Score 3 (%): Not applicable for this Episode    Precautions:         Subjective   last night had cramping in back of knee and had to take muscle relaxor.  Pain reported as 0/10.      Objective       Knee Range of Motion   Left Knee   Active (deg) Passive (deg) Pain   Flexion 122       Extension 5                       Treatment:  Therapeutic Exercise  TE 1: Nustep x 5 min  TE 2: hamstring stretch at stairs 3 x 30 sh  TE 3: SB stretch x 2 min with QS  TE 4: knee flexion stretch on step x 5 with 10 sec hold  TE 5: calf raises x 20  TE 7: seated hamstring curls 3 pl x 30  TE 9: Seated leg ext B 2 pl x 20  Balance/Neuromuscular Re-Education  NMR 1: CCTKE 3 plates x 20  Therapeutic Activity  TA 1: B leg press 6 pl x 30  TA 2: L leg press 4 pl x 30  TA 3: fwd step ups w/ B handrail 2 x 10  TA 4: sit to stands w/o push off 4 x 5  TA 5: ascend and descend steos 2 laps reciprocal    Time Entry(in minutes):  Manual Therapy Time Entry: 8  Therapeutic Exercise Time Entry:  37    Assessment & Plan   Assessment: Pt with decreased c/o muscle soreness and joint tightness at end of session. He has some c/o discomfort during session, mostly with extension ROM. Stretch to upper hamstrings and glutes improves discomfort in posterior knee pain with extension ROM.  Evaluation/Treatment Tolerance: Patient tolerated treatment well    The patient will continue to benefit from skilled outpatient physical therapy in order to address the deficits listed in the problem list on the initial evaluation, provide patient and family education, and maximize the patients level of independence in the home and community environments.     The patient's spiritual, cultural, and educational needs were considered, and the patient is agreeable to the plan of care and goals.           Plan: Focus on extension range of motion and extensor lag    Goals:   Active       Ambulation/movement       Patient will accommodate walking on uneven surfaces using straight cane (Progressing)       Start:  06/26/25    Expected End:  09/18/25            Patient will ascend 4 steps with Lionel hand rail reciprocally (Progressing)       Start:  06/26/25    Expected End:  09/18/25            Patient will descend 4 steps with Lionel handrails with reciprocal pattern without quad avoidance (Progressing)       Start:  06/26/25    Expected End:  09/18/25            Patient will ambulate with normal gait pattern with cane (Progressing)       Start:  06/26/25    Expected End:  09/18/25               Functional outcome       Patient will show a significant change in foto patient-reported outcome tool to demonstrate subjective improvement (Progressing)       Start:  06/26/25    Expected End:  09/18/25            Patient stated goal: to get left knee as good as right and resume household duties like mowing grass  (Progressing)       Start:  06/26/25    Expected End:  09/18/25            Patient will demonstrate independence in home program for support  of progression (Progressing)       Start:  06/26/25    Expected End:  09/18/25               Home activities       Patient will perform household outdoor maintenance like mowing grass with min discomfort  (Progressing)       Start:  06/26/25    Expected End:  09/18/25               Pain       Patient will report pain of 0/10 demonstrating a reduction of overall pain (Progressing)       Start:  06/26/25    Expected End:  09/18/25               Range of Motion       Patient will achieve left knee ROM of  5-125 degrees  (Progressing)       Start:  06/26/25    Expected End:  09/18/25               Strength       Patient will achieve left knee flexion strength of 5/5 (Progressing)       Start:  06/26/25    Expected End:  09/18/25            Patient will achieve left knee extension strength of 5/5 (Progressing)       Start:  06/26/25    Expected End:  09/18/25                Liliana Belcher, PT, DPT

## 2025-08-05 NOTE — PROGRESS NOTES
"  Outpatient Rehab    Physical Therapy Visit    Patient Name: Tray Jalloh  MRN: [id number]  YOB: 1950  Encounter Date: 7/29/2025    Therapy Diagnosis:   Encounter Diagnosis   Name Primary?    Weakness of left quadriceps muscle [M62.81] Yes     Physician: Dada Enriquez MD    Physician Orders: Eval and Treat  Medical Diagnosis: Arthrofibrosis of total knee arthroplasty  Surgical Diagnosis: T84.82XA (ICD-10-CM) - Arthrofibrosis of total knee arthroplasty   Surgical Date: 6/4/2025  Days Since Last Surgery: 62    Visit # / Visits Authorized:  10 / 26  Insurance Authorization Period: 6/26/2025 to 6/24/2027  Date of Evaluation: 6/26/2025  Plan of Care Certification: 6/26/2025 to 9/18/2025      PT/PTA:     Number of PTA visits since last PT visit:   Time In: 1130   Time Out: 1220  Total Time (in minutes): 50   Total Billable Time (in minutes): 50    FOTO:  Intake Score (%): 46  Survey Score 2 (%): Not applicable for this Episode  Survey Score 3 (%): Not applicable for this Episode    Precautions:         Subjective   knee feels stiff with swelling and is hurting, trying to do HEP.  Pain reported as 4/10.      Objective            Treatment:  Therapeutic Exercise  TE 1: recumbant bike x 5 min  TE 2: hamstring stretch at stairs 3 x 30 sh  TE 3: SB stretch x 2 min with QS  TE 4: knee flexion stretch on step x 5 with 10 sec hold  TE 7: seated hamstring curls 3 pl x 30  TE 9: Seated leg ext B 2 pl x 20  Balance/Neuromuscular Re-Education  NMR 1: CCTKE 3 plates x 20  Therapeutic Activity  TA 1: B leg press 6 pl x 30  TA 2: L leg press 4 pl x 30  TA 3: 6" fwd step ups w/ B handrail 2 x 10  TA 4: sit to stands w/o push off 4 x 5  Modalities  Cryotherapy (Minutes\Location): 10  Electrical Stimulation (Parameters): Russian EMS to left quads x 15 min 10/20: QS, SAQ, SLR    Time Entry(in minutes):  E-Stim (Unattended) Time Entry: 15  Hot/Cold Pack Time Entry: 10  Manual Therapy Time Entry: 5  Therapeutic Exercise " Time Entry: 30    Assessment & Plan   Assessment: Pt with more c/o pain today but desirous to do exercises. He presents with extension lag but also needs lots of cueing for best effort/ performance. He may not be able to correct Lionel knee flexion in gait secondary to flexed posturing.  Evaluation/Treatment Tolerance: Patient limited by pain    The patient will continue to benefit from skilled outpatient physical therapy in order to address the deficits listed in the problem list on the initial evaluation, provide patient and family education, and maximize the patients level of independence in the home and community environments.     The patient's spiritual, cultural, and educational needs were considered, and the patient is agreeable to the plan of care and goals.           Plan: Focus on extension range of motion and extensor lag    Goals:   Active       Ambulation/movement       Patient will accommodate walking on uneven surfaces using straight cane (Progressing)       Start:  06/26/25    Expected End:  09/18/25            Patient will ascend 4 steps with Lionel hand rail reciprocally (Progressing)       Start:  06/26/25    Expected End:  09/18/25            Patient will descend 4 steps with Lionel handrails with reciprocal pattern without quad avoidance (Progressing)       Start:  06/26/25    Expected End:  09/18/25            Patient will ambulate with normal gait pattern with cane (Progressing)       Start:  06/26/25    Expected End:  09/18/25               Functional outcome       Patient will show a significant change in foto patient-reported outcome tool to demonstrate subjective improvement (Progressing)       Start:  06/26/25    Expected End:  09/18/25            Patient stated goal: to get left knee as good as right and resume household duties like mowing grass  (Progressing)       Start:  06/26/25    Expected End:  09/18/25            Patient will demonstrate independence in home program for support of  progression (Progressing)       Start:  06/26/25    Expected End:  09/18/25               Home activities       Patient will perform household outdoor maintenance like mowing grass with min discomfort  (Progressing)       Start:  06/26/25    Expected End:  09/18/25               Pain       Patient will report pain of 0/10 demonstrating a reduction of overall pain (Progressing)       Start:  06/26/25    Expected End:  09/18/25               Range of Motion       Patient will achieve left knee ROM of  5-125 degrees  (Progressing)       Start:  06/26/25    Expected End:  09/18/25               Strength       Patient will achieve left knee flexion strength of 5/5 (Progressing)       Start:  06/26/25    Expected End:  09/18/25            Patient will achieve left knee extension strength of 5/5 (Progressing)       Start:  06/26/25    Expected End:  09/18/25                Liliana Belcher PT, DPT

## 2025-08-07 ENCOUNTER — CLINICAL SUPPORT (OUTPATIENT)
Dept: REHABILITATION | Facility: HOSPITAL | Age: 75
End: 2025-08-07
Payer: MEDICARE

## 2025-08-07 DIAGNOSIS — M62.81 WEAKNESS OF LEFT QUADRICEPS MUSCLE: Primary | ICD-10-CM

## 2025-08-07 DIAGNOSIS — M25.562 CHRONIC PAIN OF LEFT KNEE: ICD-10-CM

## 2025-08-07 DIAGNOSIS — G89.29 CHRONIC PAIN OF LEFT KNEE: ICD-10-CM

## 2025-08-07 PROCEDURE — 97014 ELECTRIC STIMULATION THERAPY: CPT | Mod: CQ

## 2025-08-07 PROCEDURE — 97112 NEUROMUSCULAR REEDUCATION: CPT | Mod: CQ

## 2025-08-07 PROCEDURE — 97110 THERAPEUTIC EXERCISES: CPT | Mod: CQ

## 2025-08-07 PROCEDURE — 97016 VASOPNEUMATIC DEVICE THERAPY: CPT | Mod: CQ

## 2025-08-07 PROCEDURE — 97140 MANUAL THERAPY 1/> REGIONS: CPT | Mod: CQ

## 2025-08-07 NOTE — PROGRESS NOTES
Outpatient Rehab    Physical Therapy Visit    Patient Name: Tray Jalloh  MRN: [id number]  YOB: 1950  Encounter Date: 8/7/2025    Therapy Diagnosis:   Encounter Diagnoses   Name Primary?    Weakness of left quadriceps muscle [M62.81] Yes    Chronic pain of left knee [M25.562, G89.29]      Physician: Dada Enriquez MD    Physician Orders: Eval and Treat  Medical Diagnosis: Arthrofibrosis of total knee arthroplasty  Surgical Diagnosis: T84.82XA (ICD-10-CM) - Arthrofibrosis of total knee arthroplasty   Surgical Date: 6/4/2025  Days Since Last Surgery: 64    Visit # / Visits Authorized:  11 / 26  Insurance Authorization Period: 6/26/2025 to 6/24/2027  Date of Evaluation: 6/26/2025  Plan of Care Certification: 6/26/2025 to 9/18/2025      PT/PTA: PT   Number of PTA visits since last PT visit:   Time In: 0830   Time Out: 0930  Total Time (in minutes): 60   Total Billable Time (in minutes): 60    FOTO:  Intake Score (%): 46  Survey Score 2 (%): Not applicable for this Episode  Survey Score 3 (%): Not applicable for this Episode    Precautions:         Subjective   forgot about appointment yesterday. Knee feel stiff and sore. Still having cramping at night even with multiple muscle relaxors..  Pain reported as 3/10. took tylenol this am    Objective            Treatment:  Therapeutic Exercise  TE 1: Nustep x 5 min  TE 2: hamstring stretch at stairs 3 x 30 sh  TE 3: SB stretch x 2 min with QS  TE 4: knee flexion stretch on step x 5 with 10 sec hold  TE 5: calf raises off step x 20  TE 6: Lionel HS cursl 5 plates x 30  TE 8: CCTKE 4 plates x 30  TE 9: leg press 7 plates x 20, x 8; LLE leg press 4 plates x 15  Manual Therapy  MT 1: extension mobs, patellar mobs x 10 min  Balance/Neuromuscular Re-Education  NMR 1: Turkish MS 10/20 x 15 min = 5 min: qs. SAQ, SAQ>SLR  Modalities  Electrical Stimulation (Parameters): premodulated EMS to left knee joint line x 15 min  Pneumatic Pump: x 10 min low compression 34  degrees    Time Entry(in minutes):  E-Stim (Unattended) Time Entry: 25  Vasopneumatic Devices Time Entry: 10  Manual Therapy Time Entry: 8  Neuromuscular Re-Education Time Entry: 15  Therapeutic Activity Time Entry: 5  Therapeutic Exercise Time Entry: 32    Assessment & Plan   Assessment: Pt with less tolerance to extension. He complains more of joint line pain as well. Less tolerance to weight bearing today. He was ambulating without cane and advised to use it for pain relief and to use knee compression sleeve to help manage pain and swelling. He agreed to do so  Evaluation/Treatment Tolerance: Patient tolerated treatment well    The patient will continue to benefit from skilled outpatient physical therapy in order to address the deficits listed in the problem list on the initial evaluation, provide patient and family education, and maximize the patients level of independence in the home and community environments.     The patient's spiritual, cultural, and educational needs were considered, and the patient is agreeable to the plan of care and goals.           Plan: Focus on extension range of motion and extensor lag    Goals:   Active       Ambulation/movement       Patient will accommodate walking on uneven surfaces using straight cane (Progressing)       Start:  06/26/25    Expected End:  09/18/25            Patient will ascend 4 steps with Lionel hand rail reciprocally (Progressing)       Start:  06/26/25    Expected End:  09/18/25            Patient will descend 4 steps with Lionel handrails with reciprocal pattern without quad avoidance (Progressing)       Start:  06/26/25    Expected End:  09/18/25            Patient will ambulate with normal gait pattern with cane (Progressing)       Start:  06/26/25    Expected End:  09/18/25               Functional outcome       Patient will show a significant change in foto patient-reported outcome tool to demonstrate subjective improvement (Progressing)       Start:   06/26/25    Expected End:  09/18/25            Patient stated goal: to get left knee as good as right and resume household duties like mowing grass  (Progressing)       Start:  06/26/25    Expected End:  09/18/25            Patient will demonstrate independence in home program for support of progression (Progressing)       Start:  06/26/25    Expected End:  09/18/25               Home activities       Patient will perform household outdoor maintenance like mowing grass with min discomfort  (Progressing)       Start:  06/26/25    Expected End:  09/18/25               Pain       Patient will report pain of 0/10 demonstrating a reduction of overall pain (Progressing)       Start:  06/26/25    Expected End:  09/18/25               Range of Motion       Patient will achieve left knee ROM of  5-125 degrees  (Progressing)       Start:  06/26/25    Expected End:  09/18/25               Strength       Patient will achieve left knee flexion strength of 5/5 (Progressing)       Start:  06/26/25    Expected End:  09/18/25            Patient will achieve left knee extension strength of 5/5 (Progressing)       Start:  06/26/25    Expected End:  09/18/25                Liliana Belcher, PT, DPT

## 2025-08-12 ENCOUNTER — CLINICAL SUPPORT (OUTPATIENT)
Dept: REHABILITATION | Facility: HOSPITAL | Age: 75
End: 2025-08-12
Payer: MEDICARE

## 2025-08-12 DIAGNOSIS — G89.29 CHRONIC PAIN OF LEFT KNEE: Primary | Chronic | ICD-10-CM

## 2025-08-12 DIAGNOSIS — M62.81 WEAKNESS OF LEFT QUADRICEPS MUSCLE: ICD-10-CM

## 2025-08-12 DIAGNOSIS — M25.562 CHRONIC PAIN OF LEFT KNEE: Primary | Chronic | ICD-10-CM

## 2025-08-12 PROCEDURE — 97112 NEUROMUSCULAR REEDUCATION: CPT | Mod: CQ

## 2025-08-12 PROCEDURE — 97110 THERAPEUTIC EXERCISES: CPT | Mod: CQ

## 2025-08-13 ENCOUNTER — OFFICE VISIT (OUTPATIENT)
Dept: UROLOGY | Facility: CLINIC | Age: 75
End: 2025-08-13
Payer: MEDICARE

## 2025-08-13 VITALS
SYSTOLIC BLOOD PRESSURE: 121 MMHG | DIASTOLIC BLOOD PRESSURE: 59 MMHG | WEIGHT: 189 LBS | HEIGHT: 71 IN | BODY MASS INDEX: 26.46 KG/M2 | HEART RATE: 78 BPM | OXYGEN SATURATION: 93 %

## 2025-08-13 DIAGNOSIS — N32.3 DIVERTICULA, BLADDER: Chronic | ICD-10-CM

## 2025-08-13 DIAGNOSIS — N39.0 RECURRENT UTI: Chronic | ICD-10-CM

## 2025-08-13 DIAGNOSIS — R35.1 NOCTURIA: ICD-10-CM

## 2025-08-13 DIAGNOSIS — Z85.46 HISTORY OF PROSTATE CANCER: Primary | Chronic | ICD-10-CM

## 2025-08-13 DIAGNOSIS — N39.45 CONTINUOUS LEAKAGE OF URINE: ICD-10-CM

## 2025-08-13 PROCEDURE — 99215 OFFICE O/P EST HI 40 MIN: CPT | Mod: PBBFAC | Performed by: NURSE PRACTITIONER

## 2025-08-13 PROCEDURE — 99999 PR PBB SHADOW E&M-EST. PATIENT-LVL V: CPT | Mod: PBBFAC,,, | Performed by: NURSE PRACTITIONER

## 2025-08-13 RX ORDER — TOLTERODINE 4 MG/1
CAPSULE, EXTENDED RELEASE ORAL
Qty: 90 CAPSULE | Refills: 3 | Status: SHIPPED | OUTPATIENT
Start: 2025-08-13

## 2025-08-13 RX ORDER — TOLTERODINE 4 MG/1
CAPSULE, EXTENDED RELEASE ORAL
Qty: 90 CAPSULE | Refills: 3 | Status: SHIPPED | OUTPATIENT
Start: 2025-08-13 | End: 2025-08-13 | Stop reason: SDUPTHER

## 2025-08-13 RX ORDER — METHENAMINE HIPPURATE 1000 MG/1
1 TABLET ORAL 2 TIMES DAILY
Qty: 180 TABLET | Refills: 3 | Status: SHIPPED | OUTPATIENT
Start: 2025-08-13

## 2025-08-14 ENCOUNTER — CLINICAL SUPPORT (OUTPATIENT)
Dept: REHABILITATION | Facility: HOSPITAL | Age: 75
End: 2025-08-14
Payer: MEDICARE

## 2025-08-14 DIAGNOSIS — M25.662 DECREASED RANGE OF MOTION (ROM) OF LEFT KNEE: Primary | ICD-10-CM

## 2025-08-14 DIAGNOSIS — M62.81 WEAKNESS OF LEFT QUADRICEPS MUSCLE: ICD-10-CM

## 2025-08-14 PROCEDURE — 97140 MANUAL THERAPY 1/> REGIONS: CPT | Mod: CQ

## 2025-08-14 PROCEDURE — 97110 THERAPEUTIC EXERCISES: CPT | Mod: CQ

## 2025-08-19 ENCOUNTER — CLINICAL SUPPORT (OUTPATIENT)
Dept: REHABILITATION | Facility: HOSPITAL | Age: 75
End: 2025-08-19
Payer: MEDICARE

## 2025-08-19 DIAGNOSIS — M62.81 WEAKNESS OF LEFT QUADRICEPS MUSCLE: Primary | ICD-10-CM

## 2025-08-19 PROCEDURE — 97530 THERAPEUTIC ACTIVITIES: CPT | Mod: CQ

## 2025-08-19 PROCEDURE — 97112 NEUROMUSCULAR REEDUCATION: CPT | Mod: CQ

## 2025-08-21 ENCOUNTER — CLINICAL SUPPORT (OUTPATIENT)
Dept: REHABILITATION | Facility: HOSPITAL | Age: 75
End: 2025-08-21
Payer: MEDICARE

## 2025-08-21 DIAGNOSIS — G89.29 CHRONIC PAIN OF LEFT KNEE: Primary | Chronic | ICD-10-CM

## 2025-08-21 DIAGNOSIS — M25.562 CHRONIC PAIN OF LEFT KNEE: Primary | Chronic | ICD-10-CM

## 2025-08-21 DIAGNOSIS — M62.81 WEAKNESS OF LEFT QUADRICEPS MUSCLE: ICD-10-CM

## 2025-08-21 PROCEDURE — 97110 THERAPEUTIC EXERCISES: CPT | Mod: CQ

## 2025-08-21 PROCEDURE — 97112 NEUROMUSCULAR REEDUCATION: CPT | Mod: CQ

## 2025-08-26 ENCOUNTER — CLINICAL SUPPORT (OUTPATIENT)
Dept: REHABILITATION | Facility: HOSPITAL | Age: 75
End: 2025-08-26
Payer: MEDICARE

## 2025-08-26 DIAGNOSIS — M62.81 WEAKNESS OF LEFT QUADRICEPS MUSCLE: Primary | ICD-10-CM

## 2025-08-26 PROCEDURE — 97112 NEUROMUSCULAR REEDUCATION: CPT | Mod: CQ

## 2025-08-26 PROCEDURE — 97110 THERAPEUTIC EXERCISES: CPT | Mod: CQ

## 2025-08-28 ENCOUNTER — CLINICAL SUPPORT (OUTPATIENT)
Dept: REHABILITATION | Facility: HOSPITAL | Age: 75
End: 2025-08-28
Payer: MEDICARE

## 2025-08-28 DIAGNOSIS — M62.81 WEAKNESS OF LEFT QUADRICEPS MUSCLE: Primary | ICD-10-CM

## 2025-08-28 PROCEDURE — 97140 MANUAL THERAPY 1/> REGIONS: CPT | Mod: CQ

## 2025-08-28 PROCEDURE — 97110 THERAPEUTIC EXERCISES: CPT | Mod: CQ

## 2025-09-02 ENCOUNTER — CLINICAL SUPPORT (OUTPATIENT)
Dept: REHABILITATION | Facility: HOSPITAL | Age: 75
End: 2025-09-02
Payer: MEDICARE

## 2025-09-02 DIAGNOSIS — M62.81 WEAKNESS OF LEFT QUADRICEPS MUSCLE: Primary | ICD-10-CM

## 2025-09-02 PROCEDURE — 97110 THERAPEUTIC EXERCISES: CPT | Mod: CQ

## 2025-09-02 PROCEDURE — 97530 THERAPEUTIC ACTIVITIES: CPT | Mod: CQ

## 2025-09-02 PROCEDURE — 97112 NEUROMUSCULAR REEDUCATION: CPT | Mod: CQ

## (undated) DEVICE — GLOVE PROTEXIS PI SYN SURG 6.5

## (undated) DEVICE — DRESSING XEROFORM FOIL PK 1X8

## (undated) DEVICE — VELYS ARRAY SET

## (undated) DEVICE — STAPLER SKIN WIDE

## (undated) DEVICE — SET VELYS PURESIGHT ARRAY KNEE

## (undated) DEVICE — CANISTER SUCTION MEDI-VAC 12L

## (undated) DEVICE — TRAY NERVE BLOCK (KC) PMA

## (undated) DEVICE — GLOVE SURGICAL PROTEXIS PI SIZE 6.5

## (undated) DEVICE — SPONGE COTTON TRAY 4X4IN

## (undated) DEVICE — GLOVE SENSICARE PI SURG 7.5

## (undated) DEVICE — SET IV SOL CONTIN-FLOW 10 DROP/ML (PRIMARY)

## (undated) DEVICE — CANISTER SUCTION JUMBO 12L

## (undated) DEVICE — PAD ABDOMINAL 8X7.5 STERILE

## (undated) DEVICE — APPLICATOR CHLORAPREP ORN 26ML

## (undated) DEVICE — CATH IV 22G X 1 AUTOGUARD

## (undated) DEVICE — WRAP KNEE 4 ICE PACK L XL

## (undated) DEVICE — PAD CAST SPECIALIST STRL 3

## (undated) DEVICE — GLOVE SENSICARE PI SURG 7

## (undated) DEVICE — DRESSING MEPILEX SACR 22X25CM

## (undated) DEVICE — GLOVE SENSICARE PI GRN 6.5

## (undated) DEVICE — SPONGE COTTON WOVEN 4X4IN

## (undated) DEVICE — KIT IRR SUCTION HND PIECE

## (undated) DEVICE — DRESSING GAUZE XEROFORM 5X9

## (undated) DEVICE — SYR 10CC LUER LOCK

## (undated) DEVICE — DRESSING MEPILEX HEEL 22X23CM

## (undated) DEVICE — VELYS DRAPE

## (undated) DEVICE — GLOVE 7.5 PROTEXIS PI BLUE

## (undated) DEVICE — KIT IV START RUSH

## (undated) DEVICE — SUT 2-0 VICRYL / CT-1

## (undated) DEVICE — KIT IV START 849

## (undated) DEVICE — WAND COBLATION WEREWOLF FLOW 50

## (undated) DEVICE — NEEDLE SPINAL 22GX3.5 QUINCHE (KC)

## (undated) DEVICE — SOL CONTINU-FLO SET 2 LAV

## (undated) DEVICE — SUT VICRYL CTD 1 27IN CP

## (undated) DEVICE — DRAPE THREE-QUARTER 53X77IN

## (undated) DEVICE — VELYS SAW BLADE

## (undated) DEVICE — SHAVER SYNNOVATOR PLAT SERIES 4.5MM

## (undated) DEVICE — KIT TOTAL KNEE RUSH

## (undated) DEVICE — Device

## (undated) DEVICE — SET CYSTO IRR DRP CHMBR 84IN

## (undated) DEVICE — TOURNIQUET SB QC SP 34X4IN

## (undated) DEVICE — BOWL MIXING BONE CEMENT

## (undated) DEVICE — GLOVE SENSICARE PI GRN 8

## (undated) DEVICE — SOL NACL IRR 1000ML BTL

## (undated) DEVICE — VELYS ROBOT-ASSISTED SOLUTION ARRAY DRILL PIN 175 MM X 4 MM
Type: IMPLANTABLE DEVICE | Site: KNEE | Status: NON-FUNCTIONAL
Brand: VELYS
Removed: 2025-06-04

## (undated) DEVICE — BANDAGE PRCAR COMPR 11YDX6IN

## (undated) DEVICE — SOL NACL INJ 1000ML 0.9%

## (undated) DEVICE — GLOVE 8 PROTEXIS PI BLUE

## (undated) DEVICE — CATH IV JELCO 22GX1 IN

## (undated) DEVICE — SOLIDIFIER BTL W/TREAT 1500CC

## (undated) DEVICE — GLOVE SENSICARE PI SURG 8

## (undated) DEVICE — SOL WATER STRL IRRIGATION 250ML

## (undated) DEVICE — PAD SUREFIT GRND ELECTRD 10FT

## (undated) DEVICE — APPLICATOR CHLORAPREP HI-LITE TINTED ORANGE 26ML

## (undated) DEVICE — DRAPE INVISISHIELD U 48X52IN

## (undated) DEVICE — GLOVE 7.0 PROTEXIS PI BLUE

## (undated) DEVICE — GLOVE SENSICARE PI SURG 6.5

## (undated) DEVICE — ELECTRODE REM PLYHSV RETURN 9

## (undated) DEVICE — VELYS ROBOT-ASSISTED SOLUTION ARRAY DRILL PIN 125 MM X 4 MM
Type: IMPLANTABLE DEVICE | Site: KNEE | Status: NON-FUNCTIONAL
Brand: VELYS
Removed: 2025-06-04

## (undated) DEVICE — COVER PROXIMA MAYO STAND

## (undated) DEVICE — NDL HYPODERMIC SAFETY 25G 1IN

## (undated) DEVICE — SPACESUIT TOGA T5 ZIPPER PEEL

## (undated) DEVICE — GLOVE SENSICARE PI GRN 7

## (undated) DEVICE — SOL NACL IRR 3000ML

## (undated) DEVICE — KIT COOLED RF PROBE 17G/75MM COOLIEF

## (undated) DEVICE — KIT COOLED RF 100MM SGL PROBE

## (undated) DEVICE — BLADE PERFORMANCE SAG 21X90MM

## (undated) DEVICE — TRAY NERVE BLOCK UNIV 10/CA

## (undated) DEVICE — SOL NACL 0.9% INJ 500ML BG

## (undated) DEVICE — TUBING CROSSFLOW INFLOW CASS

## (undated) DEVICE — GLOVE SENSICARE PI GRN 7.5

## (undated) DEVICE — SYR 30CC LUER LOCK

## (undated) DEVICE — BLADE RECIP DOUBLE SIDED

## (undated) DEVICE — SOL NACL 0.9% IV INJ 500ML

## (undated) DEVICE — DEVICE SUCTION H20 BROOM 12FT

## (undated) DEVICE — DRAPE ARTHSCP T ORTHOMAX POUCH

## (undated) DEVICE — DRAPE INCISE IOBAN 2 23X23IN

## (undated) DEVICE — NDL QUINCKE SPINAL 18G 3.5IN

## (undated) DEVICE — BANDAGE ESMARK ELASTIC ST 6X9

## (undated) DEVICE — SYRINGE 3CC LL 25GX5/8IN

## (undated) DEVICE — DRAPE VELYS SATELLITE STATION

## (undated) DEVICE — PACK KNEE ARTHROSCOPY  RUSH

## (undated) DEVICE — DRAPE VELYS DEVICE STERILE

## (undated) DEVICE — GLOVE BIOGEL SKINSENSE PI 7.5

## (undated) DEVICE — PAD GROUNDING W/CORD(BAYLIS)

## (undated) DEVICE — OVERLAY MATTRESS WAFFLE

## (undated) DEVICE — SUT ETHILON 3-0 PS2 18 BLK

## (undated) DEVICE — TUBE SUCTION MEDI-VAC STERILE

## (undated) DEVICE — GLOVE BIOGEL SKINSENSE PI 7.0

## (undated) DEVICE — BLADE VELYS OSCILLATING SAW